# Patient Record
Sex: MALE | Race: WHITE | Employment: OTHER | URBAN - METROPOLITAN AREA
[De-identification: names, ages, dates, MRNs, and addresses within clinical notes are randomized per-mention and may not be internally consistent; named-entity substitution may affect disease eponyms.]

---

## 2018-01-26 PROBLEM — F33.9 MAJOR DEPRESSIVE DISORDER, RECURRENT (HCC): Status: ACTIVE | Noted: 2018-01-26

## 2018-05-18 ENCOUNTER — HOSPITAL ENCOUNTER (INPATIENT)
Age: 63
LOS: 4 days | Discharge: HOME | DRG: 140 | End: 2018-05-22
Attending: EMERGENCY MEDICINE | Admitting: INTERNAL MEDICINE
Payer: COMMERCIAL

## 2018-05-18 ENCOUNTER — APPOINTMENT (OUTPATIENT)
Dept: GENERAL RADIOLOGY | Age: 63
DRG: 140 | End: 2018-05-18
Payer: COMMERCIAL

## 2018-05-18 DIAGNOSIS — R06.89 DYSPNEA AND RESPIRATORY ABNORMALITIES: Primary | ICD-10-CM

## 2018-05-18 DIAGNOSIS — J44.1 COPD EXACERBATION (HCC): ICD-10-CM

## 2018-05-18 DIAGNOSIS — R06.00 DYSPNEA AND RESPIRATORY ABNORMALITIES: Primary | ICD-10-CM

## 2018-05-18 PROBLEM — J96.91 RESPIRATORY FAILURE WITH HYPOXIA (HCC): Status: ACTIVE | Noted: 2018-05-18

## 2018-05-18 LAB
AADO2: 22.5 MMHG
ALBUMIN SERPL-MCNC: 4.4 G/DL (ref 3.5–5.2)
ALP BLD-CCNC: 102 U/L (ref 40–129)
ALT SERPL-CCNC: 18 U/L (ref 0–40)
ANION GAP SERPL CALCULATED.3IONS-SCNC: 23 MMOL/L (ref 7–16)
AST SERPL-CCNC: 18 U/L (ref 0–39)
B.E.: -4.7 MMOL/L (ref -3–3)
BASOPHILS ABSOLUTE: 0.11 E9/L (ref 0–0.2)
BASOPHILS RELATIVE PERCENT: 1.2 % (ref 0–2)
BILIRUB SERPL-MCNC: 0.5 MG/DL (ref 0–1.2)
BUN BLDV-MCNC: 9 MG/DL (ref 8–23)
CALCIUM SERPL-MCNC: 9.6 MG/DL (ref 8.6–10.2)
CHLORIDE BLD-SCNC: 98 MMOL/L (ref 98–107)
CK MB: 12.3 NG/ML (ref 0–7.7)
CK MB: 17.9 NG/ML (ref 0–7.7)
CO2: 20 MMOL/L (ref 22–29)
COHB: 0.8 % (ref 0–1.5)
CREAT SERPL-MCNC: 0.8 MG/DL (ref 0.7–1.2)
CRITICAL: ABNORMAL
D DIMER: 449 NG/ML DDU
DATE ANALYZED: ABNORMAL
DATE OF COLLECTION: ABNORMAL
EKG ATRIAL RATE: 111 BPM
EKG P-R INTERVAL: 124 MS
EKG Q-T INTERVAL: 330 MS
EKG QRS DURATION: 82 MS
EKG QTC CALCULATION (BAZETT): 448 MS
EKG R AXIS: -31 DEGREES
EKG T AXIS: 62 DEGREES
EKG VENTRICULAR RATE: 111 BPM
EOSINOPHILS ABSOLUTE: 1.11 E9/L (ref 0.05–0.5)
EOSINOPHILS RELATIVE PERCENT: 12.3 % (ref 0–6)
FILM ARRAY ADENOVIRUS: NORMAL
FILM ARRAY BORDETELLA PERTUSSIS: NORMAL
FILM ARRAY CHLAMYDOPHILIA PNEUMONIAE: NORMAL
FILM ARRAY CORONAVIRUS 229E: NORMAL
FILM ARRAY CORONAVIRUS HKU1: NORMAL
FILM ARRAY CORONAVIRUS NL63: NORMAL
FILM ARRAY CORONAVIRUS OC43: NORMAL
FILM ARRAY INFLUENZA A VIRUS 09H1: NORMAL
FILM ARRAY INFLUENZA A VIRUS H1: NORMAL
FILM ARRAY INFLUENZA A VIRUS H3: NORMAL
FILM ARRAY INFLUENZA A VIRUS: NORMAL
FILM ARRAY INFLUENZA B: NORMAL
FILM ARRAY METAPNEUMOVIRUS: NORMAL
FILM ARRAY MYCOPLASMA PNEUMONIAE: NORMAL
FILM ARRAY PARAINFLUENZA VIRUS 1: NORMAL
FILM ARRAY PARAINFLUENZA VIRUS 2: NORMAL
FILM ARRAY PARAINFLUENZA VIRUS 3: NORMAL
FILM ARRAY PARAINFLUENZA VIRUS 4: NORMAL
FILM ARRAY RESPIRATORY SYNCITIAL VIRUS: NORMAL
FILM ARRAY RHINOVIRUS/ENTEROVIRUS: NORMAL
FIO2: 60 %
GFR AFRICAN AMERICAN: >60
GFR NON-AFRICAN AMERICAN: >60 ML/MIN/1.73
GLUCOSE BLD-MCNC: 284 MG/DL (ref 74–109)
HCO3: 19.5 MMOL/L (ref 22–26)
HCT VFR BLD CALC: 42.5 % (ref 37–54)
HEMOGLOBIN: 14 G/DL (ref 12.5–16.5)
HHB: 0.4 % (ref 0–5)
IMMATURE GRANULOCYTES #: 0.02 E9/L
IMMATURE GRANULOCYTES %: 0.2 % (ref 0–5)
INFLUENZA A BY PCR: NOT DETECTED
INFLUENZA B BY PCR: NOT DETECTED
INR BLD: 1.5
LAB: ABNORMAL
LACTIC ACID: 1.2 MMOL/L (ref 0.5–2.2)
LACTIC ACID: 7.3 MMOL/L (ref 0.5–2.2)
LYMPHOCYTES ABSOLUTE: 2.22 E9/L (ref 1.5–4)
LYMPHOCYTES RELATIVE PERCENT: 24.6 % (ref 20–42)
Lab: 1008
MAGNESIUM: 2.1 MG/DL (ref 1.6–2.6)
MCH RBC QN AUTO: 29.3 PG (ref 26–35)
MCHC RBC AUTO-ENTMCNC: 32.9 % (ref 32–34.5)
MCV RBC AUTO: 88.9 FL (ref 80–99.9)
METER GLUCOSE: 161 MG/DL (ref 70–110)
METHB: 0.6 % (ref 0–1.5)
MODE: ABNORMAL
MONOCYTES ABSOLUTE: 0.74 E9/L (ref 0.1–0.95)
MONOCYTES RELATIVE PERCENT: 8.2 % (ref 2–12)
NEUTROPHILS ABSOLUTE: 4.82 E9/L (ref 1.8–7.3)
NEUTROPHILS RELATIVE PERCENT: 53.5 % (ref 43–80)
O2 CONTENT: 21.6 ML/DL
O2 SATURATION: 99.6 % (ref 92–98.5)
O2HB: 98.2 % (ref 94–97)
OPERATOR ID: 366
PATIENT TEMP: 37 C
PCO2: 34 MMHG (ref 35–45)
PDW BLD-RTO: 13.7 FL (ref 11.5–15)
PEEP/CPAP: 6 CMH?O
PFO2: 5.88 MMHG/%
PH BLOOD GAS: 7.38 (ref 7.35–7.45)
PHOSPHORUS: 2.3 MG/DL (ref 2.5–4.5)
PLATELET # BLD: 290 E9/L (ref 130–450)
PMV BLD AUTO: 11.9 FL (ref 7–12)
PO2: 352.9 MMHG (ref 60–100)
POTASSIUM SERPL-SCNC: 4.7 MMOL/L (ref 3.5–5)
PRO-BNP: 564 PG/ML (ref 0–125)
PROTHROMBIN TIME: 17 SEC (ref 9.3–12.4)
PS: 18 CMH20
RBC # BLD: 4.78 E12/L (ref 3.8–5.8)
RI(T): 6 %
SODIUM BLD-SCNC: 141 MMOL/L (ref 132–146)
SOURCE, BLOOD GAS: ABNORMAL
THB: 15 G/DL (ref 11.5–16.5)
TIME ANALYZED: 1011
TOTAL CK: 108 U/L (ref 20–200)
TOTAL CK: 153 U/L (ref 20–200)
TOTAL PROTEIN: 7.9 G/DL (ref 6.4–8.3)
TROPONIN: 0.45 NG/ML (ref 0–0.03)
TROPONIN: 0.49 NG/ML (ref 0–0.03)
TROPONIN: <0.01 NG/ML (ref 0–0.03)
WBC # BLD: 9 E9/L (ref 4.5–11.5)

## 2018-05-18 PROCEDURE — 82805 BLOOD GASES W/O2 SATURATION: CPT

## 2018-05-18 PROCEDURE — 6360000002 HC RX W HCPCS: Performed by: INTERNAL MEDICINE

## 2018-05-18 PROCEDURE — 84100 ASSAY OF PHOSPHORUS: CPT

## 2018-05-18 PROCEDURE — 6370000000 HC RX 637 (ALT 250 FOR IP): Performed by: EMERGENCY MEDICINE

## 2018-05-18 PROCEDURE — 82550 ASSAY OF CK (CPK): CPT

## 2018-05-18 PROCEDURE — 87486 CHLMYD PNEUM DNA AMP PROBE: CPT

## 2018-05-18 PROCEDURE — 83880 ASSAY OF NATRIURETIC PEPTIDE: CPT

## 2018-05-18 PROCEDURE — 83605 ASSAY OF LACTIC ACID: CPT

## 2018-05-18 PROCEDURE — 94660 CPAP INITIATION&MGMT: CPT

## 2018-05-18 PROCEDURE — 87503 INFLUENZA DNA AMP PROB ADDL: CPT

## 2018-05-18 PROCEDURE — 85610 PROTHROMBIN TIME: CPT

## 2018-05-18 PROCEDURE — 6370000000 HC RX 637 (ALT 250 FOR IP): Performed by: INTERNAL MEDICINE

## 2018-05-18 PROCEDURE — 84484 ASSAY OF TROPONIN QUANT: CPT

## 2018-05-18 PROCEDURE — 82553 CREATINE MB FRACTION: CPT

## 2018-05-18 PROCEDURE — 94640 AIRWAY INHALATION TREATMENT: CPT

## 2018-05-18 PROCEDURE — 87581 M.PNEUMON DNA AMP PROBE: CPT

## 2018-05-18 PROCEDURE — 87501 INFLUENZA DNA AMP PROB 1+: CPT

## 2018-05-18 PROCEDURE — 6360000002 HC RX W HCPCS: Performed by: EMERGENCY MEDICINE

## 2018-05-18 PROCEDURE — 87502 INFLUENZA DNA AMP PROBE: CPT

## 2018-05-18 PROCEDURE — 94664 DEMO&/EVAL PT USE INHALER: CPT

## 2018-05-18 PROCEDURE — 2700000000 HC OXYGEN THERAPY PER DAY

## 2018-05-18 PROCEDURE — 87040 BLOOD CULTURE FOR BACTERIA: CPT

## 2018-05-18 PROCEDURE — 99285 EMERGENCY DEPT VISIT HI MDM: CPT

## 2018-05-18 PROCEDURE — 80053 COMPREHEN METABOLIC PANEL: CPT

## 2018-05-18 PROCEDURE — 93005 ELECTROCARDIOGRAM TRACING: CPT | Performed by: EMERGENCY MEDICINE

## 2018-05-18 PROCEDURE — 36415 COLL VENOUS BLD VENIPUNCTURE: CPT

## 2018-05-18 PROCEDURE — 85378 FIBRIN DEGRADE SEMIQUANT: CPT

## 2018-05-18 PROCEDURE — 71045 X-RAY EXAM CHEST 1 VIEW: CPT

## 2018-05-18 PROCEDURE — 82962 GLUCOSE BLOOD TEST: CPT

## 2018-05-18 PROCEDURE — 85025 COMPLETE CBC W/AUTO DIFF WBC: CPT

## 2018-05-18 PROCEDURE — 2060000000 HC ICU INTERMEDIATE R&B

## 2018-05-18 PROCEDURE — 2580000003 HC RX 258: Performed by: INTERNAL MEDICINE

## 2018-05-18 PROCEDURE — 99254 IP/OBS CNSLTJ NEW/EST MOD 60: CPT | Performed by: INTERNAL MEDICINE

## 2018-05-18 PROCEDURE — 2580000003 HC RX 258: Performed by: EMERGENCY MEDICINE

## 2018-05-18 PROCEDURE — 87798 DETECT AGENT NOS DNA AMP: CPT

## 2018-05-18 PROCEDURE — 94760 N-INVAS EAR/PLS OXIMETRY 1: CPT

## 2018-05-18 PROCEDURE — 83735 ASSAY OF MAGNESIUM: CPT

## 2018-05-18 RX ORDER — IPRATROPIUM BROMIDE 42 UG/1
2 SPRAY, METERED NASAL 2 TIMES DAILY PRN
Refills: 0 | Status: ON HOLD | COMMUNITY
Start: 2018-05-02 | End: 2018-05-22

## 2018-05-18 RX ORDER — SODIUM CHLORIDE 0.9 % (FLUSH) 0.9 %
10 SYRINGE (ML) INJECTION EVERY 12 HOURS SCHEDULED
Status: DISCONTINUED | OUTPATIENT
Start: 2018-05-18 | End: 2018-05-22 | Stop reason: HOSPADM

## 2018-05-18 RX ORDER — NICOTINE 21 MG/24HR
1 PATCH, TRANSDERMAL 24 HOURS TRANSDERMAL DAILY
Status: DISCONTINUED | OUTPATIENT
Start: 2018-05-18 | End: 2018-05-22 | Stop reason: HOSPADM

## 2018-05-18 RX ORDER — METHYLPREDNISOLONE SODIUM SUCCINATE 40 MG/ML
40 INJECTION, POWDER, LYOPHILIZED, FOR SOLUTION INTRAMUSCULAR; INTRAVENOUS EVERY 6 HOURS
Status: DISCONTINUED | OUTPATIENT
Start: 2018-05-18 | End: 2018-05-19

## 2018-05-18 RX ORDER — FINASTERIDE 5 MG/1
5 TABLET, FILM COATED ORAL DAILY
Status: DISCONTINUED | OUTPATIENT
Start: 2018-05-18 | End: 2018-05-22 | Stop reason: HOSPADM

## 2018-05-18 RX ORDER — MONTELUKAST SODIUM 10 MG/1
10 TABLET ORAL DAILY
Status: DISCONTINUED | OUTPATIENT
Start: 2018-05-18 | End: 2018-05-22 | Stop reason: HOSPADM

## 2018-05-18 RX ORDER — LORAZEPAM 1 MG/1
1 TABLET ORAL 2 TIMES DAILY PRN
Status: DISCONTINUED | OUTPATIENT
Start: 2018-05-18 | End: 2018-05-22 | Stop reason: HOSPADM

## 2018-05-18 RX ORDER — IPRATROPIUM BROMIDE AND ALBUTEROL SULFATE 2.5; .5 MG/3ML; MG/3ML
3 SOLUTION RESPIRATORY (INHALATION) ONCE
Status: COMPLETED | OUTPATIENT
Start: 2018-05-18 | End: 2018-05-18

## 2018-05-18 RX ORDER — LEVETIRACETAM 500 MG/1
500 TABLET ORAL 2 TIMES DAILY
Status: DISCONTINUED | OUTPATIENT
Start: 2018-05-18 | End: 2018-05-20

## 2018-05-18 RX ORDER — 0.9 % SODIUM CHLORIDE 0.9 %
500 INTRAVENOUS SOLUTION INTRAVENOUS ONCE
Status: COMPLETED | OUTPATIENT
Start: 2018-05-18 | End: 2018-05-18

## 2018-05-18 RX ORDER — SODIUM CHLORIDE 0.9 % (FLUSH) 0.9 %
10 SYRINGE (ML) INJECTION PRN
Status: DISCONTINUED | OUTPATIENT
Start: 2018-05-18 | End: 2018-05-22 | Stop reason: HOSPADM

## 2018-05-18 RX ORDER — ONDANSETRON 2 MG/ML
4 INJECTION INTRAMUSCULAR; INTRAVENOUS EVERY 6 HOURS PRN
Status: DISCONTINUED | OUTPATIENT
Start: 2018-05-18 | End: 2018-05-22 | Stop reason: HOSPADM

## 2018-05-18 RX ORDER — WARFARIN SODIUM 5 MG/1
5 TABLET ORAL DAILY
Status: DISCONTINUED | OUTPATIENT
Start: 2018-05-18 | End: 2018-05-18

## 2018-05-18 RX ORDER — ATORVASTATIN CALCIUM 20 MG/1
20 TABLET, FILM COATED ORAL NIGHTLY
Status: DISCONTINUED | OUTPATIENT
Start: 2018-05-18 | End: 2018-05-19

## 2018-05-18 RX ORDER — METHYLPREDNISOLONE SODIUM SUCCINATE 125 MG/2ML
125 INJECTION, POWDER, LYOPHILIZED, FOR SOLUTION INTRAMUSCULAR; INTRAVENOUS ONCE
Status: COMPLETED | OUTPATIENT
Start: 2018-05-18 | End: 2018-05-18

## 2018-05-18 RX ORDER — IPRATROPIUM BROMIDE AND ALBUTEROL SULFATE 2.5; .5 MG/3ML; MG/3ML
1 SOLUTION RESPIRATORY (INHALATION) EVERY 4 HOURS
Status: DISCONTINUED | OUTPATIENT
Start: 2018-05-18 | End: 2018-05-22 | Stop reason: HOSPADM

## 2018-05-18 RX ORDER — VENLAFAXINE HYDROCHLORIDE 75 MG/1
75 CAPSULE, EXTENDED RELEASE ORAL DAILY
Status: DISCONTINUED | OUTPATIENT
Start: 2018-05-18 | End: 2018-05-20

## 2018-05-18 RX ORDER — LEVETIRACETAM 250 MG/1
2 TABLET ORAL 2 TIMES DAILY
Refills: 0 | Status: ON HOLD | COMMUNITY
Start: 2018-05-14 | End: 2018-05-22

## 2018-05-18 RX ORDER — ALBUTEROL SULFATE 2.5 MG/3ML
3 SOLUTION RESPIRATORY (INHALATION) PRN
Refills: 0 | Status: ON HOLD | COMMUNITY
Start: 2018-05-17 | End: 2018-05-22

## 2018-05-18 RX ORDER — CLOPIDOGREL BISULFATE 75 MG/1
75 TABLET ORAL DAILY
Status: DISCONTINUED | OUTPATIENT
Start: 2018-05-18 | End: 2018-05-22

## 2018-05-18 RX ORDER — LORAZEPAM 1 MG/1
1 TABLET ORAL 2 TIMES DAILY PRN
Refills: 0 | Status: ON HOLD | COMMUNITY
Start: 2018-04-25 | End: 2018-05-22 | Stop reason: HOSPADM

## 2018-05-18 RX ORDER — WARFARIN SODIUM 5 MG/1
5 TABLET ORAL
Status: DISCONTINUED | OUTPATIENT
Start: 2018-05-18 | End: 2018-05-18

## 2018-05-18 RX ORDER — VENLAFAXINE HYDROCHLORIDE 37.5 MG/1
37.5 TABLET, EXTENDED RELEASE ORAL EVERY EVENING
Refills: 0 | Status: ON HOLD | COMMUNITY
Start: 2018-04-25 | End: 2021-09-02 | Stop reason: HOSPADM

## 2018-05-18 RX ADMIN — LORAZEPAM 1 MG: 1 TABLET ORAL at 14:58

## 2018-05-18 RX ADMIN — LEVETIRACETAM 500 MG: 500 TABLET, FILM COATED ORAL at 20:30

## 2018-05-18 RX ADMIN — VENLAFAXINE HYDROCHLORIDE 75 MG: 75 CAPSULE, EXTENDED RELEASE ORAL at 16:22

## 2018-05-18 RX ADMIN — FINASTERIDE 5 MG: 5 TABLET, FILM COATED ORAL at 16:22

## 2018-05-18 RX ADMIN — IPRATROPIUM BROMIDE AND ALBUTEROL SULFATE 1 AMPULE: 2.5; .5 SOLUTION RESPIRATORY (INHALATION) at 20:14

## 2018-05-18 RX ADMIN — ENOXAPARIN SODIUM 40 MG: 40 INJECTION SUBCUTANEOUS at 14:58

## 2018-05-18 RX ADMIN — METHYLPREDNISOLONE SODIUM SUCCINATE 125 MG: 125 INJECTION, POWDER, FOR SOLUTION INTRAMUSCULAR; INTRAVENOUS at 10:00

## 2018-05-18 RX ADMIN — LEVETIRACETAM 500 MG: 500 TABLET, FILM COATED ORAL at 14:58

## 2018-05-18 RX ADMIN — MONTELUKAST SODIUM 10 MG: 10 TABLET, FILM COATED ORAL at 16:22

## 2018-05-18 RX ADMIN — METHYLPREDNISOLONE SODIUM SUCCINATE 40 MG: 40 INJECTION, POWDER, FOR SOLUTION INTRAMUSCULAR; INTRAVENOUS at 17:48

## 2018-05-18 RX ADMIN — POTASSIUM & SODIUM PHOSPHATES POWDER PACK 280-160-250 MG 500 MG: 280-160-250 PACK at 20:30

## 2018-05-18 RX ADMIN — IPRATROPIUM BROMIDE AND ALBUTEROL SULFATE 1 AMPULE: 2.5; .5 SOLUTION RESPIRATORY (INHALATION) at 16:04

## 2018-05-18 RX ADMIN — IPRATROPIUM BROMIDE AND ALBUTEROL SULFATE 1 AMPULE: 2.5; .5 SOLUTION RESPIRATORY (INHALATION) at 23:19

## 2018-05-18 RX ADMIN — ATORVASTATIN CALCIUM 20 MG: 20 TABLET, FILM COATED ORAL at 20:58

## 2018-05-18 RX ADMIN — SODIUM CHLORIDE 500 ML: 9 INJECTION, SOLUTION INTRAVENOUS at 11:12

## 2018-05-18 RX ADMIN — Medication 10 ML: at 20:30

## 2018-05-18 RX ADMIN — IPRATROPIUM BROMIDE AND ALBUTEROL SULFATE 3 AMPULE: 2.5; .5 SOLUTION RESPIRATORY (INHALATION) at 09:42

## 2018-05-18 RX ADMIN — CLOPIDOGREL 75 MG: 75 TABLET, FILM COATED ORAL at 11:57

## 2018-05-18 RX ADMIN — ENOXAPARIN SODIUM 60 MG: 60 INJECTION SUBCUTANEOUS at 20:30

## 2018-05-18 ASSESSMENT — PAIN DESCRIPTION - FREQUENCY: FREQUENCY: CONTINUOUS

## 2018-05-18 ASSESSMENT — ENCOUNTER SYMPTOMS
RHINORRHEA: 0
NAUSEA: 0
BLOOD IN STOOL: 0
ABDOMINAL PAIN: 0
DIARRHEA: 0
COLOR CHANGE: 0
SHORTNESS OF BREATH: 1
SORE THROAT: 0
BACK PAIN: 0
VOMITING: 0
EYE PAIN: 0
CHEST TIGHTNESS: 0
COUGH: 1

## 2018-05-18 ASSESSMENT — PAIN DESCRIPTION - PAIN TYPE: TYPE: ACUTE PAIN

## 2018-05-18 ASSESSMENT — PAIN SCALES - GENERAL
PAINLEVEL_OUTOF10: 0
PAINLEVEL_OUTOF10: 2

## 2018-05-18 ASSESSMENT — PAIN DESCRIPTION - DESCRIPTORS: DESCRIPTORS: SHARP

## 2018-05-18 ASSESSMENT — PAIN DESCRIPTION - LOCATION: LOCATION: CHEST

## 2018-05-19 ENCOUNTER — APPOINTMENT (OUTPATIENT)
Dept: NUCLEAR MEDICINE | Age: 63
DRG: 140 | End: 2018-05-19
Payer: COMMERCIAL

## 2018-05-19 LAB
ANION GAP SERPL CALCULATED.3IONS-SCNC: 13 MMOL/L (ref 7–16)
BASOPHILS ABSOLUTE: 0.01 E9/L (ref 0–0.2)
BASOPHILS RELATIVE PERCENT: 0.1 % (ref 0–2)
BUN BLDV-MCNC: 15 MG/DL (ref 8–23)
CALCIUM SERPL-MCNC: 9 MG/DL (ref 8.6–10.2)
CHLORIDE BLD-SCNC: 102 MMOL/L (ref 98–107)
CHOLESTEROL, TOTAL: 133 MG/DL (ref 0–199)
CK MB: 20.4 NG/ML (ref 0–7.7)
CO2: 22 MMOL/L (ref 22–29)
CREAT SERPL-MCNC: 0.7 MG/DL (ref 0.7–1.2)
EOSINOPHILS ABSOLUTE: 0 E9/L (ref 0.05–0.5)
EOSINOPHILS RELATIVE PERCENT: 0 % (ref 0–6)
GFR AFRICAN AMERICAN: >60
GFR NON-AFRICAN AMERICAN: >60 ML/MIN/1.73
GLUCOSE BLD-MCNC: 195 MG/DL (ref 74–109)
HBA1C MFR BLD: 5.8 % (ref 4.8–5.9)
HCT VFR BLD CALC: 37.8 % (ref 37–54)
HDLC SERPL-MCNC: 54 MG/DL
HEMOGLOBIN: 12.9 G/DL (ref 12.5–16.5)
IMMATURE GRANULOCYTES #: 0.06 E9/L
IMMATURE GRANULOCYTES %: 0.5 % (ref 0–5)
INR BLD: 1.5
LDL CHOLESTEROL CALCULATED: 71 MG/DL (ref 0–99)
LV EF: 35 %
LVEF MODALITY: NORMAL
LYMPHOCYTES ABSOLUTE: 0.65 E9/L (ref 1.5–4)
LYMPHOCYTES RELATIVE PERCENT: 5.4 % (ref 20–42)
MCH RBC QN AUTO: 29.4 PG (ref 26–35)
MCHC RBC AUTO-ENTMCNC: 34.1 % (ref 32–34.5)
MCV RBC AUTO: 86.1 FL (ref 80–99.9)
METER GLUCOSE: 148 MG/DL (ref 70–110)
METER GLUCOSE: 150 MG/DL (ref 70–110)
METER GLUCOSE: 165 MG/DL (ref 70–110)
METER GLUCOSE: 198 MG/DL (ref 70–110)
MONOCYTES ABSOLUTE: 0.62 E9/L (ref 0.1–0.95)
MONOCYTES RELATIVE PERCENT: 5.2 % (ref 2–12)
NEUTROPHILS ABSOLUTE: 10.66 E9/L (ref 1.8–7.3)
NEUTROPHILS RELATIVE PERCENT: 88.8 % (ref 43–80)
PDW BLD-RTO: 13.4 FL (ref 11.5–15)
PLATELET # BLD: 220 E9/L (ref 130–450)
PMV BLD AUTO: 12.2 FL (ref 7–12)
POTASSIUM REFLEX MAGNESIUM: 4.1 MMOL/L (ref 3.5–5)
PROTHROMBIN TIME: 17.5 SEC (ref 9.3–12.4)
RBC # BLD: 4.39 E12/L (ref 3.8–5.8)
SODIUM BLD-SCNC: 137 MMOL/L (ref 132–146)
TOTAL CK: 173 U/L (ref 20–200)
TRIGL SERPL-MCNC: 42 MG/DL (ref 0–149)
TROPONIN: 0.47 NG/ML (ref 0–0.03)
VLDLC SERPL CALC-MCNC: 8 MG/DL
WBC # BLD: 12 E9/L (ref 4.5–11.5)

## 2018-05-19 PROCEDURE — 93005 ELECTROCARDIOGRAM TRACING: CPT | Performed by: INTERNAL MEDICINE

## 2018-05-19 PROCEDURE — 99223 1ST HOSP IP/OBS HIGH 75: CPT | Performed by: INTERNAL MEDICINE

## 2018-05-19 PROCEDURE — 80061 LIPID PANEL: CPT

## 2018-05-19 PROCEDURE — 82553 CREATINE MB FRACTION: CPT

## 2018-05-19 PROCEDURE — 93306 TTE W/DOPPLER COMPLETE: CPT

## 2018-05-19 PROCEDURE — 82962 GLUCOSE BLOOD TEST: CPT

## 2018-05-19 PROCEDURE — 6370000000 HC RX 637 (ALT 250 FOR IP): Performed by: INTERNAL MEDICINE

## 2018-05-19 PROCEDURE — A9558 XE133 XENON 10MCI: HCPCS | Performed by: RADIOLOGY

## 2018-05-19 PROCEDURE — 83036 HEMOGLOBIN GLYCOSYLATED A1C: CPT

## 2018-05-19 PROCEDURE — 85610 PROTHROMBIN TIME: CPT

## 2018-05-19 PROCEDURE — A9540 TC99M MAA: HCPCS | Performed by: RADIOLOGY

## 2018-05-19 PROCEDURE — 6360000002 HC RX W HCPCS: Performed by: INTERNAL MEDICINE

## 2018-05-19 PROCEDURE — 36415 COLL VENOUS BLD VENIPUNCTURE: CPT

## 2018-05-19 PROCEDURE — 78580 LUNG PERFUSION IMAGING: CPT

## 2018-05-19 PROCEDURE — 80048 BASIC METABOLIC PNL TOTAL CA: CPT

## 2018-05-19 PROCEDURE — 87070 CULTURE OTHR SPECIMN AEROBIC: CPT

## 2018-05-19 PROCEDURE — 2700000000 HC OXYGEN THERAPY PER DAY

## 2018-05-19 PROCEDURE — 85025 COMPLETE CBC W/AUTO DIFF WBC: CPT

## 2018-05-19 PROCEDURE — 2580000003 HC RX 258: Performed by: INTERNAL MEDICINE

## 2018-05-19 PROCEDURE — 2060000000 HC ICU INTERMEDIATE R&B

## 2018-05-19 PROCEDURE — 94640 AIRWAY INHALATION TREATMENT: CPT

## 2018-05-19 PROCEDURE — 94760 N-INVAS EAR/PLS OXIMETRY 1: CPT

## 2018-05-19 PROCEDURE — 87205 SMEAR GRAM STAIN: CPT

## 2018-05-19 PROCEDURE — 82550 ASSAY OF CK (CPK): CPT

## 2018-05-19 PROCEDURE — 3430000000 HC RX DIAGNOSTIC RADIOPHARMACEUTICAL: Performed by: RADIOLOGY

## 2018-05-19 PROCEDURE — 84484 ASSAY OF TROPONIN QUANT: CPT

## 2018-05-19 RX ORDER — METOPROLOL SUCCINATE 25 MG/1
25 TABLET, EXTENDED RELEASE ORAL 2 TIMES DAILY
Status: DISCONTINUED | OUTPATIENT
Start: 2018-05-19 | End: 2018-05-22

## 2018-05-19 RX ORDER — DEXTROSE MONOHYDRATE 25 G/50ML
12.5 INJECTION, SOLUTION INTRAVENOUS PRN
Status: DISCONTINUED | OUTPATIENT
Start: 2018-05-19 | End: 2018-05-22 | Stop reason: HOSPADM

## 2018-05-19 RX ORDER — FUROSEMIDE 10 MG/ML
20 INJECTION INTRAMUSCULAR; INTRAVENOUS ONCE
Status: COMPLETED | OUTPATIENT
Start: 2018-05-19 | End: 2018-05-19

## 2018-05-19 RX ORDER — SPIRONOLACTONE 25 MG/1
25 TABLET ORAL DAILY
Status: DISCONTINUED | OUTPATIENT
Start: 2018-05-19 | End: 2018-05-22 | Stop reason: HOSPADM

## 2018-05-19 RX ORDER — NITROGLYCERIN 0.4 MG/1
TABLET SUBLINGUAL
Status: DISCONTINUED
Start: 2018-05-19 | End: 2018-05-19 | Stop reason: WASHOUT

## 2018-05-19 RX ORDER — DIGOXIN 125 MCG
125 TABLET ORAL DAILY
Status: DISCONTINUED | OUTPATIENT
Start: 2018-05-19 | End: 2018-05-22 | Stop reason: HOSPADM

## 2018-05-19 RX ORDER — BUDESONIDE 0.5 MG/2ML
500 INHALANT ORAL 2 TIMES DAILY
Status: DISCONTINUED | OUTPATIENT
Start: 2018-05-19 | End: 2018-05-22 | Stop reason: HOSPADM

## 2018-05-19 RX ORDER — ATORVASTATIN CALCIUM 40 MG/1
40 TABLET, FILM COATED ORAL NIGHTLY
Status: DISCONTINUED | OUTPATIENT
Start: 2018-05-19 | End: 2018-05-22 | Stop reason: HOSPADM

## 2018-05-19 RX ORDER — PREDNISONE 20 MG/1
40 TABLET ORAL DAILY
Status: DISCONTINUED | OUTPATIENT
Start: 2018-05-19 | End: 2018-05-22 | Stop reason: HOSPADM

## 2018-05-19 RX ORDER — NICOTINE POLACRILEX 4 MG
15 LOZENGE BUCCAL PRN
Status: DISCONTINUED | OUTPATIENT
Start: 2018-05-19 | End: 2018-05-22 | Stop reason: HOSPADM

## 2018-05-19 RX ORDER — FORMOTEROL FUMARATE 20 UG/2ML
20 SOLUTION RESPIRATORY (INHALATION) EVERY 12 HOURS
Status: DISCONTINUED | OUTPATIENT
Start: 2018-05-19 | End: 2018-05-22 | Stop reason: HOSPADM

## 2018-05-19 RX ORDER — DEXTROSE MONOHYDRATE 50 MG/ML
100 INJECTION, SOLUTION INTRAVENOUS PRN
Status: DISCONTINUED | OUTPATIENT
Start: 2018-05-19 | End: 2018-05-22 | Stop reason: HOSPADM

## 2018-05-19 RX ADMIN — IPRATROPIUM BROMIDE AND ALBUTEROL SULFATE 1 AMPULE: 2.5; .5 SOLUTION RESPIRATORY (INHALATION) at 13:52

## 2018-05-19 RX ADMIN — CLOPIDOGREL 75 MG: 75 TABLET, FILM COATED ORAL at 07:54

## 2018-05-19 RX ADMIN — LEVETIRACETAM 500 MG: 500 TABLET, FILM COATED ORAL at 20:55

## 2018-05-19 RX ADMIN — SPIRONOLACTONE 25 MG: 25 TABLET ORAL at 17:37

## 2018-05-19 RX ADMIN — IPRATROPIUM BROMIDE AND ALBUTEROL SULFATE 1 AMPULE: 2.5; .5 SOLUTION RESPIRATORY (INHALATION) at 18:33

## 2018-05-19 RX ADMIN — IPRATROPIUM BROMIDE AND ALBUTEROL SULFATE 1 AMPULE: 2.5; .5 SOLUTION RESPIRATORY (INHALATION) at 09:16

## 2018-05-19 RX ADMIN — INSULIN LISPRO 1 UNITS: 100 INJECTION, SOLUTION INTRAVENOUS; SUBCUTANEOUS at 20:59

## 2018-05-19 RX ADMIN — VENLAFAXINE HYDROCHLORIDE 75 MG: 75 CAPSULE, EXTENDED RELEASE ORAL at 07:54

## 2018-05-19 RX ADMIN — LORAZEPAM 1 MG: 1 TABLET ORAL at 00:11

## 2018-05-19 RX ADMIN — LORAZEPAM 1 MG: 1 TABLET ORAL at 20:55

## 2018-05-19 RX ADMIN — NITROGLYCERIN 0.5 INCH: 20 OINTMENT TOPICAL at 17:26

## 2018-05-19 RX ADMIN — IPRATROPIUM BROMIDE AND ALBUTEROL SULFATE 1 AMPULE: 2.5; .5 SOLUTION RESPIRATORY (INHALATION) at 04:23

## 2018-05-19 RX ADMIN — FUROSEMIDE 20 MG: 10 INJECTION INTRAMUSCULAR; INTRAVENOUS at 17:07

## 2018-05-19 RX ADMIN — METOPROLOL SUCCINATE 25 MG: 25 TABLET, FILM COATED, EXTENDED RELEASE ORAL at 20:55

## 2018-05-19 RX ADMIN — Medication 10 ML: at 20:55

## 2018-05-19 RX ADMIN — ENOXAPARIN SODIUM 60 MG: 60 INJECTION SUBCUTANEOUS at 20:55

## 2018-05-19 RX ADMIN — INSULIN LISPRO 1 UNITS: 100 INJECTION, SOLUTION INTRAVENOUS; SUBCUTANEOUS at 12:22

## 2018-05-19 RX ADMIN — PREDNISONE 40 MG: 20 TABLET ORAL at 09:47

## 2018-05-19 RX ADMIN — BUDESONIDE 500 MCG: 0.5 SUSPENSION RESPIRATORY (INHALATION) at 22:03

## 2018-05-19 RX ADMIN — FINASTERIDE 5 MG: 5 TABLET, FILM COATED ORAL at 07:54

## 2018-05-19 RX ADMIN — DIGOXIN 125 MCG: 125 TABLET ORAL at 17:07

## 2018-05-19 RX ADMIN — FORMOTEROL FUMARATE DIHYDRATE 20 MCG: 20 SOLUTION RESPIRATORY (INHALATION) at 22:03

## 2018-05-19 RX ADMIN — Medication 8 MILLICURIE: at 10:57

## 2018-05-19 RX ADMIN — INSULIN LISPRO 1 UNITS: 100 INJECTION, SOLUTION INTRAVENOUS; SUBCUTANEOUS at 17:16

## 2018-05-19 RX ADMIN — LEVETIRACETAM 500 MG: 500 TABLET, FILM COATED ORAL at 07:54

## 2018-05-19 RX ADMIN — ENOXAPARIN SODIUM 60 MG: 60 INJECTION SUBCUTANEOUS at 07:54

## 2018-05-19 RX ADMIN — ATORVASTATIN CALCIUM 40 MG: 40 TABLET, FILM COATED ORAL at 20:55

## 2018-05-19 RX ADMIN — Medication 35 MILLICURIE: at 10:48

## 2018-05-19 RX ADMIN — NITROGLYCERIN 0.5 INCH: 20 OINTMENT TOPICAL at 23:08

## 2018-05-19 RX ADMIN — LORAZEPAM 1 MG: 1 TABLET ORAL at 07:55

## 2018-05-19 RX ADMIN — MONTELUKAST SODIUM 10 MG: 10 TABLET, FILM COATED ORAL at 07:54

## 2018-05-19 ASSESSMENT — PAIN SCALES - GENERAL
PAINLEVEL_OUTOF10: 0

## 2018-05-20 PROBLEM — E43 SEVERE PROTEIN-CALORIE MALNUTRITION (HCC): Chronic | Status: ACTIVE | Noted: 2018-05-20

## 2018-05-20 LAB
ANION GAP SERPL CALCULATED.3IONS-SCNC: 11 MMOL/L (ref 7–16)
BUN BLDV-MCNC: 13 MG/DL (ref 8–23)
CALCIUM SERPL-MCNC: 8.9 MG/DL (ref 8.6–10.2)
CHLORIDE BLD-SCNC: 100 MMOL/L (ref 98–107)
CO2: 28 MMOL/L (ref 22–29)
CREAT SERPL-MCNC: 0.7 MG/DL (ref 0.7–1.2)
EKG ATRIAL RATE: 89 BPM
EKG ATRIAL RATE: 97 BPM
EKG P-R INTERVAL: 122 MS
EKG P-R INTERVAL: 128 MS
EKG Q-T INTERVAL: 356 MS
EKG Q-T INTERVAL: 380 MS
EKG QRS DURATION: 84 MS
EKG QRS DURATION: 90 MS
EKG QTC CALCULATION (BAZETT): 433 MS
EKG QTC CALCULATION (BAZETT): 482 MS
EKG R AXIS: -36 DEGREES
EKG R AXIS: -44 DEGREES
EKG T AXIS: -152 DEGREES
EKG T AXIS: 103 DEGREES
EKG VENTRICULAR RATE: 89 BPM
EKG VENTRICULAR RATE: 97 BPM
GFR AFRICAN AMERICAN: >60
GFR NON-AFRICAN AMERICAN: >60 ML/MIN/1.73
GLUCOSE BLD-MCNC: 110 MG/DL (ref 74–109)
GRAM STAIN ORDERABLE: NORMAL
INR BLD: 1.2
METER GLUCOSE: 109 MG/DL (ref 70–110)
METER GLUCOSE: 114 MG/DL (ref 70–110)
METER GLUCOSE: 158 MG/DL (ref 70–110)
METER GLUCOSE: 257 MG/DL (ref 70–110)
POTASSIUM SERPL-SCNC: 3.7 MMOL/L (ref 3.5–5)
PROTHROMBIN TIME: 13.4 SEC (ref 9.3–12.4)
SODIUM BLD-SCNC: 139 MMOL/L (ref 132–146)

## 2018-05-20 PROCEDURE — 80048 BASIC METABOLIC PNL TOTAL CA: CPT

## 2018-05-20 PROCEDURE — 36415 COLL VENOUS BLD VENIPUNCTURE: CPT

## 2018-05-20 PROCEDURE — 6370000000 HC RX 637 (ALT 250 FOR IP): Performed by: INTERNAL MEDICINE

## 2018-05-20 PROCEDURE — 6360000002 HC RX W HCPCS: Performed by: INTERNAL MEDICINE

## 2018-05-20 PROCEDURE — 93010 ELECTROCARDIOGRAM REPORT: CPT | Performed by: INTERNAL MEDICINE

## 2018-05-20 PROCEDURE — 2580000003 HC RX 258: Performed by: INTERNAL MEDICINE

## 2018-05-20 PROCEDURE — 2060000000 HC ICU INTERMEDIATE R&B

## 2018-05-20 PROCEDURE — 94640 AIRWAY INHALATION TREATMENT: CPT

## 2018-05-20 PROCEDURE — 85610 PROTHROMBIN TIME: CPT

## 2018-05-20 PROCEDURE — 82962 GLUCOSE BLOOD TEST: CPT

## 2018-05-20 PROCEDURE — 99233 SBSQ HOSP IP/OBS HIGH 50: CPT | Performed by: INTERNAL MEDICINE

## 2018-05-20 PROCEDURE — 2700000000 HC OXYGEN THERAPY PER DAY

## 2018-05-20 RX ORDER — DIPHENHYDRAMINE HCL 25 MG
25 TABLET ORAL 3 TIMES DAILY
Status: DISCONTINUED | OUTPATIENT
Start: 2018-05-20 | End: 2018-05-22

## 2018-05-20 RX ORDER — FAMOTIDINE 20 MG/1
20 TABLET, FILM COATED ORAL 2 TIMES DAILY
Status: DISCONTINUED | OUTPATIENT
Start: 2018-05-20 | End: 2018-05-22

## 2018-05-20 RX ADMIN — ENOXAPARIN SODIUM 60 MG: 60 INJECTION SUBCUTANEOUS at 08:07

## 2018-05-20 RX ADMIN — INSULIN LISPRO 3 UNITS: 100 INJECTION, SOLUTION INTRAVENOUS; SUBCUTANEOUS at 17:51

## 2018-05-20 RX ADMIN — FAMOTIDINE 20 MG: 20 TABLET ORAL at 11:37

## 2018-05-20 RX ADMIN — PREDNISONE 40 MG: 20 TABLET ORAL at 08:06

## 2018-05-20 RX ADMIN — ENOXAPARIN SODIUM 60 MG: 60 INJECTION SUBCUTANEOUS at 21:15

## 2018-05-20 RX ADMIN — BUDESONIDE 500 MCG: 0.5 SUSPENSION RESPIRATORY (INHALATION) at 19:58

## 2018-05-20 RX ADMIN — Medication 10 ML: at 21:15

## 2018-05-20 RX ADMIN — DIGOXIN 125 MCG: 125 TABLET ORAL at 08:04

## 2018-05-20 RX ADMIN — INSULIN LISPRO 1 UNITS: 100 INJECTION, SOLUTION INTRAVENOUS; SUBCUTANEOUS at 21:16

## 2018-05-20 RX ADMIN — NITROGLYCERIN 0.5 INCH: 20 OINTMENT TOPICAL at 12:12

## 2018-05-20 RX ADMIN — NITROGLYCERIN 0.5 INCH: 20 OINTMENT TOPICAL at 23:41

## 2018-05-20 RX ADMIN — LORAZEPAM 1 MG: 1 TABLET ORAL at 21:15

## 2018-05-20 RX ADMIN — FAMOTIDINE 20 MG: 20 TABLET ORAL at 21:21

## 2018-05-20 RX ADMIN — FINASTERIDE 5 MG: 5 TABLET, FILM COATED ORAL at 08:07

## 2018-05-20 RX ADMIN — FORMOTEROL FUMARATE DIHYDRATE 20 MCG: 20 SOLUTION RESPIRATORY (INHALATION) at 08:34

## 2018-05-20 RX ADMIN — MONTELUKAST SODIUM 10 MG: 10 TABLET, FILM COATED ORAL at 08:07

## 2018-05-20 RX ADMIN — METOPROLOL SUCCINATE 25 MG: 25 TABLET, FILM COATED, EXTENDED RELEASE ORAL at 08:04

## 2018-05-20 RX ADMIN — NITROGLYCERIN 0.5 INCH: 20 OINTMENT TOPICAL at 17:42

## 2018-05-20 RX ADMIN — IPRATROPIUM BROMIDE AND ALBUTEROL SULFATE 1 AMPULE: 2.5; .5 SOLUTION RESPIRATORY (INHALATION) at 13:56

## 2018-05-20 RX ADMIN — DIPHENHYDRAMINE HCL 25 MG: 25 TABLET ORAL at 17:34

## 2018-05-20 RX ADMIN — BUDESONIDE 500 MCG: 0.5 SUSPENSION RESPIRATORY (INHALATION) at 08:34

## 2018-05-20 RX ADMIN — LEVETIRACETAM 500 MG: 500 TABLET, FILM COATED ORAL at 08:07

## 2018-05-20 RX ADMIN — LORAZEPAM 1 MG: 1 TABLET ORAL at 08:07

## 2018-05-20 RX ADMIN — ATORVASTATIN CALCIUM 40 MG: 40 TABLET, FILM COATED ORAL at 21:15

## 2018-05-20 RX ADMIN — IPRATROPIUM BROMIDE AND ALBUTEROL SULFATE 1 AMPULE: 2.5; .5 SOLUTION RESPIRATORY (INHALATION) at 04:36

## 2018-05-20 RX ADMIN — Medication 10 ML: at 08:08

## 2018-05-20 RX ADMIN — SPIRONOLACTONE 25 MG: 25 TABLET ORAL at 08:07

## 2018-05-20 RX ADMIN — FORMOTEROL FUMARATE DIHYDRATE 20 MCG: 20 SOLUTION RESPIRATORY (INHALATION) at 19:58

## 2018-05-20 RX ADMIN — CLOPIDOGREL 75 MG: 75 TABLET, FILM COATED ORAL at 08:07

## 2018-05-20 RX ADMIN — VENLAFAXINE HYDROCHLORIDE 75 MG: 75 CAPSULE, EXTENDED RELEASE ORAL at 08:07

## 2018-05-20 RX ADMIN — NITROGLYCERIN 0.5 INCH: 20 OINTMENT TOPICAL at 06:16

## 2018-05-20 RX ADMIN — DIPHENHYDRAMINE HCL 25 MG: 25 TABLET ORAL at 21:15

## 2018-05-20 RX ADMIN — DIPHENHYDRAMINE HCL 25 MG: 25 TABLET ORAL at 11:37

## 2018-05-20 ASSESSMENT — PAIN SCALES - GENERAL
PAINLEVEL_OUTOF10: 0
PAINLEVEL_OUTOF10: 0

## 2018-05-21 PROBLEM — I48.91 A-FIB (HCC): Status: ACTIVE | Noted: 2017-06-09

## 2018-05-21 PROBLEM — N40.0 BENIGN PROSTATIC HYPERPLASIA: Status: ACTIVE | Noted: 2018-05-21

## 2018-05-21 PROBLEM — I10 ESSENTIAL (PRIMARY) HYPERTENSION: Status: ACTIVE | Noted: 2018-05-21

## 2018-05-21 PROBLEM — J45.909 ASTHMA: Status: ACTIVE | Noted: 2018-05-21

## 2018-05-21 PROBLEM — E55.9 VITAMIN D DEFICIENCY: Status: ACTIVE | Noted: 2018-05-21

## 2018-05-21 PROBLEM — J30.2 SEASONAL ALLERGIC RHINITIS: Status: ACTIVE | Noted: 2018-05-21

## 2018-05-21 PROBLEM — F19.11 H/O DRUG ABUSE (HCC): Status: ACTIVE | Noted: 2018-05-21

## 2018-05-21 PROBLEM — K27.9 GASTRODUODENAL ULCER: Status: ACTIVE | Noted: 2018-05-21

## 2018-05-21 PROBLEM — E11.9 TYPE 2 DIABETES MELLITUS (HCC): Status: ACTIVE | Noted: 2018-05-21

## 2018-05-21 LAB
ABO/RH: NORMAL
ANION GAP SERPL CALCULATED.3IONS-SCNC: 10 MMOL/L (ref 7–16)
ANTIBODY SCREEN: NORMAL
BUN BLDV-MCNC: 14 MG/DL (ref 8–23)
CALCIUM SERPL-MCNC: 9.1 MG/DL (ref 8.6–10.2)
CHLORIDE BLD-SCNC: 104 MMOL/L (ref 98–107)
CO2: 28 MMOL/L (ref 22–29)
CREAT SERPL-MCNC: 0.8 MG/DL (ref 0.7–1.2)
CULTURE, RESPIRATORY: NORMAL
GFR AFRICAN AMERICAN: >60
GFR NON-AFRICAN AMERICAN: >60 ML/MIN/1.73
GLUCOSE BLD-MCNC: 99 MG/DL (ref 74–109)
INR BLD: 1.1
MAGNESIUM: 2 MG/DL (ref 1.6–2.6)
METER GLUCOSE: 106 MG/DL (ref 70–110)
METER GLUCOSE: 149 MG/DL (ref 70–110)
METER GLUCOSE: 235 MG/DL (ref 70–110)
PHOSPHORUS: 2.6 MG/DL (ref 2.5–4.5)
POTASSIUM SERPL-SCNC: 4.1 MMOL/L (ref 3.5–5)
PROTHROMBIN TIME: 12.7 SEC (ref 9.3–12.4)
SMEAR, RESPIRATORY: NORMAL
SODIUM BLD-SCNC: 142 MMOL/L (ref 132–146)
T4 FREE: 2.19 NG/DL (ref 0.93–1.7)
TSH SERPL DL<=0.05 MIU/L-ACNC: 0.01 UIU/ML (ref 0.27–4.2)

## 2018-05-21 PROCEDURE — 99233 SBSQ HOSP IP/OBS HIGH 50: CPT | Performed by: INTERNAL MEDICINE

## 2018-05-21 PROCEDURE — 93458 L HRT ARTERY/VENTRICLE ANGIO: CPT | Performed by: INTERNAL MEDICINE

## 2018-05-21 PROCEDURE — 99254 IP/OBS CNSLTJ NEW/EST MOD 60: CPT | Performed by: INTERNAL MEDICINE

## 2018-05-21 PROCEDURE — 94640 AIRWAY INHALATION TREATMENT: CPT

## 2018-05-21 PROCEDURE — C1769 GUIDE WIRE: HCPCS

## 2018-05-21 PROCEDURE — B2111ZZ FLUOROSCOPY OF MULTIPLE CORONARY ARTERIES USING LOW OSMOLAR CONTRAST: ICD-10-PCS | Performed by: INTERNAL MEDICINE

## 2018-05-21 PROCEDURE — 83735 ASSAY OF MAGNESIUM: CPT

## 2018-05-21 PROCEDURE — APPSS30 APP SPLIT SHARED TIME 16-30 MINUTES: Performed by: NURSE PRACTITIONER

## 2018-05-21 PROCEDURE — 6370000000 HC RX 637 (ALT 250 FOR IP): Performed by: INTERNAL MEDICINE

## 2018-05-21 PROCEDURE — 2500000003 HC RX 250 WO HCPCS

## 2018-05-21 PROCEDURE — 84439 ASSAY OF FREE THYROXINE: CPT

## 2018-05-21 PROCEDURE — C1894 INTRO/SHEATH, NON-LASER: HCPCS

## 2018-05-21 PROCEDURE — 6360000002 HC RX W HCPCS

## 2018-05-21 PROCEDURE — 85610 PROTHROMBIN TIME: CPT

## 2018-05-21 PROCEDURE — 2700000000 HC OXYGEN THERAPY PER DAY

## 2018-05-21 PROCEDURE — 99232 SBSQ HOSP IP/OBS MODERATE 35: CPT | Performed by: INTERNAL MEDICINE

## 2018-05-21 PROCEDURE — 36415 COLL VENOUS BLD VENIPUNCTURE: CPT

## 2018-05-21 PROCEDURE — 86850 RBC ANTIBODY SCREEN: CPT

## 2018-05-21 PROCEDURE — 94760 N-INVAS EAR/PLS OXIMETRY 1: CPT

## 2018-05-21 PROCEDURE — 99999 PR OFFICE/OUTPT VISIT,PROCEDURE ONLY: CPT | Performed by: INTERNAL MEDICINE

## 2018-05-21 PROCEDURE — 86900 BLOOD TYPING SEROLOGIC ABO: CPT

## 2018-05-21 PROCEDURE — 80048 BASIC METABOLIC PNL TOTAL CA: CPT

## 2018-05-21 PROCEDURE — 2060000000 HC ICU INTERMEDIATE R&B

## 2018-05-21 PROCEDURE — 84443 ASSAY THYROID STIM HORMONE: CPT

## 2018-05-21 PROCEDURE — 82962 GLUCOSE BLOOD TEST: CPT

## 2018-05-21 PROCEDURE — B2151ZZ FLUOROSCOPY OF LEFT HEART USING LOW OSMOLAR CONTRAST: ICD-10-PCS | Performed by: INTERNAL MEDICINE

## 2018-05-21 PROCEDURE — 4A023N7 MEASUREMENT OF CARDIAC SAMPLING AND PRESSURE, LEFT HEART, PERCUTANEOUS APPROACH: ICD-10-PCS | Performed by: INTERNAL MEDICINE

## 2018-05-21 PROCEDURE — 2580000003 HC RX 258: Performed by: INTERNAL MEDICINE

## 2018-05-21 PROCEDURE — 84100 ASSAY OF PHOSPHORUS: CPT

## 2018-05-21 PROCEDURE — 6360000002 HC RX W HCPCS: Performed by: INTERNAL MEDICINE

## 2018-05-21 PROCEDURE — 2709999900 HC NON-CHARGEABLE SUPPLY

## 2018-05-21 PROCEDURE — 86901 BLOOD TYPING SEROLOGIC RH(D): CPT

## 2018-05-21 RX ORDER — LACTOSE-REDUCED FOOD
LIQUID (ML) ORAL
Refills: 0 | Status: ON HOLD | COMMUNITY
Start: 2018-05-09 | End: 2018-05-22

## 2018-05-21 RX ORDER — VENLAFAXINE 75 MG/1
TABLET ORAL
Refills: 0 | Status: ON HOLD | COMMUNITY
Start: 2018-03-28 | End: 2018-05-22 | Stop reason: HOSPADM

## 2018-05-21 RX ORDER — WARFARIN SODIUM 6 MG/1
TABLET ORAL
Refills: 0 | Status: ON HOLD | COMMUNITY
Start: 2018-05-02 | End: 2018-05-22 | Stop reason: HOSPADM

## 2018-05-21 RX ORDER — SODIUM CHLORIDE 9 MG/ML
INJECTION, SOLUTION INTRAVENOUS CONTINUOUS
Status: DISCONTINUED | OUTPATIENT
Start: 2018-05-21 | End: 2018-05-22 | Stop reason: HOSPADM

## 2018-05-21 RX ORDER — METHIMAZOLE 5 MG/1
5 TABLET ORAL 3 TIMES DAILY
Status: DISCONTINUED | OUTPATIENT
Start: 2018-05-22 | End: 2018-05-22

## 2018-05-21 RX ORDER — AZELASTINE HYDROCHLORIDE 137 UG/1
SPRAY, METERED NASAL
Refills: 0 | Status: ON HOLD | COMMUNITY
Start: 2018-03-26 | End: 2018-05-22

## 2018-05-21 RX ADMIN — BUDESONIDE 500 MCG: 0.5 SUSPENSION RESPIRATORY (INHALATION) at 20:30

## 2018-05-21 RX ADMIN — Medication 10 ML: at 21:13

## 2018-05-21 RX ADMIN — FAMOTIDINE 20 MG: 20 TABLET ORAL at 08:42

## 2018-05-21 RX ADMIN — SODIUM CHLORIDE: 9 INJECTION, SOLUTION INTRAVENOUS at 11:58

## 2018-05-21 RX ADMIN — INSULIN LISPRO 2 UNITS: 100 INJECTION, SOLUTION INTRAVENOUS; SUBCUTANEOUS at 16:45

## 2018-05-21 RX ADMIN — ENOXAPARIN SODIUM 60 MG: 60 INJECTION SUBCUTANEOUS at 21:13

## 2018-05-21 RX ADMIN — NITROGLYCERIN 0.5 INCH: 20 OINTMENT TOPICAL at 06:26

## 2018-05-21 RX ADMIN — FINASTERIDE 5 MG: 5 TABLET, FILM COATED ORAL at 08:42

## 2018-05-21 RX ADMIN — BUDESONIDE 500 MCG: 0.5 SUSPENSION RESPIRATORY (INHALATION) at 08:00

## 2018-05-21 RX ADMIN — DIPHENHYDRAMINE HCL 25 MG: 25 TABLET ORAL at 21:13

## 2018-05-21 RX ADMIN — DIPHENHYDRAMINE HCL 25 MG: 25 TABLET ORAL at 16:44

## 2018-05-21 RX ADMIN — IPRATROPIUM BROMIDE AND ALBUTEROL SULFATE 1 AMPULE: 2.5; .5 SOLUTION RESPIRATORY (INHALATION) at 16:55

## 2018-05-21 RX ADMIN — CLOPIDOGREL 75 MG: 75 TABLET, FILM COATED ORAL at 08:43

## 2018-05-21 RX ADMIN — IPRATROPIUM BROMIDE AND ALBUTEROL SULFATE 1 AMPULE: 2.5; .5 SOLUTION RESPIRATORY (INHALATION) at 04:19

## 2018-05-21 RX ADMIN — MONTELUKAST SODIUM 10 MG: 10 TABLET, FILM COATED ORAL at 08:42

## 2018-05-21 RX ADMIN — PREDNISONE 40 MG: 20 TABLET ORAL at 08:43

## 2018-05-21 RX ADMIN — Medication 10 ML: at 08:45

## 2018-05-21 RX ADMIN — ATORVASTATIN CALCIUM 40 MG: 40 TABLET, FILM COATED ORAL at 21:13

## 2018-05-21 RX ADMIN — FORMOTEROL FUMARATE DIHYDRATE 20 MCG: 20 SOLUTION RESPIRATORY (INHALATION) at 20:30

## 2018-05-21 RX ADMIN — SPIRONOLACTONE 25 MG: 25 TABLET ORAL at 08:42

## 2018-05-21 RX ADMIN — NITROGLYCERIN 0.5 INCH: 20 OINTMENT TOPICAL at 16:45

## 2018-05-21 RX ADMIN — FAMOTIDINE 20 MG: 20 TABLET ORAL at 21:13

## 2018-05-21 RX ADMIN — IPRATROPIUM BROMIDE AND ALBUTEROL SULFATE 1 AMPULE: 2.5; .5 SOLUTION RESPIRATORY (INHALATION) at 11:41

## 2018-05-21 RX ADMIN — IPRATROPIUM BROMIDE AND ALBUTEROL SULFATE 1 AMPULE: 2.5; .5 SOLUTION RESPIRATORY (INHALATION) at 08:01

## 2018-05-21 RX ADMIN — METOPROLOL SUCCINATE 25 MG: 25 TABLET, FILM COATED, EXTENDED RELEASE ORAL at 21:13

## 2018-05-21 RX ADMIN — FORMOTEROL FUMARATE DIHYDRATE 20 MCG: 20 SOLUTION RESPIRATORY (INHALATION) at 08:00

## 2018-05-21 RX ADMIN — SODIUM CHLORIDE: 9 INJECTION, SOLUTION INTRAVENOUS at 12:30

## 2018-05-21 RX ADMIN — DIGOXIN 125 MCG: 125 TABLET ORAL at 08:42

## 2018-05-21 RX ADMIN — DIPHENHYDRAMINE HCL 25 MG: 25 TABLET ORAL at 08:48

## 2018-05-21 RX ADMIN — METOPROLOL SUCCINATE 25 MG: 25 TABLET, FILM COATED, EXTENDED RELEASE ORAL at 08:42

## 2018-05-21 RX ADMIN — LORAZEPAM 1 MG: 1 TABLET ORAL at 21:13

## 2018-05-21 RX ADMIN — INSULIN LISPRO 1 UNITS: 100 INJECTION, SOLUTION INTRAVENOUS; SUBCUTANEOUS at 21:17

## 2018-05-21 ASSESSMENT — PAIN SCALES - GENERAL: PAINLEVEL_OUTOF10: 0

## 2018-05-22 VITALS
RESPIRATION RATE: 18 BRPM | TEMPERATURE: 98 F | DIASTOLIC BLOOD PRESSURE: 60 MMHG | SYSTOLIC BLOOD PRESSURE: 130 MMHG | HEART RATE: 95 BPM | OXYGEN SATURATION: 97 % | BODY MASS INDEX: 21.51 KG/M2 | WEIGHT: 126 LBS | HEIGHT: 64 IN

## 2018-05-22 LAB
EKG ATRIAL RATE: 81 BPM
EKG P AXIS: -28 DEGREES
EKG P-R INTERVAL: 122 MS
EKG Q-T INTERVAL: 422 MS
EKG QRS DURATION: 88 MS
EKG QTC CALCULATION (BAZETT): 490 MS
EKG R AXIS: -34 DEGREES
EKG T AXIS: -111 DEGREES
EKG VENTRICULAR RATE: 81 BPM
INR BLD: 1
METER GLUCOSE: 113 MG/DL (ref 70–110)
METER GLUCOSE: 138 MG/DL (ref 70–110)
METER GLUCOSE: 171 MG/DL (ref 70–110)
PROTHROMBIN TIME: 12 SEC (ref 9.3–12.4)

## 2018-05-22 PROCEDURE — 85610 PROTHROMBIN TIME: CPT

## 2018-05-22 PROCEDURE — 6370000000 HC RX 637 (ALT 250 FOR IP): Performed by: INTERNAL MEDICINE

## 2018-05-22 PROCEDURE — 2700000000 HC OXYGEN THERAPY PER DAY

## 2018-05-22 PROCEDURE — 2580000003 HC RX 258: Performed by: INTERNAL MEDICINE

## 2018-05-22 PROCEDURE — 99233 SBSQ HOSP IP/OBS HIGH 50: CPT | Performed by: INTERNAL MEDICINE

## 2018-05-22 PROCEDURE — 82962 GLUCOSE BLOOD TEST: CPT

## 2018-05-22 PROCEDURE — 94640 AIRWAY INHALATION TREATMENT: CPT

## 2018-05-22 PROCEDURE — 99232 SBSQ HOSP IP/OBS MODERATE 35: CPT | Performed by: INTERNAL MEDICINE

## 2018-05-22 PROCEDURE — 93005 ELECTROCARDIOGRAM TRACING: CPT | Performed by: STUDENT IN AN ORGANIZED HEALTH CARE EDUCATION/TRAINING PROGRAM

## 2018-05-22 PROCEDURE — 94760 N-INVAS EAR/PLS OXIMETRY 1: CPT

## 2018-05-22 PROCEDURE — 6360000002 HC RX W HCPCS: Performed by: INTERNAL MEDICINE

## 2018-05-22 PROCEDURE — 36415 COLL VENOUS BLD VENIPUNCTURE: CPT

## 2018-05-22 RX ORDER — METHIMAZOLE 10 MG/1
10 TABLET ORAL 3 TIMES DAILY
Qty: 90 TABLET | Refills: 3 | Status: SHIPPED | OUTPATIENT
Start: 2018-05-22 | End: 2018-05-22

## 2018-05-22 RX ORDER — VENLAFAXINE HYDROCHLORIDE 75 MG/1
75 CAPSULE, EXTENDED RELEASE ORAL
Status: DISCONTINUED | OUTPATIENT
Start: 2018-05-23 | End: 2018-05-22 | Stop reason: HOSPADM

## 2018-05-22 RX ORDER — METOPROLOL SUCCINATE 50 MG/1
50 TABLET, EXTENDED RELEASE ORAL 2 TIMES DAILY
Status: DISCONTINUED | OUTPATIENT
Start: 2018-05-22 | End: 2018-05-22

## 2018-05-22 RX ORDER — LISINOPRIL 5 MG/1
5 TABLET ORAL DAILY
Qty: 30 TABLET | Refills: 3 | Status: SHIPPED | OUTPATIENT
Start: 2018-05-22 | End: 2018-07-24

## 2018-05-22 RX ORDER — METHIMAZOLE 10 MG/1
10 TABLET ORAL 3 TIMES DAILY
Status: DISCONTINUED | OUTPATIENT
Start: 2018-05-22 | End: 2018-05-22 | Stop reason: HOSPADM

## 2018-05-22 RX ORDER — MONTELUKAST SODIUM 10 MG/1
10 TABLET ORAL DAILY
Qty: 30 TABLET | Refills: 3 | Status: SHIPPED | OUTPATIENT
Start: 2018-05-23 | End: 2019-01-16 | Stop reason: SDUPTHER

## 2018-05-22 RX ORDER — SPIRONOLACTONE 25 MG/1
25 TABLET ORAL DAILY
Qty: 30 TABLET | Refills: 3 | Status: SHIPPED | OUTPATIENT
Start: 2018-05-23 | End: 2018-05-22

## 2018-05-22 RX ORDER — FLUTICASONE FUROATE AND VILANTEROL 200; 25 UG/1; UG/1
1 POWDER RESPIRATORY (INHALATION) DAILY
Qty: 1 EACH | Refills: 3 | Status: SHIPPED | OUTPATIENT
Start: 2018-05-22 | End: 2018-05-22

## 2018-05-22 RX ORDER — WARFARIN SODIUM 5 MG/1
5 TABLET ORAL NIGHTLY
Status: DISCONTINUED | OUTPATIENT
Start: 2018-05-22 | End: 2018-05-22

## 2018-05-22 RX ORDER — METOPROLOL SUCCINATE 100 MG/1
100 TABLET, EXTENDED RELEASE ORAL DAILY
Qty: 30 TABLET | Refills: 3 | Status: SHIPPED | OUTPATIENT
Start: 2018-05-22 | End: 2018-05-22

## 2018-05-22 RX ORDER — METOPROLOL SUCCINATE 100 MG/1
100 TABLET, EXTENDED RELEASE ORAL DAILY
Status: DISCONTINUED | OUTPATIENT
Start: 2018-05-22 | End: 2018-05-22 | Stop reason: HOSPADM

## 2018-05-22 RX ORDER — ALBUTEROL SULFATE 2.5 MG/3ML
2.5 SOLUTION RESPIRATORY (INHALATION) PRN
Qty: 120 EACH | Refills: 0 | Status: SHIPPED | OUTPATIENT
Start: 2018-05-22 | End: 2021-03-22 | Stop reason: ALTCHOICE

## 2018-05-22 RX ORDER — AZELASTINE HYDROCHLORIDE 137 UG/1
SPRAY, METERED NASAL
Qty: 1 BOTTLE | Refills: 0 | Status: SHIPPED | OUTPATIENT
Start: 2018-05-22 | End: 2021-03-22 | Stop reason: ALTCHOICE

## 2018-05-22 RX ORDER — SPIRONOLACTONE 25 MG/1
25 TABLET ORAL DAILY
Qty: 30 TABLET | Refills: 3 | Status: SHIPPED | OUTPATIENT
Start: 2018-05-23 | End: 2018-09-17 | Stop reason: SDUPTHER

## 2018-05-22 RX ORDER — ACETAMINOPHEN 500 MG
500 TABLET ORAL EVERY 6 HOURS PRN
Status: DISCONTINUED | OUTPATIENT
Start: 2018-05-22 | End: 2018-05-22 | Stop reason: HOSPADM

## 2018-05-22 RX ORDER — METOPROLOL SUCCINATE 100 MG/1
100 TABLET, EXTENDED RELEASE ORAL DAILY
Qty: 30 TABLET | Refills: 3 | Status: SHIPPED | OUTPATIENT
Start: 2018-05-22 | End: 2018-09-17 | Stop reason: SDUPTHER

## 2018-05-22 RX ORDER — METHIMAZOLE 10 MG/1
10 TABLET ORAL 3 TIMES DAILY
Qty: 90 TABLET | Refills: 3 | Status: SHIPPED | OUTPATIENT
Start: 2018-05-22 | End: 2018-06-21

## 2018-05-22 RX ORDER — LEVETIRACETAM 500 MG/1
500 TABLET ORAL 2 TIMES DAILY
Qty: 30 TABLET | Refills: 3 | Status: ON HOLD | OUTPATIENT
Start: 2018-05-22 | End: 2021-12-09 | Stop reason: SDUPTHER

## 2018-05-22 RX ORDER — LACTOSE-REDUCED FOOD
1 LIQUID (ML) ORAL DAILY
Qty: 10 CAN | Refills: 0 | Status: SHIPPED | OUTPATIENT
Start: 2018-05-22

## 2018-05-22 RX ORDER — ATORVASTATIN CALCIUM 40 MG/1
40 TABLET, FILM COATED ORAL NIGHTLY
Qty: 30 TABLET | Refills: 3 | Status: SHIPPED | OUTPATIENT
Start: 2018-05-22 | End: 2018-05-22

## 2018-05-22 RX ORDER — DIGOXIN 125 MCG
125 TABLET ORAL DAILY
Qty: 30 TABLET | Refills: 3 | Status: SHIPPED | OUTPATIENT
Start: 2018-05-23 | End: 2018-05-22

## 2018-05-22 RX ORDER — FLUTICASONE FUROATE AND VILANTEROL 200; 25 UG/1; UG/1
1 POWDER RESPIRATORY (INHALATION) DAILY
Qty: 1 EACH | Refills: 3 | Status: SHIPPED | OUTPATIENT
Start: 2018-05-22 | End: 2018-07-17

## 2018-05-22 RX ORDER — ATORVASTATIN CALCIUM 40 MG/1
40 TABLET, FILM COATED ORAL NIGHTLY
Qty: 30 TABLET | Refills: 3 | Status: SHIPPED | OUTPATIENT
Start: 2018-05-22 | End: 2019-01-16 | Stop reason: SDUPTHER

## 2018-05-22 RX ORDER — LEVETIRACETAM 500 MG/1
500 TABLET ORAL 2 TIMES DAILY
Qty: 30 TABLET | Refills: 3 | Status: SHIPPED | OUTPATIENT
Start: 2018-05-22 | End: 2018-05-22

## 2018-05-22 RX ORDER — IPRATROPIUM BROMIDE 42 UG/1
2 SPRAY, METERED NASAL 2 TIMES DAILY PRN
Qty: 1 BOTTLE | Refills: 0 | Status: SHIPPED | OUTPATIENT
Start: 2018-05-22 | End: 2019-01-16 | Stop reason: SDUPTHER

## 2018-05-22 RX ORDER — ALBUTEROL SULFATE 90 UG/1
2 AEROSOL, METERED RESPIRATORY (INHALATION) EVERY 6 HOURS PRN
Qty: 1 INHALER | Refills: 3 | Status: SHIPPED | OUTPATIENT
Start: 2018-05-22 | End: 2021-03-29

## 2018-05-22 RX ORDER — LISINOPRIL 5 MG/1
5 TABLET ORAL DAILY
Qty: 30 TABLET | Refills: 3 | Status: SHIPPED | OUTPATIENT
Start: 2018-05-22 | End: 2018-05-22

## 2018-05-22 RX ORDER — PREDNISONE 1 MG/1
TABLET ORAL
Qty: 63 TABLET | Refills: 0 | Status: SHIPPED | OUTPATIENT
Start: 2018-05-22 | End: 2018-05-22

## 2018-05-22 RX ORDER — LORAZEPAM 1 MG/1
1 TABLET ORAL 2 TIMES DAILY PRN
Qty: 30 TABLET | Refills: 0 | Status: SHIPPED | OUTPATIENT
Start: 2018-05-22 | End: 2018-06-21

## 2018-05-22 RX ORDER — FINASTERIDE 5 MG/1
5 TABLET, FILM COATED ORAL DAILY
Qty: 30 TABLET | Refills: 3 | Status: SHIPPED | OUTPATIENT
Start: 2018-05-23

## 2018-05-22 RX ORDER — DIGOXIN 125 MCG
125 TABLET ORAL DAILY
Qty: 30 TABLET | Refills: 3 | Status: SHIPPED | OUTPATIENT
Start: 2018-05-23 | End: 2019-01-16 | Stop reason: SDUPTHER

## 2018-05-22 RX ORDER — PREDNISONE 1 MG/1
TABLET ORAL
Qty: 63 TABLET | Refills: 0 | Status: SHIPPED | OUTPATIENT
Start: 2018-05-22 | End: 2020-12-21 | Stop reason: ALTCHOICE

## 2018-05-22 RX ORDER — LISINOPRIL 5 MG/1
5 TABLET ORAL DAILY
Status: DISCONTINUED | OUTPATIENT
Start: 2018-05-22 | End: 2018-05-22 | Stop reason: HOSPADM

## 2018-05-22 RX ADMIN — Medication 10 ML: at 08:40

## 2018-05-22 RX ADMIN — RIVAROXABAN 20 MG: 20 TABLET, FILM COATED ORAL at 18:03

## 2018-05-22 RX ADMIN — Medication 10 ML: at 16:37

## 2018-05-22 RX ADMIN — CLOPIDOGREL 75 MG: 75 TABLET, FILM COATED ORAL at 08:41

## 2018-05-22 RX ADMIN — LORAZEPAM 1 MG: 1 TABLET ORAL at 08:45

## 2018-05-22 RX ADMIN — NITROGLYCERIN 0.5 INCH: 20 OINTMENT TOPICAL at 00:15

## 2018-05-22 RX ADMIN — IPRATROPIUM BROMIDE AND ALBUTEROL SULFATE 1 AMPULE: 2.5; .5 SOLUTION RESPIRATORY (INHALATION) at 04:44

## 2018-05-22 RX ADMIN — NITROGLYCERIN 0.5 INCH: 20 OINTMENT TOPICAL at 11:22

## 2018-05-22 RX ADMIN — FAMOTIDINE 20 MG: 20 TABLET ORAL at 08:40

## 2018-05-22 RX ADMIN — PREDNISONE 40 MG: 20 TABLET ORAL at 08:41

## 2018-05-22 RX ADMIN — FINASTERIDE 5 MG: 5 TABLET, FILM COATED ORAL at 08:40

## 2018-05-22 RX ADMIN — METHIMAZOLE 5 MG: 5 TABLET ORAL at 08:40

## 2018-05-22 RX ADMIN — LORAZEPAM 1 MG: 1 TABLET ORAL at 16:37

## 2018-05-22 RX ADMIN — SPIRONOLACTONE 25 MG: 25 TABLET ORAL at 08:40

## 2018-05-22 RX ADMIN — MONTELUKAST SODIUM 10 MG: 10 TABLET, FILM COATED ORAL at 08:40

## 2018-05-22 RX ADMIN — IPRATROPIUM BROMIDE AND ALBUTEROL SULFATE 1 AMPULE: 2.5; .5 SOLUTION RESPIRATORY (INHALATION) at 11:32

## 2018-05-22 RX ADMIN — BUDESONIDE 500 MCG: 0.5 SUSPENSION RESPIRATORY (INHALATION) at 07:39

## 2018-05-22 RX ADMIN — METOPROLOL SUCCINATE 25 MG: 25 TABLET, FILM COATED, EXTENDED RELEASE ORAL at 08:45

## 2018-05-22 RX ADMIN — METOPROLOL SUCCINATE 100 MG: 100 TABLET, FILM COATED, EXTENDED RELEASE ORAL at 18:03

## 2018-05-22 RX ADMIN — INSULIN LISPRO 1 UNITS: 100 INJECTION, SOLUTION INTRAVENOUS; SUBCUTANEOUS at 08:45

## 2018-05-22 RX ADMIN — FORMOTEROL FUMARATE DIHYDRATE 20 MCG: 20 SOLUTION RESPIRATORY (INHALATION) at 07:39

## 2018-05-22 RX ADMIN — DIPHENHYDRAMINE HCL 25 MG: 25 TABLET ORAL at 08:40

## 2018-05-22 RX ADMIN — LISINOPRIL 5 MG: 5 TABLET ORAL at 16:40

## 2018-05-22 RX ADMIN — ENOXAPARIN SODIUM 60 MG: 60 INJECTION SUBCUTANEOUS at 08:40

## 2018-05-22 RX ADMIN — DIGOXIN 125 MCG: 125 TABLET ORAL at 08:41

## 2018-05-22 RX ADMIN — DIPHENHYDRAMINE HCL 25 MG: 25 TABLET ORAL at 13:58

## 2018-05-22 RX ADMIN — NITROGLYCERIN 0.5 INCH: 20 OINTMENT TOPICAL at 06:42

## 2018-05-22 RX ADMIN — METHIMAZOLE 10 MG: 10 TABLET ORAL at 13:58

## 2018-05-22 ASSESSMENT — PAIN SCALES - GENERAL
PAINLEVEL_OUTOF10: 0
PAINLEVEL_OUTOF10: 0

## 2018-05-23 DIAGNOSIS — J45.901 PERSISTENT ASTHMA WITH ACUTE EXACERBATION, UNSPECIFIED ASTHMA SEVERITY: Primary | ICD-10-CM

## 2018-05-23 LAB
BLOOD CULTURE, ROUTINE: NORMAL
CULTURE, BLOOD 2: NORMAL

## 2018-06-04 LAB
EKG ATRIAL RATE: 110 BPM
EKG P-R INTERVAL: 128 MS
EKG Q-T INTERVAL: 344 MS
EKG QRS DURATION: 94 MS
EKG QTC CALCULATION (BAZETT): 465 MS
EKG R AXIS: -168 DEGREES
EKG T AXIS: 130 DEGREES
EKG VENTRICULAR RATE: 110 BPM

## 2018-06-13 ENCOUNTER — TELEPHONE (OUTPATIENT)
Dept: PULMONOLOGY | Age: 63
End: 2018-06-13

## 2018-07-17 ENCOUNTER — OFFICE VISIT (OUTPATIENT)
Dept: PULMONOLOGY | Age: 63
End: 2018-07-17
Payer: COMMERCIAL

## 2018-07-17 ENCOUNTER — HOSPITAL ENCOUNTER (OUTPATIENT)
Age: 63
Discharge: HOME | End: 2018-07-17
Payer: COMMERCIAL

## 2018-07-17 VITALS
SYSTOLIC BLOOD PRESSURE: 134 MMHG | WEIGHT: 122 LBS | RESPIRATION RATE: 12 BRPM | HEIGHT: 64 IN | TEMPERATURE: 98.1 F | DIASTOLIC BLOOD PRESSURE: 71 MMHG | BODY MASS INDEX: 20.83 KG/M2 | HEART RATE: 72 BPM | OXYGEN SATURATION: 93 %

## 2018-07-17 DIAGNOSIS — J44.9 CHRONIC OBSTRUCTIVE PULMONARY DISEASE, UNSPECIFIED COPD TYPE (HCC): ICD-10-CM

## 2018-07-17 DIAGNOSIS — J45.909 MILD ASTHMA WITHOUT COMPLICATION, UNSPECIFIED WHETHER PERSISTENT: ICD-10-CM

## 2018-07-17 DIAGNOSIS — J96.91 RESPIRATORY FAILURE WITH HYPOXIA, UNSPECIFIED CHRONICITY (HCC): ICD-10-CM

## 2018-07-17 DIAGNOSIS — J44.9 CHRONIC OBSTRUCTIVE PULMONARY DISEASE, UNSPECIFIED COPD TYPE (HCC): Primary | ICD-10-CM

## 2018-07-17 LAB
DLCO %PRED: NORMAL
DLCO PRE: NORMAL
EOSINOPHILS ABSOLUTE COUNT: 750 /UL (ref 50–250)
FEF 25-75%-POST: 1.56
FEF 25-75%-PRE: 1.54
FEV1-POST: 2.67
FEV1-PRE: 2.69
FEV1/FVC-POST: 67
FEV1/FVC-PRE: 69
FVC-POST: 3.95
FVC-PRE: 3.89
MEP: NORMAL
MIP: NORMAL
TLC %PRED: NORMAL
TLC PRE: NORMAL

## 2018-07-17 PROCEDURE — 99203 OFFICE O/P NEW LOW 30 MIN: CPT | Performed by: INTERNAL MEDICINE

## 2018-07-17 PROCEDURE — G8925 SPIR FEV1/FVC>=60% & NO COPD: HCPCS | Performed by: INTERNAL MEDICINE

## 2018-07-17 PROCEDURE — 1036F TOBACCO NON-USER: CPT | Performed by: INTERNAL MEDICINE

## 2018-07-17 PROCEDURE — 99214 OFFICE O/P EST MOD 30 MIN: CPT | Performed by: INTERNAL MEDICINE

## 2018-07-17 PROCEDURE — 86003 ALLG SPEC IGE CRUDE XTRC EA: CPT

## 2018-07-17 PROCEDURE — G8427 DOCREV CUR MEDS BY ELIG CLIN: HCPCS | Performed by: INTERNAL MEDICINE

## 2018-07-17 PROCEDURE — 85048 AUTOMATED LEUKOCYTE COUNT: CPT

## 2018-07-17 PROCEDURE — 82785 ASSAY OF IGE: CPT

## 2018-07-17 PROCEDURE — 3017F COLORECTAL CA SCREEN DOC REV: CPT | Performed by: INTERNAL MEDICINE

## 2018-07-17 PROCEDURE — 94060 EVALUATION OF WHEEZING: CPT | Performed by: INTERNAL MEDICINE

## 2018-07-17 PROCEDURE — G8598 ASA/ANTIPLAT THER USED: HCPCS | Performed by: INTERNAL MEDICINE

## 2018-07-17 PROCEDURE — 3023F SPIROM DOC REV: CPT | Performed by: INTERNAL MEDICINE

## 2018-07-17 PROCEDURE — G8420 CALC BMI NORM PARAMETERS: HCPCS | Performed by: INTERNAL MEDICINE

## 2018-07-17 PROCEDURE — 36415 COLL VENOUS BLD VENIPUNCTURE: CPT

## 2018-07-17 RX ORDER — LORAZEPAM 1 MG/1
1 TABLET ORAL EVERY 6 HOURS PRN
COMMUNITY
End: 2020-12-21 | Stop reason: ALTCHOICE

## 2018-07-17 RX ORDER — LURASIDONE HYDROCHLORIDE 40 MG/1
40 TABLET, FILM COATED ORAL DAILY
COMMUNITY
Start: 2018-06-07 | End: 2021-03-22 | Stop reason: ALTCHOICE

## 2018-07-17 RX ORDER — FLUTICASONE FUROATE AND VILANTEROL 200; 25 UG/1; UG/1
200 POWDER RESPIRATORY (INHALATION) DAILY
Qty: 1 EACH | Refills: 0 | Status: SHIPPED | OUTPATIENT
Start: 2018-07-17 | End: 2018-07-17 | Stop reason: SDUPTHER

## 2018-07-17 RX ORDER — FLUTICASONE FUROATE AND VILANTEROL 200; 25 UG/1; UG/1
200 POWDER RESPIRATORY (INHALATION) DAILY
Qty: 1 EACH | Refills: 3 | Status: SHIPPED | OUTPATIENT
Start: 2018-07-17 | End: 2018-08-16

## 2018-07-17 ASSESSMENT — PULMONARY FUNCTION TESTS
FEV1/FVC_POST: 67
FVC_PRE: 3.89
FEV1_POST: 2.67
FVC_POST: 3.95
FEV1/FVC_PRE: 69
FEV1_PRE: 2.69

## 2018-07-17 NOTE — PROGRESS NOTES
PAST MEDICAL HISTORY:       Diagnosis Date    Anxiety     Arthritis     Asthma     Bipolar 1 disorder ([facility]     Chronic combined systolic and diastolic CHF (congestive heart failure) ([facility] 05/27/2018    COPD (chronic obstructive pulmonary disease) (HCC)     Depression     Diabetes mellitus (HCC)     GERD (gastroesophageal reflux disease)     Hepatitis C     resolved    Hypertension     Hyperthyroidism 8/2/2012    Hypothyroidism due to medication     Mass of testicle     Mesothelioma [facility])     pt states possibly not    Neuromuscular disorder ([facility]     Other disorders of kidney and ureter     kidney stones; most recent 08/27/2015    Paroxysmal a-fib ([facility]     discussed with PCP- patient does have hx of PAfib    Peptic ulcer     Prostate enlargement     Pulmonary embolism (HCC)     Intermediate risk for PE on VQ scan.  Seizures ([facility]     Thyroid disease     hyperthyroid       MEDICATIONS:   Current Outpatient Prescriptions   Medication Sig Dispense Refill    LATUDA 20 MG TABS tablet       LORazepam (ATIVAN) 1 MG tablet Take 1 mg by mouth every 6 hours as needed for Anxiety. .      tiotropium (SPIRIVA RESPIMAT) 1.25 MCG/ACT AERS inhaler Inhale 2 puffs into the lungs daily 1 Inhaler 3    Fluticasone Furoate-Vilanterol (BREO ELLIPTA) 200-25 MCG/INH AEPB Inhale 1 Inhaler into the lungs daily 1 each 3    montelukast (SINGULAIR) 10 MG tablet Take 1 tablet by mouth daily 30 tablet 3    rivaroxaban (XARELTO) 20 MG TABS tablet Take 1 tablet by mouth daily With food 30 tablet 3    levETIRAcetam (KEPPRA) 500 MG tablet Take 1 tablet by mouth 2 times daily (Patient taking differently: Take 500 mg by mouth 4 times daily ) 30 tablet 3    atorvastatin (LIPITOR) 40 MG tablet Take 1 tablet by mouth nightly 30 tablet 3    lisinopril (PRINIVIL;ZESTRIL) 5 MG tablet Take 1 tablet by mouth daily 30 tablet 3    metoprolol succinate (TOPROL XL) 100 MG extended release tablet Take 1 tablet by mouth daily 30 tablet 3    digoxin (LANOXIN) 125 MCG tablet Take 1 tablet by mouth daily 30 tablet 3    Nutritional Supplements (RA BALANCED NUTRITIONAL PLUS) LIQD Take 1 Bottle by mouth daily 10 Can 0    spironolactone (ALDACTONE) 25 MG tablet Take 1 tablet by mouth daily 30 tablet 3    finasteride (PROSCAR) 5 MG tablet Take 1 tablet by mouth daily 30 tablet 3    venlafaxine (EFFEXOR XR) 75 MG extended release capsule Take 1 capsule by mouth daily  0    SHINGRIX 50 MCG SUSR injection inject 0.5 milliliter intramuscularly  0    predniSONE (DELTASONE) 5 MG tablet Take 8 tablets for 3 days followed by 6 tablets for 3 days followed by 4 tablet for 3 days followed by 2 tablets by 3 days followed by 1 tablet for 3 days. 63 tablet 0    albuterol sulfate  (90 Base) MCG/ACT inhaler Inhale 2 puffs into the lungs every 6 hours as needed for Wheezing or Shortness of Breath 1 Inhaler 3    albuterol (PROVENTIL) (2.5 MG/3ML) 0.083% nebulizer solution Take 3 mLs by nebulization as needed for Wheezing or Shortness of Breath 120 each 0    Azelastine HCl 137 MCG/SPRAY SOLN instill 2 sprays into each nostril twice a day for 10 days 1 Bottle 0    ipratropium (ATROVENT) 0.06 % nasal spray 2 sprays by Nasal route 2 times daily as needed for Rhinitis 1 Bottle 0     No current facility-administered medications for this visit.         SOCIAL AND OCCUPATIONAL HEALTH:  History   Smoking Status    Former Smoker    Years: 10.00    Types: Cigarettes    Quit date: 1/10/2010   Smokeless Tobacco    Former User    Types: Al Servant Quit date: 2018     TB :No  Pets No   Industrial exposure:yes   Birds :No     SURGICAL HISTORY:   Past Surgical History:   Procedure Laterality Date    ABDOMEN SURGERY      APPENDECTOMY      BLADDER SURGERY      CARDIAC CATHETERIZATION  05/21/2018    Dr. Pam Perez ENDOSCOPY, COLON, DIAGNOSTIC  11/13/2015    HEMORRHOID SURGERY      LITHOTRIPSY      VASCULAR SURGERY Impressions:     IMPRESSION:    1.) ACOS   2.)Possible COPD   3.)Asbestosis exposure   4. )Asthma ,unknown phenotype ,check eosinophilic countw hen     off steroids   5.)diastolic CHF            PLAN:     patient has been doing well since he left the hospital with no fever ,  He  Denies any chest pain   Will proceeded with the following    -continue Breo  -continue Spiriva   -albuterol as needed   -concern about asbestosis ,will get CT chest without contrast -high resolution   -Check eosinophilic count  -Check respiratory allergen profile     Will see in 4 months and then decide if any further work up needed      Thank you for allowing me to participate in [employer] care. I will keep following with you ,should you have any concerns ,please contact me at [phone]     Sincerely,        Maged Whitmore MD  Pulmonary & Critical Care Medicine     NOTE: This report was transcribed using voice recognition software. Every effort was made to ensure accuracy; however, inadvertent computerized transcription errors may be present.

## 2018-07-24 ENCOUNTER — OFFICE VISIT (OUTPATIENT)
Dept: CARDIOLOGY CLINIC | Age: 63
End: 2018-07-24
Payer: COMMERCIAL

## 2018-07-24 VITALS
WEIGHT: 125.6 LBS | BODY MASS INDEX: 21.44 KG/M2 | HEIGHT: 64 IN | RESPIRATION RATE: 18 BRPM | HEART RATE: 62 BPM | DIASTOLIC BLOOD PRESSURE: 62 MMHG | SYSTOLIC BLOOD PRESSURE: 112 MMHG

## 2018-07-24 DIAGNOSIS — I51.9 LV DYSFUNCTION: ICD-10-CM

## 2018-07-24 DIAGNOSIS — I42.8 NICM (NONISCHEMIC CARDIOMYOPATHY) (HCC): Primary | ICD-10-CM

## 2018-07-24 DIAGNOSIS — I21.4 NSTEMI (NON-ST ELEVATED MYOCARDIAL INFARCTION) (HCC): ICD-10-CM

## 2018-07-24 DIAGNOSIS — I10 ESSENTIAL (PRIMARY) HYPERTENSION: ICD-10-CM

## 2018-07-24 DIAGNOSIS — R77.8 ELEVATED TROPONIN: ICD-10-CM

## 2018-07-24 DIAGNOSIS — I48.0 PAF (PAROXYSMAL ATRIAL FIBRILLATION) (HCC): ICD-10-CM

## 2018-07-24 LAB
Lab: NORMAL
REPORT: NORMAL
THIS TEST SENT TO: NORMAL

## 2018-07-24 PROCEDURE — 93000 ELECTROCARDIOGRAM COMPLETE: CPT | Performed by: INTERNAL MEDICINE

## 2018-07-24 PROCEDURE — 99214 OFFICE O/P EST MOD 30 MIN: CPT | Performed by: INTERNAL MEDICINE

## 2018-07-24 PROCEDURE — 3017F COLORECTAL CA SCREEN DOC REV: CPT | Performed by: INTERNAL MEDICINE

## 2018-07-24 PROCEDURE — 1036F TOBACCO NON-USER: CPT | Performed by: INTERNAL MEDICINE

## 2018-07-24 PROCEDURE — G8598 ASA/ANTIPLAT THER USED: HCPCS | Performed by: INTERNAL MEDICINE

## 2018-07-24 PROCEDURE — G8420 CALC BMI NORM PARAMETERS: HCPCS | Performed by: INTERNAL MEDICINE

## 2018-07-24 PROCEDURE — G8427 DOCREV CUR MEDS BY ELIG CLIN: HCPCS | Performed by: INTERNAL MEDICINE

## 2018-07-24 RX ORDER — PRAZOSIN HYDROCHLORIDE 1 MG/1
1 CAPSULE ORAL NIGHTLY
COMMUNITY
End: 2021-06-20

## 2018-07-24 RX ORDER — LISINOPRIL 10 MG/1
10 TABLET ORAL DAILY
Qty: 30 TABLET | Refills: 11 | Status: SHIPPED | OUTPATIENT
Start: 2018-07-24

## 2018-07-24 RX ORDER — METHIMAZOLE 10 MG/1
10 TABLET ORAL 3 TIMES DAILY
COMMUNITY

## 2018-07-24 RX ORDER — VENLAFAXINE HYDROCHLORIDE 75 MG/1
75 CAPSULE, EXTENDED RELEASE ORAL EVERY MORNING
Refills: 0 | Status: ON HOLD | COMMUNITY
Start: 2018-06-05 | End: 2021-09-02 | Stop reason: HOSPADM

## 2018-07-24 RX ORDER — AMLODIPINE BESYLATE 5 MG/1
5 TABLET ORAL DAILY
COMMUNITY
End: 2018-07-24

## 2018-07-25 NOTE — PROGRESS NOTES
APPENDECTOMY      BLADDER SURGERY      CARDIAC CATHETERIZATION  05/21/2018    Dr. Alex Elam COLONOSCOPY      ENDOSCOPY, COLON, DIAGNOSTIC  11/13/2015    HEMORRHOID SURGERY      LITHOTRIPSY      VASCULAR SURGERY         Family History:  Family History   Problem Relation Age of Onset    Cancer Mother     Diabetes Mother     Cancer Father     Diabetes Father     Diabetes Maternal Aunt     Diabetes Maternal Grandmother     Cancer Maternal Grandfather     Heart Disease Paternal Grandmother     Heart Failure Paternal Grandmother     Heart Disease Paternal Grandfather     Heart Failure Paternal Grandfather     Diabetes Maternal Aunt     Diabetes Maternal Aunt        Social History:  Social History     Social History    Marital status: Single     Spouse name: N/A    Number of children: N/A    Years of education: N/A     Occupational History    disability      Social History Main Topics    Smoking status: Former Smoker     Years: 10.00     Types: Cigarettes     Quit date: 1/10/2010    Smokeless tobacco: Former User     Types: Chew     Quit date: 2018    Alcohol use No      Comment: recovered alcoholic; last use [id number]    Drug use: Yes     Types: Marijuana      Comment: heroin, cocaine; last use 04/15/2014    Sexual activity: Yes      Comment: heroin     Other Topics Concern    Not on file     Social History Narrative    No narrative on file       Allergies:   Allergies   Allergen Reactions    Codeine Anaphylaxis and Hives    Dye [Iodides] Anaphylaxis and Swelling     He claims he has had CT scans before including contrast?  Noted that if listed bc of shellfish-- this is NOT true cross reaction but an osmolaliity issue    Ketorolac Tromethamine Anaphylaxis    Peanuts [Peanut Oil] Anaphylaxis     Throat and eyes swell    Shellfish-Derived Products Anaphylaxis       Current Medications:  Current Outpatient Prescriptions   Medication Sig Dispense Refill    prazosin MCG/ACT inhaler Inhale 2 puffs into the lungs every 6 hours as needed for Wheezing or Shortness of Breath 1 Inhaler 3    albuterol (PROVENTIL) (2.5 MG/3ML) 0.083% nebulizer solution Take 3 mLs by nebulization as needed for Wheezing or Shortness of Breath 120 each 0    Azelastine HCl 137 MCG/SPRAY SOLN instill 2 sprays into each nostril twice a day for 10 days 1 Bottle 0    ipratropium (ATROVENT) 0.06 % nasal spray 2 sprays by Nasal route 2 times daily as needed for Rhinitis 1 Bottle 0     No current facility-administered medications for this visit. Physical Exam:  /62   Pulse 62   Resp 18   Ht 5' 4\" (1.626 m)   Wt 125 lb 9.6 oz (57 kg)   BMI 21.56 kg/m²   Wt Readings from Last 3 Encounters:   07/24/18 125 lb 9.6 oz (57 kg)   07/17/18 122 lb (55.3 kg)   05/20/18 126 lb (57.2 kg)     Appearance: Awake, alert, no acute respiratory distress  Skin: Intact, no rash  Head: Normocephalic, atraumatic  Eyes: EOMI, no conjunctival erythema  ENMT: No pharyngeal erythema, MMM, no rhinorrhea  Neck: Supple, no elevated JVP, no carotid bruits  Lungs: Clear to auscultation bilaterally. No wheezes, rales, or rhonchi.   Cardiac: Regular rate and rhythm, +Z5G5, 1/6 systolic murmur  Abdomen: Soft, nontender, +bowel sounds  Extremities: Moves all extremities x 4, no lower extremity edema  Neurologic: No focal motor deficits apparent, normal mood and affect  Peripheral Pulses: Intact posterior tibial pulses bilaterally    Laboratory Tests:  Lab Results   Component Value Date    CREATININE 0.8 05/21/2018    BUN 14 05/21/2018     05/21/2018    K 4.1 05/21/2018     05/21/2018    CO2 28 05/21/2018       Lab Results   Component Value Date    WBC 12.0 05/19/2018    RBC 4.39 05/19/2018    HGB 12.9 05/19/2018    HCT 37.8 05/19/2018    MCV 86.1 05/19/2018    MCH 29.4 05/19/2018    MCHC 34.1 05/19/2018    RDW 13.4 05/19/2018     05/19/2018    MPV 12.2 05/19/2018     Lab Results   Component Value Date    PROTIME

## 2018-09-12 ENCOUNTER — TELEPHONE (OUTPATIENT)
Dept: PULMONOLOGY | Age: 63
End: 2018-09-12

## 2018-09-17 ENCOUNTER — TELEPHONE (OUTPATIENT)
Dept: ADMINISTRATIVE | Age: 63
End: 2018-09-17

## 2018-09-17 DIAGNOSIS — I51.9 LV DYSFUNCTION: ICD-10-CM

## 2018-09-17 DIAGNOSIS — I10 ESSENTIAL (PRIMARY) HYPERTENSION: Primary | ICD-10-CM

## 2018-09-18 DIAGNOSIS — I51.9 LV DYSFUNCTION: ICD-10-CM

## 2018-09-18 DIAGNOSIS — I10 ESSENTIAL (PRIMARY) HYPERTENSION: ICD-10-CM

## 2018-09-18 RX ORDER — METOPROLOL SUCCINATE 100 MG/1
100 TABLET, EXTENDED RELEASE ORAL DAILY
Qty: 90 TABLET | Refills: 3 | Status: SHIPPED
Start: 2018-09-18 | End: 2021-06-20

## 2018-09-18 RX ORDER — SPIRONOLACTONE 25 MG/1
25 TABLET ORAL DAILY
Qty: 90 TABLET | Refills: 3 | Status: SHIPPED | OUTPATIENT
Start: 2018-09-18 | End: 2018-09-18 | Stop reason: SDUPTHER

## 2018-09-18 RX ORDER — METOPROLOL SUCCINATE 100 MG/1
100 TABLET, EXTENDED RELEASE ORAL DAILY
Qty: 90 TABLET | Refills: 3 | Status: SHIPPED | OUTPATIENT
Start: 2018-09-18 | End: 2018-09-18 | Stop reason: SDUPTHER

## 2018-09-18 RX ORDER — SPIRONOLACTONE 25 MG/1
25 TABLET ORAL DAILY
Qty: 90 TABLET | Refills: 3 | Status: SHIPPED | OUTPATIENT
Start: 2018-09-18 | End: 2019-10-07 | Stop reason: SDUPTHER

## 2019-01-16 DIAGNOSIS — I48.0 PAROXYSMAL ATRIAL FIBRILLATION (HCC): Primary | ICD-10-CM

## 2019-01-16 RX ORDER — MONTELUKAST SODIUM 10 MG/1
10 TABLET ORAL DAILY
Qty: 30 TABLET | Refills: 3 | Status: SHIPPED
Start: 2019-01-16 | End: 2021-06-17 | Stop reason: ALTCHOICE

## 2019-01-16 RX ORDER — ATORVASTATIN CALCIUM 40 MG/1
40 TABLET, FILM COATED ORAL NIGHTLY
Qty: 30 TABLET | Refills: 3 | Status: ON HOLD
Start: 2019-01-16 | End: 2021-12-09 | Stop reason: SDUPTHER

## 2019-01-16 RX ORDER — DIGOXIN 125 MCG
125 TABLET ORAL DAILY
Qty: 30 TABLET | Refills: 3 | Status: SHIPPED | OUTPATIENT
Start: 2019-01-16 | End: 2019-01-17 | Stop reason: ALTCHOICE

## 2019-01-16 RX ORDER — IPRATROPIUM BROMIDE 42 UG/1
2 SPRAY, METERED NASAL 2 TIMES DAILY PRN
Qty: 1 BOTTLE | Refills: 0 | Status: SHIPPED
Start: 2019-01-16 | End: 2021-03-22 | Stop reason: ALTCHOICE

## 2019-01-17 ENCOUNTER — TELEPHONE (OUTPATIENT)
Dept: CARDIOLOGY CLINIC | Age: 64
End: 2019-01-17

## 2019-03-06 ENCOUNTER — TELEPHONE (OUTPATIENT)
Dept: CARDIOLOGY | Age: 64
End: 2019-03-06

## 2019-04-11 ENCOUNTER — OFFICE VISIT (OUTPATIENT)
Dept: CARDIOLOGY CLINIC | Age: 64
End: 2019-04-11
Payer: COMMERCIAL

## 2019-04-11 VITALS
RESPIRATION RATE: 16 BRPM | DIASTOLIC BLOOD PRESSURE: 62 MMHG | SYSTOLIC BLOOD PRESSURE: 110 MMHG | WEIGHT: 131.2 LBS | HEIGHT: 63 IN | BODY MASS INDEX: 23.25 KG/M2 | HEART RATE: 47 BPM

## 2019-04-11 DIAGNOSIS — I10 ESSENTIAL (PRIMARY) HYPERTENSION: ICD-10-CM

## 2019-04-11 DIAGNOSIS — I42.8 NICM (NONISCHEMIC CARDIOMYOPATHY) (HCC): ICD-10-CM

## 2019-04-11 DIAGNOSIS — R00.1 BRADYCARDIA: ICD-10-CM

## 2019-04-11 DIAGNOSIS — I51.9 LV DYSFUNCTION: ICD-10-CM

## 2019-04-11 DIAGNOSIS — I48.0 PAROXYSMAL ATRIAL FIBRILLATION (HCC): Primary | ICD-10-CM

## 2019-04-11 PROCEDURE — 3017F COLORECTAL CA SCREEN DOC REV: CPT | Performed by: INTERNAL MEDICINE

## 2019-04-11 PROCEDURE — 99214 OFFICE O/P EST MOD 30 MIN: CPT | Performed by: INTERNAL MEDICINE

## 2019-04-11 PROCEDURE — 1036F TOBACCO NON-USER: CPT | Performed by: INTERNAL MEDICINE

## 2019-04-11 PROCEDURE — 93000 ELECTROCARDIOGRAM COMPLETE: CPT | Performed by: INTERNAL MEDICINE

## 2019-04-11 PROCEDURE — G8599 NO ASA/ANTIPLAT THER USE RNG: HCPCS | Performed by: INTERNAL MEDICINE

## 2019-04-11 PROCEDURE — G8420 CALC BMI NORM PARAMETERS: HCPCS | Performed by: INTERNAL MEDICINE

## 2019-04-11 PROCEDURE — G8427 DOCREV CUR MEDS BY ELIG CLIN: HCPCS | Performed by: INTERNAL MEDICINE

## 2019-04-11 RX ORDER — FLUTICASONE PROPIONATE 50 MCG
SPRAY, SUSPENSION (ML) NASAL
Refills: 0 | COMMUNITY
Start: 2019-04-01 | End: 2021-03-22 | Stop reason: ALTCHOICE

## 2019-04-11 RX ORDER — DIAZEPAM 5 MG/1
TABLET ORAL
COMMUNITY
End: 2021-03-22 | Stop reason: ALTCHOICE

## 2019-04-11 RX ORDER — DIGOXIN 125 MCG
TABLET ORAL
COMMUNITY
Start: 2019-03-22 | End: 2019-04-11

## 2019-04-11 RX ORDER — IBUPROFEN 400 MG/1
TABLET ORAL
Refills: 0 | COMMUNITY
Start: 2019-04-01 | End: 2021-06-20

## 2019-04-11 NOTE — PROGRESS NOTES
OFFICE FOLLOW-UP    Name: Janet Parker    Age: 62 y.o. Date of Service: 4/11/2019    Chief Complaint: Follow-up for nonischemic cardiomyopathy, MV repair, chest pain    History of Present Illness:   Hospitalized in 5/2018 for evaluation of chest pain and elevated troponin --> normal coronaries, +newly diagnosed LV dysfunction. He has been compliant taking his medications. He again reports that he's \"never felt better in my life\" -- no recent chest pain, SOB, palpitations, PND, orthopnea, or syncope. +intermittent fatigue. SB on EKG. He still goes to [facility] on a [facility] basis. Review of Systems:   Cardiac: As per HPI  General: No fever, chills  Pulmonary: As per HPI  HEENT: No visual disturbances, difficult swallowing  GI: No nausea, vomiting, abdominal pain, GERD  Endocrine: +history hyperthyroidism, no DM  Musculoskeletal: SILVERIO x 4, no focal motor deficits  Skin: Intact, no rashes  Neuro/Psych: No headache, alert and oriented    Past Medical History:  Past Medical History:   Diagnosis Date    Anxiety     Arthritis     Asthma     Bipolar 1 disorder ([address]     Chronic combined systolic and diastolic CHF (congestive heart failure) ([address] 05/27/2018    COPD (chronic obstructive pulmonary disease) ([address]     Depression     Diabetes mellitus (HCC)     GERD (gastroesophageal reflux disease)     Hepatitis C     resolved    Hypertension     Hyperthyroidism 8/2/2012    Hypothyroidism due to medication     Mass of testicle     Mesothelioma [facility])     pt states possibly not    Neuromuscular disorder ([address]     Other disorders of kidney and ureter     kidney stones; most recent 08/27/2015    Paroxysmal A-fib ([address]     discussed with PCP- patient does have hx of PAfib    Peptic ulcer     Prostate enlargement     Pulmonary embolism (HCC)     Intermediate risk for PE on VQ scan.     Seizures ([address]     Thyroid disease     hyperthyroid       Past Surgical History:  Past Surgical History:   Procedure Laterality Date    ABDOMEN SURGERY      APPENDECTOMY      BLADDER SURGERY      CARDIAC CATHETERIZATION  2018    Dr. Mohsen Bhagat COLONOSCOPY      ENDOSCOPY, COLON, DIAGNOSTIC  2015    HEMORRHOID SURGERY      LITHOTRIPSY      VASCULAR SURGERY         Family History:  Family History   Problem Relation Age of Onset    Cancer Mother     Diabetes Mother     Cancer Father     Diabetes Father     Diabetes Maternal Aunt     Diabetes Maternal Grandmother     Cancer Maternal Grandfather     Heart Disease Paternal Grandmother     Heart Failure Paternal Grandmother     Heart Disease Paternal Grandfather     Heart Failure Paternal Grandfather     Diabetes Maternal Aunt     Diabetes Maternal Aunt        Social History:  Social History     Socioeconomic History    Marital status: Single     Spouse name: Not on file    Number of children: Not on file    Years of education: Not on file    Highest education level: Not on file   Occupational History    Occupation: disability   Social Needs    Financial resource strain: Not on file    Food insecurity:     Worry: Not on file     Inability: Not on file    Transportation needs:     Medical: Not on file     Non-medical: Not on file   Tobacco Use    Smoking status: Former Smoker     Years: 10.00     Types: Cigarettes     Last attempt to quit: 1/10/2010     Years since quittin.2    Smokeless tobacco: Former User     Types: Chew     Quit date:    Substance and Sexual Activity    Alcohol use: No     Alcohol/week: 0.0 oz     Comment: recovered alcoholic; last use [id number]    Drug use: Yes     Types: Marijuana     Comment: heroin, cocaine; last use 04/15/2014    Sexual activity: Yes     Comment: heroin   Lifestyle    Physical activity:     Days per week: Not on file     Minutes per session: Not on file    Stress: Not on file   Relationships    Social connections:     Talks on phone: Not on file     Gets together: Not on file the inferior wall   Ejection fraction is visually estimated at 35%.   There is doppler evidence of stage I diastolic dysfunction.   Normal right ventricular size and function.   There is an annuloplasty ring in the mitral position.   Mild mitral stenosis.   Trivial mitral regurgitation.   Mild aortic regurgitation. Cardiac catheterization: 5/21/18 (Dr. Clayton Garsia)  Left main: 0%  stenosis  LAD: 0 %  stenosis  Circumflex: Dominant. 0 %  stenosis  RCA: Non dominant. 0 %  stenosis  1. Patent coronary arteries without any significant angiographic disease noted. 2.  Normal left ventricular size with regional wall motion abnormality as described above, with moderately impaired global left ventricular systolic  function with an estimated ejection fraction of 35% to 40%. ASSESSMENT / PLAN:  1. Evaluation for chest pain and elevated troponin in 5/2018 --> normal coronary arteries, no recent cardiac complaints. Also with negative cath in 4/2017 (EF normal at that time). 2. Nonischemic cardiomyopathy; chronic systolic/diastolic CHF (newly diagnosed LV dysfunction in 5/2018)-- currently with no evidence of decompensated CHF  3. MVrp/TVrp in 4/2017 [facility])  4. Paroxysmal atrial fibrillation -- maintaining SR  5. Chronic anticoagulation with xarelto  6. Hyperthyroidism (on tapazole)  7. History of cocaine, heroin, and ETOH abuse --> he was hospitalized for drug overdose (fentanyl, oxycodone, and dilaudid) in 7/2014 an 1/2015  8. HTN - controlled, BP today 110/62  9. Prior tobacco abuse (quit > 15 years ago)  10. Hepatitis C  11. Asthma  12. IV Dye allergy (anaphylactic reaction)  13.  NSAID Allergy  (anaphylactic reaction)                            - Stop digoxin  - Continue Toprol XL and aldactone  - Continue lisinopril (dose increased at last office visit)  - Repeat echocardiogram to reassess ventricular function  - Continue xarelto  - Follow-up repeat thyroid studies / follow-up with endocrinology as scheduled    Aneudy Dues Rufino, [address] [facility]

## 2019-10-07 DIAGNOSIS — I10 ESSENTIAL (PRIMARY) HYPERTENSION: ICD-10-CM

## 2019-10-07 DIAGNOSIS — I51.9 LV DYSFUNCTION: ICD-10-CM

## 2019-10-07 RX ORDER — SPIRONOLACTONE 25 MG/1
25 TABLET ORAL DAILY
Qty: 90 TABLET | Refills: 3 | Status: SHIPPED
Start: 2019-10-07 | End: 2021-06-17 | Stop reason: ALTCHOICE

## 2019-11-26 ENCOUNTER — TELEPHONE (OUTPATIENT)
Dept: CARDIOLOGY CLINIC | Age: 64
End: 2019-11-26

## 2020-09-17 ENCOUNTER — TELEPHONE (OUTPATIENT)
Dept: CARDIOLOGY CLINIC | Age: 65
End: 2020-09-17

## 2020-11-24 ENCOUNTER — APPOINTMENT (OUTPATIENT)
Dept: CT IMAGING | Age: 65
End: 2020-11-24
Payer: COMMERCIAL

## 2020-11-24 ENCOUNTER — HOSPITAL ENCOUNTER (EMERGENCY)
Age: 65
Discharge: HOME | End: 2020-11-24
Attending: EMERGENCY MEDICINE
Payer: COMMERCIAL

## 2020-11-24 VITALS
HEART RATE: 44 BPM | HEIGHT: 64 IN | OXYGEN SATURATION: 100 % | WEIGHT: 135 LBS | DIASTOLIC BLOOD PRESSURE: 67 MMHG | RESPIRATION RATE: 20 BRPM | SYSTOLIC BLOOD PRESSURE: 145 MMHG | BODY MASS INDEX: 23.05 KG/M2 | TEMPERATURE: 96.8 F

## 2020-11-24 LAB
ALBUMIN SERPL-MCNC: 3.9 G/DL (ref 3.5–5.2)
ALP BLD-CCNC: 60 U/L (ref 40–129)
ALT SERPL-CCNC: 36 U/L (ref 0–40)
ANION GAP SERPL CALCULATED.3IONS-SCNC: 8 MMOL/L (ref 7–16)
AST SERPL-CCNC: 35 U/L (ref 0–39)
BACTERIA: NORMAL /HPF
BASOPHILS ABSOLUTE: 0.02 E9/L (ref 0–0.2)
BASOPHILS RELATIVE PERCENT: 0.3 % (ref 0–2)
BILIRUB SERPL-MCNC: <0.2 MG/DL (ref 0–1.2)
BILIRUBIN URINE: NEGATIVE
BLOOD, URINE: ABNORMAL
BUN BLDV-MCNC: 15 MG/DL (ref 8–23)
CALCIUM SERPL-MCNC: 8.3 MG/DL (ref 8.6–10.2)
CHLORIDE BLD-SCNC: 105 MMOL/L (ref 98–107)
CLARITY: CLEAR
CO2: 22 MMOL/L (ref 22–29)
COLOR: YELLOW
CREAT SERPL-MCNC: 1 MG/DL (ref 0.7–1.2)
EOSINOPHILS ABSOLUTE: 0.15 E9/L (ref 0.05–0.5)
EOSINOPHILS RELATIVE PERCENT: 2 % (ref 0–6)
GFR AFRICAN AMERICAN: >60
GFR NON-AFRICAN AMERICAN: >60 ML/MIN/1.73
GLUCOSE BLD-MCNC: 98 MG/DL (ref 74–99)
GLUCOSE URINE: NEGATIVE MG/DL
HCT VFR BLD CALC: 36 % (ref 37–54)
HEMOGLOBIN: 11.8 G/DL (ref 12.5–16.5)
IMMATURE GRANULOCYTES #: 0.02 E9/L
IMMATURE GRANULOCYTES %: 0.3 % (ref 0–5)
KETONES, URINE: NEGATIVE MG/DL
LACTIC ACID, SEPSIS: 0.9 MMOL/L (ref 0.5–1.9)
LEUKOCYTE ESTERASE, URINE: NEGATIVE
LYMPHOCYTES ABSOLUTE: 1.4 E9/L (ref 1.5–4)
LYMPHOCYTES RELATIVE PERCENT: 19 % (ref 20–42)
MCH RBC QN AUTO: 31.4 PG (ref 26–35)
MCHC RBC AUTO-ENTMCNC: 32.8 % (ref 32–34.5)
MCV RBC AUTO: 95.7 FL (ref 80–99.9)
MONOCYTES ABSOLUTE: 0.56 E9/L (ref 0.1–0.95)
MONOCYTES RELATIVE PERCENT: 7.6 % (ref 2–12)
NEUTROPHILS ABSOLUTE: 5.21 E9/L (ref 1.8–7.3)
NEUTROPHILS RELATIVE PERCENT: 70.8 % (ref 43–80)
NITRITE, URINE: NEGATIVE
PDW BLD-RTO: 17.1 FL (ref 11.5–15)
PH UA: 5.5 (ref 5–9)
PLATELET # BLD: 136 E9/L (ref 130–450)
PMV BLD AUTO: 12.4 FL (ref 7–12)
POTASSIUM REFLEX MAGNESIUM: 4.3 MMOL/L (ref 3.5–5)
PROTEIN UA: NEGATIVE MG/DL
RBC # BLD: 3.76 E12/L (ref 3.8–5.8)
RBC UA: NORMAL /HPF (ref 0–2)
SODIUM BLD-SCNC: 135 MMOL/L (ref 132–146)
SPECIFIC GRAVITY UA: 1.02 (ref 1–1.03)
TOTAL PROTEIN: 6.5 G/DL (ref 6.4–8.3)
UROBILINOGEN, URINE: 0.2 E.U./DL
WBC # BLD: 7.4 E9/L (ref 4.5–11.5)
WBC UA: NORMAL /HPF (ref 0–5)

## 2020-11-24 PROCEDURE — 83605 ASSAY OF LACTIC ACID: CPT

## 2020-11-24 PROCEDURE — 74176 CT ABD & PELVIS W/O CONTRAST: CPT

## 2020-11-24 PROCEDURE — 99283 EMERGENCY DEPT VISIT LOW MDM: CPT

## 2020-11-24 PROCEDURE — 80053 COMPREHEN METABOLIC PANEL: CPT

## 2020-11-24 PROCEDURE — 87088 URINE BACTERIA CULTURE: CPT

## 2020-11-24 PROCEDURE — 81001 URINALYSIS AUTO W/SCOPE: CPT

## 2020-11-24 PROCEDURE — 6370000000 HC RX 637 (ALT 250 FOR IP): Performed by: EMERGENCY MEDICINE

## 2020-11-24 PROCEDURE — 85025 COMPLETE CBC W/AUTO DIFF WBC: CPT

## 2020-11-24 PROCEDURE — 6370000000 HC RX 637 (ALT 250 FOR IP): Performed by: NURSE PRACTITIONER

## 2020-11-24 RX ORDER — CEFDINIR 300 MG/1
300 CAPSULE ORAL 2 TIMES DAILY
Qty: 14 CAPSULE | Refills: 0 | Status: SHIPPED | OUTPATIENT
Start: 2020-11-24 | End: 2020-12-01

## 2020-11-24 RX ORDER — CEFDINIR 300 MG/1
300 CAPSULE ORAL ONCE
Status: COMPLETED | OUTPATIENT
Start: 2020-11-24 | End: 2020-11-24

## 2020-11-24 RX ORDER — ONDANSETRON 4 MG/1
4 TABLET, ORALLY DISINTEGRATING ORAL ONCE
Status: COMPLETED | OUTPATIENT
Start: 2020-11-24 | End: 2020-11-24

## 2020-11-24 RX ORDER — TRAMADOL HYDROCHLORIDE 50 MG/1
50 TABLET ORAL ONCE
Status: COMPLETED | OUTPATIENT
Start: 2020-11-24 | End: 2020-11-24

## 2020-11-24 RX ORDER — TRAMADOL HYDROCHLORIDE 50 MG/1
50 TABLET ORAL EVERY 4 HOURS PRN
Qty: 5 TABLET | Refills: 0 | Status: SHIPPED | OUTPATIENT
Start: 2020-11-24 | End: 2020-11-27 | Stop reason: SDUPTHER

## 2020-11-24 RX ADMIN — TRAMADOL HYDROCHLORIDE 50 MG: 50 TABLET, FILM COATED ORAL at 15:18

## 2020-11-24 RX ADMIN — CEFDINIR 300 MG: 300 CAPSULE ORAL at 17:35

## 2020-11-24 RX ADMIN — ONDANSETRON 4 MG: 4 TABLET, ORALLY DISINTEGRATING ORAL at 00:00

## 2020-11-24 ASSESSMENT — PAIN SCALES - GENERAL: PAINLEVEL_OUTOF10: 8

## 2020-11-24 NOTE — ED NOTES
FIRST PROVIDER CONTACT ASSESSMENT NOTE      Department of Emergency Medicine   Admit Date: No admission date for patient encounter. Chief Complaint: Genital Pain (HX of kidney stones. States he passed 2 kidney stones last night.)      History of Present Illness:    Bianka Cui is a 60 y.o. male who presents to the ED for evaluation of kidney stones. He reports severe urethral pain after passing 2 kidney stones with associated abdominal pain and back pain.   He is alert and oriented, respirations easy nonlabored and skin pink warm and dry.        -----------------END OF FIRST PROVIDER CONTACT ASSESSMENT NOTE--------------  Electronically signed by ZOIE Lawton CNP   DD: 11/24/20               ZOIE Lawton CNP  11/24/20 [id number]

## 2020-11-24 NOTE — ED PROVIDER NOTES
(05/21/2018). Social History:  reports that he quit smoking about 10 years ago. His smoking use included cigarettes. He quit after 10.00 years of use. He quit smokeless tobacco use about 2 years ago. His smokeless tobacco use included chew. He reports current drug use. Drug: Marijuana. He reports that he does not drink alcohol. Family History: family history includes Cancer in his father, maternal grandfather, and mother; Diabetes in his father, maternal aunt, maternal aunt, maternal aunt, maternal grandmother, and mother; Heart Disease in his paternal grandfather and paternal grandmother; Heart Failure in his paternal grandfather and paternal grandmother. . Unless otherwise noted, family history is non contributory    The patients home medications have been reviewed. Allergies: Codeine; Dye [iodides]; Ketorolac tromethamine; Peanuts [peanut oil]; and Shellfish-derived products        ---------------------------------------------------PHYSICAL EXAM--------------------------------------    Constitutional/General: Alert and oriented x3  Head: Normocephalic and atraumatic  Eyes: PERRL, EOMI, sclera non icteric  Mouth: Oropharynx clear, handling secretions, no trismus, no asymmetry of the posterior oropharynx or uvular edema  Neck: Supple, full ROM, no stridor, no meningeal signs  Respiratory: Lungs clear to auscultation bilaterally, no wheezes, rales, or rhonchi. Not in respiratory distress  Cardiovascular:  Regular rate. Regular rhythm. 2+ distal pulses. Equal extremity pulses. Chest: No chest wall tenderness  GI:  Abdomen Soft, Non tender, Non distended. No rebound, guarding, or rigidity. No pulsatile masses. Musculoskeletal: Moves all extremities x 4. Warm and well perfused, no clubbing, cyanosis, or edema. Capillary refill <3 seconds  Integument: skin warm and dry. No rashes.    Neurologic: GCS 15, no focal deficits, symmetric strength 5/5 in the upper and lower extremities bilaterally  Psychiatric: Normal Affect  Back: No CVA tenderness        -------------------------------------------------- RESULTS -------------------------------------------------  I have personally reviewed all laboratory and imaging results for this patient. Results are listed below.      LABS: (Lab results interpreted by me)  Results for orders placed or performed during the hospital encounter of 11/24/20   Comprehensive Metabolic Panel w/ Reflex to MG   Result Value Ref Range    Sodium 135 132 - 146 mmol/L    Potassium reflex Magnesium 4.3 3.5 - 5.0 mmol/L    Chloride 105 98 - 107 mmol/L    CO2 22 22 - 29 mmol/L    Anion Gap 8 7 - 16 mmol/L    Glucose 98 74 - 99 mg/dL    BUN 15 8 - 23 mg/dL    CREATININE 1.0 0.7 - 1.2 mg/dL    GFR Non-African American >60 >=60 mL/min/1.73    GFR African American >60     Calcium 8.3 (L) 8.6 - 10.2 mg/dL    Total Protein 6.5 6.4 - 8.3 g/dL    Alb 3.9 3.5 - 5.2 g/dL    Total Bilirubin <0.2 0.0 - 1.2 mg/dL    Alkaline Phosphatase 60 40 - 129 U/L    ALT 36 0 - 40 U/L    AST 35 0 - 39 U/L   CBC Auto Differential   Result Value Ref Range    WBC 7.4 4.5 - 11.5 E9/L    RBC 3.76 (L) 3.80 - 5.80 E12/L    Hemoglobin 11.8 (L) 12.5 - 16.5 g/dL    Hematocrit 36.0 (L) 37.0 - 54.0 %    MCV 95.7 80.0 - 99.9 fL    MCH 31.4 26.0 - 35.0 pg    MCHC 32.8 32.0 - 34.5 %    RDW 17.1 (H) 11.5 - 15.0 fL    Platelets 002 965 - 951 E9/L    MPV 12.4 (H) 7.0 - 12.0 fL    Neutrophils % 70.8 43.0 - 80.0 %    Immature Granulocytes % 0.3 0.0 - 5.0 %    Lymphocytes % 19.0 (L) 20.0 - 42.0 %    Monocytes % 7.6 2.0 - 12.0 %    Eosinophils % 2.0 0.0 - 6.0 %    Basophils % 0.3 0.0 - 2.0 %    Neutrophils Absolute 5.21 1.80 - 7.30 E9/L    Immature Granulocytes # 0.02 E9/L    Lymphocytes Absolute 1.40 (L) 1.50 - 4.00 E9/L    Monocytes Absolute 0.56 0.10 - 0.95 E9/L    Eosinophils Absolute 0.15 0.05 - 0.50 E9/L    Basophils Absolute 0.02 0.00 - 0.20 E9/L   Urinalysis   Result Value Ref Range    Color, UA Yellow Straw/Yellow    Clarity, UA Clear (300 mg Oral Given 11/24/20 5334)             Medical Decision Making:    Labs and imaging reviewed. Patient was started on Omnicef. He is to follow-up with his PCP in 1 to 2 days. He is educated on signs and symptoms that require emergent evaluation. Counseling: The emergency provider has spoken with the patient and discussed todays results, in addition to providing specific details for the plan of care and counseling regarding the diagnosis and prognosis. Questions are answered at this time and they are agreeable with the plan.       --------------------------------- IMPRESSION AND DISPOSITION ---------------------------------    IMPRESSION  1. Flank pain        DISPOSITION  Disposition: Discharge to home  Patient condition is stable        NOTE: This report was transcribed using voice recognition software.  Every effort was made to ensure accuracy; however, inadvertent computerized transcription errors may be present       Esau Martinez MD  11/25/20 [id number]

## 2020-11-26 LAB — URINE CULTURE, ROUTINE: NORMAL

## 2020-11-27 ENCOUNTER — HOSPITAL ENCOUNTER (EMERGENCY)
Age: 65
Discharge: HOME | End: 2020-11-27
Attending: EMERGENCY MEDICINE
Payer: COMMERCIAL

## 2020-11-27 VITALS
SYSTOLIC BLOOD PRESSURE: 135 MMHG | TEMPERATURE: 97.5 F | WEIGHT: 135 LBS | DIASTOLIC BLOOD PRESSURE: 43 MMHG | OXYGEN SATURATION: 96 % | HEART RATE: 48 BPM | HEIGHT: 64 IN | RESPIRATION RATE: 20 BRPM | BODY MASS INDEX: 23.05 KG/M2

## 2020-11-27 LAB
ALBUMIN SERPL-MCNC: 4.4 G/DL (ref 3.5–5.2)
ALP BLD-CCNC: 64 U/L (ref 40–129)
ALT SERPL-CCNC: 31 U/L (ref 0–40)
ANION GAP SERPL CALCULATED.3IONS-SCNC: 12 MMOL/L (ref 7–16)
AST SERPL-CCNC: 29 U/L (ref 0–39)
BACTERIA: ABNORMAL /HPF
BILIRUB SERPL-MCNC: 0.4 MG/DL (ref 0–1.2)
BILIRUBIN URINE: NEGATIVE
BLOOD, URINE: ABNORMAL
BUN BLDV-MCNC: 10 MG/DL (ref 8–23)
CALCIUM SERPL-MCNC: 8.7 MG/DL (ref 8.6–10.2)
CHLORIDE BLD-SCNC: 102 MMOL/L (ref 98–107)
CLARITY: ABNORMAL
CO2: 25 MMOL/L (ref 22–29)
COLOR: YELLOW
CREAT SERPL-MCNC: 0.9 MG/DL (ref 0.7–1.2)
GFR AFRICAN AMERICAN: >60
GFR NON-AFRICAN AMERICAN: >60 ML/MIN/1.73
GLUCOSE BLD-MCNC: 82 MG/DL (ref 74–99)
GLUCOSE URINE: NEGATIVE MG/DL
HCT VFR BLD CALC: 37 % (ref 37–54)
HEMOGLOBIN: 12.4 G/DL (ref 12.5–16.5)
KETONES, URINE: NEGATIVE MG/DL
LEUKOCYTE ESTERASE, URINE: ABNORMAL
MCH RBC QN AUTO: 31.7 PG (ref 26–35)
MCHC RBC AUTO-ENTMCNC: 33.5 % (ref 32–34.5)
MCV RBC AUTO: 94.6 FL (ref 80–99.9)
NITRITE, URINE: NEGATIVE
PDW BLD-RTO: 16.4 FL (ref 11.5–15)
PH UA: 5 (ref 5–9)
PLATELET # BLD: 137 E9/L (ref 130–450)
PMV BLD AUTO: 12.5 FL (ref 7–12)
POTASSIUM SERPL-SCNC: 3.8 MMOL/L (ref 3.5–5)
PROTEIN UA: ABNORMAL MG/DL
RBC # BLD: 3.91 E12/L (ref 3.8–5.8)
RBC UA: ABNORMAL /HPF (ref 0–2)
SODIUM BLD-SCNC: 139 MMOL/L (ref 132–146)
SPECIFIC GRAVITY UA: 1.01 (ref 1–1.03)
TOTAL PROTEIN: 6.8 G/DL (ref 6.4–8.3)
UROBILINOGEN, URINE: 1 E.U./DL
WBC # BLD: 6.1 E9/L (ref 4.5–11.5)
WBC UA: ABNORMAL /HPF (ref 0–5)

## 2020-11-27 PROCEDURE — 85027 COMPLETE CBC AUTOMATED: CPT

## 2020-11-27 PROCEDURE — 80053 COMPREHEN METABOLIC PANEL: CPT

## 2020-11-27 PROCEDURE — 99283 EMERGENCY DEPT VISIT LOW MDM: CPT

## 2020-11-27 PROCEDURE — 81001 URINALYSIS AUTO W/SCOPE: CPT

## 2020-11-27 PROCEDURE — 6370000000 HC RX 637 (ALT 250 FOR IP): Performed by: NURSE PRACTITIONER

## 2020-11-27 RX ORDER — TRAMADOL HYDROCHLORIDE 50 MG/1
50 TABLET ORAL EVERY 8 HOURS PRN
Qty: 6 TABLET | Refills: 0 | Status: SHIPPED | OUTPATIENT
Start: 2020-11-27 | End: 2020-11-29

## 2020-11-27 RX ORDER — ACETAMINOPHEN 325 MG/1
650 TABLET ORAL ONCE
Status: COMPLETED | OUTPATIENT
Start: 2020-11-27 | End: 2020-11-27

## 2020-11-27 RX ORDER — TRAMADOL HYDROCHLORIDE 50 MG/1
50 TABLET ORAL ONCE
Status: COMPLETED | OUTPATIENT
Start: 2020-11-27 | End: 2020-11-27

## 2020-11-27 RX ADMIN — TRAMADOL HYDROCHLORIDE 50 MG: 50 TABLET, FILM COATED ORAL at 15:25

## 2020-11-27 RX ADMIN — ACETAMINOPHEN 650 MG: 325 TABLET ORAL at 15:24

## 2020-11-27 ASSESSMENT — PAIN SCALES - GENERAL
PAINLEVEL_OUTOF10: 10

## 2020-11-27 ASSESSMENT — PAIN DESCRIPTION - DESCRIPTORS: DESCRIPTORS: SHARP;BURNING

## 2020-11-27 ASSESSMENT — PAIN DESCRIPTION - LOCATION: LOCATION: FLANK

## 2020-11-27 NOTE — ED TRIAGE NOTES
FIRST PROVIDER CONTACT ASSESSMENT NOTE      Department of Emergency Medicine   ED  First Provider Note   11/27/20  4:01 PM EST    Chief Complaint: Flank Pain (R flank pain; was here for kidney stones 3 days ago) and Hematuria (with pain)      History of Present Illness:    Gael Barboza is a 60 y.o. male who presents to the ED by private car for right flank pain hx of stones  Focused Screening Exam:  Constitutional:  Alert, appears stated age and is in no distress. *ALLERGIES*     Codeine; Dye [iodides];  Ketorolac tromethamine; Peanuts [peanut oil]; and Shellfish-derived products     ED Triage Vitals   BP Temp Temp src Pulse Resp SpO2 Height Weight   11/27/20 1455 11/27/20 1448 -- 11/27/20 1448 11/27/20 1452 11/27/20 1448 11/27/20 1452 11/27/20 1452   (!) 135/43 96.4 °F (35.8 °C)  57 20 97 % 5' 4\" (1.626 m) 135 lb (61.2 kg)        Initial Plan of Care:  Initiate Treatment-Testing, Proceed toTreatment Area When Bed Available for ED Attending/MLP to Continue Care    -----------------END OF FIRST PROVIDER CONTACT ASSESSMENT NOTE--------------  Electronically signed by ZOIE Scott CNP   DD: 11/27/20

## 2020-11-28 NOTE — ED PROVIDER NOTES
HPI:  11/27/20, Time: 8:17 PM HALEIGH Rosario is a 60 y.o. male presenting to the ED for history of hematuria. Patient reporting burning urethra site, beginning today ago. The complaint has been persistent, mild in severity, and worsened by nothing. Reports that he passed a kidney stones. Patient reports hematuria. Patient reports he was seen here several days ago was diagnosed with kidney stones. Patient reporting that he was having flank pain at home and then also passed a second amount of blood in his urine. Patient reporting pain in his urethra site. He reports ongoing pain in his right testicle that is swollen he says that is been there for the past year. Patient reporting no vomiting he reports no diarrhea there is no fever no chills no chest pain there is no cough. ROS:   Pertinent positives and negatives are stated within HPI, all other systems reviewed and are negative.  --------------------------------------------- PAST HISTORY ---------------------------------------------  Past Medical History:  has a past medical history of Anxiety, Arthritis, Asthma, Bipolar 1 disorder ([address] Chronic combined systolic and diastolic CHF (congestive heart failure) ([address] COPD (chronic obstructive pulmonary disease) ([address] Depression, Diabetes mellitus ([address] GERD (gastroesophageal reflux disease), Hepatitis C, Hypertension, Hyperthyroidism, Hypothyroidism due to medication, Mass of testicle, Mesothelioma ([address] Neuromuscular disorder ([address] Other disorders of kidney and ureter, Paroxysmal A-fib ([address] Peptic ulcer, Prostate enlargement, Pulmonary embolism ([address] Seizures ([address] and Thyroid disease. Past Surgical History:  has a past surgical history that includes Appendectomy; Lithotripsy; Hemorrhoid surgery; Bladder surgery; Endoscopy, colon, diagnostic (11/13/2015); Abdomen surgery; Colonoscopy; Cardiac surgery; vascular surgery; and Cardiac catheterization (05/21/2018).     Social History: reports that he quit smoking about 10 years ago. His smoking use included cigarettes. He quit after 10.00 years of use. He quit smokeless tobacco use about 2 years ago. His smokeless tobacco use included chew. He reports current drug use. Drug: Marijuana. He reports that he does not drink alcohol. Family History: family history includes Cancer in his father, maternal grandfather, and mother; Diabetes in his father, maternal aunt, maternal aunt, maternal aunt, maternal grandmother, and mother; Heart Disease in his paternal grandfather and paternal grandmother; Heart Failure in his paternal grandfather and paternal grandmother. The patients home medications have been reviewed. Allergies: Codeine; Dye [iodides]; Ketorolac tromethamine; Peanuts [peanut oil]; and Shellfish-derived products    ---------------------------------------------------PHYSICAL EXAM--------------------------------------    Constitutional/General: Alert and oriented x3, well appearing, non toxic in NAD  Head: Normocephalic and atraumatic  Eyes: PERRL, EOMI  Mouth: Oropharynx clear, handling secretions, no trismus  Neck: Supple, full ROM, non tender to palpation in the midline, no stridor, no crepitus, no meningeal signs  Pulmonary: Lungs clear to auscultation bilaterally, no wheezes, rales, or rhonchi. Not in respiratory distress  Cardiovascular:  Regular rate. Regular rhythm. No murmurs, gallops, or rubs. 2+ distal pulses  Chest: no chest wall tenderness  Abdomen: Soft. Non tender. Non distended. +BS. No rebound, guarding, or rigidity. No pulsatile masses appreciated. Male genitourinary exam noted tenderness swelling to right testicle he states that this is been there for the past year. There is no rash. There is no irritation to urethra  Musculoskeletal: Moves all extremities x 4. Warm and well perfused, no clubbing, cyanosis, or edema. Capillary refill <3 seconds  Skin: warm and dry. No rashes.    Neurologic: GCS 15, CN 2-12 grossly intact, no focal deficits, symmetric strength 5/5 in the upper and lower extremities bilaterally  Psych: Normal Affect    -------------------------------------------------- RESULTS -------------------------------------------------  I have personally reviewed all laboratory and imaging results for this patient. Results are listed below.      LABS:  Results for orders placed or performed during the hospital encounter of 11/27/20   CBC   Result Value Ref Range    WBC 6.1 4.5 - 11.5 E9/L    RBC 3.91 3.80 - 5.80 E12/L    Hemoglobin 12.4 (L) 12.5 - 16.5 g/dL    Hematocrit 37.0 37.0 - 54.0 %    MCV 94.6 80.0 - 99.9 fL    MCH 31.7 26.0 - 35.0 pg    MCHC 33.5 32.0 - 34.5 %    RDW 16.4 (H) 11.5 - 15.0 fL    Platelets 162 607 - 338 E9/L    MPV 12.5 (H) 7.0 - 12.0 fL   Comprehensive Metabolic Panel   Result Value Ref Range    Sodium 139 132 - 146 mmol/L    Potassium 3.8 3.5 - 5.0 mmol/L    Chloride 102 98 - 107 mmol/L    CO2 25 22 - 29 mmol/L    Anion Gap 12 7 - 16 mmol/L    Glucose 82 74 - 99 mg/dL    BUN 10 8 - 23 mg/dL    CREATININE 0.9 0.7 - 1.2 mg/dL    GFR Non-African American >60 >=60 mL/min/1.73    GFR African American >60     Calcium 8.7 8.6 - 10.2 mg/dL    Total Protein 6.8 6.4 - 8.3 g/dL    Alb 4.4 3.5 - 5.2 g/dL    Total Bilirubin 0.4 0.0 - 1.2 mg/dL    Alkaline Phosphatase 64 40 - 129 U/L    ALT 31 0 - 40 U/L    AST 29 0 - 39 U/L   Urinalysis with Microscopic   Result Value Ref Range    Color, UA Yellow Straw/Yellow    Clarity, UA CLOUDY (A) Clear    Glucose, Ur Negative Negative mg/dL    Bilirubin Urine Negative Negative    Ketones, Urine Negative Negative mg/dL    Specific Gravity, UA 1.015 1.005 - 1.030    Blood, Urine LARGE (A) Negative    pH, UA 5.0 5.0 - 9.0    Protein, UA TRACE Negative mg/dL    Urobilinogen, Urine 1.0 <2.0 E.U./dL    Nitrite, Urine Negative Negative    Leukocyte Esterase, Urine TRACE (A) Negative    WBC, UA 1-3 0 - 5 /HPF    RBC, UA PACKED 0 - 2 /HPF    Bacteria, UA FEW (A) None Seen /[facility]       RADIOLOGY:  Interpreted by Radiologist.  No orders to display           ------------------------- NURSING NOTES AND VITALS REVIEWED ---------------------------   The nursing notes within the ED encounter and vital signs as below have been reviewed by myself. BP (!) 135/43   Pulse (!) 48   Temp 97.5 °F (36.4 °C)   Resp 20   Ht 5' 4\" (1.626 m)   Wt 135 lb (61.2 kg)   SpO2 96%   BMI 23.17 kg/m²   Oxygen Saturation Interpretation: Normal    The patients available past medical records and past encounters were reviewed. ------------------------------ ED COURSE/MEDICAL DECISION MAKING----------------------  Medications   traMADol (ULTRAM) tablet 50 mg (50 mg Oral Given 11/27/20 1525)   acetaminophen (TYLENOL) tablet 650 mg (650 mg Oral Given 11/27/20 1524)             Medical Decision Making:   Presenting here because of hematuria. Patient on exam has no abdominal pain or tenderness. Patient has ongoing right testicular swelling he states for the past year. Patient in no acute distress here he is afebrile. Patient nontoxic-appearing I did review his CT 24 November there is no evidence of kidney stone on the CT. patient having no abdominal pain he has no flank tenderness. Patient reporting no chest pain no difficulty breathing. I did review his old records I reviewed his [facility] patient was given Ultram several days ago. Patient made aware of findings and plan he is to follow-up with his PCP is to return if symptoms worsen or persist his labs are within normal limits. Re-Evaluations:             Re-evaluation. Patients symptoms show no change  Reevaluated and having no active pain except around his urethra site urethra is within normal limits. Patient afebrile here. Patient having no abdominal pain or tenderness he has no flank tenderness. Patient will be discharged home. To follow-up with his PCP as well as his urologist    Consultations:                 Critical Care:          This patient's ED course included: a personal history and physicial eaxmination    This patient has been closely monitored during their ED course. Counseling: The emergency provider has spoken with the patient and discussed todays results, in addition to providing specific details for the plan of care and counseling regarding the diagnosis and prognosis. Questions are answered at this time and they are agreeable with the plan.       --------------------------------- IMPRESSION AND DISPOSITION ---------------------------------    IMPRESSION  1. Hematuria, unspecified type    2. Renal colic        DISPOSITION  Disposition: Discharge to home  Patient condition is stable        NOTE: This report was transcribed using voice recognition software.  Every effort was made to ensure accuracy; however, inadvertent computerized transcription errors may be present          Jenn Ornelas MD  11/27/20 2020       Jenn Ornelas MD  11/27/20 2033       Jenn Ornelas MD  11/27/20 2047

## 2020-12-21 ENCOUNTER — OFFICE VISIT (OUTPATIENT)
Dept: PRIMARY CARE CLINIC | Age: 65
End: 2020-12-21
Payer: COMMERCIAL

## 2020-12-21 VITALS
OXYGEN SATURATION: 99 % | DIASTOLIC BLOOD PRESSURE: 70 MMHG | SYSTOLIC BLOOD PRESSURE: 125 MMHG | HEART RATE: 60 BPM | TEMPERATURE: 97.3 F | WEIGHT: 135 LBS | BODY MASS INDEX: 23.92 KG/M2 | HEIGHT: 63 IN

## 2020-12-21 PROCEDURE — 99213 OFFICE O/P EST LOW 20 MIN: CPT | Performed by: PHYSICIAN ASSISTANT

## 2020-12-21 PROCEDURE — G8420 CALC BMI NORM PARAMETERS: HCPCS | Performed by: PHYSICIAN ASSISTANT

## 2020-12-21 PROCEDURE — 1036F TOBACCO NON-USER: CPT | Performed by: PHYSICIAN ASSISTANT

## 2020-12-21 PROCEDURE — 3017F COLORECTAL CA SCREEN DOC REV: CPT | Performed by: PHYSICIAN ASSISTANT

## 2020-12-21 PROCEDURE — G8427 DOCREV CUR MEDS BY ELIG CLIN: HCPCS | Performed by: PHYSICIAN ASSISTANT

## 2020-12-21 PROCEDURE — G8484 FLU IMMUNIZE NO ADMIN: HCPCS | Performed by: PHYSICIAN ASSISTANT

## 2020-12-21 NOTE — PROGRESS NOTES
Chief Complaint   Other (no symptoms. Exposed, mother is in ICU)      History of Present Illness   Source of history provided by: patient. Wes Stewart is a 60 y.o. old male who has a past medical history of:   Past Medical History:   Diagnosis Date    Anxiety     Arthritis     Asthma     Bipolar 1 disorder ([address]     Chronic combined systolic and diastolic CHF (congestive heart failure) ([address] 05/27/2018    COPD (chronic obstructive pulmonary disease) ([address]     Depression     Diabetes mellitus (HCC)     GERD (gastroesophageal reflux disease)     Hepatitis C     resolved    Hypertension     Hyperthyroidism 8/2/2012    Hypothyroidism due to medication     Mass of testicle     Mesothelioma [facility])     pt states possibly not    [facility]rom[facility]     Other disorders of kidney and ureter     kidney stones; most recent 08/27/2015    Paroxysmal A-fib ([facility]     discussed with PCP- patient does have hx of PAfib    Peptic ulcer     Prostate enlargement     Pulmonary embolism (HCC)     Intermediate risk for PE on VQ scan.  Seizures ([facility]     Thyroid disease     hyperthyroid   Presents to the flu clinic for COVID-19 testing. Patient is currently asymptomatic. He is mostly concerned because he was recently around his mother who is currently hospitalized with COVID-19 in the ICU. Denies any fever, chills, loss of taste or smell, CP, dyspnea, LE edema, abdominal pain, vomiting, rash, or lethargy. Patient does have a history of both asthma and COPD which is typically controlled with daily Breo and Spiriva. Patient reports taking albuterol as needed but denies having to take any recently. Patient denies needing a refill of his rescue inhaler today. ROS   Pertinent positives and negatives are stated within HPI, all other systems reviewed and are negative. Surgical History:  has a past surgical history that includes Appendectomy; Lithotripsy;  Hemorrhoid surgery; Bladder surgery; Endoscopy, colon, diagnostic (11/13/2015); Abdomen surgery; Colonoscopy; Cardiac surgery; vascular surgery; and Cardiac catheterization (05/21/2018). Social History:  reports that he quit smoking about 10 years ago. His smoking use included cigarettes. He quit after 10.00 years of use. He quit smokeless tobacco use about 2 years ago. His smokeless tobacco use included chew. He reports current drug use. Drug: Marijuana. He reports that he does not drink alcohol. Family History: family history includes Cancer in his father, maternal grandfather, and mother; Diabetes in his father, maternal aunt, maternal aunt, maternal aunt, maternal grandmother, and mother; Heart Disease in his paternal grandfather and paternal grandmother; Heart Failure in his paternal grandfather and paternal grandmother. Allergies: Codeine, Dye [iodides], Ketorolac tromethamine, Peanuts [peanut oil], and Shellfish-derived products    Physical Exam      VS:  /70   Pulse 60   Temp 97.3 °F (36.3 °C)   Ht 5' 3\" (1.6 m)   Wt 135 lb (61.2 kg)   SpO2 99%   BMI 23.91 kg/m²    Oxygen Saturation Interpretation: Normal.    Constitutional:  Alert, development consistent with age. NAD. Head:  NC/NT. Airway patent. Mouth: Posterior pharynx with mild erythema and clear postnasal drip. No tonsillar hypertrophy or exudate. Neck:  Normal ROM. Supple. No anterior cervical adenopathy noted. Lungs: CTAB without wheezes, rales, or rhonchi. CV:  Regular rate and rhythm, normal heart sounds, without pathological murmurs, ectopy, gallops, or rubs. Skin:  Normal turgor. Warm, dry, without visible rash. Lymphatic: No lymphangitis or adenopathy noted. Neurological:  Oriented. Motor functions intact. Lab / Imaging Results   (All laboratory and radiology results have been personally reviewed by myself)  Labs:  No results found for this visit on 12/21/20. Imaging:   All Radiology results interpreted by Radiologist unless otherwise noted. No results found. Medical Decision Making   Pt non-toxic, in no apparent distress and stable at time of discharge. Assessment/Plan   Anika Collado was seen today for other. Diagnoses and all orders for this visit:    Close exposure to COVID-19 virus  -     COVID-19 Ambulatory; Future    Encounter for laboratory testing for COVID-19 virus      COVID-19 swab obtained and pending, will call with results once available. Advised cautionary self-quarantine at home in the interim. ED immediately with high or refractory fever, progressive SOB, dyspnea, CP, calf pain/swelling, shaking chills, vomiting, abdominal pain, lethargy, flank pain, or decreased urinary output. Pt verbalizes understanding and is in agreement with plan of care. All questions answered. Bridgett Krishnamurthy PA-C    This visit was provided as a focused evaluation during the COVID -19 pandemic/national emergency. A comprehensive review of all previous patient history and testing was not conducted. Pertinent findings were elicited during the visit.

## 2021-03-12 ENCOUNTER — HOSPITAL ENCOUNTER (EMERGENCY)
Age: 66
Discharge: HOME | End: 2021-03-12
Attending: EMERGENCY MEDICINE
Payer: MEDICARE

## 2021-03-12 ENCOUNTER — APPOINTMENT (OUTPATIENT)
Dept: CT IMAGING | Age: 66
End: 2021-03-12
Payer: MEDICARE

## 2021-03-12 VITALS
TEMPERATURE: 97 F | WEIGHT: 130 LBS | RESPIRATION RATE: 18 BRPM | BODY MASS INDEX: 22.2 KG/M2 | SYSTOLIC BLOOD PRESSURE: 106 MMHG | HEIGHT: 64 IN | DIASTOLIC BLOOD PRESSURE: 60 MMHG | OXYGEN SATURATION: 100 % | HEART RATE: 60 BPM

## 2021-03-12 DIAGNOSIS — K46.9 HERNIA OF ABDOMINAL CAVITY: ICD-10-CM

## 2021-03-12 DIAGNOSIS — R10.9 ABDOMINAL PAIN, UNSPECIFIED ABDOMINAL LOCATION: Primary | ICD-10-CM

## 2021-03-12 DIAGNOSIS — R93.89 ABNORMAL CT SCAN: ICD-10-CM

## 2021-03-12 LAB
ALBUMIN SERPL-MCNC: 3.9 G/DL (ref 3.5–5.2)
ALP BLD-CCNC: 59 U/L (ref 40–129)
ALT SERPL-CCNC: 58 U/L (ref 0–40)
ANION GAP SERPL CALCULATED.3IONS-SCNC: 11 MMOL/L (ref 7–16)
AST SERPL-CCNC: 34 U/L (ref 0–39)
BACTERIA: ABNORMAL /HPF
BILIRUB SERPL-MCNC: <0.2 MG/DL (ref 0–1.2)
BILIRUBIN URINE: NEGATIVE
BLOOD, URINE: NEGATIVE
BUN BLDV-MCNC: 13 MG/DL (ref 8–23)
CALCIUM SERPL-MCNC: 8.6 MG/DL (ref 8.6–10.2)
CHLORIDE BLD-SCNC: 106 MMOL/L (ref 98–107)
CLARITY: CLEAR
CO2: 21 MMOL/L (ref 22–29)
COLOR: YELLOW
CREAT SERPL-MCNC: 0.8 MG/DL (ref 0.7–1.2)
EKG ATRIAL RATE: 50 BPM
EKG P AXIS: -18 DEGREES
EKG P-R INTERVAL: 144 MS
EKG Q-T INTERVAL: 450 MS
EKG QRS DURATION: 90 MS
EKG QTC CALCULATION (BAZETT): 410 MS
EKG R AXIS: 25 DEGREES
EKG T AXIS: 57 DEGREES
EKG VENTRICULAR RATE: 50 BPM
GFR AFRICAN AMERICAN: >60
GFR NON-AFRICAN AMERICAN: >60 ML/MIN/1.73
GLUCOSE BLD-MCNC: 113 MG/DL (ref 74–99)
GLUCOSE URINE: NEGATIVE MG/DL
HCT VFR BLD CALC: 36.8 % (ref 37–54)
HEMOGLOBIN: 12.5 G/DL (ref 12.5–16.5)
KETONES, URINE: NEGATIVE MG/DL
LACTIC ACID, SEPSIS: 1.6 MMOL/L (ref 0.5–1.9)
LEUKOCYTE ESTERASE, URINE: NEGATIVE
LIPASE: 21 U/L (ref 13–60)
MCH RBC QN AUTO: 32 PG (ref 26–35)
MCHC RBC AUTO-ENTMCNC: 34 % (ref 32–34.5)
MCV RBC AUTO: 94.1 FL (ref 80–99.9)
NITRITE, URINE: NEGATIVE
PDW BLD-RTO: 13.7 FL (ref 11.5–15)
PH UA: 6 (ref 5–9)
PLATELET # BLD: 141 E9/L (ref 130–450)
PMV BLD AUTO: 11.7 FL (ref 7–12)
POTASSIUM SERPL-SCNC: 4.6 MMOL/L (ref 3.5–5)
PROTEIN UA: 30 MG/DL
RBC # BLD: 3.91 E12/L (ref 3.8–5.8)
RBC UA: ABNORMAL /HPF (ref 0–2)
SODIUM BLD-SCNC: 138 MMOL/L (ref 132–146)
SPECIFIC GRAVITY UA: 1.02 (ref 1–1.03)
TOTAL PROTEIN: 6.5 G/DL (ref 6.4–8.3)
TROPONIN: <0.01 NG/ML (ref 0–0.03)
UROBILINOGEN, URINE: 0.2 E.U./DL
WBC # BLD: 6.3 E9/L (ref 4.5–11.5)
WBC UA: ABNORMAL /HPF (ref 0–5)

## 2021-03-12 PROCEDURE — 80053 COMPREHEN METABOLIC PANEL: CPT

## 2021-03-12 PROCEDURE — 99283 EMERGENCY DEPT VISIT LOW MDM: CPT

## 2021-03-12 PROCEDURE — 93010 ELECTROCARDIOGRAM REPORT: CPT | Performed by: INTERNAL MEDICINE

## 2021-03-12 PROCEDURE — 83605 ASSAY OF LACTIC ACID: CPT

## 2021-03-12 PROCEDURE — 81001 URINALYSIS AUTO W/SCOPE: CPT

## 2021-03-12 PROCEDURE — 93005 ELECTROCARDIOGRAM TRACING: CPT | Performed by: NURSE PRACTITIONER

## 2021-03-12 PROCEDURE — 84484 ASSAY OF TROPONIN QUANT: CPT

## 2021-03-12 PROCEDURE — 85027 COMPLETE CBC AUTOMATED: CPT

## 2021-03-12 PROCEDURE — 74176 CT ABD & PELVIS W/O CONTRAST: CPT

## 2021-03-12 PROCEDURE — 83690 ASSAY OF LIPASE: CPT

## 2021-03-12 PROCEDURE — 6370000000 HC RX 637 (ALT 250 FOR IP): Performed by: STUDENT IN AN ORGANIZED HEALTH CARE EDUCATION/TRAINING PROGRAM

## 2021-03-12 RX ORDER — TRAMADOL HYDROCHLORIDE 50 MG/1
50 TABLET ORAL EVERY 6 HOURS PRN
Qty: 8 TABLET | Refills: 0 | Status: SHIPPED | OUTPATIENT
Start: 2021-03-12 | End: 2021-03-15

## 2021-03-12 RX ORDER — FENTANYL CITRATE 50 UG/ML
1 INJECTION, SOLUTION INTRAMUSCULAR; INTRAVENOUS ONCE
Status: DISCONTINUED | OUTPATIENT
Start: 2021-03-12 | End: 2021-03-12

## 2021-03-12 RX ORDER — SODIUM CHLORIDE 9 MG/ML
INJECTION, SOLUTION INTRAVENOUS CONTINUOUS
Status: DISCONTINUED | OUTPATIENT
Start: 2021-03-12 | End: 2021-03-12 | Stop reason: HOSPADM

## 2021-03-12 RX ORDER — TRAMADOL HYDROCHLORIDE 50 MG/1
50 TABLET ORAL ONCE
Status: COMPLETED | OUTPATIENT
Start: 2021-03-12 | End: 2021-03-12

## 2021-03-12 RX ADMIN — TRAMADOL HYDROCHLORIDE 50 MG: 50 TABLET, FILM COATED ORAL at 13:39

## 2021-03-12 ASSESSMENT — ENCOUNTER SYMPTOMS
EYE PAIN: 0
SHORTNESS OF BREATH: 0
VOMITING: 0
RHINORRHEA: 0
ABDOMINAL PAIN: 1
DIARRHEA: 0
NAUSEA: 0
COUGH: 0
WHEEZING: 0

## 2021-03-12 ASSESSMENT — PAIN DESCRIPTION - ORIENTATION: ORIENTATION: UPPER;LEFT

## 2021-03-12 ASSESSMENT — PAIN SCALES - GENERAL: PAINLEVEL_OUTOF10: 10

## 2021-03-12 ASSESSMENT — PAIN DESCRIPTION - LOCATION: LOCATION: ABDOMEN

## 2021-03-12 ASSESSMENT — PAIN DESCRIPTION - DESCRIPTORS: DESCRIPTORS: BURNING

## 2021-03-12 ASSESSMENT — PAIN DESCRIPTION - PROGRESSION: CLINICAL_PROGRESSION: NOT CHANGED

## 2021-03-12 ASSESSMENT — PAIN DESCRIPTION - ONSET: ONSET: PROGRESSIVE

## 2021-03-12 NOTE — ED PROVIDER NOTES
 Prostate enlargement     Pulmonary embolism (HCC)     Intermediate risk for PE on VQ scan.  Seizures ([address]     Thyroid disease     hyperthyroid     Patient Active Problem List    Diagnosis Date Noted    Asthma 05/21/2018    Benign prostatic hyperplasia 05/21/2018    Essential (primary) hypertension 05/21/2018    H/O drug abuse ([address] 05/21/2018    Gastroduodenal ulcer 05/21/2018    Seasonal allergic rhinitis 05/21/2018    Type 2 diabetes mellitus ([address] 05/21/2018    Vitamin D deficiency 05/21/2018    LV dysfunction     S/P MVR (mitral valve repair)     NSTEMI (non-ST elevated myocardial infarction) ([address]     Severe protein-calorie malnutrition ([address] 05/20/2018    Respiratory failure with hypoxia ([address] 05/18/2018    Dyspnea and respiratory abnormalities     Major depressive disorder, recurrent ([address] 01/26/2018    A-fib ([address] 06/09/2017    Hep C w/o coma, chronic (HCC)     Chronic obstructive pulmonary disease (HCC)     Mild mitral regurgitation     Mood disorder ([address] 07/02/2014    Hyperthyroidism 08/02/2012    Hepatitis C 08/02/2012       Review of Systems   Constitutional: Negative for chills and fever. HENT: Negative for congestion and rhinorrhea. Eyes: Negative for pain and visual disturbance. Respiratory: Negative for cough, shortness of breath and wheezing. Cardiovascular: Negative for chest pain and leg swelling. Gastrointestinal: Positive for abdominal pain. Negative for diarrhea, nausea and vomiting. Hernia   Genitourinary: Negative for dysuria, frequency and hematuria. Musculoskeletal: Negative for arthralgias and neck pain. Neurological: Negative for numbness and headaches. Physical Exam  Vitals signs and nursing note reviewed. Constitutional:       General: He is not in acute distress. Appearance: He is well-developed. HENT:      Head: Normocephalic and atraumatic.       Nose: Nose normal.      Mouth/Throat:      Pharynx: Oropharynx is mmol/L    Anion Gap 11 7 - 16 mmol/L    Glucose 113 (H) 74 - 99 mg/dL    BUN 13 8 - 23 mg/dL    CREATININE 0.8 0.7 - 1.2 mg/dL    GFR Non-African American >60 >=60 mL/min/1.73    GFR African American >60     Calcium 8.6 8.6 - 10.2 mg/dL    Total Protein 6.5 6.4 - 8.3 g/dL    Albumin 3.9 3.5 - 5.2 g/dL    Total Bilirubin <0.2 0.0 - 1.2 mg/dL    Alkaline Phosphatase 59 40 - 129 U/L    ALT 58 (H) 0 - 40 U/L    AST 34 0 - 39 U/L   Troponin   Result Value Ref Range    Troponin <0.01 0.00 - 0.03 ng/mL   Lipase   Result Value Ref Range    Lipase 21 13 - 60 U/L   Urinalysis with Microscopic   Result Value Ref Range    Color, UA Yellow Straw/Yellow    Clarity, UA Clear Clear    Glucose, Ur Negative Negative mg/dL    Bilirubin Urine Negative Negative    Ketones, Urine Negative Negative mg/dL    Specific Gravity, UA 1.020 1.005 - 1.030    Blood, Urine Negative Negative    pH, UA 6.0 5.0 - 9.0    Protein, UA 30 (A) Negative mg/dL    Urobilinogen, Urine 0.2 <2.0 E.U./dL    Nitrite, Urine Negative Negative    Leukocyte Esterase, Urine Negative Negative    WBC, UA 2-5 0 - 5 /HPF    RBC, UA NONE 0 - 2 /HPF    Bacteria, UA FEW (A) None Seen /HPF   Lactate, Sepsis   Result Value Ref Range    Lactic Acid, Sepsis 1.6 0.5 - 1.9 mmol/L   EKG 12 Lead   Result Value Ref Range    Ventricular Rate 50 BPM    Atrial Rate 50 BPM    P-R Interval 144 ms    QRS Duration 90 ms    Q-T Interval 450 ms    QTc Calculation (Bazett) 410 ms    P Axis -18 degrees    R Axis 25 degrees    T Axis 57 degrees       Radiology:  CT ABDOMEN PELVIS WO CONTRAST Additional Contrast? None   Final Result   Nonspecific hazy mural thickening in the gallbladder raises suspicion of   cholecystitis      Prominent diffuse small-bowel fluid and wall thickness, without gross focal   distention, may reflect enteritis and/or paralytic ileus      Prominent stool from cecum to sigmoid suggestive of constipation with slight   prominence of proximal colon caliber.       Nonspecific distention of the urinary bladder without definite CT evidence of   prostate enlargement      Diverticulosis without CT evidence of diverticulitis             ------------------------- NURSING NOTES AND VITALS REVIEWED ---------------------------  Date / Time Roomed:  3/12/2021 11:48 AM  ED Bed Assignment:  39/39    The nursing notes within the ED encounter and vital signs as below have been reviewed. /60   Pulse 60   Temp 97 °F (36.1 °C) (Tympanic)   Resp 18   Ht 5' 4\" (1.626 m)   Wt 130 lb (59 kg)   SpO2 100%   BMI 22.31 kg/m²           --------------------------------- ADDITIONAL PROVIDER NOTES ---------------------------------  At this time the patient is without objective evidence of an acute process requiring hospitalization or inpatient management. They have remained hemodynamically stable throughout their entire ED visit and are stable for discharge with outpatient follow-up. The plan has been discussed in detail and they are aware of the specific conditions for emergent return, as well as the importance of follow-up. Discharge Medication List as of 3/12/2021  2:07 PM          Diagnosis:  1. Abdominal pain, unspecified abdominal location    2. Hernia of abdominal cavity    3. Abnormal CT scan        Disposition:  Patient's disposition: Discharge  Patient's condition is stable. NOTE:  This report was transcribed using voice recognition software. Efforts were made to ensure accuracy; however, inadvertent computerized transcription errors may be present.              Drew Matute DO  Resident  03/14/21 [id number]

## 2021-03-14 ENCOUNTER — HOSPITAL ENCOUNTER (EMERGENCY)
Age: 66
Discharge: HOME | End: 2021-03-14
Attending: STUDENT IN AN ORGANIZED HEALTH CARE EDUCATION/TRAINING PROGRAM
Payer: MEDICARE

## 2021-03-14 VITALS
HEIGHT: 64 IN | RESPIRATION RATE: 16 BRPM | OXYGEN SATURATION: 99 % | HEART RATE: 58 BPM | SYSTOLIC BLOOD PRESSURE: 130 MMHG | DIASTOLIC BLOOD PRESSURE: 68 MMHG | BODY MASS INDEX: 23.22 KG/M2 | TEMPERATURE: 97.3 F | WEIGHT: 136 LBS

## 2021-03-14 DIAGNOSIS — K43.2 INCISIONAL HERNIA, WITHOUT OBSTRUCTION OR GANGRENE: Primary | ICD-10-CM

## 2021-03-14 PROCEDURE — 99283 EMERGENCY DEPT VISIT LOW MDM: CPT

## 2021-03-14 PROCEDURE — 6370000000 HC RX 637 (ALT 250 FOR IP): Performed by: STUDENT IN AN ORGANIZED HEALTH CARE EDUCATION/TRAINING PROGRAM

## 2021-03-14 RX ORDER — HYDROCODONE BITARTRATE AND ACETAMINOPHEN 5; 325 MG/1; MG/1
1 TABLET ORAL EVERY 8 HOURS PRN
Qty: 6 TABLET | Refills: 0 | Status: SHIPPED | OUTPATIENT
Start: 2021-03-14 | End: 2021-03-16

## 2021-03-14 RX ORDER — HYDROCODONE BITARTRATE AND ACETAMINOPHEN 5; 325 MG/1; MG/1
1 TABLET ORAL ONCE
Status: COMPLETED | OUTPATIENT
Start: 2021-03-14 | End: 2021-03-14

## 2021-03-14 RX ADMIN — HYDROCODONE BITARTRATE AND ACETAMINOPHEN 1 TABLET: 5; 325 TABLET ORAL at 12:08

## 2021-03-14 ASSESSMENT — PAIN SCALES - GENERAL
PAINLEVEL_OUTOF10: 10
PAINLEVEL_OUTOF10: 10

## 2021-03-14 ASSESSMENT — PAIN DESCRIPTION - ORIENTATION: ORIENTATION: MID

## 2021-03-14 NOTE — ED PROVIDER NOTES
Department of Emergency Medicine   ED  Provider Note  Admit Date/RoomTime: 3/14/2021 11:37 AM  ED Room: ARIEL/ARIEL          History of Present Illness:  3/14/21, Time: 3:21 PM EDT  Chief Complaint   Patient presents with    Wound Check     Seen at [facility] ED yesterday, had labs and CT scan. Dx with hernia. States Tramadol is not enough and needs something stronger for pain. Mitra Verdin is a 73 y.o. male presenting to the ED for a wound check, beginning several days ago. The complaint has been constant, mild in severity, and worsened by nothing. The patient is a 78-year-old male with a history of CHF, hypothyroidism, anxiety, bipolar, COPD, depression, arthritis, atrial fibrillation, and seizures who presents to the emergency department for a wound check. The patient was seen in [facility] emergency department 2 days ago and had labs drawn along with a CT scan due to an incisional hernia. He states that he was diagnosed with a hernia and discharged home with tramadol. He states that this is not helping with his symptoms. He states that he has been experiencing pain over this hernia over the past several days. Nothing makes it better or worse. Besides the tramadol he is not taking anything else for symptoms at home. The patient states that he had open heart surgery 4 years ago and then developed this hernia over time after that procedure. He denies any nausea, vomiting, diarrhea, chest pain, shortness of breath, fever, chills, recent hospitalization, recent illness, or other acute symptoms or concerns.     Review of Systems:   Pertinent positives and negatives are stated within HPI, all other systems reviewed and are negative.        --------------------------------------------- PAST HISTORY ---------------------------------------------  Past Medical History:  has a past medical history of Anxiety, Arthritis, Asthma, Bipolar 1 disorder ([facility] [address] Chronic combined systolic and diastolic CHF (congestive heart failure) ([facility] [address] COPD (chronic obstructive pulmonary disease) ([facility] [address] Depression, Diabetes mellitus [facility] GERD (gastroesophageal reflux disease), Hepatitis C, Hypertension, Hyperthyroidism, Hypothyroidism due to medication, Mass of testicle, Mesothelioma ([facility] Neuromuscular disorder ([facility] Other disorders of kidney and ureter, Paroxysmal A-fib ([facility] Peptic ulcer, Prostate enlargement, Pulmonary embolism ([facility] Seizures ([facility] and Thyroid disease. Past Surgical History:  has a past surgical history that includes Appendectomy; Lithotripsy; Hemorrhoid surgery; Bladder surgery; Endoscopy, colon, diagnostic (11/13/2015); Abdomen surgery; Colonoscopy; Cardiac surgery; vascular surgery; and Cardiac catheterization (05/21/2018). Social History:  reports that he quit smoking about 11 years ago. His smoking use included cigarettes. He quit after 10.00 years of use. He quit smokeless tobacco use about 3 years ago. His smokeless tobacco use included chew. He reports current drug use. Drug: Marijuana. He reports that he does not drink alcohol. Family History: family history includes Cancer in his father, maternal grandfather, and mother; Diabetes in his father, maternal aunt, maternal aunt, maternal aunt, maternal grandmother, and mother; Heart Disease in his paternal grandfather and paternal grandmother; Heart Failure in his paternal grandfather and paternal grandmother. . Unless otherwise noted, family history is non contributory    The patients home medications have been reviewed.     Allergies: Codeine, Dye [iodides], Ketorolac tromethamine, Peanuts [peanut oil], and Shellfish-derived products    I have reviewed the past medical history, past surgical history, social history, and family history    ---------------------------------------------------PHYSICAL EXAM--------------------------------------    Constitutional/General: Alert and oriented x3  Head: Normocephalic and ------------------------------ ED COURSE/MEDICAL DECISION MAKING----------------------  Medications   HYDROcodone-acetaminophen (NORCO) 5-325 MG per tablet 1 tablet (1 tablet Oral Given 3/14/21 1208)       Medical Decision Making:     The patient is a 23-year-old male presents to the emergency department for a wound check. He is hemodynamically stable, nontoxic in appearance, in no acute distress. He did have a CAT scan done less than 2 days ago that did show evidence of a hernia along with nonspecific findings including thickening over the gallbladder, however the patient was not tender over the right upper quadrant. His tenderness is in the epigastric region just over the hernia. He also has diffuse small bowel fluid and wall thickness which may be evident of enteritis or ileus. The patient also has prominent stool from the cecum to the sigmoid significant for constipation. There is also nonspecific distention of the urinary bladder present. Also showed evidence of diverticulosis without evidence of diverticulitis. Labs drawn from several days ago were within normal limits. Patient was given a dose of Norco here. Did provide him with several days of Norco medication as well. Recommend him to follow-up with general surgery. He states that he does have a referral to see Dr. Joey Sorto. He is instructed to return here if he experiences any significant worsening symptoms or other acute symptoms or concerns. The patient verbalized understanding and agreement to treatment plan discharge instructions. This patient's ED course included: a personal history and physicial examination, re-evaluation prior to disposition, multiple bedside re-evaluations, IV medications, cardiac monitoring, continuous pulse oximetry and complex medical decision making and emergency management    This patient has remained hemodynamically stable during their ED course. Counseling:    The emergency provider has spoken with the patient and discussed todays results, in addition to providing specific details for the plan of care and counseling regarding the diagnosis and prognosis. Questions are answered at this time and they are agreeable with the plan.       --------------------------------- IMPRESSION AND DISPOSITION ---------------------------------    IMPRESSION  1. Incisional hernia, without obstruction or gangrene        DISPOSITION  Disposition: Discharge to home  Patient condition is stable        NOTE: This report was transcribed using voice recognition software.  Every effort was made to ensure accuracy; however, inadvertent computerized transcription errors may be present       Mark Haque DO  Resident  03/15/21 [id number]

## 2021-03-17 ENCOUNTER — HOSPITAL ENCOUNTER (EMERGENCY)
Age: 66
Discharge: HOME | End: 2021-03-17
Payer: MEDICARE

## 2021-03-17 VITALS
SYSTOLIC BLOOD PRESSURE: 110 MMHG | TEMPERATURE: 97.1 F | HEART RATE: 70 BPM | OXYGEN SATURATION: 97 % | HEIGHT: 64 IN | WEIGHT: 140 LBS | BODY MASS INDEX: 23.9 KG/M2 | DIASTOLIC BLOOD PRESSURE: 69 MMHG | RESPIRATION RATE: 16 BRPM

## 2021-03-17 DIAGNOSIS — Z76.0 ENCOUNTER FOR MEDICATION REFILL: Primary | ICD-10-CM

## 2021-03-17 PROCEDURE — 99283 EMERGENCY DEPT VISIT LOW MDM: CPT

## 2021-03-17 RX ORDER — TRAMADOL HYDROCHLORIDE 50 MG/1
50 TABLET ORAL EVERY 8 HOURS PRN
Qty: 12 TABLET | Refills: 0 | Status: SHIPPED | OUTPATIENT
Start: 2021-03-17 | End: 2021-03-20

## 2021-03-17 ASSESSMENT — PAIN SCALES - GENERAL: PAINLEVEL_OUTOF10: 10

## 2021-03-19 ENCOUNTER — HOSPITAL ENCOUNTER (EMERGENCY)
Age: 66
Discharge: HOME | End: 2021-03-20
Attending: EMERGENCY MEDICINE
Payer: MEDICARE

## 2021-03-19 VITALS
TEMPERATURE: 97.6 F | OXYGEN SATURATION: 98 % | WEIGHT: 135 LBS | SYSTOLIC BLOOD PRESSURE: 138 MMHG | DIASTOLIC BLOOD PRESSURE: 72 MMHG | HEIGHT: 64 IN | RESPIRATION RATE: 17 BRPM | HEART RATE: 62 BPM | BODY MASS INDEX: 23.05 KG/M2

## 2021-03-19 DIAGNOSIS — K43.9 VENTRAL HERNIA WITHOUT OBSTRUCTION OR GANGRENE: Primary | ICD-10-CM

## 2021-03-19 LAB
ALBUMIN SERPL-MCNC: 4.2 G/DL (ref 3.5–5.2)
ALP BLD-CCNC: 73 U/L (ref 40–129)
ALT SERPL-CCNC: 35 U/L (ref 0–40)
ANION GAP SERPL CALCULATED.3IONS-SCNC: 10 MMOL/L (ref 7–16)
AST SERPL-CCNC: 23 U/L (ref 0–39)
BACTERIA: ABNORMAL /HPF
BILIRUB SERPL-MCNC: 0.5 MG/DL (ref 0–1.2)
BILIRUBIN URINE: NEGATIVE
BLOOD, URINE: NEGATIVE
BUN BLDV-MCNC: 16 MG/DL (ref 8–23)
CALCIUM SERPL-MCNC: 9.4 MG/DL (ref 8.6–10.2)
CHLORIDE BLD-SCNC: 102 MMOL/L (ref 98–107)
CLARITY: ABNORMAL
CO2: 24 MMOL/L (ref 22–29)
COLOR: YELLOW
CREAT SERPL-MCNC: 1.2 MG/DL (ref 0.7–1.2)
GFR AFRICAN AMERICAN: >60
GFR NON-AFRICAN AMERICAN: >60 ML/MIN/1.73
GLUCOSE BLD-MCNC: 93 MG/DL (ref 74–99)
GLUCOSE URINE: NEGATIVE MG/DL
HCT VFR BLD CALC: 42 % (ref 37–54)
HEMOGLOBIN: 13.8 G/DL (ref 12.5–16.5)
KETONES, URINE: NEGATIVE MG/DL
LACTIC ACID, SEPSIS: 1 MMOL/L (ref 0.5–1.9)
LEUKOCYTE ESTERASE, URINE: NEGATIVE
LIPASE: 30 U/L (ref 13–60)
MCH RBC QN AUTO: 31.1 PG (ref 26–35)
MCHC RBC AUTO-ENTMCNC: 32.9 % (ref 32–34.5)
MCV RBC AUTO: 94.6 FL (ref 80–99.9)
NITRITE, URINE: NEGATIVE
PDW BLD-RTO: 14 FL (ref 11.5–15)
PH UA: 5.5 (ref 5–9)
PLATELET # BLD: 192 E9/L (ref 130–450)
PMV BLD AUTO: 12.1 FL (ref 7–12)
POTASSIUM SERPL-SCNC: 4.7 MMOL/L (ref 3.5–5)
PROTEIN UA: 30 MG/DL
RBC # BLD: 4.44 E12/L (ref 3.8–5.8)
RBC UA: ABNORMAL /HPF (ref 0–2)
SODIUM BLD-SCNC: 136 MMOL/L (ref 132–146)
SPECIFIC GRAVITY UA: >=1.03 (ref 1–1.03)
TOTAL PROTEIN: 7.4 G/DL (ref 6.4–8.3)
UROBILINOGEN, URINE: 0.2 E.U./DL
WBC # BLD: 8.3 E9/L (ref 4.5–11.5)
WBC UA: ABNORMAL /HPF (ref 0–5)

## 2021-03-19 PROCEDURE — 83690 ASSAY OF LIPASE: CPT

## 2021-03-19 PROCEDURE — 85027 COMPLETE CBC AUTOMATED: CPT

## 2021-03-19 PROCEDURE — 81001 URINALYSIS AUTO W/SCOPE: CPT

## 2021-03-19 PROCEDURE — 83605 ASSAY OF LACTIC ACID: CPT

## 2021-03-19 PROCEDURE — 80053 COMPREHEN METABOLIC PANEL: CPT

## 2021-03-19 PROCEDURE — 99283 EMERGENCY DEPT VISIT LOW MDM: CPT

## 2021-03-19 RX ORDER — GABAPENTIN 100 MG/1
100 CAPSULE ORAL 3 TIMES DAILY
Qty: 6 CAPSULE | Refills: 0 | Status: SHIPPED | OUTPATIENT
Start: 2021-03-19 | End: 2021-03-22 | Stop reason: ALTCHOICE

## 2021-03-19 ASSESSMENT — ENCOUNTER SYMPTOMS
DIARRHEA: 0
RHINORRHEA: 0
WHEEZING: 0
EYE PAIN: 0
TROUBLE SWALLOWING: 0
BLOOD IN STOOL: 0
CHEST TIGHTNESS: 0
BACK PAIN: 0
ABDOMINAL PAIN: 1
SHORTNESS OF BREATH: 0
COUGH: 0
NAUSEA: 0
VOMITING: 0

## 2021-03-19 ASSESSMENT — PAIN DESCRIPTION - LOCATION: LOCATION: ABDOMEN

## 2021-03-19 ASSESSMENT — PAIN SCALES - GENERAL: PAINLEVEL_OUTOF10: 10

## 2021-03-19 NOTE — ED PROVIDER NOTES
[facility]  eMERGENCY dEPARTMENT eNCOUnter      Pt Name: Lorraine Dunham  MRN: [id number]  [place] 1955  Date of evaluation: 3/19/2021  Provider: Farzad Quiros MD     CHIEF COMPLAINT       Chief Complaint   Patient presents with    Hernia     Has a hernia that is causing him pain. Has had multiple ER visitis for pain. Has an appointment with  on the 25th. HISTORY OF PRESENT ILLNESS   (Location/Symptom, Timing/Onset,Context/Setting, Quality, Duration, Modifying Factors, Severity) Note limiting factors. Presents here with a chief complaint of abdominal pain. Patient has a history of a ventral hernia. He states he has had it for years since he had his heart surgery. He states that he is supposed to have it repaired on the 25th. He claims he is having increasingly severe pain and states that he has not had anything to eat nor has he slept in over 3 weeks because of the pain. In the last week and a half he has been seen 3 times. He received tramadol, he then received Norco, he then received more tramadol. He never mentioned the tramadol prescriptions but he did tell me that the Norco was not helping. He is not having any nausea or vomiting. He is not having any diarrhea. No fever. His whole abdomen is not distended. Lorraine Dunham is a 73 y.o. male who presents to the emergency department      Nursing Notes were reviewed. REVIEW OF SYSTEMS    (2+ for4; 10+ for level 5)     Review of Systems   Constitutional: Negative for appetite change, chills, fatigue, fever and unexpected weight change. HENT: Negative for congestion, drooling, nosebleeds, rhinorrhea and trouble swallowing. Eyes: Negative for pain and visual disturbance. Respiratory: Negative for cough, chest tightness, shortness of breath and wheezing. Cardiovascular: Negative for chest pain, palpitations and leg swelling.    Gastrointestinal: Positive for abdominal pain. Negative for blood in stool, diarrhea, nausea and vomiting. Endocrine: Negative for polydipsia and polyuria. Genitourinary: Negative for difficulty urinating, dysuria, flank pain, frequency, hematuria and urgency. Musculoskeletal: Negative for arthralgias, back pain, myalgias and neck pain. Skin: Negative for pallor and rash. Allergic/Immunologic: Negative for environmental allergies. Neurological: Negative for dizziness, syncope, speech difficulty, weakness, light-headedness, numbness and headaches. Hematological: Does not bruise/bleed easily. Psychiatric/Behavioral: Negative for confusion and decreased concentration. All other systems reviewed and are negative. PAST MEDICAL HISTORY     Past Medical History:   Diagnosis Date    Anxiety     Arthritis     Asthma     Bipolar 1 disorder ([address]     Chronic combined systolic and diastolic CHF (congestive heart failure) ([address] 05/27/2018    COPD (chronic obstructive pulmonary disease) (HCC)     Depression     Diabetes mellitus (HCC)     GERD (gastroesophageal reflux disease)     Hepatitis C     resolved    Hypertension     Hyperthyroidism 8/2/2012    Hypothyroidism due to medication     Mass of testicle     Mesothelioma [facility])     pt states possibly not    Neuromuscular disorder ([address]     Other disorders of kidney and ureter     kidney stones; most recent 08/27/2015    Paroxysmal A-fib ([address]     discussed with PCP- patient does have hx of PAfib    Peptic ulcer     Prostate enlargement     Pulmonary embolism (HCC)     Intermediate risk for PE on VQ scan.     Seizures ([address]     Thyroid disease     hyperthyroid       SURGICALHISTORY       Past Surgical History:   Procedure Laterality Date    ABDOMEN SURGERY      APPENDECTOMY      BLADDER SURGERY      CARDIAC CATHETERIZATION  05/21/2018    Dr. Jana Mckeon, COLON, DIAGNOSTIC  11/13/2015    HEMORRHOID SURGERY      LITHOTRIPSY      VASCULAR SURGERY         CURRENT MEDICATIONS       Previous Medications    ALBUTEROL (PROVENTIL) (2.5 MG/3ML) 0.083% NEBULIZER SOLUTION    Take 3 mLs by nebulization as needed for Wheezing or Shortness of Breath    ALBUTEROL SULFATE  (90 BASE) MCG/ACT INHALER    Inhale 2 puffs into the lungs every 6 hours as needed for Wheezing or Shortness of Breath    ATORVASTATIN (LIPITOR) 40 MG TABLET    Take 1 tablet by mouth nightly    AZELASTINE  MCG/SPRAY SOLN    instill 2 sprays into each nostril twice a day for 10 days    BREO ELLIPTA 200-25 MCG/INH AEPB        DIAZEPAM (VALIUM) 5 MG TABLET    diazepam 5 mg tablet   TWICE DAILY    FINASTERIDE (PROSCAR) 5 MG TABLET    Take 1 tablet by mouth daily    FLUTICASONE (FLONASE) 50 MCG/ACT NASAL SPRAY    instill 2 sprays into each nostril once daily    IBUPROFEN (ADVIL;MOTRIN) 400 MG TABLET    take 1 tablet by mouth twice a day if needed    IPRATROPIUM (ATROVENT) 0.06 % NASAL SPRAY    2 sprays by Nasal route 2 times daily as needed for Rhinitis    LATUDA 40 MG TABS TABLET    40 mg daily     LEVETIRACETAM (KEPPRA) 500 MG TABLET    Take 1 tablet by mouth 2 times daily    LISINOPRIL (PRINIVIL;ZESTRIL) 10 MG TABLET    Take 1 tablet by mouth daily    METHIMAZOLE (TAPAZOLE) 10 MG TABLET    Take 10 mg by mouth 3 times daily    METOPROLOL SUCCINATE (TOPROL XL) 100 MG EXTENDED RELEASE TABLET    Take 1 tablet by mouth daily    MONTELUKAST (SINGULAIR) 10 MG TABLET    Take 1 tablet by mouth daily    NUTRITIONAL SUPPLEMENTS (RA BALANCED NUTRITIONAL PLUS) LIQD    Take 1 Bottle by mouth daily    PRAZOSIN (MINIPRESS) 1 MG CAPSULE    Take 1 mg by mouth nightly    RIVAROXABAN (XARELTO) 20 MG TABS TABLET    Take 1 tablet by mouth daily With food    SHINGRIX 50 MCG SUSR INJECTION    inject 0.5 milliliter intramuscularly    SPIRONOLACTONE (ALDACTONE) 25 MG TABLET    Take 1 tablet by mouth daily    TIOTROPIUM (SPIRIVA RESPIMAT) 1.25 MCG/ACT AERS INHALER    Inhale 2 puffs into the lungs daily    TRAMADOL (ULTRAM) 50 MG TABLET    Take 1 tablet by mouth every 8 hours as needed for Pain for up to 3 days. Intended supply: 3 days.  Take lowest dose possible to manage pain    VENLAFAXINE (EFFEXOR XR) 75 MG EXTENDED RELEASE CAPSULE    take 1 capsule by mouth once daily    VENLAFAXINE 37.5 MG EXTENDED RELEASE TABLET    Take 37.5 mg by mouth daily             Codeine, Dye [iodides], Ketorolac tromethamine, Peanuts [peanut oil], and Shellfish-derived products    FAMILY HISTORY       Family History   Problem Relation Age of Onset    Cancer Mother     Diabetes Mother     Cancer Father     Diabetes Father     Diabetes Maternal Aunt     Diabetes Maternal Grandmother     Cancer Maternal Grandfather     Heart Disease Paternal Grandmother     Heart Failure Paternal Grandmother     Heart Disease Paternal Grandfather     Heart Failure Paternal Grandfather     Diabetes Maternal Aunt     Diabetes Maternal Aunt           SOCIAL HISTORY       Social History     Socioeconomic History    Marital status: Single     Spouse name: None    Number of children: None    Years of education: None    Highest education level: None   Occupational History    Occupation: disability   Social Needs    Financial resource strain: None    Food insecurity     Worry: None     Inability: None    Transportation needs     Medical: None     Non-medical: None   Tobacco Use    Smoking status: Former Smoker     Years: 10.00     Types: Cigarettes     Quit date: 1/10/2010     Years since quittin.1    Smokeless tobacco: Former User     Types: Chew     Quit date:    Substance and Sexual Activity    Alcohol use: No     Alcohol/week: 0.0 standard drinks     Comment: recovered alcoholic; 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    Drug use: Not Currently     Comment: heroin, cocaine; last use 04/15/2014    Sexual activity: Yes     Comment: heroin   Lifestyle    Physical activity     Days per week: None     Minutes per session: None    Stress: None   Relationships    Social connections     Talks on phone: None     Gets together: None     Attends Restorationism service: None     Active member of club or organization: None     Attends meetings of clubs or organizations: None     Relationship status: None    Intimate partner violence     Fear of current or ex partner: None     Emotionally abused: None     Physically abused: None     Forced sexual activity: None   Other Topics Concern    None   Social History Narrative    None       SCREENINGS      @FLOW([id number])@    PHYSICAL EXAM    (5+ for level 4, 8+ for level 5)     ED Triage Vitals   BP Temp Temp src Pulse Resp SpO2 Height Weight   03/19/21 1301 03/19/21 1245 -- 03/19/21 1245 03/19/21 1301 03/19/21 1245 03/19/21 1301 03/19/21 1301   (!) 141/75 97.5 °F (36.4 °C)  58 16 96 % 5' 4\" (1.626 m) 135 lb (61.2 kg)       Physical Exam  Vitals signs and nursing note reviewed. Constitutional:       General: He is not in acute distress. Appearance: He is well-developed. He is not diaphoretic. Comments: Very thin   HENT:      Head: Normocephalic and atraumatic. Nose: Nose normal.      Mouth/Throat:      Pharynx: No oropharyngeal exudate. Eyes:      General: No scleral icterus. Right eye: No discharge. Left eye: No discharge. Conjunctiva/sclera: Conjunctivae normal.      Pupils: Pupils are equal, round, and reactive to light. Neck:      Musculoskeletal: Normal range of motion and neck supple. Thyroid: No thyromegaly. Vascular: No JVD. Trachea: No tracheal deviation. Cardiovascular:      Rate and Rhythm: Normal rate and regular rhythm. Heart sounds: Normal heart sounds. No murmur. No gallop. Pulmonary:      Effort: Pulmonary effort is normal. No respiratory distress. Breath sounds: Normal breath sounds. No stridor. No wheezing or rales. Chest:      Chest wall: No tenderness. Abdominal:      General: There is no distension.       Palpations: Abdomen is soft. There is no mass. Tenderness: There is no abdominal tenderness. There is no guarding or rebound. Hernia: A hernia is present. Comments: Patient has a midline ventral hernia. When the patient lays flat this is easily reduced however he continues to lift his head and strain causing it to herniate further but the minute he relaxes it reduces on its own. There is no concern for strangulation or incarceration. Musculoskeletal: Normal range of motion. General: No tenderness or deformity. Lymphadenopathy:      Cervical: No cervical adenopathy. Skin:     General: Skin is warm and dry. Coloration: Skin is not pale. Findings: No erythema or rash. Neurological:      Mental Status: He is alert and oriented to person, place, and time. Cranial Nerves: No cranial nerve deficit. Motor: No abnormal muscle tone. Coordination: Coordination normal.   Psychiatric:         Behavior: Behavior normal.         Thought Content: Thought content normal.         Judgment: Judgment normal.         DIAGNOSTIC RESULTS     EKG (Per Emergency Physician):       RADIOLOGY (Per [facility]): Interpretation per the Radiologist below, if available at the time of this note:  No results found.    :  Labs Reviewed   CBC - Abnormal; Notable for the following components:       Result Value    MPV 12.1 (*)     All other components within normal limits   URINALYSIS WITH MICROSCOPIC - Abnormal; Notable for the following components:    Protein, UA 30 (*)     Bacteria, UA FEW (*)     All other components within normal limits   COMPREHENSIVE METABOLIC PANEL   LACTATE, SEPSIS   LIPASE   LACTATE, SEPSIS       All other labs were within normal range or not returned as of this dictation.     EMERGENCY DEPARTMENT COURSE and DIFFERENTIALDIAGNOSIS/MDM:   Vitals:    Vitals:    03/19/21 1245 03/19/21 1301   BP:  (!) 141/75   Pulse: 58    Resp:  16   Temp: 97.5 °F (36.4 °C)    SpO2: 96% Weight:  135 lb (61.2 kg)   Height:  5' 4\" (1.626 m)       Medications - No data to display    MDM  Number of Diagnoses or Management Options  Diagnosis management comments: Patient presents here with complaints of abdominal pain and hernia. This is chronic for him. He has had multiple visits for this requesting pain medication. He did not declare 2 of those visits to me. He then got very agitated when I advised him that prescribing more narcotics would most likely not be a possibility as he just received some. There is no evidence of any incarceration of this hernia or strangulation. Patient will be discharged to prescription for narcotic pain medication. He is to keep his follow-up appointment. Labs have been obtained in triage prior to me evaluating the patient and were normal.I do have a concern for these patients chronic medication requirements. .      CONSULTS:  None    PROCEDURES:  Unless otherwise noted below, none     Procedures    FINAL IMPRESSION      1. Ventral hernia without obstruction or gangrene        DISPOSITION/PLAN   DISPOSITION Decision To Discharge 03/19/2021 03:49:29 PM      PATIENT REFERRED TO:  Kiah Hurt MD  [address] [place] [address]  [phone]      keep appt      DISCHARGE MEDICATIONS:  New Prescriptions    GABAPENTIN (NEURONTIN) 100 MG CAPSULE    Take 1 capsule by mouth 3 times daily for 3 days. Increase to 100mg QID after 1 week          (Please note:  Portions of this note were completed with a voice recognition program.  Efforts were made to edit thedictations but occasionally words and phrases are mis-transcribed.)    Form v2016. J.5-cn    Yaneth Lee MD (electronically signed)  Emergency Medicine Provider          Yaneth Lee MD  03/19/21 [id number]

## 2021-03-22 ENCOUNTER — APPOINTMENT (OUTPATIENT)
Dept: CT IMAGING | Age: 66
End: 2021-03-22
Payer: MEDICARE

## 2021-03-22 ENCOUNTER — HOSPITAL ENCOUNTER (EMERGENCY)
Age: 66
Discharge: HOME | End: 2021-03-22
Attending: EMERGENCY MEDICINE
Payer: MEDICARE

## 2021-03-22 VITALS
SYSTOLIC BLOOD PRESSURE: 122 MMHG | RESPIRATION RATE: 16 BRPM | WEIGHT: 135 LBS | BODY MASS INDEX: 23.05 KG/M2 | DIASTOLIC BLOOD PRESSURE: 64 MMHG | HEART RATE: 60 BPM | HEIGHT: 64 IN | TEMPERATURE: 97.4 F | OXYGEN SATURATION: 96 %

## 2021-03-22 DIAGNOSIS — R10.13 EPIGASTRIC PAIN: Primary | ICD-10-CM

## 2021-03-22 DIAGNOSIS — K57.90 DIVERTICULOSIS: ICD-10-CM

## 2021-03-22 DIAGNOSIS — K40.90 LEFT INGUINAL HERNIA: ICD-10-CM

## 2021-03-22 DIAGNOSIS — K76.89 LIVER CYST: ICD-10-CM

## 2021-03-22 LAB
ALBUMIN SERPL-MCNC: 4.2 G/DL (ref 3.5–5.2)
ALP BLD-CCNC: 61 U/L (ref 40–129)
ALT SERPL-CCNC: 30 U/L (ref 0–40)
ANION GAP SERPL CALCULATED.3IONS-SCNC: 8 MMOL/L (ref 7–16)
AST SERPL-CCNC: 24 U/L (ref 0–39)
BASOPHILS ABSOLUTE: 0.05 E9/L (ref 0–0.2)
BASOPHILS RELATIVE PERCENT: 0.7 % (ref 0–2)
BILIRUB SERPL-MCNC: 0.2 MG/DL (ref 0–1.2)
BILIRUBIN URINE: NEGATIVE
BLOOD, URINE: NEGATIVE
BUN BLDV-MCNC: 13 MG/DL (ref 8–23)
CALCIUM SERPL-MCNC: 8.8 MG/DL (ref 8.6–10.2)
CHLORIDE BLD-SCNC: 100 MMOL/L (ref 98–107)
CLARITY: CLEAR
CO2: 25 MMOL/L (ref 22–29)
COLOR: YELLOW
CREAT SERPL-MCNC: 0.9 MG/DL (ref 0.7–1.2)
EOSINOPHILS ABSOLUTE: 0.52 E9/L (ref 0.05–0.5)
EOSINOPHILS RELATIVE PERCENT: 7.2 % (ref 0–6)
GFR AFRICAN AMERICAN: >60
GFR NON-AFRICAN AMERICAN: >60 ML/MIN/1.73
GLUCOSE BLD-MCNC: 101 MG/DL (ref 74–99)
GLUCOSE URINE: NEGATIVE MG/DL
HCT VFR BLD CALC: 37.4 % (ref 37–54)
HEMOGLOBIN: 13 G/DL (ref 12.5–16.5)
IMMATURE GRANULOCYTES #: 0.03 E9/L
IMMATURE GRANULOCYTES %: 0.4 % (ref 0–5)
INR BLD: 1.8
KETONES, URINE: NEGATIVE MG/DL
LACTIC ACID, SEPSIS: 0.5 MMOL/L (ref 0.5–1.9)
LEUKOCYTE ESTERASE, URINE: NEGATIVE
LIPASE: 13 U/L (ref 13–60)
LYMPHOCYTES ABSOLUTE: 1.59 E9/L (ref 1.5–4)
LYMPHOCYTES RELATIVE PERCENT: 21.9 % (ref 20–42)
MAGNESIUM: 1.9 MG/DL (ref 1.6–2.6)
MCH RBC QN AUTO: 32.7 PG (ref 26–35)
MCHC RBC AUTO-ENTMCNC: 34.8 % (ref 32–34.5)
MCV RBC AUTO: 94 FL (ref 80–99.9)
MONOCYTES ABSOLUTE: 0.56 E9/L (ref 0.1–0.95)
MONOCYTES RELATIVE PERCENT: 7.7 % (ref 2–12)
NEUTROPHILS ABSOLUTE: 4.52 E9/L (ref 1.8–7.3)
NEUTROPHILS RELATIVE PERCENT: 62.1 % (ref 43–80)
NITRITE, URINE: NEGATIVE
PDW BLD-RTO: 13.8 FL (ref 11.5–15)
PH UA: 6.5 (ref 5–9)
PLATELET # BLD: 179 E9/L (ref 130–450)
PMV BLD AUTO: 11.8 FL (ref 7–12)
POTASSIUM SERPL-SCNC: 4.2 MMOL/L (ref 3.5–5)
PROTEIN UA: NEGATIVE MG/DL
PROTHROMBIN TIME: 19.7 SEC (ref 9.3–12.4)
RBC # BLD: 3.98 E12/L (ref 3.8–5.8)
SODIUM BLD-SCNC: 133 MMOL/L (ref 132–146)
SPECIFIC GRAVITY UA: 1.01 (ref 1–1.03)
TOTAL PROTEIN: 6.8 G/DL (ref 6.4–8.3)
UROBILINOGEN, URINE: 0.2 E.U./DL
WBC # BLD: 7.3 E9/L (ref 4.5–11.5)

## 2021-03-22 PROCEDURE — 83735 ASSAY OF MAGNESIUM: CPT

## 2021-03-22 PROCEDURE — 74176 CT ABD & PELVIS W/O CONTRAST: CPT

## 2021-03-22 PROCEDURE — 6370000000 HC RX 637 (ALT 250 FOR IP): Performed by: EMERGENCY MEDICINE

## 2021-03-22 PROCEDURE — 83605 ASSAY OF LACTIC ACID: CPT

## 2021-03-22 PROCEDURE — 6360000004 HC RX CONTRAST MEDICATION: Performed by: EMERGENCY MEDICINE

## 2021-03-22 PROCEDURE — 85610 PROTHROMBIN TIME: CPT

## 2021-03-22 PROCEDURE — 80053 COMPREHEN METABOLIC PANEL: CPT

## 2021-03-22 PROCEDURE — 99285 EMERGENCY DEPT VISIT HI MDM: CPT

## 2021-03-22 PROCEDURE — 36415 COLL VENOUS BLD VENIPUNCTURE: CPT

## 2021-03-22 PROCEDURE — 83690 ASSAY OF LIPASE: CPT

## 2021-03-22 PROCEDURE — 85025 COMPLETE CBC W/AUTO DIFF WBC: CPT

## 2021-03-22 PROCEDURE — 81003 URINALYSIS AUTO W/O SCOPE: CPT

## 2021-03-22 RX ORDER — ONDANSETRON 2 MG/ML
4 INJECTION INTRAMUSCULAR; INTRAVENOUS ONCE
Status: DISCONTINUED | OUTPATIENT
Start: 2021-03-22 | End: 2021-03-22

## 2021-03-22 RX ORDER — FENTANYL CITRATE 50 UG/ML
25 INJECTION, SOLUTION INTRAMUSCULAR; INTRAVENOUS ONCE
Status: DISCONTINUED | OUTPATIENT
Start: 2021-03-22 | End: 2021-03-22

## 2021-03-22 RX ORDER — POLYETHYLENE GLYCOL 3350 17 G/17G
17 POWDER, FOR SOLUTION ORAL DAILY PRN
Qty: 510 G | Refills: 0 | Status: SHIPPED | OUTPATIENT
Start: 2021-03-22 | End: 2021-04-21

## 2021-03-22 RX ORDER — PANTOPRAZOLE SODIUM 40 MG/1
40 TABLET, DELAYED RELEASE ORAL DAILY PRN
Qty: 30 TABLET | Refills: 0 | Status: SHIPPED | OUTPATIENT
Start: 2021-03-22 | End: 2021-06-20

## 2021-03-22 RX ORDER — 0.9 % SODIUM CHLORIDE 0.9 %
1000 INTRAVENOUS SOLUTION INTRAVENOUS ONCE
Status: DISCONTINUED | OUTPATIENT
Start: 2021-03-22 | End: 2021-03-22

## 2021-03-22 RX ORDER — DOCUSATE SODIUM 100 MG/1
100 CAPSULE, LIQUID FILLED ORAL 2 TIMES DAILY PRN
Qty: 20 CAPSULE | Refills: 0 | Status: SHIPPED | OUTPATIENT
Start: 2021-03-22 | End: 2021-03-27

## 2021-03-22 RX ORDER — OXYCODONE HYDROCHLORIDE AND ACETAMINOPHEN 5; 325 MG/1; MG/1
1 TABLET ORAL ONCE
Status: COMPLETED | OUTPATIENT
Start: 2021-03-22 | End: 2021-03-22

## 2021-03-22 RX ADMIN — IOHEXOL 50 ML: 240 INJECTION, SOLUTION INTRATHECAL; INTRAVASCULAR; INTRAVENOUS; ORAL at 15:37

## 2021-03-22 RX ADMIN — OXYCODONE HYDROCHLORIDE AND ACETAMINOPHEN 1 TABLET: 5; 325 TABLET ORAL at 16:12

## 2021-03-22 ASSESSMENT — PAIN SCALES - GENERAL
PAINLEVEL_OUTOF10: 10
PAINLEVEL_OUTOF10: 10

## 2021-03-22 ASSESSMENT — PAIN DESCRIPTION - LOCATION: LOCATION: ABDOMEN

## 2021-03-22 ASSESSMENT — PAIN DESCRIPTION - DESCRIPTORS: DESCRIPTORS: STABBING;BURNING

## 2021-03-22 NOTE — ED NOTES
Bed: 10  Expected date:   Expected time:   Means of arrival:   Comments:  [address], [place]  78/75/36 1400

## 2021-03-22 NOTE — ED PROVIDER NOTES
Jun Gan is a 73 y.o. male presenting to the ED for abdominal pain, beginning pta ago. The complaint has been intermittent, moderate in severity, and worsened by nothing. 74 yo m who has history of afib, s/p MVR w sternotomy, drug abuse, hep c, gastric ulcers presents for cutaneous bulging around abdominal scar from previous surgery, there is no palpable bowel fat, induration, it appears normal scarlike in appearance w/o hernia. / pt denies fever or chills had a normal bm today pt dneies cough or cold sx, denies chest pain shortness of breath or palpitations    Review of Systems:   Pertinent positives and negatives are stated within HPI, all other systems reviewed and are negative.          --------------------------------------------- PAST HISTORY ---------------------------------------------  Past Medical History:  has a past medical history of Anxiety, Arthritis, Asthma, Bipolar 1 disorder ([address] Chronic combined systolic and diastolic CHF (congestive heart failure) ([address] COPD (chronic obstructive pulmonary disease) ([address] Depression, Diabetes mellitus ([address] GERD (gastroesophageal reflux disease), Hepatitis C, Hypertension, Hyperthyroidism, Hypothyroidism due to medication, Mass of testicle, Mesothelioma ([address] Neuromuscular disorder ([address] Other disorders of kidney and ureter, Paroxysmal A-fib ([address] Peptic ulcer, Prostate enlargement, Pulmonary embolism ([address] Seizures ([address] and Thyroid disease. Past Surgical History:  has a past surgical history that includes Appendectomy; Lithotripsy; Hemorrhoid surgery; Bladder surgery; Endoscopy, colon, diagnostic (11/13/2015); Abdomen surgery; Colonoscopy; Cardiac surgery; vascular surgery; and Cardiac catheterization (05/21/2018). Social History:  reports that he quit smoking about 11 years ago. His smoking use included cigarettes. He quit after 10.00 years of use. He quit smokeless tobacco use about 3 years ago.   His smokeless tobacco use included chew. He reports previous drug use. He reports that he does not drink alcohol. Family History: family history includes Cancer in his father, maternal grandfather, and mother; Diabetes in his father, maternal aunt, maternal aunt, maternal aunt, maternal grandmother, and mother; Heart Disease in his paternal grandfather and paternal grandmother; Heart Failure in his paternal grandfather and paternal grandmother. The patients home medications have been reviewed.     Allergies: Codeine, Dye [iodides], Ketorolac tromethamine, Peanuts [peanut oil], and Shellfish-derived products    -------------------------------------------------- RESULTS -------------------------------------------------  All laboratory and radiology results have been personally reviewed by myself   LABS:  Results for orders placed or performed during the hospital encounter of 03/22/21   CBC Auto Differential   Result Value Ref Range    WBC 7.3 4.5 - 11.5 E9/L    RBC 3.98 3.80 - 5.80 E12/L    Hemoglobin 13.0 12.5 - 16.5 g/dL    Hematocrit 37.4 37.0 - 54.0 %    MCV 94.0 80.0 - 99.9 fL    MCH 32.7 26.0 - 35.0 pg    MCHC 34.8 (H) 32.0 - 34.5 %    RDW 13.8 11.5 - 15.0 fL    Platelets 825 396 - 287 E9/L    MPV 11.8 7.0 - 12.0 fL    Neutrophils % 62.1 43.0 - 80.0 %    Immature Granulocytes % 0.4 0.0 - 5.0 %    Lymphocytes % 21.9 20.0 - 42.0 %    Monocytes % 7.7 2.0 - 12.0 %    Eosinophils % 7.2 (H) 0.0 - 6.0 %    Basophils % 0.7 0.0 - 2.0 %    Neutrophils Absolute 4.52 1.80 - 7.30 E9/L    Immature Granulocytes # 0.03 E9/L    Lymphocytes Absolute 1.59 1.50 - 4.00 E9/L    Monocytes Absolute 0.56 0.10 - 0.95 E9/L    Eosinophils Absolute 0.52 (H) 0.05 - 0.50 E9/L    Basophils Absolute 0.05 0.00 - 0.20 E9/L   Magnesium   Result Value Ref Range    Magnesium 1.9 1.6 - 2.6 mg/dL   Lipase   Result Value Ref Range    Lipase 13 13 - 60 U/L   Lactate, Sepsis   Result Value Ref Range    Lactic Acid, Sepsis 0.5 0.5 - 1.9 mmol/L   Comprehensive Metabolic toxic-appearing. HENT:      Head: Normocephalic and atraumatic. Right Ear: External ear normal.      Left Ear: External ear normal.      Nose: Nose normal. No congestion. Mouth/Throat:      Mouth: Mucous membranes are moist.      Pharynx: Oropharynx is clear. No posterior oropharyngeal erythema. Eyes:      Extraocular Movements: Extraocular movements intact. Pupils: Pupils are equal, round, and reactive to light. Neck:      Musculoskeletal: Normal range of motion and neck supple. No muscular tenderness. Cardiovascular:      Rate and Rhythm: Normal rate and regular rhythm. Pulses: Normal pulses. Heart sounds: No murmur. Comments: Sternotomy scar c/d/i no palpable fat or bowel containing hernia  Pulmonary:      Effort: Pulmonary effort is normal.      Breath sounds: No wheezing or rhonchi. Chest:      Chest wall: No tenderness. Abdominal:      General: Bowel sounds are normal. There is no distension. Palpations: There is no mass. Tenderness: There is no abdominal tenderness. There is no right CVA tenderness, left CVA tenderness, guarding or rebound. Hernia: No hernia is present. Musculoskeletal:         General: No swelling, tenderness or deformity. Right lower leg: No edema. Left lower leg: No edema. Skin:     General: Skin is warm and dry. Capillary Refill: Capillary refill takes less than 2 seconds. Findings: No bruising. Neurological:      General: No focal deficit present. Mental Status: He is alert and oriented to person, place, and time. Sensory: No sensory deficit. Motor: No weakness.    Psychiatric:         Mood and Affect: Mood normal.         Behavior: Behavior normal.               ------------------------------ ED COURSE/MEDICAL DECISION MAKING----------------------  Medications   iohexol (OMNIPAQUE 240) injection 50 mL (50 mLs Oral Given 3/22/21 1158)   oxyCODONE-acetaminophen (PERCOCET) 5-325 MG per tablet 1 tablet (1 tablet Oral Given 3/22/21 1612)         ED COURSE:       Medical Decision Making:    Ct scan reviewed w patient he has a followup apt w gen surgery already scheduled in 3 days, pt requesting percocet, I told him I didn't feel that was appropriate, ct scan no aute pathology, pt was forcing valsalva, his exam was benign, pt to be discharge w the below    I have reviewed my findings and recommendations with Marylen Patter and members of family present at the time of disposition. My findings/plan: The primary encounter diagnosis was Epigastric pain. Diagnoses of Left inguinal hernia, Diverticulosis, and Liver cyst were also pertinent to this visit. New Prescriptions    DOCUSATE SODIUM (COLACE) 100 MG CAPSULE    Take 1 capsule by mouth 2 times daily as needed for Constipation    PANTOPRAZOLE (PROTONIX) 40 MG TABLET    Take 1 tablet by mouth daily as needed (epigastric pain)    POLYETHYLENE GLYCOL (GLYCOLAX) 17 GM/SCOOP POWDER    Take 17 g by mouth daily as needed (constipation)         Risks and benefits were discussed with patient for All medications dispensed and given in department as well prescriptions prescribed for home, . The patient elected to take the medicine. Pt instructed on warning signs and precautions for medication side effects. The patient was given warning signs for when to seek medical attention. Counseled regarding todays diagnosis, including possible risks and complications,  especially if left uncontrolled. Counseled regarding the possible side effects, risks, benefits and alternatives to treatment; patient and/or guardian verbalizes understanding, agrees, feels comfortable with and wishes to proceed with treatment plan. Advised patient to call  primary care physician with any new medication issues, and read all Rx info from pharmacy to assure aware of all possible risks and side effects of medication before taking.     I did discuss warning signs for when to return to the Emergency Room, and the patient verbalized understanding      Counseling: The emergency provider has spoken with the patient and discussed todays results, in addition to providing specific details for the plan of care and counseling regarding the diagnosis and prognosis. Questions are answered at this time and they are agreeable with the plan.      --------------------------------- IMPRESSION AND DISPOSITION ---------------------------------    IMPRESSION  1. Epigastric pain    2. Left inguinal hernia    3. Diverticulosis    4. Liver cyst        DISPOSITION  Disposition: Discharge to home  Patient condition is good      NOTE: This report was transcribed using voice recognition software.  Every effort was made to ensure accuracy; however, inadvertent computerized transcription errors may be present       Romana Mancilla DO  03/22/21 1705

## 2021-03-29 ENCOUNTER — TELEPHONE (OUTPATIENT)
Dept: CARDIOLOGY CLINIC | Age: 66
End: 2021-03-29

## 2021-03-29 ENCOUNTER — APPOINTMENT (OUTPATIENT)
Dept: ULTRASOUND IMAGING | Age: 66
End: 2021-03-29
Payer: MEDICARE

## 2021-03-29 ENCOUNTER — HOSPITAL ENCOUNTER (EMERGENCY)
Age: 66
Discharge: HOME | End: 2021-03-29
Attending: EMERGENCY MEDICINE
Payer: MEDICARE

## 2021-03-29 VITALS
RESPIRATION RATE: 16 BRPM | BODY MASS INDEX: 20.49 KG/M2 | WEIGHT: 120 LBS | SYSTOLIC BLOOD PRESSURE: 116 MMHG | OXYGEN SATURATION: 98 % | DIASTOLIC BLOOD PRESSURE: 67 MMHG | HEART RATE: 62 BPM | TEMPERATURE: 96.9 F | HEIGHT: 64 IN

## 2021-03-29 DIAGNOSIS — N43.3 HYDROCELE, UNSPECIFIED HYDROCELE TYPE: ICD-10-CM

## 2021-03-29 DIAGNOSIS — G89.29 CHRONIC PAIN IN TESTICLE: Primary | ICD-10-CM

## 2021-03-29 DIAGNOSIS — Z76.5 DRUG-SEEKING BEHAVIOR: ICD-10-CM

## 2021-03-29 DIAGNOSIS — N50.819 CHRONIC PAIN IN TESTICLE: Primary | ICD-10-CM

## 2021-03-29 LAB
ALBUMIN SERPL-MCNC: 4.5 G/DL (ref 3.5–5.2)
ALP BLD-CCNC: 61 U/L (ref 40–129)
ALT SERPL-CCNC: 61 U/L (ref 0–40)
ANION GAP SERPL CALCULATED.3IONS-SCNC: 7 MMOL/L (ref 7–16)
AST SERPL-CCNC: 49 U/L (ref 0–39)
BASOPHILS ABSOLUTE: 0.05 E9/L (ref 0–0.2)
BASOPHILS RELATIVE PERCENT: 0.6 % (ref 0–2)
BILIRUB SERPL-MCNC: 0.3 MG/DL (ref 0–1.2)
BUN BLDV-MCNC: 10 MG/DL (ref 8–23)
CALCIUM SERPL-MCNC: 9.2 MG/DL (ref 8.6–10.2)
CHLORIDE BLD-SCNC: 101 MMOL/L (ref 98–107)
CO2: 27 MMOL/L (ref 22–29)
CREAT SERPL-MCNC: 1.1 MG/DL (ref 0.7–1.2)
EOSINOPHILS ABSOLUTE: 0.36 E9/L (ref 0.05–0.5)
EOSINOPHILS RELATIVE PERCENT: 4.6 % (ref 0–6)
GFR AFRICAN AMERICAN: >60
GFR NON-AFRICAN AMERICAN: >60 ML/MIN/1.73
GLUCOSE BLD-MCNC: 79 MG/DL (ref 74–99)
HCT VFR BLD CALC: 44.1 % (ref 37–54)
HEMOGLOBIN: 14.6 G/DL (ref 12.5–16.5)
IMMATURE GRANULOCYTES #: 0.04 E9/L
IMMATURE GRANULOCYTES %: 0.5 % (ref 0–5)
LYMPHOCYTES ABSOLUTE: 1.63 E9/L (ref 1.5–4)
LYMPHOCYTES RELATIVE PERCENT: 21 % (ref 20–42)
MCH RBC QN AUTO: 31.7 PG (ref 26–35)
MCHC RBC AUTO-ENTMCNC: 33.1 % (ref 32–34.5)
MCV RBC AUTO: 95.9 FL (ref 80–99.9)
MONOCYTES ABSOLUTE: 0.61 E9/L (ref 0.1–0.95)
MONOCYTES RELATIVE PERCENT: 7.9 % (ref 2–12)
NEUTROPHILS ABSOLUTE: 5.06 E9/L (ref 1.8–7.3)
NEUTROPHILS RELATIVE PERCENT: 65.4 % (ref 43–80)
PDW BLD-RTO: 14.5 FL (ref 11.5–15)
PLATELET # BLD: 172 E9/L (ref 130–450)
PMV BLD AUTO: 12 FL (ref 7–12)
POTASSIUM SERPL-SCNC: 5 MMOL/L (ref 3.5–5)
RBC # BLD: 4.6 E12/L (ref 3.8–5.8)
SODIUM BLD-SCNC: 135 MMOL/L (ref 132–146)
TOTAL PROTEIN: 7.4 G/DL (ref 6.4–8.3)
WBC # BLD: 7.8 E9/L (ref 4.5–11.5)

## 2021-03-29 PROCEDURE — 85025 COMPLETE CBC W/AUTO DIFF WBC: CPT

## 2021-03-29 PROCEDURE — 6370000000 HC RX 637 (ALT 250 FOR IP): Performed by: NURSE PRACTITIONER

## 2021-03-29 PROCEDURE — 99284 EMERGENCY DEPT VISIT MOD MDM: CPT

## 2021-03-29 PROCEDURE — 93975 VASCULAR STUDY: CPT

## 2021-03-29 PROCEDURE — 6370000000 HC RX 637 (ALT 250 FOR IP): Performed by: EMERGENCY MEDICINE

## 2021-03-29 PROCEDURE — 76870 US EXAM SCROTUM: CPT

## 2021-03-29 PROCEDURE — 80053 COMPREHEN METABOLIC PANEL: CPT

## 2021-03-29 RX ORDER — TRAMADOL HYDROCHLORIDE 50 MG/1
50 TABLET ORAL ONCE
Status: COMPLETED | OUTPATIENT
Start: 2021-03-29 | End: 2021-03-29

## 2021-03-29 RX ORDER — HYDROCODONE BITARTRATE AND ACETAMINOPHEN 5; 325 MG/1; MG/1
1 TABLET ORAL ONCE
Status: COMPLETED | OUTPATIENT
Start: 2021-03-29 | End: 2021-03-29

## 2021-03-29 RX ADMIN — TRAMADOL HYDROCHLORIDE 50 MG: 50 TABLET, FILM COATED ORAL at 12:21

## 2021-03-29 RX ADMIN — HYDROCODONE BITARTRATE AND ACETAMINOPHEN 1 TABLET: 5; 325 TABLET ORAL at 14:33

## 2021-03-29 ASSESSMENT — PAIN SCALES - GENERAL
PAINLEVEL_OUTOF10: 10
PAINLEVEL_OUTOF10: 10

## 2021-03-29 NOTE — ED TRIAGE NOTES
[facility] CONTACT ASSESSMENT NOTE      Department of Emergency Medicine   ED  First Provider Note   3/29/21  12:19 PM EDT    Chief Complaint: Groin Swelling (right sided testicle pain with swelling, follows with ricchuti but is unable to get in.)      History of Present Illness:    Tamara Lemus is a 73 y.o. male who presents to the ED by private car for right groin swelling and testicular pain. He thinks his hydrocele is acting up. Focused Screening Exam:  Constitutional:  Alert, appears stated age and is in no distress. No abdominal pain to palpation.     *ALLERGIES*     Codeine, Dye [iodides], Ketorolac tromethamine, Peanuts [peanut oil], and Shellfish-derived products     ED Triage Vitals   BP Temp Temp src Pulse Resp SpO2 Height Weight   03/29/21 1215 03/29/21 1149 -- 03/29/21 1149 03/29/21 1215 03/29/21 1149 03/29/21 1215 03/29/21 1215   116/67 96.9 °F (36.1 °C)  62 16 98 % 5' 4\" (1.626 m) 120 lb (54.4 kg)        Initial Plan of Care:  Initiate Treatment-Testing, Proceed toTreatment Area When Bed Available for ED Attending/MLP to Continue Care    -----------------END OF FIRST PROVIDER CONTACT ASSESSMENT NOTE--------------  Electronically signed by ZOIE Suazo CNP   DD: 3/29/21

## 2021-03-29 NOTE — ED PROVIDER NOTES
[facility]  Department of Emergency Medicine   ED  Encounter Note  Admit Date/RoomTime: 3/29/2021 12:36 PM  ED Room: 39/39    NAME: Marshall Arriaga  : 1955  MRN: [id number]     Chief Complaint:  Groin Swelling (right sided testicle pain with swelling, follows with ricchuti but is unable to get in.)    History of Present Illness       Marshall Arriaga is a 73 y.o. old male who presenting to the emergency department for evaluation of right testicle pain which has been going on for the past year. Patient states that he has an upcoming appointment with urology, Dr. Jake Rogel. He states that he is mostly here because he wants a prescription for tramadol. He states that he knows the ER can give him 2 to 3 days and he wants a prescription \"just to hold him over until his appointment \". He denies any fever chills. No abdominal pain nausea, vomiting or diarrhea. No constipation or rectal bleeding. No penile drainage or known exposure to sexually transmitted infections. ROS   Pertinent positives and negatives are stated within HPI, all other systems reviewed and are negative. Past Medical History:  has a past medical history of Anxiety, Arthritis, Asthma, Bipolar 1 disorder ([address] Chronic combined systolic and diastolic CHF (congestive heart failure) ([address] COPD (chronic obstructive pulmonary disease) ([address] Depression, Diabetes mellitus ([address] GERD (gastroesophageal reflux disease), Hepatitis C, Hypertension, Hyperthyroidism, Hypothyroidism due to medication, Mass of testicle, Mesothelioma ([address] Neuromuscular disorder ([address] Other disorders of kidney and ureter, Paroxysmal A-fib ([address] Peptic ulcer, Prostate enlargement, Pulmonary embolism ([address] Seizures ([address] and Thyroid disease. Surgical History:  has a past surgical history that includes Appendectomy; Lithotripsy; Hemorrhoid surgery; Bladder surgery; Endoscopy, colon, diagnostic (2015);  Abdomen surgery; Colonoscopy; Cardiac surgery; vascular surgery; and Cardiac catheterization (05/21/2018). Social History:  reports that he quit smoking about 11 years ago. His smoking use included cigarettes. He quit after 10.00 years of use. He quit smokeless tobacco use about 3 years ago. His smokeless tobacco use included chew. He reports previous drug use. He reports that he does not drink alcohol. Family History: family history includes Cancer in his father, maternal grandfather, and mother; Diabetes in his father, maternal aunt, maternal aunt, maternal aunt, maternal grandmother, and mother; Heart Disease in his paternal grandfather and paternal grandmother; Heart Failure in his paternal grandfather and paternal grandmother. Allergies: Codeine, Dye [iodides], Ketorolac tromethamine, Peanuts [peanut oil], and Shellfish-derived products    Physical Exam   Oxygen Saturation Interpretation: Normal.        ED Triage Vitals   BP Temp Temp src Pulse Resp SpO2 Height Weight   03/29/21 1215 03/29/21 1149 -- 03/29/21 1149 03/29/21 1215 03/29/21 1149 03/29/21 1215 03/29/21 1215   116/67 96.9 °F (36.1 °C)  62 16 98 % 5' 4\" (1.626 m) 120 lb (54.4 kg)         Constitutional:  Alert, development consistent with age. Eyes:  PERRL, EOMI, no discharge or conjunctival injection. Ears:  External ears without lesions. Throat:  Pharynx without injection, exudate, or tonsillar hypertrophy. Airway patient. Neck:  Normal ROM. Supple. Respiratory:  Clear to auscultation and breath sounds equal.  CV:  Regular rate and rhythm, normal heart sounds, without pathological murmurs, ectopy, gallops, or rubs. GI:  General Appearance: normal.       Bowel sounds: normal bowel sounds. Distension:  None. Tenderness: No abdominal tenderness. Liver: non-tender. Spleen:  non-tender. Pulsatile Mass: absent. Hernia:  no inguinal or femoral hernias noted.   :        Penis: non focal circumcised. Scrotum: normal.         Testicle: normal without masses , tenderness of right. Integument:  Normal turgor. Warm, dry, without visible rash, unless noted elsewhere. Neurological:  Orientation age-appropriate. Motor functions intact. Lab / Imaging Results   (All laboratory and radiology results have been personally reviewed by myself)  Labs:  Results for orders placed or performed during the hospital encounter of 03/29/21   CBC Auto Differential   Result Value Ref Range    WBC 7.8 4.5 - 11.5 E9/L    RBC 4.60 3.80 - 5.80 E12/L    Hemoglobin 14.6 12.5 - 16.5 g/dL    Hematocrit 44.1 37.0 - 54.0 %    MCV 95.9 80.0 - 99.9 fL    MCH 31.7 26.0 - 35.0 pg    MCHC 33.1 32.0 - 34.5 %    RDW 14.5 11.5 - 15.0 fL    Platelets 922 215 - 069 E9/L    MPV 12.0 7.0 - 12.0 fL    Neutrophils % 65.4 43.0 - 80.0 %    Immature Granulocytes % 0.5 0.0 - 5.0 %    Lymphocytes % 21.0 20.0 - 42.0 %    Monocytes % 7.9 2.0 - 12.0 %    Eosinophils % 4.6 0.0 - 6.0 %    Basophils % 0.6 0.0 - 2.0 %    Neutrophils Absolute 5.06 1.80 - 7.30 E9/L    Immature Granulocytes # 0.04 E9/L    Lymphocytes Absolute 1.63 1.50 - 4.00 E9/L    Monocytes Absolute 0.61 0.10 - 0.95 E9/L    Eosinophils Absolute 0.36 0.05 - 0.50 E9/L    Basophils Absolute 0.05 0.00 - 0.20 E9/L   Comprehensive Metabolic Panel   Result Value Ref Range    Sodium 135 132 - 146 mmol/L    Potassium 5.0 3.5 - 5.0 mmol/L    Chloride 101 98 - 107 mmol/L    CO2 27 22 - 29 mmol/L    Anion Gap 7 7 - 16 mmol/L    Glucose 79 74 - 99 mg/dL    BUN 10 8 - 23 mg/dL    CREATININE 1.1 0.7 - 1.2 mg/dL    GFR Non-African American >60 >=60 mL/min/1.73    GFR African American >60     Calcium 9.2 8.6 - 10.2 mg/dL    Total Protein 7.4 6.4 - 8.3 g/dL    Albumin 4.5 3.5 - 5.2 g/dL    Total Bilirubin 0.3 0.0 - 1.2 mg/dL    Alkaline Phosphatase 61 40 - 129 U/L    ALT 61 (H) 0 - 40 U/L    AST 49 (H) 0 - 39 U/L     Imaging:   All Radiology results interpreted by Radiologist unless otherwise noted.  US SCROTUM AND TESTICLES   Final Result   1. Normal size and appearing right left testicle except by microlithiasis   which are seen bilaterally. 2.  Preserved the arteries venous vascularity for the right left testicles,   no increased or decreased vascularity. 3.  Small size right-sided epididymis, no increased vascularity. Left-sided   epididymis could not be well seen, there is no increased vascularity in the   left epididymis. 4.  Moderate large size bilateral hydroceles. US DUP ABD PEL RETRO SCROT COMPLETE   Final Result   1. Normal size and appearing right left testicle except by microlithiasis   which are seen bilaterally. 2.  Preserved the arteries venous vascularity for the right left testicles,   no increased or decreased vascularity. 3.  Small size right-sided epididymis, no increased vascularity. Left-sided   epididymis could not be well seen, there is no increased vascularity in the   left epididymis. 4.  Moderate large size bilateral hydroceles. ED Course / Medical Decision Making     Medications   traMADol (ULTRAM) tablet 50 mg (50 mg Oral Given 3/29/21 1221)   HYDROcodone-acetaminophen (NORCO) 5-325 MG per tablet 1 tablet (1 tablet Oral Given 3/29/21 1433)        Re-examination:  3/29/21        Patients symptoms show no change. Consults:   None    Procedures:   none    MDM:   Patient presents to the ED for evaluation of right testicle pain which has been ongoing for 1 year. He states that his testicle is swollen however, on physical exam it appears normal.  Labs were obtained through triage and reviewed. He has a normal white blood cell count. Ultrasound of the scrotum and testes is unremarkable for acute abnormality to explain his pain. He does have bilateral hydroceles which are chronic. I did not realize he had already received a tramadol via triage and he was given a Norco for pain while waiting on ultrasound results.   I explained to the patient that I could not write him narcotics for this chronic problem. He became very angry and confrontational.  He was advised to keep his scheduled follow-up appointment with urology. Discharged home in stable condition. Plan of Care/Counseling:  I reviewed today's visit with the patient in addition to providing specific details for the plan of care and counseling regarding the diagnosis and prognosis. Questions are answered at this time and are agreeable with the plan. Assessment     1. Chronic pain in testicle    2. Hydrocele, unspecified hydrocele type    3. Drug-seeking behavior      Plan   Discharge home. Patient condition is stable    New Medications     Discharge Medication List as of 3/29/2021  2:19 PM        Electronically signed by Saba Hall DO   DD: 3/29/21  **This report was transcribed using voice recognition software. Every effort was made to ensure accuracy; however, inadvertent computerized transcription errors may be present.   END OF ED PROVIDER NOTE        Saba Hall DO  03/29/21 3752

## 2021-03-29 NOTE — TELEPHONE ENCOUNTER
Patient called stating he had open heart (3) years ago and he has a hernia or lump on his chest.  Dr. Iliana Oliva would like to remove it and has recommended patient hold Xarelto prior to procedure. Please advise.

## 2021-03-31 ENCOUNTER — HOSPITAL ENCOUNTER (EMERGENCY)
Age: 66
Discharge: HOME | End: 2021-03-31
Attending: FAMILY MEDICINE
Payer: MEDICARE

## 2021-03-31 ENCOUNTER — APPOINTMENT (OUTPATIENT)
Dept: ULTRASOUND IMAGING | Age: 66
End: 2021-03-31
Payer: MEDICARE

## 2021-03-31 ENCOUNTER — HOSPITAL ENCOUNTER (EMERGENCY)
Age: 66
Discharge: HOME | End: 2021-03-31
Attending: EMERGENCY MEDICINE
Payer: MEDICARE

## 2021-03-31 VITALS
RESPIRATION RATE: 16 BRPM | HEIGHT: 64 IN | TEMPERATURE: 97 F | OXYGEN SATURATION: 99 % | SYSTOLIC BLOOD PRESSURE: 132 MMHG | DIASTOLIC BLOOD PRESSURE: 83 MMHG | HEART RATE: 50 BPM | WEIGHT: 115 LBS | BODY MASS INDEX: 19.63 KG/M2

## 2021-03-31 VITALS
BODY MASS INDEX: 19.63 KG/M2 | HEART RATE: 80 BPM | WEIGHT: 115 LBS | HEIGHT: 64 IN | TEMPERATURE: 98 F | DIASTOLIC BLOOD PRESSURE: 68 MMHG | RESPIRATION RATE: 16 BRPM | SYSTOLIC BLOOD PRESSURE: 104 MMHG | OXYGEN SATURATION: 99 %

## 2021-03-31 DIAGNOSIS — N43.3 HYDROCELE IN ADULT: Primary | ICD-10-CM

## 2021-03-31 DIAGNOSIS — N50.819 CHRONIC PAIN IN TESTICLE: Primary | ICD-10-CM

## 2021-03-31 DIAGNOSIS — G89.29 CHRONIC PAIN IN TESTICLE: Primary | ICD-10-CM

## 2021-03-31 LAB
BACTERIA: NORMAL /HPF
BILIRUBIN URINE: NEGATIVE
BLOOD, URINE: ABNORMAL
CLARITY: CLEAR
COLOR: YELLOW
GLUCOSE URINE: NEGATIVE MG/DL
KETONES, URINE: NEGATIVE MG/DL
LEUKOCYTE ESTERASE, URINE: NEGATIVE
NITRITE, URINE: NEGATIVE
PH UA: 5.5 (ref 5–9)
PROTEIN UA: NEGATIVE MG/DL
RBC UA: >20 /HPF (ref 0–2)
SPECIFIC GRAVITY UA: 1.02 (ref 1–1.03)
UROBILINOGEN, URINE: 0.2 E.U./DL
WBC UA: NORMAL /HPF (ref 0–5)

## 2021-03-31 PROCEDURE — 81001 URINALYSIS AUTO W/SCOPE: CPT

## 2021-03-31 PROCEDURE — 99284 EMERGENCY DEPT VISIT MOD MDM: CPT

## 2021-03-31 PROCEDURE — 76870 US EXAM SCROTUM: CPT

## 2021-03-31 PROCEDURE — 93975 VASCULAR STUDY: CPT

## 2021-03-31 PROCEDURE — 6370000000 HC RX 637 (ALT 250 FOR IP): Performed by: FAMILY MEDICINE

## 2021-03-31 PROCEDURE — 6370000000 HC RX 637 (ALT 250 FOR IP): Performed by: STUDENT IN AN ORGANIZED HEALTH CARE EDUCATION/TRAINING PROGRAM

## 2021-03-31 RX ORDER — IBUPROFEN 800 MG/1
800 TABLET ORAL ONCE
Status: COMPLETED | OUTPATIENT
Start: 2021-03-31 | End: 2021-03-31

## 2021-03-31 RX ORDER — HYDROCODONE BITARTRATE AND ACETAMINOPHEN 5; 325 MG/1; MG/1
1 TABLET ORAL EVERY 8 HOURS PRN
Qty: 6 TABLET | Refills: 0 | Status: SHIPPED | OUTPATIENT
Start: 2021-03-31 | End: 2021-04-02

## 2021-03-31 RX ORDER — HYDROCODONE BITARTRATE AND ACETAMINOPHEN 5; 325 MG/1; MG/1
1 TABLET ORAL ONCE
Status: COMPLETED | OUTPATIENT
Start: 2021-03-31 | End: 2021-03-31

## 2021-03-31 RX ORDER — HYDROCODONE BITARTRATE AND ACETAMINOPHEN 5; 325 MG/1; MG/1
1 TABLET ORAL ONCE
Status: DISCONTINUED | OUTPATIENT
Start: 2021-03-31 | End: 2021-03-31

## 2021-03-31 RX ADMIN — IBUPROFEN 800 MG: 800 TABLET, FILM COATED ORAL at 08:24

## 2021-03-31 RX ADMIN — HYDROCODONE BITARTRATE AND ACETAMINOPHEN 1 TABLET: 5; 325 TABLET ORAL at 16:50

## 2021-03-31 ASSESSMENT — PAIN SCALES - GENERAL: PAINLEVEL_OUTOF10: 10

## 2021-03-31 ASSESSMENT — PAIN - FUNCTIONAL ASSESSMENT: PAIN_FUNCTIONAL_ASSESSMENT: PREVENTS OR INTERFERES SOME ACTIVE ACTIVITIES AND ADLS

## 2021-03-31 ASSESSMENT — PAIN DESCRIPTION - PAIN TYPE
TYPE: ACUTE PAIN
TYPE: ACUTE PAIN

## 2021-03-31 ASSESSMENT — PAIN DESCRIPTION - DESCRIPTORS: DESCRIPTORS: DISCOMFORT

## 2021-03-31 ASSESSMENT — PAIN DESCRIPTION - PROGRESSION: CLINICAL_PROGRESSION: GRADUALLY WORSENING

## 2021-03-31 ASSESSMENT — PAIN DESCRIPTION - FREQUENCY: FREQUENCY: CONTINUOUS

## 2021-03-31 NOTE — ED NOTES
Bed: 17B-17  Expected date:   Expected time:   Means of arrival:   Comments:  triage     Bebe Poole RN  03/31/21 1567

## 2021-03-31 NOTE — ED NOTES
FIRST PROVIDER CONTACT ASSESSMENT NOTE      Department of Emergency Medicine   ED  First Provider Note   3/31/21  1:29 PM EDT    Chief Complaint: Testicle Pain (per pt right testicle pain and swelling. Dx w/hydrocele. Pain was worse today.)      History of Present Illness:    Janice Shannon is a 73 y.o. male who presents to the ED for complaint of right scrotal pain and swelling. Reports Hx of hydrocele. Denies fever, chills, nausea, vomiting, abdominal pain, dysuria, hematuria, or any other complaints at this time. Reports being under the care of urology  for his hydrocele. Focused Screening Exam:  Constitutional:  Alert, appears stated age and is in no distress.       *ALLERGIES*     Codeine, Dye [iodides], Ketorolac tromethamine, Peanuts [peanut oil], and Shellfish-derived products     ED Triage Vitals [03/31/21 1326]   BP Temp Temp src Pulse Resp SpO2 Height Weight   91/68 96.9 °F (36.1 °C) -- (!) 48 16 96 % 5' 4\" (1.626 m) 115 lb (52.2 kg)        Initial Plan of Care:  Initiate Treatment-Testing, Proceed toTreatment Area When Bed Available for ED Attending/MLP to Continue Care    -----------------END OF FIRST PROVIDER CONTACT ASSESSMENT NOTE--------------  Electronically signed by ZOIE Du CNP   DD: 3/31/21     ZOIE Du CNP  03/31/21 9219

## 2021-04-01 ASSESSMENT — ENCOUNTER SYMPTOMS
VOMITING: 0
SORE THROAT: 0
COUGH: 0
NAUSEA: 0
EYE PAIN: 0
ABDOMINAL DISTENTION: 0
DIARRHEA: 0
EYE REDNESS: 0
SINUS PRESSURE: 0
SHORTNESS OF BREATH: 0
BACK PAIN: 0
COLOR CHANGE: 0
WHEEZING: 0
ABDOMINAL PAIN: 0
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
## 2021-04-23 ENCOUNTER — APPOINTMENT (OUTPATIENT)
Dept: ULTRASOUND IMAGING | Age: 66
End: 2021-04-23
Payer: MEDICARE

## 2021-04-23 ENCOUNTER — HOSPITAL ENCOUNTER (EMERGENCY)
Age: 66
Discharge: HOME | End: 2021-04-23
Payer: MEDICARE

## 2021-04-23 VITALS
OXYGEN SATURATION: 98 % | SYSTOLIC BLOOD PRESSURE: 118 MMHG | TEMPERATURE: 97.5 F | HEIGHT: 64 IN | HEART RATE: 55 BPM | RESPIRATION RATE: 16 BRPM | BODY MASS INDEX: 22.2 KG/M2 | WEIGHT: 130 LBS | DIASTOLIC BLOOD PRESSURE: 68 MMHG

## 2021-04-23 DIAGNOSIS — N43.3 HYDROCELE, UNSPECIFIED HYDROCELE TYPE: Primary | ICD-10-CM

## 2021-04-23 LAB
ANION GAP SERPL CALCULATED.3IONS-SCNC: 8 MMOL/L (ref 7–16)
BASOPHILS ABSOLUTE: 0.04 E9/L (ref 0–0.2)
BASOPHILS RELATIVE PERCENT: 0.9 % (ref 0–2)
BUN BLDV-MCNC: 13 MG/DL (ref 6–23)
CALCIUM SERPL-MCNC: 8.8 MG/DL (ref 8.6–10.2)
CHLORIDE BLD-SCNC: 101 MMOL/L (ref 98–107)
CO2: 28 MMOL/L (ref 22–29)
CREAT SERPL-MCNC: 0.9 MG/DL (ref 0.7–1.2)
EOSINOPHILS ABSOLUTE: 0.16 E9/L (ref 0.05–0.5)
EOSINOPHILS RELATIVE PERCENT: 3.5 % (ref 0–6)
GFR AFRICAN AMERICAN: >60
GFR NON-AFRICAN AMERICAN: >60 ML/MIN/1.73
GLUCOSE BLD-MCNC: 83 MG/DL (ref 74–99)
HCT VFR BLD CALC: 35.8 % (ref 37–54)
HEMOGLOBIN: 11.7 G/DL (ref 12.5–16.5)
IMMATURE GRANULOCYTES #: 0.01 E9/L
IMMATURE GRANULOCYTES %: 0.2 % (ref 0–5)
LYMPHOCYTES ABSOLUTE: 0.97 E9/L (ref 1.5–4)
LYMPHOCYTES RELATIVE PERCENT: 21.3 % (ref 20–42)
MCH RBC QN AUTO: 32.3 PG (ref 26–35)
MCHC RBC AUTO-ENTMCNC: 32.7 % (ref 32–34.5)
MCV RBC AUTO: 98.9 FL (ref 80–99.9)
MONOCYTES ABSOLUTE: 0.45 E9/L (ref 0.1–0.95)
MONOCYTES RELATIVE PERCENT: 9.9 % (ref 2–12)
NEUTROPHILS ABSOLUTE: 2.92 E9/L (ref 1.8–7.3)
NEUTROPHILS RELATIVE PERCENT: 64.2 % (ref 43–80)
PDW BLD-RTO: 15.1 FL (ref 11.5–15)
PLATELET # BLD: 132 E9/L (ref 130–450)
PMV BLD AUTO: 12.1 FL (ref 7–12)
POTASSIUM SERPL-SCNC: 4.7 MMOL/L (ref 3.5–5)
RBC # BLD: 3.62 E12/L (ref 3.8–5.8)
SODIUM BLD-SCNC: 137 MMOL/L (ref 132–146)
WBC # BLD: 4.6 E9/L (ref 4.5–11.5)

## 2021-04-23 PROCEDURE — 93975 VASCULAR STUDY: CPT

## 2021-04-23 PROCEDURE — 76870 US EXAM SCROTUM: CPT

## 2021-04-23 PROCEDURE — 99283 EMERGENCY DEPT VISIT LOW MDM: CPT

## 2021-04-23 PROCEDURE — 80048 BASIC METABOLIC PNL TOTAL CA: CPT

## 2021-04-23 PROCEDURE — 85025 COMPLETE CBC W/AUTO DIFF WBC: CPT

## 2021-04-23 RX ORDER — TRAMADOL HYDROCHLORIDE 50 MG/1
50 TABLET ORAL EVERY 6 HOURS PRN
Qty: 8 TABLET | Refills: 0 | Status: SHIPPED | OUTPATIENT
Start: 2021-04-23 | End: 2021-04-28

## 2021-04-23 ASSESSMENT — PAIN DESCRIPTION - LOCATION: LOCATION: SCROTUM

## 2021-04-23 NOTE — ED PROVIDER NOTES
[facility]     HPI:  4/23/21,   Time: 7:56 PM EDT         Adrianna Gunter is a 73 y.o. male presenting to the ED for right-sided testicle pain, beginning few weeks ago. The complaint has been persistent, moderate in severity, and worsened by nothing. States he was recently diagnosed with a hydrocele to the right testicle and has been followed by Dr. Eloy Hunter. States he has upcoming appointment on Tuesday for follow-up however he is out of pain medication. States he has been urinating with no difficulty. He denies any fever. Denies any abdominal pain. ROS:   Pertinent positives and negatives are stated within HPI, all other systems reviewed and are negative.  --------------------------------------------- PAST HISTORY ---------------------------------------------  Past Medical History:  has a past medical history of Anxiety, Arthritis, Asthma, Bipolar 1 disorder ([address] Chronic combined systolic and diastolic CHF (congestive heart failure) ([address] COPD (chronic obstructive pulmonary disease) ([address] Depression, Diabetes mellitus ([address] GERD (gastroesophageal reflux disease), Hepatitis C, Hypertension, Hyperthyroidism, Hypothyroidism due to medication, Mass of testicle, Mesothelioma ([address] Neuromuscular disorder ([address] Other disorders of kidney and ureter, Paroxysmal A-fib ([address] Peptic ulcer, Prostate enlargement, Pulmonary embolism ([address] Seizures ([address] and Thyroid disease. Past Surgical History:  has a past surgical history that includes Appendectomy; Lithotripsy; Hemorrhoid surgery; Bladder surgery; Endoscopy, colon, diagnostic (11/13/2015); Abdomen surgery; Colonoscopy; Cardiac surgery; vascular surgery; and Cardiac catheterization (05/21/2018). Social History:  reports that he quit smoking about 11 years ago. His smoking use included cigarettes. He quit after 10.00 years of use. He quit smokeless tobacco use about 3 years ago. His smokeless tobacco use included chew.  He reports previous drug 1.2 mg/dL    GFR Non-African American >60 >=60 mL/min/1.73    GFR African American >60     Calcium 8.8 8.6 - 10.2 mg/dL       RADIOLOGY:  Interpreted by Radiologist.  [address]   Final Result   1. Moderate right and small left simple appearing scrotal hydroceles which   are not significantly changed from prior exam.      2.  Small intratesticular calcifications bilaterally are unchanged. There is   no evidence of intra testicular mass. US DUP ABD PEL RETRO SCROT COMPLETE   Final Result   1. Moderate right and small left simple appearing scrotal hydroceles which   are not significantly changed from prior exam.      2.  Small intratesticular calcifications bilaterally are unchanged. There is   no evidence of intra testicular mass. ------------------------- NURSING NOTES AND VITALS REVIEWED ---------------------------   The nursing notes within the ED encounter and vital signs as below have been reviewed. /68   Pulse 55   Temp 97.5 °F (36.4 °C)   Resp 16   Ht 5' 4\" (1.626 m)   Wt 130 lb (59 kg)   SpO2 98%   BMI 22.31 kg/m²   Oxygen Saturation Interpretation: Normal      ---------------------------------------------------PHYSICAL EXAM--------------------------------------      Constitutional/General: Alert and oriented x3, well appearing, non toxic in NAD  Head: NC/AT  Eyes: PERRL, EOMI  Mouth: Oropharynx clear, handling secretions, no trismus  Neck: Supple, full ROM, no meningeal signs  Pulmonary: Lungs clear to auscultation bilaterally, no wheezes, rales, or rhonchi. Not in respiratory distress  Cardiovascular:  Regular rate and rhythm, no  murmurs, gallops, or rubs. 2+ distal pulses  Abdomen: Soft, non tender, non distended, tenderness on palpation with edema but no deformity or erythema to the right testicle. It is more firm than the left. No abnormality noted at the penis. Extremities: Moves all extremities x 4.  Warm and well perfused  Skin: warm and dry without rash  Neurologic: GCS 15,  Psych: Normal Affect      ------------------------------ ED COURSE/MEDICAL DECISION MAKING----------------------  Medications - No data to display      Medical Decision Making:    Informed of normal labs and ultrasound result revealing a hydrocele which is unchanged from prior examination. Pain medication provided advised to keep his upcoming appointment on Tuesday with urology. Counseling: The emergency provider has spoken with the patient and discussed todays results, in addition to providing specific details for the plan of care and counseling regarding the diagnosis and prognosis. Questions are answered at this time and they are agreeable with the plan.      --------------------------------- IMPRESSION AND DISPOSITION ---------------------------------    IMPRESSION  1.  Hydrocele, unspecified hydrocele type        DISPOSITION  Disposition: Discharge to home  Patient condition is good                 ZOIE Enriquez CNP  04/23/21 2005

## 2021-04-23 NOTE — ED NOTES
Bed: ST-3  Expected date:   Expected time:   Means of arrival:   Comments:  Use last     Frank Agustin RN  04/23/21 [id number]

## 2021-04-26 ENCOUNTER — HOSPITAL ENCOUNTER (EMERGENCY)
Age: 66
Discharge: LEFT BEFORE DISCHARGE | End: 2021-04-26
Payer: MEDICARE

## 2021-04-26 VITALS
OXYGEN SATURATION: 99 % | HEART RATE: 62 BPM | HEIGHT: 64 IN | SYSTOLIC BLOOD PRESSURE: 117 MMHG | WEIGHT: 130 LBS | BODY MASS INDEX: 22.2 KG/M2 | RESPIRATION RATE: 17 BRPM | TEMPERATURE: 97.3 F | DIASTOLIC BLOOD PRESSURE: 60 MMHG

## 2021-04-26 DIAGNOSIS — Z76.5 DRUG-SEEKING BEHAVIOR: ICD-10-CM

## 2021-04-26 DIAGNOSIS — N43.3 HYDROCELE, UNSPECIFIED HYDROCELE TYPE: Primary | ICD-10-CM

## 2021-04-26 PROCEDURE — 99283 EMERGENCY DEPT VISIT LOW MDM: CPT

## 2021-04-26 ASSESSMENT — PAIN DESCRIPTION - PAIN TYPE: TYPE: ACUTE PAIN

## 2021-04-26 ASSESSMENT — PAIN DESCRIPTION - LOCATION: LOCATION: SCROTUM

## 2021-04-26 NOTE — ED NOTES
FIRST PROVIDER CONTACT ASSESSMENT NOTE      Department of Emergency Medicine   ED  First Provider Note   4/26/21  3:11 PM EDT    Chief Complaint: Groin Swelling (swollen testicles, hx hydroceal)      History of Present Illness:    Wendi Barlow is a 73 y.o. male who presents to the ED by private car for groin pain and swelling  Focused Screening Exam:  Constitutional:  Alert, appears stated age and is in no distress.       *ALLERGIES*     Codeine, Dye [iodides], Ketorolac tromethamine, Peanuts [peanut oil], and Shellfish-derived products     ED Triage Vitals   BP Temp Temp src Pulse Resp SpO2 Height Weight   -- 04/26/21 1451 -- 04/26/21 1451 04/26/21 1509 04/26/21 1451 04/26/21 1509 04/26/21 1509    97.3 °F (36.3 °C)  62 17 99 % 5' 4\" (1.626 m) 130 lb (59 kg)        Initial Plan of Care:  Initiate Treatment-Testing, Proceed toTreatment Area When Bed Available for ED Attending/MLP to Continue Care    -----------------[address] ASSESSMENT NOTE--------------  Electronically signed by ELIZABET Norman   DD: 4/26/21       ELIZABET Hobbs  04/26/21 [id number]

## 2021-04-26 NOTE — ED PROVIDER NOTES
[facility]    HPI:  4/26/21,   Time: 3:46 PM EDT         Jerrica Lund is a 73 y.o. male presenting to the ED for pain and swollen testicles, beginning few weeks ago. The complaint has been persistent, moderate in severity, and worsened by nothing. Patient presents stating that the swelling to the testicles is getting worse. He was seen and treated this emergency room 3 days ago for the same complaint was given pain medication at that time stated that he had an appointment with urology on Monday. When questioned regarding his appointment with urology he now states that he is waiting for urology to call him to schedule the appointment. I did ask regarding the pain medication that was provided to him 3 days ago and he states he has 2 more pain pills however is concerned that he is going to run out. He states he is concerned because the swelling to the testicles is getting worse. I asked him for a urine sample and he states he is unable to urinate. I did tell him that we would either need to have to catheterize him to get the urine sample or we can wait and provide some water for him to drink in order to induce the urine. As I stepped away to obtain a cup of water for him to drink he subsequently got up and walked out of the department.     ROS:   Pertinent positives and negatives are stated within HPI, all other systems reviewed and are negative.  --------------------------------------------- PAST HISTORY ---------------------------------------------  Past Medical History:  has a past medical history of Anxiety, Arthritis, Asthma, Bipolar 1 disorder ([facility] [address] [facility] Chronic combined systolic and diastolic CHF (congestive heart failure) ([facility] [address] [facility] COPD (chronic obstructive pulmonary disease) ([facility] [address] [facility] Depression, Diabetes mellitus ([facility] [address] [facility] GERD (gastroesophageal reflux disease), Hepatitis C, Hypertension, Hyperthyroidism, Hypothyroidism due to medication, Mass of testicle, Mesothelioma [facility]), Neuromuscular disorder Normal      ---------------------------------------------------PHYSICAL EXAM--------------------------------------      Constitutional/General: Alert and oriented x3, well appearing, non toxic in NAD  Head: NC/AT  Eyes: PERRL, EOMI  Mouth: Oropharynx clear, handling secretions, no trismus  Neck: Supple, full ROM, no meningeal signs  Pulmonary: Lungs clear to auscultation bilaterally, no wheezes, rales, or rhonchi. Not in respiratory distress  Cardiovascular:  Regular rate and rhythm, no murmurs, gallops, or rubs. 2+ distal pulses  Abdomen: Soft, non tender, non distended,   Extremities: Moves all extremities x 4. Warm and well perfused  Skin: warm and dry without rash  Neurologic: GCS 15,  Psych: Normal Affect      ------------------------------ ED COURSE/MEDICAL DECISION MAKING----------------------  Medications - No data to display      Medical Decision Making:    Patient was informed that no further narcotics will be given he states he has 2 pain pills at home. He was advised to call urology for follow-up appointment upon requesting a urine sample he states he is unable to urinate when I informed him that we will need to catheterize him for the urine sample he refused stating that he will try to drink water. As I stepped away to get the water for him he subsequently left the department. Counseling: The emergency provider has spoken with the patient and discussed todays results, in addition to providing specific details for the plan of care and counseling regarding the diagnosis and prognosis. Questions are answered at this time and they are agreeable with the plan.      --------------------------------- IMPRESSION AND DISPOSITION ---------------------------------    IMPRESSION  1. Hydrocele, unspecified hydrocele type    2.  Drug-seeking behavior        DISPOSITION  Disposition: Other Disposition: Eloped  Patient condition is stable                  ZOIE Dietrich - CNP  04/26/21 1549

## 2021-05-12 ENCOUNTER — HOSPITAL ENCOUNTER (EMERGENCY)
Age: 66
Discharge: HOME | End: 2021-05-12
Payer: MEDICARE

## 2021-05-12 VITALS
OXYGEN SATURATION: 98 % | DIASTOLIC BLOOD PRESSURE: 74 MMHG | RESPIRATION RATE: 18 BRPM | TEMPERATURE: 98 F | WEIGHT: 130 LBS | BODY MASS INDEX: 22.2 KG/M2 | SYSTOLIC BLOOD PRESSURE: 138 MMHG | HEIGHT: 64 IN | HEART RATE: 54 BPM

## 2021-05-12 DIAGNOSIS — G89.29 CHRONIC PAIN IN TESTICLE: Primary | ICD-10-CM

## 2021-05-12 DIAGNOSIS — N50.819 CHRONIC PAIN IN TESTICLE: Primary | ICD-10-CM

## 2021-05-12 PROCEDURE — 99283 EMERGENCY DEPT VISIT LOW MDM: CPT

## 2021-05-12 RX ORDER — TRAMADOL HYDROCHLORIDE 50 MG/1
50 TABLET ORAL EVERY 6 HOURS PRN
Qty: 12 TABLET | Refills: 0 | Status: SHIPPED | OUTPATIENT
Start: 2021-05-12 | End: 2021-05-15

## 2021-05-12 ASSESSMENT — PAIN DESCRIPTION - PAIN TYPE: TYPE: ACUTE PAIN

## 2021-05-12 ASSESSMENT — PAIN DESCRIPTION - DESCRIPTORS: DESCRIPTORS: THROBBING;CONSTANT

## 2021-05-12 NOTE — ED PROVIDER NOTES
[facility]  Department of Emergency Medicine   ED  Encounter Note  Admit Date/RoomTime: 2021 11:53 AM  ED Room: [address]  NAME: Dinesh Servin  : 1955  MRN: [id number]     Chief Complaint:  Groin Swelling (Increased groin swelling/ pain since last evening;ongoing x1 yr follows [facility] Dr. Awa Henao; denies chest pain sob n/v/d)    HISTORY OF PRESENT ILLNESS        Dinesh Servin is a 73 y.o. male who presents to the ED by private vehicle for pain in the right testicle beginning approximately 1 year ago. Patient has had frequent emergency department visits for the same complaint where he is undergone repeat ultrasound of the scrotum each one demonstrating a similar finding of a very small hydrocele on the right otherwise no other suspicious findings. Has been no fever, chills or urinary complaints. No abdominal pains or back pains. He frequently comes to the emergency department requesting something for pain usually prescribed Ultram for discomfort. Prior record review indicates he has appointments in the upcoming 3 to 5 days at a urologist however has never been able to make it there. He states he has an appointment again in 3 days to be seen by Dr. Nayely Thompson for his chronic pain. ROS   Pertinent positives and negatives are stated within HPI, all other systems reviewed and are negative.     Past Medical History:  has a past medical history of Anxiety, Arthritis, Asthma, Bipolar 1 disorder ([address] Chronic combined systolic and diastolic CHF (congestive heart failure) ([address] COPD (chronic obstructive pulmonary disease) ([address] Depression, Diabetes mellitus ([address] GERD (gastroesophageal reflux disease), Hepatitis C, Hypertension, Hyperthyroidism, Hypothyroidism due to medication, Mass of testicle, Mesothelioma ([address] Neuromuscular disorder ([address] Other disorders of kidney and ureter, Paroxysmal A-fib ([address] Peptic ulcer, Prostate enlargement, Pulmonary embolism ([facility] [address] Seizures ([facility] [address] and Thyroid disease. Surgical History:  has a past surgical history that includes Appendectomy; Lithotripsy; Hemorrhoid surgery; Bladder surgery; Endoscopy, colon, diagnostic (11/13/2015); Abdomen surgery; Colonoscopy; Cardiac surgery; vascular surgery; and Cardiac catheterization (05/21/2018). Social History:  reports that he quit smoking about 11 years ago. His smoking use included cigarettes. He quit after 10.00 years of use. He quit smokeless tobacco use about 3 years ago. His smokeless tobacco use included chew. He reports previous drug use. He reports that he does not drink alcohol. Family History: family history includes Cancer in his father, maternal grandfather, and mother; Diabetes in his father, maternal aunt, maternal aunt, maternal aunt, maternal grandmother, and mother; Heart Disease in his paternal grandfather and paternal grandmother; Heart Failure in his paternal grandfather and paternal grandmother. Allergies: Codeine, Dye [iodides], Ketorolac tromethamine, Peanuts [peanut oil], and Shellfish-derived products    PHYSICAL EXAM   Oxygen Saturation Interpretation: Normal.        ED Triage Vitals   BP Temp Temp Source Pulse Resp SpO2 Height Weight   05/12/21 1207 05/12/21 1152 05/12/21 1207 05/12/21 1152 05/12/21 1207 05/12/21 1152 05/12/21 1207 05/12/21 1207   138/74 96.9 °F (36.1 °C) Oral (!) 48 18 98 % 5' 4\" (1.626 m) 130 lb (59 kg)         Physical Exam  Constitutional/General: Alert and oriented x3, well appearing, non toxic  HEENT:  NC/NT. PERRLA,  Airway patent. Neck: Supple, full ROM, non tender to palpation in the midline, no stridor, no crepitus, no meningeal signs  Respiratory: Lungs clear to auscultation bilaterally, no wheezes, rales, or rhonchi. Not in respiratory distress  CV:  Regular rate. Regular rhythm. No murmurs, gallops, or rubs.  2+ distal pulses  Chest: No chest wall tenderness  GI/:  Abdomen Soft, . Mild edema and tenderness to the right testicle. No epididymal tenderness. No skin changes. No rashes or wounds. No urethral drainage is noticed. Non tender, Non distended. +BS. No rebound, guarding, or rigidity. No pulsatile masses. Musculoskeletal: Moves all extremities x 4. Warm and well perfused, no clubbing, cyanosis, or edema. Capillary refill <3 seconds  Integument: skin warm and dry. No rashes. Lymphatic: no lymphadenopathy noted  Neurologic: GCS 15, no focal deficits, symmetric strength 5/5 in the upper and lower extremities bilaterally  Psychiatric: Normal Affect    Lab / Imaging Results   (All laboratory and radiology results have been personally reviewed by myself)  Labs:  No results found for this visit on 05/12/21. Imaging: All Radiology results interpreted by Radiologist unless otherwise noted. No orders to display       ED Course / Medical Decision Making   Medications - No data to display     MDM:   Patient presented with chronic testicular pain unchanged from prior. He has an appointment he claims in the next 3 days however this is been a theme of his from every emergency department visit he is ever had getting any request medications to Tide him over for 3 days until he can make it to his urologist.  I discussed the importance of him following up with urology and not utilizing the emergency department for chronic means especially for chronic pain. This is not the form to treat chronic pain efficiently and correctly. I will prescribe him enough medicine for 3 days and call was placed to urology to inform of his repeat visits in the hopes that someone can reach out to him to arrange follow-up. Plan of Care/Counseling:  I reviewed today's visit with the patient in addition to providing specific details for the plan of care and counseling regarding the diagnosis and prognosis. Questions are answered at this time and are agreeable with the plan. ASSESSMENT     1.  Chronic pain in testicle      PLAN Discharge home. Patient condition is good    New Medications     New Prescriptions    TRAMADOL (ULTRAM) 50 MG TABLET    Take 1 tablet by mouth every 6 hours as needed for Pain for up to 3 days. Electronically signed by ELIZABET Collado   DD: 5/12/21  **This report was transcribed using voice recognition software. Every effort was made to ensure accuracy; however, inadvertent computerized transcription errors may be present.   END OF ED PROVIDER NOTE       Gisselle Mckeon  05/12/21 7860

## 2021-05-18 ENCOUNTER — HOSPITAL ENCOUNTER (EMERGENCY)
Age: 66
Discharge: HOME | End: 2021-05-18
Payer: MEDICARE

## 2021-05-18 ENCOUNTER — APPOINTMENT (OUTPATIENT)
Dept: ULTRASOUND IMAGING | Age: 66
End: 2021-05-18
Payer: MEDICARE

## 2021-05-18 VITALS
TEMPERATURE: 96.8 F | SYSTOLIC BLOOD PRESSURE: 103 MMHG | WEIGHT: 130 LBS | OXYGEN SATURATION: 97 % | HEART RATE: 57 BPM | DIASTOLIC BLOOD PRESSURE: 79 MMHG | BODY MASS INDEX: 22.2 KG/M2 | RESPIRATION RATE: 16 BRPM | HEIGHT: 64 IN

## 2021-05-18 DIAGNOSIS — N50.82 SCROTAL PAIN: Primary | ICD-10-CM

## 2021-05-18 DIAGNOSIS — Z76.5 DRUG-SEEKING BEHAVIOR: ICD-10-CM

## 2021-05-18 PROCEDURE — 99283 EMERGENCY DEPT VISIT LOW MDM: CPT

## 2021-05-18 PROCEDURE — 76870 US EXAM SCROTUM: CPT

## 2021-05-18 RX ORDER — TRAMADOL HYDROCHLORIDE 50 MG/1
50 TABLET ORAL ONCE
Status: DISCONTINUED | OUTPATIENT
Start: 2021-05-18 | End: 2021-05-18 | Stop reason: HOSPADM

## 2021-05-18 ASSESSMENT — PAIN DESCRIPTION - LOCATION: LOCATION: SCROTUM

## 2021-05-18 ASSESSMENT — PAIN SCALES - GENERAL: PAINLEVEL_OUTOF10: 10
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
## 2021-05-20 NOTE — ED PROVIDER NOTES
[address]  Department of Emergency Medicine   ED  Encounter Note  Admit Date/RoomTime: 3/17/2021  4:26 PM  ED Room: [facility]    NAME: Katerine Jacob  : 1955  MRN: [id number]     Chief Complaint:  Hernia (abd hernia and was seen here yesterday and also [place] yesterday, states he doesn't see Dr. Griselda Urban until 3/25 and can't take the pain)    History of Present Illness      Katerine Jacob is a 73 y.o. old male presents to the emergency department via by private vehicle requesting medication(s) as result of running out of pain medication(s). He has a history of hernia and has an appointment with Dr. Richelle Felipe 3/25/21. He has been seen here several times and was previously taking tramadol for the pain that helped quite a bit however decided that he wanted something stronger therefore was seen on 3/14/2021 requesting stronger pain medication he was prescribed Norco now he states that he does not like the way the Arlon Aniyah makes him feel and is requesting a refill of his tramadol. ROS   Pertinent positives and negatives are stated within HPI, all other systems reviewed and are negative. Past Medical History:  has a past medical history of Anxiety, Arthritis, Asthma, Bipolar 1 disorder ([address] Chronic combined systolic and diastolic CHF (congestive heart failure) ([address] COPD (chronic obstructive pulmonary disease) ([address] Depression, Diabetes mellitus ([address] GERD (gastroesophageal reflux disease), Hepatitis C, Hypertension, Hyperthyroidism, Hypothyroidism due to medication, Mass of testicle, Mesothelioma ([address] Neuromuscular disorder ([address] Other disorders of kidney and ureter, Paroxysmal A-fib ([address] Peptic ulcer, Prostate enlargement, Pulmonary embolism ([address] Seizures ([address] and Thyroid disease. Surgical History:  has a past surgical history that includes Appendectomy; Lithotripsy; Hemorrhoid surgery; Bladder surgery;  Endoscopy, colon, diagnostic (2015); Abdomen surgery; Colonoscopy; Cardiac surgery; vascular surgery; and Cardiac catheterization (05/21/2018). Social History:  reports that he quit smoking about 11 years ago. His smoking use included cigarettes. He quit after 10.00 years of use. He quit smokeless tobacco use about 3 years ago. His smokeless tobacco use included chew. He reports current drug use. Drug: Marijuana. He reports that he does not drink alcohol. Family History: family history includes Cancer in his father, maternal grandfather, and mother; Diabetes in his father, maternal aunt, maternal aunt, maternal aunt, maternal grandmother, and mother; Heart Disease in his paternal grandfather and paternal grandmother; Heart Failure in his paternal grandfather and paternal grandmother. Allergies: Codeine, Dye [iodides], Ketorolac tromethamine, Peanuts [peanut oil], and Shellfish-derived products    Physical Exam   Oxygen Saturation Interpretation: Normal.        ED Triage Vitals   BP Temp Temp src Pulse Resp SpO2 Height Weight   03/17/21 1623 03/17/21 1514 -- 03/17/21 1514 03/17/21 1621 03/17/21 1514 03/17/21 1621 03/17/21 1621   110/69 97.1 °F (36.2 °C)  70 16 97 % 5' 4\" (1.626 m) 140 lb (63.5 kg)         Constitutional:  Alert, development consistent with age. HEENT:  NC/NT. Airway patent. Neck:  Normal ROM. Supple. Respiratory: Respirations nonlabored. Integument:  No rashes, erythema present. Lymphatics: No lymphangitis or adenopathy noted. Neurological:  Oriented. Motor functions intact. Lab / Imaging Results   (All laboratory and radiology results have been personally reviewed by myself)  Labs:  No results found for this visit on 03/17/21. Imaging: All Radiology results interpreted by Radiologist unless otherwise noted.   No orders to display       ED Course / Medical Decision Making   Medications - No data to display     Consults:   None    Counseling/MDM:   Myself  have spoken with the patient and discussed todays Pf[employer] dose 1 and 2

## 2021-06-14 ENCOUNTER — HOSPITAL ENCOUNTER (EMERGENCY)
Age: 66
Discharge: HOME | End: 2021-06-14
Payer: MEDICARE

## 2021-06-14 VITALS
WEIGHT: 140 LBS | BODY MASS INDEX: 24.8 KG/M2 | DIASTOLIC BLOOD PRESSURE: 79 MMHG | HEART RATE: 66 BPM | HEIGHT: 63 IN | RESPIRATION RATE: 18 BRPM | OXYGEN SATURATION: 98 % | SYSTOLIC BLOOD PRESSURE: 121 MMHG | TEMPERATURE: 97.5 F

## 2021-06-14 DIAGNOSIS — T23.262A PARTIAL THICKNESS BURN OF BACK OF LEFT HAND, INITIAL ENCOUNTER: Primary | ICD-10-CM

## 2021-06-14 PROCEDURE — 6370000000 HC RX 637 (ALT 250 FOR IP): Performed by: PHYSICIAN ASSISTANT

## 2021-06-14 PROCEDURE — 99282 EMERGENCY DEPT VISIT SF MDM: CPT

## 2021-06-14 RX ORDER — BACITRACIN, NEOMYCIN, POLYMYXIN B 400; 3.5; 5 [USP'U]/G; MG/G; [USP'U]/G
OINTMENT TOPICAL
Qty: 30 G | Refills: 0 | Status: SHIPPED | OUTPATIENT
Start: 2021-06-14 | End: 2021-06-17 | Stop reason: ALTCHOICE

## 2021-06-14 RX ORDER — HYDROCODONE BITARTRATE AND ACETAMINOPHEN 5; 325 MG/1; MG/1
1 TABLET ORAL ONCE
Status: COMPLETED | OUTPATIENT
Start: 2021-06-14 | End: 2021-06-14

## 2021-06-14 RX ORDER — BACITRACIN, NEOMYCIN, POLYMYXIN B 400; 3.5; 5 [USP'U]/G; MG/G; [USP'U]/G
OINTMENT TOPICAL
Qty: 30 G | Refills: 0 | Status: SHIPPED | OUTPATIENT
Start: 2021-06-14 | End: 2021-06-14

## 2021-06-14 RX ADMIN — HYDROCODONE BITARTRATE AND ACETAMINOPHEN 1 TABLET: 5; 325 TABLET ORAL at 11:49

## 2021-06-14 ASSESSMENT — PAIN SCALES - GENERAL: PAINLEVEL_OUTOF10: 10
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
## 2021-06-15 ENCOUNTER — HOSPITAL ENCOUNTER (EMERGENCY)
Age: 66
Discharge: HOME | End: 2021-06-15
Attending: EMERGENCY MEDICINE
Payer: MEDICARE

## 2021-06-15 VITALS
SYSTOLIC BLOOD PRESSURE: 148 MMHG | DIASTOLIC BLOOD PRESSURE: 90 MMHG | TEMPERATURE: 98 F | WEIGHT: 135 LBS | HEIGHT: 63 IN | OXYGEN SATURATION: 97 % | BODY MASS INDEX: 23.92 KG/M2 | HEART RATE: 90 BPM | RESPIRATION RATE: 16 BRPM

## 2021-06-15 DIAGNOSIS — T23.262A PARTIAL THICKNESS BURN OF BACK OF LEFT HAND, INITIAL ENCOUNTER: Primary | ICD-10-CM

## 2021-06-15 PROCEDURE — 99284 EMERGENCY DEPT VISIT MOD MDM: CPT

## 2021-06-15 PROCEDURE — 6370000000 HC RX 637 (ALT 250 FOR IP): Performed by: EMERGENCY MEDICINE

## 2021-06-15 RX ORDER — DIAPER,BRIEF,INFANT-TODD,DISP
EACH MISCELLANEOUS ONCE
Status: COMPLETED | OUTPATIENT
Start: 2021-06-15 | End: 2021-06-15

## 2021-06-15 RX ORDER — TRAMADOL HYDROCHLORIDE 50 MG/1
50 TABLET ORAL EVERY 6 HOURS PRN
Qty: 20 TABLET | Refills: 0 | Status: SHIPPED | OUTPATIENT
Start: 2021-06-15 | End: 2021-06-20

## 2021-06-15 RX ORDER — TRAMADOL HYDROCHLORIDE 50 MG/1
50 TABLET ORAL ONCE
Status: COMPLETED | OUTPATIENT
Start: 2021-06-15 | End: 2021-06-15

## 2021-06-15 RX ADMIN — TRAMADOL HYDROCHLORIDE 50 MG: 50 TABLET, FILM COATED ORAL at 08:03

## 2021-06-15 RX ADMIN — BACITRACIN ZINC: 500 OINTMENT TOPICAL at 08:04

## 2021-06-15 ASSESSMENT — PAIN - FUNCTIONAL ASSESSMENT: PAIN_FUNCTIONAL_ASSESSMENT: PREVENTS OR INTERFERES SOME ACTIVE ACTIVITIES AND ADLS

## 2021-06-15 ASSESSMENT — PAIN DESCRIPTION - FREQUENCY: FREQUENCY: CONTINUOUS

## 2021-06-15 ASSESSMENT — PAIN DESCRIPTION - PROGRESSION: CLINICAL_PROGRESSION: GRADUALLY WORSENING

## 2021-06-15 ASSESSMENT — PAIN DESCRIPTION - DESCRIPTORS: DESCRIPTORS: BURNING

## 2021-06-15 ASSESSMENT — PAIN DESCRIPTION - ORIENTATION: ORIENTATION: LEFT

## 2021-06-15 ASSESSMENT — PAIN DESCRIPTION - PAIN TYPE: TYPE: ACUTE PAIN

## 2021-06-15 ASSESSMENT — PAIN SCALES - GENERAL
PAINLEVEL_OUTOF10: 10
PAINLEVEL_OUTOF10: 10

## 2021-06-15 ASSESSMENT — PAIN DESCRIPTION - ONSET: ONSET: ON-GOING

## 2021-06-15 ASSESSMENT — PAIN DESCRIPTION - LOCATION: LOCATION: HAND

## 2021-06-15 NOTE — ED PROVIDER NOTES
HPI:  6/15/21, Time: 8:02 AM EDT         Janice Shannon is a 73 y.o. male presenting to the ED for pain of back of left hand after spilling hot boiling water on the back of his left hand yesterday while making past.  Was seen at [address] [place] [address],[address] yesterday and had burn treated an has not had time to follow up today but is here requesting pain meds for pain control. He denies new complaints. He is right-handed. States he does not drive. The complaint has been persistent, moderate in severity, and worsened by nothing. Patient denies any other complaints. Has a bandaid on his hand right now. ROS:   A complete review of systems was performed and all pertinent positives and negatives are stated within HPI, all other systems reviewed and are negative.      --------------------------------------------- PAST HISTORY ---------------------------------------------  Past Medical History:  has a past medical history of Anxiety, Arthritis, Asthma, Bipolar 1 disorder ([address] Chronic combined systolic and diastolic CHF (congestive heart failure) ([address] COPD (chronic obstructive pulmonary disease) ([address] Depression, Diabetes mellitus ([address] GERD (gastroesophageal reflux disease), Hepatitis C, Hypertension, Hyperthyroidism, Hypothyroidism due to medication, Mass of testicle, Mesothelioma ([address] Neuromuscular disorder ([address] Other disorders of kidney and ureter, Paroxysmal A-fib ([address] Peptic ulcer, Prostate enlargement, Pulmonary embolism ([address] Seizures ([address] and Thyroid disease. Past Surgical History:  has a past surgical history that includes Appendectomy; Lithotripsy; Hemorrhoid surgery; Bladder surgery; Endoscopy, colon, diagnostic (11/13/2015); Abdomen surgery; Colonoscopy; Cardiac surgery; vascular surgery; and Cardiac catheterization (05/21/2018). Social History:  reports that he quit smoking about 11 years ago. His smoking use included cigarettes. He quit after 10.00 years of use.  He quit smokeless tobacco use about 3 years ago. His smokeless tobacco use included chew. He reports previous drug use. He reports that he does not drink alcohol. Family History: family history includes Cancer in his father, maternal grandfather, and mother; Diabetes in his father, maternal aunt, maternal aunt, maternal aunt, maternal grandmother, and mother; Heart Disease in his paternal grandfather and paternal grandmother; Heart Failure in his paternal grandfather and paternal grandmother. The patients home medications have been reviewed. Allergies: Codeine, Dye [iodides], Ketorolac tromethamine, Peanuts [peanut oil], and Shellfish-derived products        ----------------------------------------PHYSICAL EXAM--------------------------------------  Constitutional:  Well developed, well nourished, no acute distress, non-toxic appearance   Eyes:  PERRL, conjunctiva normal, EOMI  HENT:  Atraumatic, external ears normal, nose normal, oropharynx moist. Neck- normal range of motion, no nuchal rigidity   Respiratory:  No respiratory distress   Cardiovascular:  Normal rate, normal rhythm. Radial pulses 2+ bilaterally. Musculoskeletal:  No edema,  no deformities. Mild swelling of the dorsum of the left hand but does not affect his fingers or wrist.  Compartments are soft. He is warm and well perfused. Cap refill less than 3 seconds. Integument:  Well hydrated, no visible rash. Adequate perfusion. Dorsal left hand with 2nd degree burn (blister that was previously described has popped) See photo below. Neurologic:  Alert & oriented x 3, CN 2-12 normal, no focal deficits noted. Normal gait. Left median, radial and ulnar nerves sensation intact to light touch and strength 5/5. Recurrent branch of left median nerve intact.   Psychiatric:  Speech and behavior appropriate   **Informed Consent**    The patient has given verbal consent to have photos taken of left hand and inserted into their ED Provider Note as part of their permanent medical record for purposes of documentation, treatment management and/or medical review. All Images taken on 6/15/21 of patient name: Jen Gonzalez were transmitted and stored on secured [employer] located within [facility] by a registered [employer].             -------------------------------------------------- RESULTS -------------------------------------------------  I have personally reviewed all laboratory and imaging results for this patient. Results are listed below. LABS:  No results found for this visit on 06/15/21. RADIOLOGY:  Interpreted by Radiologist.  No orders to display       ------------------------- NURSING NOTES AND VITALS REVIEWED ---------------------------  The nursing notes within the ED encounter and vital signs as below have been reviewed by myself. Ht 5' 3\" (1.6 m)   Wt 135 lb (61.2 kg)   BMI 23.91 kg/m²   Oxygen Saturation Interpretation: Normal      The patients available past medical records and past encounters were reviewed. ------------------------------ ED COURSE/MEDICAL DECISION MAKING----------------------  Medications   traMADol (ULTRAM) tablet 50 mg (50 mg Oral Given 6/15/21 0803)   bacitracin zinc ointment ( Topical Given 6/15/21 0804)           Procedures:   none      Medical Decision Making:    Tetanus already UTD per patient. Wound care with bacitracin (given Rx for it yesterday). Requesting Ultram for pain control (safe use and driving restrictions reviewed). F/U with plastics (call today). Patient was explicitly instructed on specific signs and symptoms on which to return to the emergency room for. Patient was instructed to return to the ER for any new or worsening symptoms. Additional discharge instructions were given verbally. All questions were answered. Patient is comfortable and agreeable with discharge plan. Patient in no acute distress and non-toxic in appearance.        This patient's ED course included: re-evaluation prior to disposition and a personal history and physicial eaxmination    This patient has remained hemodynamically stable and improved during their ED course. Re-Evaluations:  Time: 8:11 AM EDT   Re-evaluation. Patients symptoms are improving  Repeat physical examination is improved      Consultations:   none    Critical Care: none    Bacilio POWELL, DO, am the Primary Provider of Record    Counseling: The emergency provider has spoken with the patient and discussed todays results, in addition to providing specific details for the plan of care and counseling regarding the diagnosis and prognosis. Questions are answered at this time and they are agreeable with the plan.    --------------------------- IMPRESSION AND DISPOSITION ---------------------------------    IMPRESSION  1.  Partial thickness burn of back of left hand, initial encounter        DISPOSITION  Disposition: Discharge to home  Patient condition is stable             Melva Vasques DO  06/15/21 7789 [facility] Case Number: [id number]

## 2021-06-17 ENCOUNTER — HOSPITAL ENCOUNTER (EMERGENCY)
Age: 66
Discharge: HOME | End: 2021-06-17
Payer: MEDICARE

## 2021-06-17 VITALS
HEIGHT: 64 IN | DIASTOLIC BLOOD PRESSURE: 89 MMHG | HEART RATE: 82 BPM | WEIGHT: 130 LBS | TEMPERATURE: 97.1 F | OXYGEN SATURATION: 93 % | SYSTOLIC BLOOD PRESSURE: 158 MMHG | RESPIRATION RATE: 16 BRPM | BODY MASS INDEX: 22.2 KG/M2

## 2021-06-17 DIAGNOSIS — T23.062D: Primary | ICD-10-CM

## 2021-06-17 PROCEDURE — 6370000000 HC RX 637 (ALT 250 FOR IP): Performed by: PHYSICIAN ASSISTANT

## 2021-06-17 PROCEDURE — 99283 EMERGENCY DEPT VISIT LOW MDM: CPT

## 2021-06-17 RX ORDER — CEPHALEXIN 500 MG/1
500 CAPSULE ORAL ONCE
Status: COMPLETED | OUTPATIENT
Start: 2021-06-17 | End: 2021-06-17

## 2021-06-17 RX ORDER — HYDROCODONE BITARTRATE AND ACETAMINOPHEN 5; 325 MG/1; MG/1
1 TABLET ORAL ONCE
Status: COMPLETED | OUTPATIENT
Start: 2021-06-17 | End: 2021-06-17

## 2021-06-17 RX ORDER — CEPHALEXIN 500 MG/1
500 CAPSULE ORAL 4 TIMES DAILY
Qty: 28 CAPSULE | Refills: 0 | Status: SHIPPED | OUTPATIENT
Start: 2021-06-17 | End: 2021-06-24

## 2021-06-17 RX ORDER — DIAPER,BRIEF,INFANT-TODD,DISP
EACH MISCELLANEOUS ONCE
Status: COMPLETED | OUTPATIENT
Start: 2021-06-17 | End: 2021-06-17

## 2021-06-17 RX ORDER — CLONAZEPAM 1 MG/1
1 TABLET ORAL 3 TIMES DAILY PRN
Status: ON HOLD | COMMUNITY
End: 2021-09-02 | Stop reason: HOSPADM

## 2021-06-17 RX ADMIN — CEPHALEXIN 500 MG: 500 CAPSULE ORAL at 12:45

## 2021-06-17 RX ADMIN — HYDROCODONE BITARTRATE AND ACETAMINOPHEN 1 TABLET: 5; 325 TABLET ORAL at 12:45

## 2021-06-17 RX ADMIN — Medication: at 13:12

## 2021-06-17 ASSESSMENT — PAIN DESCRIPTION - PAIN TYPE: TYPE: ACUTE PAIN

## 2021-06-17 ASSESSMENT — PAIN DESCRIPTION - DESCRIPTORS: DESCRIPTORS: ACHING

## 2021-06-17 ASSESSMENT — PAIN SCALES - GENERAL
PAINLEVEL_OUTOF10: 10
PAINLEVEL_OUTOF10: 10

## 2021-06-17 ASSESSMENT — PAIN DESCRIPTION - LOCATION: LOCATION: HAND

## 2021-06-17 ASSESSMENT — PAIN DESCRIPTION - ORIENTATION: ORIENTATION: LEFT

## 2021-06-20 ENCOUNTER — APPOINTMENT (OUTPATIENT)
Dept: GENERAL RADIOLOGY | Age: 66
End: 2021-06-20
Payer: MEDICARE

## 2021-06-20 ENCOUNTER — HOSPITAL ENCOUNTER (EMERGENCY)
Age: 66
Discharge: LEFT BEFORE DISCHARGE | End: 2021-06-20
Attending: EMERGENCY MEDICINE
Payer: MEDICARE

## 2021-06-20 VITALS
TEMPERATURE: 97.7 F | SYSTOLIC BLOOD PRESSURE: 121 MMHG | HEART RATE: 71 BPM | DIASTOLIC BLOOD PRESSURE: 70 MMHG | OXYGEN SATURATION: 99 % | RESPIRATION RATE: 16 BRPM | BODY MASS INDEX: 20.49 KG/M2 | WEIGHT: 120 LBS | HEIGHT: 64 IN

## 2021-06-20 DIAGNOSIS — T23.232D PARTIAL THICKNESS BURN OF MULTIPLE FINGERS OF LEFT HAND EXCLUDING THUMB, SUBSEQUENT ENCOUNTER: ICD-10-CM

## 2021-06-20 DIAGNOSIS — R07.9 CHEST PAIN, UNSPECIFIED TYPE: Primary | ICD-10-CM

## 2021-06-20 LAB
ALBUMIN SERPL-MCNC: 4.4 G/DL (ref 3.5–5.2)
ALP BLD-CCNC: 63 U/L (ref 40–129)
ALT SERPL-CCNC: 19 U/L (ref 0–40)
ANION GAP SERPL CALCULATED.3IONS-SCNC: 12 MMOL/L (ref 7–16)
APTT: 32.3 SEC (ref 24.5–35.1)
AST SERPL-CCNC: 24 U/L (ref 0–39)
BASOPHILS ABSOLUTE: 0.06 E9/L (ref 0–0.2)
BASOPHILS RELATIVE PERCENT: 0.8 % (ref 0–2)
BILIRUB SERPL-MCNC: 0.4 MG/DL (ref 0–1.2)
BUN BLDV-MCNC: 12 MG/DL (ref 6–23)
CALCIUM SERPL-MCNC: 8.9 MG/DL (ref 8.6–10.2)
CHLORIDE BLD-SCNC: 98 MMOL/L (ref 98–107)
CO2: 25 MMOL/L (ref 22–29)
CREAT SERPL-MCNC: 1 MG/DL (ref 0.7–1.2)
D DIMER: 439 NG/ML DDU
EOSINOPHILS ABSOLUTE: 0.4 E9/L (ref 0.05–0.5)
EOSINOPHILS RELATIVE PERCENT: 5.3 % (ref 0–6)
GFR AFRICAN AMERICAN: >60
GFR NON-AFRICAN AMERICAN: >60 ML/MIN/1.73
GLUCOSE BLD-MCNC: 75 MG/DL (ref 74–99)
HCT VFR BLD CALC: 38.2 % (ref 37–54)
HEMOGLOBIN: 12.8 G/DL (ref 12.5–16.5)
IMMATURE GRANULOCYTES #: 0.02 E9/L
IMMATURE GRANULOCYTES %: 0.3 % (ref 0–5)
INR BLD: 1.2
LYMPHOCYTES ABSOLUTE: 1.66 E9/L (ref 1.5–4)
LYMPHOCYTES RELATIVE PERCENT: 22.1 % (ref 20–42)
MCH RBC QN AUTO: 31.1 PG (ref 26–35)
MCHC RBC AUTO-ENTMCNC: 33.5 % (ref 32–34.5)
MCV RBC AUTO: 92.9 FL (ref 80–99.9)
MONOCYTES ABSOLUTE: 0.72 E9/L (ref 0.1–0.95)
MONOCYTES RELATIVE PERCENT: 9.6 % (ref 2–12)
NEUTROPHILS ABSOLUTE: 4.64 E9/L (ref 1.8–7.3)
NEUTROPHILS RELATIVE PERCENT: 61.9 % (ref 43–80)
PDW BLD-RTO: 13.2 FL (ref 11.5–15)
PLATELET # BLD: 196 E9/L (ref 130–450)
PMV BLD AUTO: 11.5 FL (ref 7–12)
POTASSIUM REFLEX MAGNESIUM: 5.3 MMOL/L (ref 3.5–5)
PROTHROMBIN TIME: 13.6 SEC (ref 9.3–12.4)
RBC # BLD: 4.11 E12/L (ref 3.8–5.8)
REASON FOR REJECTION: NORMAL
REJECTED TEST: NORMAL
SODIUM BLD-SCNC: 135 MMOL/L (ref 132–146)
TOTAL PROTEIN: 7.5 G/DL (ref 6.4–8.3)
TROPONIN, HIGH SENSITIVITY: 7 NG/L (ref 0–11)
WBC # BLD: 7.5 E9/L (ref 4.5–11.5)

## 2021-06-20 PROCEDURE — 99284 EMERGENCY DEPT VISIT MOD MDM: CPT

## 2021-06-20 PROCEDURE — 85610 PROTHROMBIN TIME: CPT

## 2021-06-20 PROCEDURE — 96375 TX/PRO/DX INJ NEW DRUG ADDON: CPT

## 2021-06-20 PROCEDURE — 85378 FIBRIN DEGRADE SEMIQUANT: CPT

## 2021-06-20 PROCEDURE — 93005 ELECTROCARDIOGRAM TRACING: CPT | Performed by: EMERGENCY MEDICINE

## 2021-06-20 PROCEDURE — 6360000002 HC RX W HCPCS: Performed by: EMERGENCY MEDICINE

## 2021-06-20 PROCEDURE — 80053 COMPREHEN METABOLIC PANEL: CPT

## 2021-06-20 PROCEDURE — 85730 THROMBOPLASTIN TIME PARTIAL: CPT

## 2021-06-20 PROCEDURE — 85025 COMPLETE CBC W/AUTO DIFF WBC: CPT

## 2021-06-20 PROCEDURE — 84484 ASSAY OF TROPONIN QUANT: CPT

## 2021-06-20 PROCEDURE — 36415 COLL VENOUS BLD VENIPUNCTURE: CPT

## 2021-06-20 PROCEDURE — 96374 THER/PROPH/DIAG INJ IV PUSH: CPT

## 2021-06-20 PROCEDURE — 71045 X-RAY EXAM CHEST 1 VIEW: CPT

## 2021-06-20 RX ORDER — DIPHENHYDRAMINE HYDROCHLORIDE 50 MG/ML
50 INJECTION INTRAMUSCULAR; INTRAVENOUS ONCE
Status: DISCONTINUED | OUTPATIENT
Start: 2021-06-20 | End: 2021-06-20

## 2021-06-20 RX ORDER — ONDANSETRON 2 MG/ML
4 INJECTION INTRAMUSCULAR; INTRAVENOUS ONCE
Status: COMPLETED | OUTPATIENT
Start: 2021-06-20 | End: 2021-06-20

## 2021-06-20 RX ORDER — M-VIT,TX,IRON,MINS/CALC/FOLIC 27MG-0.4MG
1 TABLET ORAL DAILY
COMMUNITY

## 2021-06-20 RX ORDER — ALBUTEROL SULFATE 90 UG/1
2 AEROSOL, METERED RESPIRATORY (INHALATION) EVERY 6 HOURS
Status: ON HOLD | COMMUNITY
End: 2021-08-27 | Stop reason: ALTCHOICE

## 2021-06-20 RX ORDER — METHYLPREDNISOLONE SODIUM SUCCINATE 125 MG/2ML
125 INJECTION, POWDER, LYOPHILIZED, FOR SOLUTION INTRAMUSCULAR; INTRAVENOUS ONCE
Status: DISCONTINUED | OUTPATIENT
Start: 2021-06-20 | End: 2021-06-20

## 2021-06-20 RX ORDER — FENTANYL CITRATE 50 UG/ML
50 INJECTION, SOLUTION INTRAMUSCULAR; INTRAVENOUS ONCE
Status: COMPLETED | OUTPATIENT
Start: 2021-06-20 | End: 2021-06-20

## 2021-06-20 RX ORDER — DIAPER,BRIEF,INFANT-TODD,DISP
EACH MISCELLANEOUS ONCE
Status: DISCONTINUED | OUTPATIENT
Start: 2021-06-20 | End: 2021-06-20 | Stop reason: HOSPADM

## 2021-06-20 RX ADMIN — ONDANSETRON 4 MG: 2 INJECTION INTRAMUSCULAR; INTRAVENOUS at 17:02

## 2021-06-20 RX ADMIN — FENTANYL CITRATE 50 MCG: 0.05 INJECTION, SOLUTION INTRAMUSCULAR; INTRAVENOUS at 17:00

## 2021-06-20 ASSESSMENT — PAIN DESCRIPTION - LOCATION: LOCATION: CHEST

## 2021-06-20 ASSESSMENT — PAIN SCALES - GENERAL
PAINLEVEL_OUTOF10: 8
PAINLEVEL_OUTOF10: 5

## 2021-06-20 ASSESSMENT — PAIN DESCRIPTION - PAIN TYPE: TYPE: ACUTE PAIN

## 2021-06-20 NOTE — ED NOTES
Bed: 07  Expected date:   Expected time:   Means of arrival:   Comments:  KHAI Hardin, RN  06/20/21 7842

## 2021-06-20 NOTE — ED NOTES
Pt requesting Dr. Lana Patel, charge nurse came into room to check on complaint called to . Informed the patient together that he was welcome to make any concerns known and care will be unaffected. Bacitracin Dsg w/ non-adherant vaseline dressing applied to L hand from 3rd degree burn site treated 4 days ago. Discussed with patient the concerns of pain medication use. Reviewed the importance of changing dressing daily and when soiled. Patient began to mention his wife was pregnant and due soon. He had not mentioned this before. He then clarified it was his xwife. He also stated his steven could pick him up but he needed to leave by a certain time. It was conveyed to him that he had concerning labs that may mean additional testing. Patient stated he was not wanting additional testing or admission because of his pregnant, wife- xwife.        Rg Lewis RN  06/20/21 1900

## 2021-06-20 NOTE — ED PROVIDER NOTES
HPI:  6/20/21,   Time: 4:16 PM EDT       Daniela Torres is a 73 y.o. male presenting to the ED for cp, beginning 10 hr ago. The complaint has been persistent, moderate in severity, and worsened by nothing. Sharp pain, nothing makes better, no hx. No n/v/d/cough/congestion/runny nose/sore throat. Has left hand burn from 5 days ago seen at [facility] for    Review of Systems:   Pertinent positives and negatives are stated within HPI, all other systems reviewed and are negative.          --------------------------------------------- PAST HISTORY ---------------------------------------------  Past Medical History:  has a past medical history of Anxiety, Arthritis, Asthma, Bipolar 1 disorder ([address] Chronic combined systolic and diastolic CHF (congestive heart failure) ([address] COPD (chronic obstructive pulmonary disease) ([address] Depression, Diabetes mellitus ([address] GERD (gastroesophageal reflux disease), Hepatitis C, Hypertension, Hyperthyroidism, Hypothyroidism due to medication, Mass of testicle, Mesothelioma ([address] Neuromuscular disorder ([address] Other disorders of kidney and ureter, Paroxysmal A-fib ([address] Peptic ulcer, Prostate enlargement, Pulmonary embolism ([address] Seizures ([address] and Thyroid disease. Past Surgical History:  has a past surgical history that includes Appendectomy; Lithotripsy; Hemorrhoid surgery; Bladder surgery; Endoscopy, colon, diagnostic (11/13/2015); Abdomen surgery; Colonoscopy; Cardiac surgery; vascular surgery; and Cardiac catheterization (05/21/2018). Social History:  reports that he quit smoking about 11 years ago. His smoking use included cigarettes. He quit after 10.00 years of use. He quit smokeless tobacco use about 3 years ago. His smokeless tobacco use included chew. He reports previous drug use. He reports that he does not drink alcohol.     Family History: family history includes Cancer in his father, maternal grandfather, and mother; Diabetes in his father, maternal aunt, maternal aunt, maternal aunt, maternal grandmother, and mother; Heart Disease in his paternal grandfather and paternal grandmother; Heart Failure in his paternal grandfather and paternal grandmother. The patients home medications have been reviewed. Allergies: Codeine, Dye [iodides], Ketorolac tromethamine, Peanuts [peanut oil], and Shellfish-derived products        ---------------------------------------------------PHYSICAL EXAM--------------------------------------    Constitutional/General: Alert and oriented x3, well appearing, non toxic in NAD  Head: Normocephalic and atraumatic  Eyes: PERRL, EOMI, conjunctive normal, sclera non icteric  Mouth: Oropharynx clear, handling secretions, no trismus, no asymmetry of the posterior oropharynx or uvular edema  Neck: Supple, full ROM, non tender to palpation in the midline, no stridor, no crepitus, no meningeal signs  Respiratory: Lungs clear to auscultation bilaterally, no wheezes, rales, or rhonchi. Not in respiratory distress  Cardiovascular:  Regular rate. Regular rhythm. No murmurs, gallops, or rubs. 2+ distal pulses  Chest: No chest wall tenderness  GI:  Abdomen Soft, Non tender, Non distended. Musculoskeletal: Moves all extremities x 4. Warm and well perfused, no clubbing, cyanosis, or edema. Capillary refill <3 seconds  Integument: 2nd degree burn dorsum left hand  Lymphatic: no lymphadenopathy noted  Neurologic: GCS 15, no focal deficits,   Psychiatric: Normal Affect    -------------------------------------------------- RESULTS -------------------------------------------------  I have personally reviewed all laboratory and imaging results for this patient. Results are listed below.      LABS:  Results for orders placed or performed during the hospital encounter of 06/20/21   Comprehensive Metabolic Panel w/ Reflex to MG   Result Value Ref Range    Sodium 135 132 - 146 mmol/L    Potassium reflex Magnesium 5.3 (H) 3.5 - 5.0 mmol/L    Chloride 98 98 - 107 mmol/L    CO2 25 22 - 29 mmol/L    Anion Gap 12 7 - 16 mmol/L    Glucose 75 74 - 99 mg/dL    BUN 12 6 - 23 mg/dL    CREATININE 1.0 0.7 - 1.2 mg/dL    GFR Non-African American >60 >=60 mL/min/1.73    GFR African American >60     Calcium 8.9 8.6 - 10.2 mg/dL    Total Protein 7.5 6.4 - 8.3 g/dL    Albumin 4.4 3.5 - 5.2 g/dL    Total Bilirubin 0.4 0.0 - 1.2 mg/dL    Alkaline Phosphatase 63 40 - 129 U/L    ALT 19 0 - 40 U/L    AST 24 0 - 39 U/L   Troponin   Result Value Ref Range    Troponin, High Sensitivity 7 0 - 11 ng/L   D-dimer, quantitative   Result Value Ref Range    D-Dimer, Quant 439 ng/mL DDU   Protime-INR   Result Value Ref Range    Protime 13.6 (H) 9.3 - 12.4 sec    INR 1.2    APTT   Result Value Ref Range    aPTT 32.3 24.5 - 35.1 sec   CBC auto differential   Result Value Ref Range    WBC 7.5 4.5 - 11.5 E9/L    RBC 4.11 3.80 - 5.80 E12/L    Hemoglobin 12.8 12.5 - 16.5 g/dL    Hematocrit 38.2 37.0 - 54.0 %    MCV 92.9 80.0 - 99.9 fL    MCH 31.1 26.0 - 35.0 pg    MCHC 33.5 32.0 - 34.5 %    RDW 13.2 11.5 - 15.0 fL    Platelets 230 081 - 865 E9/L    MPV 11.5 7.0 - 12.0 fL    Neutrophils % 61.9 43.0 - 80.0 %    Immature Granulocytes % 0.3 0.0 - 5.0 %    Lymphocytes % 22.1 20.0 - 42.0 %    Monocytes % 9.6 2.0 - 12.0 %    Eosinophils % 5.3 0.0 - 6.0 %    Basophils % 0.8 0.0 - 2.0 %    Neutrophils Absolute 4.64 1.80 - 7.30 E9/L    Immature Granulocytes # 0.02 E9/L    Lymphocytes Absolute 1.66 1.50 - 4.00 E9/L    Monocytes Absolute 0.72 0.10 - 0.95 E9/L    Eosinophils Absolute 0.40 0.05 - 0.50 E9/L    Basophils Absolute 0.06 0.00 - 0.20 E9/L   SPECIMEN REJECTION   Result Value Ref Range    Rejected Test PT PTT DIMER     Reason for Rejection see below        RADIOLOGY:  Interpreted by Radiologist.  XR CHEST PORTABLE   Final Result   No acute process. EKG:  This EKG is signed and interpreted by the EP.     Time: 1616  Rate: 70  Rhythm: Sinus  Interpretation: non-specific EKG  Comparison: None      ------------------------- NURSING NOTES AND VITALS REVIEWED ---------------------------   The nursing notes within the ED encounter and vital signs as below have been reviewed by myself. /70   Pulse 71   Temp 97.7 °F (36.5 °C) (Temporal)   Resp 16   Ht 5' 4\" (1.626 m)   Wt 120 lb (54.4 kg)   SpO2 99%   BMI 20.60 kg/m²   Oxygen Saturation Interpretation: Normal    The patients available past medical records and past encounters were reviewed. ------------------------------ ED COURSE/MEDICAL DECISION MAKING----------------------  Medications   bacitracin zinc ointment (has no administration in time range)   fentaNYL (SUBLIMAZE) injection 50 mcg (50 mcg Intravenous Given 6/20/21 1700)   ondansetron (ZOFRAN) injection 4 mg (4 mg Intravenous Given 6/20/21 1702)         ED COURSE:       Medical Decision Making:    Pt trop noted, d dimer elev, allergic to iv dye, rec obs for v/q and repeat trop. Pt refused, signs out ama    Unfortunately, Jarad Ordoñez at 7:10 PM decided to leave the Emergency Department Against Medical Advice. Jarad Ordoñez is clinically sober, free of any distracting injury, appears to be of sound mind with intact judgement and insight, and in my opinion has the capacity to make decisions. he presented to the Emergency Department to be evaluated for Chest Pain (woke up with chest pain out of his sleep. States he was diaphoretic. No meds given. The patient states he can't take ASA d/t him being on Xarelto)   and understands that I am concerned about the possibility of emergent issue in pe or cardiac cause of sx. I have explained the nature of the evaluation so for and he understands the results and that the evaluation so far has been limited and cannot exclude emergent condition and that by not undergoing further evaluation and management specific risks include: Permanent disability and death.    We have discussed alternative treatments and the patient was still wishing to leave and referred to Erich Alcazar MD   Still, Taylor Ríos is unwilling to stay for the recommended evaluation and management and wishes to leave against medical advice. I am unable to convince the patient to stay, I have asked them to return as soon as possible to complete their evaluation. I have answered all their questions             This patient's ED course included: a personal history and physicial examination    This patient has remained hemodynamically stable during their ED course. Re-Evaluations:             Re-evaluation. Patients symptoms are improving          Counseling: The emergency provider has spoken with the patient and discussed todays results, in addition to providing specific details for the plan of care and counseling regarding the diagnosis and prognosis. Questions are answered at this time and they are agreeable with the plan.       --------------------------------- IMPRESSION AND DISPOSITION ---------------------------------    IMPRESSION  1. Chest pain, unspecified type    2. Partial thickness burn of multiple fingers of left hand excluding thumb, subsequent encounter        DISPOSITION  Disposition: Other Disposition: Left AMA  Patient condition is fair    NOTE: This report was transcribed using voice recognition software.  Every effort was made to ensure accuracy; however, inadvertent computerized transcription errors may be present        Lolis Bermudez MD  06/20/21 1910

## 2021-06-21 LAB
EKG ATRIAL RATE: 71 BPM
EKG P-R INTERVAL: 132 MS
EKG Q-T INTERVAL: 406 MS
EKG QRS DURATION: 80 MS
EKG QTC CALCULATION (BAZETT): 441 MS
EKG R AXIS: -7 DEGREES
EKG T AXIS: 46 DEGREES
EKG VENTRICULAR RATE: 71 BPM

## 2021-06-21 PROCEDURE — 93010 ELECTROCARDIOGRAM REPORT: CPT | Performed by: INTERNAL MEDICINE

## 2021-06-30 ENCOUNTER — HOSPITAL ENCOUNTER (EMERGENCY)
Age: 66
Discharge: HOME | End: 2021-06-30
Attending: FAMILY MEDICINE
Payer: MEDICARE

## 2021-06-30 ENCOUNTER — HOSPITAL ENCOUNTER (EMERGENCY)
Age: 66
Discharge: LEFT BEFORE DISCHARGE | End: 2021-06-30
Payer: MEDICARE

## 2021-06-30 VITALS
SYSTOLIC BLOOD PRESSURE: 99 MMHG | OXYGEN SATURATION: 93 % | DIASTOLIC BLOOD PRESSURE: 67 MMHG | TEMPERATURE: 97.3 F | RESPIRATION RATE: 16 BRPM | HEART RATE: 78 BPM

## 2021-06-30 VITALS
OXYGEN SATURATION: 98 % | RESPIRATION RATE: 18 BRPM | BODY MASS INDEX: 20.83 KG/M2 | HEART RATE: 77 BPM | DIASTOLIC BLOOD PRESSURE: 62 MMHG | SYSTOLIC BLOOD PRESSURE: 101 MMHG | TEMPERATURE: 97.5 F | HEIGHT: 64 IN | WEIGHT: 122 LBS

## 2021-06-30 DIAGNOSIS — T23.262A: Primary | ICD-10-CM

## 2021-06-30 DIAGNOSIS — T23.202D PARTIAL THICKNESS BURN OF LEFT HAND, UNSPECIFIED SITE OF HAND, SUBSEQUENT ENCOUNTER: Primary | ICD-10-CM

## 2021-06-30 PROCEDURE — 99283 EMERGENCY DEPT VISIT LOW MDM: CPT

## 2021-06-30 PROCEDURE — 6370000000 HC RX 637 (ALT 250 FOR IP): Performed by: PHYSICIAN ASSISTANT

## 2021-06-30 RX ORDER — TRAMADOL HYDROCHLORIDE 50 MG/1
50 TABLET ORAL EVERY 6 HOURS PRN
Qty: 6 TABLET | Refills: 0 | Status: SHIPPED | OUTPATIENT
Start: 2021-06-30 | End: 2021-07-02

## 2021-06-30 RX ORDER — TRAMADOL HYDROCHLORIDE 50 MG/1
50 TABLET ORAL ONCE
Status: COMPLETED | OUTPATIENT
Start: 2021-06-30 | End: 2021-06-30

## 2021-06-30 RX ADMIN — TRAMADOL HYDROCHLORIDE 50 MG: 50 TABLET, FILM COATED ORAL at 16:14

## 2021-06-30 ASSESSMENT — PAIN DESCRIPTION - DESCRIPTORS: DESCRIPTORS: BURNING

## 2021-06-30 ASSESSMENT — PAIN DESCRIPTION - LOCATION
LOCATION: HAND
LOCATION: HAND

## 2021-06-30 ASSESSMENT — PAIN SCALES - GENERAL
PAINLEVEL_OUTOF10: 7
PAINLEVEL_OUTOF10: 10
PAINLEVEL_OUTOF10: 10

## 2021-06-30 ASSESSMENT — PAIN DESCRIPTION - ORIENTATION
ORIENTATION: LEFT
ORIENTATION: LEFT

## 2021-06-30 ASSESSMENT — PAIN DESCRIPTION - PAIN TYPE
TYPE: ACUTE PAIN
TYPE: ACUTE PAIN

## 2021-06-30 NOTE — ED NOTES
Left hand wrapped with telfa and gauze wrap per pt request. States he has silvadene cream at home     Abhi Randolph RN  06/30/21 7730

## 2021-06-30 NOTE — ED PROVIDER NOTES
ED Attending  CC: No       [address] [facility]  Department of Emergency Medicine   ED  Encounter Note  Admit Date/RoomTime: 2021  3:04 PM  ED Room:      NAME: Zach Mckeon  : 1955  MRN: [id number]     Chief Complaint:  Burn (left handle, states boiling water, spilled on hand)    History of Present Illness        Zach Mckeon is a 73 y.o. old male who presents to the emergency department by private vehicle, for evaluation of burn of left dorsal hand which occurred on . Patient states he is still having pain. Patient states symptoms are mild in severity and describes as a burning pain. Patient denies anything making it better or worse. Patient states he did not follow-up with the burn center as recommended. Denies fever/chills, headache, vision change, dizziness, chest pain, dyspnea, abdominal pain, NVD, numbness/weakness. Pertinent positives and negatives are stated within HPI, all other systems reviewed and are negative. Past Medical History:  has a past medical history of Anxiety, Arthritis, Asthma, Bipolar 1 disorder ([address] Chronic combined systolic and diastolic CHF (congestive heart failure) ([address] COPD (chronic obstructive pulmonary disease) ([address] Depression, Diabetes mellitus ([address] GERD (gastroesophageal reflux disease), Hepatitis C, Hypertension, Hyperthyroidism, Hypothyroidism due to medication, Mass of testicle, Mesothelioma ([address] Neuromuscular disorder ([address] Other disorders of kidney and ureter, Paroxysmal A-fib ([address] Peptic ulcer, Prostate enlargement, Pulmonary embolism ([address] Seizures ([address] and Thyroid disease. Surgical History:  has a past surgical history that includes Appendectomy; Lithotripsy; Hemorrhoid surgery; Bladder surgery; Endoscopy, colon, diagnostic (2015); Abdomen surgery; Colonoscopy; Cardiac surgery; vascular surgery; and Cardiac catheterization (2018).     Social History:  reports that he quit smoking about 11 years ago. His smoking use included cigarettes. He quit after 10.00 years of use. He quit smokeless tobacco use about 3 years ago. His smokeless tobacco use included chew. He reports previous drug use. He reports that he does not drink alcohol. Family History: family history includes Cancer in his father, maternal grandfather, and mother; Diabetes in his father, maternal aunt, maternal aunt, maternal aunt, maternal grandmother, and mother; Heart Disease in his paternal grandfather and paternal grandmother; Heart Failure in his paternal grandfather and paternal grandmother. Allergies: Codeine, Dye [iodides], Ketorolac tromethamine, Peanuts [peanut oil], and Shellfish-derived products    Physical Exam   Oxygen Saturation Interpretation: Normal.        ED Triage Vitals   BP Temp Temp Source Pulse Resp SpO2 Height Weight   06/30/21 1459 06/30/21 1459 06/30/21 1459 06/30/21 1459 06/30/21 1459 06/30/21 1459 06/30/21 1524 06/30/21 1524   101/62 97.5 °F (36.4 °C) Oral 77 18 98 % 5' 4\" (1.626 m) 122 lb (55.3 kg)         Constitutional:  Alert, development consistent with age. HEENT:  NC/NT. Airway patent. Neck:  Normal ROM. Supple. Extremity(s):  Left: hand. Tenderness:  none. Swelling: None. Deformity: No.               ROM: full range of motion. Skin:  Healing skin on dorsal left hand. Neurovascular: Motor deficit: none. Sensory deficit: none. Pulse deficit: none. Capillary refill: normal.  Lymphatics: No lymphangitis or adenopathy noted. Neurological:  Oriented. Motor functions intact. Lab / Imaging Results   (All laboratory and radiology results have been personally reviewed by myself)  Labs:  No results found for this visit on 06/30/21. Imaging: All Radiology results interpreted by Radiologist unless otherwise noted.   No orders to display       ED Course / Medical Decision Making Medications   traMADol (ULTRAM) tablet 50 mg (50 mg Oral Given 6/30/21 1612)         Consult(s):   None    Procedure(s):   none    MDM:   Patient presenting with burn to his left hand. Patient is in no acute distress, afebrile, nontoxic appearance. Discussed with patient that he cannot continue getting narcotics for this burn from the ED. Patient did not follow-up with burn center as recommended. Patient requested to speak with Dr. Rubbie Klinefelter who then Dr. Rubbie Klinefelter asked that I prescribe him 6 tramadols. Recommended patient follow-up with the burn center. Recommended patient return to the ED with new or worsening of symptoms. Plan of Care/Counseling:  YOLA Jorgensen reviewed today's visit with the patient in addition to providing specific details for the plan of care and counseling regarding the diagnosis and prognosis. Questions are answered at this time and are agreeable with the plan. Assessment      1. Partial thickness burn of left hand, unspecified site of hand, subsequent encounter      Plan   Discharged home. Patient condition is stable    New Medications     Discharge Medication List as of 6/30/2021  4:23 PM      START taking these medications    Details   traMADol (ULTRAM) 50 MG tablet Take 1 tablet by mouth every 6 hours as needed for Pain for up to 2 days. Intended supply: 3 days. Take lowest dose possible to manage pain, Disp-6 tablet, R-0Print           Electronically signed by YOLA Jorgensen   DD: 6/30/21  **This report was transcribed using voice recognition software. Every effort was made to ensure accuracy; however, inadvertent computerized transcription errors may be present.   END OF ED PROVIDER NOTE     YOLA Jorgensen  06/30/21 9842

## 2021-06-30 NOTE — ED PROVIDER NOTES
Appendectomy; Lithotripsy; Hemorrhoid surgery; Bladder surgery; Endoscopy, colon, diagnostic (11/13/2015); Abdomen surgery; Colonoscopy; Cardiac surgery; vascular surgery; and Cardiac catheterization (05/21/2018). Social History:  reports that he quit smoking about 11 years ago. His smoking use included cigarettes. He quit after 10.00 years of use. He quit smokeless tobacco use about 3 years ago. His smokeless tobacco use included chew. He reports previous drug use. He reports that he does not drink alcohol. Family History: family history includes Cancer in his father, maternal grandfather, and mother; Diabetes in his father, maternal aunt, maternal aunt, maternal aunt, maternal grandmother, and mother; Heart Disease in his paternal grandfather and paternal grandmother; Heart Failure in his paternal grandfather and paternal grandmother. Allergies: Codeine, Dye [iodides], Ketorolac tromethamine, Peanuts [peanut oil], and Shellfish-derived products    Physical Exam   Oxygen Saturation Interpretation: Normal.        ED Triage Vitals   BP Temp Temp src Pulse Resp SpO2 Height Weight   06/30/21 1311 06/30/21 1251 -- 06/30/21 1251 06/30/21 1251 06/30/21 1251 -- --   99/67 97.3 °F (36.3 °C)  78 16 93 %           Constitutional:  Alert, development consistent with age. HEENT:  NC/NT. Airway patent. Neck:  Normal ROM. Supple. Extremity(s):  Left: hand. Tenderness:  mild. Swelling: Mild. Deformity: No.               ROM: full range of motion. Skin: Erythematous healing second-degree burn to the dorsum of the left hand including the dorsum of the proximal phalanx of the digits. No red streaking, no focal abscess formation, no evidence of any secondary bacterial infection. .       Neurovascular: Motor deficit: none. Sensory deficit: none. Pulse deficit: none.   2+ radial pulse of the left wrist.            Capillary refill: normal.  Lymphatics: No lymphangitis or adenopathy noted. Neurological:  Oriented. Motor functions intact. Lab / Imaging Results   (All laboratory and radiology results have been personally reviewed by myself)  Labs:  No results found for this visit on 06/30/21. Imaging: All Radiology results interpreted by Radiologist unless otherwise noted. No orders to display       ED Course / Medical Decision Making   Medications - No data to display       Consult(s):   None    Procedure(s):   none    MDM:   Patient has healing second-degree burn of the dorsum of the left hand. There is no sign of any secondary bacterial infection, the burn is from 1 week ago. Patient is requesting tramadol, advised that he has a history of seizures and that I am unwilling to prescribe tramadol at this time. Patient is argumentative, reporting that he has had tramadol in the past, states that he has not had a seizure in 4 years. I again explained to the patient that tramadol can lower your threshold for seizures and I would not prescribe it at this time. He is offered other pain medication and anti-inflammatories, he declines all of this and states that he would just like to leave. His tetanus is up-to-date. Will discharge home at this time. Patient is encouraged return to the ER for any worsening symptoms. Otherwise to follow-up with PCP and plastics. Plan of Care/Counseling:  ELIZABET Sousa reviewed today's visit with the patient in addition to providing specific details for the plan of care and counseling regarding the diagnosis and prognosis. Questions are answered at this time and are agreeable with the plan. Assessment      1. Second degree burn of back of hand, left, initial encounter      Plan   Discharged home.   Patient condition is good    New Medications     New Prescriptions    No medications on file     Electronically signed by ELIZABET Sousa   DD: 6/30/21  **This report was transcribed using voice recognition software. Every effort was made to ensure accuracy; however, inadvertent computerized transcription errors may be present.   END OF ED PROVIDER NOTE       Astrid , [id number]  06/30/21 [id number]

## 2021-07-26 ENCOUNTER — APPOINTMENT (OUTPATIENT)
Dept: GENERAL RADIOLOGY | Age: 66
End: 2021-07-26
Payer: COMMERCIAL

## 2021-07-26 ENCOUNTER — HOSPITAL ENCOUNTER (EMERGENCY)
Age: 66
Discharge: HOME | End: 2021-07-26
Payer: COMMERCIAL

## 2021-07-26 VITALS
SYSTOLIC BLOOD PRESSURE: 111 MMHG | WEIGHT: 122 LBS | HEIGHT: 62 IN | RESPIRATION RATE: 18 BRPM | DIASTOLIC BLOOD PRESSURE: 58 MMHG | HEART RATE: 76 BPM | BODY MASS INDEX: 22.45 KG/M2 | TEMPERATURE: 97.5 F | OXYGEN SATURATION: 95 %

## 2021-07-26 DIAGNOSIS — S39.012A STRAIN OF LUMBAR REGION, INITIAL ENCOUNTER: Primary | ICD-10-CM

## 2021-07-26 PROCEDURE — 6370000000 HC RX 637 (ALT 250 FOR IP): Performed by: NURSE PRACTITIONER

## 2021-07-26 PROCEDURE — 99284 EMERGENCY DEPT VISIT MOD MDM: CPT

## 2021-07-26 PROCEDURE — 72110 X-RAY EXAM L-2 SPINE 4/>VWS: CPT

## 2021-07-26 PROCEDURE — 6360000002 HC RX W HCPCS: Performed by: NURSE PRACTITIONER

## 2021-07-26 PROCEDURE — 96372 THER/PROPH/DIAG INJ SC/IM: CPT

## 2021-07-26 RX ORDER — HYDROCODONE BITARTRATE AND ACETAMINOPHEN 5; 325 MG/1; MG/1
2 TABLET ORAL ONCE
Status: COMPLETED | OUTPATIENT
Start: 2021-07-26 | End: 2021-07-26

## 2021-07-26 RX ORDER — HYDROCODONE BITARTRATE AND ACETAMINOPHEN 5; 325 MG/1; MG/1
1 TABLET ORAL EVERY 8 HOURS PRN
Qty: 6 TABLET | Refills: 0 | Status: SHIPPED | OUTPATIENT
Start: 2021-07-26 | End: 2021-07-29

## 2021-07-26 RX ORDER — LIDOCAINE 50 MG/G
3 PATCH TOPICAL EVERY 24 HOURS
Qty: 30 PATCH | Refills: 0 | Status: SHIPPED | OUTPATIENT
Start: 2021-07-26 | End: 2021-09-24

## 2021-07-26 RX ORDER — ORPHENADRINE CITRATE 30 MG/ML
60 INJECTION INTRAMUSCULAR; INTRAVENOUS ONCE
Status: COMPLETED | OUTPATIENT
Start: 2021-07-26 | End: 2021-07-26

## 2021-07-26 RX ADMIN — HYDROCODONE BITARTRATE AND ACETAMINOPHEN 2 TABLET: 5; 325 TABLET ORAL at 14:55

## 2021-07-26 RX ADMIN — ORPHENADRINE CITRATE 60 MG: 60 INJECTION INTRAMUSCULAR; INTRAVENOUS at 14:55

## 2021-07-26 ASSESSMENT — PAIN SCALES - GENERAL
PAINLEVEL_OUTOF10: 8
PAINLEVEL_OUTOF10: 5
PAINLEVEL_OUTOF10: 9

## 2021-07-26 ASSESSMENT — PAIN DESCRIPTION - LOCATION: LOCATION: BACK

## 2021-07-26 ASSESSMENT — PAIN DESCRIPTION - DESCRIPTORS: DESCRIPTORS: CONSTANT;PRESSURE;DISCOMFORT

## 2021-07-26 ASSESSMENT — PAIN DESCRIPTION - PAIN TYPE: TYPE: ACUTE PAIN

## 2021-07-26 ASSESSMENT — PAIN DESCRIPTION - ORIENTATION: ORIENTATION: LOWER;MID

## 2021-07-27 NOTE — ED PROVIDER NOTES
[address]  Department of Emergency Medicine   ED  Encounter Note  Admit Date/RoomTime: 2021  2:29 PM  ED Room: ST02/ST-2    NAME: Mattie Durbin  : 1955  MRN: [id number]     Chief Complaint:  Back Pain (pt reports he lifted 2 cement blocks earlier today, mid to lower back pain, hx herniated disc)    History of Present Illness        Mattie Durbin is a 73 y.o. old male with a prior history of ongoing chronic back pain from a remote injury, presents to the emergency department by private vehicle, for traumatic acute-on-chronic, aching and sharp midline lower lumbar spine pain without radiation, for several hour(s) prior to arrival.  There has been a new injury as it relates to today's presenting complaint, he states he is building a house and bent over to  a cinder block and felt a pulling in his lower back. Since onset the symptoms have been stable and is mild-to-moderate in severity. He has associated signs & symptoms of nothing additional.   He denies any bladder or bowel incontinence , new weakness, tingling or paresthesias, recent back injection, recent spinal surgery, recent spinal/chiropractic manipulation, history of IVDA, fever, abdominal pain, bladder incontinence, bowel incontinence, bladder retention, bladder urgency, bowel urgency, saddle paresthesias  or sacral numbness. The pain is aggraveated by any movement and relieved by nothing in particular. He reports he tried ibuprofen and Toradol with no improvement. He is not currently in pain management but reports his PCP is trying to get him into pain management but he is \"very busy\". He is on xarelto. ROS   Pertinent positives and negatives are stated within HPI, all other systems reviewed and are negative.     Past Medical History:  has a past medical history of Anxiety, Arthritis, Asthma, Bipolar 1 disorder ([address] Chronic combined systolic and diastolic CHF (congestive heart failure) ([facility] [address] COPD (chronic obstructive pulmonary disease) ([facility] [address] Depression, Diabetes mellitus ([facility] [address] GERD (gastroesophageal reflux disease), Hepatitis C, Hypertension, Hyperthyroidism, Hypothyroidism due to medication, Mass of testicle, Mesothelioma ([facility] [address] Neuromuscular disorder ([facility] [address] Other disorders of kidney and ureter, Paroxysmal A-fib ([facility] [address] Peptic ulcer, Prostate enlargement, Pulmonary embolism ([facility] [address] Seizures ([facility] [address] and Thyroid disease. Surgical History:  has a past surgical history that includes Appendectomy; Lithotripsy; Hemorrhoid surgery; Bladder surgery; Endoscopy, colon, diagnostic (11/13/2015); Abdomen surgery; Colonoscopy; Cardiac surgery; vascular surgery; and Cardiac catheterization (05/21/2018). Social History:  reports that he quit smoking about 11 years ago. His smoking use included cigarettes. He quit after 10.00 years of use. He quit smokeless tobacco use about 3 years ago. His smokeless tobacco use included chew. He reports previous drug use. He reports that he does not drink alcohol. Family History: family history includes Cancer in his father, maternal grandfather, and mother; Diabetes in his father, maternal aunt, maternal aunt, maternal aunt, maternal grandmother, and mother; Heart Disease in his paternal grandfather and paternal grandmother; Heart Failure in his paternal grandfather and paternal grandmother. Allergies: Codeine, Dye [iodides], Ketorolac tromethamine, Peanuts [peanut oil], and Shellfish-derived products    Physical Exam   Oxygen Saturation Interpretation: Normal.        ED Triage Vitals   BP Temp Temp src Pulse Resp SpO2 Height Weight   07/26/21 1426 07/26/21 1356 -- 07/26/21 1356 07/26/21 1426 07/26/21 1356 07/26/21 1426 07/26/21 1426   107/60 97.5 °F (36.4 °C)  77 18 95 % 5' 2\" (1.575 m) 122 lb (55.3 kg)         Constitutional:  Alert, development consistent with age. HEENT:  NC/NT. Airway patent. Neck:  Normal ROM. Supple.   Respiratory:  Clear to auscultation and breath sounds equal.  CV:  Regular rate and rhythm, normal heart sounds, without pathological murmurs, ectopy, gallops, or rubs. GI:  Abdomen Soft, nontender, good bowel sounds. No firm or pulsatile mass. Back: lower lumbar spine midline. Tenderness: Moderate. Swelling: no.              Range of Motion: full range of motion. CVA Tenderness: No CVA tenderness. Straight leg raising:  Bilateral negative. Skin:  no wounds, erythema, or swelling. Distal Function:              Motor deficit: none. Sensory deficit: none. Pulse deficit: none. Calf Tenderness:  No Bilateral.               Edema:  none Bilateral lower extremity(s). Deep Tendon Reflexes (DTR's) to bilateral knee's and ankle's are normo-reflexive   Gait:  normal.  Integument:  Normal turgor. Warm, dry, without visible rash. Lymphatics: No lymphangitis or adenopathy noted. Neurological:  Oriented. Motor functions intact. Lab / Imaging Results   (All laboratory and radiology results have been personally reviewed by myself)  Labs:  No results found for this visit on 07/26/21. Imaging: All Radiology results interpreted by Radiologist unless otherwise noted. XR LUMBAR SPINE (MIN 4 VIEWS)   Final Result   Lumbar dextroscoliosis. Multilevel lumbar spine degenerative changes. No   acute osseous abnormality is identified             ED Course / Medical Decision Making     Medications   orphenadrine (NORFLEX) injection 60 mg (60 mg Intramuscular Given 7/26/21 1455)   HYDROcodone-acetaminophen (NORCO) 5-325 MG per tablet 2 tablet (2 tablets Oral Given 7/26/21 1455)        Re-examination:  7/26/21       Time: 6674 patient reports pain is improved.   Consult(s):   None    Procedure(s):   none    Medical Decision Making/Counseling:    *At this time the patient is without objective evidence of an acute process requiring inpatient management. Chronic pain with new lifting injury x-ray obtained negative for any acute findings. No neurovascular deficits. Plan to be for symptom control and appropriate outpatient follow-up with PCP. [place] was [place] patient has an active prescription for Klonopin, as well as lenghty [place] record,  is on Xarelto therefore poor candidate for NSAIDs therefore will start Lidoderm patches and patient was given a short course of Norco until he can be seen by his PCP. Educated on signs and symptoms require emergent evaluation. The emergency provider has spoken with the patient and discussed todays emergency visit, in addition to providing specific details for the plan of care and counseling regarding the diagnosis and prognosis. He was counseled on the role of the emergency department regarding prescribing medications for chronic conditions including Narcotic and other controlled substances. Questions are answered at this time and they are agreeable with the plan. Assessment      1. Strain of lumbar region, initial encounter      Plan   Discharged home. Patient condition is good    New Medications     Discharge Medication List as of 7/26/2021  4:04 PM      START taking these medications    Details   lidocaine (LIDODERM) 5 % Place 3 patches onto the skin every 24 hours 12 hours on, 12 hours off., Disp-30 patch, R-0Print      HYDROcodone-acetaminophen (NORCO) 5-325 MG per tablet Take 1 tablet by mouth every 8 hours as needed for Pain for up to 3 days. Intended supply: 3 days. Take lowest dose possible to manage pain, Disp-6 tablet, R-0Print           Electronically signed by ZOIE Romero CNP   DD: 7/26/21  **This report was transcribed using voice recognition software. Every effort was made to ensure accuracy; however, inadvertent computerized transcription errors may be present.   END OF ED PROVIDER NOTE      ZOIE Esparza CNP  07/26/21 2356

## 2021-08-13 ENCOUNTER — HOSPITAL ENCOUNTER (EMERGENCY)
Age: 66
Discharge: HOME | End: 2021-08-13
Payer: MEDICARE

## 2021-08-13 VITALS
SYSTOLIC BLOOD PRESSURE: 132 MMHG | HEIGHT: 62 IN | TEMPERATURE: 98 F | HEART RATE: 88 BPM | OXYGEN SATURATION: 97 % | BODY MASS INDEX: 22.45 KG/M2 | RESPIRATION RATE: 20 BRPM | DIASTOLIC BLOOD PRESSURE: 87 MMHG | WEIGHT: 122 LBS

## 2021-08-13 DIAGNOSIS — M54.50 CHRONIC LOW BACK PAIN WITHOUT SCIATICA, UNSPECIFIED BACK PAIN LATERALITY: Primary | ICD-10-CM

## 2021-08-13 DIAGNOSIS — G89.29 CHRONIC LOW BACK PAIN WITHOUT SCIATICA, UNSPECIFIED BACK PAIN LATERALITY: Primary | ICD-10-CM

## 2021-08-13 PROCEDURE — 99283 EMERGENCY DEPT VISIT LOW MDM: CPT

## 2021-08-13 RX ORDER — TRAMADOL HYDROCHLORIDE 50 MG/1
50 TABLET ORAL EVERY 6 HOURS PRN
Qty: 12 TABLET | Refills: 0 | Status: SHIPPED | OUTPATIENT
Start: 2021-08-13 | End: 2021-08-18

## 2021-08-13 ASSESSMENT — PAIN DESCRIPTION - LOCATION: LOCATION: BACK

## 2021-08-13 ASSESSMENT — PAIN SCALES - GENERAL: PAINLEVEL_OUTOF10: 10

## 2021-08-13 ASSESSMENT — PAIN DESCRIPTION - DESCRIPTORS: DESCRIPTORS: PRESSURE

## 2021-08-13 ASSESSMENT — PAIN DESCRIPTION - PAIN TYPE: TYPE: CHRONIC PAIN

## 2021-08-13 ASSESSMENT — PAIN DESCRIPTION - FREQUENCY: FREQUENCY: CONTINUOUS

## 2021-08-13 NOTE — ED PROVIDER NOTES
[facility]    HPI: Shira Sifuentes 73 y. o.male with   Past Medical History:   Diagnosis Date    Anxiety     Arthritis     Asthma     B[facility] 1 disorder ([facility]     Chronic combined systolic and diastolic CHF (congestive heart failure) ([facility] [address] 05/27/2018    COPD (chronic obstructive pulmonary disease) (HCC)     Depression     Diabetes mellitus (HCC)     GERD (gastroesophageal reflux disease)     Hepatitis C     resolved    Hypertension     Hyperthyroidism 8/2/2012    Hypothyroidism due to medication     Mass of testicle     Mesothelioma [facility])     pt states possibly not    Neuromuscular disorder ([address]     Other disorders of kidney and ureter     kidney stones; most recent 08/27/2015    Paroxysmal A-fib ([facility] [address]     discussed with PCP- patient does have hx of PAfib    Peptic ulcer     Prostate enlargement     Pulmonary embolism (HCC)     Intermediate risk for PE on VQ scan.  Seizures ([facility] [address]     Thyroid disease     hyperthyroid    who presents with lower back pain. The patient states that the back pain began yesterday however is chronic in nature.   The history is obtained from the patient. The mechanism of the injury is apparent. The patient states that the pain is moderate. It is worsened by movement including flexion and extension of the back as well as rotation. Patient denies any distal neurovascular complaints including numbness tingling or weakness. There are no bowel or bladder symptoms reported. The patient denies saddle or groin anesthesia. The patient also denies fevers, chills, weight loss, night sweats, IV drug use, or recent illness. States he suffers from chronic low back pain and lifted a 50 pound sack of flour yesterday is now having worsening low back pain. Denies any radiation to lower extremities. Denies loss of bowel or bladder control. Denies any saddle anesthesia.   Denies any direct fall.        ROS:   Pertinent positives and negatives are stated within HPI, all other systems reviewed and are negative.    --------------------------------------------- PAST HISTORY ---------------------------------------------  Past Medical History:  has a past medical history of Anxiety, Arthritis, Asthma, Bipolar 1 disorder ([facility] [address] Chronic combined systolic and diastolic CHF (congestive heart failure) ([facility] [address] COPD (chronic obstructive pulmonary disease) ([facility] [address] Depression, Diabetes mellitus ([facility] [address] GERD (gastroesophageal reflux disease), Hepatitis C, Hypertension, Hyperthyroidism, Hypothyroidism due to medication, Mass of testicle, Mesothelioma ([facility] [address] Neuromuscular disorder ([facility] Other disorders of kidney and ureter, Paroxysmal A-fib ([facility] [address] Peptic ulcer, Prostate enlargement, Pulmonary embolism ([facility] [address] Seizures ([facility] [address] and Thyroid disease. Past Surgical History:  has a past surgical history that includes Appendectomy; Lithotripsy; Hemorrhoid surgery; Bladder surgery; Endoscopy, colon, diagnostic (11/13/2015); Abdomen surgery; Colonoscopy; Cardiac surgery; vascular surgery; and Cardiac catheterization (05/21/2018). Social History:  reports that he quit smoking about 11 years ago. His smoking use included cigarettes. He quit after 10.00 years of use. He quit smokeless tobacco use about 3 years ago. His smokeless tobacco use included chew. He reports previous drug use. He reports that he does not drink alcohol. Family History: family history includes Cancer in his father, maternal grandfather, and mother; Diabetes in his father, maternal aunt, maternal aunt, maternal aunt, maternal grandmother, and mother; Heart Disease in his paternal grandfather and paternal grandmother; Heart Failure in his paternal grandfather and paternal grandmother. The patients home medications have been reviewed.     Allergies: Codeine, Dye [iodides], Ketorolac tromethamine, Peanuts [peanut oil], and Shellfish-derived products      ------------------------- NURSING NOTES AND VITALS REVIEWED ---------------------------   The nursing notes within the ED encounter and vital signs as below have been reviewed by myself. /87   Pulse 88   Temp 98 °F (36.7 °C) (Tympanic)   Resp 20   Ht 5' 2\" (1.575 m)   Wt 122 lb (55.3 kg)   SpO2 97%   BMI 22.31 kg/m²   Oxygen Saturation Interpretation: Normal    The patients available past medical records and past encounters were reviewed. Physical exam:  Constitutional:  The patient is comfortable, well appearing, non toxic in NAD. Patient is alert and oriented x3. Head: Head is atraumatic and normocephalic. Eyes: There is no discharge from the eyes. Sclerae are normal.  ENT: The oropharynx is normal. The mouth is normal to inspection. Neck: Normal range of motion of the neck is present there is no JVD present no meningeal signs are present. Respiratory/chest: The chest is nontender breath sounds are normal  Cardiovascular: Regular rate and rhythm is noted. No murmurs. No rubs or gallops. Skin: Skin is warm and dry, Skin exam normal  Neurologic exam: The patient's Rhona Coma Scale is 15. No focal motor deficits. There are no focal sensory deficits. Deep tendon reflexes are intact and present bilaterally in the lower extremities. Babinski is absent. Gait is normal. Symmetric strength and sensation in the lower extremities bilaterally. Back exam: The patient has reproducible tenderness to palpation in the paravertebral lumbar region. There is no evidence of localized erythema nor is there any focal warmth to the back. The patient has no evidence of single vertebral tenderness or any single area of interspace tenderness. There are no palpable deformities, lacerations, abrasions, or stepoffs.  No midline cervical, thoracic, or lumbar spine tenderness.           -------------------------------------------------- RESULTS -------------------------------------------------  I have personally reviewed all laboratory and imaging results for this patient. Results are listed below. LABS:  No results found for this visit on 08/13/21. RADIOLOGY:  Interpreted by Radiologist.  No orders to display           Medical decision making:   Mechanical lumbosacral strain without evidence of fracture or neurologic compromise. Exacerbation of chronic low back pain. States he is currently out of tramadol and that works best for his pain     Plan:  Review of past medical records for appropriate pain control and outpatient referral.        --------------------------------- IMPRESSION AND DISPOSITION ---------------------------------    IMPRESSION  1.  Chronic low back pain without sciatica, unspecified back pain laterality        DISPOSITION  Disposition: Discharge to home  Patient condition is good            ZOIE Tovar CNP  08/13/21 [id number]

## 2021-08-21 ENCOUNTER — HOSPITAL ENCOUNTER (EMERGENCY)
Age: 66
Discharge: LEFT BEFORE DISCHARGE | End: 2021-08-21
Attending: EMERGENCY MEDICINE
Payer: COMMERCIAL

## 2021-08-21 ENCOUNTER — APPOINTMENT (OUTPATIENT)
Dept: GENERAL RADIOLOGY | Age: 66
End: 2021-08-21
Payer: COMMERCIAL

## 2021-08-21 VITALS
DIASTOLIC BLOOD PRESSURE: 70 MMHG | HEIGHT: 64 IN | TEMPERATURE: 98 F | SYSTOLIC BLOOD PRESSURE: 110 MMHG | BODY MASS INDEX: 20.83 KG/M2 | OXYGEN SATURATION: 99 % | WEIGHT: 122 LBS | HEART RATE: 65 BPM | RESPIRATION RATE: 17 BRPM

## 2021-08-21 DIAGNOSIS — G89.29 CHRONIC LOW BACK PAIN WITHOUT SCIATICA, UNSPECIFIED BACK PAIN LATERALITY: ICD-10-CM

## 2021-08-21 DIAGNOSIS — R07.9 CHEST PAIN, UNSPECIFIED TYPE: Primary | ICD-10-CM

## 2021-08-21 DIAGNOSIS — M54.50 CHRONIC LOW BACK PAIN WITHOUT SCIATICA, UNSPECIFIED BACK PAIN LATERALITY: ICD-10-CM

## 2021-08-21 DIAGNOSIS — Z76.5 DRUG-SEEKING BEHAVIOR: ICD-10-CM

## 2021-08-21 LAB
ALBUMIN SERPL-MCNC: 4.4 G/DL (ref 3.5–5.2)
ALP BLD-CCNC: 52 U/L (ref 40–129)
ALT SERPL-CCNC: 52 U/L (ref 0–40)
ANION GAP SERPL CALCULATED.3IONS-SCNC: 9 MMOL/L (ref 7–16)
AST SERPL-CCNC: 33 U/L (ref 0–39)
BASOPHILS ABSOLUTE: 0.04 E9/L (ref 0–0.2)
BASOPHILS RELATIVE PERCENT: 0.5 % (ref 0–2)
BILIRUB SERPL-MCNC: <0.2 MG/DL (ref 0–1.2)
BUN BLDV-MCNC: 20 MG/DL (ref 6–23)
CALCIUM SERPL-MCNC: 8.9 MG/DL (ref 8.6–10.2)
CHLORIDE BLD-SCNC: 94 MMOL/L (ref 98–107)
CO2: 26 MMOL/L (ref 22–29)
CREAT SERPL-MCNC: 1.1 MG/DL (ref 0.7–1.2)
EKG ATRIAL RATE: 54 BPM
EKG P AXIS: 26 DEGREES
EKG P-R INTERVAL: 144 MS
EKG Q-T INTERVAL: 420 MS
EKG QRS DURATION: 90 MS
EKG QTC CALCULATION (BAZETT): 398 MS
EKG R AXIS: 62 DEGREES
EKG T AXIS: -5 DEGREES
EKG VENTRICULAR RATE: 54 BPM
EOSINOPHILS ABSOLUTE: 0.35 E9/L (ref 0.05–0.5)
EOSINOPHILS RELATIVE PERCENT: 4.7 % (ref 0–6)
GFR AFRICAN AMERICAN: >60
GFR NON-AFRICAN AMERICAN: >60 ML/MIN/1.73
GLUCOSE BLD-MCNC: 101 MG/DL (ref 74–99)
HCT VFR BLD CALC: 33.2 % (ref 37–54)
HEMOGLOBIN: 11.4 G/DL (ref 12.5–16.5)
IMMATURE GRANULOCYTES #: 0.03 E9/L
IMMATURE GRANULOCYTES %: 0.4 % (ref 0–5)
LYMPHOCYTES ABSOLUTE: 1.66 E9/L (ref 1.5–4)
LYMPHOCYTES RELATIVE PERCENT: 22.3 % (ref 20–42)
MCH RBC QN AUTO: 31.7 PG (ref 26–35)
MCHC RBC AUTO-ENTMCNC: 34.3 % (ref 32–34.5)
MCV RBC AUTO: 92.2 FL (ref 80–99.9)
MONOCYTES ABSOLUTE: 0.65 E9/L (ref 0.1–0.95)
MONOCYTES RELATIVE PERCENT: 8.7 % (ref 2–12)
NEUTROPHILS ABSOLUTE: 4.7 E9/L (ref 1.8–7.3)
NEUTROPHILS RELATIVE PERCENT: 63.4 % (ref 43–80)
PDW BLD-RTO: 15.4 FL (ref 11.5–15)
PLATELET # BLD: 119 E9/L (ref 130–450)
PMV BLD AUTO: 11.7 FL (ref 7–12)
POTASSIUM REFLEX MAGNESIUM: 4.2 MMOL/L (ref 3.5–5)
RBC # BLD: 3.6 E12/L (ref 3.8–5.8)
SODIUM BLD-SCNC: 129 MMOL/L (ref 132–146)
TOTAL PROTEIN: 7.1 G/DL (ref 6.4–8.3)
TROPONIN, HIGH SENSITIVITY: 10 NG/L (ref 0–11)
WBC # BLD: 7.4 E9/L (ref 4.5–11.5)

## 2021-08-21 PROCEDURE — 71045 X-RAY EXAM CHEST 1 VIEW: CPT

## 2021-08-21 PROCEDURE — 93005 ELECTROCARDIOGRAM TRACING: CPT | Performed by: STUDENT IN AN ORGANIZED HEALTH CARE EDUCATION/TRAINING PROGRAM

## 2021-08-21 PROCEDURE — 6370000000 HC RX 637 (ALT 250 FOR IP): Performed by: STUDENT IN AN ORGANIZED HEALTH CARE EDUCATION/TRAINING PROGRAM

## 2021-08-21 PROCEDURE — 84484 ASSAY OF TROPONIN QUANT: CPT

## 2021-08-21 PROCEDURE — 80053 COMPREHEN METABOLIC PANEL: CPT

## 2021-08-21 PROCEDURE — 2580000003 HC RX 258: Performed by: STUDENT IN AN ORGANIZED HEALTH CARE EDUCATION/TRAINING PROGRAM

## 2021-08-21 PROCEDURE — 93010 ELECTROCARDIOGRAM REPORT: CPT | Performed by: INTERNAL MEDICINE

## 2021-08-21 PROCEDURE — 99285 EMERGENCY DEPT VISIT HI MDM: CPT

## 2021-08-21 PROCEDURE — 85025 COMPLETE CBC W/AUTO DIFF WBC: CPT

## 2021-08-21 RX ORDER — KETOROLAC TROMETHAMINE 30 MG/ML
INJECTION, SOLUTION INTRAMUSCULAR; INTRAVENOUS
Status: DISCONTINUED
Start: 2021-08-21 | End: 2021-08-21 | Stop reason: WASHOUT

## 2021-08-21 RX ORDER — 0.9 % SODIUM CHLORIDE 0.9 %
1000 INTRAVENOUS SOLUTION INTRAVENOUS ONCE
Status: COMPLETED | OUTPATIENT
Start: 2021-08-21 | End: 2021-08-21

## 2021-08-21 RX ORDER — ACETAMINOPHEN 325 MG/1
650 TABLET ORAL ONCE
Status: COMPLETED | OUTPATIENT
Start: 2021-08-21 | End: 2021-08-21

## 2021-08-21 RX ADMIN — SODIUM CHLORIDE 1000 ML: 9 INJECTION, SOLUTION INTRAVENOUS at 14:14

## 2021-08-21 RX ADMIN — ACETAMINOPHEN 650 MG: 325 TABLET ORAL at 14:31

## 2021-08-21 ASSESSMENT — ENCOUNTER SYMPTOMS
EYE DISCHARGE: 0
ABDOMINAL PAIN: 0
SORE THROAT: 0
DIARRHEA: 0
BACK PAIN: 1
EYE REDNESS: 0
SHORTNESS OF BREATH: 0
COUGH: 0
EYE PAIN: 0
VOMITING: 0
SINUS PRESSURE: 0
NAUSEA: 0
WHEEZING: 0

## 2021-08-21 ASSESSMENT — PAIN SCALES - GENERAL: PAINLEVEL_OUTOF10: 10

## 2021-08-21 ASSESSMENT — PAIN DESCRIPTION - DESCRIPTORS: DESCRIPTORS: DISCOMFORT

## 2021-08-21 ASSESSMENT — PAIN DESCRIPTION - LOCATION: LOCATION: BACK;CHEST

## 2021-08-21 NOTE — ED NOTES
Bed: HE  Expected date: 8/21/21  Expected time:   Means of arrival: [facility]  Comments:  terry Montoya RN  08/21/21 8815

## 2021-08-21 NOTE — ED NOTES
Pt expresses he wants to leave AMA, ED attending aware, spoke to pt, pt aaox4, denies SI/HI     Mariann Whitt RN  08/21/21 [id number]
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
## 2021-08-27 ENCOUNTER — HOSPITAL ENCOUNTER (INPATIENT)
Age: 66
LOS: 6 days | Discharge: HOME | DRG: 885 | End: 2021-09-02
Attending: EMERGENCY MEDICINE | Admitting: PSYCHIATRY & NEUROLOGY
Payer: MEDICARE

## 2021-08-27 DIAGNOSIS — F13.20 BENZODIAZEPINE DEPENDENCE (HCC): Primary | ICD-10-CM

## 2021-08-27 DIAGNOSIS — F32.A DEPRESSION WITH SUICIDAL IDEATION: ICD-10-CM

## 2021-08-27 DIAGNOSIS — R45.851 DEPRESSION WITH SUICIDAL IDEATION: ICD-10-CM

## 2021-08-27 LAB
ACETAMINOPHEN LEVEL: <5 MCG/ML (ref 10–30)
ALBUMIN SERPL-MCNC: 4.5 G/DL (ref 3.5–5.2)
ALP BLD-CCNC: 69 U/L (ref 40–129)
ALT SERPL-CCNC: 198 U/L (ref 0–40)
AMPHETAMINE SCREEN, URINE: NOT DETECTED
ANION GAP SERPL CALCULATED.3IONS-SCNC: 11 MMOL/L (ref 7–16)
AST SERPL-CCNC: 84 U/L (ref 0–39)
BARBITURATE SCREEN URINE: NOT DETECTED
BASOPHILS ABSOLUTE: 0.02 E9/L (ref 0–0.2)
BASOPHILS RELATIVE PERCENT: 0.3 % (ref 0–2)
BENZODIAZEPINE SCREEN, URINE: NOT DETECTED
BILIRUB SERPL-MCNC: 0.4 MG/DL (ref 0–1.2)
BUN BLDV-MCNC: 7 MG/DL (ref 6–23)
CALCIUM SERPL-MCNC: 9.4 MG/DL (ref 8.6–10.2)
CANNABINOID SCREEN URINE: POSITIVE
CHLORIDE BLD-SCNC: 97 MMOL/L (ref 98–107)
CO2: 27 MMOL/L (ref 22–29)
COCAINE METABOLITE SCREEN URINE: NOT DETECTED
CREAT SERPL-MCNC: 1 MG/DL (ref 0.7–1.2)
EKG ATRIAL RATE: 58 BPM
EKG P AXIS: 57 DEGREES
EKG P-R INTERVAL: 140 MS
EKG Q-T INTERVAL: 418 MS
EKG QRS DURATION: 88 MS
EKG QTC CALCULATION (BAZETT): 410 MS
EKG R AXIS: -12 DEGREES
EKG T AXIS: 61 DEGREES
EKG VENTRICULAR RATE: 58 BPM
EOSINOPHILS ABSOLUTE: 0.11 E9/L (ref 0.05–0.5)
EOSINOPHILS RELATIVE PERCENT: 1.7 % (ref 0–6)
ETHANOL: <10 MG/DL (ref 0–0.08)
FENTANYL SCREEN, URINE: NOT DETECTED
GFR AFRICAN AMERICAN: >60
GFR NON-AFRICAN AMERICAN: >60 ML/MIN/1.73
GLUCOSE BLD-MCNC: 130 MG/DL (ref 74–99)
HCT VFR BLD CALC: 37.7 % (ref 37–54)
HEMOGLOBIN: 13 G/DL (ref 12.5–16.5)
IMMATURE GRANULOCYTES #: 0.02 E9/L
IMMATURE GRANULOCYTES %: 0.3 % (ref 0–5)
INFLUENZA A: NOT DETECTED
INFLUENZA B: NOT DETECTED
LYMPHOCYTES ABSOLUTE: 0.92 E9/L (ref 1.5–4)
LYMPHOCYTES RELATIVE PERCENT: 14.2 % (ref 20–42)
Lab: ABNORMAL
MCH RBC QN AUTO: 31.3 PG (ref 26–35)
MCHC RBC AUTO-ENTMCNC: 34.5 % (ref 32–34.5)
MCV RBC AUTO: 90.6 FL (ref 80–99.9)
METHADONE SCREEN, URINE: NOT DETECTED
MONOCYTES ABSOLUTE: 0.46 E9/L (ref 0.1–0.95)
MONOCYTES RELATIVE PERCENT: 7.1 % (ref 2–12)
NEUTROPHILS ABSOLUTE: 4.94 E9/L (ref 1.8–7.3)
NEUTROPHILS RELATIVE PERCENT: 76.4 % (ref 43–80)
OPIATE SCREEN URINE: POSITIVE
OXYCODONE URINE: NOT DETECTED
PDW BLD-RTO: 15.2 FL (ref 11.5–15)
PHENCYCLIDINE SCREEN URINE: NOT DETECTED
PLATELET # BLD: 152 E9/L (ref 130–450)
PMV BLD AUTO: 12.3 FL (ref 7–12)
POTASSIUM SERPL-SCNC: 4 MMOL/L (ref 3.5–5)
RBC # BLD: 4.16 E12/L (ref 3.8–5.8)
SALICYLATE, SERUM: <0.3 MG/DL (ref 0–30)
SARS-COV-2 RNA, RT PCR: NOT DETECTED
SODIUM BLD-SCNC: 135 MMOL/L (ref 132–146)
TOTAL PROTEIN: 7.6 G/DL (ref 6.4–8.3)
TRICYCLIC ANTIDEPRESSANTS SCREEN SERUM: NEGATIVE NG/ML
WBC # BLD: 6.5 E9/L (ref 4.5–11.5)

## 2021-08-27 PROCEDURE — 82077 ASSAY SPEC XCP UR&BREATH IA: CPT

## 2021-08-27 PROCEDURE — 85025 COMPLETE CBC W/AUTO DIFF WBC: CPT

## 2021-08-27 PROCEDURE — 93010 ELECTROCARDIOGRAM REPORT: CPT | Performed by: INTERNAL MEDICINE

## 2021-08-27 PROCEDURE — 87636 SARSCOV2 & INF A&B AMP PRB: CPT

## 2021-08-27 PROCEDURE — 99285 EMERGENCY DEPT VISIT HI MDM: CPT

## 2021-08-27 PROCEDURE — 6370000000 HC RX 637 (ALT 250 FOR IP): Performed by: NURSE PRACTITIONER

## 2021-08-27 PROCEDURE — 6370000000 HC RX 637 (ALT 250 FOR IP): Performed by: EMERGENCY MEDICINE

## 2021-08-27 PROCEDURE — 80307 DRUG TEST PRSMV CHEM ANLYZR: CPT

## 2021-08-27 PROCEDURE — 93005 ELECTROCARDIOGRAM TRACING: CPT | Performed by: EMERGENCY MEDICINE

## 2021-08-27 PROCEDURE — 80179 DRUG ASSAY SALICYLATE: CPT

## 2021-08-27 PROCEDURE — 1240000000 HC EMOTIONAL WELLNESS R&B

## 2021-08-27 PROCEDURE — 80053 COMPREHEN METABOLIC PANEL: CPT

## 2021-08-27 PROCEDURE — 80143 DRUG ASSAY ACETAMINOPHEN: CPT

## 2021-08-27 RX ORDER — TRAZODONE HYDROCHLORIDE 50 MG/1
50 TABLET ORAL NIGHTLY PRN
Status: DISCONTINUED | OUTPATIENT
Start: 2021-08-27 | End: 2021-09-02 | Stop reason: HOSPADM

## 2021-08-27 RX ORDER — LORAZEPAM 1 MG/1
2 TABLET ORAL ONCE
Status: COMPLETED | OUTPATIENT
Start: 2021-08-27 | End: 2021-08-27

## 2021-08-27 RX ORDER — HALOPERIDOL 5 MG/ML
5 INJECTION INTRAMUSCULAR EVERY 6 HOURS PRN
Status: DISCONTINUED | OUTPATIENT
Start: 2021-08-27 | End: 2021-09-02 | Stop reason: HOSPADM

## 2021-08-27 RX ORDER — ACETAMINOPHEN 325 MG/1
650 TABLET ORAL EVERY 4 HOURS PRN
Status: DISCONTINUED | OUTPATIENT
Start: 2021-08-27 | End: 2021-09-02 | Stop reason: HOSPADM

## 2021-08-27 RX ORDER — DIPHENHYDRAMINE HYDROCHLORIDE 50 MG/ML
50 INJECTION INTRAMUSCULAR; INTRAVENOUS EVERY 6 HOURS PRN
Status: DISCONTINUED | OUTPATIENT
Start: 2021-08-27 | End: 2021-09-02 | Stop reason: HOSPADM

## 2021-08-27 RX ORDER — NICOTINE 21 MG/24HR
1 PATCH, TRANSDERMAL 24 HOURS TRANSDERMAL DAILY
Status: DISCONTINUED | OUTPATIENT
Start: 2021-08-27 | End: 2021-09-02 | Stop reason: HOSPADM

## 2021-08-27 RX ORDER — LORAZEPAM 1 MG/1
1 TABLET ORAL EVERY 6 HOURS PRN
Status: DISCONTINUED | OUTPATIENT
Start: 2021-08-27 | End: 2021-09-01

## 2021-08-27 RX ORDER — MAGNESIUM HYDROXIDE/ALUMINUM HYDROXICE/SIMETHICONE 120; 1200; 1200 MG/30ML; MG/30ML; MG/30ML
30 SUSPENSION ORAL PRN
Status: DISCONTINUED | OUTPATIENT
Start: 2021-08-27 | End: 2021-09-02 | Stop reason: HOSPADM

## 2021-08-27 RX ADMIN — LORAZEPAM 2 MG: 1 TABLET ORAL at 13:30

## 2021-08-27 RX ADMIN — LORAZEPAM 1 MG: 1 TABLET ORAL at 21:24

## 2021-08-27 ASSESSMENT — SLEEP AND FATIGUE QUESTIONNAIRES
AVERAGE NUMBER OF SLEEP HOURS: 2
DIFFICULTY STAYING ASLEEP: YES
DIFFICULTY FALLING ASLEEP: YES
DO YOU HAVE DIFFICULTY SLEEPING: YES
SLEEP PATTERN: DIFFICULTY FALLING ASLEEP;INSOMNIA
RESTFUL SLEEP: NO
DIFFICULTY ARISING: NO
DO YOU USE A SLEEP AID: YES

## 2021-08-27 ASSESSMENT — PAIN - FUNCTIONAL ASSESSMENT: PAIN_FUNCTIONAL_ASSESSMENT: ACTIVITIES ARE NOT PREVENTED

## 2021-08-27 ASSESSMENT — PAIN DESCRIPTION - PAIN TYPE: TYPE: ACUTE PAIN

## 2021-08-27 ASSESSMENT — PAIN DESCRIPTION - PROGRESSION: CLINICAL_PROGRESSION: NOT CHANGED

## 2021-08-27 ASSESSMENT — PAIN DESCRIPTION - ONSET: ONSET: GRADUAL

## 2021-08-27 ASSESSMENT — LIFESTYLE VARIABLES: HISTORY_ALCOHOL_USE: YES

## 2021-08-27 ASSESSMENT — PATIENT HEALTH QUESTIONNAIRE - PHQ9: SUM OF ALL RESPONSES TO PHQ QUESTIONS 1-9: 18

## 2021-08-27 ASSESSMENT — PAIN DESCRIPTION - LOCATION: LOCATION: ABDOMEN

## 2021-08-27 ASSESSMENT — PAIN DESCRIPTION - ORIENTATION: ORIENTATION: MID

## 2021-08-27 ASSESSMENT — PAIN DESCRIPTION - DESCRIPTORS: DESCRIPTORS: BURNING

## 2021-08-27 ASSESSMENT — PAIN DESCRIPTION - FREQUENCY: FREQUENCY: CONTINUOUS

## 2021-08-27 ASSESSMENT — PAIN SCALES - GENERAL: PAINLEVEL_OUTOF10: 10
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
## 2021-08-27 NOTE — ED NOTES
Emergency Department [facility] AN AFFILIATE OF [facility] Biopsychosocial Assessment Note    Chief Complaint: was at psych appt. making comments about ending life    MSE:  Very poor insight and judgement, suicidal, depressed, sad, hopeless, anxious, clear speech, thoughts are unstable - fair eye contact    Clinical Summary/History:   [facility] to have [facility] ideations with recent plans to use a shotgun just a few days ago. He is stressed about his housing, as he was evicted from his home, is bi-polar and is med compliant. He has not had any sleep and poor eating. Pt is tearful and said \"people go through things\". He presents very hopeless, sad and depressed. He reported to me that he did not take his meds today and now having suicidal thoughts. He reported that he is losing his apartment on 21. Pt has 3 8year old daughters Baron Burgess and Bertha Camar[place]). He said that they are at home alone and that his wife  3 years ago. Both girls have COVID. Pt will call his brother to care for the girls. Mom has dementia and is in [facility]. Pt said that girls attend [facility], but have not attended yet. Pt's main focus now is his daughters. Pt is aware that he will need to be admitted for safety and stability. Gender  [x] Male [] Female [] Transgender  [] Other    Sexual Orientation    [x] Heterosexual [] Homosexual [] Bisexual [] Other    Suicidal Behavioral: CSSR-S Complete. [x] Reports:    [] Past [] Present   [] Denies    Homicidal/ Violent Behavior  [] Reports:   [] Past [] Present   [x] Denies     Hallucinations/Delusions   [] Reports:   [x] Denies     Substance Use/Alcohol Use/Addiction: SBIRT Screen Complete. [x] Reports: IN THE PAST  [] Denies     Trauma History  [] Reports:  [x] Denies     Collateral Information:   Pt pretended to call his brother Chuy Reyna and gave a fake number, which was disconnected.   He then said that he lied and that he has no children and just wants to get out of here. I called [employer] and spoke to Romaine who will send officers over to do a welfare check.     AWAITING CALL BACK      Level of Care/Disposition Plan  [] Home:   [] Outpatient Provider:   [] Crisis Unit:   [x] Inpatient Psychiatric Unit:  [] Other:        Navin Landry, [facility]  08/27/21 0721

## 2021-08-27 NOTE — PLAN OF CARE
Problem: Depressive Behavior With or Without Suicide Precautions:  Goal: Ability to disclose and discuss suicidal ideas will improve  Description: Ability to disclose and discuss suicidal ideas will improve  Outcome: Met This Shift  Goal: Able to verbalize support systems  Description: Able to verbalize support systems  Outcome: Met This Shift  Goal: Absence of self-harm  Description: Absence of self-harm  Outcome: Met This Shift     Problem: Depressive Behavior With or Without Suicide Precautions:  Goal: Able to verbalize acceptance of life and situations over which he or she has no control  Description: Able to verbalize acceptance of life and situations over which he or she has no control  Outcome: Not Met This Shift  Goal: Able to verbalize and/or display a decrease in depressive symptoms  Description: Able to verbalize and/or display a decrease in depressive symptoms  Outcome: Not Met This Shift

## 2021-08-27 NOTE — ED PROVIDER NOTES
Department of Emergency Medicine   ED  Provider Note  Admit Date/RoomTime: 8/27/2021  9:43 AM  [facility]              8/27/21  12:41 PM EDT    HPI: Bianka Cui 73 y.o. male presents with a complaint of depression and suicidal ideation beginning a few days ago. Complaint has been constant and became more severe today which is what prompted the visit. Depression with suicidal ideation. Says he has thought about trying to kill himself. Says that he has thought about shooting himself but he no longer has a gun. He denies other complaints. No HI. Review of Systems:   A complete review of systems was performed and pertinent positives and negatives are stated within HPI, all other systems reviewed and are negative.            --------------------------------------------- PAST HISTORY ---------------------------------------------  Past Medical History:  has a past medical history of Anxiety, Arthritis, Asthma, Bipolar 1 disorder ([address] Chronic combined systolic and diastolic CHF (congestive heart failure) ([address] COPD (chronic obstructive pulmonary disease) ([address] Depression, Diabetes mellitus ([address] GERD (gastroesophageal reflux disease), Hepatitis C, Hypertension, Hyperthyroidism, Hypothyroidism due to medication, Mass of testicle, Mesothelioma ([address] Neuromuscular disorder ([address] Other disorders of kidney and ureter, Paroxysmal A-fib ([address] Peptic ulcer, Prostate enlargement, Pulmonary embolism ([address] Seizures ([address] and Thyroid disease. Past Surgical History:  has a past surgical history that includes Appendectomy; Lithotripsy; Hemorrhoid surgery; Bladder surgery; Endoscopy, colon, diagnostic (11/13/2015); Abdomen surgery; Colonoscopy; Cardiac surgery; vascular surgery; and Cardiac catheterization (05/21/2018). Social History:  reports that he quit smoking about 11 years ago. His smoking use included cigarettes. He quit after 10.00 years of use.  He quit smokeless tobacco use about 3 years ago. His smokeless tobacco use included chew. He reports previous drug use. He reports that he does not drink alcohol. Family History: family history includes Cancer in his father, maternal grandfather, and mother; Diabetes in his father, maternal aunt, maternal aunt, maternal aunt, maternal grandmother, and mother; Heart Disease in his paternal grandfather and paternal grandmother; Heart Failure in his paternal grandfather and paternal grandmother. Family history is non contributory unless otherwise noted    The patients home medications have been reviewed. Allergies: Codeine, Dye [iodides], Ketorolac tromethamine, Peanuts [peanut oil], and Shellfish-derived products    -------------------------------------------------- RESULTS -------------------------------------------------  All laboratory and imaging studies were reviewed by myself.     LABS: reviewed and interpreted by me  Results for orders placed or performed during the hospital encounter of 08/27/21   COVID-19 & Influenza Combo    Specimen: Nasopharyngeal Swab   Result Value Ref Range    SARS-CoV-2 RNA, RT PCR NOT DETECTED NOT DETECTED    INFLUENZA A NOT DETECTED NOT DETECTED    INFLUENZA B NOT DETECTED NOT DETECTED   Comprehensive Metabolic Panel   Result Value Ref Range    Sodium 135 132 - 146 mmol/L    Potassium 4.0 3.5 - 5.0 mmol/L    Chloride 97 (L) 98 - 107 mmol/L    CO2 27 22 - 29 mmol/L    Anion Gap 11 7 - 16 mmol/L    Glucose 130 (H) 74 - 99 mg/dL    BUN 7 6 - 23 mg/dL    CREATININE 1.0 0.7 - 1.2 mg/dL    GFR Non-African American >60 >=60 mL/min/1.73    GFR African American >60     Calcium 9.4 8.6 - 10.2 mg/dL    Total Protein 7.6 6.4 - 8.3 g/dL    Albumin 4.5 3.5 - 5.2 g/dL    Total Bilirubin 0.4 0.0 - 1.2 mg/dL    Alkaline Phosphatase 69 40 - 129 U/L     (H) 0 - 40 U/L    AST 84 (H) 0 - 39 U/L   CBC Auto Differential   Result Value Ref Range    WBC 6.5 4.5 - 11.5 E9/L    RBC 4.16 3.80 - 5.80 E12/L    Hemoglobin 13.0 12.5 - 16.5 g/dL    Hematocrit 37.7 37.0 - 54.0 %    MCV 90.6 80.0 - 99.9 fL    MCH 31.3 26.0 - 35.0 pg    MCHC 34.5 32.0 - 34.5 %    RDW 15.2 (H) 11.5 - 15.0 fL    Platelets 709 450 - 227 E9/L    MPV 12.3 (H) 7.0 - 12.0 fL    Neutrophils % 76.4 43.0 - 80.0 %    Immature Granulocytes % 0.3 0.0 - 5.0 %    Lymphocytes % 14.2 (L) 20.0 - 42.0 %    Monocytes % 7.1 2.0 - 12.0 %    Eosinophils % 1.7 0.0 - 6.0 %    Basophils % 0.3 0.0 - 2.0 %    Neutrophils Absolute 4.94 1.80 - 7.30 E9/L    Immature Granulocytes # 0.02 E9/L    Lymphocytes Absolute 0.92 (L) 1.50 - 4.00 E9/L    Monocytes Absolute 0.46 0.10 - 0.95 E9/L    Eosinophils Absolute 0.11 0.05 - 0.50 E9/L    Basophils Absolute 0.02 0.00 - 0.20 E9/L   Serum Drug Screen   Result Value Ref Range    Ethanol Lvl <10 mg/dL    Acetaminophen Level <5.0 (L) 10.0 - 11.6 mcg/mL    Salicylate, Serum <8.7 0.0 - 30.0 mg/dL   Urine Drug Screen   Result Value Ref Range    Amphetamine Screen, Urine NOT DETECTED Negative <1000 ng/mL    Barbiturate Screen, Ur NOT DETECTED Negative < 200 ng/mL    Benzodiazepine Screen, Urine NOT DETECTED Negative < 200 ng/mL    Cannabinoid Scrn, Ur POSITIVE (A) Negative < 50ng/mL    Cocaine Metabolite Screen, Urine NOT DETECTED Negative < 300 ng/mL    Opiate Scrn, Ur POSITIVE (A) Negative < 300ng/mL    PCP Screen, Urine NOT DETECTED Negative < 25 ng/mL    Methadone Screen, Urine NOT DETECTED Negative <300 ng/mL    Oxycodone Urine NOT DETECTED Negative <100 ng/mL    FENTANYL SCREEN, URINE NOT DETECTED Negative <1 ng/mL    Drug Screen Comment: see below    EKG 12 Lead   Result Value Ref Range    Ventricular Rate 58 BPM    Atrial Rate 58 BPM    P-R Interval 140 ms    QRS Duration 88 ms    Q-T Interval 418 ms    QTc Calculation (Bazett) 410 ms    P Axis 57 degrees    R Axis -12 degrees    T Axis 61 degrees       RADIOLOGY:  Interpreted by Radiologist.  No orders to display       EKG Interpretation  Interpreted by emergency department physician, Dr. Charmayne Roles Rhythm: sinus bradycardia  Rate: bradycardia  Axis: left  Conduction: normal  ST Segments: no acute change  T Waves: no acute change    Clinical Impression: sinus bradycardia, no acute ischemic changes  Comparison to Old EKG  Stable from prior        ------------------------- NURSING NOTES AND VITALS REVIEWED ---------------------------   The nursing notes within the ED encounter and vital signs as below have been reviewed. /73   Pulse 63   Temp 97.3 °F (36.3 °C) (Temporal)   Resp 16   SpO2 99%   Oxygen Saturation Interpretation: Normal            ---------------------------------------------------PHYSICAL EXAM--------------------------------------      Constitutional/General: Alert and oriented x3   Head: Normocephalic, atraumatic  Eyes: PERRL, EOMI  ENT: Oropharynx clear, handling secretions, no trismus  Neck: Supple, full ROM, no meningeal signs  Pulmonary: Lungs clear to auscultation bilaterally, no wheezes, rales, or rhonchi. Not in respiratory distress  Cardiovascular:  Regular rate and rhythm, no murmurs, gallops, or rubs. 2+ distal pulses  Abdomen: Soft, non tender, non distended, +BS, no rebound, guarding, or rigidity. No pulsatile masses appreciated  Extremities: Moves all extremities x 4. Warm and well perfused, no clubbing, cyanosis, or edema. Capillary refill <3 seconds  Skin: warm and dry without rash  Neurologic: GCS 15, no focal deficits  Psych: flat Affect      ------------------------------------------ PROGRESS NOTES ------------------------------------------     Medical decision making:  Patient is medically stable for mental health evaluation    Consultations:   Behavioral Health    Re-Evaluations:        Counseling: The emergency provider has spoken with thepatient and discussed todays results, in addition to providing specific details for the plan of care and counseling regarding the diagnosis and prognosis.   Questions are answered at this time and they are agreeable with the plan.         --------------------------------- IMPRESSION AND DISPOSITION ---------------------------------    IMPRESSION  1.  Depression with suicidal ideation        DISPOSITION  Disposition: as per consultant  Patient condition is stable           Fede Manzo MD  08/27/21 [id number]

## 2021-08-28 PROCEDURE — 6370000000 HC RX 637 (ALT 250 FOR IP): Performed by: NURSE PRACTITIONER

## 2021-08-28 PROCEDURE — 1240000000 HC EMOTIONAL WELLNESS R&B

## 2021-08-28 PROCEDURE — 99221 1ST HOSP IP/OBS SF/LOW 40: CPT | Performed by: PSYCHIATRY & NEUROLOGY

## 2021-08-28 PROCEDURE — 6370000000 HC RX 637 (ALT 250 FOR IP): Performed by: PSYCHIATRY & NEUROLOGY

## 2021-08-28 RX ORDER — VENLAFAXINE HYDROCHLORIDE 37.5 MG/1
37.5 CAPSULE, EXTENDED RELEASE ORAL NIGHTLY
Status: DISCONTINUED | OUTPATIENT
Start: 2021-08-28 | End: 2021-09-02 | Stop reason: HOSPADM

## 2021-08-28 RX ORDER — CLONAZEPAM 0.5 MG/1
1 TABLET ORAL 3 TIMES DAILY PRN
Status: DISCONTINUED | OUTPATIENT
Start: 2021-08-28 | End: 2021-09-01

## 2021-08-28 RX ORDER — VENLAFAXINE HYDROCHLORIDE 75 MG/1
75 CAPSULE, EXTENDED RELEASE ORAL
Status: DISCONTINUED | OUTPATIENT
Start: 2021-08-28 | End: 2021-09-02 | Stop reason: HOSPADM

## 2021-08-28 RX ADMIN — LURASIDONE HYDROCHLORIDE 60 MG: 20 TABLET, FILM COATED ORAL at 11:40

## 2021-08-28 RX ADMIN — VENLAFAXINE HYDROCHLORIDE 37.5 MG: 37.5 CAPSULE, EXTENDED RELEASE ORAL at 21:41

## 2021-08-28 RX ADMIN — CLONAZEPAM 1 MG: 0.5 TABLET ORAL at 17:25

## 2021-08-28 RX ADMIN — VENLAFAXINE HYDROCHLORIDE 75 MG: 75 CAPSULE, EXTENDED RELEASE ORAL at 11:40

## 2021-08-28 RX ADMIN — LORAZEPAM 1 MG: 1 TABLET ORAL at 21:41

## 2021-08-28 ASSESSMENT — SLEEP AND FATIGUE QUESTIONNAIRES
AVERAGE NUMBER OF SLEEP HOURS: 4
RESTFUL SLEEP: NO
DO YOU USE A SLEEP AID: YES
DIFFICULTY STAYING ASLEEP: YES
DIFFICULTY FALLING ASLEEP: YES
SLEEP PATTERN: DIFFICULTY FALLING ASLEEP;INSOMNIA
DIFFICULTY ARISING: NO
DO YOU HAVE DIFFICULTY SLEEPING: YES

## 2021-08-28 ASSESSMENT — PATIENT HEALTH QUESTIONNAIRE - PHQ9: SUM OF ALL RESPONSES TO PHQ QUESTIONS 1-9: 18

## 2021-08-28 ASSESSMENT — LIFESTYLE VARIABLES: HISTORY_ALCOHOL_USE: YES

## 2021-08-28 NOTE — PLAN OF CARE
Patient alert and oriented x4 he has a flat affect and is cooperative. Patient is denying SI/HI he does not report auditory or visual hallucinations. Can be isolative to his room but did come out to watch a movie this afternoon. Patient is taking medications.         Problem: Depressive Behavior With or Without Suicide Precautions:  Goal: Able to verbalize acceptance of life and situations over which he or she has no control  Description: Able to verbalize acceptance of life and situations over which he or she has no control  Outcome: Ongoing     Problem: Depressive Behavior With or Without Suicide Precautions:  Goal: Able to verbalize and/or display a decrease in depressive symptoms  Description: Able to verbalize and/or display a decrease in depressive symptoms  Outcome: Ongoing     Problem: Depressive Behavior With or Without Suicide Precautions:  Goal: Ability to disclose and discuss suicidal ideas will improve  Description: Ability to disclose and discuss suicidal ideas will improve  Outcome: Ongoing     Problem: Depressive Behavior With or Without Suicide Precautions:  Goal: Able to verbalize support systems  Description: Able to verbalize support systems  Outcome: Ongoing

## 2021-08-28 NOTE — PROGRESS NOTES
Home medications verified with Bill at [facility].         Electronically signed by Yosi Ellsworth RN on 8/27/2021 at 8:03 PM
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
## 2021-08-28 NOTE — PROGRESS NOTES
Nutrition Assessment     Type and Reason for Visit: Initial, Positive Nutrition Screen    Nutrition Recommendations/Plan: Continue current diet, Start Ensure BID     Nutrition Assessment:  Pt adm w/ depression/SI. Noted subjective wt loss. Will provide ONS and monitor    Malnutrition Assessment:  Malnutrition Status: At risk for malnutrition (Comment)    Nutrition Related Findings: pt alert, abd tenderness, no noted edema, fluids WNL      Current Nutrition Therapies:    ADULT DIET; Regular    Anthropometric Measures:  · Height: 5' 4\" (162.6 cm)  · Current Body Wt: 122 lb (55.3 kg) (stated, UTO updated CBW)   · BMI: 20.9    Nutrition Diagnosis:   No nutrition diagnosis at this time    Nutrition Interventions:   Food and/or Nutrient Delivery:  Continue Current Diet, Start Oral Nutrition Supplement (Ensure BID)  Nutrition Education/Counseling:  Education not appropriate   Coordination of Nutrition Care:  Continue to monitor while inpatient    Goals:  Consume >75% meals/ONS       Nutrition Monitoring and Evaluation:   Food/Nutrient Intake Outcomes:  Diet Advancement/Tolerance, Food and Nutrient Intake, Supplement Intake  Physical Signs/Symptoms Outcomes:  Biochemical Data, GI Status, Fluid Status or Edema, Nutrition Focused Physical Findings, Weight, Skin     Discharge Planning:     Too soon to determine     Electronically signed by Handy Hunter, MS, RD, LD on 8/28/21 at 3:13 PM EDT    Contact: [phone]

## 2021-08-28 NOTE — PROGRESS NOTES
[facility]  Initial Interdisciplinary Treatment Plan NOTE    Review Date & Time: 8/28/2021    Patient was in treatment team    Admission Type:   Admission Type: Involuntary    Reason for admission:  Reason for Admission: \"I was having suicidal thoughts. I'm losing my apartment in 30 days too. \"      Estimated Length of Stay Update:  3-5 days  Estimated Discharge Date Update: 9/1/2021    EDUCATION:   Learner Progress Toward Treatment Goals: Reviewed results and recommendations of this team, Reviewed group plan and strategies, Reviewed signs, symptoms and risk of self harm and violent behavior and Reviewed goals and plan of care    Method: Small group    Outcome: Needs reinforcement    PATIENT GOALS: no goal set for today    PLAN/TREATMENT RECOMMENDATIONS UPDATE: will support patient in setting and obtaining both short term and long term goals while on the unit    GOALS UPDATE:   Time frame for Short-Term Goals: 1-3 days    Susan Zavala RN
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
## 2021-08-29 PROCEDURE — 6370000000 HC RX 637 (ALT 250 FOR IP): Performed by: PSYCHIATRY & NEUROLOGY

## 2021-08-29 PROCEDURE — 99231 SBSQ HOSP IP/OBS SF/LOW 25: CPT | Performed by: PSYCHIATRY & NEUROLOGY

## 2021-08-29 PROCEDURE — 1240000000 HC EMOTIONAL WELLNESS R&B

## 2021-08-29 PROCEDURE — 6370000000 HC RX 637 (ALT 250 FOR IP): Performed by: NURSE PRACTITIONER

## 2021-08-29 RX ORDER — METHIMAZOLE 5 MG/1
10 TABLET ORAL 3 TIMES DAILY
Status: DISCONTINUED | OUTPATIENT
Start: 2021-08-29 | End: 2021-09-02 | Stop reason: HOSPADM

## 2021-08-29 RX ORDER — LISINOPRIL 10 MG/1
5 TABLET ORAL DAILY
Status: DISCONTINUED | OUTPATIENT
Start: 2021-08-29 | End: 2021-09-02 | Stop reason: HOSPADM

## 2021-08-29 RX ORDER — FINASTERIDE 5 MG/1
5 TABLET, FILM COATED ORAL DAILY
Status: DISCONTINUED | OUTPATIENT
Start: 2021-08-29 | End: 2021-09-02 | Stop reason: HOSPADM

## 2021-08-29 RX ORDER — ATORVASTATIN CALCIUM 40 MG/1
40 TABLET, FILM COATED ORAL NIGHTLY
Status: DISCONTINUED | OUTPATIENT
Start: 2021-08-29 | End: 2021-09-02 | Stop reason: HOSPADM

## 2021-08-29 RX ADMIN — ATORVASTATIN CALCIUM 40 MG: 40 TABLET, FILM COATED ORAL at 21:19

## 2021-08-29 RX ADMIN — METHIMAZOLE 10 MG: 5 TABLET ORAL at 21:18

## 2021-08-29 RX ADMIN — LURASIDONE HYDROCHLORIDE 60 MG: 20 TABLET, FILM COATED ORAL at 08:59

## 2021-08-29 RX ADMIN — LISINOPRIL 5 MG: 10 TABLET ORAL at 11:59

## 2021-08-29 RX ADMIN — METHIMAZOLE 10 MG: 5 TABLET ORAL at 11:59

## 2021-08-29 RX ADMIN — VENLAFAXINE HYDROCHLORIDE 37.5 MG: 37.5 CAPSULE, EXTENDED RELEASE ORAL at 21:19

## 2021-08-29 RX ADMIN — FINASTERIDE 5 MG: 5 TABLET, FILM COATED ORAL at 11:59

## 2021-08-29 RX ADMIN — TRAZODONE HYDROCHLORIDE 50 MG: 50 TABLET ORAL at 21:26

## 2021-08-29 RX ADMIN — RIVAROXABAN 20 MG: 20 TABLET, FILM COATED ORAL at 11:59

## 2021-08-29 RX ADMIN — CLONAZEPAM 1 MG: 0.5 TABLET ORAL at 08:58

## 2021-08-29 RX ADMIN — VENLAFAXINE HYDROCHLORIDE 75 MG: 75 CAPSULE, EXTENDED RELEASE ORAL at 08:59

## 2021-08-29 RX ADMIN — LORAZEPAM 1 MG: 1 TABLET ORAL at 21:19

## 2021-08-29 RX ADMIN — CLONAZEPAM 1 MG: 0.5 TABLET ORAL at 16:03

## 2021-08-29 ASSESSMENT — PAIN SCALES - GENERAL: PAINLEVEL_OUTOF10: 0

## 2021-08-29 NOTE — PROGRESS NOTES
Group Therapy Note    Patient attended goals group and stated daily goal as to be positive. Group Therapy Note    Date: 8/29/2021  Start Time: 11:00  End Time: 11:30  Number of Participants: 11    Type of Group: Psychoeducation    Wellness Binder Information  Module Name: Building Happiness    Patient's Goal: To id positive ways to build happiness in one's life to improve wellness recovery. Notes: Attended group and was able to participate. Status After Intervention:  Improved    Participation Level:  Active Listener and Interactive    Participation Quality: Attentive      Speech:  hesitant      Thought Process/Content: Linear      Affective Functioning: Flat      Mood: depressed      Level of consciousness:  Alert      Response to Learning: Progressing to goal      Endings: None Reported    Modes of Intervention: Education      Discipline Responsible: Psychoeducational Specialist      Signature:  TONY Robert

## 2021-08-29 NOTE — PROGRESS NOTES
BEHAVIORAL HEALTH FOLLOW-UP NOTE     8/29/2021     Patient was seen and examined in person, Chart reviewed   Patient's case discussed with staff/team    Chief Complaint: depressed mood, suicidal ideation    Interim History:     Patient said he was feeling \"better\". He said he was able to sleep well and that his appetite was \"good\". He said he did not feel depressed anymore. He denied having any more suicidal or homicidal ideation. He denied having any hallucinations, paranoia or other delusions. When asked about his statement in the ER that he had \"little girls\" at home, he said he had \"lied\" because he wanted to leave; however he said he now is happy to be on the unit and getting help. Appetite:   [x] Normal/Unchanged  [] Increased  [] Decreased      Sleep:       [x] Normal/Unchanged  [] Fair       [] Poor              Energy:    [x] Normal/Unchanged  [] Increased  [] Decreased        SI [] Present  [x] Absent    HI  []Present  [x] Absent     Aggression:  [] yes  [x] no    Patient is [x] able  [] unable to CONTRACT FOR SAFETY     PAST MEDICAL/PSYCHIATRIC HISTORY:   Past Medical History:   Diagnosis Date    Anxiety     Arthritis     Asthma     Bipolar 1 disorder ([address]     Chronic combined systolic and diastolic CHF (congestive heart failure) ([address] 05/27/2018    COPD (chronic obstructive pulmonary disease) ([address]     Depression     Diabetes mellitus ([address]     GERD (gastroesophageal reflux disease)     Hepatitis C     resolved    Hypertension     Hyperthyroidism 8/2/2012    Hypothyroidism due to medication     Mass of testicle     Mesothelioma [facility])     pt states possibly not    Neuromuscular disorder ([address]     Other disorders of kidney and ureter     kidney stones; most recent 08/27/2015    Paroxysmal A-fib ([address]     discussed with PCP- patient does have hx of PAfib    Peptic ulcer     Prostate enlargement     Pulmonary embolism ([address]     Intermediate risk for PE on VQ scan.     Seizures ([address]  Thyroid disease     hyperthyroid       FAMILY/SOCIAL HISTORY:  Family History   Problem Relation Age of Onset    Cancer Mother     Diabetes Mother     Cancer Father     Diabetes Father     Diabetes Maternal Aunt     Diabetes Maternal Grandmother     Cancer Maternal Grandfather     Heart Disease Paternal Grandmother     Heart Failure Paternal Grandmother     Heart Disease Paternal Grandfather     Heart Failure Paternal Grandfather     Diabetes Maternal Aunt     Diabetes Maternal Aunt      Social History     Socioeconomic History    Marital status: Single     Spouse name: Not on file    Number of children: Not on file    Years of education: Not on file    Highest education level: Not on file   Occupational History    Occupation: disability   Tobacco Use    Smoking status: Former Smoker     Years: 10.00     Types: Cigarettes     Quit date: 1/10/2010     Years since quittin.6    Smokeless tobacco: Former User     Types: [address] date:    Vaping Use    Vaping Use: Never used   Substance and Sexual Activity    Alcohol use: No     Alcohol/week: 0.0 standard drinks     Comment: recovered alcoholic; 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    Drug use: Not Currently     Comment: heroin, cocaine; last use 04/15/2014    Sexual activity: Yes     Comment: heroin   Other Topics Concern    Not on file   Social History Narrative    Not on file     Social Determinants of Health     Financial Resource Strain:     Difficulty of Paying Living Expenses:    Food Insecurity:     Worried About Running Out of Food in the Last Year:     [address] in the Last Year:    Transportation Needs:     Lack of Transportation (Medical):      Lack of Transportation (Non-Medical):    Physical Activity:     Days of Exercise per Week:     Minutes of Exercise per Session:    Stress:     Feeling of Stress :    Social Connections:     Frequency of Communication with Friends and Family:     Frequency of Social Gatherings with Friends and Family:     Attends Shinto Services:     Active Member of Clubs or Organizations:     Attends Club or Organization Meetings:     Marital Status:    Intimate Partner Violence:     Fear of Current or Ex-Partner:     Emotionally Abused:     Physically Abused:     Sexually Abused:            ROS:  [x] All negative/unchanged except if checked.  Explain positive(checked items) below:  [] Constitutional  [] Eyes  [] Ear/Nose/Mouth/Throat  [] Respiratory  [] CV  [] GI  []   [] Musculoskeletal  [] Skin/Breast  [] Neurological  [] Endocrine  [] Heme/Lymph  [] Allergic/Immunologic    Explanation:     MEDICATIONS:    Current Facility-Administered Medications:     rivaroxaban (XARELTO) tablet 20 mg, 20 mg, Oral, Daily, Juli Dubois MD, 20 mg at 08/29/21 1159    lisinopril (PRINIVIL;ZESTRIL) tablet 5 mg, 5 mg, Oral, Daily, Juli Dubois MD, 5 mg at 08/29/21 1159    finasteride (PROSCAR) tablet 5 mg, 5 mg, Oral, Daily, Juli Dubois MD, 5 mg at 08/29/21 1159    atorvastatin (LIPITOR) tablet 40 mg, 40 mg, Oral, Nightly, Juli Dubois MD    methIMAzole (TAPAZOLE) tablet 10 mg, 10 mg, Oral, TID, Juli Dubois MD, 10 mg at 08/29/21 1159    venlafaxine (EFFEXOR XR) extended release capsule 75 mg, 75 mg, Oral, Daily with breakfast, Juli Dubois MD, 75 mg at 08/29/21 0859    venlafaxine (EFFEXOR XR) extended release capsule 37.5 mg, 37.5 mg, Oral, Nightly, Juli Dubois MD, 37.5 mg at 08/28/21 2141    lurasidone (LATUDA) tablet 60 mg, 60 mg, Oral, Daily, Juli Dubois MD, 60 mg at 08/29/21 0859    clonazePAM (KLONOPIN) tablet 1 mg, 1 mg, Oral, TID PRN, Juli Dubois MD, 1 mg at 08/29/21 0858    acetaminophen (TYLENOL) tablet 650 mg, 650 mg, Oral, Q4H PRN, ZOIE Johnson - CNP    magnesium hydroxide (MILK OF MAGNESIA) 400 MG/5ML suspension 30 mL, 30 mL, Oral, Daily PRN, Mckayla Burgess APRN - CNP    nicotine (NICODERM CQ) 21 MG/24HR 1 patch, 1 patch, Transdermal, Daily, Chey Sinclair Carly Sainz, ZOIE - CNP    aluminum & magnesium hydroxide-simethicone (MAALOX) 200-200-20 MG/5ML suspension 30 mL, 30 mL, Oral, PRN, ZOIE Escobar CNP    traZODone (DESYREL) tablet 50 mg, 50 mg, Oral, Nightly PRN, ZOIE Escobar - CNP    diphenhydrAMINE (BENADRYL) injection 50 mg, 50 mg, IntraMUSCular, Q6H PRN, ZOIE Escobar - CNP    LORazepam (ATIVAN) tablet 1 mg, 1 mg, Oral, Q6H PRN, ZOIE Escobar - CNP, 1 mg at 08/28/21 2141    haloperidol lactate (HALDOL) injection 5 mg, 5 mg, IntraMUSCular, Q6H PRN, ZOIE Escobar CNP      Examination:  /66   Pulse 66   Temp 98.5 °F (36.9 °C) (Temporal)   Resp 17   Ht 5' 4\" (1.626 m)   Wt 122 lb (55.3 kg)   SpO2 98%   BMI 20.94 kg/m²   Gait - steady  Medication side effects(SE): none reported    Mental Status Examination:    Level of consciousness:  within normal limits   Appearance:  fair grooming and fair hygiene  Behavior/Motor:  no abnormalities noted  Attitude toward examiner:  cooperative and good eye contact  Speech:  spontaneous, normal rate, normal volume and well articulated   Mood: euthymic  Affect:  mood congruent  Thought processes:  linear, goal directed and coherent   Thought content:  Homocidal ideation denies  Suicidal Ideation:  denies suicidal ideation  Delusions:  no evidence of delusions  Perceptual Disturbance:  denies any perceptual disturbance  Cognition:  oriented to person, place, and time   Concentration distractible  Insight fair   Judgement fair     ASSESSMENT:   Patient symptoms are:  [x] Well controlled  [x] Improving  [] Worsening  [] No change      Diagnosis:   Bipolar Disorder, depressed vs. Major Depressive Disorder, recurrent  Opioid use disorder  Cannabis use disorder  R/o PTSD    LABS:    Recent Labs     08/27/21  1042   WBC 6.5   HGB 13.0        Recent Labs     08/27/21  1042      K 4.0   CL 97*   CO2 27   BUN 7   CREATININE 1.0   GLUCOSE 130*     Recent Labs 08/27/21  1042   BILITOT 0.4   ALKPHOS 69   AST 84*   *     Lab Results   Component Value Date    LABAMPH NOT DETECTED 08/27/2021    LABAMPH NOT DETECTED 07/04/2011    BARBSCNU NOT DETECTED 08/27/2021    LABBENZ NOT DETECTED 08/27/2021    LABBENZ SEE BELOW 07/01/2014    CANNAB POSITIVE  07/04/2011    LABMETH NOT DETECTED 08/27/2021    OPIATESCREENURINE POSITIVE 08/27/2021    PHENCYCLIDINESCREENURINE NOT DETECTED 08/27/2021    ETOH <10 08/27/2021     Lab Results   Component Value Date    TSH 0.008 05/21/2018     No results found for: LITHIUM  Lab Results   Component Value Date    VALPROATE 31 (L) 06/02/2016       RISK ASSESSMENT:   Suicide risk: moderate  Homicide risk: low  Violence risk: low  Elopement risk: low    Treatment Plan:  Reviewed current Medications with the patient. Will continue current medications. Risks, benefits, side effects, drug-to-drug interactions and alternatives to treatment were discussed. Collateral information: pending  CD evaluation pending  Encourage patient to attend group and other milieu activities.   Discharge planning discussed with the patient and treatment team.    PSYCHOTHERAPY/COUNSELING:  [x] Therapeutic interview  [x] Supportive  [] CBT  [] Ongoing  [] Other    [x] Patient continues to need, on a daily basis, active treatment furnished directly by or requiring the supervision of inpatient psychiatric personnel      Anticipated Length of stay:             Electronically signed by Tashi De MD on 8/29/2021 at 3:17 PM
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
## 2021-08-30 PROBLEM — F31.32 BIPOLAR AFFECTIVE DISORDER, CURRENTLY DEPRESSED, MODERATE (HCC): Status: ACTIVE | Noted: 2021-08-30

## 2021-08-30 PROCEDURE — 99231 SBSQ HOSP IP/OBS SF/LOW 25: CPT | Performed by: NURSE PRACTITIONER

## 2021-08-30 PROCEDURE — 6370000000 HC RX 637 (ALT 250 FOR IP): Performed by: PSYCHIATRY & NEUROLOGY

## 2021-08-30 PROCEDURE — 6370000000 HC RX 637 (ALT 250 FOR IP): Performed by: NURSE PRACTITIONER

## 2021-08-30 PROCEDURE — 1240000000 HC EMOTIONAL WELLNESS R&B

## 2021-08-30 RX ADMIN — RIVAROXABAN 20 MG: 20 TABLET, FILM COATED ORAL at 09:27

## 2021-08-30 RX ADMIN — LURASIDONE HYDROCHLORIDE 60 MG: 20 TABLET, FILM COATED ORAL at 10:18

## 2021-08-30 RX ADMIN — LISINOPRIL 5 MG: 10 TABLET ORAL at 09:27

## 2021-08-30 RX ADMIN — METHIMAZOLE 10 MG: 5 TABLET ORAL at 13:09

## 2021-08-30 RX ADMIN — CLONAZEPAM 1 MG: 0.5 TABLET ORAL at 09:51

## 2021-08-30 RX ADMIN — VENLAFAXINE HYDROCHLORIDE 75 MG: 75 CAPSULE, EXTENDED RELEASE ORAL at 09:27

## 2021-08-30 RX ADMIN — RIVAROXABAN 20 MG: 20 TABLET, FILM COATED ORAL at 18:31

## 2021-08-30 RX ADMIN — CLONAZEPAM 1 MG: 0.5 TABLET ORAL at 17:13

## 2021-08-30 RX ADMIN — FINASTERIDE 5 MG: 5 TABLET, FILM COATED ORAL at 09:27

## 2021-08-30 RX ADMIN — ATORVASTATIN CALCIUM 40 MG: 40 TABLET, FILM COATED ORAL at 21:11

## 2021-08-30 RX ADMIN — VENLAFAXINE HYDROCHLORIDE 37.5 MG: 37.5 CAPSULE, EXTENDED RELEASE ORAL at 21:10

## 2021-08-30 RX ADMIN — METHIMAZOLE 10 MG: 5 TABLET ORAL at 21:11

## 2021-08-30 RX ADMIN — METHIMAZOLE 10 MG: 5 TABLET ORAL at 09:27

## 2021-08-30 RX ADMIN — TRAZODONE HYDROCHLORIDE 50 MG: 50 TABLET ORAL at 21:11

## 2021-08-30 ASSESSMENT — PAIN SCALES - GENERAL
PAINLEVEL_OUTOF10: 0
PAINLEVEL_OUTOF10: 0

## 2021-08-30 NOTE — PROGRESS NOTES
Pt continues to be friendly and bright when conversing with this . Pt's estimated discharge remains unknown. Sw will continue to monitor pt's moods and behaviors.

## 2021-08-30 NOTE — BH NOTE
Pt denies SI HI and hallucinations. Pt is bright, blunt with assessment questions. No voiced depression or anxiety. Pt spoke about his sobriety of 20+ years. Pt stated that his sponsor, Charley Benitez, will help him with his housing situation. Pt encouraged to speak with SW concerning discharge planning. Pt is out on the unit social with peers, intrusive at times but appropriate. No aggression or behaviors. Pt is medication compliant. Pt is eating provided meals. Pt is attending and participating in groups. We will continue to provide support and comfort for the patient.

## 2021-08-30 NOTE — PROGRESS NOTES
Patient has been out on the unit, pleasant, calm, and cooperative. Patient denies all and denies anxiety/depression at this time. Patient is focused on discharge and states that he's ready to leave. Patient was overheard making comments to another patient who was tearful. Patient was making remarks about giving the patient \"something to cry about\" and patient was reminded to remain respectful of other people on the unit. Patient is encouraged to continue to work towards discharge goal by complying with medications, attending groups and to seek staff if feelings are overwhelming. Environmental rounds completed per unit policy to maintain safety of everyone on the unit. Staff will offer support and interventions as requested or required.

## 2021-08-30 NOTE — PLAN OF CARE
Problem: Depressive Behavior With or Without Suicide Precautions:  Goal: Able to verbalize acceptance of life and situations over which he or she has no control  Description: Able to verbalize acceptance of life and situations over which he or she has no control  8/29/2021 2116 by Demarcus Mendez RN  Outcome: Met This Shift     Problem: Depressive Behavior With or Without Suicide Precautions:  Goal: Able to verbalize and/or display a decrease in depressive symptoms  Description: Able to verbalize and/or display a decrease in depressive symptoms  8/29/2021 2116 by Demarcus Mendez RN  Outcome: Met This Shift     Problem: Depressive Behavior With or Without Suicide Precautions:  Goal: Ability to disclose and discuss suicidal ideas will improve  Description: Ability to disclose and discuss suicidal ideas will improve  Outcome: Met This Shift

## 2021-08-30 NOTE — PROGRESS NOTES
BEHAVIORAL HEALTH FOLLOW-UP NOTE     8/30/2021     Patient was seen and examined in person, Chart reviewed   Patient's case discussed with staff/team    Chief Complaint: depressed mood, suicidal ideation    Interim History:     Patient said he was feeling \"better\". He said he was able to sleep well and that his appetite was \"good\". He said he did not feel depressed anymore. He denied having any more suicidal or homicidal ideation. He denied having any hallucinations, paranoia or other delusions. When asked about his statement in the ER that he had \"little girls\" at home, he said he had \"lied\" because he wanted to leave; however he said he now is happy to be on the unit and getting help. He is observed on the unit, he is present in the milieu and social with select peers.      Appetite:   [x] Normal/Unchanged  [] Increased  [] Decreased      Sleep:       [x] Normal/Unchanged  [] Fair       [] Poor              Energy:    [x] Normal/Unchanged  [] Increased  [] Decreased        SI [] Present  [x] Absent    HI  []Present  [x] Absent     Aggression:  [] yes  [x] no    Patient is [x] able  [] unable to CONTRACT FOR SAFETY     PAST MEDICAL/PSYCHIATRIC HISTORY:   Past Medical History:   Diagnosis Date    Anxiety     Arthritis     Asthma     Bipolar 1 disorder ([address]     Chronic combined systolic and diastolic CHF (congestive heart failure) ([address] 05/27/2018    [facility]     Depression     Diabetes mellitus ([address]     GERD (gastroesophageal reflux disease)     Hepatitis C     resolved    Hypertension     Hyperthyroidism 8/2/2012    Hypothyroidism due to medication     Mass of testicle     Mesothelioma [facility])     pt states possibly not    Neuromuscular disorder ([address]     Other disorders of kidney and ureter     kidney stones; most recent 08/27/2015    Paroxysmal A-fib ([address]     discussed with PCP- patient does have hx of PAfib    Peptic ulcer     Prostate enlargement  Pulmonary embolism (HCC)     Intermediate risk for PE on VQ scan.  Seizures ([address]     Thyroid disease     hyperthyroid       FAMILY/SOCIAL HISTORY:  Family History   Problem Relation Age of Onset    Cancer Mother     Diabetes Mother     Cancer Father     Diabetes Father     Diabetes Maternal Aunt     Diabetes Maternal Grandmother     Cancer Maternal Grandfather     Heart Disease Paternal Grandmother     Heart Failure Paternal Grandmother     Heart Disease Paternal Grandfather     Heart Failure Paternal Grandfather     Diabetes Maternal Aunt     Diabetes Maternal Aunt      Social History     Socioeconomic History    Marital status: Single     Spouse name: Not on file    Number of children: Not on file    Years of education: Not on file    Highest education level: Not on file   Occupational History    Occupation: disability   Tobacco Use    Smoking status: Former Smoker     Years: 10.00     Types: Cigarettes     Quit date: 1/10/2010     Years since quittin.6    Smokeless tobacco: Former User     Types: [address] date:    Vaping Use    Vaping Use: Never used   Substance and Sexual Activity    Alcohol use: No     Alcohol/week: 0.0 standard drinks     Comment: recovered alcoholic; 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    Drug use: Not Currently     Comment: heroin, cocaine; last use 04/15/2014    Sexual activity: Yes     Comment: heroin   Other Topics Concern    Not on file   Social History Narrative    Not on file     Social Determinants of Health     Financial Resource Strain:     Difficulty of Paying Living Expenses:    Food Insecurity:     Worried About Running Out of Food in the Last Year:     [address] in the Last Year:    Transportation Needs:     Lack of Transportation (Medical):      Lack of Transportation (Non-Medical):    Physical Activity:     Days of Exercise per Week:     Minutes of Exercise per Session:    Stress:     Feeling of Stress :    Social Connections:     Frequency of Communication with Friends and Family:     Frequency of Social Gatherings with Friends and Family:     Attends Sabianist Services:     Active Member of Clubs or Organizations:     Attends Club or Organization Meetings:     Marital Status:    Intimate Partner Violence:     Fear of Current or Ex-Partner:     Emotionally Abused:     Physically Abused:     Sexually Abused:            ROS:  [x] All negative/unchanged except if checked.  Explain positive(checked items) below:  [] Constitutional  [] Eyes  [] Ear/Nose/Mouth/Throat  [] Respiratory  [] CV  [] GI  []   [] Musculoskeletal  [] Skin/Breast  [] Neurological  [] Endocrine  [] Heme/Lymph  [] Allergic/Immunologic    Explanation:     MEDICATIONS:    Current Facility-Administered Medications:     rivaroxaban (XARELTO) tablet 20 mg, 20 mg, Oral, Daily, Adry Sidhu MD, 20 mg at 08/30/21 9394    lisinopril (PRINIVIL;ZESTRIL) tablet 5 mg, 5 mg, Oral, Daily, Adry Sidhu MD, 5 mg at 08/30/21 0256    finasteride (PROSCAR) tablet 5 mg, 5 mg, Oral, Daily, Adry Sidhu MD, 5 mg at 08/30/21 0357    atorvastatin (LIPITOR) tablet 40 mg, 40 mg, Oral, Nightly, Adry Sidhu MD, 40 mg at 08/29/21 2119    methIMAzole (TAPAZOLE) tablet 10 mg, 10 mg, Oral, TID, Adry Sidhu MD, 10 mg at 08/30/21 1309    venlafaxine (EFFEXOR XR) extended release capsule 75 mg, 75 mg, Oral, Daily with breakfast, Adry Sidhu MD, 75 mg at 08/30/21 7673    venlafaxine (EFFEXOR XR) extended release capsule 37.5 mg, 37.5 mg, Oral, Nightly, Adry Sidhu MD, 37.5 mg at 08/29/21 2119    lurasidone (LATUDA) tablet 60 mg, 60 mg, Oral, Daily, Adry Sidhu MD, 60 mg at 08/30/21 1018    clonazePAM (KLONOPIN) tablet 1 mg, 1 mg, Oral, TID PRN, Adry Sidhu MD, 1 mg at 08/30/21 0951    acetaminophen (TYLENOL) tablet 650 mg, 650 mg, Oral, Q4H PRN, Josefa Muro, APRN - CNP    magnesium hydroxide (MILK OF MAGNESIA) 400 MG/5ML suspension 30 mL, 30 mL, Oral, Daily PRN, Aren Gregg, APRN - CNP    nicotine (NICODERM CQ) 21 MG/24HR 1 patch, 1 patch, Transdermal, Daily, Aren Fails, APRN - CNP    aluminum & magnesium hydroxide-simethicone (MAALOX) 200-200-20 MG/5ML suspension 30 mL, 30 mL, Oral, PRN, Aren Fails, APRN - CNP    traZODone (DESYREL) tablet 50 mg, 50 mg, Oral, Nightly PRN, ZOIE Nathan - CNP, 50 mg at 08/29/21 2126    diphenhydrAMINE (BENADRYL) injection 50 mg, 50 mg, IntraMUSCular, Q6H PRN, Aren Fails, APRN - CNP    LORazepam (ATIVAN) tablet 1 mg, 1 mg, Oral, Q6H PRN, Aren Fails, ZOIE - CNP, 1 mg at 08/29/21 2119    haloperidol lactate (HALDOL) injection 5 mg, 5 mg, IntraMUSCular, Q6H PRN, ZOIE Nathan - LATONIA      Examination:  /64   Pulse 84   Temp 98.7 °F (37.1 °C) (Temporal)   Resp 16   Ht 5' 4\" (1.626 m)   Wt 122 lb (55.3 kg)   SpO2 96%   BMI 20.94 kg/m²   Gait - steady  Medication side effects(SE): none reported    Mental Status Examination:    Level of consciousness:  within normal limits   Appearance:  fair grooming and fair hygiene  Behavior/Motor:  no abnormalities noted  Attitude toward examiner:  cooperative and good eye contact  Speech:  spontaneous, normal rate, normal volume and well articulated   Mood: euthymic  Affect:  mood congruent  Thought processes:  linear, goal directed and coherent   Thought content:  Homocidal ideation denies  Suicidal Ideation:  denies suicidal ideation  Delusions:  no evidence of delusions  Perceptual Disturbance:  denies any perceptual disturbance  Cognition:  oriented to person, place, and time   Concentration distractible  Insight fair   Judgement fair     ASSESSMENT:   Patient symptoms are:  [x] Well controlled  [x] Improving  [] Worsening  [] No change      Diagnosis:   Bipolar Disorder, depressed vs. Major Depressive Disorder, recurrent  Opioid use disorder  Cannabis use disorder  R/o PTSD    LABS:    No results for input(s): WBC, HGB, PLT in the last 72 hours. No results for input(s): NA, K, CL, CO2, BUN, CREATININE, GLUCOSE in the last 72 hours. No results for input(s): BILITOT, ALKPHOS, AST, ALT in the last 72 hours. Lab Results   Component Value Date    LABAMPH NOT DETECTED 08/27/2021    LABAMPH NOT DETECTED 07/04/2011    BARBSCNU NOT DETECTED 08/27/2021    LABBENZ NOT DETECTED 08/27/2021    LABBENZ SEE BELOW 07/01/2014    CANNAB POSITIVE  07/04/2011    LABMETH NOT DETECTED 08/27/2021    OPIATESCREENURINE POSITIVE 08/27/2021    PHENCYCLIDINESCREENURINE NOT DETECTED 08/27/2021    ETOH <10 08/27/2021     Lab Results   Component Value Date    TSH 0.008 05/21/2018     No results found for: LITHIUM  Lab Results   Component Value Date    VALPROATE 31 (L) 06/02/2016       RISK ASSESSMENT:   Suicide risk: moderate  Homicide risk: low  Violence risk: low  Elopement risk: low    Treatment Plan:  Reviewed current Medications with the patient. Will continue current medications. Risks, benefits, side effects, drug-to-drug interactions and alternatives to treatment were discussed. Collateral information: pending  CD evaluation pending  Encourage patient to attend group and other milieu activities.   Discharge planning discussed with the patient and treatment team.    PSYCHOTHERAPY/COUNSELING:  [x] Therapeutic interview  [x] Supportive  [] CBT  [] Ongoing  [] Other    [x] Patient continues to need, on a daily basis, active treatment furnished directly by or requiring the supervision of inpatient psychiatric personnel      Anticipated Length of stay:             Electronically signed by ZOIE Jacobson CNP on 8/30/2021 at 1:51 PM

## 2021-08-30 NOTE — GROUP NOTE
Group Therapy Note    Date: 8/30/2021    Group Start Time: 1100  Group End Time: 8281  Group Topic: Cognitive Skills    [facility]    DAVID Khan LSW        Group Therapy Note    Attendees:8           Patient's Goal: Pt will be able to follow a guided meditation on the topic of a 'body scan'    Notes:  Pt made connections and participated in group    Status After Intervention:  Improved    Participation Level:  Active Listener and Interactive    Participation Quality: Appropriate, Attentive, Sharing and Supportive      Speech:  normal      Thought Process/Content: Logical  Linear      Affective Functioning: Congruent      Mood: depressed      Level of consciousness:  Alert, Oriented x4 and Attentive      Response to Learning: Able to verbalize current knowledge/experience      Endings: None Reported    Modes of Intervention: Education, Support, Socialization, Exploration, Clarifying, Problem-solving and Activity      Discipline Responsible: /Counselor      Signature:  DAVID Khan LSW

## 2021-08-31 PROCEDURE — 1240000000 HC EMOTIONAL WELLNESS R&B

## 2021-08-31 PROCEDURE — 99231 SBSQ HOSP IP/OBS SF/LOW 25: CPT | Performed by: NURSE PRACTITIONER

## 2021-08-31 PROCEDURE — 6370000000 HC RX 637 (ALT 250 FOR IP): Performed by: NURSE PRACTITIONER

## 2021-08-31 PROCEDURE — 6370000000 HC RX 637 (ALT 250 FOR IP): Performed by: PSYCHIATRY & NEUROLOGY

## 2021-08-31 RX ADMIN — RIVAROXABAN 20 MG: 20 TABLET, FILM COATED ORAL at 17:09

## 2021-08-31 RX ADMIN — VENLAFAXINE HYDROCHLORIDE 37.5 MG: 37.5 CAPSULE, EXTENDED RELEASE ORAL at 20:30

## 2021-08-31 RX ADMIN — CLONAZEPAM 1 MG: 0.5 TABLET ORAL at 09:30

## 2021-08-31 RX ADMIN — TRAZODONE HYDROCHLORIDE 50 MG: 50 TABLET ORAL at 20:30

## 2021-08-31 RX ADMIN — LURASIDONE HYDROCHLORIDE 60 MG: 20 TABLET, FILM COATED ORAL at 09:06

## 2021-08-31 RX ADMIN — METHIMAZOLE 10 MG: 5 TABLET ORAL at 09:07

## 2021-08-31 RX ADMIN — VENLAFAXINE HYDROCHLORIDE 75 MG: 75 CAPSULE, EXTENDED RELEASE ORAL at 09:06

## 2021-08-31 RX ADMIN — FINASTERIDE 5 MG: 5 TABLET, FILM COATED ORAL at 09:06

## 2021-08-31 RX ADMIN — CLONAZEPAM 1 MG: 0.5 TABLET ORAL at 17:09

## 2021-08-31 RX ADMIN — METHIMAZOLE 10 MG: 5 TABLET ORAL at 20:30

## 2021-08-31 RX ADMIN — LISINOPRIL 5 MG: 10 TABLET ORAL at 09:06

## 2021-08-31 RX ADMIN — METHIMAZOLE 10 MG: 5 TABLET ORAL at 13:23

## 2021-08-31 RX ADMIN — ATORVASTATIN CALCIUM 40 MG: 40 TABLET, FILM COATED ORAL at 20:30

## 2021-08-31 ASSESSMENT — PAIN SCALES - GENERAL: PAINLEVEL_OUTOF10: 0

## 2021-08-31 NOTE — PROGRESS NOTES
Spiritual Support Group Note    Number of Participants in Group:     8                   Time:   2:30    Goal: Relief from isolation and loneliness             Ashley Sharing             Self-understanding and gain insight              Acceptance and belonging            Recognize they are not alone                Socialization             Empowerment       Encouragement    Topic:  [x] Spiritual Wellness and Self Care                  [x] Hope                     [] Connecting with Divine/Others        [] Thankfulness and Gratitude               []  Meaningfulness and Purpose               [x] Forgiveness               [x] Peace               [] Connect to [employer]      [] Other    Participation Level:   [x] Active Listener   [] Minimal   [] Monopolizing   [] Interactive   [] No Participation   []  Other:     Attention:   [x] Alert   [] Distractible   [] Drowsy   [] Poor   [] Other:    Manner:   [x] Cooperative   [] Suspicious   [] Withdrawn   [] Guarded   [] Irritable   [] Inhospitable   [] Other:     Others Comments from Group:

## 2021-08-31 NOTE — BH NOTE
Pt denies SI HI and hallucinations. Pt is pleasant, evasive, minimizing, poor insight. Pt noted to be disrespectful to other patients needs on the unit, intrusive, apologetic when confronted. Pt is out on the unit social with peers. No voiced depression or anxiety, delusions or paranoia. No aggression or behaviors. Pt is medication compliant. Pt is eating provided meals. Pt is attending and participating in groups. We will continue to provide support and comfort for the patient.

## 2021-08-31 NOTE — GROUP NOTE
Group Therapy Note    Date: 8/31/2021    Group Start Time: 1000  Group End Time: 1100  Group Topic: Psychoeducation    [facility] [address]        Group Therapy Note    Attendees: 10         Patient's Goal:  To quit reliving the past trauma when I was 9 and move forward in my recovery and with AA. Notes: Active and engaged during discussion on importance of a daily routine. Pleasant and social with peers. Status After Intervention:  Improved    Participation Level:  Active Listener and Interactive    Participation Quality: Appropriate, Attentive, Sharing and Supportive      Speech:  normal      Thought Process/Content: Logical      Affective Functioning: Congruent      Mood: euthymic      Level of consciousness:  Alert and Attentive      Response to Learning: Progressing to goal      Endings: None Reported    Modes of Intervention: Education, Support, Socialization and Exploration      Discipline Responsible: Psychoeducational Specialist      Signature:  Teddy Winkler, [address]

## 2021-08-31 NOTE — GROUP NOTE
Group Therapy Note    Date: 8/31/2021    Group Start Time: 1100  Group End Time: 1130  Group Topic: Cognitive Skills    [facility]    DAVID Phillips LSW        Group Therapy Note    Attendees: 7         Patient's Goal:  Pt participated in gratitude exercises     Notes:  Pt participated and made connections during group    Status After Intervention:  Improved    Participation Level:  Active Listener and Interactive    Participation Quality: Appropriate, Attentive, Sharing and Supportive      Speech:  normal      Thought Process/Content: Logical  Linear      Affective Functioning: Congruent      Mood: depressed      Level of consciousness:  Alert, Oriented x4 and Attentive      Response to Learning: Able to verbalize current knowledge/experience      Endings: None Reported    Modes of Intervention: Education, Support, Socialization, Exploration, Clarifying, Problem-solving and Activity      Discipline Responsible: /Counselor      Signature:  DAVID Phillips LSW

## 2021-08-31 NOTE — PLAN OF CARE
Problem: Depressive Behavior With or Without Suicide Precautions:  Goal: Able to verbalize acceptance of life and situations over which he or she has no control  Description: Able to verbalize acceptance of life and situations over which he or she has no control  Outcome: Met This Shift  Goal: Ability to disclose and discuss suicidal ideas will improve  Description: Ability to disclose and discuss suicidal ideas will improve  Outcome: Met This Shift  Goal: Absence of self-harm  Description: Absence of self-harm  Outcome: Met This Shift

## 2021-08-31 NOTE — PROGRESS NOTES
Patient has been out on the unit, pleasant, calm, and cooperative. Patient denies all and denies anxiety/depression. Patient states that \"I feel so much better. \"  Patient states that he is ready for discharge. Patient is encouraged to continue to work towards discharge goal by complying with medications, attending groups and to seek staff if feelings are overwhelming. Environmental rounds completed per unit policy to maintain safety of everyone on the unit. Staff will offer support and interventions as requested or required.

## 2021-08-31 NOTE — PROGRESS NOTES
BEHAVIORAL HEALTH FOLLOW-UP NOTE     8/31/2021     Patient was seen and examined in person, Chart reviewed   Patient's case discussed with staff/team    Chief Complaint: \"I am doing well, when do you think I can go home. \"     Interim History:     Patient said he was feeling \"better\". He said he was able to sleep well and that his appetite was \"good\". He said he did not feel depressed anymore. He denied having any more suicidal or homicidal ideation. He denied having any hallucinations, paranoia or other delusions. When asked about his statement in the ER that he had \"little girls\" at home, he said he had \"lied\" because he wanted to leave; however he said he now is happy to be on the unit and getting help. He is observed on the unit, he is present in the milieu and social with select peers.      Appetite:   [x] Normal/Unchanged  [] Increased  [] Decreased      Sleep:       [x] Normal/Unchanged  [] Fair       [] Poor              Energy:    [x] Normal/Unchanged  [] Increased  [] Decreased        SI [] Present  [x] Absent    HI  []Present  [x] Absent     Aggression:  [] yes  [x] no    Patient is [x] able  [] unable to CONTRACT FOR SAFETY     PAST MEDICAL/PSYCHIATRIC HISTORY:   Past Medical History:   Diagnosis Date    Anxiety     Arthritis     Asthma     Bipolar 1 disorder ([address]     Chronic combined systolic and diastolic CHF (congestive heart failure) ([address] 05/27/2018    COPD (chronic obstructive pulmonary disease) ([address]     Depression     Diabetes mellitus ([address]     GERD (gastroesophageal reflux disease)     Hepatitis C     resolved    Hypertension     Hyperthyroidism 8/2/2012    Hypothyroidism due to medication     Mass of testicle     Mesothelioma [facility])     pt states possibly not    Neuromuscular disorder ([address]     Other disorders of kidney and ureter     kidney stones; most recent 08/27/2015    Paroxysmal A-fib ([address]     discussed with PCP- patient does have hx of PAfib    Peptic ulcer     Prostate enlargement     Pulmonary embolism (HCC)     Intermediate risk for PE on VQ scan.  Seizures ([address]     Thyroid disease     hyperthyroid       FAMILY/SOCIAL HISTORY:  Family History   Problem Relation Age of Onset    Cancer Mother     Diabetes Mother     Cancer Father     Diabetes Father     Diabetes Maternal Aunt     Diabetes Maternal Grandmother     Cancer Maternal Grandfather     Heart Disease Paternal Grandmother     Heart Failure Paternal Grandmother     Heart Disease Paternal Grandfather     Heart Failure Paternal Grandfather     Diabetes Maternal Aunt     Diabetes Maternal Aunt      Social History     Socioeconomic History    Marital status: Single     Spouse name: Not on file    Number of children: Not on file    Years of education: Not on file    Highest education level: Not on file   Occupational History    Occupation: disability   Tobacco Use    Smoking status: Former Smoker     Years: 10.00     Types: Cigarettes     Quit date: 1/10/2010     Years since quittin.6    Smokeless tobacco: Former User     Types: [address] date:    Vaping Use    Vaping Use: Never used   Substance and Sexual Activity    Alcohol use: No     Alcohol/week: 0.0 standard drinks     Comment: recovered alcoholic; 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    Drug use: Not Currently     Comment: heroin, cocaine; last use 04/15/2014    Sexual activity: Yes     Comment: heroin   Other Topics Concern    Not on file   Social History Narrative    Not on file     Social Determinants of Health     Financial Resource Strain:     Difficulty of Paying Living Expenses:    Food Insecurity:     Worried About Running Out of Food in the Last Year:     [address] in the Last Year:    Transportation Needs:     Lack of Transportation (Medical):      Lack of Transportation (Non-Medical):    Physical Activity:     Days of Exercise per Week:     Minutes of Exercise per Session:    Stress:     Feeling of Stress :    Social Connections:     Frequency of Communication with Friends and Family:     Frequency of Social Gatherings with Friends and Family:     Attends [employer] Services:     Active Member of Clubs or Organizations:     Attends Club or Organization Meetings:     Marital Status:    Intimate Partner Violence:     Fear of Current or Ex-Partner:     Emotionally Abused:     Physically Abused:     Sexually Abused:            ROS:  [x] All negative/unchanged except if checked.  Explain positive(checked items) below:  [] Constitutional  [] Eyes  [] Ear/Nose/Mouth/Throat  [] Respiratory  [] CV  [] GI  []   [] Musculoskeletal  [] Skin/Breast  [] Neurological  [] Endocrine  [] Heme/Lymph  [] Allergic/Immunologic    Explanation:     MEDICATIONS:    Current Facility-Administered Medications:     rivaroxaban (XARELTO) tablet 20 mg, 20 mg, Oral, Daily, Daryl Dsouza MD, 20 mg at 08/30/21 1831    lisinopril (PRINIVIL;ZESTRIL) tablet 5 mg, 5 mg, Oral, Daily, Daryl Dsouza MD, 5 mg at 08/31/21 7832    finasteride (PROSCAR) tablet 5 mg, 5 mg, Oral, Daily, Daryl Dsouza MD, 5 mg at 08/31/21 9987    atorvastatin (LIPITOR) tablet 40 mg, 40 mg, Oral, Nightly, Daryl Dsouza MD, 40 mg at 08/30/21 2111    methIMAzole (TAPAZOLE) tablet 10 mg, 10 mg, Oral, TID, Daryl Dsouza MD, 10 mg at 08/31/21 1323    venlafaxine (EFFEXOR XR) extended release capsule 75 mg, 75 mg, Oral, Daily with breakfast, Daryl Dsouza MD, 75 mg at 08/31/21 0906    venlafaxine (EFFEXOR XR) extended release capsule 37.5 mg, 37.5 mg, Oral, Nightly, Daryl Dsouza MD, 37.5 mg at 08/30/21 2110    lurasidone (LATUDA) tablet 60 mg, 60 mg, Oral, Daily, Daryl Dsouza MD, 60 mg at 08/31/21 1572    clonazePAM (KLONOPIN) tablet 1 mg, 1 mg, Oral, TID PRN, Daryl Dsouza MD, 1 mg at 08/31/21 0930    acetaminophen (TYLENOL) tablet 650 mg, 650 mg, Oral, Q4H PRN, ZOIE Rodrigez - CNP    magnesium hydroxide (MILK OF MAGNESIA) 400 MG/5ML suspension 30 mL, 30 mL, Oral, Daily PRN, ZOIE Jovel - CNP    nicotine (NICODERM CQ) 21 MG/24HR 1 patch, 1 patch, Transdermal, Daily, ZOIE Jovel - CNP    aluminum & magnesium hydroxide-simethicone (MAALOX) 200-200-20 MG/5ML suspension 30 mL, 30 mL, Oral, PRN, Brea Rodriguez APRN - CNP    traZODone (DESYREL) tablet 50 mg, 50 mg, Oral, Nightly PRN, Brea Rodriguez APRN - CNP, 50 mg at 08/30/21 2111    diphenhydrAMINE (BENADRYL) injection 50 mg, 50 mg, IntraMUSCular, Q6H PRN, Brea Rodriguez APRN - CNP    LORazepam (ATIVAN) tablet 1 mg, 1 mg, Oral, Q6H PRN, Brea Lopezeger, APRN - CNP, 1 mg at 08/29/21 2119    haloperidol lactate (HALDOL) injection 5 mg, 5 mg, IntraMUSCular, Q6H PRN, ZOIE Jovel - CNP      Examination:  BP (!) 116/53   Pulse 80   Temp 98.1 °F (36.7 °C) (Temporal)   Resp 16   Ht 5' 4\" (1.626 m)   Wt 122 lb (55.3 kg)   SpO2 96%   BMI 20.94 kg/m²   Gait - steady  Medication side effects(SE): none reported    Mental Status Examination:    Level of consciousness:  within normal limits   Appearance:  fair grooming and fair hygiene  Behavior/Motor:  no abnormalities noted  Attitude toward examiner:  cooperative and good eye contact  Speech:  spontaneous, normal rate, normal volume and well articulated   Mood: euthymic  Affect:  mood congruent  Thought processes:  linear, goal directed and coherent   Thought content:  Homocidal ideation denies  Suicidal Ideation:  denies suicidal ideation  Delusions:  no evidence of delusions  Perceptual Disturbance:  denies any perceptual disturbance  Cognition:  oriented to person, place, and time   Concentration distractible  Insight fair   Judgement fair     ASSESSMENT:   Patient symptoms are:  [x] Well controlled  [x] Improving  [] Worsening  [] No change      Diagnosis:   Bipolar Disorder, depressed vs. Major Depressive Disorder, recurrent  Opioid use disorder  Cannabis use disorder  R/o PTSD    LABS:    No results for input(s): WBC, HGB, PLT in the last 72 hours. No results for input(s): NA, K, CL, CO2, BUN, CREATININE, GLUCOSE in the last 72 hours. No results for input(s): BILITOT, ALKPHOS, AST, ALT in the last 72 hours. Lab Results   Component Value Date    LABAMPH NOT DETECTED 08/27/2021    LABAMPH NOT DETECTED 07/04/2011    BARBSCNU NOT DETECTED 08/27/2021    LABBENZ NOT DETECTED 08/27/2021    LABBENZ SEE BELOW 07/01/2014    CANNAB POSITIVE  07/04/2011    LABMETH NOT DETECTED 08/27/2021    OPIATESCREENURINE POSITIVE 08/27/2021    PHENCYCLIDINESCREENURINE NOT DETECTED 08/27/2021    ETOH <10 08/27/2021     Lab Results   Component Value Date    TSH 0.008 05/21/2018     No results found for: LITHIUM  Lab Results   Component Value Date    VALPROATE 31 (L) 06/02/2016       Treatment Plan:  Reviewed current Medications with the patient. Will continue current medications. Risks, benefits, side effects, drug-to-drug interactions and alternatives to treatment were discussed. Collateral information: pending  CD evaluation pending  Encourage patient to attend group and other milieu activities.   Discharge planning discussed with the patient and treatment team.    PSYCHOTHERAPY/COUNSELING:  [x] Therapeutic interview  [x] Supportive  [] CBT  [] Ongoing  [] Other    [x] Patient continues to need, on a daily basis, active treatment furnished directly by or requiring the supervision of inpatient psychiatric personnel      Anticipated Length of stay:             Electronically signed by ZOIE Morales CNP on 8/31/2021 at 2:32 PM

## 2021-08-31 NOTE — PROGRESS NOTES
Pt continues to be friendly and bright when conversing with this . Pt out on the unit conversing with peers. Pt's estimated discharge date: 9/2 or 9/3. Sw will continue to monitor pt's moods and behaviors.

## 2021-08-31 NOTE — PLAN OF CARE
Problem: Depressive Behavior With or Without Suicide Precautions:  Goal: Able to verbalize acceptance of life and situations over which he or she has no control  Description: Able to verbalize acceptance of life and situations over which he or she has no control  Outcome: Met This Shift     Problem: Depressive Behavior With or Without Suicide Precautions:  Goal: Able to verbalize and/or display a decrease in depressive symptoms  Description: Able to verbalize and/or display a decrease in depressive symptoms  Outcome: Met This Shift     Problem: Depressive Behavior With or Without Suicide Precautions:  Goal: Ability to disclose and discuss suicidal ideas will improve  Description: Ability to disclose and discuss suicidal ideas will improve  Outcome: Met This Shift     Problem: Depressive Behavior With or Without Suicide Precautions:  Goal: Absence of self-harm  Description: Absence of self-harm  Outcome: Met This Shift     Problem: Falls - Risk of:  Goal: Will remain free from falls  Description: Will remain free from falls  Outcome: Met This Shift

## 2021-09-01 PROCEDURE — 99231 SBSQ HOSP IP/OBS SF/LOW 25: CPT | Performed by: NURSE PRACTITIONER

## 2021-09-01 PROCEDURE — 6370000000 HC RX 637 (ALT 250 FOR IP): Performed by: NURSE PRACTITIONER

## 2021-09-01 PROCEDURE — 6370000000 HC RX 637 (ALT 250 FOR IP): Performed by: PSYCHIATRY & NEUROLOGY

## 2021-09-01 PROCEDURE — 1240000000 HC EMOTIONAL WELLNESS R&B

## 2021-09-01 RX ORDER — CLONAZEPAM 0.5 MG/1
0.5 TABLET ORAL 2 TIMES DAILY PRN
Status: DISCONTINUED | OUTPATIENT
Start: 2021-09-01 | End: 2021-09-02 | Stop reason: HOSPADM

## 2021-09-01 RX ADMIN — LISINOPRIL 5 MG: 10 TABLET ORAL at 09:27

## 2021-09-01 RX ADMIN — METHIMAZOLE 10 MG: 5 TABLET ORAL at 09:27

## 2021-09-01 RX ADMIN — CLONAZEPAM 0.5 MG: 0.5 TABLET ORAL at 18:40

## 2021-09-01 RX ADMIN — LURASIDONE HYDROCHLORIDE 60 MG: 20 TABLET, FILM COATED ORAL at 09:27

## 2021-09-01 RX ADMIN — RIVAROXABAN 20 MG: 20 TABLET, FILM COATED ORAL at 17:09

## 2021-09-01 RX ADMIN — FINASTERIDE 5 MG: 5 TABLET, FILM COATED ORAL at 09:27

## 2021-09-01 RX ADMIN — VENLAFAXINE HYDROCHLORIDE 37.5 MG: 37.5 CAPSULE, EXTENDED RELEASE ORAL at 20:59

## 2021-09-01 RX ADMIN — ATORVASTATIN CALCIUM 40 MG: 40 TABLET, FILM COATED ORAL at 20:59

## 2021-09-01 RX ADMIN — TRAZODONE HYDROCHLORIDE 50 MG: 50 TABLET ORAL at 20:59

## 2021-09-01 RX ADMIN — CLONAZEPAM 1 MG: 0.5 TABLET ORAL at 10:29

## 2021-09-01 RX ADMIN — VENLAFAXINE HYDROCHLORIDE 75 MG: 75 CAPSULE, EXTENDED RELEASE ORAL at 09:27

## 2021-09-01 RX ADMIN — CLONAZEPAM 1 MG: 0.5 TABLET ORAL at 02:21

## 2021-09-01 RX ADMIN — METHIMAZOLE 10 MG: 5 TABLET ORAL at 14:43

## 2021-09-01 RX ADMIN — METHIMAZOLE 10 MG: 5 TABLET ORAL at 20:59

## 2021-09-01 ASSESSMENT — PAIN SCALES - GENERAL
PAINLEVEL_OUTOF10: 0
PAINLEVEL_OUTOF10: 0

## 2021-09-01 NOTE — PLAN OF CARE
Denies SI, HI, and hallucinations. He is bright and pleasant with staff. He states \"I'm doing much better now. \" Today he is focused on his discharge date. He states \"I need to look for a new apartment and I'm getting another heart surgery in 3 weeks. It's from all the drugs I did, the crack cocaine. \" He is also reporting anxiety and requesting medication. PRN given for anxiety. See MAR. No other complaints or concerns verbalized at this time. He is out on the unit, attending groups, and is social with peers. Will continue to monitor and offer support.         Problem: Depressive Behavior With or Without Suicide Precautions:  Goal: Able to verbalize acceptance of life and situations over which he or she has no control  Description: Able to verbalize acceptance of life and situations over which he or she has no control  Outcome: Met This Shift     Problem: Depressive Behavior With or Without Suicide Precautions:  Goal: Able to verbalize and/or display a decrease in depressive symptoms  Description: Able to verbalize and/or display a decrease in depressive symptoms  Outcome: Met This Shift     Problem: Depressive Behavior With or Without Suicide Precautions:  Goal: Ability to disclose and discuss suicidal ideas will improve  Description: Ability to disclose and discuss suicidal ideas will improve  Outcome: Met This Shift     Problem: Depressive Behavior With or Without Suicide Precautions:  Goal: Absence of self-harm  Description: Absence of self-harm  Outcome: Met This Shift     Problem: Falls - Risk of:  Goal: Will remain free from falls  Description: Will remain free from falls  Outcome: Met This Shift           Electronically signed by Lucie Toth RN on 9/1/2021 at 12:21 PM

## 2021-09-01 NOTE — GROUP NOTE
Group Therapy Note    Date: 9/1/2021    Group Start Time: 1000  Group End Time: 9828  Group Topic: Psychoeducation    [facility]    Yanira Cevallos, CTRS        Group Therapy Note    Number of participants: 9  Type of group: Psychoeducation  Mode of intervention: Education, Support, Socialization, Exploration, Clarifying, and Problem-solving  Topic: Time Management Skills  Objective: Pt will identify 1 way to manage time better in recovery. Patient's Goal:  \"Go home\"     Notes:  Pt was interactive during group sharing 1 way to manage time better in recovery. Pt gave support and feedback to others. Status After Intervention:  Improved    Participation Level:  Active Listener and Interactive    Participation Quality: Appropriate, Attentive, Sharing and Supportive      Speech:  normal      Thought Process/Content: Logical      Affective Functioning: Congruent      Mood: euthymic      Level of consciousness:  Alert, Oriented x4 and Attentive      Response to Learning: Able to verbalize current knowledge/experience, Able to verbalize/acknowledge new learning, Able to retain information, Capable of insight, Able to change behavior and Progressing to goal      Endings: None Reported    Modes of Intervention: Education, Support, Socialization, Exploration, Clarifying and Problem-solving

## 2021-09-01 NOTE — PROGRESS NOTES
Pt continues to be friendly and bright when conversing with this . Pt appears to be discharge focused. Pt's estimated discharge date: 9/2 or 9/3. Sw will continue to monitor pt's moods and behaviors.

## 2021-09-01 NOTE — GROUP NOTE
Group Therapy Note    Date: 9/1/2021    Group Start Time: 1050  Group End Time: 1130  Group Topic: Cognitive Skills    [facility]    DAVID Beebe LSW        Group Therapy Note    Attendees: 9         Patient's Goal:  Pt will be able to verbalize understanding of 'self-care tips' and why self-care is important'. Notes: Pt participated and made connections during group    Status After Intervention:  Improved    Participation Level:  Active Listener    Participation Quality: Appropriate, Attentive, Sharing and Supportive      Speech:  normal      Thought Process/Content: Logical  Linear      Affective Functioning: Congruent      Mood: depressed      Level of consciousness:  Alert, Oriented x4 and Attentive      Response to Learning: Able to verbalize current knowledge/experience      Endings: None Reported    Modes of Intervention: Education, Support, Socialization, Exploration, Clarifying, Problem-solving and Activity      Discipline Responsible: /Counselor      Signature:  DAVID Beebe LSW

## 2021-09-01 NOTE — PROGRESS NOTES
[id number]    Pt attended and participated in leisure group of lifestories. Pt was 1 out of 7 in attendance.

## 2021-09-01 NOTE — PROGRESS NOTES
Patient has been out on the unit pleasant, calm, and cooperative. Patient denies all and denies anxiety/depression at this time. Patient is encouraged to continue to work towards discharge goal by complying with medications, attending groups and to seek staff if feelings are overwhelming. Environmental rounds completed per unit policy to maintain safety of everyone on the unit. Staff will offer support and interventions as requested or required.

## 2021-09-01 NOTE — PROGRESS NOTES
BEHAVIORAL HEALTH FOLLOW-UP NOTE     9/1/2021     Patient was seen and examined in person, Chart reviewed   Patient's case discussed with staff/team    Chief Complaint: \"I am doing well, when do you think I can go home. \"     Interim History:     Patient said he was feeling \"better\". He said he was able to sleep well and that his appetite was \"good\". He said he did not feel depressed anymore. He denied having any more suicidal or homicidal ideation. He denied having any hallucinations, paranoia or other delusions. When asked about his statement in the ER that he had \"little girls\" at home, he said he had \"lied\" because he wanted to leave; however he said he now is happy to be on the unit and getting help. He is observed on the unit, he is present in the milieu and social with select peers. Full decrease Klonopin from 1 mg 3 times daily as needed to Klonopin 0.5 mg twice daily as needed. The patient is heavily utilizing his as needed Klonopin that was continued on admission due to taking this medication in the outpatient setting. We will taper him off of this medication safely prior to discharge.     Appetite:   [x] Normal/Unchanged  [] Increased  [] Decreased      Sleep:       [x] Normal/Unchanged  [] Fair       [] Poor              Energy:    [x] Normal/Unchanged  [] Increased  [] Decreased        SI [] Present  [x] Absent    HI  []Present  [x] Absent     Aggression:  [] yes  [x] no    Patient is [x] able  [] unable to CONTRACT FOR SAFETY     PAST MEDICAL/PSYCHIATRIC HISTORY:   Past Medical History:   Diagnosis Date    Anxiety     Arthritis     Asthma     [facility]     Chronic combined systolic and diastolic CHF (congestive heart failure) ([facility] 05/27/2018    COPD (chronic obstructive pulmonary disease) ([facility]     Depression     Diabetes mellitus ([facility]     GERD (gastroesophageal reflux disease)     Hepatitis C     resolved    Hypertension     Hyperthyroidism 8/2/2012    Hypothyroidism due to medication     Mass of testicle     Mesothelioma [facility])     pt states possibly not    [facility]rom[facility] disorder [facility]     Other disorders of kidney and ureter     kidney stones; most recent 2015    Paroxysmal A-fib [facility])     discussed with PCP- patient does have hx of PAfib    Peptic ulcer     Prostate enlargement     Pulmonary embolism (HCC)     Intermediate risk for PE on VQ scan.     Seizures ([facility]     Thyroid disease     hyperthyroid       FAMILY/SOCIAL HISTORY:  Family History   Problem Relation Age of Onset    Cancer Mother     Diabetes Mother     Cancer Father     Diabetes Father     Diabetes Maternal Aunt     Diabetes Maternal Grandmother     Cancer Maternal Grandfather     Heart Disease Paternal Grandmother     Heart Failure Paternal Grandmother     Heart Disease Paternal Grandfather     Heart Failure Paternal Grandfather     Diabetes Maternal Aunt     Diabetes Maternal Aunt      Social History     Socioeconomic History    Marital status: Single     Spouse name: Not on file    Number of children: Not on file    Years of education: Not on file    Highest education level: Not on file   Occupational History    Occupation: disability   Tobacco Use    Smoking status: Former Smoker     Years: 10.00     Types: Cigarettes     Quit date: 1/10/2010     Years since quittin.6    Smokeless tobacco: Former User     Types: Chew     Quit date:    Vaping Use    Vaping Use: Never used   Substance and Sexual Activity    Alcohol use: No     Alcohol/week: 0.0 standard drinks     Comment: recovered alcoholic; 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    Drug use: Not Currently     Comment: heroin, cocaine; last use 04/15/2014    Sexual activity: Yes     Comment: heroin   Other Topics Concern    Not on file   Social History Narrative    Not on file     Social Determinants of Health     Financial Resource Strain:     Difficulty of Paying Living Expenses:    Food Insecurity:     Worried About Running Out of Food in the Last Year:    [address] in the Last Year:    Transportation Needs:     Lack of Transportation (Medical):  Lack of Transportation (Non-Medical):    Physical Activity:     Days of Exercise per Week:     Minutes of Exercise per Session:    Stress:     Feeling of Stress :    Social Connections:     Frequency of Communication with Friends and Family:     Frequency of Social Gatherings with Friends and Family:     Attends [employer] Services:     Active Member of Clubs or Organizations:     Attends Club or Organization Meetings:     Marital Status:    Intimate Partner Violence:     Fear of Current or Ex-Partner:     Emotionally Abused:     Physically Abused:     Sexually Abused:            ROS:  [x] All negative/unchanged except if checked.  Explain positive(checked items) below:  [] Constitutional  [] Eyes  [] Ear/Nose/Mouth/Throat  [] Respiratory  [] CV  [] GI  []   [] Musculoskeletal  [] Skin/Breast  [] Neurological  [] Endocrine  [] Heme/Lymph  [] Allergic/Immunologic    Explanation:     MEDICATIONS:    Current Facility-Administered Medications:     clonazePAM (KLONOPIN) tablet 0.5 mg, 0.5 mg, Oral, BID PRN, Crescencio Morillo APRN - CNP    rivaroxaban (XARELTO) tablet 20 mg, 20 mg, Oral, Daily, Humberto Levy MD, 20 mg at 08/31/21 1709    lisinopril (PRINIVIL;ZESTRIL) tablet 5 mg, 5 mg, Oral, Daily, Humberto Levy MD, 5 mg at 09/01/21 3408    finasteride (PROSCAR) tablet 5 mg, 5 mg, Oral, Daily, Humberto Levy MD, 5 mg at 09/01/21 2425    atorvastatin (LIPITOR) tablet 40 mg, 40 mg, Oral, Nightly, Humberto Levy MD, 40 mg at 08/31/21 2030    methIMAzole (TAPAZOLE) tablet 10 mg, 10 mg, Oral, TID, Humberto Levy MD, 10 mg at 09/01/21 1841    venlafaxine (EFFEXOR XR) extended release capsule 75 mg, 75 mg, Oral, Daily with breakfast, Humberto Levy MD, 75 mg at 09/01/21 0927    venlafaxine (EFFEXOR XR) extended release capsule 37.5 mg, 37.5 mg, Oral, Nightly, Mark Jin Emmy Dasilva MD, 37.5 mg at 08/31/21 2030    lurasidone (LATUDA) tablet 60 mg, 60 mg, Oral, Daily, Humberto Levy MD, 60 mg at 09/01/21 8713    acetaminophen (TYLENOL) tablet 650 mg, 650 mg, Oral, Q4H PRN, ZOIE Sanchez CNP    magnesium hydroxide (MILK OF MAGNESIA) 400 MG/5ML suspension 30 mL, 30 mL, Oral, Daily PRN, ZOIE Sanchez CNP    nicotine (NICODERM CQ) 21 MG/24HR 1 patch, 1 patch, TransDERmal, Daily, ZOIE Sanchez CNP    aluminum & magnesium hydroxide-simethicone (MAALOX) 200-200-20 MG/5ML suspension 30 mL, 30 mL, Oral, PRN, ZOIE Sanchez CNP    traZODone (DESYREL) tablet 50 mg, 50 mg, Oral, Nightly PRN, ZOIE Sanchez CNP, 50 mg at 08/31/21 2030    diphenhydrAMINE (BENADRYL) injection 50 mg, 50 mg, IntraMUSCular, Q6H PRN, ZOIE Sanchez CNP    haloperidol lactate (HALDOL) injection 5 mg, 5 mg, IntraMUSCular, Q6H PRN, ZOIE Sanchez CNP      Examination:  BP (!) 162/68   Pulse 82   Temp 97.8 °F (36.6 °C) (Temporal)   Resp 16   Ht 5' 4\" (1.626 m)   Wt 122 lb (55.3 kg)   SpO2 98%   BMI 20.94 kg/m²   Gait - steady  Medication side effects(SE): none reported    Mental Status Examination:    Level of consciousness:  within normal limits   Appearance:  fair grooming and fair hygiene  Behavior/Motor:  no abnormalities noted  Attitude toward examiner:  cooperative and good eye contact  Speech:  spontaneous, normal rate, normal volume and well articulated   Mood: euthymic  Affect:  mood congruent  Thought processes:  linear, goal directed and coherent   Thought content:  Homocidal ideation denies  Suicidal Ideation:  denies suicidal ideation  Delusions:  no evidence of delusions  Perceptual Disturbance:  denies any perceptual disturbance  Cognition:  oriented to person, place, and time   Concentration distractible  Insight fair   Judgement fair     ASSESSMENT:   Patient symptoms are:  [x] Well controlled  [x] Improving  [] Worsening  [] No change      Diagnosis:   Bipolar Disorder, depressed vs. Major Depressive Disorder, recurrent  Opioid use disorder  Cannabis use disorder  R/o PTSD    LABS:    No results for input(s): WBC, HGB, PLT in the last 72 hours. No results for input(s): NA, K, CL, CO2, BUN, CREATININE, GLUCOSE in the last 72 hours. No results for input(s): BILITOT, ALKPHOS, AST, ALT in the last 72 hours. Lab Results   Component Value Date    LABAMPH NOT DETECTED 08/27/2021    LABAMPH NOT DETECTED 07/04/2011    BARBSCNU NOT DETECTED 08/27/2021    LABBENZ NOT DETECTED 08/27/2021    LABBENZ SEE BELOW 07/01/2014    CANNAB POSITIVE  07/04/2011    LABMETH NOT DETECTED 08/27/2021    OPIATESCREENURINE POSITIVE 08/27/2021    PHENCYCLIDINESCREENURINE NOT DETECTED 08/27/2021    ETOH <10 08/27/2021     Lab Results   Component Value Date    TSH 0.008 05/21/2018     No results found for: LITHIUM  Lab Results   Component Value Date    VALPROATE 31 (L) 06/02/2016       Treatment Plan:  Reviewed current Medications with the patient. Will continue current medications. Risks, benefits, side effects, drug-to-drug interactions and alternatives to treatment were discussed. Collateral information: pending  CD evaluation pending  Encourage patient to attend group and other milieu activities.   Discharge planning discussed with the patient and treatment team.    PSYCHOTHERAPY/COUNSELING:  [x] Therapeutic interview  [x] Supportive  [] CBT  [] Ongoing  [] Other    [x] Patient continues to need, on a daily basis, active treatment furnished directly by or requiring the supervision of inpatient psychiatric personnel      Anticipated Length of stay:             Electronically signed by ZOIE Peres CNP on 9/1/2021 at 11:16 AM

## 2021-09-02 VITALS
TEMPERATURE: 98.1 F | HEIGHT: 64 IN | WEIGHT: 122 LBS | SYSTOLIC BLOOD PRESSURE: 128 MMHG | RESPIRATION RATE: 14 BRPM | BODY MASS INDEX: 20.83 KG/M2 | HEART RATE: 77 BPM | OXYGEN SATURATION: 98 % | DIASTOLIC BLOOD PRESSURE: 69 MMHG

## 2021-09-02 PROBLEM — F13.20 BENZODIAZEPINE DEPENDENCE (HCC): Status: ACTIVE | Noted: 2021-09-02

## 2021-09-02 PROCEDURE — 99239 HOSP IP/OBS DSCHRG MGMT >30: CPT | Performed by: NURSE PRACTITIONER

## 2021-09-02 PROCEDURE — 6370000000 HC RX 637 (ALT 250 FOR IP): Performed by: PSYCHIATRY & NEUROLOGY

## 2021-09-02 PROCEDURE — 6370000000 HC RX 637 (ALT 250 FOR IP): Performed by: NURSE PRACTITIONER

## 2021-09-02 RX ORDER — NICOTINE 21 MG/24HR
1 PATCH, TRANSDERMAL 24 HOURS TRANSDERMAL DAILY
Qty: 30 PATCH | Refills: 3
Start: 2021-09-03 | End: 2021-09-20

## 2021-09-02 RX ORDER — VENLAFAXINE HYDROCHLORIDE 37.5 MG/1
37.5 CAPSULE, EXTENDED RELEASE ORAL NIGHTLY
Qty: 30 CAPSULE | Refills: 0 | Status: ON HOLD | OUTPATIENT
Start: 2021-09-02 | End: 2021-09-28 | Stop reason: HOSPADM

## 2021-09-02 RX ORDER — CLONAZEPAM 0.5 MG/1
0.5 TABLET ORAL 2 TIMES DAILY PRN
Qty: 6 TABLET | Refills: 0 | Status: ON HOLD | OUTPATIENT
Start: 2021-09-02 | End: 2021-11-11

## 2021-09-02 RX ORDER — VENLAFAXINE HYDROCHLORIDE 75 MG/1
75 CAPSULE, EXTENDED RELEASE ORAL
Qty: 30 CAPSULE | Refills: 0 | Status: ON HOLD | OUTPATIENT
Start: 2021-09-03 | End: 2021-10-18 | Stop reason: HOSPADM

## 2021-09-02 RX ADMIN — FINASTERIDE 5 MG: 5 TABLET, FILM COATED ORAL at 09:35

## 2021-09-02 RX ADMIN — METHIMAZOLE 10 MG: 5 TABLET ORAL at 13:20

## 2021-09-02 RX ADMIN — VENLAFAXINE HYDROCHLORIDE 75 MG: 75 CAPSULE, EXTENDED RELEASE ORAL at 09:04

## 2021-09-02 RX ADMIN — CLONAZEPAM 0.5 MG: 0.5 TABLET ORAL at 09:03

## 2021-09-02 RX ADMIN — LISINOPRIL 5 MG: 10 TABLET ORAL at 09:02

## 2021-09-02 RX ADMIN — METHIMAZOLE 10 MG: 5 TABLET ORAL at 09:02

## 2021-09-02 RX ADMIN — LURASIDONE HYDROCHLORIDE 60 MG: 20 TABLET, FILM COATED ORAL at 09:02

## 2021-09-02 ASSESSMENT — PAIN SCALES - GENERAL: PAINLEVEL_OUTOF10: 0

## 2021-09-02 NOTE — BH NOTE
Pt denies SI HI and hallucinations. Pt is pleasant, blunt, out on the unit appropriate with staff and peers. Pt is medication compliant. Pt is eating provided meals, attending and participating in groups. No voiced depression or anxiety. No delusions or paranoia. No aggression or behaviors. We will continue to provide support and comfort for the patient.

## 2021-09-02 NOTE — CARE COORDINATION
KADEN note: d/c planning: KADEN met with pt and discussed d/c. He states that he treats at [facility] in [facility] where he sees [facility] for medication management. Pt states Gopi blackwell slipped him d/t concern when pt made suicidal statements after finding out he was going to be evicted. Pt states he is feeling better re: this now and will be looking for another apt. He states he can return to his apt upon d/c as he just received his 30 day eviction notice. Pt states he always takes bus and plans to take bus home today. KADEN called and left message for [facility] to call back re: appt.

## 2021-09-02 NOTE — PROGRESS NOTES
[facility]  Discharge Note    Pt discharged with followings belongings:   Dentures: Lowers, Uppers  Vision - Corrective Lenses: Glasses  Hearing Aid: None  Jewelry: Watch  Body Piercings Removed: N/A  Clothing: Pants, Shirt, Socks, Footwear  Were All Patient Medications Collected?: Not Applicable  Other Valuables: Jason Cramer sent home with patient or returned to patient. Patient education on aftercare instructions: Yes  Information reviewed with and copy provided to patient by Jeffery Lowery RN  at 1:00 pm .Patient verbalize understanding of AVS:  Yes.     Status EXAM upon discharge:  Status and Exam  Normal: Yes  Facial Expression: Brightened  Affect: Appropriate  Level of Consciousness: Alert  Mood:Normal: No  Mood: Anxious  Motor Activity:Normal: Yes  Interview Behavior: Cooperative  Preception: [place] to Person, [place] to Time, [place] to Place, [place] to Situation  Attention:Normal: No  Attention: Distractible  Thought Processes: Circumstantial, Tangential  Thought Content:Normal: No  Thought Content: Preoccupations  Hallucinations: None  Delusions: No  Memory:Normal: Yes  Memory: Confabulation  Insight and Judgment: Yes  Insight and Judgment: Poor Judgment, Poor Insight  Present Suicidal Ideation: No  Present Homicidal Ideation: No      Metabolic Screening:    Lab Results   Component Value Date    LABA1C 5.8 05/19/2018       Lab Results   Component Value Date    CHOL 133 05/19/2018    CHOL 148 07/06/2012     Lab Results   Component Value Date    TRIG 42 05/19/2018    TRIG 63 07/06/2012     Lab Results   Component Value Date    HDL 54 05/19/2018    HDL 65.0 07/06/2012     No components found for: [facility] [place]  Lab Results   Component Value Date    LABVLDL 8 05/19/2018       Stu Sandoval RN

## 2021-09-02 NOTE — GROUP NOTE
Group Therapy Note    Date: 9/2/2021    Group Start Time: 1105  Group End Time: 1150  Group Topic: Cognitive Skills    SE 7W I    SOHAM George        Group Therapy Note    Attendees: 9         Patient's Goal: Pt will be able to participate in discussion of the importance of 6 protective factors that can help to deal with life stressors. Notes: Pt participated in class discussion. Pt shared when called upon and seemed willing to learn new information. Status After Intervention:  Unchanged    Participation Level:  Active Listener and interactive    Participation Quality: Appropriate and Attentive      Speech:  normal      Thought Process/Content: Logical      Affective Functioning: Blunted      Mood: anxious and depressed      Level of consciousness:  Alert and Oriented x4      Response to Learning: Able to verbalize current knowledge/experience, Able to verbalize/acknowledge new learning and Progressing to goal      Endings: None Reported    Modes of Intervention: Education, Support, Socialization and Problem-solving      Discipline Responsible: /Counselor      Signature:  SOHAM George

## 2021-09-02 NOTE — CARE COORDINATION
In order to ensure appropriate transition and discharge planning is in place, the following documents have been transmitted to [facility] in [facility], as the new outpatient provider:     The d/c diagnosis was transmitted to the next care provider   The reason for hospitalization was transmitted to the next care provider   The d/c medications (dosage and indication) were transmitted to the next care provider    The continuing care plan was transmitted to the next care provider

## 2021-09-02 NOTE — SUICIDE SAFETY PLAN
SAFETY PLAN    A suicide Safety Plan is a document that supports someone when they are having thoughts of suicide. Warning Signs that indicate a suicidal crisis may be developing: What (situations, thoughts, feelings, body sensations, behaviors, etc.) do you experience that lets you know you are beginning to think about suicide? 1. Bad thoughts  2. Depression  3. Broke    Internal Coping Strategies:  What things can I do (relaxation techniques, hobbies, physical activities, etc.) to take my mind off my problems without contacting another person? 1. Run  2. [place]  3. Call sponsor     People and social settings that provide distraction: Who can I call or where can I go to distract me? 1. Name:   Phone:   2. Name:   Phone:    3. Place: [facility]            4. Place: [facility] whom I can ask for help: Who can I call when I need help - for example, friends, family, clergy, someone else? 1. Name: Chyna Sánchez                Phone:   2. Name: mom  Phone:   3. Name: Sponsor  Phone:     Professionals or [facility] agencies I can contact during a crisis: Who can I call for help - for example, my doctor, my psychiatrist, my psychologist, a mental health provider, a suicide hotline? 1. Clinician Name: [facility]   Phone: [phone]      Clinician Pager or Emergency Contact #:     2. Clinician Name:    Phone:       Clinician Pager or Emergency Contact #:     3. Suicide Prevention Lifeline: 5-114-459-TALK (0488)    4. [address] [place] [address] Emergency Services -  for example, [facility] suicide hotline, [facility] Hotline:       Emergency Services Address:       Emergency Services Phone: [facility]    Making the environment safe: How can I make my environment (house/apartment/living space) safer? For example, can I remove guns, medications, and other items? 1. Keep clean  2.

## 2021-09-02 NOTE — PROGRESS NOTES
CLINICAL PHARMACY NOTE: MEDS TO BEDS    Total # of Prescriptions Filled: 3   The following medications were delivered to the patient:  · EFFEXOR XR 75 MG  · EFFEXOR XR 37.5 MG  · KLONOPIN 0.5 MG    Additional Documentation:

## 2021-09-02 NOTE — PROGRESS NOTES
Group Therapy Note  Patient attended goals group and stated daily goal as to work on my anxiety by praying more. Group Therapy Note    Date: 9/2/2021  Start Time: 10:00  End Time: 10:30  Number of Participants: 8    Type of Group: Psychoeducation    Wellness Binder Information  Module Name: Coping Skills     Patient's Goal: To id positive coping skills to use on a  daily basis. Notes: attended group and was able to participate in discussion    Status After Intervention:  Improved    Participation Level:  Active Listener and Interactive    Participation Quality: Attentive and Sharing      Speech:  hesitant      Thought Process/Content: Linear      Affective Functioning: Flat      Mood: depressed      Level of consciousness:  Alert      Response to Learning: Progressing to goal      Endings: None Reported    Modes of Intervention: Education      Discipline Responsible: Psychoeducational Specialist      Signature:  TONY Bishop

## 2021-09-02 NOTE — DISCHARGE SUMMARY
DISCHARGE SUMMARY      Patient ID:  Manjula Bolds  [id number]  89 y.o.  1955    Admit date: 8/27/2021    Discharge date and time: 9/2/2021    Admitting Physician: Emelia Hernandez MD     Discharge Physician: Dr Macy Javed MD    Discharge Diagnoses:   Patient Active Problem List   Diagnosis    Hyperthyroidism    Hepatitis C    Mood disorder ([address]    Mild mitral regurgitation    Hep C w/o coma, chronic (HCC)    Chronic obstructive pulmonary disease ([address]    Major depressive disorder, recurrent ([address]    Dyspnea and respiratory abnormalities    Respiratory failure with hypoxia ([address]    Severe protein-calorie malnutrition ([address]    Asthma    A-fib ([address]    Benign prostatic hyperplasia    Essential (primary) hypertension    H/O drug abuse ([address]    Gastroduodenal ulcer    Seasonal allergic rhinitis    Type 2 diabetes mellitus ([address]    Vitamin D deficiency    LV dysfunction    S/P MVR (mitral valve repair)    NSTEMI (non-ST elevated myocardial infarction) ([address]    Suicidal ideations    Bipolar affective disorder, currently depressed, moderate ([address]    Benzodiazepine dependence ([address]       Admission Condition: poor    Discharged Condition: stable    Admission Circumstance:   Manjula Logan  is a 73 y.o. male with history of treatment for Bipolar Disorder (per his report) vs. Major Depressive Disorder, recurrent (per Epic diagnoses) who was admitted from the ER due to depressed mood and suicidal ideation. Per ER note, \"EMS reported that pt was at [facility] and reported to have suicidal ideations with recent plans to use a shotgun just a few days ago. Sanjuanita Mccrary is stressed about his housing, as he was evicted from his home, is bi-polar and is med compliant.        PAST MEDICAL/PSYCHIATRIC HISTORY:   Past Medical History:   Diagnosis Date    Anxiety     Arthritis     Asthma     Bipolar 1 disorder ([address]     Chronic combined systolic and diastolic CHF (congestive heart failure) [facility] 05/27/2018    COPD (chronic obstructive pulmonary disease) ([facility]     Depression     Diabetes mellitus (HCC)     GERD (gastroesophageal reflux disease)     Hepatitis C     resolved    Hypertension     Hyperthyroidism 2012    Hypothyroidism due to medication     Mass of testicle     Mesothelioma [facility])     pt states possibly not    Neuromuscular disorder ([facility]     Other disorders of kidney and ureter     kidney stones; most recent 2015    Paroxysmal A-fib [facility])     discussed with PCP- patient does have hx of PAfib    Peptic ulcer     Prostate enlargement     Pulmonary embolism (HCC)     Intermediate risk for PE on VQ scan.     Seizures ([facility]     Thyroid disease     hyperthyroid       FAMILY/SOCIAL HISTORY:  Family History   Problem Relation Age of Onset    Cancer Mother     Diabetes Mother     Cancer Father     Diabetes Father     Diabetes Maternal Aunt     Diabetes Maternal Grandmother     Cancer Maternal Grandfather     Heart Disease Paternal Grandmother     Heart Failure Paternal Grandmother     Heart Disease Paternal Grandfather     Heart Failure Paternal Grandfather     Diabetes Maternal Aunt     Diabetes Maternal Aunt      Social History     Socioeconomic History    Marital status: Single     Spouse name: Not on file    Number of children: Not on file    Years of education: Not on file    Highest education level: Not on file   Occupational History    Occupation: disability   Tobacco Use    Smoking status: Former Smoker     Years: 10.00     Types: Cigarettes     Quit date: 1/10/2010     Years since quittin.6    Smokeless tobacco: Former User     Types: [address] date:    Vaping Use    Vaping Use: Never used   Substance and Sexual Activity    Alcohol use: No     Alcohol/week: 0.0 standard drinks     Comment: recovered alcoholic; 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    Drug use: Not Currently     Comment: heroin, cocaine; last use 04/15/2014    Sexual activity: Yes     Comment: heroin Other Topics Concern    Not on file   Social History Narrative    Not on file     Social Determinants of Health     Financial Resource Strain:     Difficulty of Paying Living Expenses:    Food Insecurity:     Worried About Running Out of Food in the Last Year:     [address] in the Last Year:    Transportation Needs:     Lack of Transportation (Medical):      Lack of Transportation (Non-Medical):    Physical Activity:     Days of Exercise per Week:     Minutes of Exercise per Session:    Stress:     Feeling of Stress :    Social Connections:     Frequency of Communication with Friends and Family:     Frequency of Social Gatherings with Friends and Family:     Attends Confucianist Services:     Active Member of Clubs or Organizations:     Attends Club or Organization Meetings:     Marital Status:    Intimate Partner Violence:     Fear of Current or Ex-Partner:     Emotionally Abused:     Physically Abused:     Sexually Abused:        MEDICATIONS:    Current Facility-Administered Medications:     clonazePAM (KLONOPIN) tablet 0.5 mg, 0.5 mg, Oral, BID PRN, Mateo Salas, APRN - CNP, 0.5 mg at 09/02/21 1524    rivaroxaban (XARELTO) tablet 20 mg, 20 mg, Oral, Daily, Bree Sood MD, 20 mg at 09/01/21 1709    lisinopril (PRINIVIL;ZESTRIL) tablet 5 mg, 5 mg, Oral, Daily, Bree Sood MD, 5 mg at 09/02/21 0902    finasteride (PROSCAR) tablet 5 mg, 5 mg, Oral, Daily, Bree Sood MD, 5 mg at 09/02/21 0935    atorvastatin (LIPITOR) tablet 40 mg, 40 mg, Oral, Nightly, Bree Sood MD, 40 mg at 09/01/21 2059    methIMAzole (TAPAZOLE) tablet 10 mg, 10 mg, Oral, TID, Bree Sood MD, 10 mg at 09/02/21 0902    venlafaxine (EFFEXOR XR) extended release capsule 75 mg, 75 mg, Oral, Daily with breakfast, Bree Sood MD, 75 mg at 09/02/21 0904    venlafaxine (EFFEXOR XR) extended release capsule 37.5 mg, 37.5 mg, Oral, Nightly, Bree Sood MD, 37.5 mg at 09/01/21 2059   lurasidone (LATUDA) tablet 60 mg, 60 mg, Oral, Daily, Mathew Reveles MD, 60 mg at 09/02/21 0902    acetaminophen (TYLENOL) tablet 650 mg, 650 mg, Oral, Q4H PRN, Honora Quails, APRN - CNP    magnesium hydroxide (MILK OF MAGNESIA) 400 MG/5ML suspension 30 mL, 30 mL, Oral, Daily PRN, Honora Quails, APRN - CNP    nicotine (NICODERM CQ) 21 MG/24HR 1 patch, 1 patch, TransDERmal, Daily, Honora Quails, APRN - CNP    aluminum & magnesium hydroxide-simethicone (MAALOX) 200-200-20 MG/5ML suspension 30 mL, 30 mL, Oral, PRN, Honora Quails, APRN - CNP    traZODone (DESYREL) tablet 50 mg, 50 mg, Oral, Nightly PRN, Honora Quails, APRN - CNP, 50 mg at 09/01/21 2059    diphenhydrAMINE (BENADRYL) injection 50 mg, 50 mg, IntraMUSCular, Q6H PRN, Honora Quails, APRN - CNP    haloperidol lactate (HALDOL) injection 5 mg, 5 mg, IntraMUSCular, Q6H PRN, Honora Quails, APRN - CNP    Examination:  /69   Pulse 77   Temp 98.1 °F (36.7 °C) (Temporal)   Resp 14   Ht 5' 4\" (1.626 m)   Wt 122 lb (55.3 kg)   SpO2 98%   BMI 20.94 kg/m²   Gait - steady    HOSPITAL COURSE[de-identified]  Following admission to the hospital, patient had a complete physical exam and blood work up, which he was medically cleared and admitted to [facility] for psychiatric evaluation and stabilization. The patient was monitored closely with suicide and appropriate precautions. He was continued on his home regime of psychiatric medications as he stated that these worked very well for him. There was no indication for medication changes on this admission. However he was tapered down on the Klonopin prior to discharge. He was encouraged to participate in group and other milieu activity and started to feel better with this combination of treatment. There has been significant progress in the improvement of symptoms since admission. The patient has been an active participant in his treatment, and discharge planning.       Patient was no longer suicidal, homicidal, manic or psychotic. He received the required treatment with medication, participated in group milieu, remained engaged in unit activities, learned appropriate coping skills. He was seen to be watching television socializing with peers using the phone. There were no mention or gestures of self-harm or harm to others. His mental status has returned to baseline. The treatment team believes the patient obtain maximum benefit out of this hospitalization and does not meet the criteria for inpatient hospitalization anymore. However he will continue to benefit from outpatient follow-up and treatment to maintain stability. Collateral information has been obtained and reconciled and there are no concerns about his safety. He has no access to guns or weapons. He appreciates the help that he received here. This patient no longer meets criteria for inpatient hospitalization. He was discharged home to his family in psychiatrically stable condition. Appetite:  [x] Normal  [] Increased  [] Decreased    Sleep:       [x] Normal  [] Fair       [] Poor            Energy:    [x] Normal  [] Increased  [] Decreased     SI [] Present  [x] Absent  HI  []Present  [x] Absent   Aggression:  [] yes  [] no  Patient is [x] able  [] unable to CONTRACT FOR SAFETY   Medication side effects(SE):  [x] None(Psych. Meds.) [] Other      Mental Status Examination on discharge:    Level of consciousness:  within normal limits   Appearance:  well-appearing  Behavior/Motor:  no abnormalities noted  Attitude toward examiner:  attentive and good eye contact  Speech:  spontaneous, normal rate and normal volume   Mood: euthymic  Affect:  mood congruent  Thought processes:  linear and goal directed   Thought content: The patient is devoid of suicidal or homicidal ideation intent or plan. Devoid of auditory or visual hallucinations or other perceptual disturbances, there are no overt or covert signs of psychosis or paranoia.   There are no alternatives discussed with the patient and the patient demonstrated understanding. The patient was also educated that the outcome of treatment will depend on the medication compliance as directed by the prescribers along with regular follow-up, compliance with the labs and other work-up, as clinically indicated. The patient was referred to outpatient/inpatient substance abuse rehabilitation programming. He was educated multiple times during the hospitalization that if he chooses to continue to use drugs or alcohol, he may potentially act out impulsively, resulting in serious harm to self or others, even though unintentional.  He was also educated that mental health treatment cannot be optimized with ongoing use of drugs. He demonstrated understanding has the capacity to understand that. Encourage patient to attend outpatient follow up appointment and therapy, outpatient follow-up appointments have been scheduled prior to discharge    Patient was advised to call the outpatient provider, visit the nearest ED or call 911 if symptoms are not manageable. Patient's family member was contacted prior to the discharge, patient was discharged to his home in psychiatrically stable condition. Medication List      START taking these medications    nicotine 21 MG/24HR  Commonly known as: [id number] [facility] 1 patch onto the skin daily  Start taking on: September 3, 2021        CHANGE how you take these medications    clonazePAM 0.5 MG tablet  Commonly known as: KLONOPIN  Take 1 tablet by mouth 2 times daily as needed for Anxiety for up to 3 days.   What changed:   · medication strength  · how much to take  · when to take this  · reasons to take this     lisinopril 10 MG tablet  Commonly known as: PRINIVIL;ZESTRIL  Take 1 tablet by mouth daily  What changed: how much to take     lurasidone 60 MG Tabs tablet  Commonly known as: LATUDA  Take 1 tablet by mouth daily  Start taking on: September 3, 2021  What changed: medication strength     * venlafaxine 37.5 MG extended release capsule  Commonly known as: EFFEXOR XR  Take 1 capsule by mouth nightly  What changed: You were already taking a medication with the same name, and this prescription was added. Make sure you understand how and when to take each. Replaces: venlafaxine 37.5 MG extended release tablet     * venlafaxine 75 MG extended release capsule  Commonly known as: EFFEXOR XR  Take 1 capsule by mouth daily (with breakfast)  Start taking on: September 3, 2021  What changed:   · when to take this  · Another medication with the same name was removed. Continue taking this medication, and follow the directions you see here. * This list has 2 medication(s) that are the same as other medications prescribed for you. Read the directions carefully, and ask your doctor or other care provider to review them with you. CONTINUE taking these medications    atorvastatin 40 MG tablet  Commonly known as: LIPITOR  Take 1 tablet by mouth nightly     Breo Ellipta 200-25 MCG/INH Aepb inhaler  Generic drug: Fluticasone furoate-vilanterol     finasteride 5 MG tablet  Commonly known as: PROSCAR  Take 1 tablet by mouth daily     levETIRAcetam 500 MG tablet  Commonly known as: KEPPRA  Take 1 tablet by mouth 2 times daily     lidocaine 5 %  Commonly known as: Lidoderm  Place 3 patches onto the skin every 24 hours 12 hours on, 12 hours off.     methIMAzole 10 MG tablet  Commonly known as: TAPAZOLE     RA Balanced Nutritional Plus Liqd  Take 1 Bottle by mouth daily     rivaroxaban 20 MG Tabs tablet  Commonly known as: XARELTO  Take 1 tablet by mouth daily With food     therapeutic multivitamin-minerals tablet        STOP taking these medications    venlafaxine 37.5 MG extended release tablet  Replaced by: venlafaxine 37.5 MG extended release capsule  You also have another medication with the same name that you need to continue taking as instructed.      Ventolin  (90 Base) MCG/ACT inhaler  Generic drug: albuterol sulfate HFA           Where to Get Your Medications      These medications were sent to [facility] [address] [address]    Phone: [phone]   · lurasidone 60 MG Tabs tablet  · venlafaxine 37.5 MG extended release capsule  · venlafaxine 75 MG extended release capsule     You can get these medications from any pharmacy    Bring a paper prescription for each of these medications  · clonazePAM 0.5 MG tablet     Information about where to get these medications is not yet available    Ask your nurse or doctor about these medications  · nicotine 21 MG/24HR       NOTE: This report was transcribed using voice recognition software. Every effort was made to ensure accuracy; however, inadvertent computerized transcription errors may be present.       TIME SPEND - 35 MINUTES TO COMPLETE THE EVALUATION, DISCHARGE SUMMARY, MEDICATION RECONCILIATION AND FOLLOW UP CARE     Signed:  ZOIE Perez CNP  9/2/2021  9:38 AM

## 2021-09-02 NOTE — PLAN OF CARE
Problem: Depressive Behavior With or Without Suicide Precautions:  Goal: Able to verbalize acceptance of life and situations over which he or she has no control  Description: Able to verbalize acceptance of life and situations over which he or she has no control  9/2/2021 1055 by Gay Perez RN  Outcome: Met This Shift     Problem: Depressive Behavior With or Without Suicide Precautions:  Goal: Able to verbalize and/or display a decrease in depressive symptoms  Description: Able to verbalize and/or display a decrease in depressive symptoms  9/2/2021 1055 by Gay Perez RN  Outcome: Met This Shift     Problem: Depressive Behavior With or Without Suicide Precautions:  Goal: Ability to disclose and discuss suicidal ideas will improve  Description: Ability to disclose and discuss suicidal ideas will improve  9/2/2021 1055 by Gay Perez RN  Outcome: Met This Shift     Problem: Depressive Behavior With or Without Suicide Precautions:  Goal: Absence of self-harm  Description: Absence of self-harm  Outcome: Met This Shift     Problem: Falls - Risk of:  Goal: Will remain free from falls  Description: Will remain free from falls  Outcome: Met This Shift

## 2021-09-02 NOTE — PLAN OF CARE
Patient out on unit, bright, social, pleasant. Denies SI/HI/AVH and depression/anxiety. Discharge focused. Has been social with peers this shift. Compliant with medications, no behavioral issues. Will continue to monitor and provide support.     Problem: Depressive Behavior With or Without Suicide Precautions:  Goal: Able to verbalize acceptance of life and situations over which he or she has no control  Description: Able to verbalize acceptance of life and situations over which he or she has no control  9/1/2021 2256 by Wendi Gastelum RN  Outcome: Met This Shift     Problem: Depressive Behavior With or Without Suicide Precautions:  Goal: Able to verbalize and/or display a decrease in depressive symptoms  Description: Able to verbalize and/or display a decrease in depressive symptoms  9/1/2021 2256 by Wendi Gastelum RN  Outcome: Met This Shift     Problem: Depressive Behavior With or Without Suicide Precautions:  Goal: Ability to disclose and discuss suicidal ideas will improve  Description: Ability to disclose and discuss suicidal ideas will improve  9/1/2021 2256 by Wendi Gastelum RN  Outcome: Met This Shift     Problem: Depressive Behavior With or Without Suicide Precautions:  Goal: Able to verbalize support systems  Description: Able to verbalize support systems  Outcome: Ongoing

## 2021-09-20 ENCOUNTER — HOSPITAL ENCOUNTER (EMERGENCY)
Age: 66
Discharge: HOME | End: 2021-09-20
Attending: EMERGENCY MEDICINE
Payer: MEDICARE

## 2021-09-20 VITALS
TEMPERATURE: 97.9 F | RESPIRATION RATE: 16 BRPM | HEART RATE: 65 BPM | OXYGEN SATURATION: 96 % | SYSTOLIC BLOOD PRESSURE: 115 MMHG | HEIGHT: 63 IN | BODY MASS INDEX: 21.61 KG/M2 | DIASTOLIC BLOOD PRESSURE: 62 MMHG

## 2021-09-20 DIAGNOSIS — M79.601 PAIN OF RIGHT UPPER EXTREMITY: ICD-10-CM

## 2021-09-20 DIAGNOSIS — R21 RASH AND OTHER NONSPECIFIC SKIN ERUPTION: Primary | ICD-10-CM

## 2021-09-20 PROCEDURE — 99282 EMERGENCY DEPT VISIT SF MDM: CPT

## 2021-09-20 RX ORDER — HYDROXYZINE PAMOATE 25 MG/1
25 CAPSULE ORAL 3 TIMES DAILY PRN
Qty: 15 CAPSULE | Refills: 0 | Status: ON HOLD | OUTPATIENT
Start: 2021-09-20 | End: 2021-11-15 | Stop reason: HOSPADM

## 2021-09-20 ASSESSMENT — PAIN DESCRIPTION - FREQUENCY: FREQUENCY: CONTINUOUS

## 2021-09-20 ASSESSMENT — PAIN DESCRIPTION - LOCATION: LOCATION: ARM

## 2021-09-20 ASSESSMENT — PAIN DESCRIPTION - ONSET: ONSET: PROGRESSIVE

## 2021-09-20 ASSESSMENT — PAIN SCALES - GENERAL: PAINLEVEL_OUTOF10: 10

## 2021-09-20 ASSESSMENT — ENCOUNTER SYMPTOMS
ABDOMINAL PAIN: 0
BACK PAIN: 0
COUGH: 0

## 2021-09-20 ASSESSMENT — PAIN DESCRIPTION - DESCRIPTORS: DESCRIPTORS: SHARP

## 2021-09-20 NOTE — ED PROVIDER NOTES
This is a 73year old male with a PMH of Bipolar and DM and CAD who presents to the ED for evaluation of a rash. He states that this morning he awoke and had pain and itching to his right forearm. He has no new contacts, no new lotions no new food or detergents. He is concerned that it is shingles as he had shingles in the past and states that this feels similar. He denies any fevers, any numbness or tingling. He denies any trauma. The history is provided by the patient. No  was used. Review of Systems   Constitutional: Negative for fever. HENT: Negative for congestion. Eyes: Negative for visual disturbance. Respiratory: Negative for cough. Cardiovascular: Negative for chest pain. Gastrointestinal: Negative for abdominal pain. Genitourinary: Negative for dysuria. Musculoskeletal: Negative for back pain. Skin: Positive for rash. Allergic/Immunologic: Negative for immunocompromised state. Neurological: Negative for headaches. Hematological: Bruises/bleeds easily. Psychiatric/Behavioral: Negative for confusion. Physical Exam  Vitals and nursing note reviewed. Constitutional:       General: He is not in acute distress. Appearance: He is well-developed. HENT:      Head: Normocephalic and atraumatic. Mouth/Throat:      Mouth: Mucous membranes are moist.   Eyes:      Extraocular Movements: Extraocular movements intact. Pupils: Pupils are equal, round, and reactive to light. Neck:      Vascular: No JVD. Cardiovascular:      Rate and Rhythm: Normal rate and regular rhythm. Pulmonary:      Effort: Pulmonary effort is normal.      Breath sounds: No wheezing, rhonchi or rales. Chest:      Chest wall: No tenderness. Abdominal:      General: There is no distension. Palpations: Abdomen is soft. Tenderness: There is no abdominal tenderness. There is no guarding or rebound. Hernia: No hernia is present.    Musculoskeletal: Cervical back: Normal range of motion and neck supple. Right lower leg: No edema. Left lower leg: No edema. Comments: Areas of scattered eccymosis to right forarm, no crepitus or induration or raised lesions. Skin:     General: Skin is warm and dry. Capillary Refill: Capillary refill takes less than 2 seconds. Neurological:      General: No focal deficit present. Mental Status: He is alert and oriented to person, place, and time. Cranial Nerves: No cranial nerve deficit. Psychiatric:         Mood and Affect: Mood normal.         Behavior: Behavior normal.          Procedures     MDM  Number of Diagnoses or Management Options  Pain of right upper extremity  Rash and other nonspecific skin eruption  Diagnosis management comments: Patient presented with what he called a rash. No obvious rash appreacited lesions were more consistent with eccymosis and he is on blood thinners. Patient grew upset when he was told that he would not get narcotics that he had asked for. When I went to recheck and discharge patient nurse made me aware that he had left                     --------------------------------------------- PAST HISTORY ---------------------------------------------  Past Medical History:  has a past medical history of Anxiety, Arthritis, Asthma, Bipolar 1 disorder ([facility] [address] Chronic combined systolic and diastolic CHF (congestive heart failure) ([facility] [address] COPD (chronic obstructive pulmonary disease) ([facility] [address] Depression, Diabetes mellitus ([facility] [address] GERD (gastroesophageal reflux disease), Hepatitis C, Hypertension, Hyperthyroidism, Hypothyroidism due to medication, Mass of testicle, Mesothelioma ([address] Neuromuscular disorder ([address] Other disorders of kidney and ureter, Paroxysmal A-fib ([address] Peptic ulcer, Prostate enlargement, Pulmonary embolism ([address] Seizures ([facility] [address] and Thyroid disease. Past Surgical History:  has a past surgical history that includes Appendectomy; Lithotripsy;  Hemorrhoid surgery; Bladder surgery; Endoscopy, colon, diagnostic (11/13/2015); Abdomen surgery; Colonoscopy; Cardiac surgery; vascular surgery; and Cardiac catheterization (05/21/2018). Social History:  reports that he quit smoking about 11 years ago. His smoking use included cigarettes. He quit after 10.00 years of use. He quit smokeless tobacco use about 3 years ago. His smokeless tobacco use included chew. He reports previous drug use. He reports that he does not drink alcohol. Family History: family history includes Cancer in his father, maternal grandfather, and mother; Diabetes in his father, maternal aunt, maternal aunt, maternal aunt, maternal grandmother, and mother; Heart Disease in his paternal grandfather and paternal grandmother; Heart Failure in his paternal grandfather and paternal grandmother. The patients home medications have been reviewed. Allergies: Codeine, Dye [iodides], Ketorolac tromethamine, Peanuts [peanut oil], and Shellfish-derived products    -------------------------------------------------- RESULTS -------------------------------------------------  Labs:  No results found for this visit on 09/20/21. Radiology:  No orders to display       ------------------------- NURSING NOTES AND VITALS REVIEWED ---------------------------  Date / Time Roomed:  9/20/2021  1:24 PM  ED Bed Assignment:  10/10    The nursing notes within the ED encounter and vital signs as below have been reviewed. /62   Pulse 65   Temp 97.9 °F (36.6 °C) (Temporal)   Resp 16   Ht 5' 3\" (1.6 m)   SpO2 96%   BMI 21.61 kg/m²   Oxygen Saturation Interpretation: Normal      ------------------------------------------ PROGRESS NOTES ------------------------------------------  2:36 PM EDT  I have spoken with the patient and discussed todays results, in addition to providing specific details for the plan of care and counseling regarding the diagnosis and prognosis.   Their questions are answered at this time and they are agreeable with the plan. I discussed at length with them reasons for immediate return here for re evaluation. They will followup with their PCP.      --------------------------------- ADDITIONAL PROVIDER NOTES ---------------------------------  At this time the patient is without objective evidence of an acute process requiring hospitalization or inpatient management. They have remained hemodynamically stable throughout their entire ED visit and are stable for discharge with outpatient follow-up. The plan has been discussed in detail and they are aware of the specific conditions for emergent return, as well as the importance of follow-up. New Prescriptions    HYDROXYZINE (VISTARIL) 25 MG CAPSULE    Take 1 capsule by mouth 3 times daily as needed for Itching       Diagnosis:  1. Rash and other nonspecific skin eruption    2. Pain of right upper extremity        Disposition:  Patient's disposition: Discharge to home  Patient's condition is stable.       Charles Mayfield DO  09/21/21 2044

## 2021-09-24 ENCOUNTER — HOSPITAL ENCOUNTER (INPATIENT)
Age: 66
LOS: 4 days | Discharge: HOME | DRG: 885 | End: 2021-09-29
Attending: EMERGENCY MEDICINE | Admitting: PSYCHIATRY & NEUROLOGY
Payer: MEDICARE

## 2021-09-24 DIAGNOSIS — F19.10 POLYSUBSTANCE ABUSE (HCC): ICD-10-CM

## 2021-09-24 DIAGNOSIS — R45.851 SUICIDAL IDEATION: Primary | ICD-10-CM

## 2021-09-24 LAB
ACETAMINOPHEN LEVEL: <5 MCG/ML (ref 10–30)
ALBUMIN SERPL-MCNC: 4.5 G/DL (ref 3.5–5.2)
ALP BLD-CCNC: 73 U/L (ref 40–129)
ALT SERPL-CCNC: 14 U/L (ref 0–40)
AMPHETAMINE SCREEN, URINE: NOT DETECTED
ANION GAP SERPL CALCULATED.3IONS-SCNC: 10 MMOL/L (ref 7–16)
AST SERPL-CCNC: 16 U/L (ref 0–39)
BACTERIA: NORMAL /HPF
BARBITURATE SCREEN URINE: NOT DETECTED
BASOPHILS ABSOLUTE: 0.04 E9/L (ref 0–0.2)
BASOPHILS RELATIVE PERCENT: 0.7 % (ref 0–2)
BENZODIAZEPINE SCREEN, URINE: POSITIVE
BILIRUB SERPL-MCNC: 0.4 MG/DL (ref 0–1.2)
BILIRUBIN URINE: NEGATIVE
BLOOD, URINE: ABNORMAL
BUN BLDV-MCNC: 7 MG/DL (ref 6–23)
CALCIUM SERPL-MCNC: 9.3 MG/DL (ref 8.6–10.2)
CANNABINOID SCREEN URINE: POSITIVE
CHLORIDE BLD-SCNC: 103 MMOL/L (ref 98–107)
CLARITY: ABNORMAL
CO2: 27 MMOL/L (ref 22–29)
COCAINE METABOLITE SCREEN URINE: POSITIVE
COLOR: YELLOW
CREAT SERPL-MCNC: 1 MG/DL (ref 0.7–1.2)
EOSINOPHILS ABSOLUTE: 0.28 E9/L (ref 0.05–0.5)
EOSINOPHILS RELATIVE PERCENT: 4.8 % (ref 0–6)
ETHANOL: <10 MG/DL (ref 0–0.08)
FENTANYL SCREEN, URINE: NOT DETECTED
GFR AFRICAN AMERICAN: >60
GFR NON-AFRICAN AMERICAN: >60 ML/MIN/1.73
GLUCOSE BLD-MCNC: 99 MG/DL (ref 74–99)
GLUCOSE URINE: NEGATIVE MG/DL
HCT VFR BLD CALC: 41.6 % (ref 37–54)
HEMOGLOBIN: 13.7 G/DL (ref 12.5–16.5)
IMMATURE GRANULOCYTES #: 0.03 E9/L
IMMATURE GRANULOCYTES %: 0.5 % (ref 0–5)
INFLUENZA A: NOT DETECTED
INFLUENZA B: NOT DETECTED
KETONES, URINE: NEGATIVE MG/DL
LEUKOCYTE ESTERASE, URINE: NEGATIVE
LYMPHOCYTES ABSOLUTE: 1.35 E9/L (ref 1.5–4)
LYMPHOCYTES RELATIVE PERCENT: 23.2 % (ref 20–42)
Lab: ABNORMAL
MCH RBC QN AUTO: 32 PG (ref 26–35)
MCHC RBC AUTO-ENTMCNC: 32.9 % (ref 32–34.5)
MCV RBC AUTO: 97.2 FL (ref 80–99.9)
METHADONE SCREEN, URINE: NOT DETECTED
MONOCYTES ABSOLUTE: 0.49 E9/L (ref 0.1–0.95)
MONOCYTES RELATIVE PERCENT: 8.4 % (ref 2–12)
NEUTROPHILS ABSOLUTE: 3.64 E9/L (ref 1.8–7.3)
NEUTROPHILS RELATIVE PERCENT: 62.4 % (ref 43–80)
NITRITE, URINE: NEGATIVE
OPIATE SCREEN URINE: POSITIVE
OXYCODONE URINE: NOT DETECTED
PDW BLD-RTO: 15.2 FL (ref 11.5–15)
PH UA: 7.5 (ref 5–9)
PHENCYCLIDINE SCREEN URINE: POSITIVE
PLATELET # BLD: 159 E9/L (ref 130–450)
PMV BLD AUTO: 12.2 FL (ref 7–12)
POTASSIUM REFLEX MAGNESIUM: 4.2 MMOL/L (ref 3.5–5)
PROTEIN UA: >=300 MG/DL
RBC # BLD: 4.28 E12/L (ref 3.8–5.8)
RBC UA: NORMAL /HPF (ref 0–2)
SALICYLATE, SERUM: <0.3 MG/DL (ref 0–30)
SARS-COV-2 RNA, RT PCR: NOT DETECTED
SODIUM BLD-SCNC: 140 MMOL/L (ref 132–146)
SPECIFIC GRAVITY UA: 1.02 (ref 1–1.03)
SPERM, URINE: NORMAL
T4 TOTAL: 6.6 MCG/DL (ref 4.5–11.7)
TOTAL PROTEIN: 7.1 G/DL (ref 6.4–8.3)
TRICYCLIC ANTIDEPRESSANTS SCREEN SERUM: NEGATIVE NG/ML
TSH SERPL DL<=0.05 MIU/L-ACNC: 22.83 UIU/ML (ref 0.27–4.2)
UROBILINOGEN, URINE: 0.2 E.U./DL
WBC # BLD: 5.8 E9/L (ref 4.5–11.5)
WBC UA: NORMAL /HPF (ref 0–5)

## 2021-09-24 PROCEDURE — 82077 ASSAY SPEC XCP UR&BREATH IA: CPT

## 2021-09-24 PROCEDURE — 81001 URINALYSIS AUTO W/SCOPE: CPT

## 2021-09-24 PROCEDURE — 80053 COMPREHEN METABOLIC PANEL: CPT

## 2021-09-24 PROCEDURE — 80143 DRUG ASSAY ACETAMINOPHEN: CPT

## 2021-09-24 PROCEDURE — 87636 SARSCOV2 & INF A&B AMP PRB: CPT

## 2021-09-24 PROCEDURE — 85025 COMPLETE CBC W/AUTO DIFF WBC: CPT

## 2021-09-24 PROCEDURE — 80179 DRUG ASSAY SALICYLATE: CPT

## 2021-09-24 PROCEDURE — 84443 ASSAY THYROID STIM HORMONE: CPT

## 2021-09-24 PROCEDURE — 82550 ASSAY OF CK (CPK): CPT

## 2021-09-24 PROCEDURE — 6370000000 HC RX 637 (ALT 250 FOR IP): Performed by: EMERGENCY MEDICINE

## 2021-09-24 PROCEDURE — 93005 ELECTROCARDIOGRAM TRACING: CPT | Performed by: EMERGENCY MEDICINE

## 2021-09-24 PROCEDURE — 84436 ASSAY OF TOTAL THYROXINE: CPT

## 2021-09-24 PROCEDURE — 80307 DRUG TEST PRSMV CHEM ANLYZR: CPT

## 2021-09-24 PROCEDURE — 36415 COLL VENOUS BLD VENIPUNCTURE: CPT

## 2021-09-24 RX ORDER — CLONAZEPAM 0.5 MG/1
0.5 TABLET ORAL ONCE
Status: COMPLETED | OUTPATIENT
Start: 2021-09-24 | End: 2021-09-24

## 2021-09-24 RX ADMIN — CLONAZEPAM 0.5 MG: 0.5 TABLET ORAL at 22:11

## 2021-09-24 ASSESSMENT — ENCOUNTER SYMPTOMS
NAUSEA: 0
ABDOMINAL PAIN: 0
EYE REDNESS: 0
VOMITING: 0
SHORTNESS OF BREATH: 0

## 2021-09-24 NOTE — ED NOTES
Bed: [facility]  Expected date:   Expected time:   Means of arrival:   Comments:  ems     Mitra Johnson RN  09/24/21 1921

## 2021-09-25 PROBLEM — F19.10 POLYSUBSTANCE ABUSE (HCC): Status: ACTIVE | Noted: 2021-09-25

## 2021-09-25 PROBLEM — R45.851 SUICIDAL IDEATION: Status: ACTIVE | Noted: 2021-09-25

## 2021-09-25 PROBLEM — F31.30 BIPOLAR DISORDER CURRENT EPISODE DEPRESSED (HCC): Status: ACTIVE | Noted: 2021-09-25

## 2021-09-25 LAB
EKG ATRIAL RATE: 52 BPM
EKG P AXIS: 88 DEGREES
EKG P-R INTERVAL: 152 MS
EKG Q-T INTERVAL: 422 MS
EKG QRS DURATION: 80 MS
EKG QTC CALCULATION (BAZETT): 392 MS
EKG R AXIS: 11 DEGREES
EKG T AXIS: 28 DEGREES
EKG VENTRICULAR RATE: 52 BPM
TOTAL CK: 47 U/L (ref 20–200)

## 2021-09-25 PROCEDURE — 6370000000 HC RX 637 (ALT 250 FOR IP): Performed by: PSYCHIATRY & NEUROLOGY

## 2021-09-25 PROCEDURE — 1240000000 HC EMOTIONAL WELLNESS R&B

## 2021-09-25 PROCEDURE — 99222 1ST HOSP IP/OBS MODERATE 55: CPT | Performed by: PSYCHIATRY & NEUROLOGY

## 2021-09-25 PROCEDURE — 93010 ELECTROCARDIOGRAM REPORT: CPT | Performed by: INTERNAL MEDICINE

## 2021-09-25 PROCEDURE — 99284 EMERGENCY DEPT VISIT MOD MDM: CPT

## 2021-09-25 RX ORDER — HYDROXYZINE PAMOATE 25 MG/1
50 CAPSULE ORAL 3 TIMES DAILY PRN
Status: DISCONTINUED | OUTPATIENT
Start: 2021-09-25 | End: 2021-09-29 | Stop reason: HOSPADM

## 2021-09-25 RX ORDER — TRAZODONE HYDROCHLORIDE 50 MG/1
50 TABLET ORAL NIGHTLY PRN
Status: DISCONTINUED | OUTPATIENT
Start: 2021-09-25 | End: 2021-09-29 | Stop reason: HOSPADM

## 2021-09-25 RX ORDER — M-VIT,TX,IRON,MINS/CALC/FOLIC 27MG-0.4MG
1 TABLET ORAL DAILY
Status: DISCONTINUED | OUTPATIENT
Start: 2021-09-25 | End: 2021-09-29 | Stop reason: HOSPADM

## 2021-09-25 RX ORDER — VENLAFAXINE HYDROCHLORIDE 75 MG/1
75 CAPSULE, EXTENDED RELEASE ORAL
Status: DISCONTINUED | OUTPATIENT
Start: 2021-09-26 | End: 2021-09-29 | Stop reason: HOSPADM

## 2021-09-25 RX ORDER — LISINOPRIL 10 MG/1
10 TABLET ORAL DAILY
Status: DISCONTINUED | OUTPATIENT
Start: 2021-09-25 | End: 2021-09-29 | Stop reason: HOSPADM

## 2021-09-25 RX ORDER — CLONAZEPAM 0.5 MG/1
0.5 TABLET ORAL 2 TIMES DAILY PRN
Status: DISCONTINUED | OUTPATIENT
Start: 2021-09-25 | End: 2021-09-29 | Stop reason: HOSPADM

## 2021-09-25 RX ORDER — LEVETIRACETAM 500 MG/1
500 TABLET ORAL 2 TIMES DAILY
Status: DISCONTINUED | OUTPATIENT
Start: 2021-09-25 | End: 2021-09-29 | Stop reason: HOSPADM

## 2021-09-25 RX ORDER — BUDESONIDE AND FORMOTEROL FUMARATE DIHYDRATE 160; 4.5 UG/1; UG/1
2 AEROSOL RESPIRATORY (INHALATION) 2 TIMES DAILY
Status: DISCONTINUED | OUTPATIENT
Start: 2021-09-25 | End: 2021-09-25 | Stop reason: CLARIF

## 2021-09-25 RX ORDER — MAGNESIUM HYDROXIDE/ALUMINUM HYDROXICE/SIMETHICONE 120; 1200; 1200 MG/30ML; MG/30ML; MG/30ML
30 SUSPENSION ORAL PRN
Status: DISCONTINUED | OUTPATIENT
Start: 2021-09-25 | End: 2021-09-29 | Stop reason: HOSPADM

## 2021-09-25 RX ORDER — BUDESONIDE 0.5 MG/2ML
0.5 INHALANT ORAL 2 TIMES DAILY
Status: DISCONTINUED | OUTPATIENT
Start: 2021-09-25 | End: 2021-09-29 | Stop reason: HOSPADM

## 2021-09-25 RX ORDER — METHIMAZOLE 5 MG/1
10 TABLET ORAL 3 TIMES DAILY
Status: DISCONTINUED | OUTPATIENT
Start: 2021-09-25 | End: 2021-09-29 | Stop reason: HOSPADM

## 2021-09-25 RX ORDER — ATORVASTATIN CALCIUM 40 MG/1
40 TABLET, FILM COATED ORAL NIGHTLY
Status: DISCONTINUED | OUTPATIENT
Start: 2021-09-25 | End: 2021-09-29 | Stop reason: HOSPADM

## 2021-09-25 RX ORDER — HALOPERIDOL 2 MG/1
3 TABLET ORAL EVERY 6 HOURS PRN
Status: DISCONTINUED | OUTPATIENT
Start: 2021-09-25 | End: 2021-09-29 | Stop reason: HOSPADM

## 2021-09-25 RX ORDER — ACETAMINOPHEN 325 MG/1
650 TABLET ORAL EVERY 4 HOURS PRN
Status: DISCONTINUED | OUTPATIENT
Start: 2021-09-25 | End: 2021-09-29 | Stop reason: HOSPADM

## 2021-09-25 RX ORDER — NICOTINE 21 MG/24HR
1 PATCH, TRANSDERMAL 24 HOURS TRANSDERMAL DAILY
Status: DISCONTINUED | OUTPATIENT
Start: 2021-09-25 | End: 2021-09-29 | Stop reason: HOSPADM

## 2021-09-25 RX ORDER — ARFORMOTEROL TARTRATE 15 UG/2ML
15 SOLUTION RESPIRATORY (INHALATION) 2 TIMES DAILY
Status: DISCONTINUED | OUTPATIENT
Start: 2021-09-25 | End: 2021-09-29 | Stop reason: HOSPADM

## 2021-09-25 RX ORDER — LACTOSE-REDUCED FOOD
1 LIQUID (ML) ORAL DAILY
Status: DISCONTINUED | OUTPATIENT
Start: 2021-09-25 | End: 2021-09-25 | Stop reason: CLARIF

## 2021-09-25 RX ORDER — HALOPERIDOL 5 MG/ML
3 INJECTION INTRAMUSCULAR EVERY 6 HOURS PRN
Status: DISCONTINUED | OUTPATIENT
Start: 2021-09-25 | End: 2021-09-29 | Stop reason: HOSPADM

## 2021-09-25 RX ADMIN — RIVAROXABAN 20 MG: 20 TABLET, FILM COATED ORAL at 13:37

## 2021-09-25 RX ADMIN — LEVETIRACETAM 500 MG: 500 TABLET, FILM COATED ORAL at 13:42

## 2021-09-25 RX ADMIN — ATORVASTATIN CALCIUM 40 MG: 40 TABLET, FILM COATED ORAL at 21:15

## 2021-09-25 RX ADMIN — HYDROXYZINE PAMOATE 50 MG: 25 CAPSULE ORAL at 16:54

## 2021-09-25 RX ADMIN — CLONAZEPAM 0.5 MG: 0.5 TABLET ORAL at 13:36

## 2021-09-25 RX ADMIN — LEVETIRACETAM 500 MG: 500 TABLET, FILM COATED ORAL at 21:15

## 2021-09-25 RX ADMIN — Medication 1 TABLET: at 13:38

## 2021-09-25 RX ADMIN — METHIMAZOLE 10 MG: 5 TABLET ORAL at 21:15

## 2021-09-25 RX ADMIN — METHIMAZOLE 10 MG: 5 TABLET ORAL at 13:37

## 2021-09-25 RX ADMIN — LURASIDONE HYDROCHLORIDE 60 MG: 20 TABLET, FILM COATED ORAL at 13:42

## 2021-09-25 RX ADMIN — LISINOPRIL 10 MG: 10 TABLET ORAL at 13:38

## 2021-09-25 ASSESSMENT — SLEEP AND FATIGUE QUESTIONNAIRES
DO YOU USE A SLEEP AID: NO
DO YOU HAVE DIFFICULTY SLEEPING: NO
SLEEP PATTERN: NORMAL
DIFFICULTY ARISING: NO
AVERAGE NUMBER OF SLEEP HOURS: 6
DIFFICULTY FALLING ASLEEP: NO
AVERAGE NUMBER OF SLEEP HOURS: 6
DO YOU HAVE DIFFICULTY SLEEPING: NO
DIFFICULTY STAYING ASLEEP: NO
DO YOU USE A SLEEP AID: NO
RESTFUL SLEEP: YES

## 2021-09-25 ASSESSMENT — LIFESTYLE VARIABLES
HISTORY_ALCOHOL_USE: YES
HISTORY_ALCOHOL_USE: NO

## 2021-09-25 ASSESSMENT — PATIENT HEALTH QUESTIONNAIRE - PHQ9
SUM OF ALL RESPONSES TO PHQ QUESTIONS 1-9: 5
SUM OF ALL RESPONSES TO PHQ QUESTIONS 1-9: 5

## 2021-09-25 ASSESSMENT — PAIN SCALES - GENERAL: PAINLEVEL_OUTOF10: 0
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
## 2021-09-25 NOTE — PROGRESS NOTES
[facility]  Admission Note     Admission Type:   Admission Type: Involuntary    Reason for admission:  Reason for Admission: \"I'm stressed about my life. I'm about to get evicted out of my apartment on 9/30. \"    PATIENT STRENGTHS:  Strengths: Communication, Connection to output provider    Patient Strengths and Limitations:  Limitations: Tendency to isolate self, Inappropriate/potentially harmful leisure interests    Addictive Behavior:   Addictive Behavior  In the past 3 months, have you felt or has someone told you that you have a problem with:  : None (Patient only admits to smoking MJ and states it is not an issue)  Do you have a history of Chemical Use?: Yes  Do you have a history of Alcohol Use?: Yes  Do you have a history of Street Drug Abuse?: No (MJ)  Histroy of Prescripton Drug Abuse?: Yes    Medical Problems:   Past Medical History:   Diagnosis Date    Anxiety     Arthritis     Asthma     Bipolar 1 disorder ([address]     Chronic combined systolic and diastolic CHF (congestive heart failure) ([facility] [address] 05/27/2018    COPD (chronic obstructive pulmonary disease) ([facility] [address]     Depression     Diabetes mellitus ([address]     GERD (gastroesophageal reflux disease)     Hepatitis C     resolved    Hypertension     Hyperthyroidism 8/2/2012    Hypothyroidism due to medication     Mass of testicle     Mesothelioma [facility])     pt states possibly not    Neuromuscular disorder ([address]     Other disorders of kidney and ureter     kidney stones; most recent 08/27/2015    Paroxysmal A-fib ([address]     discussed with PCP- patient does have hx of PAfib    Peptic ulcer     Prostate enlargement     Pulmonary embolism ([address]     Intermediate risk for PE on VQ scan.     Seizures ([address]     Thyroid disease     hyperthyroid       Status EXAM:  Status and Exam  Normal: Yes  Facial Expression: Brightened  Affect: Congruent  Level of Consciousness: Alert  Mood:Normal: No  Mood: Anxious  Motor Activity:Normal: (jeans, tshirt, belt, socks, shoes, hat)  Were All Patient Medications Collected?: Not Applicable  Other Valuables: Keys, Cell phone     Patient oriented to surroundings and program expectations and copy of patient rights given. Received admission packet:  Yes. Consents reviewed, signed Yes. Patient verbalize understanding:  Yes. Patient education on precautions:  Yes                   [place] RN

## 2021-09-25 NOTE — ED NOTES
Patient resting with eyes closed, respirations non-labored, no distress noted. Patient responds to voice, oriented x 4 when awake. Patient denies SI or HI at present. Patient states he had suicidal thoughts earlier due to losing his apartment and having no place to go. Patient denies any hallucinations. Patient calm and cooperative at present.       Horace Priest RN  09/25/21 9613

## 2021-09-25 NOTE — ED NOTES
Informed by charge RN that other patient in main ED went to 7300 floor. This RN to request transport to floor on this patient at [phone] per charge RN.       Kyara Farias RN  09/25/21 2591

## 2021-09-25 NOTE — ED NOTES
This RN called report to floor. Was informed RN in report that there are 2 patients coming to 68 but that due to staffing issues on the floor there needs to be an hour and a half in between patients being sent up. This RN notified ED charge nurse who then spoke to nursing supervisor who states to give floor 45 minutes in between patients. Per charge nurse this RN to request transport 45 minutes after other patient going to 73 goes to floor.       Garth Reyes RN  09/25/21 4445
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
## 2021-09-25 NOTE — ED PROVIDER NOTES
Chief complaint: Suicidal ideation      HPI:  9/24/21, Time: 11:48 PM EDT    JOHANN Reinoso is a 73 y.o. male presenting to the ED for suicidal ideation. The  history is obtained from the patient as well as the patient's medical record. Patient is presenting to the emergency department for suicidal ideation. The patient states that he was suicidal secondary to losing his apartment. This is present for 1 day. Moderate in severity. Nothing makes it better. Nothing makes it worse. The patient states he has no particular plan. Patient denied any treatment prior to arrival.  He denies any homicidal ideation. He denies any auditory . mtros visual hallucination. There are no associated fevers, chills, lightheadedness, nasal congestion, chest pain, shortness of breath, cough, nausea, vomiting, abdominal pain, diarrhea, constipation, dysuria or hematuria. The patient has no other stated complaints at this time. ROS:   Review of Systems   Constitutional: Negative for chills and fever. HENT: Negative for congestion. Eyes: Negative for redness. Respiratory: Negative for shortness of breath. Cardiovascular: Negative for chest pain. Gastrointestinal: Negative for abdominal pain, nausea and vomiting. Genitourinary: Negative for dysuria. Musculoskeletal: Negative for arthralgias. Skin: Negative for rash. Neurological: Negative for light-headedness. Psychiatric/Behavioral: Positive for suicidal ideas. Negative for confusion.    All other systems reviewed and are negative.      --------------------------------------------- PAST HISTORY ---------------------------------------------  Past Medical History:  has a past medical history of Anxiety, Arthritis, Asthma, B[facility] 1 disorder ([facility] Chronic combined systolic and diastolic CHF (congestive heart failure) ([facility] COPD (chronic obstructive pulmonary disease) ([facility] Depression, Diabetes mellitus ([facility] GERD (gastroesophageal reflux disease), Hepatitis C, Hypertension, Hyperthyroidism, Hypothyroidism due to medication, Mass of testicle, Mesothelioma ([facility] Neuromuscular disorder ([facility] Other disorders of kidney and ureter, Paroxysmal A-fib ([facility] Peptic ulcer, Prostate enlargement, Pulmonary embolism ([facility] Seizures ([facility] and Thyroid disease. Past Surgical History:  has a past surgical history that includes Appendectomy; Lithotripsy; Hemorrhoid surgery; Bladder surgery; Endoscopy, colon, diagnostic (11/13/2015); Abdomen surgery; Colonoscopy; Cardiac surgery; vascular surgery; and Cardiac catheterization (05/21/2018). Social History:  reports that he quit smoking about 11 years ago. His smoking use included cigarettes. He quit after 10.00 years of use. He quit smokeless tobacco use about 3 years ago. His smokeless tobacco use included chew. He reports current drug use. Drug: Cocaine. He reports that he does not drink alcohol. Family History: family history includes Cancer in his father, maternal grandfather, and mother; Diabetes in his father, maternal aunt, maternal aunt, maternal aunt, maternal grandmother, and mother; Heart Disease in his paternal grandfather and paternal grandmother; Heart Failure in his paternal grandfather and paternal grandmother. The patients home medications have been reviewed. Allergies: Codeine, Dye [iodides], Ketorolac tromethamine, Peanuts [peanut oil], and Shellfish-derived products    ---------------------------------------------------PHYSICAL EXAM--------------------------------------    Constitutional/General: Alert and oriented x3, well appearing, non toxic in NAD  Head: Normocephalic and atraumatic  Mouth: Oropharynx clear, handling secretions, no trismus  Neck: Supple, full ROM,  Pulmonary: Lungs clear to auscultation bilaterally, no wheezes, rales, or rhonchi. Not in respiratory distress  Cardiovascular:  Regular rate. Regular rhythm.  No murmurs  Chest: no chest wall tenderness  Abdomen: Soft.  Non tender. Non distended. No rebound, guarding, or rigidity. No pulsatile masses appreciated. Musculoskeletal: Moves all extremities x 4. Warm and well perfused, no clubbing, cyanosis, or edema. Capillary refill <3 seconds  Skin: warm and dry. No rashes. Neurologic: GCS 15, no gross focal neurologic deficits  Psych: Flat affect, positive suicidal ideation, negative homicidal ideation, no auditory visual hallucination    -------------------------------------------------- RESULTS -------------------------------------------------  I have personally reviewed all laboratory and imaging results for this patient. Results are listed below.      LABS:  Results for orders placed or performed during the hospital encounter of 09/24/21   COVID-19 & Influenza Combo    Specimen: Nasopharyngeal Swab   Result Value Ref Range    SARS-CoV-2 RNA, RT PCR NOT DETECTED NOT DETECTED    INFLUENZA A NOT DETECTED NOT DETECTED    INFLUENZA B NOT DETECTED NOT DETECTED   CBC Auto Differential   Result Value Ref Range    WBC 5.8 4.5 - 11.5 E9/L    RBC 4.28 3.80 - 5.80 E12/L    Hemoglobin 13.7 12.5 - 16.5 g/dL    Hematocrit 41.6 37.0 - 54.0 %    MCV 97.2 80.0 - 99.9 fL    MCH 32.0 26.0 - 35.0 pg    MCHC 32.9 32.0 - 34.5 %    RDW 15.2 (H) 11.5 - 15.0 fL    Platelets 702 936 - 163 E9/L    MPV 12.2 (H) 7.0 - 12.0 fL    Neutrophils % 62.4 43.0 - 80.0 %    Immature Granulocytes % 0.5 0.0 - 5.0 %    Lymphocytes % 23.2 20.0 - 42.0 %    Monocytes % 8.4 2.0 - 12.0 %    Eosinophils % 4.8 0.0 - 6.0 %    Basophils % 0.7 0.0 - 2.0 %    Neutrophils Absolute 3.64 1.80 - 7.30 E9/L    Immature Granulocytes # 0.03 E9/L    Lymphocytes Absolute 1.35 (L) 1.50 - 4.00 E9/L    Monocytes Absolute 0.49 0.10 - 0.95 E9/L    Eosinophils Absolute 0.28 0.05 - 0.50 E9/L    Basophils Absolute 0.04 0.00 - 0.20 E9/L   Comprehensive Metabolic Panel w/ Reflex to MG   Result Value Ref Range    Sodium 140 132 - 146 mmol/L    Potassium reflex Magnesium 4.2 3.5 - 5.0 mmol/L Chloride 103 98 - 107 mmol/L    CO2 27 22 - 29 mmol/L    Anion Gap 10 7 - 16 mmol/L    Glucose 99 74 - 99 mg/dL    BUN 7 6 - 23 mg/dL    CREATININE 1.0 0.7 - 1.2 mg/dL    GFR Non-African American >60 >=60 mL/min/1.73    GFR African American >60     Calcium 9.3 8.6 - 10.2 mg/dL    Total Protein 7.1 6.4 - 8.3 g/dL    Albumin 4.5 3.5 - 5.2 g/dL    Total Bilirubin 0.4 0.0 - 1.2 mg/dL    Alkaline Phosphatase 73 40 - 129 U/L    ALT 14 0 - 40 U/L    AST 16 0 - 39 U/L   Serum Drug Screen   Result Value Ref Range    Ethanol Lvl <10 mg/dL    Acetaminophen Level <5.0 (L) 10.0 - 41.0 mcg/mL    Salicylate, Serum <5.4 0.0 - 30.0 mg/dL    TCA Scrn NEGATIVE Cutoff:300 ng/mL   Urine Drug Screen   Result Value Ref Range    Amphetamine Screen, Urine NOT DETECTED Negative <1000 ng/mL    Barbiturate Screen, Ur NOT DETECTED Negative < 200 ng/mL    Benzodiazepine Screen, Urine POSITIVE (A) Negative < 200 ng/mL    Cannabinoid Scrn, Ur POSITIVE (A) Negative < 50ng/mL    Cocaine Metabolite Screen, Urine POSITIVE (A) Negative < 300 ng/mL    Opiate Scrn, Ur POSITIVE (A) Negative < 300ng/mL    PCP Screen, Urine POSITIVE (A) Negative < 25 ng/mL    Methadone Screen, Urine NOT DETECTED Negative <300 ng/mL    Oxycodone Urine NOT DETECTED Negative <100 ng/mL    FENTANYL SCREEN, URINE NOT DETECTED Negative <1 ng/mL    Drug Screen Comment: see below    TSH without Reflex   Result Value Ref Range    TSH 22.830 (H) 0.270 - 4.200 uIU/mL   T4   Result Value Ref Range    T4, Total 6.6 4.5 - 11.7 mcg/dL   Urinalysis   Result Value Ref Range    Color, UA Yellow Straw/Yellow    Clarity, UA TURBID (A) Clear    Glucose, Ur Negative Negative mg/dL    Bilirubin Urine Negative Negative    Ketones, Urine Negative Negative mg/dL    Specific Gravity, UA 1.020 1.005 - 1.030    Blood, Urine SMALL (A) Negative    pH, UA 7.5 5.0 - 9.0    Protein, UA >=300 (A) Negative mg/dL    Urobilinogen, Urine 0.2 <2.0 E.U./dL    Nitrite, Urine Negative Negative    Leukocyte Esterase, Urine Negative Negative   Microscopic Urinalysis   Result Value Ref Range    WBC, UA 0-1 0 - 5 /HPF    RBC, UA 0-1 0 - 2 /HPF    Bacteria, UA NONE SEEN None Seen /HPF    Sperm, Urine MANY    CK   Result Value Ref Range    Total CK 47 20 - 200 U/L       RADIOLOGY:  Interpreted by Radiologist.  No orders to display         EKG: This EKG is signed and interpreted by me. Sinus bradycardia, rate of 52, no ST segment elevation or depression, RI interval 152 MS, QRS 80 MS,  MS  Interpreted by me      ------------------------- NURSING NOTES AND VITALS REVIEWED ---------------------------   The nursing notes within the ED encounter and vital signs as below have been reviewed by myself. /68   Pulse 76   Temp 97.5 °F (36.4 °C) (Infrared)   Resp 18   Ht 5' 4\" (1.626 m)   Wt 113 lb (51.3 kg)   SpO2 96%   BMI 19.40 kg/m²   Oxygen Saturation Interpretation: Normal    The patients available past medical records and past encounters were reviewed. ------------------------------ ED COURSE/MEDICAL DECISION MAKING----------------------  Medications   clonazePAM (KLONOPIN) tablet 0.5 mg (0.5 mg Oral Given 9/24/21 2211)             Medical Decision Making:   IDr. Gloria am the primary physician of record. Dewey Talbot is a 73 y.o. male who presents to the ED for suicidal ideation. Labs reviewed. Patient medically cleared. The patient was pink slipped. Social work consultation. Re-Evaluations/Consultations:           Patient in bed no acute distress. Medically cleared. This patient's ED course included: History, physical examination, reevaluation prior to disposition, labs    This patient has remained hemodynamically stable during their ED course. Counseling: The emergency provider has spoken with the patient and discussed todays results, in addition to providing specific details for the plan of care and counseling regarding the diagnosis and prognosis. Questions are answered at this time and they are agreeable with the plan.       --------------------------------- IMPRESSION AND DISPOSITION ---------------------------------    IMPRESSION  1. Suicidal ideation    2. Polysubstance abuse ([facility]        DISPOSITION  Disposition: Admit to mental health unit - medically cleared for admission  Patient condition is stable        NOTE: This report was transcribed using voice recognition software.  Every effort was made to ensure accuracy; however, inadvertent computerized transcription errors may be present         Serge Burns DO  09/25/21 0122

## 2021-09-25 NOTE — ED NOTES
Patient has been accepted for admission to [facility] by Dr. Sunni Poole. Patient will go to room 7305B. Called admitting and notified Jimi SOLOMON RN is aware to call nurse to nurse and put in for patient transport.      DAVID Kim, [facility]  09/25/21 0020

## 2021-09-25 NOTE — ED NOTES
Transport to floor requested. All patient belongings will be removed from locker for transport.       Eli Linares RN  09/25/21 1884
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
## 2021-09-26 PROCEDURE — 6360000002 HC RX W HCPCS: Performed by: NURSE PRACTITIONER

## 2021-09-26 PROCEDURE — 1240000000 HC EMOTIONAL WELLNESS R&B

## 2021-09-26 PROCEDURE — 6370000000 HC RX 637 (ALT 250 FOR IP): Performed by: NURSE PRACTITIONER

## 2021-09-26 PROCEDURE — 6370000000 HC RX 637 (ALT 250 FOR IP): Performed by: PSYCHIATRY & NEUROLOGY

## 2021-09-26 PROCEDURE — 94640 AIRWAY INHALATION TREATMENT: CPT

## 2021-09-26 PROCEDURE — 99231 SBSQ HOSP IP/OBS SF/LOW 25: CPT | Performed by: NURSE PRACTITIONER

## 2021-09-26 RX ORDER — FINASTERIDE 5 MG/1
5 TABLET, FILM COATED ORAL DAILY
Status: DISCONTINUED | OUTPATIENT
Start: 2021-09-26 | End: 2021-09-29 | Stop reason: HOSPADM

## 2021-09-26 RX ADMIN — BUDESONIDE 500 MCG: 0.5 SUSPENSION RESPIRATORY (INHALATION) at 08:50

## 2021-09-26 RX ADMIN — METHIMAZOLE 10 MG: 5 TABLET ORAL at 21:21

## 2021-09-26 RX ADMIN — BUDESONIDE 500 MCG: 0.5 SUSPENSION RESPIRATORY (INHALATION) at 21:30

## 2021-09-26 RX ADMIN — HYDROXYZINE PAMOATE 50 MG: 25 CAPSULE ORAL at 21:21

## 2021-09-26 RX ADMIN — VENLAFAXINE HYDROCHLORIDE 75 MG: 75 CAPSULE, EXTENDED RELEASE ORAL at 09:02

## 2021-09-26 RX ADMIN — ATORVASTATIN CALCIUM 40 MG: 40 TABLET, FILM COATED ORAL at 21:21

## 2021-09-26 RX ADMIN — LURASIDONE HYDROCHLORIDE 60 MG: 20 TABLET, FILM COATED ORAL at 09:02

## 2021-09-26 RX ADMIN — RIVAROXABAN 20 MG: 20 TABLET, FILM COATED ORAL at 09:02

## 2021-09-26 RX ADMIN — CLONAZEPAM 0.5 MG: 0.5 TABLET ORAL at 21:21

## 2021-09-26 RX ADMIN — TRAZODONE HYDROCHLORIDE 50 MG: 50 TABLET ORAL at 21:21

## 2021-09-26 RX ADMIN — CLONAZEPAM 0.5 MG: 0.5 TABLET ORAL at 09:12

## 2021-09-26 RX ADMIN — Medication 1 TABLET: at 09:03

## 2021-09-26 RX ADMIN — METHIMAZOLE 10 MG: 5 TABLET ORAL at 09:03

## 2021-09-26 RX ADMIN — LISINOPRIL 10 MG: 10 TABLET ORAL at 09:04

## 2021-09-26 RX ADMIN — METHIMAZOLE 10 MG: 5 TABLET ORAL at 14:12

## 2021-09-26 RX ADMIN — LEVETIRACETAM 500 MG: 500 TABLET, FILM COATED ORAL at 09:02

## 2021-09-26 RX ADMIN — FINASTERIDE 5 MG: 5 TABLET, FILM COATED ORAL at 14:20

## 2021-09-26 RX ADMIN — LEVETIRACETAM 500 MG: 500 TABLET, FILM COATED ORAL at 21:21

## 2021-09-26 RX ADMIN — ARFORMOTEROL TARTRATE 15 MCG: 15 SOLUTION RESPIRATORY (INHALATION) at 21:30

## 2021-09-26 RX ADMIN — ARFORMOTEROL TARTRATE 15 MCG: 15 SOLUTION RESPIRATORY (INHALATION) at 08:50

## 2021-09-26 ASSESSMENT — PAIN SCALES - GENERAL
PAINLEVEL_OUTOF10: 0
PAINLEVEL_OUTOF10: 0
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
## 2021-09-26 NOTE — PROGRESS NOTES
[facility]  Initial Interdisciplinary Treatment Plan NOTE    Review Date & Time: 09/26/2021 0900    Patient was in treatment team    Admission Type:   Admission Type: Involuntary    Reason for admission:  Reason for Admission: \"I'm stressed about my life. I'm about to get evicted out of my apartment on 9/30. \"      Estimated Length of Stay Update:  1-3  Estimated Discharge Date Update: 09/28/2021    EDUCATION:   Learner Progress Toward Treatment Goals: Reviewed results and recommendations of this team    Method: Small group    Outcome: Verbalized understanding    PATIENT GOALS: None at this time. PLAN/TREATMENT RECOMMENDATIONS UPDATE: Encourage patient to attend and participate in groups. Take medication as prescribed. GOALS UPDATE:   Time frame for Short-Term Goals: Prior to discharge.     Victoriano Barreto RN

## 2021-09-26 NOTE — PROGRESS NOTES
Patient attended goals group and stated daily goal as to stay positive. Group Therapy Note    Date: 9/26/2021  Start Time: 11:00  End Time:  11:30  Number of Participants: 11    Type of Group: Psychoeducation    Wellness Binder Information  Module Name: Building happiness    Patient's Goal:  To id ways to build happiness in one's life. Notes: Attended group and was able to participate in discussion. Status After Intervention:  Improved    Participation Level:  Active Listener    Participation Quality: Attentive and Sharing      Speech:  hesitant      Thought Process/Content: Linear      Affective Functioning: Flat      Mood: depressed      Level of consciousness:  Alert      Response to Learning: Progressing to goal      Endings: None Reported    Modes of Intervention: Education        Signature:  TONY Mcclain

## 2021-09-26 NOTE — PLAN OF CARE
Problem: Depressive Behavior With or Without Suicide Precautions:  Goal: Able to verbalize and/or display a decrease in depressive symptoms  Description: Able to verbalize and/or display a decrease in depressive symptoms  Outcome: Met This Shift     Problem: Depressive Behavior With or Without Suicide Precautions:  Goal: Ability to disclose and discuss suicidal ideas will improve  Description: Ability to disclose and discuss suicidal ideas will improve  Outcome: Met This Shift     Problem: Depressive Behavior With or Without Suicide Precautions:  Goal: Absence of self-harm  Description: Absence of self-harm  9/26/2021 1329 by Shanique Juárez RN  Outcome: Met This Shift  9/26/2021 0029 by Vicki Karimi RN  Outcome: Ongoing     Problem: Depressive Behavior With or Without Suicide Precautions:  Goal: Able to verbalize acceptance of life and situations over which he or she has no control  Description: Able to verbalize acceptance of life and situations over which he or she has no control  Outcome: Ongoing     Problem: Depressive Behavior With or Without Suicide Precautions:  Goal: Participates in care planning  Description: Participates in care planning  Outcome: Ongoing     Patient denies SI/HI/Hallucinations, anxiety or depression. Patient minimizes his hospitalization and is focused on discharge. Patient preoccupied with being evicted from his apartment in four days. Patient observed out on the unit watching TV, social with peers. Patient bright and pleasant during conversation. Patient taking prescribed medications, eating provided meals, and attending groups.

## 2021-09-27 PROCEDURE — 6370000000 HC RX 637 (ALT 250 FOR IP): Performed by: NURSE PRACTITIONER

## 2021-09-27 PROCEDURE — 1240000000 HC EMOTIONAL WELLNESS R&B

## 2021-09-27 PROCEDURE — 6370000000 HC RX 637 (ALT 250 FOR IP): Performed by: PSYCHIATRY & NEUROLOGY

## 2021-09-27 PROCEDURE — 99231 SBSQ HOSP IP/OBS SF/LOW 25: CPT | Performed by: NURSE PRACTITIONER

## 2021-09-27 PROCEDURE — 94640 AIRWAY INHALATION TREATMENT: CPT

## 2021-09-27 PROCEDURE — 6360000002 HC RX W HCPCS: Performed by: NURSE PRACTITIONER

## 2021-09-27 RX ADMIN — RIVAROXABAN 20 MG: 20 TABLET, FILM COATED ORAL at 10:01

## 2021-09-27 RX ADMIN — LEVETIRACETAM 500 MG: 500 TABLET, FILM COATED ORAL at 10:00

## 2021-09-27 RX ADMIN — METHIMAZOLE 10 MG: 5 TABLET ORAL at 13:43

## 2021-09-27 RX ADMIN — LURASIDONE HYDROCHLORIDE 60 MG: 20 TABLET, FILM COATED ORAL at 10:00

## 2021-09-27 RX ADMIN — BUDESONIDE 500 MCG: 0.5 SUSPENSION RESPIRATORY (INHALATION) at 20:51

## 2021-09-27 RX ADMIN — Medication 1 TABLET: at 10:01

## 2021-09-27 RX ADMIN — ATORVASTATIN CALCIUM 40 MG: 40 TABLET, FILM COATED ORAL at 21:59

## 2021-09-27 RX ADMIN — METHIMAZOLE 10 MG: 5 TABLET ORAL at 10:00

## 2021-09-27 RX ADMIN — ARFORMOTEROL TARTRATE 15 MCG: 15 SOLUTION RESPIRATORY (INHALATION) at 20:50

## 2021-09-27 RX ADMIN — LEVETIRACETAM 500 MG: 500 TABLET, FILM COATED ORAL at 22:00

## 2021-09-27 RX ADMIN — TRAZODONE HYDROCHLORIDE 50 MG: 50 TABLET ORAL at 22:00

## 2021-09-27 RX ADMIN — VENLAFAXINE HYDROCHLORIDE 75 MG: 75 CAPSULE, EXTENDED RELEASE ORAL at 10:01

## 2021-09-27 RX ADMIN — METHIMAZOLE 10 MG: 5 TABLET ORAL at 22:00

## 2021-09-27 RX ADMIN — FINASTERIDE 5 MG: 5 TABLET, FILM COATED ORAL at 10:01

## 2021-09-27 RX ADMIN — CLONAZEPAM 0.5 MG: 0.5 TABLET ORAL at 17:42

## 2021-09-27 RX ADMIN — HYDROXYZINE PAMOATE 50 MG: 25 CAPSULE ORAL at 22:00

## 2021-09-27 ASSESSMENT — PAIN SCALES - GENERAL: PAINLEVEL_OUTOF10: 0

## 2021-09-27 NOTE — CARE COORDINATION
Pt has a counseling appointment at [address] [place] [address] 10/7 and medication management 10/13.

## 2021-09-27 NOTE — PROGRESS NOTES
2:30-3:00    Pt attended and participated in leisure group of chicken soup for the soul game. Enjoyed chicken soup for the soul activity. Pt was 1 out of 7 in attendance.

## 2021-09-27 NOTE — PLAN OF CARE
Problem: Depressive Behavior With or Without Suicide Precautions:  Goal: Able to verbalize and/or display a decrease in depressive symptoms  Description: Able to verbalize and/or display a decrease in depressive symptoms  9/27/2021 0107 by Joann Rogel RN  Outcome: Met This Shift     Problem: Depressive Behavior With or Without Suicide Precautions:  Goal: Ability to disclose and discuss suicidal ideas will improve  Description: Ability to disclose and discuss suicidal ideas will improve  9/27/2021 0107 by Joann Rogel RN  Outcome: Met This Shift     Problem: Depressive Behavior With or Without Suicide Precautions:  Goal: Absence of self-harm  Description: Absence of self-harm  9/27/2021 0107 by Joann Rogel RN  Outcome: Met This Shift

## 2021-09-27 NOTE — PROGRESS NOTES
BEHAVIORAL HEALTH FOLLOW-UP NOTE     9/27/2021     Patient was seen and examined in person, Chart reviewed   Patient's case discussed with staff/team    Chief Complaint: \"I think I was overwhelmed but I am ready to go now. \"    Interim History:     Patient is on the unit, social with select peers. He is bizarre and cannot seem to tell me why he was admitted or why he was in the ER. He is telling me that he has to be discharged by the 30th so he can get his belongings from his apartment that he is being evicted from, he states he wants to go to the  [facility] [address]. He was positive for multiple substances in the ED and seems to have poor recollection of calling 911 and stating that he was going to shoot himself. Today he tells me everything is fine and he is discharge focused. Denies all.  Denies SI/HI      Appetite:   [x] Normal/Unchanged  [] Increased  [] Decreased      Sleep:       [x] Normal/Unchanged  [] Fair       [] Poor              Energy:    [x] Normal/Unchanged  [] Increased  [] Decreased        SI [] Present  [x] Absent    HI  []Present  [x] Absent     Aggression:  [] yes  [x] no    Patient is [x] able  [] unable to CONTRACT FOR SAFETY     PAST MEDICAL/PSYCHIATRIC HISTORY:   Past Medical History:   Diagnosis Date    Anxiety     Arthritis     Asthma     Bipolar 1 disorder ([address]     Chronic combined systolic and diastolic CHF (congestive heart failure) ([address] 05/27/2018    COPD (chronic obstructive pulmonary disease) ([address]     Depression     Diabetes mellitus ([address]     GERD (gastroesophageal reflux disease)     Hepatitis C     resolved    Hypertension     Hyperthyroidism 8/2/2012    Hypothyroidism due to medication     Mass of testicle     Mesothelioma [facility])     pt states possibly not    Neuromuscular disorder ([address]     Other disorders of kidney and ureter     kidney stones; most recent 08/27/2015    Paroxysmal A-fib ([address]     discussed with PCP- patient does have hx of PAfib    Peptic ulcer     Prostate enlargement     Pulmonary embolism (HCC)     Intermediate risk for PE on VQ scan.  Seizures ([address]     Thyroid disease     hyperthyroid       FAMILY/SOCIAL HISTORY:  Family History   Problem Relation Age of Onset    Cancer Mother     Diabetes Mother     Cancer Father     Diabetes Father     Diabetes Maternal Aunt     Diabetes Maternal Grandmother     Cancer Maternal Grandfather     Heart Disease Paternal Grandmother     Heart Failure Paternal Grandmother     Heart Disease Paternal Grandfather     Heart Failure Paternal Grandfather     Diabetes Maternal Aunt     Diabetes Maternal Aunt      Social History     Socioeconomic History    Marital status: Single     Spouse name: Not on file    Number of children: Not on file    Years of education: Not on file    Highest education level: Not on file   Occupational History    Occupation: disability   Tobacco Use    Smoking status: Former Smoker     Years: 10.00     Types: Cigarettes     Quit date: 1/10/2010     Years since quittin.7    Smokeless tobacco: Former User     Types: Chew     Quit date:    Vaping Use    Vaping Use: Never used   Substance and Sexual Activity    Alcohol use: No     Alcohol/week: 0.0 standard drinks     Comment: recovered alcoholic; 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    Drug use: Yes     Types: Cocaine    Sexual activity: Yes     Comment: heroin   Other Topics Concern    Not on file   Social History Narrative    Not on file     Social Determinants of Health     Financial Resource Strain:     Difficulty of Paying Living Expenses:    Food Insecurity:     Worried About Running Out of Food in the Last Year:     [address] in the Last Year:    Transportation Needs:     Lack of Transportation (Medical):      Lack of Transportation (Non-Medical):    Physical Activity:     Days of Exercise per Week:     Minutes of Exercise per Session:    Stress:     Feeling of Stress :    Social Connections:     Frequency of Communication with Friends and Family:     Frequency of Social Gatherings with Friends and Family:     Attends Orthodox Services:     Active Member of Clubs or Organizations:     Attends Club or Organization Meetings:     Marital Status:    Intimate Partner Violence:     Fear of Current or Ex-Partner:     Emotionally Abused:     Physically Abused:     Sexually Abused:            ROS:  [x] All negative/unchanged except if checked.  Explain positive(checked items) below:  [] Constitutional  [] Eyes  [] Ear/Nose/Mouth/Throat  [] Respiratory  [] CV  [] GI  []   [] Musculoskeletal  [] Skin/Breast  [] Neurological  [] Endocrine  [] Heme/Lymph  [] Allergic/Immunologic    Explanation:     MEDICATIONS:    Current Facility-Administered Medications:     finasteride (PROSCAR) tablet 5 mg, 5 mg, Oral, Daily, ZOIE Sloan - CNP, 5 mg at 09/27/21 1001    acetaminophen (TYLENOL) tablet 650 mg, 650 mg, Oral, Q4H PRN, Saranya Dawson MD    magnesium hydroxide (MILK OF MAGNESIA) 400 MG/5ML suspension 30 mL, 30 mL, Oral, Daily PRN, Saranya Dawson MD    nicotine (NICODERM CQ) 21 MG/24HR 1 patch, 1 patch, TransDERmal, Daily, Saranya Dawson MD    aluminum & magnesium hydroxide-simethicone (MAALOX) 200-200-20 MG/5ML suspension 30 mL, 30 mL, Oral, PRN, Saranya Dawson MD    hydrOXYzine (VISTARIL) capsule 50 mg, 50 mg, Oral, TID PRN, Saranya Dawson MD, 50 mg at 09/26/21 2121    haloperidol (HALDOL) tablet 3 mg, 3 mg, Oral, Q6H PRN **OR** haloperidol lactate (HALDOL) injection 3 mg, 3 mg, IntraMUSCular, Q6H PRN, Saranya Dawson MD    traZODone (DESYREL) tablet 50 mg, 50 mg, Oral, Nightly PRN, Saranya Dawson MD, 50 mg at 09/26/21 2121    levETIRAcetam (KEPPRA) tablet 500 mg, 500 mg, Oral, BID, Saranya Dawson MD, 500 mg at 09/27/21 1000    lisinopril (PRINIVIL;ZESTRIL) tablet 10 mg, 10 mg, Oral, Daily, Saranya Dawson MD, 10 mg at 09/26/21 0904    lurasidone (LATUDA) tablet 60 mg, 60 mg, Oral, Daily, Cathy Carrillo MD, 60 mg at 09/27/21 1000    methIMAzole (TAPAZOLE) tablet 10 mg, 10 mg, Oral, TID, Cathy Carrillo MD, 10 mg at 09/27/21 1000    therapeutic multivitamin-minerals 1 tablet, 1 tablet, Oral, Daily, Cathy Carrillo MD, 1 tablet at 09/27/21 1001    rivaroxaban (XARELTO) tablet 20 mg, 20 mg, Oral, Daily, Cathy Carrillo MD, 20 mg at 09/27/21 1001    clonazePAM (KLONOPIN) tablet 0.5 mg, 0.5 mg, Oral, BID PRN, Cathy Carrillo MD, 0.5 mg at 09/26/21 2121    atorvastatin (LIPITOR) tablet 40 mg, 40 mg, Oral, Nightly, Cathy Carrillo MD, 40 mg at 09/26/21 2121    venlafaxine (EFFEXOR XR) extended release capsule 75 mg, 75 mg, Oral, Daily with breakfast, Cathy Carrillo MD, 75 mg at 09/27/21 1001    budesonide (PULMICORT) nebulizer suspension 500 mcg, 0.5 mg, Nebulization, BID, Galileo Janelco, APRN - CNP, 500 mcg at 09/26/21 2130    Arformoterol Tartrate (BROVANA) nebulizer solution 15 mcg, 15 mcg, Nebulization, BID, Galileo Banco, APRN - CNP, 15 mcg at 09/26/21 2130      Examination:  BP (!) 90/52   Pulse 71   Temp 97.6 °F (36.4 °C) (Temporal)   Resp 16   Ht 5' 4\" (1.626 m)   Wt 113 lb (51.3 kg)   SpO2 98%   BMI 19.40 kg/m²   Gait - steady  Medication side effects(SE): none reported     Mental Status Examination:    Level of consciousness:  within normal limits   Appearance:  fair grooming and fair hygiene  Behavior/Motor:  no abnormalities noted  Attitude toward examiner:  cooperative  Speech:  normal rate and normal volume   Mood: euthymic  Affect:  mood congruent  Thought processes:  goal directed   Thought content: The patient is devoid of suicidal or homicidal ideation intent or plan. Devoid of auditory or visual hallucinations or other perceptual disturbances, there are no overt or covert signs of psychosis or paranoia. There are no neurovegetative signs of depression.   Cognition:  oriented to person, place, and time   Concentration intact  Insight poor Judgement poor     ASSESSMENT:   Patient symptoms are:  [] Well controlled  [] Improving  [] Worsening  [x] No change      Diagnosis:   Principal Problem:    Bipolar disorder current episode depressed ([facility]  Active Problems:    Suicidal ideation    Polysubstance abuse ([facility]  Resolved Problems:    * No resolved hospital problems. *      LABS:    Recent Labs     09/24/21 2007   WBC 5.8   HGB 13.7        Recent Labs     09/24/21 2007      K 4.2      CO2 27   BUN 7   CREATININE 1.0   GLUCOSE 99     Recent Labs     09/24/21 2007   BILITOT 0.4   ALKPHOS 73   AST 16   ALT 14     Lab Results   Component Value Date    LABAMPH NOT DETECTED 09/24/2021    LABAMPH NOT DETECTED 07/04/2011    BARBSCNU NOT DETECTED 09/24/2021    LABBENZ POSITIVE 09/24/2021    LABBENZ SEE BELOW 07/01/2014    CANNAB POSITIVE  07/04/2011    LABMETH NOT DETECTED 09/24/2021    OPIATESCREENURINE POSITIVE 09/24/2021    PHENCYCLIDINESCREENURINE POSITIVE 09/24/2021    ETOH <10 09/24/2021     Lab Results   Component Value Date    TSH 22.830 09/24/2021     No results found for: LITHIUM  Lab Results   Component Value Date    VALPROATE 31 (L) 06/02/2016           Treatment Plan:  The patient's diagnosis, treatment plan, medication management were formulated after patient was seen directly by the attending physician and myself and all relevant documentation was reviewed. Reviewed current Medications with the patient. Risk, benefit, side effects, possible outcomes of the medication and alternatives discussed with the patient and the patient demonstrated understanding. The patient was also educated that the outcome of treatment will depend on the medication compliance as directed by the prescribers along with regular follow-up, compliance with the labs and other work-up, as clinically indicated.     Home medications continued as indicated  Continue Effexor XR 75 mg daily for depression and anxiety  Continue latuda 60 mg daily

## 2021-09-27 NOTE — PROGRESS NOTES
Patient has been out on the unit, pleasant, calm, and cooperative. Patient denies all and denies anxiety/depression. Patient is focused on discharge. Patient has been bright and social on the unit. Patient is encouraged to continue to work towards discharge goal by complying with medications, attending groups and to seek staff if feelings are overwhelming. Environmental rounds completed per unit policy to maintain safety of everyone on the unit. Staff will offer support and interventions as requested or required.

## 2021-09-27 NOTE — GROUP NOTE
Group Therapy Note    Date: 9/27/2021    Group Start Time: 1055  Group End Time: 0676  Group Topic: Cognitive Skills    [facility] ACUTE BH 2    DAVID Alexander, [facility] [place]        Group Therapy Note    Attendees: 8         Patient's Goal:  Pt will be able to utilize the DBT acronym [facility] to communicate effectively. Notes:  Pt participated in group and made connections. Status After Intervention:  Improved    Participation Level:  Active Listener and Interactive    Participation Quality: Appropriate and Attentive      Speech:  normal      Thought Process/Content: Logical      Affective Functioning: Flat      Mood: depressed      Level of consciousness:  Alert, Oriented x4 and Attentive      Response to Learning: Able to verbalize current knowledge/experience and Able to retain information      Endings: None Reported    Modes of Intervention: Education, Support, Socialization, Exploration, Clarifying and Problem-solving      Discipline Responsible: /Counselor      Signature:  DAVID Alexander, [place]

## 2021-09-27 NOTE — BH NOTE
Pt denies SI HI and hallucinations. Pt is bright, pleasant, social, out on the unit appropriate with staff and peers. No aggression or behaviors. Anxious about his eviction but aware that he will be able to go to the [facility] at discharge. Pt is medication compliant. Pt is eating provided meals. Pt is attending and participating in groups. No voiced depression, paranoia or delusions. We will continue to provide support and comfort for the patient. Belinda Larkin

## 2021-09-28 LAB
AMPHETAMINE SCREEN, URINE: NOT DETECTED
BARBITURATE SCREEN URINE: NOT DETECTED
BENZODIAZEPINE SCREEN, URINE: POSITIVE
CANNABINOID SCREEN URINE: POSITIVE
COCAINE METABOLITE SCREEN URINE: NOT DETECTED
FENTANYL SCREEN, URINE: NOT DETECTED
Lab: ABNORMAL
METHADONE SCREEN, URINE: NOT DETECTED
OPIATE SCREEN URINE: NOT DETECTED
OXYCODONE URINE: NOT DETECTED
PHENCYCLIDINE SCREEN URINE: NOT DETECTED

## 2021-09-28 PROCEDURE — 1240000000 HC EMOTIONAL WELLNESS R&B

## 2021-09-28 PROCEDURE — 6360000002 HC RX W HCPCS: Performed by: NURSE PRACTITIONER

## 2021-09-28 PROCEDURE — 6370000000 HC RX 637 (ALT 250 FOR IP): Performed by: PSYCHIATRY & NEUROLOGY

## 2021-09-28 PROCEDURE — 99231 SBSQ HOSP IP/OBS SF/LOW 25: CPT | Performed by: NURSE PRACTITIONER

## 2021-09-28 PROCEDURE — 80307 DRUG TEST PRSMV CHEM ANLYZR: CPT

## 2021-09-28 PROCEDURE — 94640 AIRWAY INHALATION TREATMENT: CPT

## 2021-09-28 PROCEDURE — 6370000000 HC RX 637 (ALT 250 FOR IP): Performed by: NURSE PRACTITIONER

## 2021-09-28 RX ORDER — TRAZODONE HYDROCHLORIDE 50 MG/1
50 TABLET ORAL NIGHTLY PRN
Qty: 30 TABLET | Refills: 0 | Status: SHIPPED | OUTPATIENT
Start: 2021-09-28 | End: 2022-08-11

## 2021-09-28 RX ADMIN — LEVETIRACETAM 500 MG: 500 TABLET, FILM COATED ORAL at 21:08

## 2021-09-28 RX ADMIN — FINASTERIDE 5 MG: 5 TABLET, FILM COATED ORAL at 09:27

## 2021-09-28 RX ADMIN — ACETAMINOPHEN 650 MG: 325 TABLET ORAL at 11:48

## 2021-09-28 RX ADMIN — CLONAZEPAM 0.5 MG: 0.5 TABLET ORAL at 10:12

## 2021-09-28 RX ADMIN — LEVETIRACETAM 500 MG: 500 TABLET, FILM COATED ORAL at 09:27

## 2021-09-28 RX ADMIN — METHIMAZOLE 10 MG: 5 TABLET ORAL at 09:27

## 2021-09-28 RX ADMIN — RIVAROXABAN 20 MG: 20 TABLET, FILM COATED ORAL at 09:27

## 2021-09-28 RX ADMIN — Medication 1 TABLET: at 09:26

## 2021-09-28 RX ADMIN — METHIMAZOLE 10 MG: 5 TABLET ORAL at 21:08

## 2021-09-28 RX ADMIN — LURASIDONE HYDROCHLORIDE 60 MG: 20 TABLET, FILM COATED ORAL at 09:26

## 2021-09-28 RX ADMIN — BUDESONIDE 500 MCG: 0.5 SUSPENSION RESPIRATORY (INHALATION) at 09:58

## 2021-09-28 RX ADMIN — ARFORMOTEROL TARTRATE 15 MCG: 15 SOLUTION RESPIRATORY (INHALATION) at 09:58

## 2021-09-28 RX ADMIN — ATORVASTATIN CALCIUM 40 MG: 40 TABLET, FILM COATED ORAL at 21:08

## 2021-09-28 RX ADMIN — TRAZODONE HYDROCHLORIDE 50 MG: 50 TABLET ORAL at 21:08

## 2021-09-28 RX ADMIN — VENLAFAXINE HYDROCHLORIDE 75 MG: 75 CAPSULE, EXTENDED RELEASE ORAL at 09:27

## 2021-09-28 RX ADMIN — LISINOPRIL 10 MG: 10 TABLET ORAL at 09:27

## 2021-09-28 RX ADMIN — CLONAZEPAM 0.5 MG: 0.5 TABLET ORAL at 22:05

## 2021-09-28 RX ADMIN — METHIMAZOLE 10 MG: 5 TABLET ORAL at 13:09

## 2021-09-28 ASSESSMENT — PAIN SCALES - GENERAL
PAINLEVEL_OUTOF10: 0
PAINLEVEL_OUTOF10: 10
PAINLEVEL_OUTOF10: 0
PAINLEVEL_OUTOF10: 0

## 2021-09-28 ASSESSMENT — PAIN DESCRIPTION - DESCRIPTORS: DESCRIPTORS: ACHING;DISCOMFORT;TENDER

## 2021-09-28 ASSESSMENT — PAIN DESCRIPTION - LOCATION: LOCATION: ABDOMEN

## 2021-09-28 ASSESSMENT — PAIN DESCRIPTION - ORIENTATION: ORIENTATION: MID

## 2021-09-28 ASSESSMENT — PAIN DESCRIPTION - PROGRESSION: CLINICAL_PROGRESSION: NOT CHANGED

## 2021-09-28 ASSESSMENT — PAIN DESCRIPTION - FREQUENCY: FREQUENCY: INTERMITTENT

## 2021-09-28 ASSESSMENT — PAIN DESCRIPTION - PAIN TYPE: TYPE: ACUTE PAIN

## 2021-09-28 ASSESSMENT — PAIN DESCRIPTION - ONSET: ONSET: GRADUAL

## 2021-09-28 NOTE — GROUP NOTE
Group Therapy Note    Date: 9/28/2021    Group Start Time: 1000  Group End Time: 1100  Group Topic: Psychoeducation    [facility] AC[facility]        Group Therapy Note    Attendees: 13         Patient's Goal:  Not feel so depressed. Stay sober. Notes: Active and engaged during wellness discussion. Pleasant when sharing experiences with peers. Status After Intervention:  Improved    Participation Level:  Active Listener and Interactive    Participation Quality: Appropriate, Attentive and Sharing      Speech:  normal      Thought Process/Content: Logical      Affective Functioning: Congruent      Mood: euthymic      Level of consciousness:  Alert and Attentive      Response to Learning: Progressing to goal      Endings: None Reported    Modes of Intervention: Education, Support, Socialization and Exploration      Discipline Responsible: Psychoeducational Specialist      Signature:  Keegan Nieto

## 2021-09-28 NOTE — PROGRESS NOTES
Patient denies SI/HI/Hallucinations, anxiety or depression. Patient bright and pleasant during conversation. Patient observed out on the unit watching TV and socializing with peers. Patient taking prescribed medications, eating provided meals, and attending groups.

## 2021-09-28 NOTE — CARE COORDINATION
In order to ensure appropriate transition and discharge planning is in place, the following documents have been transmitted to [facility], as the new outpatient provider:     · The d/c diagnosis was transmitted to the next care provider  · The reason for hospitalization was transmitted to the next care provider  · The d/c medications (dosage and indication) were transmitted to the next care provider   · The continuing care plan was transmitted to the next care provider

## 2021-09-28 NOTE — CARE COORDINATION
Pt asking that he be discharged tomorrow, as his landlord will not have the key for his apartment until tomorrow. Sw will discussed this with treatment team. Pt to discharged 9/29.

## 2021-09-28 NOTE — PROGRESS NOTES
Spiritual Support Group Note    Number of Participants in Group:     11                   Time:    2:30    Goal: Relief from isolation and loneliness             Ashley Sharing             Self-understanding and gain insight              Acceptance and belonging            Recognize they are not alone                Socialization             Empowerment       Encouragement    Topic:  [x] Spiritual Wellness and Self Care                  [x] Hope                     [] Connecting with Divine/Others        [] Thankfulness and Gratitude               []  Meaningfulness and Purpose               [] Forgiveness               [x] Peace               [] Connect to [employer]      [] Other    Participation Level:   [x] Active Listener   [] Minimal   [] Monopolizing   [] Interactive   [] No Participation   []  Other:     Attention:   [x] Alert   [] Distractible   [] Drowsy   [] Poor   [] Other:    Manner:   [x] Cooperative   [] Suspicious   [] Withdrawn   [] Guarded   [] Irritable   [] Inhospitable   [] Other:     Others Comments from Group:

## 2021-09-28 NOTE — PLAN OF CARE
Problem: Depressive Behavior With or Without Suicide Precautions:  Goal: Able to verbalize and/or display a decrease in depressive symptoms  Description: Able to verbalize and/or display a decrease in depressive symptoms  Outcome: Met This Shift     Problem: Depressive Behavior With or Without Suicide Precautions:  Goal: Ability to disclose and discuss suicidal ideas will improve  Description: Ability to disclose and discuss suicidal ideas will improve  9/28/2021 0937 by Katelynn Major RN  Outcome: Met This Shift  9/27/2021 2226 by Manish Granda RN  Outcome: Met This Shift     Problem: Depressive Behavior With or Without Suicide Precautions:  Goal: Absence of self-harm  Description: Absence of self-harm  9/28/2021 0937 by Katelynn Major RN  Outcome: Met This Shift  9/27/2021 2226 by Manish Granda RN  Outcome: Met This Shift     Problem: Depressive Behavior With or Without Suicide Precautions:  Goal: Able to verbalize acceptance of life and situations over which he or she has no control  Description: Able to verbalize acceptance of life and situations over which he or she has no control  9/28/2021 0937 by Katelynn Major RN  Outcome: Ongoing  9/27/2021 2226 by Manish Granda RN  Outcome: Not Met This Shift     Problem: Depressive Behavior With or Without Suicide Precautions:  Goal: Participates in care planning  Description: Participates in care planning  Outcome: Ongoing

## 2021-09-28 NOTE — PLAN OF CARE
Problem: Depressive Behavior With or Without Suicide Precautions:  Goal: Ability to disclose and discuss suicidal ideas will improve  Description: Ability to disclose and discuss suicidal ideas will improve  Outcome: Met This Shift     Problem: Depressive Behavior With or Without Suicide Precautions:  Goal: Absence of self-harm  Description: Absence of self-harm  Outcome: Met This Shift     Problem: Depressive Behavior With or Without Suicide Precautions:  Goal: Able to verbalize acceptance of life and situations over which he or she has no control  Description: Able to verbalize acceptance of life and situations over which he or she has no control  Outcome: Not Met This Shift

## 2021-09-28 NOTE — PLAN OF CARE
[facility]  Day 3 Interdisciplinary Treatment Plan NOTE    Review Date & Time: 9/27/2021 0830    Patient was in treatment team    Estimated Length of Stay Update:  3-5 days  Estimated Discharge Date Update: 9/30/2021    EDUCATION:   Learner Progress Toward Treatment Goals: Reviewed results and recommendations of this team    Method: Small group    Outcome: Verbalized understanding    PATIENT GOALS: No goal.    PLAN/TREATMENT RECOMMENDATIONS UPDATE: Encourage group participation and continue to provide emotional support    GOALS UPDATE:   Time frame for Short-Term Goals: 1-3 days      Roderick Tanner RN

## 2021-09-28 NOTE — GROUP NOTE
Group Therapy Note    Date: 9/28/2021    Group Start Time: 1130  Group End Time: 1200  Group Topic: Cognitive Skills    [facility] ACUTE BH 1    DAVID aSlmon LSW        Group Therapy Note    Attendees: 7         Patient's Goal:  Pt will be able to verbalize understanding of assertive communication     Notes:  Pt made connections and participated in group    Status After Intervention:  Improved    Participation Level:  Active Listener and Interactive    Participation Quality: Appropriate, Attentive, Sharing and Supportive      Speech:  normal      Thought Process/Content: Logical      Affective Functioning: Congruent      Mood: depressed      Level of consciousness:  Alert, Oriented x4 and Attentive      Response to Learning: Able to verbalize current knowledge/experience      Endings: None Reported    Modes of Intervention: Exploration, Clarifying, Problem-solving and Activity      Discipline Responsible: /Counselor      Signature:  DAVID Salmon LSW

## 2021-09-28 NOTE — SUICIDE SAFETY PLAN
SAFETY PLAN    A suicide Safety Plan is a document that supports someone when they are having thoughts of suicide. Warning Signs that indicate a suicidal crisis may be developing: What (situations, thoughts, feelings, body sensations, behaviors, etc.) do you experience that lets you know you are beginning to think about suicide? 1. Depression    Internal Coping Strategies:  What things can I do (relaxation techniques, hobbies, physical activities, etc.) to take my mind off my problems without contacting another person? 1. N/A per patient    People and social settings that provide distraction: Who can I call or where can I go to distract me? 1. Name: Friends    People whom I can ask for help: Who can I call when I need help - for example, friends, family, clergy, someone else? 1. Name: Friends    Professionals or [address] [place] [address] I can contact during a crisis: Who can I call for help - for example, my doctor, my psychiatrist, my psychologist, a mental health provider, a suicide hotline? 1. Comprehensive    3. Suicide Prevention Lifeline: 7-229-135-TALK (3298)      Making the environment safe: How can I make my environment (house/apartment/living space) safer? For example, can I remove guns, medications, and other items? 1.  N/A per patient

## 2021-09-29 VITALS
RESPIRATION RATE: 16 BRPM | DIASTOLIC BLOOD PRESSURE: 60 MMHG | HEIGHT: 64 IN | HEART RATE: 67 BPM | TEMPERATURE: 98.1 F | WEIGHT: 113 LBS | SYSTOLIC BLOOD PRESSURE: 109 MMHG | BODY MASS INDEX: 19.29 KG/M2 | OXYGEN SATURATION: 98 %

## 2021-09-29 PROCEDURE — 6370000000 HC RX 637 (ALT 250 FOR IP): Performed by: NURSE PRACTITIONER

## 2021-09-29 PROCEDURE — 99239 HOSP IP/OBS DSCHRG MGMT >30: CPT | Performed by: NURSE PRACTITIONER

## 2021-09-29 PROCEDURE — 6370000000 HC RX 637 (ALT 250 FOR IP): Performed by: PSYCHIATRY & NEUROLOGY

## 2021-09-29 RX ADMIN — FINASTERIDE 5 MG: 5 TABLET, FILM COATED ORAL at 09:32

## 2021-09-29 RX ADMIN — RIVAROXABAN 20 MG: 20 TABLET, FILM COATED ORAL at 09:32

## 2021-09-29 RX ADMIN — CLONAZEPAM 0.5 MG: 0.5 TABLET ORAL at 10:18

## 2021-09-29 RX ADMIN — METHIMAZOLE 10 MG: 5 TABLET ORAL at 13:40

## 2021-09-29 RX ADMIN — LURASIDONE HYDROCHLORIDE 60 MG: 20 TABLET, FILM COATED ORAL at 09:32

## 2021-09-29 RX ADMIN — VENLAFAXINE HYDROCHLORIDE 75 MG: 75 CAPSULE, EXTENDED RELEASE ORAL at 09:32

## 2021-09-29 RX ADMIN — Medication 1 TABLET: at 09:32

## 2021-09-29 RX ADMIN — LEVETIRACETAM 500 MG: 500 TABLET, FILM COATED ORAL at 09:32

## 2021-09-29 RX ADMIN — METHIMAZOLE 10 MG: 5 TABLET ORAL at 09:32

## 2021-09-29 RX ADMIN — LISINOPRIL 10 MG: 10 TABLET ORAL at 09:32

## 2021-09-29 ASSESSMENT — PAIN - FUNCTIONAL ASSESSMENT: PAIN_FUNCTIONAL_ASSESSMENT: RESPIRATORY RATE >10

## 2021-09-29 NOTE — PROGRESS NOTES
Patient has been out on the unit, pleasant, calm, and cooperative. Patient denies all and states that he's \"a little bummed\" about not being able to leave today but has been social and bright. Patient is encouraged to continue to work towards discharge goal by complying with medications, attending groups and to seek staff if feelings are overwhelming. Environmental rounds completed per unit policy to maintain safety of everyone on the unit. Staff will offer support and interventions as requested or required.

## 2021-09-29 NOTE — PLAN OF CARE
Problem: Depressive Behavior With or Without Suicide Precautions:  Goal: Absence of self-harm  Description: Absence of self-harm  9/29/2021 0701 by Durga Sumner  Outcome: Met This Shift

## 2021-09-29 NOTE — CARE COORDINATION
In order to ensure appropriate transition and discharge planning is in place, the following documents have been transmitted to [facility], as the new outpatient provider:     · The d/c diagnosis was transmitted to the next care provider  · The reason for hospitalization was transmitted to the next care provider  · The d/c medications (dosage and indication) were transmitted to the next care provider   · The continuing care plan was transmitted to the next care provider

## 2021-09-29 NOTE — PLAN OF CARE
Problem: Depressive Behavior With or Without Suicide Precautions:  Goal: Able to verbalize and/or display a decrease in depressive symptoms  Description: Able to verbalize and/or display a decrease in depressive symptoms  9/28/2021 2118 by Jami Gill RN  Outcome: Met This Shift     Problem: Depressive Behavior With or Without Suicide Precautions:  Goal: Ability to disclose and discuss suicidal ideas will improve  Description: Ability to disclose and discuss suicidal ideas will improve  9/28/2021 2118 by Jami Gill RN  Outcome: Met This Shift     Problem: Depressive Behavior With or Without Suicide Precautions:  Goal: Absence of self-harm  Description: Absence of self-harm  9/28/2021 2118 by Jami Gill RN  Outcome: Met This Shift

## 2021-09-29 NOTE — PROGRESS NOTES
2:30-3:00    Pt attended and participated in leisure group of lifestories. Enjoyed lifestories activity. Pt was 1 out of 8 in attendance.
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
## 2021-09-29 NOTE — GROUP NOTE
Group Therapy Note    Date: 9/29/2021    Group Start Time: 1005  Group End Time: 1055  Group Topic: Psychoeducation    [facility]    Yanira Cevallos, CTRS        Group Therapy Note      Number of participants: 10  Type of group: Psychoeducation  Mode of intervention: Education, Support, Socialization, Exploration, Clarifying, and Problem-solving  Topic: Ways to Cultivate a Attitude   Objective: Pt will identify 3 ways to cultivate a positive attitude in recovery. Patient's Goal:  \"Get out of here\"     Notes:  Pt interactive during group sharing 3 ways to cultivate a positive attitude in recovery. Pt share personal experiences where a positive attitude has been helpful. Pt gave support and feedback to others. Status After Intervention:  Improved    Participation Level:  Active Listener and Interactive    Participation Quality: Appropriate, Attentive, Sharing and Supportive      Speech:  normal      Thought Process/Content: Logical      Affective Functioning: Congruent      Mood: euthymic      Level of consciousness:  Alert, Oriented x4 and Attentive      Response to Learning: Able to verbalize current knowledge/experience, Able to verbalize/acknowledge new learning, Able to retain information, Capable of insight, Able to change behavior and Progressing to goal      Endings: None Reported    Modes of Intervention: Education, Support, Socialization, Exploration, Clarifying and Problem-solving

## 2021-09-29 NOTE — GROUP NOTE
Group Therapy Note    Date: 9/29/2021    Group Start Time: 1100  Group End Time: 0476  Group Topic: Cognitive Skills    [facility]    DAVID Valadez LSW        Group Therapy Note    Attendees: 9         Patient's Goal:  Pt will be able to discuss cognitive distortions, and how they may negatively impact our lives    Notes:  Pt made connections and participated in group    Status After Intervention:  Improved    Participation Level:  Active Listener and Interactive    Participation Quality: Appropriate, Attentive, Sharing and Supportive      Speech:  normal      Thought Process/Content: Logical  Linear      Affective Functioning: Congruent      Mood: depressed      Level of consciousness:  Alert, Oriented x4 and Attentive      Response to Learning: Able to verbalize current knowledge/experience      Endings: None Reported    Modes of Intervention: Education, Support, Socialization, Exploration, Clarifying, Problem-solving and Activity      Discipline Responsible: /Counselor      Signature:  DAVID Valadez LSW
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
## 2021-09-29 NOTE — DISCHARGE SUMMARY
DISCHARGE SUMMARY      Patient ID:  Efra Love  [id number]  23 y.o.  1955    Admit date: 9/24/2021    Discharge date and time: 9/29/2021    Admitting Physician: Carlos Miranda MD     Discharge Physician: Dr Jihan Aviles MD    Discharge Diagnoses:   Patient Active Problem List   Diagnosis    Hyperthyroidism    Hepatitis C    Mood disorder ([address]    Mild mitral regurgitation    Hep C w/o coma, chronic (HCC)    Chronic obstructive pulmonary disease ([address]    Major depressive disorder, recurrent ([address]    Dyspnea and respiratory abnormalities    Respiratory failure with hypoxia ([address]    Severe protein-calorie malnutrition ([address]    Asthma    A-fib ([address]    Benign prostatic hyperplasia    Essential (primary) hypertension    H/O drug abuse ([address]    Gastroduodenal ulcer    Seasonal allergic rhinitis    Type 2 diabetes mellitus ([address]    Vitamin D deficiency    LV dysfunction    S/P MVR (mitral valve repair)    NSTEMI (non-ST elevated myocardial infarction) ([address]    Suicidal ideations    Bipolar affective disorder, currently depressed, moderate (HCC)    Benzodiazepine dependence ([address]    Suicidal ideation    Bipolar disorder current episode depressed ([address]    Polysubstance abuse ([address]       Admission Condition: poor    Discharged Condition: stable    Admission Circumstance:   Patient is a 79-year old man with history of bipolar disorder depressed discharge recently for 7 W and was readmitted last time because he was feeling suicidal.  He called himself 911 stating that he was going to shoot himself with a gun. He however does not have a gun. In the emergency room he was tested positive for cannabis cocaine opiate PCP. He was medically cleared and admitted to 9 W.     PAST MEDICAL/PSYCHIATRIC HISTORY:   Past Medical History:   Diagnosis Date    Anxiety     Arthritis     Asthma     Bipolar 1 disorder ([address]     Chronic combined systolic and diastolic CHF (congestive heart failure) ([address] 2018    COPD (chronic obstructive pulmonary disease) (HCC)     Depression     Diabetes mellitus (HCC)     GERD (gastroesophageal reflux disease)     Hepatitis C     resolved    Hypertension     Hyperthyroidism 2012    Hypothyroidism due to medication     Mass of testicle     Mesothelioma [facility])     pt states possibly not    [facility]     Other disorders of kidney and ureter     kidney stones; most recent 2015    Paroxysmal A-fib [facility])     discussed with PCP- patient does have hx of PAfib    Peptic ulcer     Prostate enlargement     Pulmonary embolism (HCC)     Intermediate risk for PE on VQ scan.     Seizures ([facility]     Thyroid disease     hyperthyroid       FAMILY/SOCIAL HISTORY:  Family History   Problem Relation Age of Onset    Cancer Mother     Diabetes Mother     Cancer Father     Diabetes Father     Diabetes Maternal Aunt     Diabetes Maternal Grandmother     Cancer Maternal Grandfather     Heart Disease Paternal Grandmother     Heart Failure Paternal Grandmother     Heart Disease Paternal Grandfather     Heart Failure Paternal Grandfather     Diabetes Maternal Aunt     Diabetes Maternal Aunt      Social History     Socioeconomic History    Marital status: Single     Spouse name: Not on file    Number of children: Not on file    Years of education: Not on file    Highest education level: Not on file   Occupational History    Occupation: disability   Tobacco Use    Smoking status: Former Smoker     Years: 10.00     Types: Cigarettes     Quit date: 1/10/2010     Years since quittin.7    Smokeless tobacco: Former User     Types: Chew     Quit date:    Vaping Use    Vaping Use: Never used   Substance and Sexual Activity    Alcohol use: No     Alcohol/week: 0.0 standard drinks     Comment: recovered alcoholic; last use [id number]    Drug use: Yes     Types: Cocaine    Sexual activity: Yes     Comment: heroin   Other Topics Concern    Not on file   Social History Narrative    Not on file     Social Determinants of Health     Financial Resource Strain:     Difficulty of Paying Living Expenses:    Food Insecurity:     Worried About Running Out of Food in the Last Year:     [address] in the Last Year:    Transportation Needs:     Lack of Transportation (Medical):      Lack of Transportation (Non-Medical):    Physical Activity:     Days of Exercise per Week:     Minutes of Exercise per Session:    Stress:     Feeling of Stress :    Social Connections:     Frequency of Communication with Friends and Family:     Frequency of Social Gatherings with Friends and Family:     Attends [employer] Services:     Active Member of Clubs or Organizations:     Attends Club or Organization Meetings:     Marital Status:    Intimate Partner Violence:     Fear of Current or Ex-Partner:     Emotionally Abused:     Physically Abused:     Sexually Abused:        MEDICATIONS:    Current Facility-Administered Medications:     finasteride (PROSCAR) tablet 5 mg, 5 mg, Oral, Daily, Deneise Bolds, APRN - CNP, 5 mg at 09/29/21 0932    acetaminophen (TYLENOL) tablet 650 mg, 650 mg, Oral, Q4H PRN, Bryan Morales MD, 650 mg at 09/28/21 1148    magnesium hydroxide (MILK OF MAGNESIA) 400 MG/5ML suspension 30 mL, 30 mL, Oral, Daily PRN, Bryan Morales MD    nicotine (NICODERM CQ) 21 MG/24HR 1 patch, 1 patch, TransDERmal, Daily, Bryan Morales MD    aluminum & magnesium hydroxide-simethicone (MAALOX) 200-200-20 MG/5ML suspension 30 mL, 30 mL, Oral, PRN, Bryan Morales MD    hydrOXYzine (VISTARIL) capsule 50 mg, 50 mg, Oral, TID PRN, Bryan Morales MD, 50 mg at 09/27/21 2200    haloperidol (HALDOL) tablet 3 mg, 3 mg, Oral, Q6H PRN **OR** haloperidol lactate (HALDOL) injection 3 mg, 3 mg, IntraMUSCular, Q6H PRN, Bryan Morales MD    traZODone (DESYREL) tablet 50 mg, 50 mg, Oral, Nightly PRN, Bryan Morales MD, 50 mg at 09/28/21 2108   levETIRAcetam (KEPPRA) tablet 500 mg, 500 mg, Oral, BID, Ora Dancer, MD, 500 mg at 09/29/21 0932    lisinopril (PRINIVIL;ZESTRIL) tablet 10 mg, 10 mg, Oral, Daily, Ora Dancer, MD, 10 mg at 09/29/21 0932    lurasidone (LATUDA) tablet 60 mg, 60 mg, Oral, Daily, Ora Dancer, MD, 60 mg at 09/29/21 0932    methIMAzole (TAPAZOLE) tablet 10 mg, 10 mg, Oral, TID, Ora Dancer, MD, 10 mg at 09/29/21 0932    therapeutic multivitamin-minerals 1 tablet, 1 tablet, Oral, Daily, Ora Dancer, MD, 1 tablet at 09/29/21 0932    rivaroxaban (XARELTO) tablet 20 mg, 20 mg, Oral, Daily, Ora Dancer, MD, 20 mg at 09/29/21 0932    clonazePAM (KLONOPIN) tablet 0.5 mg, 0.5 mg, Oral, BID PRN, Ora Dancer, MD, 0.5 mg at 09/29/21 1018    atorvastatin (LIPITOR) tablet 40 mg, 40 mg, Oral, Nightly, Ora Dancer, MD, 40 mg at 09/28/21 2108    venlafaxine (EFFEXOR XR) extended release capsule 75 mg, 75 mg, Oral, Daily with breakfast, Ora Dancer, MD, 75 mg at 09/29/21 0932    budesonide (PULMICORT) nebulizer suspension 500 mcg, 0.5 mg, Nebulization, BID, ZOIE Tan - CNP, 500 mcg at 09/28/21 7675    Arformoterol Tartrate (BROVANA) nebulizer solution 15 mcg, 15 mcg, Nebulization, BID, ZOIE Tan - CNP, 15 mcg at 09/28/21 0272    Examination:  /60   Pulse 67   Temp 98.1 °F (36.7 °C) (Temporal)   Resp 16   Ht 5' 4\" (1.626 m)   Wt 113 lb (51.3 kg)   SpO2 98%   BMI 19.40 kg/m²   Gait - steady    HOSPITAL COURSE[de-identified]  Following admission to the hospital, patient had a complete physical exam and blood work up, which he was medically cleared and admitted to [facility] for psychiatric evaluation and stabilization. The patient was monitored closely with suicide and appropriate precautions. He was started on venlafaxine XR 75 mg daily for depression, and Latuda for bipolar disorder. He was continued on his home medications as indicated.   He was encouraged to participate in group and other milieu activity and started to feel better with this combination of treatment. There has been significant progress in the improvement of symptoms since admission. The patient has been an active participant in his treatment, and discharge planning. Patient was no longer suicidal, homicidal, manic or psychotic. He received the required treatment with medication, participated in group milieu, remained engaged in unit activities, learned appropriate coping skills. He was seen to be watching television socializing with peers using the phone. There were no mention or gestures of self-harm or harm to others. His mental status has returned to baseline. The treatment team believes the patient obtain maximum benefit out of this hospitalization and does not meet the criteria for inpatient hospitalization anymore. However he will continue to benefit from outpatient follow-up and treatment to maintain stability. Collateral information has been obtained and reconciled and there are no concerns about his safety. He has no access to guns or weapons. He appreciates the help that he received here. This patient no longer meets criteria for inpatient hospitalization. He was discharged home in psychiatrically stable condition. Appetite:  [x] Normal  [] Increased  [] Decreased    Sleep:       [x] Normal  [] Fair       [] Poor            Energy:    [x] Normal  [] Increased  [] Decreased     SI [] Present  [x] Absent  HI  []Present  [x] Absent   Aggression:  [] yes  [] no  Patient is [x] able  [] unable to CONTRACT FOR SAFETY   Medication side effects(SE):  [x] None(Psych.  Meds.) [] Other      Mental Status Examination on discharge:    Level of consciousness:  within normal limits   Appearance:  well-appearing  Behavior/Motor:  no abnormalities noted  Attitude toward examiner:  attentive and good eye contact  Speech:  spontaneous, normal rate and normal volume   Mood: euthymic  Affect:  mood congruent  Thought processes:  linear and goal directed   Thought content: The patient is devoid of suicidal or homicidal ideation intent or plan. Devoid of auditory or visual hallucinations or other perceptual disturbances, there are no overt or covert signs of psychosis or paranoia. There are no neurovegetative signs of depression. Cognition:  oriented to person, place, and time   Concentration intact  Memory intact  Insight good   Judgement fair   Fund of Knowledge adequate      ASSESSMENT:  Patient symptoms are:  [x] Well controlled  [x] Improving  [] Worsening  [] No change    Reason for more than one antipsychotic:  [x] N/A  [] 3 Failed Monotherapy attempts (Drugs tried:)  [] Crossover to a new antipsychotic  [] Taper to Monotherapy from Polypharmacy  [] Augmentation of clozapine therapy due to treatment resistance to single therapy    Diagnosis:  Principal Problem:    Bipolar disorder current episode depressed ([facility])  Active Problems:    Suicidal ideation    Polysubstance abuse ([facility]  Resolved Problems:    * No resolved hospital problems. *      LABS:    No results for input(s): WBC, HGB, PLT in the last 72 hours. No results for input(s): NA, K, CL, CO2, BUN, CREATININE, GLUCOSE in the last 72 hours. No results for input(s): BILITOT, ALKPHOS, AST, ALT in the last 72 hours. Lab Results   Component Value Date    LABAMPH NOT DETECTED 09/28/2021    LABAMPH NOT DETECTED 07/04/2011    BARBSCNU NOT DETECTED 09/28/2021    LABBENZ POSITIVE 09/28/2021    LABBENZ SEE BELOW 07/01/2014    CANNAB POSITIVE  07/04/2011    LABMETH NOT DETECTED 09/28/2021    OPIATESCREENURINE NOT DETECTED 09/28/2021    PHENCYCLIDINESCREENURINE NOT DETECTED 09/28/2021    ETOH <10 09/24/2021     Lab Results   Component Value Date    TSH 22.830 09/24/2021     No results found for: LITHIUM  Lab Results   Component Value Date    VALPROATE 31 (L) 06/02/2016       RISK ASSESSMENT AT DISCHARGE: Low risk for suicide and homicide.      Treatment Plan:  The patient's diagnosis, treatment plan, medication management were formulated after patient was seen directly by the attending physician and myself and all relevant documentation was reviewed. The patient was referred to outpatient/inpatient substance abuse rehabilitation programming. He was educated multiple times during the hospitalization that if he chooses to continue to use drugs or alcohol, he may potentially act out impulsively, resulting in serious harm to self or others, even though unintentional.  He was also educated that mental health treatment cannot be optimized with ongoing use of drugs. He demonstrated understanding has the capacity to understand that. Patient was offered inpatient substance abuse debilitation programming however declined. Risk, benefit, side effects, possible outcomes of the medication and alternatives discussed with the patient and the patient demonstrated understanding. The patient was also educated that the outcome of treatment will depend on the medication compliance as directed by the prescribers along with regular follow-up, compliance with the labs and other work-up, as clinically indicated. .    Encourage patient to attend outpatient follow up appointment and therapy, outpatient follow-up appointments of been scheduled prior to discharge. Patient was advised to call the outpatient provider, visit the nearest ED or call 911 if symptoms are not manageable. Patient's family member was contacted prior to the discharge and the patient has been discharged to his home in psychiatrically stable condition.          Medication List      START taking these medications    traZODone 50 MG tablet  Commonly known as: DESYREL  Take 1 tablet by mouth nightly as needed for Sleep        CHANGE how you take these medications    lisinopril 10 MG tablet  Commonly known as: PRINIVIL;ZESTRIL  Take 1 tablet by mouth daily  What changed: how much to take     venlafaxine 75 MG extended release capsule  Commonly known as: EFFEXOR XR  Take 1 capsule by mouth daily (with breakfast)  What changed: Another medication with the same name was removed. Continue taking this medication, and follow the directions you see here. CONTINUE taking these medications    atorvastatin 40 MG tablet  Commonly known as: LIPITOR  Take 1 tablet by mouth nightly     Breo Ellipta 200-25 MCG/INH Aepb inhaler  Generic drug: Fluticasone furoate-vilanterol     clonazePAM 0.5 MG tablet  Commonly known as: KLONOPIN  Take 1 tablet by mouth 2 times daily as needed for Anxiety for up to 3 days. finasteride 5 MG tablet  Commonly known as: PROSCAR  Take 1 tablet by mouth daily     hydrOXYzine 25 MG capsule  Commonly known as: Vistaril  Take 1 capsule by mouth 3 times daily as needed for Itching     levETIRAcetam 500 MG tablet  Commonly known as: KEPPRA  Take 1 tablet by mouth 2 times daily     lurasidone 60 MG Tabs tablet  Commonly known as: LATUDA  Take 1 tablet by mouth daily     methIMAzole 10 MG tablet  Commonly known as: TAPAZOLE     RA Balanced Nutritional Plus Liqd  Take 1 Bottle by mouth daily     rivaroxaban 20 MG Tabs tablet  Commonly known as: XARELTO  Take 1 tablet by mouth daily With food     therapeutic multivitamin-minerals tablet           Where to Get Your Medications      These medications were sent to [facility] [address] [facility] [address]    Phone: [phone]   · traZODone 50 MG tablet       NOTE: This report was transcribed using voice recognition software. Every effort was made to ensure accuracy; however, inadvertent computerized transcription errors may be present.     TIME SPEND - 35 MINUTES TO COMPLETE THE EVALUATION, DISCHARGE SUMMARY, MEDICATION RECONCILIATION AND FOLLOW UP CARE     Signed:  ZOIE Lazar CNP  9/29/2021  11:18 AM

## 2021-10-03 ENCOUNTER — HOSPITAL ENCOUNTER (EMERGENCY)
Age: 66
Discharge: PSYCHIATRIC OR SUBSTANCE USE FACILITY | End: 2021-10-03
Attending: EMERGENCY MEDICINE
Payer: MEDICARE

## 2021-10-03 VITALS
DIASTOLIC BLOOD PRESSURE: 65 MMHG | TEMPERATURE: 97.9 F | HEIGHT: 64 IN | SYSTOLIC BLOOD PRESSURE: 106 MMHG | BODY MASS INDEX: 19.4 KG/M2 | OXYGEN SATURATION: 97 % | RESPIRATION RATE: 18 BRPM | HEART RATE: 75 BPM

## 2021-10-03 DIAGNOSIS — R45.89 SUICIDAL BEHAVIOR WITHOUT ATTEMPTED SELF-INJURY: ICD-10-CM

## 2021-10-03 DIAGNOSIS — R45.851 SUICIDAL IDEATION: Primary | ICD-10-CM

## 2021-10-03 LAB
ACETAMINOPHEN LEVEL: <5 MCG/ML (ref 10–30)
ALBUMIN SERPL-MCNC: 4.4 G/DL (ref 3.5–5.2)
ALP BLD-CCNC: 63 U/L (ref 40–129)
ALT SERPL-CCNC: 24 U/L (ref 0–40)
AMPHETAMINE SCREEN, URINE: NOT DETECTED
ANION GAP SERPL CALCULATED.3IONS-SCNC: 11 MMOL/L (ref 7–16)
AST SERPL-CCNC: 20 U/L (ref 0–39)
BARBITURATE SCREEN URINE: NOT DETECTED
BASOPHILS ABSOLUTE: 0.04 E9/L (ref 0–0.2)
BASOPHILS RELATIVE PERCENT: 0.6 % (ref 0–2)
BENZODIAZEPINE SCREEN, URINE: POSITIVE
BILIRUB SERPL-MCNC: 0.3 MG/DL (ref 0–1.2)
BUN BLDV-MCNC: 16 MG/DL (ref 6–23)
CALCIUM SERPL-MCNC: 9.2 MG/DL (ref 8.6–10.2)
CANNABINOID SCREEN URINE: POSITIVE
CHLORIDE BLD-SCNC: 103 MMOL/L (ref 98–107)
CO2: 26 MMOL/L (ref 22–29)
COCAINE METABOLITE SCREEN URINE: NOT DETECTED
CREAT SERPL-MCNC: 1 MG/DL (ref 0.7–1.2)
EOSINOPHILS ABSOLUTE: 0.39 E9/L (ref 0.05–0.5)
EOSINOPHILS RELATIVE PERCENT: 5.7 % (ref 0–6)
ETHANOL: <10 MG/DL (ref 0–0.08)
FENTANYL SCREEN, URINE: NOT DETECTED
GFR AFRICAN AMERICAN: >60
GFR NON-AFRICAN AMERICAN: >60 ML/MIN/1.73
GLUCOSE BLD-MCNC: 114 MG/DL (ref 74–99)
HCT VFR BLD CALC: 39.9 % (ref 37–54)
HEMOGLOBIN: 13.6 G/DL (ref 12.5–16.5)
IMMATURE GRANULOCYTES #: 0.03 E9/L
IMMATURE GRANULOCYTES %: 0.4 % (ref 0–5)
INFLUENZA A: NOT DETECTED
INFLUENZA B: NOT DETECTED
LYMPHOCYTES ABSOLUTE: 1.18 E9/L (ref 1.5–4)
LYMPHOCYTES RELATIVE PERCENT: 17.2 % (ref 20–42)
Lab: ABNORMAL
MCH RBC QN AUTO: 31.7 PG (ref 26–35)
MCHC RBC AUTO-ENTMCNC: 34.1 % (ref 32–34.5)
MCV RBC AUTO: 93 FL (ref 80–99.9)
METHADONE SCREEN, URINE: NOT DETECTED
MONOCYTES ABSOLUTE: 0.62 E9/L (ref 0.1–0.95)
MONOCYTES RELATIVE PERCENT: 9.1 % (ref 2–12)
NEUTROPHILS ABSOLUTE: 4.59 E9/L (ref 1.8–7.3)
NEUTROPHILS RELATIVE PERCENT: 67 % (ref 43–80)
OPIATE SCREEN URINE: POSITIVE
OXYCODONE URINE: NOT DETECTED
PDW BLD-RTO: 14.5 FL (ref 11.5–15)
PHENCYCLIDINE SCREEN URINE: NOT DETECTED
PLATELET # BLD: 172 E9/L (ref 130–450)
PMV BLD AUTO: 12.1 FL (ref 7–12)
POTASSIUM SERPL-SCNC: 3.9 MMOL/L (ref 3.5–5)
RBC # BLD: 4.29 E12/L (ref 3.8–5.8)
SALICYLATE, SERUM: <0.3 MG/DL (ref 0–30)
SARS-COV-2 RNA, RT PCR: NOT DETECTED
SODIUM BLD-SCNC: 140 MMOL/L (ref 132–146)
TOTAL PROTEIN: 7.1 G/DL (ref 6.4–8.3)
TRICYCLIC ANTIDEPRESSANTS SCREEN SERUM: NEGATIVE NG/ML
WBC # BLD: 6.9 E9/L (ref 4.5–11.5)

## 2021-10-03 PROCEDURE — 80179 DRUG ASSAY SALICYLATE: CPT

## 2021-10-03 PROCEDURE — 99285 EMERGENCY DEPT VISIT HI MDM: CPT

## 2021-10-03 PROCEDURE — 87636 SARSCOV2 & INF A&B AMP PRB: CPT

## 2021-10-03 PROCEDURE — 6370000000 HC RX 637 (ALT 250 FOR IP): Performed by: STUDENT IN AN ORGANIZED HEALTH CARE EDUCATION/TRAINING PROGRAM

## 2021-10-03 PROCEDURE — 93005 ELECTROCARDIOGRAM TRACING: CPT | Performed by: STUDENT IN AN ORGANIZED HEALTH CARE EDUCATION/TRAINING PROGRAM

## 2021-10-03 PROCEDURE — 82077 ASSAY SPEC XCP UR&BREATH IA: CPT

## 2021-10-03 PROCEDURE — 80307 DRUG TEST PRSMV CHEM ANLYZR: CPT

## 2021-10-03 PROCEDURE — 80053 COMPREHEN METABOLIC PANEL: CPT

## 2021-10-03 PROCEDURE — 36415 COLL VENOUS BLD VENIPUNCTURE: CPT

## 2021-10-03 PROCEDURE — 85025 COMPLETE CBC W/AUTO DIFF WBC: CPT

## 2021-10-03 PROCEDURE — 80143 DRUG ASSAY ACETAMINOPHEN: CPT

## 2021-10-03 RX ORDER — CLONAZEPAM 0.5 MG/1
1 TABLET ORAL ONCE
Status: COMPLETED | OUTPATIENT
Start: 2021-10-03 | End: 2021-10-03

## 2021-10-03 RX ADMIN — CLONAZEPAM 1 MG: 0.5 TABLET ORAL at 17:11

## 2021-10-03 ASSESSMENT — ENCOUNTER SYMPTOMS
SHORTNESS OF BREATH: 0
ABDOMINAL PAIN: 0
DIARRHEA: 0
VOMITING: 0
ABDOMINAL DISTENTION: 0
COUGH: 0
COLOR CHANGE: 0
STRIDOR: 0
NAUSEA: 0
WHEEZING: 0
BACK PAIN: 0
CONSTIPATION: 0

## 2021-10-03 NOTE — ED PROVIDER NOTES
[address] [facility] ENCOUNTER      Pt Name: Lexy Bailey  MRN: [id number]  [place] 1955  Date of evaluation: 10/3/2021      CHIEF COMPLAINT       Chief Complaint   Patient presents with    Suicidal     with plan to cut throat        HPI  Lexy Bailey is a 73 y.o. male with a medical history of depression, presents with suicidal ideation with plan. Patient states that he wants to stab himself in the throat with a knife. States that he has had previous suicide attempts in the past.  No alleviating or exacerbating factors. Not taking his antidepression medication for weeks. Denies any visual or auditory hallucinations. Denies any homicidal ideation. Denies fevers, chills, nausea, vomiting, chest pain, shortness of breath, dumping, flank pain, dysuria, hematuria, diarrhea, constipation, new rashes or sores. Denies any illicit drug use. Except as noted above the remainder of the review of systems was reviewed and negative. Review of Systems   Constitutional: Negative for activity change, appetite change, diaphoresis, fatigue, fever and unexpected weight change. HENT: Negative for congestion. Eyes: Negative for visual disturbance. Respiratory: Negative for cough, shortness of breath, wheezing and stridor. Cardiovascular: Negative for chest pain, palpitations and leg swelling. Gastrointestinal: Negative for abdominal distention, abdominal pain, constipation, diarrhea, nausea and vomiting. Endocrine: Negative for polyphagia and polyuria. Genitourinary: Negative for dysuria and hematuria. Musculoskeletal: Negative for back pain. Skin: Negative for color change, pallor, rash and wound. Neurological: Negative for dizziness and syncope. Hematological: Negative for adenopathy. Does not bruise/bleed easily. Psychiatric/Behavioral: Positive for suicidal ideas. Negative for agitation. grandfather and paternal grandmother; Heart Failure in his paternal grandfather and paternal grandmother. The patients home medications have been reviewed.     Allergies: Codeine, Dye [iodides], Ketorolac tromethamine, Peanuts [peanut oil], and Shellfish-derived products    -------------------------------------------------- RESULTS -------------------------------------------------    LABS:  Results for orders placed or performed during the hospital encounter of 10/03/21   COVID-19 & Influenza Combo    Specimen: Nasopharyngeal Swab   Result Value Ref Range    SARS-CoV-2 RNA, RT PCR NOT DETECTED NOT DETECTED    INFLUENZA A NOT DETECTED NOT DETECTED    INFLUENZA B NOT DETECTED NOT DETECTED   Comprehensive Metabolic Panel   Result Value Ref Range    Sodium 140 132 - 146 mmol/L    Potassium 3.9 3.5 - 5.0 mmol/L    Chloride 103 98 - 107 mmol/L    CO2 26 22 - 29 mmol/L    Anion Gap 11 7 - 16 mmol/L    Glucose 114 (H) 74 - 99 mg/dL    BUN 16 6 - 23 mg/dL    CREATININE 1.0 0.7 - 1.2 mg/dL    GFR Non-African American >60 >=60 mL/min/1.73    GFR African American >60     Calcium 9.2 8.6 - 10.2 mg/dL    Total Protein 7.1 6.4 - 8.3 g/dL    Albumin 4.4 3.5 - 5.2 g/dL    Total Bilirubin 0.3 0.0 - 1.2 mg/dL    Alkaline Phosphatase 63 40 - 129 U/L    ALT 24 0 - 40 U/L    AST 20 0 - 39 U/L   CBC Auto Differential   Result Value Ref Range    WBC 6.9 4.5 - 11.5 E9/L    RBC 4.29 3.80 - 5.80 E12/L    Hemoglobin 13.6 12.5 - 16.5 g/dL    Hematocrit 39.9 37.0 - 54.0 %    MCV 93.0 80.0 - 99.9 fL    MCH 31.7 26.0 - 35.0 pg    MCHC 34.1 32.0 - 34.5 %    RDW 14.5 11.5 - 15.0 fL    Platelets 126 902 - 285 E9/L    MPV 12.1 (H) 7.0 - 12.0 fL    Neutrophils % 67.0 43.0 - 80.0 %    Immature Granulocytes % 0.4 0.0 - 5.0 %    Lymphocytes % 17.2 (L) 20.0 - 42.0 %    Monocytes % 9.1 2.0 - 12.0 %    Eosinophils % 5.7 0.0 - 6.0 %    Basophils % 0.6 0.0 - 2.0 %    Neutrophils Absolute 4.59 1.80 - 7.30 E9/L    Immature Granulocytes # 0.03 E9/L Lymphocytes Absolute 1.18 (L) 1.50 - 4.00 E9/L    Monocytes Absolute 0.62 0.10 - 0.95 E9/L    Eosinophils Absolute 0.39 0.05 - 0.50 E9/L    Basophils Absolute 0.04 0.00 - 0.20 E9/L   URINE DRUG SCREEN   Result Value Ref Range    Amphetamine Screen, Urine NOT DETECTED Negative <1000 ng/mL    Barbiturate Screen, Ur NOT DETECTED Negative < 200 ng/mL    Benzodiazepine Screen, Urine POSITIVE (A) Negative < 200 ng/mL    Cannabinoid Scrn, Ur POSITIVE (A) Negative < 50ng/mL    Cocaine Metabolite Screen, Urine NOT DETECTED Negative < 300 ng/mL    Opiate Scrn, Ur POSITIVE (A) Negative < 300ng/mL    PCP Screen, Urine NOT DETECTED Negative < 25 ng/mL    Methadone Screen, Urine NOT DETECTED Negative <300 ng/mL    Oxycodone Urine NOT DETECTED Negative <100 ng/mL    FENTANYL SCREEN, URINE NOT DETECTED Negative <1 ng/mL    Drug Screen Comment: see below    Serum Drug Screen   Result Value Ref Range    Ethanol Lvl <10 mg/dL    Acetaminophen Level <5.0 (L) 10.0 - 53.4 mcg/mL    Salicylate, Serum <3.5 0.0 - 30.0 mg/dL    TCA Scrn NEGATIVE Cutoff:300 ng/mL       RADIOLOGY:  No orders to display     EKG read interpreted by me. Heart rate 57. Sinus bradycardia. Short DC. No WPW. Normal axis deviation. No QTC prolongation. No ST elevations or depressions. Stable compared to previous EKG.    ------------------------- NURSING NOTES AND VITALS REVIEWED ---------------------------  Date / Time Roomed:  10/3/2021 12:34 PM  ED Bed Assignment:  [facility]    The nursing notes within the ED encounter and vital signs as below have been reviewed.      Patient Vitals for the past 24 hrs:   BP Temp Pulse Resp SpO2 Height   10/03/21 1225 135/73 98 °F (36.7 °C) 77 18 99 % 5' 4\" (1.626 m)       Oxygen Saturation Interpretation: Normal    ------------------------------------------ PROGRESS NOTES ------------------------------------------    Counseling:  I have spoken with the patient and discussed todays results, in addition to providing specific details for the plan of care and counseling regarding the diagnosis and prognosis. Their questions are answered at this time and they are agreeable with the plan of admission.    --------------------------------- ADDITIONAL PROVIDER NOTES ---------------------------------  Consultations:  Spoke with Behavioral health. Discussed case. They will admit the patient. This patient's ED course included: a personal history and physicial examination, re-evaluation prior to disposition and multiple bedside re-evaluations    This patient has remained hemodynamically stable during their ED course. Diagnosis:  1. Suicidal ideation        Disposition:  Patient's disposition: Admit to mental health unit - medically cleared for admission  Patient's condition is fair.           [place] MD  Resident  10/03/21 [id number]

## 2021-10-03 NOTE — ED NOTES
PT STATED - \"I DON'T WANT TO GO UPSTAIRS - I REFUSE TO STAY HERE\"    PT HAS BEEN ACCEPTED TO [address] [place] [address] Road BY DR. Carmelina Gonzales TO ROOM 102B    PHYSICIANS WILL TRANSPORT BY 1930    [address], [address] [facility], [facility]  10/03/21 [facility] [address] [facility]  10/03/21 [facility] [address] [facility]  10/03/21 1851

## 2021-10-03 NOTE — ED NOTES
Emergency Department [facility] AN AFFILIATE OF [facility] Biopsychosocial Assessment Note    Chief Complaint:   Suicidal with plan to cut throat     MSE:  PT PRESENTS WITH UNSTABLE THOUGHTS, REPORTS TO BE SAD AND DEPRESSED, HAS POOR INSIGHT AND JUDGEMENT, DENIES HI AND NO HALLUCINATIONS - SUICIDAL WITH A PLAN TO CUT HIS THROAT. Clinical Summary/History:   I MET WITH PT, WHO REPORTS THAT HE IS SUICIDAL WITH A PLAN TO KILL HIMSELF BY CUTTING HIS THROAT. HIS STRESSOR IS THAT HE IS LOSING HIS APARTMENT BECAUSE HE IS SHORT ON HIS RENT. PT HAS A BROTHER WHO IS SUPPORTIVE, HE LIVES ALONE. PT HAS A  HX OF DEPRESSION AND TREATS AT [facility] WITH NP BETY SCHOFIELD, HE HAS NO COUNSELOR. PT DENIES HI, DENIES HALLUCINATIONS, PREVIOUS ATTEMPT BY OD ON PILLS, LAST ADMISSION WAS 5 DAYS AGO ON 7W. Gender  [x] Male [] Female [] Transgender  [] Other    Sexual Orientation    [x] Heterosexual [] Homosexual [] Bisexual [] Other    Suicidal Behavioral: CSSR-S Complete. [x] Reports:    [] Past [] Present   [] Denies    Homicidal/ Violent Behavior  [] Reports:   [] Past [] Present   [x] Denies     Hallucinations/Delusions   [] Reports:   [x] Denies     Substance Use/Alcohol Use/Addiction: SBIRT Screen Complete.    [] Reports:   [x] Denies     Trauma History  [] Reports:  [x] Denies     Collateral Information:   NA    Level of Care/Disposition Plan  [] Home:   [] Outpatient Provider:   [] Crisis Unit:   [x] Inpatient Psychiatric Unit:  [] Other:        TONY Tavera  10/03/21 7087

## 2021-10-03 NOTE — ED NOTES
PD @ bedside cash collected totaling $844, receipt placed on the chart.           Chester Couch RN  10/03/21 0645

## 2021-10-03 NOTE — ED NOTES
Pt has a large sum of cash with him that he would like to be placed in the safe. PD notified.       Mayra Moyer RN  10/03/21 8120

## 2021-10-04 LAB
EKG ATRIAL RATE: 55 BPM
EKG P-R INTERVAL: 96 MS
EKG Q-T INTERVAL: 416 MS
EKG QRS DURATION: 82 MS
EKG QTC CALCULATION (BAZETT): 404 MS
EKG R AXIS: -19 DEGREES
EKG T AXIS: 63 DEGREES
EKG VENTRICULAR RATE: 57 BPM

## 2021-10-04 PROCEDURE — 93010 ELECTROCARDIOGRAM REPORT: CPT | Performed by: INTERNAL MEDICINE

## 2021-10-13 ENCOUNTER — HOSPITAL ENCOUNTER (INPATIENT)
Age: 66
LOS: 4 days | Discharge: HOME | DRG: 885 | End: 2021-10-18
Attending: EMERGENCY MEDICINE | Admitting: PSYCHIATRY & NEUROLOGY
Payer: MEDICARE

## 2021-10-13 DIAGNOSIS — R45.851 SUICIDAL IDEATION: Primary | ICD-10-CM

## 2021-10-13 LAB
ACETAMINOPHEN LEVEL: <5 MCG/ML (ref 10–30)
ALBUMIN SERPL-MCNC: 4.7 G/DL (ref 3.5–5.2)
ALP BLD-CCNC: 72 U/L (ref 40–129)
ALT SERPL-CCNC: 29 U/L (ref 0–40)
AMPHETAMINE SCREEN, URINE: NOT DETECTED
ANION GAP SERPL CALCULATED.3IONS-SCNC: 20 MMOL/L (ref 7–16)
AST SERPL-CCNC: 54 U/L (ref 0–39)
BARBITURATE SCREEN URINE: NOT DETECTED
BASOPHILS ABSOLUTE: 0.05 E9/L (ref 0–0.2)
BASOPHILS RELATIVE PERCENT: 0.5 % (ref 0–2)
BENZODIAZEPINE SCREEN, URINE: POSITIVE
BILIRUB SERPL-MCNC: 0.6 MG/DL (ref 0–1.2)
BILIRUBIN URINE: NEGATIVE
BLOOD, URINE: NEGATIVE
BUN BLDV-MCNC: 13 MG/DL (ref 6–23)
CALCIUM SERPL-MCNC: 9.6 MG/DL (ref 8.6–10.2)
CANNABINOID SCREEN URINE: NOT DETECTED
CHLORIDE BLD-SCNC: 100 MMOL/L (ref 98–107)
CLARITY: CLEAR
CO2: 18 MMOL/L (ref 22–29)
COCAINE METABOLITE SCREEN URINE: POSITIVE
COLOR: YELLOW
CREAT SERPL-MCNC: 1 MG/DL (ref 0.7–1.2)
EOSINOPHILS ABSOLUTE: 0.54 E9/L (ref 0.05–0.5)
EOSINOPHILS RELATIVE PERCENT: 5.7 % (ref 0–6)
ETHANOL: 172 MG/DL (ref 0–0.08)
FENTANYL SCREEN, URINE: NOT DETECTED
GFR AFRICAN AMERICAN: >60
GFR NON-AFRICAN AMERICAN: >60 ML/MIN/1.73
GLUCOSE BLD-MCNC: 77 MG/DL (ref 74–99)
GLUCOSE URINE: NEGATIVE MG/DL
HCT VFR BLD CALC: 42.6 % (ref 37–54)
HEMOGLOBIN: 14.4 G/DL (ref 12.5–16.5)
IMMATURE GRANULOCYTES #: 0.03 E9/L
IMMATURE GRANULOCYTES %: 0.3 % (ref 0–5)
INFLUENZA A: NOT DETECTED
INFLUENZA B: NOT DETECTED
KETONES, URINE: NEGATIVE MG/DL
LEUKOCYTE ESTERASE, URINE: NEGATIVE
LYMPHOCYTES ABSOLUTE: 1.45 E9/L (ref 1.5–4)
LYMPHOCYTES RELATIVE PERCENT: 15.3 % (ref 20–42)
Lab: ABNORMAL
MCH RBC QN AUTO: 31.2 PG (ref 26–35)
MCHC RBC AUTO-ENTMCNC: 33.8 % (ref 32–34.5)
MCV RBC AUTO: 92.4 FL (ref 80–99.9)
METHADONE SCREEN, URINE: NOT DETECTED
MONOCYTES ABSOLUTE: 1.02 E9/L (ref 0.1–0.95)
MONOCYTES RELATIVE PERCENT: 10.7 % (ref 2–12)
NEUTROPHILS ABSOLUTE: 6.41 E9/L (ref 1.8–7.3)
NEUTROPHILS RELATIVE PERCENT: 67.5 % (ref 43–80)
NITRITE, URINE: NEGATIVE
OPIATE SCREEN URINE: NOT DETECTED
OXYCODONE URINE: NOT DETECTED
PDW BLD-RTO: 13.9 FL (ref 11.5–15)
PH UA: 5.5 (ref 5–9)
PHENCYCLIDINE SCREEN URINE: NOT DETECTED
PLATELET # BLD: 207 E9/L (ref 130–450)
PMV BLD AUTO: 12.2 FL (ref 7–12)
POTASSIUM SERPL-SCNC: 3.7 MMOL/L (ref 3.5–5)
PROTEIN UA: NEGATIVE MG/DL
RBC # BLD: 4.61 E12/L (ref 3.8–5.8)
SALICYLATE, SERUM: <0.3 MG/DL (ref 0–30)
SARS-COV-2 RNA, RT PCR: NOT DETECTED
SODIUM BLD-SCNC: 138 MMOL/L (ref 132–146)
SPECIFIC GRAVITY UA: 1.02 (ref 1–1.03)
TOTAL PROTEIN: 8 G/DL (ref 6.4–8.3)
TRICYCLIC ANTIDEPRESSANTS SCREEN SERUM: NEGATIVE NG/ML
UROBILINOGEN, URINE: 0.2 E.U./DL
WBC # BLD: 9.5 E9/L (ref 4.5–11.5)

## 2021-10-13 PROCEDURE — 82077 ASSAY SPEC XCP UR&BREATH IA: CPT

## 2021-10-13 PROCEDURE — 36415 COLL VENOUS BLD VENIPUNCTURE: CPT

## 2021-10-13 PROCEDURE — 82550 ASSAY OF CK (CPK): CPT

## 2021-10-13 PROCEDURE — 85025 COMPLETE CBC W/AUTO DIFF WBC: CPT

## 2021-10-13 PROCEDURE — 80307 DRUG TEST PRSMV CHEM ANLYZR: CPT

## 2021-10-13 PROCEDURE — 80143 DRUG ASSAY ACETAMINOPHEN: CPT

## 2021-10-13 PROCEDURE — 81003 URINALYSIS AUTO W/O SCOPE: CPT

## 2021-10-13 PROCEDURE — 87636 SARSCOV2 & INF A&B AMP PRB: CPT

## 2021-10-13 PROCEDURE — 99285 EMERGENCY DEPT VISIT HI MDM: CPT

## 2021-10-13 PROCEDURE — 80053 COMPREHEN METABOLIC PANEL: CPT

## 2021-10-13 PROCEDURE — 93005 ELECTROCARDIOGRAM TRACING: CPT | Performed by: EMERGENCY MEDICINE

## 2021-10-13 PROCEDURE — 80179 DRUG ASSAY SALICYLATE: CPT

## 2021-10-14 LAB
EKG ATRIAL RATE: 73 BPM
EKG P AXIS: -17 DEGREES
EKG P-R INTERVAL: 132 MS
EKG Q-T INTERVAL: 404 MS
EKG QRS DURATION: 90 MS
EKG QTC CALCULATION (BAZETT): 445 MS
EKG R AXIS: -36 DEGREES
EKG T AXIS: 69 DEGREES
EKG VENTRICULAR RATE: 73 BPM
ETHANOL: 48 MG/DL (ref 0–0.08)
TOTAL CK: 1049 U/L (ref 20–200)
TOTAL CK: 1151 U/L (ref 20–200)
TOTAL CK: 1883 U/L (ref 20–200)

## 2021-10-14 PROCEDURE — 6370000000 HC RX 637 (ALT 250 FOR IP): Performed by: PSYCHIATRY & NEUROLOGY

## 2021-10-14 PROCEDURE — 2580000003 HC RX 258: Performed by: EMERGENCY MEDICINE

## 2021-10-14 PROCEDURE — 82550 ASSAY OF CK (CPK): CPT

## 2021-10-14 PROCEDURE — 93010 ELECTROCARDIOGRAM REPORT: CPT | Performed by: INTERNAL MEDICINE

## 2021-10-14 PROCEDURE — 82077 ASSAY SPEC XCP UR&BREATH IA: CPT

## 2021-10-14 PROCEDURE — 6370000000 HC RX 637 (ALT 250 FOR IP): Performed by: NURSE PRACTITIONER

## 2021-10-14 PROCEDURE — 1240000000 HC EMOTIONAL WELLNESS R&B

## 2021-10-14 RX ORDER — METHIMAZOLE 5 MG/1
10 TABLET ORAL 3 TIMES DAILY
Status: DISCONTINUED | OUTPATIENT
Start: 2021-10-14 | End: 2021-10-18 | Stop reason: HOSPADM

## 2021-10-14 RX ORDER — ATORVASTATIN CALCIUM 40 MG/1
40 TABLET, FILM COATED ORAL NIGHTLY
Status: DISCONTINUED | OUTPATIENT
Start: 2021-10-14 | End: 2021-10-18 | Stop reason: HOSPADM

## 2021-10-14 RX ORDER — HYDROXYZINE PAMOATE 50 MG/1
50 CAPSULE ORAL 3 TIMES DAILY PRN
Status: DISCONTINUED | OUTPATIENT
Start: 2021-10-14 | End: 2021-10-18 | Stop reason: HOSPADM

## 2021-10-14 RX ORDER — LISINOPRIL 10 MG/1
10 TABLET ORAL DAILY
Status: DISCONTINUED | OUTPATIENT
Start: 2021-10-14 | End: 2021-10-18 | Stop reason: HOSPADM

## 2021-10-14 RX ORDER — 0.9 % SODIUM CHLORIDE 0.9 %
2000 INTRAVENOUS SOLUTION INTRAVENOUS ONCE
Status: COMPLETED | OUTPATIENT
Start: 2021-10-14 | End: 2021-10-14

## 2021-10-14 RX ORDER — HALOPERIDOL 2 MG/1
3 TABLET ORAL EVERY 6 HOURS PRN
Status: DISCONTINUED | OUTPATIENT
Start: 2021-10-14 | End: 2021-10-18 | Stop reason: HOSPADM

## 2021-10-14 RX ORDER — FINASTERIDE 5 MG/1
5 TABLET, FILM COATED ORAL DAILY
Status: DISCONTINUED | OUTPATIENT
Start: 2021-10-14 | End: 2021-10-18 | Stop reason: HOSPADM

## 2021-10-14 RX ORDER — LEVETIRACETAM 500 MG/1
500 TABLET ORAL 2 TIMES DAILY
Status: DISCONTINUED | OUTPATIENT
Start: 2021-10-14 | End: 2021-10-18 | Stop reason: HOSPADM

## 2021-10-14 RX ORDER — ACETAMINOPHEN 325 MG/1
650 TABLET ORAL EVERY 4 HOURS PRN
Status: DISCONTINUED | OUTPATIENT
Start: 2021-10-14 | End: 2021-10-18 | Stop reason: HOSPADM

## 2021-10-14 RX ORDER — MULTIVITAMIN WITH IRON
1 TABLET ORAL DAILY
Status: DISCONTINUED | OUTPATIENT
Start: 2021-10-14 | End: 2021-10-18 | Stop reason: HOSPADM

## 2021-10-14 RX ORDER — MAGNESIUM HYDROXIDE/ALUMINUM HYDROXICE/SIMETHICONE 120; 1200; 1200 MG/30ML; MG/30ML; MG/30ML
30 SUSPENSION ORAL PRN
Status: DISCONTINUED | OUTPATIENT
Start: 2021-10-14 | End: 2021-10-18 | Stop reason: HOSPADM

## 2021-10-14 RX ORDER — 0.9 % SODIUM CHLORIDE 0.9 %
1000 INTRAVENOUS SOLUTION INTRAVENOUS ONCE
Status: COMPLETED | OUTPATIENT
Start: 2021-10-14 | End: 2021-10-14

## 2021-10-14 RX ORDER — NICOTINE 21 MG/24HR
1 PATCH, TRANSDERMAL 24 HOURS TRANSDERMAL DAILY
Status: DISCONTINUED | OUTPATIENT
Start: 2021-10-14 | End: 2021-10-18 | Stop reason: HOSPADM

## 2021-10-14 RX ORDER — HALOPERIDOL 5 MG/ML
3 INJECTION INTRAMUSCULAR EVERY 6 HOURS PRN
Status: DISCONTINUED | OUTPATIENT
Start: 2021-10-14 | End: 2021-10-18 | Stop reason: HOSPADM

## 2021-10-14 RX ORDER — TRAZODONE HYDROCHLORIDE 50 MG/1
50 TABLET ORAL NIGHTLY PRN
Status: DISCONTINUED | OUTPATIENT
Start: 2021-10-14 | End: 2021-10-18 | Stop reason: HOSPADM

## 2021-10-14 RX ORDER — DIAZEPAM 5 MG/1
5 TABLET ORAL EVERY 8 HOURS PRN
Status: DISCONTINUED | OUTPATIENT
Start: 2021-10-14 | End: 2021-10-18 | Stop reason: HOSPADM

## 2021-10-14 RX ADMIN — METHIMAZOLE 10 MG: 5 TABLET ORAL at 21:51

## 2021-10-14 RX ADMIN — SODIUM CHLORIDE 2000 ML: 9 INJECTION, SOLUTION INTRAVENOUS at 02:37

## 2021-10-14 RX ADMIN — LISINOPRIL 10 MG: 10 TABLET ORAL at 21:52

## 2021-10-14 RX ADMIN — ATORVASTATIN CALCIUM 40 MG: 40 TABLET, FILM COATED ORAL at 21:51

## 2021-10-14 RX ADMIN — TRAZODONE HYDROCHLORIDE 50 MG: 50 TABLET ORAL at 21:51

## 2021-10-14 RX ADMIN — Medication 1 TABLET: at 21:51

## 2021-10-14 RX ADMIN — SODIUM CHLORIDE 1000 ML: 9 INJECTION, SOLUTION INTRAVENOUS at 07:32

## 2021-10-14 RX ADMIN — FINASTERIDE 5 MG: 5 TABLET, FILM COATED ORAL at 21:52

## 2021-10-14 RX ADMIN — LEVETIRACETAM 500 MG: 500 TABLET, FILM COATED ORAL at 21:51

## 2021-10-14 RX ADMIN — HYDROXYZINE PAMOATE 50 MG: 50 CAPSULE ORAL at 13:19

## 2021-10-14 RX ADMIN — RIVAROXABAN 20 MG: 20 TABLET, FILM COATED ORAL at 17:42

## 2021-10-14 ASSESSMENT — SLEEP AND FATIGUE QUESTIONNAIRES
DIFFICULTY STAYING ASLEEP: NO
DIFFICULTY ARISING: NO
RESTFUL SLEEP: YES
DO YOU USE A SLEEP AID: NO
DO YOU HAVE DIFFICULTY SLEEPING: NO
SLEEP PATTERN: NORMAL
DIFFICULTY FALLING ASLEEP: NO
AVERAGE NUMBER OF SLEEP HOURS: 6

## 2021-10-14 ASSESSMENT — LIFESTYLE VARIABLES: HISTORY_ALCOHOL_USE: YES

## 2021-10-14 ASSESSMENT — PATIENT HEALTH QUESTIONNAIRE - PHQ9: SUM OF ALL RESPONSES TO PHQ QUESTIONS 1-9: 9

## 2021-10-14 ASSESSMENT — PAIN SCALES - GENERAL: PAINLEVEL_OUTOF10: 0

## 2021-10-14 NOTE — ED NOTES
Dr. Josue Chung notified of repeat CK level 1,049, verbal order received.       Janet Kern RN  10/14/21 9285

## 2021-10-14 NOTE — ED NOTES
[facility]  at bedside evaluating patient at this time.       Janet Kern RN  10/14/21 0000

## 2021-10-14 NOTE — PROGRESS NOTES
[facility]  Admission Note     Patient transferred from [facility] AN AFFILIATE OF [facility]. Patient came to the ER suicidal with a plan to shoot himself. Patient denies SI/HI/Hallucinations at this time. Patient endorses high levels of depression and anxiety because he is in the process of being evicted from his apartment. Patient appears flat, sad, and anxious but brightens during conversation. Patient minimizes his alcohol and drug use. Patient agreeable to treatment. Admission Type:   Admission Type:  Involuntary    Reason for admission:  Reason for Admission: \"I am getting evicted from my apartment and was suicidal\"    PATIENT STRENGTHS:  Strengths: Communication, Connection to output provider    Patient Strengths and Limitations:  Limitations: Tendency to isolate self, Inappropriate/potentially harmful leisure interests    Addictive Behavior:   Addictive Behavior  In the past 3 months, have you felt or has someone told you that you have a problem with:  : None  Do you have a history of Chemical Use?: Yes  Do you have a history of Alcohol Use?: Yes  Do you have a history of Street Drug Abuse?: No  Histroy of Prescripton Drug Abuse?: Yes    Medical Problems:   Past Medical History:   Diagnosis Date    Anxiety     Arthritis     Asthma     Bipolar 1 disorder ([address]     Chronic combined systolic and diastolic CHF (congestive heart failure) ([address] 05/27/2018    COPD (chronic obstructive pulmonary disease) ([address]     Depression     Diabetes mellitus ([address]     GERD (gastroesophageal reflux disease)     Hepatitis C     resolved    Hypertension     Hyperthyroidism 8/2/2012    Hypothyroidism due to medication     Mass of testicle     Mesothelioma [facility])     pt states possibly not    Neuromuscular disorder ([address]     Other disorders of kidney and ureter     kidney stones; most recent 08/27/2015    Paroxysmal A-fib ([address]     discussed with PCP- patient does have hx of PAfib    Peptic ulcer     Prostate enlargement     Pulmonary embolism (HCC)     Intermediate risk for PE on VQ scan.     Seizures ([address]     Thyroid disease     hyperthyroid       Status EXAM:  Status and Exam  Normal: No  Facial Expression: Flat, Sad  Affect: Congruent  Level of Consciousness: Alert  Mood:Normal: No  Mood: Anxious  Motor Activity:Normal: Yes  Interview Behavior: Cooperative  Preception: [place] to Person, [place] Medicus to Time, [place] to Place, [place] to Situation  Attention:Normal: No  Attention: Distractible  Thought Processes: Circumstantial  Thought Content:Normal: No  Thought Content: Preoccupations  Hallucinations: None  Delusions: No  Memory:Normal: No  Memory: Poor Recent  Insight and Judgment: No  Insight and Judgment: Poor Judgment, Poor Insight  Present Suicidal Ideation: No  Present Homicidal Ideation: No    Tobacco Screening:  Practical Counseling, on admission, marisol X, if applicable and completed (first 3 are required if patient doesn't refuse):            (X)  Recognizing danger situations (included triggers and roadblocks)                    (X)  Coping skills (new ways to manage stress, exercise, relaxation techniques, changing routine, distraction)                                                           (X)  Basic information about quitting (benefits of quitting, techniques in how to quit, available resources  ( ) Referral for counseling faxed to [facility]                                           ( ) Patient refused counseling  (X) Patient has not smoked in the last 30 days    Metabolic Screening:    Lab Results   Component Value Date    LABA1C 5.8 05/19/2018       Lab Results   Component Value Date    CHOL 133 05/19/2018    CHOL 148 07/06/2012     Lab Results   Component Value Date    TRIG 42 05/19/2018    TRIG 63 07/06/2012     Lab Results   Component Value Date    HDL 54 05/19/2018    HDL 65.0 07/06/2012     No components found for: [facility] EVALUATION AND TREATMENT [place]  Lab Results   Component Value Date    LABVLDL 8 05/19/2018         Body mass index is 19.4 kg/m². BP Readings from Last 2 Encounters:   10/14/21 (!) 103/57   10/03/21 106/65           Pt admitted with followings belongings:  Dentures: None  Vision - Corrective Lenses: None  Hearing Aid: None  Jewelry: None  Body Piercings Removed: N/A  Clothing: Footwear, Pants, Shirt, Socks, Undergarments (Comment)  Were All Patient Medications Collected?: Not Applicable     Patient oriented to surroundings and program expectations and copy of patient rights given. Received admission packet:  Yes. Consents reviewed, signed Yes. Patient verbalize understanding:  Yes. Patient education on precautions:  Yes                   Rico Mcwilliams, RN

## 2021-10-14 NOTE — ED NOTES
Pt accepted by Dr Aldon Apgar per [facility] AN AFFILIATE OF [facility] nurse Héctor Corrales on [address] notified, Arias fall in Admitting advised og assigned room number 7310 B.       [facility], [place], [place]  10/14/21 1300

## 2021-10-14 NOTE — ED PROVIDER NOTES
HPI:  10/13/21, Time: 8:37 PM EDT         Prashant Latham is a 73 y.o. male presenting to the ED for psychiatric evaluation. Patient states he is suicidal.  He wants to shoot himself with a gun. No attempt. No homicidal ideation. No hallucinations. He states he was just [facility] from [facility] yesterday, however, he still having the thoughts. He did drink today. Denies any other symptoms or complaints. Review of Systems:   A complete review of systems was performed and pertinent positives and negatives are stated within HPI, all other systems reviewed and are negative.          --------------------------------------------- PAST HISTORY ---------------------------------------------  Past Medical History:  has a past medical history of Anxiety, Arthritis, Asthma, Bipolar 1 disorder ([address] Chronic combined systolic and diastolic CHF (congestive heart failure) ([address] COPD (chronic obstructive pulmonary disease) ([address] Depression, Diabetes mellitus ([address] GERD (gastroesophageal reflux disease), Hepatitis C, Hypertension, Hyperthyroidism, Hypothyroidism due to medication, Mass of testicle, Mesothelioma ([address] Neuromuscular disorder ([address] Other disorders of kidney and ureter, Paroxysmal A-fib ([address] Peptic ulcer, Prostate enlargement, Pulmonary embolism ([address] Seizures ([address] and Thyroid disease. Past Surgical History:  has a past surgical history that includes Appendectomy; Lithotripsy; Hemorrhoid surgery; Bladder surgery; Endoscopy, colon, diagnostic (11/13/2015); Abdomen surgery; Colonoscopy; Cardiac surgery; vascular surgery; and Cardiac catheterization (05/21/2018). Social History:  reports that he quit smoking about 11 years ago. His smoking use included cigarettes. He quit after 10.00 years of use. He quit smokeless tobacco use about 3 years ago. His smokeless tobacco use included chew. He reports current drug use. Drug: Cocaine. He reports that he does not drink alcohol.     Family History: family history includes Cancer in his father, maternal grandfather, and mother; Diabetes in his father, maternal aunt, maternal aunt, maternal aunt, maternal grandmother, and mother; Heart Disease in his paternal grandfather and paternal grandmother; Heart Failure in his paternal grandfather and paternal grandmother. The patients home medications have been reviewed. Allergies: Codeine, Dye [iodides], Ketorolac tromethamine, Peanuts [peanut oil], and Shellfish-derived products    -------------------------------------------------- RESULTS -------------------------------------------------  All laboratory and radiology results have been personally reviewed by myself   LABS:  Results for orders placed or performed during the hospital encounter of 10/13/21   CBC Auto Differential   Result Value Ref Range    WBC 9.5 4.5 - 11.5 E9/L    RBC 4.61 3.80 - 5.80 E12/L    Hemoglobin 14.4 12.5 - 16.5 g/dL    Hematocrit 42.6 37.0 - 54.0 %    MCV 92.4 80.0 - 99.9 fL    MCH 31.2 26.0 - 35.0 pg    MCHC 33.8 32.0 - 34.5 %    RDW 13.9 11.5 - 15.0 fL    Platelets 942 014 - 667 E9/L    MPV 12.2 (H) 7.0 - 12.0 fL    Neutrophils % 67.5 43.0 - 80.0 %    Immature Granulocytes % 0.3 0.0 - 5.0 %    Lymphocytes % 15.3 (L) 20.0 - 42.0 %    Monocytes % 10.7 2.0 - 12.0 %    Eosinophils % 5.7 0.0 - 6.0 %    Basophils % 0.5 0.0 - 2.0 %    Neutrophils Absolute 6.41 1.80 - 7.30 E9/L    Immature Granulocytes # 0.03 E9/L    Lymphocytes Absolute 1.45 (L) 1.50 - 4.00 E9/L    Monocytes Absolute 1.02 (H) 0.10 - 0.95 E9/L    Eosinophils Absolute 0.54 (H) 0.05 - 0.50 E9/L    Basophils Absolute 0.05 0.00 - 0.20 E9/L       RADIOLOGY:  Interpreted by Radiologist.  No orders to display       ------------------------- NURSING NOTES AND VITALS REVIEWED ---------------------------   The nursing notes within the ED encounter and vital signs as below have been reviewed.    BP (!) 103/59   Pulse 62   Temp 96.6 °F (35.9 °C) (Temporal)   Resp 18   SpO2 98% Oxygen Saturation Interpretation: Normal      ---------------------------------------------------PHYSICAL EXAM--------------------------------------      Constitutional/General: Alert and oriented x3, well appearing, non toxic in NAD  Head: Normocephalic and atraumatic  Eyes: PERRL, EOMI  Mouth: Oropharynx clear, handling secretions, no trismus  Neck: Supple, full ROM,   Pulmonary: Lungs clear to auscultation bilaterally, no wheezes, rales, or rhonchi. Not in respiratory distress  Cardiovascular:  Regular rate and rhythm, no murmurs, gallops, or rubs. 2+ distal pulses  Abdomen: Soft, non tender, non distended,   Extremities: Moves all extremities x 4. Warm and well perfused  Skin: warm and dry without rash  Neurologic: GCS 15,  Psych: Normal Affect      ------------------------------ ED COURSE/MEDICAL DECISION MAKING----------------------  Medications - No data to display      EKG: Sinus rhythm, rate 73, no STEMI    ED COURSE:       Medical Decision Making:    Medically clear     Counseling: The emergency provider has spoken with the patient and discussed todays results, in addition to providing specific details for the plan of care and counseling regarding the diagnosis and prognosis. Questions are answered at this time and they are agreeable with the plan.      --------------------------------- IMPRESSION AND DISPOSITION ---------------------------------    IMPRESSION  1. Suicidal ideation        DISPOSITION  Disposition: Admit to mental health unit - medically cleared for admission  Patient condition is stable      NOTE: This report was transcribed using voice recognition software.  Every effort was made to ensure accuracy; however, inadvertent computerized transcription errors may be present        Mima Wheatley MD  10/13/21 2038
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
## 2021-10-14 NOTE — ED NOTES
Pt signed OLY for his brother, Darcy Kirk, [phone], who lives in [place]. Pt said to answer any questions and to let Anh Sanchez know where his going.      DAVID Quiñones, [facility]  10/14/21 9297

## 2021-10-14 NOTE — ED NOTES
Discussed case with Dr Aleyda Pope NP, awaiting return call for Plan of care     Anais Farias RN  10/14/21 0141

## 2021-10-14 NOTE — ED NOTES
Report given to Nadia Mendoza RN.        Daphnie Salazar RN  10/14/21 131 Lydia Timmons RN  10/14/21 1470

## 2021-10-14 NOTE — ED NOTES
Pt brought in by EMS intoxicated changed,  bag consisting jacket, pr shoes, clothes placed in locker 27.

## 2021-10-15 PROBLEM — F60.89 CLUSTER B PERSONALITY DISORDER (HCC): Status: ACTIVE | Noted: 2021-10-15

## 2021-10-15 PROBLEM — Z91.199 HISTORY OF NONCOMPLIANCE WITH MEDICAL TREATMENT, PRESENTING HAZARDS TO HEALTH: Status: ACTIVE | Noted: 2021-10-15

## 2021-10-15 PROCEDURE — 6370000000 HC RX 637 (ALT 250 FOR IP): Performed by: NURSE PRACTITIONER

## 2021-10-15 PROCEDURE — 6370000000 HC RX 637 (ALT 250 FOR IP): Performed by: PSYCHIATRY & NEUROLOGY

## 2021-10-15 PROCEDURE — 1240000000 HC EMOTIONAL WELLNESS R&B

## 2021-10-15 PROCEDURE — 99222 1ST HOSP IP/OBS MODERATE 55: CPT | Performed by: NURSE PRACTITIONER

## 2021-10-15 RX ORDER — CLONAZEPAM 0.5 MG/1
0.5 TABLET ORAL 2 TIMES DAILY PRN
Status: DISCONTINUED | OUTPATIENT
Start: 2021-10-15 | End: 2021-10-18 | Stop reason: HOSPADM

## 2021-10-15 RX ORDER — VENLAFAXINE HYDROCHLORIDE 75 MG/1
75 CAPSULE, EXTENDED RELEASE ORAL
Status: DISCONTINUED | OUTPATIENT
Start: 2021-10-16 | End: 2021-10-18 | Stop reason: HOSPADM

## 2021-10-15 RX ADMIN — METHIMAZOLE 10 MG: 5 TABLET ORAL at 20:53

## 2021-10-15 RX ADMIN — LURASIDONE HYDROCHLORIDE 60 MG: 20 TABLET, FILM COATED ORAL at 10:56

## 2021-10-15 RX ADMIN — LISINOPRIL 10 MG: 10 TABLET ORAL at 09:17

## 2021-10-15 RX ADMIN — METHIMAZOLE 10 MG: 5 TABLET ORAL at 09:17

## 2021-10-15 RX ADMIN — RIVAROXABAN 20 MG: 20 TABLET, FILM COATED ORAL at 17:57

## 2021-10-15 RX ADMIN — TRAZODONE HYDROCHLORIDE 50 MG: 50 TABLET ORAL at 20:53

## 2021-10-15 RX ADMIN — FINASTERIDE 5 MG: 5 TABLET, FILM COATED ORAL at 09:19

## 2021-10-15 RX ADMIN — ATORVASTATIN CALCIUM 40 MG: 40 TABLET, FILM COATED ORAL at 20:53

## 2021-10-15 RX ADMIN — CLONAZEPAM 0.5 MG: 0.5 TABLET ORAL at 12:28

## 2021-10-15 RX ADMIN — HYDROXYZINE PAMOATE 50 MG: 50 CAPSULE ORAL at 18:02

## 2021-10-15 RX ADMIN — METHIMAZOLE 10 MG: 5 TABLET ORAL at 14:18

## 2021-10-15 RX ADMIN — HYDROXYZINE PAMOATE 50 MG: 50 CAPSULE ORAL at 09:17

## 2021-10-15 RX ADMIN — LEVETIRACETAM 500 MG: 500 TABLET, FILM COATED ORAL at 20:53

## 2021-10-15 RX ADMIN — Medication 1 TABLET: at 09:16

## 2021-10-15 RX ADMIN — LEVETIRACETAM 500 MG: 500 TABLET, FILM COATED ORAL at 09:17

## 2021-10-15 ASSESSMENT — PATIENT HEALTH QUESTIONNAIRE - PHQ9: SUM OF ALL RESPONSES TO PHQ QUESTIONS 1-9: 11

## 2021-10-15 ASSESSMENT — SLEEP AND FATIGUE QUESTIONNAIRES
DO YOU USE A SLEEP AID: NO
AVERAGE NUMBER OF SLEEP HOURS: 6
DO YOU HAVE DIFFICULTY SLEEPING: NO

## 2021-10-15 ASSESSMENT — PAIN SCALES - GENERAL
PAINLEVEL_OUTOF10: 0
PAINLEVEL_OUTOF10: 0

## 2021-10-15 ASSESSMENT — LIFESTYLE VARIABLES: HISTORY_ALCOHOL_USE: YES

## 2021-10-15 NOTE — PROGRESS NOTES
Group Therapy Note  Patient attended goals group and stated daily goal as to get rid of my depression. Group Therapy Note    Date: 10/15/2021  Start Time: 10:00  End Time:  10:30  Number of Participants: 10    Type of Group: Psychoeducation    Wellness Binder Information  Module Name: Coping Skills    Patient's Goal: To id positive coping skills to use on a daily basis    Notes: attended group and was able to participate. Status After Intervention:  Unchanged    Participation Level:  Active Listener    Participation Quality: Attentive      Speech:  hesitant      Thought Process/Content: Linear      Affective Functioning: Flat      Mood: depressed      Level of consciousness:  Alert      Response to Learning: Progressing to goal      Endings: None Reported    Modes of Intervention: Education      Discipline Responsible: Psychoeducational Specialist      Signature:  TONY Dupree

## 2021-10-15 NOTE — PROGRESS NOTES
[facility]  Initial Interdisciplinary Treatment Plan NOTE    Review Date & Time: 10/15/21 0900     Patient was in treatment team    Admission Type:   Admission Type: Involuntary    Reason for admission:  Reason for Admission: \"I am getting evicted from my apartment and was suicidal\"      Estimated Length of Stay Update:  1-3 days  Estimated Discharge Date Update: 10/18/21    EDUCATION:   Learner Progress Toward Treatment Goals: Reviewed results and recommendations of this team and Reviewed goals and plan of care    Method: Small group    Outcome: Verbalized understanding    PATIENT GOALS: \"Get rid of my depression\"    PLAN/TREATMENT RECOMMENDATIONS UPDATE: Take prescribed medications, attend/participate in groups. Continue to provide emotional support to patient.     GOALS UPDATE:   Time frame for Short-Term Goals: 1-3 days    Kamille Morton RN
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
## 2021-10-15 NOTE — PLAN OF CARE
Problem: Depressive Behavior With or Without Suicide Precautions:  Goal: Ability to disclose and discuss suicidal ideas will improve  Description: Ability to disclose and discuss suicidal ideas will improve  Outcome: Met This Shift     Problem: Depressive Behavior With or Without Suicide Precautions:  Goal: Absence of self-harm  Description: Absence of self-harm  10/15/2021 1157 by Tosha Ribera RN  Outcome: Met This Shift  10/15/2021 0429 by Rodney Jacobo RN  Outcome: Ongoing     Problem: Depressive Behavior With or Without Suicide Precautions:  Goal: Able to verbalize and/or display a decrease in depressive symptoms  Description: Able to verbalize and/or display a decrease in depressive symptoms  Outcome: Ongoing     Problem: Depressive Behavior With or Without Suicide Precautions:  Goal: Participates in care planning  Description: Participates in care planning  Outcome: Ongoing     Patient denies SI/HI/Hallucinations. Patient endorses high levels of depression, rating his depression a 10/10. Patient appears flat and sad but brightens during conversation. Patient observed out on the unit watching TV and socializing with peers. Patient taking prescribed medications, eating provided meals, and attending groups.

## 2021-10-15 NOTE — GROUP NOTE
Group Therapy Note    Date: 10/15/2021    Group Start Time: 1045  Group End Time: 1115  Group Topic: Cognitive Skills    [facility]    DAVID Rosen LSW        Group Therapy Note    Attendees: 9         Patient's Goal:  Pt will be able to discuss cognitive distortions, and how they can negatively impact our everyday lives. Notes:  Pt made connections and participated in group      Status After Intervention:  Improved    Participation Level:  Active Listener    Participation Quality: Appropriate, Attentive, Sharing and Supportive      Speech:  normal      Thought Process/Content: Logical      Affective Functioning: Congruent      Mood: depressed      Level of consciousness:  Alert and Attentive      Response to Learning: Able to verbalize current knowledge/experience      Endings: None Reported    Modes of Intervention: Education, Support, Socialization, Exploration, Clarifying, Problem-solving and Activity      Discipline Responsible: /Counselor      Signature:  DAVID Rosen LSW

## 2021-10-16 PROCEDURE — 6370000000 HC RX 637 (ALT 250 FOR IP): Performed by: PSYCHIATRY & NEUROLOGY

## 2021-10-16 PROCEDURE — 6370000000 HC RX 637 (ALT 250 FOR IP): Performed by: NURSE PRACTITIONER

## 2021-10-16 PROCEDURE — 1240000000 HC EMOTIONAL WELLNESS R&B

## 2021-10-16 PROCEDURE — 99232 SBSQ HOSP IP/OBS MODERATE 35: CPT | Performed by: NURSE PRACTITIONER

## 2021-10-16 RX ADMIN — FINASTERIDE 5 MG: 5 TABLET, FILM COATED ORAL at 10:12

## 2021-10-16 RX ADMIN — METHIMAZOLE 10 MG: 5 TABLET ORAL at 10:13

## 2021-10-16 RX ADMIN — HYDROXYZINE PAMOATE 50 MG: 50 CAPSULE ORAL at 21:05

## 2021-10-16 RX ADMIN — VENLAFAXINE HYDROCHLORIDE 75 MG: 75 CAPSULE, EXTENDED RELEASE ORAL at 10:12

## 2021-10-16 RX ADMIN — LEVETIRACETAM 500 MG: 500 TABLET, FILM COATED ORAL at 21:05

## 2021-10-16 RX ADMIN — ATORVASTATIN CALCIUM 40 MG: 40 TABLET, FILM COATED ORAL at 21:05

## 2021-10-16 RX ADMIN — ACETAMINOPHEN 650 MG: 325 TABLET ORAL at 19:23

## 2021-10-16 RX ADMIN — Medication 1 TABLET: at 10:12

## 2021-10-16 RX ADMIN — CLONAZEPAM 0.5 MG: 0.5 TABLET ORAL at 00:00

## 2021-10-16 RX ADMIN — LEVETIRACETAM 500 MG: 500 TABLET, FILM COATED ORAL at 10:12

## 2021-10-16 RX ADMIN — CLONAZEPAM 0.5 MG: 0.5 TABLET ORAL at 11:52

## 2021-10-16 RX ADMIN — LISINOPRIL 10 MG: 10 TABLET ORAL at 10:12

## 2021-10-16 RX ADMIN — TRAZODONE HYDROCHLORIDE 50 MG: 50 TABLET ORAL at 21:05

## 2021-10-16 RX ADMIN — METHIMAZOLE 10 MG: 5 TABLET ORAL at 21:05

## 2021-10-16 RX ADMIN — LURASIDONE HYDROCHLORIDE 60 MG: 20 TABLET, FILM COATED ORAL at 10:12

## 2021-10-16 RX ADMIN — RIVAROXABAN 20 MG: 20 TABLET, FILM COATED ORAL at 18:26

## 2021-10-16 RX ADMIN — METHIMAZOLE 10 MG: 5 TABLET ORAL at 13:45

## 2021-10-16 ASSESSMENT — PAIN SCALES - GENERAL
PAINLEVEL_OUTOF10: 0
PAINLEVEL_OUTOF10: 0
PAINLEVEL_OUTOF10: 10
PAINLEVEL_OUTOF10: 0

## 2021-10-16 NOTE — PROGRESS NOTES
Has been out of room most of the shift. Excited about possibility of going home on Monday.  Some sexual preoccupation stated \"I would like to have a supermodel \"

## 2021-10-16 NOTE — PROGRESS NOTES
BEHAVIORAL HEALTH FOLLOW-UP NOTE     10/16/2021     Patient was seen and examined in person, Chart reviewed   Patient's case discussed with staff/team    Chief Complaint: \" I am okay. \"    Interim History: Patient seen in his room flat blunted affect. Offers no conversation. Denies any depression denies any anxiety minimizing circumstances hospitalization minimizing his drug use. He denies SI/HI intent or plan currently denies auditory visual hallucinations. Isolative to his room not attending groups or socializing with peers      Appetite:   [x] Normal/Unchanged  [] Increased  [] Decreased      Sleep:       [x] Normal/Unchanged  [] Fair       [] Poor              Energy:    [x] Normal/Unchanged  [] Increased  [] Decreased        SI [] Present  [x] Absent    HI  []Present  [x] Absent     Aggression:  [] yes  [x] no    Patient is [x] able  [] unable to CONTRACT FOR SAFETY     PAST MEDICAL/PSYCHIATRIC HISTORY:   Past Medical History:   Diagnosis Date    Anxiety     Arthritis     Asthma     Bipolar 1 disorder ([address]     Chronic combined systolic and diastolic CHF (congestive heart failure) ([address] 05/27/2018    COPD (chronic obstructive pulmonary disease) ([address]     Depression     Diabetes mellitus ([address]     GERD (gastroesophageal reflux disease)     Hepatitis C     resolved    Hypertension     Hyperthyroidism 8/2/2012    Hypothyroidism due to medication     Mass of testicle     Mesothelioma [facility])     pt states possibly not    Neuromuscular disorder ([address]     Other disorders of kidney and ureter     kidney stones; most recent 08/27/2015    Paroxysmal A-fib ([address]     discussed with PCP- patient does have hx of PAfib    Peptic ulcer     Prostate enlargement     Pulmonary embolism ([address]     Intermediate risk for PE on VQ scan.     Seizures ([address]     Thyroid disease     hyperthyroid       FAMILY/SOCIAL HISTORY:  Family History   Problem Relation Age of Onset    Cancer Mother     Diabetes Mother    [employer] Cancer Father     Diabetes Father     Diabetes Maternal Aunt     Diabetes Maternal Grandmother     Cancer Maternal Grandfather     Heart Disease Paternal Grandmother     Heart Failure Paternal Grandmother     Heart Disease Paternal Grandfather     Heart Failure Paternal Grandfather     Diabetes Maternal Aunt     Diabetes Maternal Aunt      Social History     Socioeconomic History    Marital status: Single     Spouse name: Not on file    Number of children: Not on file    Years of education: Not on file    Highest education level: Not on file   Occupational History    Occupation: disability   Tobacco Use    Smoking status: Former Smoker     Years: 10.00     Types: Cigarettes     Quit date: 1/10/2010     Years since quittin.7    Smokeless tobacco: Former User     Types: [address] date:    Vaping Use    Vaping Use: Never used   Substance and Sexual Activity    Alcohol use: No     Alcohol/week: 0.0 standard drinks     Comment: recovered alcoholic; 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    Drug use: Yes     Types: Cocaine    Sexual activity: Yes     Comment: heroin   Other Topics Concern    Not on file   Social History Narrative    Not on file     Social Determinants of Health     Financial Resource Strain:     Difficulty of Paying Living Expenses:    Food Insecurity:     Worried About Running Out of Food in the Last Year:     [address] in the Last Year:    Transportation Needs:     Lack of Transportation (Medical):      Lack of Transportation (Non-Medical):    Physical Activity:     Days of Exercise per Week:     Minutes of Exercise per Session:    Stress:     Feeling of Stress :    Social Connections:     Frequency of Communication with Friends and Family:     Frequency of Social Gatherings with Friends and Family:     Attends [employer] Services:     Active Member of Clubs or Organizations:     Attends Club or Organization Meetings:     Marital Status:    Intimate Partner Violence:     at 10/16/21 1012    atorvastatin (LIPITOR) tablet 40 mg, 40 mg, Oral, Nightly, Katarina Chris, APRN - CNP, 40 mg at 10/15/21 2053    lisinopril (PRINIVIL;ZESTRIL) tablet 10 mg, 10 mg, Oral, Daily, Katarina Chris, APRN - CNP, 10 mg at 10/16/21 1012    finasteride (PROSCAR) tablet 5 mg, 5 mg, Oral, Daily, Katarina Chris, APRN - CNP, 5 mg at 10/16/21 1012    multivitamin 1 tablet, 1 tablet, Oral, Daily, Katarina Chris, APRN - CNP, 1 tablet at 10/16/21 1012    methIMAzole (TAPAZOLE) tablet 10 mg, 10 mg, Oral, TID, Katarina Chris, APRN - CNP, 10 mg at 10/16/21 1345      Examination:  /75   Pulse 71   Temp 98.5 °F (36.9 °C) (Temporal)   Resp 17   Ht 5' 4\" (1.626 m)   Wt 113 lb (51.3 kg)   SpO2 97%   BMI 19.40 kg/m²   Gait - steady  Medication side effects(SE): Denies    Mental Status Examination:    Level of consciousness:  within normal limits   Appearance:  fair grooming and fair hygiene  Behavior/Motor:  no abnormalities noted  Attitude toward examiner:  cooperative  Speech:  spontaneous, normal rate and normal volume   Mood: \" I am okay. \"  Affect: Mood incongruent flat and blunted  Thought processes: Linear without flight of ideas loose associations  Thought content: Devoid of any auditory visualizations delusions or other perceptual normalities. Denies SI/HI intent or plan  Cognition:  oriented to person, place, and time   Concentration distractible  Insight poor   Judgement poor     ASSESSMENT:   Patient symptoms are:  [] Well controlled  [] Improving  [] Worsening  [x] No change      Diagnosis:   Principal Problem:    Bipolar disorder current episode depressed ([facility]  Active Problems:    Benzodiazepine dependence ([facility]    Suicidal ideation    Cluster B personality disorder ([facility]    History of noncompliance with medical treatment, presenting hazards to health  Resolved Problems:    * No resolved hospital problems.  *      LABS:    Recent Labs     10/13/21  2014   WBC 9.5   HGB 14.4    Recent Labs     10/13/21  2014      K 3.7      CO2 18*   BUN 13   CREATININE 1.0   GLUCOSE 77     Recent Labs     10/13/21  2014   BILITOT 0.6   ALKPHOS 72   AST 54*   ALT 29     Lab Results   Component Value Date    LABAMPH NOT DETECTED 10/13/2021    LABAMPH NOT DETECTED 07/04/2011    BARBSCNU NOT DETECTED 10/13/2021    LABBENZ POSITIVE 10/13/2021    LABBENZ SEE BELOW 07/01/2014    CANNAB POSITIVE  07/04/2011    LABMETH NOT DETECTED 10/13/2021    OPIATESCREENURINE NOT DETECTED 10/13/2021    PHENCYCLIDINESCREENURINE NOT DETECTED 10/13/2021    ETOH 48 10/14/2021     Lab Results   Component Value Date    TSH 22.830 09/24/2021     No results found for: LITHIUM  Lab Results   Component Value Date    VALPROATE 31 (L) 06/02/2016           Treatment Plan:  Reviewed current Medications with the patient. Risks, benefits, side effects, drug-to-drug interactions and alternatives to treatment were discussed. Collateral information:   CD evaluation  Encourage patient to attend group and other milieu activities.   Discharge planning discussed with the patient and treatment team.    Continue Latuda 60 mg daily  Continue Effexor XR 75 mg daily    PSYCHOTHERAPY/COUNSELING:  [x] Therapeutic interview  [x] Supportive  [] CBT  [] Ongoing  [] Other    [x] Patient continues to need, on a daily basis, active treatment furnished directly by or requiring the supervision of inpatient psychiatric personnel      Anticipated Length of stay: 3 to 7 days based on stability            Electronically signed by ZOIE Peterson CNP on 27/36/3205 at 3:35 PM

## 2021-10-16 NOTE — PLAN OF CARE
Problem: Depressive Behavior With or Without Suicide Precautions:  Goal: Able to verbalize acceptance of life and situations over which he or she has no control  Description: Able to verbalize acceptance of life and situations over which he or she has no control  Outcome: Met This Shift     Problem: Depressive Behavior With or Without Suicide Precautions:  Goal: Able to verbalize and/or display a decrease in depressive symptoms  Description: Able to verbalize and/or display a decrease in depressive symptoms  10/16/2021 0014 by Omie Lesch, RN  Outcome: Met This Shift     Problem: Depressive Behavior With or Without Suicide Precautions:  Goal: Ability to disclose and discuss suicidal ideas will improve  Description: Ability to disclose and discuss suicidal ideas will improve  10/16/2021 0014 by Omie Lesch, RN  Outcome: Met This Shift     Problem: Depressive Behavior With or Without Suicide Precautions:  Goal: Able to verbalize support systems  Description: Able to verbalize support systems  Outcome: Ongoing     Problem: Depressive Behavior With or Without Suicide Precautions:  Goal: Absence of self-harm  Description: Absence of self-harm  10/16/2021 0014 by Omie Lesch, RN  Outcome: Met This Shift     Problem: Depressive Behavior With or Without Suicide Precautions:  Goal: Patient specific goal  Description: Patient specific goal  Outcome: Ongoing     Problem: Depressive Behavior With or Without Suicide Precautions:  Goal: Participates in care planning  Description: Participates in care planning  10/16/2021 0014 by Omie Lesch, RN  Outcome: Met This Shift

## 2021-10-17 PROCEDURE — 1240000000 HC EMOTIONAL WELLNESS R&B

## 2021-10-17 PROCEDURE — 99232 SBSQ HOSP IP/OBS MODERATE 35: CPT | Performed by: PSYCHIATRY & NEUROLOGY

## 2021-10-17 PROCEDURE — 6370000000 HC RX 637 (ALT 250 FOR IP): Performed by: NURSE PRACTITIONER

## 2021-10-17 PROCEDURE — 6370000000 HC RX 637 (ALT 250 FOR IP): Performed by: PSYCHIATRY & NEUROLOGY

## 2021-10-17 RX ADMIN — LISINOPRIL 10 MG: 10 TABLET ORAL at 09:28

## 2021-10-17 RX ADMIN — RIVAROXABAN 20 MG: 20 TABLET, FILM COATED ORAL at 17:42

## 2021-10-17 RX ADMIN — LEVETIRACETAM 500 MG: 500 TABLET, FILM COATED ORAL at 09:28

## 2021-10-17 RX ADMIN — LEVETIRACETAM 500 MG: 500 TABLET, FILM COATED ORAL at 21:07

## 2021-10-17 RX ADMIN — LURASIDONE HYDROCHLORIDE 60 MG: 20 TABLET, FILM COATED ORAL at 09:28

## 2021-10-17 RX ADMIN — METHIMAZOLE 10 MG: 5 TABLET ORAL at 09:27

## 2021-10-17 RX ADMIN — CLONAZEPAM 0.5 MG: 0.5 TABLET ORAL at 11:57

## 2021-10-17 RX ADMIN — HYDROXYZINE PAMOATE 50 MG: 50 CAPSULE ORAL at 21:07

## 2021-10-17 RX ADMIN — ACETAMINOPHEN 650 MG: 325 TABLET ORAL at 10:06

## 2021-10-17 RX ADMIN — FINASTERIDE 5 MG: 5 TABLET, FILM COATED ORAL at 09:28

## 2021-10-17 RX ADMIN — VENLAFAXINE HYDROCHLORIDE 75 MG: 75 CAPSULE, EXTENDED RELEASE ORAL at 09:28

## 2021-10-17 RX ADMIN — ATORVASTATIN CALCIUM 40 MG: 40 TABLET, FILM COATED ORAL at 21:07

## 2021-10-17 RX ADMIN — TRAZODONE HYDROCHLORIDE 50 MG: 50 TABLET ORAL at 21:07

## 2021-10-17 RX ADMIN — METHIMAZOLE 10 MG: 5 TABLET ORAL at 13:52

## 2021-10-17 RX ADMIN — Medication 1 TABLET: at 09:28

## 2021-10-17 RX ADMIN — METHIMAZOLE 10 MG: 5 TABLET ORAL at 21:07

## 2021-10-17 ASSESSMENT — PAIN SCALES - GENERAL
PAINLEVEL_OUTOF10: 0
PAINLEVEL_OUTOF10: 0
PAINLEVEL_OUTOF10: 8

## 2021-10-17 NOTE — CARE COORDINATION
Patient signed a OLY in soft chart. This is for his brother who lives in [place], his name is Lb Gaitan. He has not his phone number, but his brother owns a heating and air conditioning  Store and we could find the number that way.     Electronically signed by DAVID Mosqueda LSW on 10/17/2021 at 6:04 PM

## 2021-10-17 NOTE — PROGRESS NOTES
BEHAVIORAL HEALTH FOLLOW-UP NOTE     10/17/2021     Patient was seen and examined in person, Chart reviewed   Patient's case discussed with staff/team    Chief Complaint: \" when can I go home? \"    Interim History: Patient seen this morning he is discharge focused asking about when he can go home. Denies all symptoms today. Symptoms limited insight about his hospitalization she denies any suicidal thoughts denies side effects to medications. Several issues per nursing report. Appetite:   [x] Normal/Unchanged  [] Increased  [] Decreased      Sleep:       [x] Normal/Unchanged  [] Fair       [] Poor              Energy:    [x] Normal/Unchanged  [] Increased  [] Decreased        SI [] Present  [x] Absent    HI  []Present  [x] Absent     Aggression:  [] yes  [x] no    Patient is [x] able  [] unable to CONTRACT FOR SAFETY     PAST MEDICAL/PSYCHIATRIC HISTORY:   Past Medical History:   Diagnosis Date    Anxiety     Arthritis     Asthma     Bipolar 1 disorder ([address]     Chronic combined systolic and diastolic CHF (congestive heart failure) ([address] 05/27/2018    COPD (chronic obstructive pulmonary disease) ([address]     Depression     Diabetes mellitus ([address]     GERD (gastroesophageal reflux disease)     Hepatitis C     resolved    Hypertension     Hyperthyroidism 8/2/2012    Hypothyroidism due to medication     Mass of testicle     Mesothelioma [facility])     pt states possibly not    Neuromuscular disorder ([address]     Other disorders of kidney and ureter     kidney stones; most recent 08/27/2015    Paroxysmal A-fib ([address]     discussed with PCP- patient does have hx of PAfib    Peptic ulcer     Prostate enlargement     Pulmonary embolism ([address]     Intermediate risk for PE on VQ scan.     Seizures ([address]     Thyroid disease     hyperthyroid       FAMILY/SOCIAL HISTORY:  Family History   Problem Relation Age of Onset   [place] Cancer Mother     Diabetes Mother     Cancer Father     Diabetes Father     Diabetes Maternal Aunt     Diabetes Maternal Grandmother     Cancer Maternal Grandfather     Heart Disease Paternal Grandmother     Heart Failure Paternal Grandmother     Heart Disease Paternal Grandfather     Heart Failure Paternal Grandfather     Diabetes Maternal Aunt     Diabetes Maternal Aunt      Social History     Socioeconomic History    Marital status: Single     Spouse name: Not on file    Number of children: Not on file    Years of education: Not on file    Highest education level: Not on file   Occupational History    Occupation: disability   Tobacco Use    Smoking status: Former Smoker     Years: 10.00     Types: Cigarettes     Quit date: 1/10/2010     Years since quittin.7    Smokeless tobacco: Former User     Types: [address] date:    Vaping Use    Vaping Use: Never used   Substance and Sexual Activity    Alcohol use: No     Alcohol/week: 0.0 standard drinks     Comment: recovered alcoholic; 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    Drug use: Yes     Types: Cocaine    Sexual activity: Yes     Comment: heroin   Other Topics Concern    Not on file   Social History Narrative    Not on file     Social Determinants of Health     Financial Resource Strain:     Difficulty of Paying Living Expenses:    Food Insecurity:     Worried About Running Out of Food in the Last Year:     [address] in the Last Year:    Transportation Needs:     Lack of Transportation (Medical):      Lack of Transportation (Non-Medical):    Physical Activity:     Days of Exercise per Week:     Minutes of Exercise per Session:    Stress:     Feeling of Stress :    Social Connections:     Frequency of Communication with Friends and Family:     Frequency of Social Gatherings with Friends and Family:     Attends [employer] Services:     Active Member of Clubs or Organizations:     Attends Club or Organization Meetings:     Marital Status:    Intimate Partner Violence:     Fear of Current or Ex-Partner:     Emotionally Abused:     Physically Abused:     Sexually Abused:            ROS:  [x] All negative/unchanged except if checked.  Explain positive(checked items) below:  [] Constitutional  [] Eyes  [] Ear/Nose/Mouth/Throat  [] Respiratory  [] CV  [] GI  []   [] Musculoskeletal  [] Skin/Breast  [] Neurological  [] Endocrine  [] Heme/Lymph  [] Allergic/Immunologic    Explanation:     MEDICATIONS:    Current Facility-Administered Medications:     clonazePAM (KLONOPIN) tablet 0.5 mg, 0.5 mg, Oral, BID PRN, Katarina Perez APRN - CNP, 0.5 mg at 10/17/21 1157    lurasidone (LATUDA) tablet 60 mg, 60 mg, Oral, Daily, Katarina Perez APRN - CNP, 60 mg at 10/17/21 0928    venlafaxine (EFFEXOR XR) extended release capsule 75 mg, 75 mg, Oral, Daily with breakfast, Katarina Perez APRN - CNP, 75 mg at 10/17/21 5990    acetaminophen (TYLENOL) tablet 650 mg, 650 mg, Oral, Q4H PRN, Mami Adrian MD, 650 mg at 10/17/21 1006    magnesium hydroxide (MILK OF MAGNESIA) 400 MG/5ML suspension 30 mL, 30 mL, Oral, Daily PRN, Mami Adrian MD    nicotine (NICODERM CQ) 21 MG/24HR 1 patch, 1 patch, TransDERmal, Daily, Mami Adrian MD    aluminum & magnesium hydroxide-simethicone (MAALOX) 200-200-20 MG/5ML suspension 30 mL, 30 mL, Oral, PRN, Mami Adrian MD    hydrOXYzine (VISTARIL) capsule 50 mg, 50 mg, Oral, TID PRN, Mami Adrian MD, 50 mg at 10/16/21 2105    haloperidol (HALDOL) tablet 3 mg, 3 mg, Oral, Q6H PRN **OR** haloperidol lactate (HALDOL) injection 3 mg, 3 mg, IntraMUSCular, Q6H PRN, Mami Adrian MD    traZODone (DESYREL) tablet 50 mg, 50 mg, Oral, Nightly PRN, Mami Adrian MD, 50 mg at 10/16/21 2105    diazePAM (VALIUM) tablet 5 mg, 5 mg, Oral, Q8H PRN, Mami Adrian MD    rivaroxaban (XARELTO) tablet 20 mg, 20 mg, Oral, Daily, ZOIE Belcher CNP, 20 mg at 10/16/21 1826    levETIRAcetam (KEPPRA) tablet 500 mg, 500 mg, Oral, BID, ZOIE Belcher CNP, 500 mg at 10/17/21 0165   atorvastatin (LIPITOR) tablet 40 mg, 40 mg, Oral, Nightly, Frankey Slice, APRN - CNP, 40 mg at 10/16/21 2105    lisinopril (PRINIVIL;ZESTRIL) tablet 10 mg, 10 mg, Oral, Daily, Frankey Slice, APRN - CNP, 10 mg at 10/17/21 4128    finasteride (PROSCAR) tablet 5 mg, 5 mg, Oral, Daily, Frankey Slice, APRN - CNP, 5 mg at 10/17/21 5381    multivitamin 1 tablet, 1 tablet, Oral, Daily, Frankey Slice, APRN - CNP, 1 tablet at 10/17/21 7225    methIMAzole (TAPAZOLE) tablet 10 mg, 10 mg, Oral, TID, Frankey Slice, APRN - CNP, 10 mg at 10/17/21 0549      Examination:  /62   Pulse 67   Temp 98.7 °F (37.1 °C) (Infrared)   Resp 16   Ht 5' 4\" (1.626 m)   Wt 113 lb (51.3 kg)   SpO2 97%   BMI 19.40 kg/m²   Gait - steady  Medication side effects(SE): Denies    Mental Status Examination:    Level of consciousness:  within normal limits   Appearance:  fair grooming and fair hygiene  Behavior/Motor:  no abnormalities noted  Attitude toward examiner:  cooperative  Speech:  spontaneous, normal rate and normal volume   Mood: \" I am okay. \"  Affect: Mood incongruent flat and blunted  Thought processes: Linear without flight of ideas loose associations  Thought content: Devoid of any auditory visualizations delusions or other perceptual normalities. Denies SI/HI intent or plan  Cognition:  oriented to person, place, and time   Concentration distractible  Insight poor   Judgement poor     ASSESSMENT:   Patient symptoms are:  [] Well controlled  [] Improving  [] Worsening  [x] No change      Diagnosis:   Principal Problem:    Bipolar disorder current episode depressed ([facility]  Active Problems:    Benzodiazepine dependence ([facility]    Suicidal ideation    [facility] disorder [facility]    History of noncompliance with medical treatment, presenting hazards to health  Resolved Problems:    * No resolved hospital problems. *      LABS:    No results for input(s): WBC, HGB, PLT in the last 72 hours.   No results for input(s): NA, K, CL, CO2, BUN, CREATININE, GLUCOSE in the last 72 hours. No results for input(s): BILITOT, ALKPHOS, AST, ALT in the last 72 hours. Lab Results   Component Value Date    LABAMPH NOT DETECTED 10/13/2021    LABAMPH NOT DETECTED 07/04/2011    BARBSCNU NOT DETECTED 10/13/2021    LABBENZ POSITIVE 10/13/2021    LABBENZ SEE BELOW 07/01/2014    CANNAB POSITIVE  07/04/2011    LABMETH NOT DETECTED 10/13/2021    OPIATESCREENURINE NOT DETECTED 10/13/2021    PHENCYCLIDINESCREENURINE NOT DETECTED 10/13/2021    ETOH 48 10/14/2021     Lab Results   Component Value Date    TSH 22.830 09/24/2021     No results found for: LITHIUM  Lab Results   Component Value Date    VALPROATE 31 (L) 06/02/2016           Treatment Plan:  Reviewed current Medications with the patient. Risks, benefits, side effects, drug-to-drug interactions and alternatives to treatment were discussed. Collateral information:   CD evaluation  Encourage patient to attend group and other milieu activities.   Discharge planning discussed with the patient and treatment team.    Continue Latuda 60 mg daily  Continue Effexor XR 75 mg daily    PSYCHOTHERAPY/COUNSELING:  [x] Therapeutic interview  [x] Supportive  [] CBT  [] Ongoing  [] Other    [x] Patient continues to need, on a daily basis, active treatment furnished directly by or requiring the supervision of inpatient psychiatric personnel      Anticipated Length of stay: 3 to 7 days based on stability            Electronically signed by Chandrakant Light MD on 10/17/2021 at 12:55 PM

## 2021-10-17 NOTE — PROGRESS NOTES
Patient A+Ox 4, denies SI, HI, an hallucinations. Pt rates both anxiety and depression 10/10. Patient is pleasant and brightens with conversation. Patient states that he is worried about upcoming surgery. Compliant with medication regimen, cooperataive with staff. No behaviors, pt observed watching television.  Will continue with current treatment plan

## 2021-10-17 NOTE — PLAN OF CARE
Problem: Depressive Behavior With or Without Suicide Precautions:  Goal: Able to verbalize acceptance of life and situations over which he or she has no control  Description: Able to verbalize acceptance of life and situations over which he or she has no control  Outcome: Ongoing  Goal: Able to verbalize and/or display a decrease in depressive symptoms  Description: Able to verbalize and/or display a decrease in depressive symptoms  Outcome: Ongoing

## 2021-10-17 NOTE — PLAN OF CARE
Problem: Depressive Behavior With or Without Suicide Precautions:  Goal: Able to verbalize acceptance of life and situations over which he or she has no control  Description: Able to verbalize acceptance of life and situations over which he or she has no control  Outcome: Ongoing     Problem: Depressive Behavior With or Without Suicide Precautions:  Goal: Able to verbalize and/or display a decrease in depressive symptoms  Description: Able to verbalize and/or display a decrease in depressive symptoms  10/16/2021 2102 by Alireza Echevarria RN  Outcome: Ongoing  10/16/2021 1809 by Stephie Donnelly RN  Outcome: Ongoing     Problem: Depressive Behavior With or Without Suicide Precautions:  Goal: Able to verbalize support systems  Description: Able to verbalize support systems  Outcome: Ongoing

## 2021-10-18 VITALS
WEIGHT: 113 LBS | RESPIRATION RATE: 16 BRPM | HEART RATE: 63 BPM | HEIGHT: 64 IN | BODY MASS INDEX: 19.29 KG/M2 | SYSTOLIC BLOOD PRESSURE: 120 MMHG | DIASTOLIC BLOOD PRESSURE: 72 MMHG | OXYGEN SATURATION: 98 % | TEMPERATURE: 98.2 F

## 2021-10-18 PROCEDURE — 99239 HOSP IP/OBS DSCHRG MGMT >30: CPT | Performed by: NURSE PRACTITIONER

## 2021-10-18 PROCEDURE — 6370000000 HC RX 637 (ALT 250 FOR IP): Performed by: NURSE PRACTITIONER

## 2021-10-18 RX ORDER — VENLAFAXINE HYDROCHLORIDE 75 MG/1
75 CAPSULE, EXTENDED RELEASE ORAL
Qty: 30 CAPSULE | Refills: 0 | Status: ON HOLD | OUTPATIENT
Start: 2021-10-19 | End: 2021-12-09 | Stop reason: HOSPADM

## 2021-10-18 RX ADMIN — FINASTERIDE 5 MG: 5 TABLET, FILM COATED ORAL at 08:53

## 2021-10-18 RX ADMIN — VENLAFAXINE HYDROCHLORIDE 75 MG: 75 CAPSULE, EXTENDED RELEASE ORAL at 08:53

## 2021-10-18 RX ADMIN — Medication 1 TABLET: at 08:53

## 2021-10-18 RX ADMIN — LURASIDONE HYDROCHLORIDE 60 MG: 20 TABLET, FILM COATED ORAL at 08:53

## 2021-10-18 RX ADMIN — CLONAZEPAM 0.5 MG: 0.5 TABLET ORAL at 04:41

## 2021-10-18 RX ADMIN — METHIMAZOLE 10 MG: 5 TABLET ORAL at 08:53

## 2021-10-18 RX ADMIN — LISINOPRIL 10 MG: 10 TABLET ORAL at 08:54

## 2021-10-18 RX ADMIN — LEVETIRACETAM 500 MG: 500 TABLET, FILM COATED ORAL at 08:53

## 2021-10-18 NOTE — DISCHARGE SUMMARY
DISCHARGE SUMMARY      Patient ID:  Danette Lamas  [id number]  89 y.o.  1955    Admit date: 10/13/2021    Discharge date and time: 10/18/2021    Admitting Physician: Bryan Morales MD     Discharge Physician: Dr Ekaterina Michaels MD    Discharge Diagnoses:   Patient Active Problem List   Diagnosis    Hyperthyroidism    Hepatitis C    Mood disorder ([address]    Mild mitral regurgitation    Hep C w/o coma, chronic (HCC)    Chronic obstructive pulmonary disease ([address]    Major depressive disorder, recurrent ([address]    Dyspnea and respiratory abnormalities    Respiratory failure with hypoxia ([address]    Severe protein-calorie malnutrition ([address]    Asthma    A-fib ([address]    Benign prostatic hyperplasia    Essential (primary) hypertension    H/O drug abuse ([address]    Gastroduodenal ulcer    Seasonal allergic rhinitis    Type 2 diabetes mellitus ([address]    Vitamin D deficiency    LV dysfunction    S/P MVR (mitral valve repair)    NSTEMI (non-ST elevated myocardial infarction) ([address]    Suicidal ideations    Bipolar affective disorder, currently depressed, moderate (HCC)    Benzodiazepine dependence ([address]    Suicidal ideation    Bipolar disorder current episode depressed ([address]    Polysubstance abuse ([address]    Cluster B personality disorder ([address]    History of noncompliance with medical treatment, presenting hazards to health       Admission Condition: poor    Discharged Condition: stable    Admission Circumstance:   Patient presented to [facility] ED immediately after being released from [facility] stating that he was suicidal because he is loosing his apartment      PAST MEDICAL/PSYCHIATRIC HISTORY:   Past Medical History:   Diagnosis Date    Anxiety     Arthritis     Asthma     Bipolar 1 disorder ([address]     Chronic combined systolic and diastolic CHF (congestive heart failure) ([address] 05/27/2018    COPD (chronic obstructive pulmonary disease) (HCC)     Depression     Diabetes mellitus (HCC)     GERD (gastroesophageal reflux disease)     Hepatitis C     resolved    Hypertension     Hyperthyroidism 2012    Hypothyroidism due to medication     Mass of testicle     Mesothelioma [facility])     pt states possibly not    Neuromuscular disorder ([address]     Other disorders of kidney and ureter     kidney stones; most recent 2015    Paroxysmal A-fib [facility])     discussed with PCP- patient does have hx of PAfib    Peptic ulcer     Prostate enlargement     Pulmonary embolism (HCC)     Intermediate risk for PE on VQ scan.     Seizures ([address]     Thyroid disease     hyperthyroid       FAMILY/SOCIAL HISTORY:  Family History   Problem Relation Age of Onset    Cancer Mother     Diabetes Mother     Cancer Father     Diabetes Father     Diabetes Maternal Aunt     Diabetes Maternal Grandmother     Cancer Maternal Grandfather     Heart Disease Paternal Grandmother     Heart Failure Paternal Grandmother     Heart Disease Paternal Grandfather     Heart Failure Paternal Grandfather     Diabetes Maternal Aunt     Diabetes Maternal Aunt      Social History     Socioeconomic History    Marital status: Single     Spouse name: Not on file    Number of children: Not on file    Years of education: Not on file    Highest education level: Not on file   Occupational History    Occupation: disability   Tobacco Use    Smoking status: Former Smoker     Years: 10.00     Types: Cigarettes     Quit date: 1/10/2010     Years since quittin.7    Smokeless tobacco: Former User     Types: Chew     Quit date:    Vaping Use    Vaping Use: Never used   Substance and Sexual Activity    Alcohol use: No     Alcohol/week: 0.0 standard drinks     Comment: recovered alcoholic; last use 7517    Drug use: Yes     Types: Cocaine    Sexual activity: Yes     Comment: heroin   Other Topics Concern    Not on file   Social History Narrative    Not on file     Social Determinants of Health     Financial Resource Strain:    [facility] Difficulty of Paying Living Expenses:    Food Insecurity:     Worried About Running Out of Food in the Last Year:     [address] in the Last Year:    Transportation Needs:     Lack of Transportation (Medical):      Lack of Transportation (Non-Medical):    Physical Activity:     Days of Exercise per Week:     Minutes of Exercise per Session:    Stress:     Feeling of Stress :    Social Connections:     Frequency of Communication with Friends and Family:     Frequency of Social Gatherings with Friends and Family:     Attends Gnosticist Services:     Active Member of Clubs or Organizations:     Attends Club or Organization Meetings:     Marital Status:    Intimate Partner Violence:     Fear of Current or Ex-Partner:     Emotionally Abused:     Physically Abused:     Sexually Abused:        MEDICATIONS:    Current Facility-Administered Medications:     clonazePAM (KLONOPIN) tablet 0.5 mg, 0.5 mg, Oral, BID PRN, Migel Whitley APRN - CNP, 0.5 mg at 10/18/21 0441    lurasidone (LATUDA) tablet 60 mg, 60 mg, Oral, Daily, ZOIE Castellon - CNP, 60 mg at 10/18/21 0853    venlafaxine (EFFEXOR XR) extended release capsule 75 mg, 75 mg, Oral, Daily with breakfast, Migel Whitley APRN - CNP, 75 mg at 10/18/21 0853    acetaminophen (TYLENOL) tablet 650 mg, 650 mg, Oral, Q4H PRN, Seble Coombs MD, 650 mg at 10/17/21 1006    magnesium hydroxide (MILK OF MAGNESIA) 400 MG/5ML suspension 30 mL, 30 mL, Oral, Daily PRN, Seble Coombs MD    nicotine (NICODERM CQ) 21 MG/24HR 1 patch, 1 patch, TransDERmal, Daily, Seble Coombs MD    aluminum & magnesium hydroxide-simethicone (MAALOX) 200-200-20 MG/5ML suspension 30 mL, 30 mL, Oral, PRN, Seble Coombs MD    hydrOXYzine (VISTARIL) capsule 50 mg, 50 mg, Oral, TID PRN, Seble Coombs MD, 50 mg at 10/17/21 2107    haloperidol (HALDOL) tablet 3 mg, 3 mg, Oral, Q6H PRN **OR** haloperidol lactate (HALDOL) injection 3 mg, 3 mg, IntraMUSCular, Q6H PRN, Bryan Morales MD    traZODone (DESYREL) tablet 50 mg, 50 mg, Oral, Nightly PRN, Bryan Morales MD, 50 mg at 10/17/21 2107    diazePAM (VALIUM) tablet 5 mg, 5 mg, Oral, Q8H PRN, Bryan Morales MD    rivaroxaban (XARELTO) tablet 20 mg, 20 mg, Oral, Daily, Deneise Bolds, APRN - CNP, 20 mg at 10/17/21 1742    levETIRAcetam (KEPPRA) tablet 500 mg, 500 mg, Oral, BID, Deneise Bolds, APRN - CNP, 500 mg at 10/18/21 8308    atorvastatin (LIPITOR) tablet 40 mg, 40 mg, Oral, Nightly, Deneise Bolds, APRN - CNP, 40 mg at 10/17/21 2107    lisinopril (PRINIVIL;ZESTRIL) tablet 10 mg, 10 mg, Oral, Daily, Deneise Bolds, APRN - CNP, 10 mg at 10/18/21 0854    finasteride (PROSCAR) tablet 5 mg, 5 mg, Oral, Daily, Deneise Bolds, APRN - CNP, 5 mg at 10/18/21 8118    multivitamin 1 tablet, 1 tablet, Oral, Daily, Deneise Bolds, APRN - CNP, 1 tablet at 10/18/21 0853    methIMAzole (TAPAZOLE) tablet 10 mg, 10 mg, Oral, TID, Deneise Bolds, APRN - CNP, 10 mg at 10/18/21 4300    Examination:  /72   Pulse 63   Temp 98.2 °F (36.8 °C) (Temporal)   Resp 16   Ht 5' 4\" (1.626 m)   Wt 113 lb (51.3 kg)   SpO2 98%   BMI 19.40 kg/m²   Gait - steady    HOSPITAL COURSE[de-identified]  Following admission to the hospital, patient had a complete physical exam and blood work up, which he was medically cleared and admitted to [facility] for psychiatric evaluation and stabilization. The patient was monitored closely with suicide and appropriate precautions. He was started on Effexor XR 75 mg daily for depression and anxiety, he was continued on his Latuda 60 mg daily and continued on his other home medications as clinically indicated. He was encouraged to participate in group and other milieu activity and started to feel better with this combination of treatment. There has been significant progress in the improvement of symptoms since admission.   The patient has been an active participant in his treatment, and discharge planning. Following admission to the hospital, patient had a complete physical exam and blood work up, which he was medically cleared and admitted to [facility] for psychiatric evaluation and stabilization. The patient was monitored closely with suicide and appropriate precautions. He was started on   He was encouraged to participate in group and other milieu activity and started to feel better with this combination of treatment. There has been significant progress in the improvement of symptoms since admission. The patient has been an active participant in his treatment, and discharge planning. Appetite:  [x] Normal  [] Increased  [] Decreased    Sleep:       [x] Normal  [] Fair       [] Poor            Energy:    [x] Normal  [] Increased  [] Decreased     SI [] Present  [x] Absent  HI  []Present  [x] Absent   Aggression:  [] yes  [] no  Patient is [x] able  [] unable to CONTRACT FOR SAFETY   Medication side effects(SE):  [x] None(Psych. Meds.) [] Other      Mental Status Examination on discharge:    Level of consciousness:  within normal limits   Appearance:  well-appearing  Behavior/Motor:  no abnormalities noted  Attitude toward examiner:  attentive and good eye contact  Speech:  spontaneous, normal rate and normal volume   Mood: euthymic  Affect:  mood congruent  Thought processes:  linear and goal directed   Thought content: The patient is devoid of suicidal or homicidal ideation intent or plan. Devoid of auditory or visual hallucinations or other perceptual disturbances, there are no overt or covert signs of psychosis or paranoia. There are no neurovegetative signs of depression.   Cognition:  oriented to person, place, and time   Concentration intact  Memory intact  Insight and judgment fair  Fund of Knowledge adequate      ASSESSMENT:  Patient symptoms are:  [x] Well controlled  [x] Improving  [] Worsening  [] No change    Reason for more than one antipsychotic:  [x] N/A  [] 3 Failed Monotherapy attempts (Drugs tried:)  [] Crossover to a new antipsychotic  [] Taper to Monotherapy from Polypharmacy  [] Augmentation of clozapine therapy due to treatment resistance to single therapy    Diagnosis:  Principal Problem:    Bipolar disorder current episode depressed ([facility]  Active Problems:    Benzodiazepine dependence ([facility]    Suicidal ideation    Cluster B personality disorder ([facility]    History of noncompliance with medical treatment, presenting hazards to health  Resolved Problems:    * No resolved hospital problems. *      LABS:    No results for input(s): WBC, HGB, PLT in the last 72 hours. No results for input(s): NA, K, CL, CO2, BUN, CREATININE, GLUCOSE in the last 72 hours. No results for input(s): BILITOT, ALKPHOS, AST, ALT in the last 72 hours. Lab Results   Component Value Date    LABAMPH NOT DETECTED 10/13/2021    LABAMPH NOT DETECTED 07/04/2011    BARBSCNU NOT DETECTED 10/13/2021    LABBENZ POSITIVE 10/13/2021    LABBENZ SEE BELOW 07/01/2014    CANNAB POSITIVE  07/04/2011    LABMETH NOT DETECTED 10/13/2021    OPIATESCREENURINE NOT DETECTED 10/13/2021    PHENCYCLIDINESCREENURINE NOT DETECTED 10/13/2021    ETOH 48 10/14/2021     Lab Results   Component Value Date    TSH 22.830 09/24/2021     No results found for: LITHIUM  Lab Results   Component Value Date    VALPROATE 31 (L) 06/02/2016       RISK ASSESSMENT AT DISCHARGE: Low risk for suicide and homicide. Treatment Plan:  Psychosis the patient's diagnosis, treatment plan, medication management were formulated after patient was seen directly by the attending physician and myself and all relevant documentation was reviewed. Risk, benefit, side effects, possible outcomes of the medication and alternatives discussed with the patient and the patient demonstrated understanding.   The patient was also educated that the outcome of treatment will depend on the medication compliance as directed by the prescribers along with regular follow-up, compliance with the labs and other work-up, as clinically indicated. Well with    The patient was referred to outpatient/inpatient substance abuse rehabilitation programming. He was educated multiple times during the hospitalization that if he chooses to continue to use drugs or alcohol, he may potentially act out impulsively, resulting in serious harm to self or others, even though unintentional.  He was also educated that mental health treatment cannot be optimized with ongoing use of drugs. He demonstrated understanding has the capacity to understand that. Patient was offered inpatient drug rehabilitation programming or outpatient IOP drug rehabilitation programming however he is declined. Patient was advised to call the outpatient provider, visit the nearest ED or call 911 if symptoms are not manageable. Patient's family member was contacted prior to the discharge, and the patient was discharged home in psychiatrically stable condition. Medication List      CONTINUE taking these medications    atorvastatin 40 MG tablet  Commonly known as: LIPITOR  Take 1 tablet by mouth nightly     Breo Ellipta 200-25 MCG/INH Aepb inhaler  Generic drug: Fluticasone furoate-vilanterol     clonazePAM 0.5 MG tablet  Commonly known as: KLONOPIN  Take 1 tablet by mouth 2 times daily as needed for Anxiety for up to 3 days.      finasteride 5 MG tablet  Commonly known as: PROSCAR  Take 1 tablet by mouth daily     hydrOXYzine 25 MG capsule  Commonly known as: Vistaril  Take 1 capsule by mouth 3 times daily as needed for Itching     levETIRAcetam 500 MG tablet  Commonly known as: KEPPRA  Take 1 tablet by mouth 2 times daily     lisinopril 10 MG tablet  Commonly known as: PRINIVIL;ZESTRIL  Take 1 tablet by mouth daily     lurasidone 60 MG Tabs tablet  Commonly known as: LATUDA  Take 1 tablet by mouth daily     methIMAzole 10 MG tablet  Commonly known as: TAPAZOLE     RA Balanced Nutritional Plus Liqd  Take 1 Bottle by mouth daily rivaroxaban 20 MG Tabs tablet  Commonly known as: XARELTO  Take 1 tablet by mouth daily With food     therapeutic multivitamin-minerals tablet     traZODone 50 MG tablet  Commonly known as: DESYREL  Take 1 tablet by mouth nightly as needed for Sleep     venlafaxine 75 MG extended release capsule  Commonly known as: EFFEXOR XR  Take 1 capsule by mouth daily (with breakfast)  Start taking on: October 19, 2021           Where to Get Your Medications      These medications were sent to [facility] [phone]  [address] [place] [id number]    Phone: [phone]   · venlafaxine 75 MG extended release capsule       NOTE: This report was transcribed using voice recognition software. Every effort was made to ensure accuracy; however, inadvertent computerized transcription errors may be present.      TIME SPEND - 35 MINUTES TO COMPLETE THE EVALUATION, DISCHARGE SUMMARY, MEDICATION RECONCILIATION AND FOLLOW UP CARE     Signed:  ZOIE Vega CNP  10/18/2021  12:16 PM

## 2021-10-18 NOTE — CARE COORDINATION
In order to ensure appropriate transition and discharge planning is in place, the following documents have been transmitted to [facility] as the new outpatient provider:     · The d/c diagnosis was transmitted to the next care provider  · The reason for hospitalization was transmitted to the next care provider  · The d/c medications (dosage and indication) were transmitted to the next care provider   · The continuing care plan was transmitted to the next care provider

## 2021-10-18 NOTE — PLAN OF CARE
Problem: Depressive Behavior With or Without Suicide Precautions:  Goal: Able to verbalize acceptance of life and situations over which he or she has no control  Description: Able to verbalize acceptance of life and situations over which he or she has no control  Outcome: Ongoing  Goal: Able to verbalize and/or display a decrease in depressive symptoms  Description: Able to verbalize and/or display a decrease in depressive symptoms  Outcome: Ongoing  Goal: Ability to disclose and discuss suicidal ideas will improve  Description: Ability to disclose and discuss suicidal ideas will improve  Outcome: Ongoing  Goal: Able to verbalize support systems  Description: Able to verbalize support systems  Outcome: Ongoing  Goal: Absence of self-harm  Description: Absence of self-harm  Outcome: Ongoing    Patient denies suicidal ideations, homicidal ideations, and hallucinations. He is medication compliant and in control of his behavior.

## 2021-10-18 NOTE — PROGRESS NOTES
Patient has been out on the unit watching television. Patient is pleasant, calm, and cooperative. Patient denies all and denies anxiety but rates his depression a 5/10. Patient is bright with conversation but talks about being scared for his upcoming surgery. Patient is encouraged to continue to work towards discharge goal by complying with medications, attending groups and to seek staff if feelings are overwhelming. Environmental rounds completed per unit policy to maintain safety of everyone on the unit. Staff will offer support and interventions as requested or required.

## 2021-10-18 NOTE — PROGRESS NOTES
[facility]  Discharge Note    Pt discharged with followings belongings:   Dentures: None  Vision - Corrective Lenses: None  Hearing Aid: None  Jewelry: None  Body Piercings Removed: N/A  Clothing: Pants, Jacket / coat, Footwear, Other (Comment) (belt, shoes, jeans, leather jacket, hat)  Were All Patient Medications Collected?: Not Applicable  Other Valuables: Wallet, Other (Comment), Cell phone (phone, wallet-[place] ID, debit card, misc cards)   Valuables sent home with patient  or returned to patient. Patient education on aftercare instructions: yes  Information faxed to n/a by n/a  at 1:54 PM .Patient verbalize understanding of AVS:  yes.     Status EXAM upon discharge:  Status and Exam  Normal: No  Facial Expression: Worried, Sad  Affect: Congruent  Level of Consciousness: Alert  Mood:Normal: No  Mood: Depressed, Sad  Motor Activity:Normal: Yes  Motor Activity: Increased  Interview Behavior: Cooperative  Preception: [place] to Person, Tivis Castor to Time, [place] to Place, [place] to Situation  Attention:Normal: Yes  Attention: Distractible  Thought Processes: Circumstantial  Thought Content:Normal: No  Thought Content: Preoccupations  Hallucinations: None  Delusions: No  Memory:Normal: Yes  Memory: Poor Recent  Insight and Judgment: No  Insight and Judgment: Poor Insight  Present Suicidal Ideation: No  Present Homicidal Ideation: No      Metabolic Screening:    Lab Results   Component Value Date    LABA1C 5.8 05/19/2018       Lab Results   Component Value Date    CHOL 133 05/19/2018    CHOL 148 07/06/2012     Lab Results   Component Value Date    TRIG 42 05/19/2018    TRIG 63 07/06/2012     Lab Results   Component Value Date    HDL 54 05/19/2018    HDL 65.0 07/06/2012     No components found for: [facility] EVALUATION AND TREATMENT [place]  Lab Results   Component Value Date    LABVLDL 8 05/19/2018       Herminia Meek RN

## 2021-10-18 NOTE — PROGRESS NOTES
Group Therapy Note    Patient attended goals group and stated daily goal as to be discharged today and continue to work on my depression. Group Therapy Note    Date: 10/18/2021  Start Time: 10:00  End Time:  10:30  Number of Participants: 11    Type of Group: Psychoeducation    Wellness Binder Information  Module Name: Self-care    Patient's Goal: To id ways to improve self-care. Notes: Attended group and was able to participate. Status After Intervention:  Improved    Participation Level:  Active Listener and Interactive    Participation Quality: Attentive      Speech:  hesitant      Thought Process/Content: Linear      Affective Functioning: Flat      Mood: anxious and depressed      Level of consciousness:  Alert      Response to Learning: Progressing to goal      Endings: None Reported    Modes of Intervention: Education      Discipline Responsible: Psychoeducational Specialist      Signature:  Maryella Mcburney, LSW

## 2021-10-18 NOTE — PLAN OF CARE
Problem: Depressive Behavior With or Without Suicide Precautions:  Goal: Ability to disclose and discuss suicidal ideas will improve  Description: Ability to disclose and discuss suicidal ideas will improve  Outcome: Met This Shift     Problem: Depressive Behavior With or Without Suicide Precautions:  Goal: Able to verbalize acceptance of life and situations over which he or she has no control  Description: Able to verbalize acceptance of life and situations over which he or she has no control  10/17/2021 2050 by Maria Eugenia Menjivar RN  Outcome: Not Met This Shift     Problem: Depressive Behavior With or Without Suicide Precautions:  Goal: Able to verbalize and/or display a decrease in depressive symptoms  Description: Able to verbalize and/or display a decrease in depressive symptoms  10/17/2021 2050 by Maria Eugenia Menjivar RN  Outcome: Not Met This Shift

## 2021-11-09 ENCOUNTER — HOSPITAL ENCOUNTER (INPATIENT)
Age: 66
LOS: 6 days | Discharge: HOME | DRG: 885 | End: 2021-11-15
Attending: EMERGENCY MEDICINE | Admitting: PSYCHIATRY & NEUROLOGY
Payer: MEDICARE

## 2021-11-09 DIAGNOSIS — R45.851 SUICIDAL IDEATION: Primary | ICD-10-CM

## 2021-11-09 LAB
ACETAMINOPHEN LEVEL: <5 MCG/ML (ref 10–30)
ALBUMIN SERPL-MCNC: 4.3 G/DL (ref 3.5–5.2)
ALP BLD-CCNC: 60 U/L (ref 40–129)
ALT SERPL-CCNC: 15 U/L (ref 0–40)
AMPHETAMINE SCREEN, URINE: NOT DETECTED
ANION GAP SERPL CALCULATED.3IONS-SCNC: 12 MMOL/L (ref 7–16)
AST SERPL-CCNC: 16 U/L (ref 0–39)
BARBITURATE SCREEN URINE: NOT DETECTED
BASOPHILS ABSOLUTE: 0.06 E9/L (ref 0–0.2)
BASOPHILS RELATIVE PERCENT: 1 % (ref 0–2)
BENZODIAZEPINE SCREEN, URINE: NOT DETECTED
BILIRUB SERPL-MCNC: 0.3 MG/DL (ref 0–1.2)
BUN BLDV-MCNC: 7 MG/DL (ref 6–23)
CALCIUM SERPL-MCNC: 9 MG/DL (ref 8.6–10.2)
CANNABINOID SCREEN URINE: POSITIVE
CHLORIDE BLD-SCNC: 101 MMOL/L (ref 98–107)
CO2: 23 MMOL/L (ref 22–29)
COCAINE METABOLITE SCREEN URINE: POSITIVE
CREAT SERPL-MCNC: 1.1 MG/DL (ref 0.7–1.2)
EOSINOPHILS ABSOLUTE: 0.34 E9/L (ref 0.05–0.5)
EOSINOPHILS RELATIVE PERCENT: 5.5 % (ref 0–6)
ETHANOL: <10 MG/DL (ref 0–0.08)
FENTANYL SCREEN, URINE: NOT DETECTED
GFR AFRICAN AMERICAN: >60
GFR NON-AFRICAN AMERICAN: >60 ML/MIN/1.73
GLUCOSE BLD-MCNC: 83 MG/DL (ref 74–99)
HCT VFR BLD CALC: 37.2 % (ref 37–54)
HEMOGLOBIN: 12.8 G/DL (ref 12.5–16.5)
IMMATURE GRANULOCYTES #: 0.02 E9/L
IMMATURE GRANULOCYTES %: 0.3 % (ref 0–5)
INFLUENZA A: NOT DETECTED
INFLUENZA B: NOT DETECTED
LYMPHOCYTES ABSOLUTE: 1.44 E9/L (ref 1.5–4)
LYMPHOCYTES RELATIVE PERCENT: 23.5 % (ref 20–42)
Lab: ABNORMAL
MCH RBC QN AUTO: 31.8 PG (ref 26–35)
MCHC RBC AUTO-ENTMCNC: 34.4 % (ref 32–34.5)
MCV RBC AUTO: 92.3 FL (ref 80–99.9)
METHADONE SCREEN, URINE: NOT DETECTED
MONOCYTES ABSOLUTE: 0.48 E9/L (ref 0.1–0.95)
MONOCYTES RELATIVE PERCENT: 7.8 % (ref 2–12)
NEUTROPHILS ABSOLUTE: 3.79 E9/L (ref 1.8–7.3)
NEUTROPHILS RELATIVE PERCENT: 61.9 % (ref 43–80)
OPIATE SCREEN URINE: NOT DETECTED
OXYCODONE URINE: NOT DETECTED
PDW BLD-RTO: 12.9 FL (ref 11.5–15)
PHENCYCLIDINE SCREEN URINE: NOT DETECTED
PLATELET # BLD: 152 E9/L (ref 130–450)
PMV BLD AUTO: 11.4 FL (ref 7–12)
POTASSIUM SERPL-SCNC: 4 MMOL/L (ref 3.5–5)
RBC # BLD: 4.03 E12/L (ref 3.8–5.8)
SALICYLATE, SERUM: <0.3 MG/DL (ref 0–30)
SARS-COV-2 RNA, RT PCR: NOT DETECTED
SODIUM BLD-SCNC: 136 MMOL/L (ref 132–146)
TOTAL CK: 107 U/L (ref 20–200)
TOTAL PROTEIN: 7.2 G/DL (ref 6.4–8.3)
TRICYCLIC ANTIDEPRESSANTS SCREEN SERUM: NEGATIVE NG/ML
WBC # BLD: 6.1 E9/L (ref 4.5–11.5)

## 2021-11-09 PROCEDURE — 36415 COLL VENOUS BLD VENIPUNCTURE: CPT

## 2021-11-09 PROCEDURE — 6370000000 HC RX 637 (ALT 250 FOR IP): Performed by: EMERGENCY MEDICINE

## 2021-11-09 PROCEDURE — 1240000000 HC EMOTIONAL WELLNESS R&B

## 2021-11-09 PROCEDURE — 80053 COMPREHEN METABOLIC PANEL: CPT

## 2021-11-09 PROCEDURE — 80143 DRUG ASSAY ACETAMINOPHEN: CPT

## 2021-11-09 PROCEDURE — 99284 EMERGENCY DEPT VISIT MOD MDM: CPT

## 2021-11-09 PROCEDURE — 87636 SARSCOV2 & INF A&B AMP PRB: CPT

## 2021-11-09 PROCEDURE — 82077 ASSAY SPEC XCP UR&BREATH IA: CPT

## 2021-11-09 PROCEDURE — 82550 ASSAY OF CK (CPK): CPT

## 2021-11-09 PROCEDURE — 93005 ELECTROCARDIOGRAM TRACING: CPT | Performed by: EMERGENCY MEDICINE

## 2021-11-09 PROCEDURE — 80307 DRUG TEST PRSMV CHEM ANLYZR: CPT

## 2021-11-09 PROCEDURE — 80179 DRUG ASSAY SALICYLATE: CPT

## 2021-11-09 PROCEDURE — 85025 COMPLETE CBC W/AUTO DIFF WBC: CPT

## 2021-11-09 RX ORDER — ACETAMINOPHEN 325 MG/1
650 TABLET ORAL ONCE
Status: DISCONTINUED | OUTPATIENT
Start: 2021-11-09 | End: 2021-11-15 | Stop reason: HOSPADM

## 2021-11-09 RX ORDER — CLONAZEPAM 0.5 MG/1
0.5 TABLET ORAL ONCE
Status: COMPLETED | OUTPATIENT
Start: 2021-11-09 | End: 2021-11-09

## 2021-11-09 RX ADMIN — CLONAZEPAM 0.5 MG: 0.5 TABLET ORAL at 17:40

## 2021-11-09 NOTE — ED NOTES
Bed: -28  Expected date: 11/9/21  Expected time:   Means of arrival: [facility]  Comments:  KHAI Lima RN  11/09/21 5799
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
## 2021-11-10 PROBLEM — F12.10 CANNABIS ABUSE: Status: ACTIVE | Noted: 2021-11-10

## 2021-11-10 PROBLEM — F33.9 MAJOR DEPRESSIVE DISORDER, RECURRENT (HCC): Status: RESOLVED | Noted: 2018-01-26 | Resolved: 2021-11-10

## 2021-11-10 PROBLEM — F14.10 COCAINE ABUSE (HCC): Status: ACTIVE | Noted: 2021-11-10

## 2021-11-10 PROBLEM — F31.32 BIPOLAR AFFECTIVE DISORDER, CURRENTLY DEPRESSED, MODERATE (HCC): Status: RESOLVED | Noted: 2021-08-30 | Resolved: 2021-11-10

## 2021-11-10 LAB
EKG ATRIAL RATE: 58 BPM
EKG P AXIS: -41 DEGREES
EKG P-R INTERVAL: 138 MS
EKG Q-T INTERVAL: 434 MS
EKG QRS DURATION: 86 MS
EKG QTC CALCULATION (BAZETT): 426 MS
EKG R AXIS: -27 DEGREES
EKG T AXIS: 66 DEGREES
EKG VENTRICULAR RATE: 58 BPM

## 2021-11-10 PROCEDURE — 6370000000 HC RX 637 (ALT 250 FOR IP): Performed by: NURSE PRACTITIONER

## 2021-11-10 PROCEDURE — 6370000000 HC RX 637 (ALT 250 FOR IP): Performed by: PSYCHIATRY & NEUROLOGY

## 2021-11-10 PROCEDURE — 1240000000 HC EMOTIONAL WELLNESS R&B

## 2021-11-10 PROCEDURE — 99222 1ST HOSP IP/OBS MODERATE 55: CPT | Performed by: NURSE PRACTITIONER

## 2021-11-10 PROCEDURE — 93010 ELECTROCARDIOGRAM REPORT: CPT | Performed by: INTERNAL MEDICINE

## 2021-11-10 RX ORDER — BUDESONIDE 0.5 MG/2ML
0.5 INHALANT ORAL 2 TIMES DAILY
Status: DISCONTINUED | OUTPATIENT
Start: 2021-11-10 | End: 2021-11-15 | Stop reason: HOSPADM

## 2021-11-10 RX ORDER — HYDROXYZINE HYDROCHLORIDE 10 MG/1
50 TABLET, FILM COATED ORAL 3 TIMES DAILY PRN
Status: DISCONTINUED | OUTPATIENT
Start: 2021-11-10 | End: 2021-11-15 | Stop reason: HOSPADM

## 2021-11-10 RX ORDER — VENLAFAXINE HYDROCHLORIDE 75 MG/1
75 CAPSULE, EXTENDED RELEASE ORAL
Status: DISCONTINUED | OUTPATIENT
Start: 2021-11-11 | End: 2021-11-15 | Stop reason: HOSPADM

## 2021-11-10 RX ORDER — LISINOPRIL 10 MG/1
10 TABLET ORAL DAILY
Status: DISCONTINUED | OUTPATIENT
Start: 2021-11-10 | End: 2021-11-15 | Stop reason: HOSPADM

## 2021-11-10 RX ORDER — MAGNESIUM HYDROXIDE/ALUMINUM HYDROXICE/SIMETHICONE 120; 1200; 1200 MG/30ML; MG/30ML; MG/30ML
30 SUSPENSION ORAL PRN
Status: DISCONTINUED | OUTPATIENT
Start: 2021-11-10 | End: 2021-11-15 | Stop reason: HOSPADM

## 2021-11-10 RX ORDER — ACETAMINOPHEN 325 MG/1
650 TABLET ORAL EVERY 4 HOURS PRN
Status: DISCONTINUED | OUTPATIENT
Start: 2021-11-10 | End: 2021-11-15 | Stop reason: HOSPADM

## 2021-11-10 RX ORDER — LEVETIRACETAM 500 MG/1
500 TABLET ORAL 2 TIMES DAILY
Status: DISCONTINUED | OUTPATIENT
Start: 2021-11-10 | End: 2021-11-15 | Stop reason: HOSPADM

## 2021-11-10 RX ORDER — ATORVASTATIN CALCIUM 40 MG/1
40 TABLET, FILM COATED ORAL NIGHTLY
Status: DISCONTINUED | OUTPATIENT
Start: 2021-11-10 | End: 2021-11-15 | Stop reason: HOSPADM

## 2021-11-10 RX ORDER — HALOPERIDOL 2 MG/1
3 TABLET ORAL EVERY 6 HOURS PRN
Status: DISCONTINUED | OUTPATIENT
Start: 2021-11-10 | End: 2021-11-15 | Stop reason: HOSPADM

## 2021-11-10 RX ORDER — ARFORMOTEROL TARTRATE 15 UG/2ML
15 SOLUTION RESPIRATORY (INHALATION) 2 TIMES DAILY
Status: DISCONTINUED | OUTPATIENT
Start: 2021-11-10 | End: 2021-11-15 | Stop reason: HOSPADM

## 2021-11-10 RX ORDER — NICOTINE 21 MG/24HR
1 PATCH, TRANSDERMAL 24 HOURS TRANSDERMAL DAILY
Status: DISCONTINUED | OUTPATIENT
Start: 2021-11-10 | End: 2021-11-15 | Stop reason: HOSPADM

## 2021-11-10 RX ORDER — BUDESONIDE AND FORMOTEROL FUMARATE DIHYDRATE 160; 4.5 UG/1; UG/1
2 AEROSOL RESPIRATORY (INHALATION) 2 TIMES DAILY
Status: DISCONTINUED | OUTPATIENT
Start: 2021-11-10 | End: 2021-11-10 | Stop reason: CLARIF

## 2021-11-10 RX ORDER — FINASTERIDE 5 MG/1
5 TABLET, FILM COATED ORAL DAILY
Status: DISCONTINUED | OUTPATIENT
Start: 2021-11-10 | End: 2021-11-15 | Stop reason: HOSPADM

## 2021-11-10 RX ORDER — TRAZODONE HYDROCHLORIDE 50 MG/1
50 TABLET ORAL NIGHTLY PRN
Status: DISCONTINUED | OUTPATIENT
Start: 2021-11-10 | End: 2021-11-15 | Stop reason: HOSPADM

## 2021-11-10 RX ORDER — HALOPERIDOL 5 MG/ML
3 INJECTION INTRAMUSCULAR EVERY 6 HOURS PRN
Status: DISCONTINUED | OUTPATIENT
Start: 2021-11-10 | End: 2021-11-15 | Stop reason: HOSPADM

## 2021-11-10 RX ADMIN — ATORVASTATIN CALCIUM 40 MG: 40 TABLET, FILM COATED ORAL at 20:52

## 2021-11-10 RX ADMIN — LURASIDONE HYDROCHLORIDE 20 MG: 20 TABLET, FILM COATED ORAL at 12:07

## 2021-11-10 RX ADMIN — LISINOPRIL 10 MG: 10 TABLET ORAL at 12:07

## 2021-11-10 RX ADMIN — TRAZODONE HYDROCHLORIDE 50 MG: 50 TABLET ORAL at 20:52

## 2021-11-10 RX ADMIN — HYDROXYZINE HYDROCHLORIDE 50 MG: 10 TABLET ORAL at 12:07

## 2021-11-10 RX ADMIN — LEVETIRACETAM 500 MG: 500 TABLET, FILM COATED ORAL at 12:07

## 2021-11-10 RX ADMIN — LEVETIRACETAM 500 MG: 500 TABLET, FILM COATED ORAL at 20:52

## 2021-11-10 RX ADMIN — FINASTERIDE 5 MG: 5 TABLET, FILM COATED ORAL at 12:07

## 2021-11-10 RX ADMIN — HYDROXYZINE HYDROCHLORIDE 50 MG: 10 TABLET ORAL at 20:52

## 2021-11-10 RX ADMIN — RIVAROXABAN 20 MG: 20 TABLET, FILM COATED ORAL at 12:07

## 2021-11-10 ASSESSMENT — SLEEP AND FATIGUE QUESTIONNAIRES
SLEEP PATTERN: NORMAL
DO YOU USE A SLEEP AID: NO
DIFFICULTY ARISING: NO
SLEEP PATTERN: NORMAL
AVERAGE NUMBER OF SLEEP HOURS: 6
DO YOU HAVE DIFFICULTY SLEEPING: NO
AVERAGE NUMBER OF SLEEP HOURS: 6
DO YOU HAVE DIFFICULTY SLEEPING: NO
DO YOU USE A SLEEP AID: NO
RESTFUL SLEEP: YES
DIFFICULTY FALLING ASLEEP: NO
DIFFICULTY STAYING ASLEEP: NO

## 2021-11-10 ASSESSMENT — PAIN SCALES - GENERAL
PAINLEVEL_OUTOF10: 0
PAINLEVEL_OUTOF10: 0

## 2021-11-10 ASSESSMENT — PATIENT HEALTH QUESTIONNAIRE - PHQ9
SUM OF ALL RESPONSES TO PHQ QUESTIONS 1-9: 11
SUM OF ALL RESPONSES TO PHQ QUESTIONS 1-9: 11

## 2021-11-10 ASSESSMENT — LIFESTYLE VARIABLES
HISTORY_ALCOHOL_USE: NO
HISTORY_ALCOHOL_USE: YES

## 2021-11-10 NOTE — ED NOTES
Patient has been accepted for admission to [facility] by Dr. Bryson Stanley. Patient will go to room 7303A. Called admitting and notified [address]. [facility] RN is aware to call nurse to nurse and put in for patient transport.        DAVID [place], [place]  11/09/21 8753

## 2021-11-10 NOTE — ED NOTES
PT HAS COME UP TO THE NURSES AND SW DESK SEVERAL TIMES AND SAID \"I WANT TO KILL MYSELF\" AND THEN HE RETURNS BACK TO HIS ROOM WITHOUT INCIDENT.      TONY Jacobo  11/09/21 2122

## 2021-11-10 NOTE — PROGRESS NOTES
73 yr old male presents to  psych with an admitting Dx of suicidal ideations. It's reported that patient was having suicidal ideations to overdose because of a pending eviction. Patient is A+Ox 4. Denies thoughts of HI and hallucinations. Displays a sad affect and states that he is sad and depressed d/t a pending eviction date of 11/22. Calm and pleasant. Pt admits to chemical use and has a positive drug screen for benzos and cocaine. Patient's medical Hx includes COPD, dyspnea and respiratory abnormalities, asthma, hyperthyroidism, type 2 diabetes, primary hypertension, benign prostatic hyperplasia, hep-C, and A-fib. Psych Hx includes SI, bipolar disorder, polysubstance abuse, and cluster B personality. Patient was offered food and fluids and refused both. All forms have been explained, agreed upon, and signed.  No behaviors observed
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
## 2021-11-10 NOTE — PLAN OF CARE
Problem: Depressive Behavior With or Without Suicide Precautions:  Goal: Able to verbalize acceptance of life and situations over which he or she has no control  Description: Able to verbalize acceptance of life and situations over which he or she has no control  Outcome: Ongoing     Problem: Suicide risk  Goal: Provide patient with safe environment  Description: Provide patient with safe environment  Outcome: Ongoing     Problem: Depressive Behavior With or Without Suicide Precautions:  Goal: Able to verbalize and/or display a decrease in depressive symptoms  Description: Able to verbalize and/or display a decrease in depressive symptoms  Outcome: Ongoing     Problem: Depressive Behavior With or Without Suicide Precautions:  Goal: Able to verbalize support systems  Description: Able to verbalize support systems  Outcome: Ongoing
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
## 2021-11-10 NOTE — ED NOTES
Report called to [facility]     Garcia Dominguez RN  11/10/21 0000

## 2021-11-10 NOTE — PLAN OF CARE
Denies SI/HI   denies hallucinations  pleasant on approach  out of bed for breakfast   will continue to monitor
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
## 2021-11-10 NOTE — PROGRESS NOTES
[facility]  Admission Note     Admission Type:   Admission Type: Involuntary    Reason for admission:  Reason for Admission: \"I'm losing my apartment and I have nowhere to go and they're going to throw all of my things into the street the 22nd of this month. \"    PATIENT STRENGTHS:  Strengths: Communication, Social Skills, Connection to output provider    Patient Strengths and Limitations:  Limitations: Tendency to isolate self, Difficulty problem solving/relies on others to help solve problems, External locus of control    Addictive Behavior:   Addictive Behavior  In the past 3 months, have you felt or has someone told you that you have a problem with:  : None  Do you have a history of Chemical Use?: Yes  Do you have a history of Alcohol Use?: Yes  Do you have a history of Street Drug Abuse?: Yes  Histroy of Prescripton Drug Abuse?: Yes    Medical Problems:   Past Medical History:   Diagnosis Date    Anxiety     Arthritis     Asthma     Bipolar 1 disorder (HCC)     Chronic combined systolic and diastolic CHF (congestive heart failure) ([address] 05/27/2018    COPD (chronic obstructive pulmonary disease) ([address]     Depression     Diabetes mellitus ([address]     GERD (gastroesophageal reflux disease)     Hepatitis C     resolved    Hypertension     Hyperthyroidism 8/2/2012    Hypothyroidism due to medication     Mass of testicle     Mesothelioma [facility])     pt states possibly not    Neuromuscular disorder ([address]     Other disorders of kidney and ureter     kidney stones; most recent 08/27/2015    Paroxysmal A-fib ([address]     discussed with PCP- patient does have hx of PAfib    Peptic ulcer     Prostate enlargement     Pulmonary embolism ([address]     Intermediate risk for PE on VQ scan.     Seizures ([address]     Thyroid disease     hyperthyroid       Status EXAM:  Status and Exam  Normal: No  Facial Expression: Worried, Sad  Affect: Congruent  Level of Consciousness: Alert  Mood:Normal: No  Mood: Depressed, Sad  Motor Activity:Normal: Yes  Interview Behavior: Cooperative  Preception: [place] to Person, [place]y Bo[place] to Time, [place] to Place, [place] to Situation  Attention:Normal: No  Attention: Distractible  Thought Processes: Circumstantial  Thought Content:Normal: No  Thought Content: Preoccupations  Hallucinations: None  Delusions: No  Memory:Normal: No  Memory: Poor Recent  Insight and Judgment: No  Insight and Judgment: Poor Judgment  Present Suicidal Ideation: No  Present Homicidal Ideation: No    Tobacco Screening:  Practical Counseling, on admission, marisol X, if applicable and completed (first 3 are required if patient doesn't refuse): ( X)  Recognizing danger situations (included triggers and roadblocks)                    (X )  Coping skills (new ways to manage stress, exercise, relaxation techniques, changing routine, distraction)                                                           ( X)  Basic information about quitting (benefits of quitting, techniques in how to quit, available resources  ( ) Referral for counseling faxed to [facility]                                           ( ) Patient refused counseling  (X ) Patient has not smoked in the last 30 days    Metabolic Screening:    Lab Results   Component Value Date    LABA1C 5.8 05/19/2018       Lab Results   Component Value Date    CHOL 133 05/19/2018    CHOL 148 07/06/2012     Lab Results   Component Value Date    TRIG 42 05/19/2018    TRIG 63 07/06/2012     Lab Results   Component Value Date    HDL 54 05/19/2018    HDL 65.0 07/06/2012     No components found for: LDLCAL  Lab Results   Component Value Date    LABVLDL 8 05/19/2018         Body mass index is 19.4 kg/m².     BP Readings from Last 2 Encounters:   11/10/21 126/73   10/18/21 120/72           Pt admitted with followings belongings:  Dentures: Uppers, Lowers  Vision - Corrective Lenses: None  Hearing Aid: None  Jewelry: None  Body Piercings Removed: N/A  Clothing: Footwear, Other (Comment), Shirt, Pants, Sweater [employer])  Were All Patient Medications Collected?: Not Applicable  Other Valuables: [facility]     Patient's home medications were N/A. Patient oriented to surroundings and program expectations and copy of patient rights given. Received admission packet:  YES. Consents reviewed, signed YES. Patient verbalize understanding:  YES.   Patient education on precautions: Viktoria Gautam RN

## 2021-11-11 PROCEDURE — 6370000000 HC RX 637 (ALT 250 FOR IP): Performed by: NURSE PRACTITIONER

## 2021-11-11 PROCEDURE — 94640 AIRWAY INHALATION TREATMENT: CPT

## 2021-11-11 PROCEDURE — 6360000002 HC RX W HCPCS: Performed by: NURSE PRACTITIONER

## 2021-11-11 PROCEDURE — 99231 SBSQ HOSP IP/OBS SF/LOW 25: CPT | Performed by: NURSE PRACTITIONER

## 2021-11-11 PROCEDURE — 6370000000 HC RX 637 (ALT 250 FOR IP): Performed by: PSYCHIATRY & NEUROLOGY

## 2021-11-11 PROCEDURE — 94664 DEMO&/EVAL PT USE INHALER: CPT

## 2021-11-11 PROCEDURE — 1240000000 HC EMOTIONAL WELLNESS R&B

## 2021-11-11 RX ORDER — CLONAZEPAM 1 MG/1
1 TABLET ORAL 3 TIMES DAILY PRN
Status: ON HOLD | COMMUNITY
End: 2021-11-15 | Stop reason: HOSPADM

## 2021-11-11 RX ORDER — DIAZEPAM 5 MG/1
5 TABLET ORAL EVERY 12 HOURS PRN
Status: DISPENSED | OUTPATIENT
Start: 2021-11-11 | End: 2021-11-13

## 2021-11-11 RX ADMIN — LEVETIRACETAM 500 MG: 500 TABLET, FILM COATED ORAL at 12:24

## 2021-11-11 RX ADMIN — RIVAROXABAN 20 MG: 20 TABLET, FILM COATED ORAL at 12:24

## 2021-11-11 RX ADMIN — ARFORMOTEROL TARTRATE 15 MCG: 15 SOLUTION RESPIRATORY (INHALATION) at 09:15

## 2021-11-11 RX ADMIN — BUDESONIDE 500 MCG: 0.5 SUSPENSION RESPIRATORY (INHALATION) at 20:37

## 2021-11-11 RX ADMIN — HYDROXYZINE HYDROCHLORIDE 50 MG: 10 TABLET ORAL at 12:24

## 2021-11-11 RX ADMIN — ATORVASTATIN CALCIUM 40 MG: 40 TABLET, FILM COATED ORAL at 20:45

## 2021-11-11 RX ADMIN — ARFORMOTEROL TARTRATE 15 MCG: 15 SOLUTION RESPIRATORY (INHALATION) at 20:36

## 2021-11-11 RX ADMIN — TRAZODONE HYDROCHLORIDE 50 MG: 50 TABLET ORAL at 20:45

## 2021-11-11 RX ADMIN — LURASIDONE HYDROCHLORIDE 20 MG: 20 TABLET, FILM COATED ORAL at 12:24

## 2021-11-11 RX ADMIN — VENLAFAXINE HYDROCHLORIDE 75 MG: 75 CAPSULE, EXTENDED RELEASE ORAL at 12:24

## 2021-11-11 RX ADMIN — FINASTERIDE 5 MG: 5 TABLET, FILM COATED ORAL at 12:24

## 2021-11-11 RX ADMIN — BUDESONIDE 500 MCG: 0.5 SUSPENSION RESPIRATORY (INHALATION) at 09:16

## 2021-11-11 RX ADMIN — DIAZEPAM 5 MG: 5 TABLET ORAL at 20:45

## 2021-11-11 RX ADMIN — LEVETIRACETAM 500 MG: 500 TABLET, FILM COATED ORAL at 20:45

## 2021-11-11 ASSESSMENT — PAIN SCALES - GENERAL
PAINLEVEL_OUTOF10: 0
PAINLEVEL_OUTOF10: 0

## 2021-11-11 NOTE — PROGRESS NOTES
BEHAVIORAL HEALTH FOLLOW-UP NOTE     11/11/2021     Patient was seen and examined in person, Chart reviewed   Patient's case discussed with staff/team    Chief Complaint:  Requesting Klonopin     Interim History:   Patient is seen up on the unit today and later he is observed sleeping in his room. He is in no distress, he is asking for klonopin by name. He will be provide valium 5 mg twice daily PRN as a precaution for benzodiazepine withdrawal. He denies AVH. He states that he is depressed, he appears blunted. Lacks insight to his need for treatment, lacks insight into his substance abuse problem. Appetite: [x] Normal/Unchanged  [] Increased  [] Decreased      Sleep:       [x] Normal/Unchanged  [] Fair       [] Poor              Energy:    [x] Normal/Unchanged  [] Increased  [] Decreased        SI [] Present  [x] Absent    HI  []Present  [x] Absent     Aggression:  [] yes  [x] no    Patient is [x] able  [] unable to CONTRACT FOR SAFETY     PAST MEDICAL/PSYCHIATRIC HISTORY:   Past Medical History:   Diagnosis Date    Anxiety     Arthritis     Asthma     Bipolar 1 disorder ([address]     Chronic combined systolic and diastolic CHF (congestive heart failure) ([address] 05/27/2018    COPD (chronic obstructive pulmonary disease) ([address]     Depression     Diabetes mellitus ([address]     GERD (gastroesophageal reflux disease)     Hepatitis C     resolved    Hypertension     Hyperthyroidism 8/2/2012    Hypothyroidism due to medication     Mass of testicle     Mesothelioma [facility])     pt states possibly not    Neuromuscular disorder ([address]     Other disorders of kidney and ureter     kidney stones; most recent 08/27/2015    Paroxysmal A-fib ([address]     discussed with PCP- patient does have hx of PAfib    Peptic ulcer     Prostate enlargement     Pulmonary embolism ([address]     Intermediate risk for PE on VQ scan.     Seizures ([address]     Thyroid disease     hyperthyroid       FAMILY/SOCIAL HISTORY:  Family History Problem Relation Age of Onset    Cancer Mother     Diabetes Mother     Cancer Father     Diabetes Father     Diabetes Maternal Aunt     Diabetes Maternal Grandmother     Cancer Maternal Grandfather     Heart Disease Paternal Grandmother     Heart Failure Paternal Grandmother     Heart Disease Paternal Grandfather     Heart Failure Paternal Grandfather     Diabetes Maternal Aunt     Diabetes Maternal Aunt      Social History     Socioeconomic History    Marital status: Single     Spouse name: Not on file    Number of children: Not on file    Years of education: Not on file    Highest education level: Not on file   Occupational History    Occupation: disability   Tobacco Use    Smoking status: Former Smoker     Years: 10.00     Types: Cigarettes     Quit date: 1/10/2010     Years since quittin.8    Smokeless tobacco: Former User     Types: [address] date:    Vaping Use    Vaping Use: Never used   Substance and Sexual Activity    Alcohol use: No     Alcohol/week: 0.0 standard drinks     Comment: recovered alcoholic; last use 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    Drug use: Yes     Types: Cocaine    Sexual activity: Yes     Comment: heroin   Other Topics Concern    Not on file   Social History Narrative    Not on file     Social Determinants of Health     Financial Resource Strain:     Difficulty of Paying Living Expenses: Not on file   Food Insecurity:     Worried About Running Out of Food in the Last Year: Not on file    [address] in the Last Year: Not on file   Transportation Needs:     Lack of Transportation (Medical): Not on file    Lack of Transportation (Non-Medical):  Not on file   Physical Activity:     Days of Exercise per Week: Not on file    Minutes of Exercise per Session: Not on file   Stress:     Feeling of Stress : Not on file   Social Connections:     Frequency of Communication with Friends and Family: Not on file    Frequency of Social Gatherings with Friends and Family: Not on finasteride (PROSCAR) tablet 5 mg, 5 mg, Oral, Daily, Katarina Chris, APRN - CNP, 5 mg at 11/11/21 1224    levETIRAcetam (KEPPRA) tablet 500 mg, 500 mg, Oral, BID, Katarina Chris, APRN - CNP, 500 mg at 11/11/21 1224    lisinopril (PRINIVIL;ZESTRIL) tablet 10 mg, 10 mg, Oral, Daily, Katarina Chris, APRN - CNP, 10 mg at 11/10/21 1207    rivaroxaban (XARELTO) tablet 20 mg, 20 mg, Oral, Daily, Katarina Chris, APRN - CNP, 20 mg at 11/11/21 1224    venlafaxine (EFFEXOR XR) extended release capsule 75 mg, 75 mg, Oral, Daily with breakfast, Katarina Chris, APRN - CNP, 75 mg at 11/11/21 1224    budesonide (PULMICORT) nebulizer suspension 500 mcg, 0.5 mg, Nebulization, BID, Katarina Chris, APRN - CNP, 500 mcg at 11/11/21 6026    Arformoterol Tartrate (BROVANA) nebulizer solution 15 mcg, 15 mcg, Nebulization, BID, Katarina Chris, APRN - CNP, 15 mcg at 11/11/21 0915    lurasidone (LATUDA) tablet 20 mg, 20 mg, Oral, Daily, Katarina Chris, APRN - CNP, 20 mg at 11/11/21 1224    acetaminophen (TYLENOL) tablet 650 mg, 650 mg, Oral, Once, Ede Espinal MD      Examination:  BP (!) 94/58   Pulse 64   Temp 97.6 °F (36.4 °C) (Temporal)   Resp 16   Ht 5' 4\" (1.626 m)   Wt 113 lb (51.3 kg)   SpO2 98%   BMI 19.40 kg/m²   Gait - steady  Medication side effects(SE):     Mental Status Examination:    Level of consciousness:  within normal limits   Appearance:  fair grooming and fair hygiene  Behavior/Motor:  no abnormalities noted  Attitude toward examiner:  cooperative  Speech:  normal rate and normal volume   Mood: decreased range  Affect:  blunted  Thought processes:  Oriska  Thought content: The patient is devoid of suicidal or homicidal ideation intent or plan. Devoid of auditory or visual hallucinations or other perceptual disturbances, there are no overt or covert signs of psychosis or paranoia. There are no neurovegetative signs of depression.   Cognition:  oriented to person, place, and time   Concentration poor  Insight poor   Judgement poor     ASSESSMENT:   Patient symptoms are:  [] Well controlled  [] Improving  [] Worsening  [x] No change      Diagnosis:   Principal Problem:    Bipolar disorder current episode depressed ([facility]  Active Problems:    History of noncompliance with medical treatment, presenting hazards to health    Cocaine abuse ([facility]    Cannabis abuse  Resolved Problems:    Bipolar affective disorder, currently depressed, moderate ([facility]      LABS:    Recent Labs     11/09/21  1705   WBC 6.1   HGB 12.8        Recent Labs     11/09/21  1705      K 4.0      CO2 23   BUN 7   CREATININE 1.1   GLUCOSE 83     Recent Labs     11/09/21  1705   BILITOT 0.3   ALKPHOS 60   AST 16   ALT 15     Lab Results   Component Value Date    LABAMPH NOT DETECTED 11/09/2021    LABAMPH NOT DETECTED 07/04/2011    BARBSCNU NOT DETECTED 11/09/2021    LABBENZ NOT DETECTED 11/09/2021    LABBENZ SEE BELOW 07/01/2014    CANNAB POSITIVE  07/04/2011    LABMETH NOT DETECTED 11/09/2021    OPIATESCREENURINE NOT DETECTED 11/09/2021    PHENCYCLIDINESCREENURINE NOT DETECTED 11/09/2021    ETOH <10 11/09/2021     Lab Results   Component Value Date    TSH 22.830 09/24/2021     No results found for: LITHIUM  Lab Results   Component Value Date    VALPROATE 31 (L) 06/02/2016       Treatment Plan:  The patient's diagnosis, treatment plan, medication management were formulated after patient was seen directly by the attending physician and myself and all relevant documentation was reviewed. Risk, benefit, side effects, possible outcomes of the medication and alternatives discussed with the patient and the patient demonstrated understanding. The patient was also educated that the outcome of treatment will depend on the medication compliance as directed by the prescribers along with regular follow-up, compliance with the labs and other work-up, as clinically indicated.     The patient was referred to outpatient/inpatient substance abuse rehabilitation programming. He was educated multiple times during the hospitalization that if he chooses to continue to use drugs or alcohol, he may potentially act out impulsively, resulting in serious harm to self or others, even though unintentional.  He was also educated that mental health treatment cannot be optimized with ongoing use of drugs. He demonstrated understanding has the capacity to understand that. Latuda 20 mg daily  Effexor XR 75 mg daily   Home medications continued as indicated    Collateral information: Followed by social work  CD evaluation  Encourage patient to attend group and other milieu activities. Discharge planning discussed with the patient and treatment team.    PSYCHOTHERAPY/COUNSELING:  [x] Therapeutic interview  [x] Supportive  [] CBT  [] Ongoing  [] Other    [x] Patient continues to need, on a daily basis, active treatment furnished directly by or requiring the supervision of inpatient psychiatric personnel      Anticipated Length of stay: 5 - 7 days based on stability    NOTE: This report was transcribed using voice recognition software.  Every effort was made to ensure accuracy; however, inadvertent computerized transcription errors may be present.       Electronically signed by Chanetta Heimlich, APRN - CNP on 11/11/2021 at 2:32 PM

## 2021-11-11 NOTE — PLAN OF CARE
Problem: Suicide risk  Goal: Provide patient with safe environment  Description: Provide patient with safe environment  11/10/2021 2114 by Neena Padilla RN  Outcome: Met This Shift     Problem: Depressive Behavior With or Without Suicide Precautions:  Goal: Able to verbalize acceptance of life and situations over which he or she has no control  Description: Able to verbalize acceptance of life and situations over which he or she has no control  11/10/2021 2114 by Neena Padilla RN  Outcome: Met This Shift     Problem: Depressive Behavior With or Without Suicide Precautions:  Goal: Able to verbalize and/or display a decrease in depressive symptoms  Description: Able to verbalize and/or display a decrease in depressive symptoms  11/10/2021 2114 by Neena Padilla RN  Outcome: Met This Shift     Problem: Depressive Behavior With or Without Suicide Precautions:  Goal: Able to verbalize support systems  Description: Able to verbalize support systems  11/10/2021 2114 by Neena Padilla RN  Outcome: Met This Shift    Patient denies SI/HI and hallucinations. Patient flat but brightens with conversation. Patient rates anxiety and depression 10/10. Patient is out on the unit and social with select peers. Patient takes prescribed medications without issue. Will continue to offer support and comfort to patient.

## 2021-11-12 PROCEDURE — 1240000000 HC EMOTIONAL WELLNESS R&B

## 2021-11-12 PROCEDURE — 6370000000 HC RX 637 (ALT 250 FOR IP): Performed by: NURSE PRACTITIONER

## 2021-11-12 PROCEDURE — 94640 AIRWAY INHALATION TREATMENT: CPT

## 2021-11-12 PROCEDURE — 6360000002 HC RX W HCPCS: Performed by: NURSE PRACTITIONER

## 2021-11-12 PROCEDURE — 6370000000 HC RX 637 (ALT 250 FOR IP): Performed by: PSYCHIATRY & NEUROLOGY

## 2021-11-12 PROCEDURE — 99231 SBSQ HOSP IP/OBS SF/LOW 25: CPT | Performed by: NURSE PRACTITIONER

## 2021-11-12 RX ADMIN — ARFORMOTEROL TARTRATE 15 MCG: 15 SOLUTION RESPIRATORY (INHALATION) at 09:42

## 2021-11-12 RX ADMIN — DIAZEPAM 5 MG: 5 TABLET ORAL at 10:59

## 2021-11-12 RX ADMIN — HYDROXYZINE HYDROCHLORIDE 50 MG: 10 TABLET ORAL at 21:03

## 2021-11-12 RX ADMIN — DIAZEPAM 5 MG: 5 TABLET ORAL at 23:06

## 2021-11-12 RX ADMIN — ATORVASTATIN CALCIUM 40 MG: 40 TABLET, FILM COATED ORAL at 21:03

## 2021-11-12 RX ADMIN — VENLAFAXINE HYDROCHLORIDE 75 MG: 75 CAPSULE, EXTENDED RELEASE ORAL at 09:41

## 2021-11-12 RX ADMIN — LURASIDONE HYDROCHLORIDE 20 MG: 20 TABLET, FILM COATED ORAL at 09:41

## 2021-11-12 RX ADMIN — RIVAROXABAN 20 MG: 20 TABLET, FILM COATED ORAL at 09:41

## 2021-11-12 RX ADMIN — TRAZODONE HYDROCHLORIDE 50 MG: 50 TABLET ORAL at 21:03

## 2021-11-12 RX ADMIN — LISINOPRIL 10 MG: 10 TABLET ORAL at 09:40

## 2021-11-12 RX ADMIN — LEVETIRACETAM 500 MG: 500 TABLET, FILM COATED ORAL at 09:41

## 2021-11-12 RX ADMIN — FINASTERIDE 5 MG: 5 TABLET, FILM COATED ORAL at 09:41

## 2021-11-12 RX ADMIN — BUDESONIDE 500 MCG: 0.5 SUSPENSION RESPIRATORY (INHALATION) at 09:42

## 2021-11-12 RX ADMIN — LEVETIRACETAM 500 MG: 500 TABLET, FILM COATED ORAL at 21:03

## 2021-11-12 ASSESSMENT — PAIN SCALES - GENERAL
PAINLEVEL_OUTOF10: 0
PAINLEVEL_OUTOF10: 0

## 2021-11-12 NOTE — PROGRESS NOTES
[facility]  Day 3 Interdisciplinary Treatment Plan NOTE    Review Date & Time: 11/12/21 0900    Patient was in treatment team    Estimated Length of Stay Update:  3-5 days  Estimated Discharge Date Update: 11/14/21     EDUCATION:   Learner Progress Toward Treatment Goals: Reviewed results and recommendations of this team and Reviewed goals and plan of care    Method: Small group    Outcome: Verbalized understanding    PATIENT GOALS: None at this time    PLAN/TREATMENT RECOMMENDATIONS UPDATE: Take prescribed medications, attend/participate in groups. Continue to provide emotional support to patient.     GOALS UPDATE:   Time frame for Short-Term Goals: Prior to discharge      Vidya Caba RN

## 2021-11-12 NOTE — GROUP NOTE
Group Therapy Note    Date: 11/12/2021    Group Start Time: 1045  Group End Time: 200  Group Topic: Cognitive Skills    [facility]    Thankful DAVID Pablo, TONY        Group Therapy Note    Attendees: 9    Patient's Goal:  Patient will learn fair fighting rules, boundaries, warning signs and techniques for handling disagreements. Notes:  Patient was an active participant in group therapy. Status After Intervention:  Unchanged    Participation Level: Active Listener and Interactive    Participation Quality: Appropriate, Attentive and Sharing      Speech:  normal      Thought Process/Content: Logical      Affective Functioning: Congruent      Mood: anxious      Level of consciousness:  Alert, Oriented x4 and Attentive      Response to Learning: Able to verbalize current knowledge/experience, Able to verbalize/acknowledge new learning and Able to retain information      Endings: None Reported    Modes of Intervention: Education, Support, Socialization, Exploration, Clarifying and Problem-solving      Discipline Responsible: /Counselor      Signature:   DAVID Jones, TONY

## 2021-11-12 NOTE — PLAN OF CARE
Patient calm, cooperative, pleasant during assessment. Appears anxious and worried, brightens with conversation. Observed out on unit, social with peers. Patient states intermittent SI that comes and goes without plan but is denying SI/HI/AVH at this time. Patient rates depression/anxiety 10/10 d/t living situation and upcoming heart surgery. Compliant with medications and in control of behaviors. Will continue to monitor and provide support.     Problem: Suicide risk  Goal: Provide patient with safe environment  Description: Provide patient with safe environment  Outcome: Met This Shift     Problem: Depressive Behavior With or Without Suicide Precautions:  Goal: Ability to disclose and discuss suicidal ideas will improve  Description: Ability to disclose and discuss suicidal ideas will improve  11/11/2021 2114 by Kirsty Lawson RN  Outcome: Met This Shift     Problem: Depressive Behavior With or Without Suicide Precautions:  Goal: Absence of self-harm  Description: Absence of self-harm  11/11/2021 2114 by Kirsty Lawson RN  Outcome: Met This Shift     Problem: Depressive Behavior With or Without Suicide Precautions:  Goal: Able to verbalize acceptance of life and situations over which he or she has no control  Description: Able to verbalize acceptance of life and situations over which he or she has no control  11/11/2021 2114 by Kirsty Lawson RN  Outcome: Ongoing

## 2021-11-12 NOTE — PROGRESS NOTES
Group Therapy Note    Patient attended goals group and stated daily goal as to be more positive. Group Therapy Note    Date: 11/12/2021  Start Time: 10:00  End Time: 10:30  Number of Participants: 10    Type of Group: Psychoeducation    Wellness Binder Information  Module Name: Self-care    Patient's Goal: To id positive ways to take care of one self in wellness recovery. Notes: Attended group and was an active listener. Status After Intervention:  Improved    Participation Level:  Active Listener and Interactive    Participation Quality: Attentive and Sharing      Speech:  hesitant      Thought Process/Content: Linear      Affective Functioning: Flat      Mood: depressed      Level of consciousness:  Alert      Response to Learning: Progressing to goal      Endings: None Reported    Modes of Intervention: Education      Discipline Responsible: Psychoeducational Specialist      Signature:  TONY Chong

## 2021-11-12 NOTE — CARE COORDINATION
Sw met face-to-face with this pt discuss discharge planning. Pt states that he is being evicted from his apartment on 11/22/21. Pt states that he would like to get a new apartment. Sw printed out the pt apartments in this pt's price range, and gave him contact numbers. Pt encouraged to start the process now, as it takes time. Pt agreeable. Sw made aware of his appointments at [facility]. Sw will continue to assist as needed.

## 2021-11-12 NOTE — PROGRESS NOTES
BEHAVIORAL HEALTH FOLLOW-UP NOTE     11/12/2021     Patient was seen and examined in person, Chart reviewed   Patient's case discussed with staff/team    Chief Complaint:  \"I am really good today. \"      Interim History:   Patient is seen up on the unit today and later he is observed sleeping in his room. He is in no distress, he is no longer asking for klonopin by name. He will be provide valium 5 mg twice daily PRN as a precaution for benzodiazepine withdrawal. He is brighter today and overly exaggerated. He denies AVH. He states that he is depressed, he appears blunted. Lacks insight to his need for treatment, lacks insight into his substance abuse problem. Appetite: [x] Normal/Unchanged  [] Increased  [] Decreased      Sleep:       [x] Normal/Unchanged  [] Fair       [] Poor              Energy:    [x] Normal/Unchanged  [] Increased  [] Decreased        SI [] Present  [x] Absent    HI  []Present  [x] Absent     Aggression:  [] yes  [x] no    Patient is [x] able  [] unable to CONTRACT FOR SAFETY     PAST MEDICAL/PSYCHIATRIC HISTORY:   Past Medical History:   Diagnosis Date    Anxiety     Arthritis     Asthma     Bipolar 1 disorder ([address]     Chronic combined systolic and diastolic CHF (congestive heart failure) ([address] 05/27/2018    COPD (chronic obstructive pulmonary disease) ([address]     Depression     Diabetes mellitus ([address]     GERD (gastroesophageal reflux disease)     Hepatitis C     resolved    Hypertension     Hyperthyroidism 8/2/2012    Hypothyroidism due to medication     Mass of testicle     Mesothelioma [facility])     pt states possibly not    Neuromuscular disorder ([address]     Other disorders of kidney and ureter     kidney stones; most recent 08/27/2015    Paroxysmal A-fib ([address]     discussed with PCP- patient does have hx of PAfib    Peptic ulcer     Prostate enlargement     Pulmonary embolism ([address]     Intermediate risk for PE on VQ scan.     Seizures ([address]     Thyroid disease hyperthyroid       FAMILY/SOCIAL HISTORY:  Family History   Problem Relation Age of Onset    Cancer Mother     Diabetes Mother     Cancer Father     Diabetes Father     Diabetes Maternal Aunt     Diabetes Maternal Grandmother     Cancer Maternal Grandfather     Heart Disease Paternal Grandmother     Heart Failure Paternal Grandmother     Heart Disease Paternal Grandfather     Heart Failure Paternal Grandfather     Diabetes Maternal Aunt     Diabetes Maternal Aunt      Social History     Socioeconomic History    Marital status: Single     Spouse name: Not on file    Number of children: Not on file    Years of education: Not on file    Highest education level: Not on file   Occupational History    Occupation: disability   Tobacco Use    Smoking status: Former Smoker     Years: 10.00     Types: Cigarettes     Quit date: 1/10/2010     Years since quittin.8    Smokeless tobacco: Former User     Types: [address] date:    Vaping Use    Vaping Use: Never used   Substance and Sexual Activity    Alcohol use: No     Alcohol/week: 0.0 standard drinks     Comment: recovered alcoholic; 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    Drug use: Yes     Types: Cocaine    Sexual activity: Yes     Comment: heroin   Other Topics Concern    Not on file   Social History Narrative    Not on file     Social Determinants of Health     Financial Resource Strain:     Difficulty of Paying Living Expenses: Not on file   Food Insecurity:     Worried About [address] in the Last Year: Not on file    [address] in the Last Year: Not on file   Transportation Needs:     Lack of Transportation (Medical): Not on file    Lack of Transportation (Non-Medical):  Not on file   Physical Activity:     Days of Exercise per Week: Not on file    Minutes of Exercise per Session: Not on file   Stress:     Feeling of Stress : Not on file   Social Connections:     Frequency of Communication with Friends and Family: Not on file    Frequency of Social Gatherings with Friends and Family: Not on file    Attends Congregation Services: Not on file    Active Member of Clubs or Organizations: Not on file    Attends Club or Organization Meetings: Not on file    Marital Status: Not on file   Intimate Partner Violence:     Fear of Current or Ex-Partner: Not on file    Emotionally Abused: Not on file    Physically Abused: Not on file    Sexually Abused: Not on file   Housing Stability:     Unable to Pay for Housing in the Last Year: Not on file    Number of [place] in the Last Year: Not on file    Unstable Housing in the Last Year: Not on file           ROS:  [x] All negative/unchanged except if checked.  Explain positive(checked items) below:  [] Constitutional  [] Eyes  [] Ear/Nose/Mouth/Throat  [] Respiratory  [] CV  [] GI  []   [] Musculoskeletal  [] Skin/Breast  [] Neurological  [] Endocrine  [] Heme/Lymph  [] Allergic/Immunologic    Explanation:     MEDICATIONS:    Current Facility-Administered Medications:     diazePAM (VALIUM) tablet 5 mg, 5 mg, Oral, Q12H PRN, Uche Lick, APRN - CNP, 5 mg at 11/11/21 2045    acetaminophen (TYLENOL) tablet 650 mg, 650 mg, Oral, Q4H PRN, Tomás Martins MD    magnesium hydroxide (MILK OF MAGNESIA) 400 MG/5ML suspension 30 mL, 30 mL, Oral, Daily PRN, Tomás Martins MD    nicotine (NICODERM CQ) 21 MG/24HR 1 patch, 1 patch, TransDERmal, Daily, Tomás Martins MD    aluminum & magnesium hydroxide-simethicone (MAALOX) 323-245-34 MG/5ML suspension 30 mL, 30 mL, Oral, PRN, Tomás Martins MD    hydrOXYzine (ATARAX) tablet 50 mg, 50 mg, Oral, TID PRN, Tomás Martins MD, 50 mg at 11/11/21 1224    haloperidol (HALDOL) tablet 3 mg, 3 mg, Oral, Q6H PRN **OR** haloperidol lactate (HALDOL) injection 3 mg, 3 mg, IntraMUSCular, Q6H PRN, Tomás Martins MD    traZODone (DESYREL) tablet 50 mg, 50 mg, Oral, Nightly PRN, Tomás Martins MD, 50 mg at 11/11/21 2045    atorvastatin (LIPITOR) tablet 40 mg, 40 mg, Oral, Nightly, Kiarra Sarmiento APRN - CNP, 40 mg at 11/11/21 2045    finasteride (PROSCAR) tablet 5 mg, 5 mg, Oral, Daily, Rutcourtneyl Torsten, APRN - CNP, 5 mg at 11/12/21 0941    levETIRAcetam (KEPPRA) tablet 500 mg, 500 mg, Oral, BID, Rutbe Sarmiento APRN - CNP, 500 mg at 11/12/21 0941    lisinopril (PRINIVIL;ZESTRIL) tablet 10 mg, 10 mg, Oral, Daily, Rutcourtneyl Torsten APRN - CNP, 10 mg at 11/12/21 0940    rivaroxaban (XARELTO) tablet 20 mg, 20 mg, Oral, Daily, Rutcourtneyl Torsten, APRN - CNP, 20 mg at 11/12/21 0941    venlafaxine (EFFEXOR XR) extended release capsule 75 mg, 75 mg, Oral, Daily with breakfast, Rutcourtneyl Torsten, APRN - CNP, 75 mg at 11/12/21 0941    budesonide (PULMICORT) nebulizer suspension 500 mcg, 0.5 mg, Nebulization, BID, Rutcourtneyl Torsten, APRN - CNP, 500 mcg at 11/11/21 2037    Arformoterol Tartrate (BROVANA) nebulizer solution 15 mcg, 15 mcg, Nebulization, BID, Rutcourtneyl Torsten, APRN - CNP, 15 mcg at 11/11/21 2036    lurasidone (LATUDA) tablet 20 mg, 20 mg, Oral, Daily, Didierl Torsten APRN - CNP, 20 mg at 11/12/21 0941    acetaminophen (TYLENOL) tablet 650 mg, 650 mg, Oral, Once, Michelle Guevara MD      Examination:  /60   Pulse 54   Temp 97.8 °F (36.6 °C) (Temporal)   Resp 18   Ht 5' 4\" (1.626 m)   Wt 113 lb (51.3 kg)   SpO2 97%   BMI 19.40 kg/m²   Gait - steady  Medication side effects(SE):     Mental Status Examination:    Level of consciousness:  within normal limits   Appearance:  fair grooming and fair hygiene  Behavior/Motor:  no abnormalities noted  Attitude toward examiner:  cooperative  Speech:  normal rate and normal volume   Mood: decreased range  Affect:  blunted  Thought processes:  [place]  Thought content: The patient is devoid of suicidal or homicidal ideation intent or plan. Devoid of auditory or visual hallucinations or other perceptual disturbances, there are no overt or covert signs of psychosis or paranoia.   There are no neurovegetative signs of clinically indicated. The patient was referred to outpatient/inpatient substance abuse rehabilitation programming. He was educated multiple times during the hospitalization that if he chooses to continue to use drugs or alcohol, he may potentially act out impulsively, resulting in serious harm to self or others, even though unintentional.  He was also educated that mental health treatment cannot be optimized with ongoing use of drugs. He demonstrated understanding has the capacity to understand that. Latuda 20 mg daily  Effexor XR 75 mg daily   Home medications continued as indicated    Collateral information: Followed by social work  CD evaluation  Encourage patient to attend group and other milieu activities. Discharge planning discussed with the patient and treatment team.    PSYCHOTHERAPY/COUNSELING:  [x] Therapeutic interview  [x] Supportive  [] CBT  [] Ongoing  [] Other    [x] Patient continues to need, on a daily basis, active treatment furnished directly by or requiring the supervision of inpatient psychiatric personnel      Anticipated Length of stay: 5 - 7 days based on stability    NOTE: This report was transcribed using voice recognition software.  Every effort was made to ensure accuracy; however, inadvertent computerized transcription errors may be present.       Electronically signed by ZOIE Gonzales CNP on 11/12/2021 at 10:36 AM

## 2021-11-12 NOTE — PLAN OF CARE
Patient denies SI/HI/Hallucinations. Per 11/11/2021 assessment patient was postive SI during morning assessment and afternoon conversation patient denies SI. Patient is alert and oriented. Mood is flat but he brightens with conversation. Patient is cooperative with answering questions and has been in and out of room for meals and watching T.V intermittently.        Problem: Depressive Behavior With or Without Suicide Precautions:  Goal: Able to verbalize acceptance of life and situations over which he or she has no control  Description: Able to verbalize acceptance of life and situations over which he or she has no control  Outcome: Ongoing  Goal: Ability to disclose and discuss suicidal ideas will improve  Description: Ability to disclose and discuss suicidal ideas will improve  Outcome: Ongoing  Goal: Absence of self-harm  Description: Absence of self-harm  Outcome: Ongoing     Problem: Depressive Behavior With or Without Suicide Precautions:  Goal: Able to verbalize acceptance of life and situations over which he or she has no control  Description: Able to verbalize acceptance of life and situations over which he or she has no control  Outcome: Ongoing  Goal: Ability to disclose and discuss suicidal ideas will improve  Description: Ability to disclose and discuss suicidal ideas will improve  Outcome: Ongoing  Goal: Absence of self-harm  Description: Absence of self-harm  Outcome: Ongoing

## 2021-11-13 PROCEDURE — 99232 SBSQ HOSP IP/OBS MODERATE 35: CPT | Performed by: NURSE PRACTITIONER

## 2021-11-13 PROCEDURE — 1240000000 HC EMOTIONAL WELLNESS R&B

## 2021-11-13 PROCEDURE — 6370000000 HC RX 637 (ALT 250 FOR IP): Performed by: NURSE PRACTITIONER

## 2021-11-13 PROCEDURE — 6370000000 HC RX 637 (ALT 250 FOR IP): Performed by: PSYCHIATRY & NEUROLOGY

## 2021-11-13 RX ADMIN — TRAZODONE HYDROCHLORIDE 50 MG: 50 TABLET ORAL at 21:12

## 2021-11-13 RX ADMIN — LEVETIRACETAM 500 MG: 500 TABLET, FILM COATED ORAL at 08:48

## 2021-11-13 RX ADMIN — ATORVASTATIN CALCIUM 40 MG: 40 TABLET, FILM COATED ORAL at 21:12

## 2021-11-13 RX ADMIN — LEVETIRACETAM 500 MG: 500 TABLET, FILM COATED ORAL at 21:12

## 2021-11-13 RX ADMIN — FINASTERIDE 5 MG: 5 TABLET, FILM COATED ORAL at 08:47

## 2021-11-13 RX ADMIN — LISINOPRIL 10 MG: 10 TABLET ORAL at 12:54

## 2021-11-13 RX ADMIN — HYDROXYZINE HYDROCHLORIDE 50 MG: 10 TABLET ORAL at 21:12

## 2021-11-13 RX ADMIN — LURASIDONE HYDROCHLORIDE 20 MG: 20 TABLET, FILM COATED ORAL at 08:47

## 2021-11-13 RX ADMIN — RIVAROXABAN 20 MG: 20 TABLET, FILM COATED ORAL at 08:47

## 2021-11-13 RX ADMIN — DIAZEPAM 5 MG: 5 TABLET ORAL at 12:54

## 2021-11-13 RX ADMIN — VENLAFAXINE HYDROCHLORIDE 75 MG: 75 CAPSULE, EXTENDED RELEASE ORAL at 08:47

## 2021-11-13 ASSESSMENT — PAIN SCALES - GENERAL: PAINLEVEL_OUTOF10: 0

## 2021-11-13 NOTE — PLAN OF CARE
Patient calm, pleasant, cooperative. Denies SI/HI/AVH and depression/anxiety. Patient out on the unit, social with peers. Compliant with medications and in control of behaviors. Will continue to monitor and provide support.     Problem: Suicide risk  Goal: Provide patient with safe environment  Description: Provide patient with safe environment  Outcome: Met This Shift     Problem: Depressive Behavior With or Without Suicide Precautions:  Goal: Able to verbalize and/or display a decrease in depressive symptoms  Description: Able to verbalize and/or display a decrease in depressive symptoms  Outcome: Met This Shift     Problem: Depressive Behavior With or Without Suicide Precautions:  Goal: Ability to disclose and discuss suicidal ideas will improve  Description: Ability to disclose and discuss suicidal ideas will improve  11/12/2021 2223 by Jemal Santiago RN  Outcome: Met This Shift     Problem: Depressive Behavior With or Without Suicide Precautions:  Goal: Absence of self-harm  Description: Absence of self-harm  11/12/2021 2223 by Jemal Santiago RN  Outcome: Met This Shift

## 2021-11-13 NOTE — PROGRESS NOTES
BEHAVIORAL HEALTH FOLLOW-UP NOTE     11/13/2021     Patient was seen and examined in person, Chart reviewed   Patient's case discussed with staff/team    Chief Complaint:  \"I am doing really good\"      Interim History:   Patient seen in his room states he is \"doing really good. \"  He states he feels better. His affect seems brighter more pleasant he denies SI/HI intent or plan denies any auditory visual hallucinations affect appears brighter    Appetite: [x] Normal/Unchanged  [] Increased  [] Decreased      Sleep:       [x] Normal/Unchanged  [] Fair       [] Poor              Energy:    [x] Normal/Unchanged  [] Increased  [] Decreased        SI [] Present  [x] Absent    HI  []Present  [x] Absent     Aggression:  [] yes  [x] no    Patient is [x] able  [] unable to CONTRACT FOR SAFETY     PAST MEDICAL/PSYCHIATRIC HISTORY:   Past Medical History:   Diagnosis Date    Anxiety     Arthritis     Asthma     Bipolar 1 disorder ([address]     Chronic combined systolic and diastolic CHF (congestive heart failure) ([address] 05/27/2018    COPD (chronic obstructive pulmonary disease) ([address]     Depression     Diabetes mellitus ([address]     GERD (gastroesophageal reflux disease)     Hepatitis C     resolved    Hypertension     Hyperthyroidism 8/2/2012    Hypothyroidism due to medication     Mass of testicle     Mesothelioma [facility])     pt states possibly not    Neuromuscular disorder ([address]     Other disorders of kidney and ureter     kidney stones; most recent 08/27/2015    Paroxysmal A-fib ([address]     discussed with PCP- patient does have hx of PAfib    Peptic ulcer     Prostate enlargement     Pulmonary embolism ([address]     Intermediate risk for PE on VQ scan.     Seizures ([address]     Thyroid disease     hyperthyroid       FAMILY/SOCIAL HISTORY:  Family History   Problem Relation Age of Onset    Cancer Mother     Diabetes Mother     Cancer Father     Diabetes Father     Diabetes Maternal Aunt     Diabetes Maternal Grandmother     Cancer Maternal Grandfather     Heart Disease Paternal Grandmother     Heart Failure Paternal Grandmother     Heart Disease Paternal Grandfather     Heart Failure Paternal Grandfather     Diabetes Maternal Aunt     Diabetes Maternal Aunt      Social History     Socioeconomic History    Marital status: Single     Spouse name: Not on file    Number of children: Not on file    Years of education: Not on file    Highest education level: Not on file   Occupational History    Occupation: disability   Tobacco Use    Smoking status: Former Smoker     Years: 10.00     Types: Cigarettes     Quit date: 1/10/2010     Years since quittin.8    Smokeless tobacco: Former User     Types: [address] date:    Vaping Use    Vaping Use: Never used   Substance and Sexual Activity    Alcohol use: No     Alcohol/week: 0.0 standard drinks     Comment: recovered alcoholic; last use 595    Drug use: Yes     Types: Cocaine    Sexual activity: Yes     Comment: heroin   Other Topics Concern    Not on file   Social History Narrative    Not on file     Social Determinants of Health     Financial Resource Strain:     Difficulty of Paying Living Expenses: Not on file   Food Insecurity:     Worried About [address] in the Last Year: Not on file    [address] in the Last Year: Not on file   Transportation Needs:     Lack of Transportation (Medical): Not on file    Lack of Transportation (Non-Medical):  Not on file   Physical Activity:     Days of Exercise per Week: Not on file    Minutes of Exercise per Session: Not on file   Stress:     Feeling of Stress : Not on file   Social Connections:     Frequency of Communication with Friends and Family: Not on file    Frequency of Social Gatherings with Friends and Family: Not on file    Attends Synagogue Services: Not on file    Active Member of Clubs or Organizations: Not on file    Attends Club or Organization Meetings: Not on file  Marital Status: Not on file   Intimate Partner Violence:     Fear of Current or Ex-Partner: Not on file    Emotionally Abused: Not on file    Physically Abused: Not on file    Sexually Abused: Not on file   Housing Stability:     Unable to Pay for Housing in the Last Year: Not on file    Number of [place] in the Last Year: Not on file    Unstable Housing in the Last Year: Not on file           ROS:  [x] All negative/unchanged except if checked.  Explain positive(checked items) below:  [] Constitutional  [] Eyes  [] Ear/Nose/Mouth/Throat  [] Respiratory  [] CV  [] GI  []   [] Musculoskeletal  [] Skin/Breast  [] Neurological  [] Endocrine  [] Heme/Lymph  [] Allergic/Immunologic    Explanation:     MEDICATIONS:    Current Facility-Administered Medications:     diazePAM (VALIUM) tablet 5 mg, 5 mg, Oral, Q12H PRN, Deneise Bolds, APRN - CNP, 5 mg at 11/12/21 2306    acetaminophen (TYLENOL) tablet 650 mg, 650 mg, Oral, Q4H PRN, Alla Jaramillo MD    magnesium hydroxide (MILK OF MAGNESIA) 400 MG/5ML suspension 30 mL, 30 mL, Oral, Daily PRN, Alla Jaramillo MD    nicotine (NICODERM CQ) 21 MG/24HR 1 patch, 1 patch, TransDERmal, Daily, Alla Jaramillo MD    aluminum & magnesium hydroxide-simethicone (MAALOX) 355-180-62 MG/5ML suspension 30 mL, 30 mL, Oral, PRN, Alla Jaramillo MD    hydrOXYzine (ATARAX) tablet 50 mg, 50 mg, Oral, TID PRN, Alla Jaramillo MD, 50 mg at 11/12/21 2103    haloperidol (HALDOL) tablet 3 mg, 3 mg, Oral, Q6H PRN **OR** haloperidol lactate (HALDOL) injection 3 mg, 3 mg, IntraMUSCular, Q6H PRN, Alla Jaramillo MD    traZODone (DESYREL) tablet 50 mg, 50 mg, Oral, Nightly PRN, Alla Jaramillo MD, 50 mg at 11/12/21 2103    atorvastatin (LIPITOR) tablet 40 mg, 40 mg, Oral, Nightly, Deneise Bolds, APRN - CNP, 40 mg at 11/12/21 2103    finasteride (PROSCAR) tablet 5 mg, 5 mg, Oral, Daily, Kalyn Logan APRN - CNP, 5 mg at 11/13/21 0847    levETIRAcetam (KEPPRA) tablet 500 mg, 500 mg, Oral, BID, Julius Prado, APRN - CNP, 500 mg at 11/13/21 0848    lisinopril (PRINIVIL;ZESTRIL) tablet 10 mg, 10 mg, Oral, Daily, Julius Prado, APRN - CNP, 10 mg at 11/12/21 0940    rivaroxaban (XARELTO) tablet 20 mg, 20 mg, Oral, Daily, Madrid Johan, APRN - CNP, 20 mg at 11/13/21 0847    venlafaxine (EFFEXOR XR) extended release capsule 75 mg, 75 mg, Oral, Daily with breakfast, Julius Prado, APRN - CNP, 75 mg at 11/13/21 0847    budesonide (PULMICORT) nebulizer suspension 500 mcg, 0.5 mg, Nebulization, BID, Julius Wellerlucy, APRN - CNP, 500 mcg at 11/12/21 2099    Arformoterol Tartrate (BROVANA) nebulizer solution 15 mcg, 15 mcg, Nebulization, BID, Madrid Johan, APRN - CNP, 15 mcg at 11/12/21 5450    lurasidone (LATUDA) tablet 20 mg, 20 mg, Oral, Daily, Madrid Johan, APRN - CNP, 20 mg at 11/13/21 0847    acetaminophen (TYLENOL) tablet 650 mg, 650 mg, Oral, Once, Erika Koroma MD      Examination:  BP (!) 99/55   Pulse 63   Temp 98.7 °F (37.1 °C) (Temporal)   Resp 14   Ht 5' 4\" (1.626 m)   Wt 113 lb (51.3 kg)   SpO2 98%   BMI 19.40 kg/m²   Gait - steady  Medication side effects(SE):     Mental Status Examination:    Level of consciousness:  within normal limits   Appearance:  fair grooming and fair hygiene  Behavior/Motor:  no abnormalities noted  Attitude toward examiner:  cooperative  Speech:  normal rate and normal volume   Mood: decreased range  Affect:  blunted  Thought processes:  [facility]  Thought content: The patient is devoid of suicidal or homicidal ideation intent or plan. Devoid of auditory or visual hallucinations or other perceptual disturbances, there are no overt or covert signs of psychosis or paranoia. There are no neurovegetative signs of depression.   Cognition:  oriented to person, place, and time   Concentration poor  Insight poor   Judgement poor     ASSESSMENT:   Patient symptoms are:  [] Well controlled  [] Improving  [] Worsening  [x] No change      Diagnosis:   Principal Problem:    Bipolar disorder current episode depressed ([facility]  Active Problems:    History of noncompliance with medical treatment, presenting hazards to health    Cocaine abuse ([facility]    Cannabis abuse  Resolved Problems:    Bipolar affective disorder, currently depressed, moderate ([facility]      LABS:    No results for input(s): WBC, HGB, PLT in the last 72 hours. No results for input(s): NA, K, CL, CO2, BUN, CREATININE, GLUCOSE in the last 72 hours. No results for input(s): BILITOT, ALKPHOS, AST, ALT in the last 72 hours. Lab Results   Component Value Date    LABAMPH NOT DETECTED 11/09/2021    LABAMPH NOT DETECTED 07/04/2011    BARBSCNU NOT DETECTED 11/09/2021    LABBENZ NOT DETECTED 11/09/2021    LABBENZ SEE BELOW 07/01/2014    CANNAB POSITIVE  07/04/2011    LABMETH NOT DETECTED 11/09/2021    OPIATESCREENURINE NOT DETECTED 11/09/2021    PHENCYCLIDINESCREENURINE NOT DETECTED 11/09/2021    ETOH <10 11/09/2021     Lab Results   Component Value Date    TSH 22.830 09/24/2021     No results found for: LITHIUM  Lab Results   Component Value Date    VALPROATE 31 (L) 06/02/2016       Treatment Plan:  The patient's diagnosis, treatment plan, medication management were formulated after patient was seen directly by the attending physician and myself and all relevant documentation was reviewed. Risk, benefit, side effects, possible outcomes of the medication and alternatives discussed with the patient and the patient demonstrated understanding. The patient was also educated that the outcome of treatment will depend on the medication compliance as directed by the prescribers along with regular follow-up, compliance with the labs and other work-up, as clinically indicated. The patient was referred to outpatient/inpatient substance abuse rehabilitation programming.   He was educated multiple times during the hospitalization that if he chooses to continue to use drugs or alcohol, he may

## 2021-11-14 PROCEDURE — 6370000000 HC RX 637 (ALT 250 FOR IP): Performed by: NURSE PRACTITIONER

## 2021-11-14 PROCEDURE — 6370000000 HC RX 637 (ALT 250 FOR IP): Performed by: PSYCHIATRY & NEUROLOGY

## 2021-11-14 PROCEDURE — 1240000000 HC EMOTIONAL WELLNESS R&B

## 2021-11-14 PROCEDURE — 99232 SBSQ HOSP IP/OBS MODERATE 35: CPT | Performed by: NURSE PRACTITIONER

## 2021-11-14 RX ADMIN — LEVETIRACETAM 500 MG: 500 TABLET, FILM COATED ORAL at 21:08

## 2021-11-14 RX ADMIN — FINASTERIDE 5 MG: 5 TABLET, FILM COATED ORAL at 08:22

## 2021-11-14 RX ADMIN — LISINOPRIL 10 MG: 10 TABLET ORAL at 08:22

## 2021-11-14 RX ADMIN — RIVAROXABAN 20 MG: 20 TABLET, FILM COATED ORAL at 08:21

## 2021-11-14 RX ADMIN — LEVETIRACETAM 500 MG: 500 TABLET, FILM COATED ORAL at 08:21

## 2021-11-14 RX ADMIN — TRAZODONE HYDROCHLORIDE 50 MG: 50 TABLET ORAL at 21:08

## 2021-11-14 RX ADMIN — VENLAFAXINE HYDROCHLORIDE 75 MG: 75 CAPSULE, EXTENDED RELEASE ORAL at 08:22

## 2021-11-14 RX ADMIN — LURASIDONE HYDROCHLORIDE 20 MG: 20 TABLET, FILM COATED ORAL at 08:22

## 2021-11-14 RX ADMIN — ATORVASTATIN CALCIUM 40 MG: 40 TABLET, FILM COATED ORAL at 21:08

## 2021-11-14 RX ADMIN — HYDROXYZINE HYDROCHLORIDE 50 MG: 10 TABLET ORAL at 21:08

## 2021-11-14 ASSESSMENT — PAIN SCALES - GENERAL: PAINLEVEL_OUTOF10: 10

## 2021-11-14 NOTE — GROUP NOTE
Group Therapy Note    Date: 11/14/2021    Group Start Time: 1100  Group End Time: 8802  Group Topic: Psychoeducation    [facility] AC[facility]        Group Therapy Note    Attendees: 11       Notes: Active and engaged during discussion on healthy boundaries. Able to share and relate to peers.         Status After Intervention:  Unchanged    Participation Level: Minimal    Participation Quality: Attentive      Speech:  hesitant      Thought Process/Content: Perseverating      Affective Functioning: Flat      Mood: anxious and depressed      Level of consciousness:  Alert and Attentive      Response to Learning: Progressing to goal      Endings: None Reported    Modes of Intervention: Education, Support, Socialization and Exploration      Discipline Responsible: Psychoeducational Specialist      Signature:  Rick Disla, [address]

## 2021-11-14 NOTE — PLAN OF CARE
Problem: Suicide risk  Goal: Provide patient with safe environment  Description: Provide patient with safe environment  Outcome: Met This Shift     Problem: Depressive Behavior With or Without Suicide Precautions:  Goal: Able to verbalize support systems  Description: Able to verbalize support systems  Outcome: Met This Shift     Problem: Depressive Behavior With or Without Suicide Precautions:  Goal: Absence of self-harm  Description: Absence of self-harm  Outcome: Met This Shift    Patient denies SI/HI and hallucinations. Patient is bright, pleasant, cooperative, and social. Patient takes prescribed medications without issue. Will continue to offer support and comfort to patient.

## 2021-11-14 NOTE — PROGRESS NOTES
BEHAVIORAL HEALTH FOLLOW-UP NOTE     11/14/2021     Patient was seen and examined in person, Chart reviewed   Patient's case discussed with staff/team    Chief Complaint:  \"I am doing really good\"      Interim History:   Patient seen up on the unit socializing with peers attending groups states he is \"doing really good. \"  He states he feels better. His affect seems brighter more pleasant he denies SI/HI intent or plan denies any auditory visual hallucinations affect appears brighter    Appetite: [x] Normal/Unchanged  [] Increased  [] Decreased      Sleep:       [x] Normal/Unchanged  [] Fair       [] Poor              Energy:    [x] Normal/Unchanged  [] Increased  [] Decreased        SI [] Present  [x] Absent    HI  []Present  [x] Absent     Aggression:  [] yes  [x] no    Patient is [x] able  [] unable to CONTRACT FOR SAFETY     PAST MEDICAL/PSYCHIATRIC HISTORY:   Past Medical History:   Diagnosis Date    Anxiety     Arthritis     Asthma     Bipolar 1 disorder ([address]     Chronic combined systolic and diastolic CHF (congestive heart failure) ([address] 05/27/2018    COPD (chronic obstructive pulmonary disease) ([address]     Depression     Diabetes mellitus ([address]     GERD (gastroesophageal reflux disease)     Hepatitis C     resolved    Hypertension     Hyperthyroidism 8/2/2012    Hypothyroidism due to medication     Mass of testicle     Mesothelioma [facility])     pt states possibly not    Neuromuscular disorder ([address]     Other disorders of kidney and ureter     kidney stones; most recent 08/27/2015    Paroxysmal A-fib ([address]     discussed with PCP- patient does have hx of PAfib    Peptic ulcer     Prostate enlargement     Pulmonary embolism ([address]     Intermediate risk for PE on VQ scan.     Seizures ([address]     Thyroid disease     hyperthyroid       FAMILY/SOCIAL HISTORY:  Family History   Problem Relation Age of Onset   [facility] Cancer Mother     Diabetes Mother     Cancer Father     Diabetes Father     Diabetes or Organization Meetings: Not on file    Marital Status: Not on file   Intimate Partner Violence:     Fear of Current or Ex-Partner: Not on file    Emotionally Abused: Not on file    Physically Abused: Not on file    Sexually Abused: Not on file   Housing Stability:     Unable to Pay for Housing in the Last Year: Not on file    Number of [place] in the Last Year: Not on file    Unstable Housing in the Last Year: Not on file           ROS:  [x] All negative/unchanged except if checked.  Explain positive(checked items) below:  [] Constitutional  [] Eyes  [] Ear/Nose/Mouth/Throat  [] Respiratory  [] CV  [] GI  []   [] Musculoskeletal  [] Skin/Breast  [] Neurological  [] Endocrine  [] Heme/Lymph  [] Allergic/Immunologic    Explanation:     MEDICATIONS:    Current Facility-Administered Medications:     acetaminophen (TYLENOL) tablet 650 mg, 650 mg, Oral, Q4H PRN, Clarita Rosado MD    magnesium hydroxide (MILK OF MAGNESIA) 400 MG/5ML suspension 30 mL, 30 mL, Oral, Daily PRN, Clarita Rosado MD    nicotine (NICODERM CQ) 21 MG/24HR 1 patch, 1 patch, TransDERmal, Daily, Clarita Rosado MD    aluminum & magnesium hydroxide-simethicone (MAALOX) 200-200-20 MG/5ML suspension 30 mL, 30 mL, Oral, PRN, Clarita Rosado MD    hydrOXYzine (ATARAX) tablet 50 mg, 50 mg, Oral, TID PRN, Clarita Rosado MD, 50 mg at 11/13/21 2112    haloperidol (HALDOL) tablet 3 mg, 3 mg, Oral, Q6H PRN **OR** haloperidol lactate (HALDOL) injection 3 mg, 3 mg, IntraMUSCular, Q6H PRN, Clarita Rosado MD    traZODone (DESYREL) tablet 50 mg, 50 mg, Oral, Nightly PRN, Clarita Rosado MD, 50 mg at 11/13/21 2112    atorvastatin (LIPITOR) tablet 40 mg, 40 mg, Oral, Nightly, ZOIE Sánchez CNP, 40 mg at 11/13/21 2112    finasteride (PROSCAR) tablet 5 mg, 5 mg, Oral, Daily, ZOIE Sánchez CNP, 5 mg at 11/14/21 9138    levETIRAcetam (KEPPRA) tablet 500 mg, 500 mg, Oral, BID, ZOIE Sánchez CNP, 500 mg at 11/14/21 7406    lisinopril (PRINIVIL;ZESTRIL) tablet 10 mg, 10 mg, Oral, Daily, Michelle Mayans, APRN - CNP, 10 mg at 11/14/21 0510    rivaroxaban (XARELTO) tablet 20 mg, 20 mg, Oral, Daily, Michelle Mayans, APRN - CNP, 20 mg at 11/14/21 5629    venlafaxine (EFFEXOR XR) extended release capsule 75 mg, 75 mg, Oral, Daily with breakfast, Michelle Mayans, APRN - CNP, 75 mg at 11/14/21 0792    budesonide (PULMICORT) nebulizer suspension 500 mcg, 0.5 mg, Nebulization, BID, Michelle Mayans, APRN - CNP, 500 mcg at 11/12/21 1769    Arformoterol Tartrate (BROVANA) nebulizer solution 15 mcg, 15 mcg, Nebulization, BID, Michelle Mayans, APRN - CNP, 15 mcg at 11/12/21 1816    lurasidone (LATUDA) tablet 20 mg, 20 mg, Oral, Daily, Michelle Mayans, APRN - CNP, 20 mg at 11/14/21 5673    acetaminophen (TYLENOL) tablet 650 mg, 650 mg, Oral, Once, Pablo Sandoval MD      Examination:  BP (!) 105/56   Pulse 70   Temp 98 °F (36.7 °C) (Temporal)   Resp 14   Ht 5' 4\" (1.626 m)   Wt 113 lb (51.3 kg)   SpO2 96%   BMI 19.40 kg/m²   Gait - steady  Medication side effects(SE):     Mental Status Examination:    Level of consciousness:  within normal limits   Appearance:  fair grooming and fair hygiene  Behavior/Motor:  no abnormalities noted  Attitude toward examiner:  cooperative  Speech:  normal rate and normal volume   Mood: decreased range  Affect:  blunted  Thought processes:  [place]  Thought content: The patient is devoid of suicidal or homicidal ideation intent or plan. Devoid of auditory or visual hallucinations or other perceptual disturbances, there are no overt or covert signs of psychosis or paranoia. There are no neurovegetative signs of depression.   Cognition:  oriented to person, place, and time   Concentration poor  Insight poor   Judgement poor     ASSESSMENT:   Patient symptoms are:  [] Well controlled  [] Improving  [] Worsening  [x] No change      Diagnosis:   Principal Problem:    Bipolar disorder current episode depressed ([facility]  Active Problems:    History of noncompliance with medical treatment, presenting hazards to health    Cocaine abuse ([facility]    Cannabis abuse  Resolved Problems:    Bipolar affective disorder, currently depressed, moderate ([facility]      LABS:    No results for input(s): WBC, HGB, PLT in the last 72 hours. No results for input(s): NA, K, CL, CO2, BUN, CREATININE, GLUCOSE in the last 72 hours. No results for input(s): BILITOT, ALKPHOS, AST, ALT in the last 72 hours. Lab Results   Component Value Date    LABAMPH NOT DETECTED 11/09/2021    LABAMPH NOT DETECTED 07/04/2011    BARBSCNU NOT DETECTED 11/09/2021    LABBENZ NOT DETECTED 11/09/2021    LABBENZ SEE BELOW 07/01/2014    CANNAB POSITIVE  07/04/2011    LABMETH NOT DETECTED 11/09/2021    OPIATESCREENURINE NOT DETECTED 11/09/2021    PHENCYCLIDINESCREENURINE NOT DETECTED 11/09/2021    ETOH <10 11/09/2021     Lab Results   Component Value Date    TSH 22.830 09/24/2021     No results found for: LITHIUM  Lab Results   Component Value Date    VALPROATE 31 (L) 06/02/2016       Treatment Plan:  The patient's diagnosis, treatment plan, medication management were formulated after patient was seen directly by the attending physician and myself and all relevant documentation was reviewed. Risk, benefit, side effects, possible outcomes of the medication and alternatives discussed with the patient and the patient demonstrated understanding. The patient was also educated that the outcome of treatment will depend on the medication compliance as directed by the prescribers along with regular follow-up, compliance with the labs and other work-up, as clinically indicated. The patient was referred to outpatient/inpatient substance abuse rehabilitation programming.   He was educated multiple times during the hospitalization that if he chooses to continue to use drugs or alcohol, he may potentially act out impulsively, resulting in serious harm to self or others, even though unintentional.  He was also educated that mental health treatment cannot be optimized with ongoing use of drugs. He demonstrated understanding has the capacity to understand that. Latuda 20 mg daily  Effexor XR 75 mg daily   Home medications continued as indicated    Collateral information: Followed by social work  CD evaluation  Encourage patient to attend group and other milieu activities. Discharge planning discussed with the patient and treatment team.    PSYCHOTHERAPY/COUNSELING:  [x] Therapeutic interview  [x] Supportive  [] CBT  [] Ongoing  [] Other    [x] Patient continues to need, on a daily basis, active treatment furnished directly by or requiring the supervision of inpatient psychiatric personnel      Anticipated Length of stay: 5 - 7 days based on stability    NOTE: This report was transcribed using voice recognition software.  Every effort was made to ensure accuracy; however, inadvertent computerized transcription errors may be present.       Electronically signed by ZOIE Ford CNP on 10/20/8969 at 11:11 AM

## 2021-11-14 NOTE — PLAN OF CARE
Problem: Depressive Behavior With or Without Suicide Precautions:  Goal: Able to verbalize and/or display a decrease in depressive symptoms  Description: Able to verbalize and/or display a decrease in depressive symptoms  Outcome: Met This Shift     Problem: Depressive Behavior With or Without Suicide Precautions:  Goal: Ability to disclose and discuss suicidal ideas will improve  Description: Ability to disclose and discuss suicidal ideas will improve  Outcome: Met This Shift     Pt denies SI/HI/AVH, anxiety and depression. Pt in his room most of morning but observed out on unit after lunch social with peers. Pt is bright, pleasant, calm and cooperative. Pt is taking prescribed medication and attends group. Will continue to monitor.

## 2021-11-14 NOTE — GROUP NOTE
Group Therapy Note    Date: 11/14/2021    Group Start Time: 1200  Group End Time: 200  Group Topic: Cognitive Skills    [facility]    DAVID Hernandes LSW        Group Therapy Note    Attendees: 13         Patient's Goal: To participate in the group activities on coping skills for anxiety: deep breathing, progressive muscle relaxation, challenging irrational thoughts, and positive imagery. Notes: PT was an active participant in group activities. Status After Intervention:  Improved    Participation Level:  Active Listener and Interactive    Participation Quality: Appropriate and Attentive      Speech:  normal      Thought Process/Content: Logical      Affective Functioning: Congruent      Mood: anxious      Level of consciousness:  Alert and Oriented x4      Response to Learning: Able to verbalize current knowledge/experience      Endings: None Reported    Modes of Intervention: Education, Support, Socialization, Exploration, Clarifying and Problem-solving      Discipline Responsible: /Counselor      Signature:  DAVID Stock LSW

## 2021-11-15 VITALS
BODY MASS INDEX: 19.29 KG/M2 | HEIGHT: 64 IN | DIASTOLIC BLOOD PRESSURE: 70 MMHG | SYSTOLIC BLOOD PRESSURE: 121 MMHG | HEART RATE: 88 BPM | WEIGHT: 113 LBS | RESPIRATION RATE: 16 BRPM | TEMPERATURE: 98.8 F | OXYGEN SATURATION: 98 %

## 2021-11-15 PROCEDURE — 6370000000 HC RX 637 (ALT 250 FOR IP): Performed by: PSYCHIATRY & NEUROLOGY

## 2021-11-15 PROCEDURE — 99239 HOSP IP/OBS DSCHRG MGMT >30: CPT | Performed by: NURSE PRACTITIONER

## 2021-11-15 PROCEDURE — 6370000000 HC RX 637 (ALT 250 FOR IP): Performed by: NURSE PRACTITIONER

## 2021-11-15 RX ORDER — NICOTINE 21 MG/24HR
1 PATCH, TRANSDERMAL 24 HOURS TRANSDERMAL DAILY
Qty: 30 PATCH | Refills: 3 | Status: ON HOLD
Start: 2021-11-16 | End: 2021-12-09 | Stop reason: HOSPADM

## 2021-11-15 RX ADMIN — FINASTERIDE 5 MG: 5 TABLET, FILM COATED ORAL at 09:46

## 2021-11-15 RX ADMIN — LISINOPRIL 10 MG: 10 TABLET ORAL at 10:14

## 2021-11-15 RX ADMIN — HYDROXYZINE HYDROCHLORIDE 50 MG: 10 TABLET ORAL at 09:47

## 2021-11-15 RX ADMIN — RIVAROXABAN 20 MG: 20 TABLET, FILM COATED ORAL at 09:46

## 2021-11-15 RX ADMIN — LURASIDONE HYDROCHLORIDE 20 MG: 20 TABLET, FILM COATED ORAL at 09:47

## 2021-11-15 RX ADMIN — VENLAFAXINE HYDROCHLORIDE 75 MG: 75 CAPSULE, EXTENDED RELEASE ORAL at 09:46

## 2021-11-15 RX ADMIN — LEVETIRACETAM 500 MG: 500 TABLET, FILM COATED ORAL at 09:46

## 2021-11-15 NOTE — CARE COORDINATION
In order to ensure appropriate transition and discharge planning is in place, the following documents have been transmitted to [facility], as the new outpatient provider:     · The d/c diagnosis was transmitted to the next care provider  · The reason for hospitalization was transmitted to the next care provider  · The d/c medications (dosage and indication) were transmitted to the next care provider   · The continuing care plan was transmitted to the next care provider

## 2021-11-15 NOTE — GROUP NOTE
Group Therapy Note    Date: 11/15/2021    Group Start Time: 1000  Group End Time: 1050  Group Topic: Psychoeducation    [facility]    [facility]allos, [facility]        Group Therapy Note      Number of participants: 9  Type of group: Psychoeducation  Mode of intervention: Education, Support, Socialization, Exploration, Clarifying, Problem-solving, and Activity  Topic: Developing Self Awareness  Objective: Pt will identify beliefs, principles, values, motivation, and what they want out of recovery and share 1 way to implement these traits in recovery.        Patient's Goal:  \"Work on anger\"     Notes:  Pt interactive during group identifying beliefs, principles, values, motivation and what he wants out of recovery. Pt shared one thing he is not willing to compromise on is working on his anger. Shared he expresses his anger by swearing at everyone but shared that therapy does help him and wants to express his anger in a more appropriate manner. Accepting of support and feedback given by peers. Status After Intervention:  Improved    Participation Level:  Active Listener and Interactive    Participation Quality: Appropriate, Attentive, Sharing and Supportive      Speech:  normal      Thought Process/Content: Logical      Affective Functioning: Congruent      Mood: euthymic      Level of consciousness:  Alert, Oriented x4 and Attentive      Response to Learning: Able to verbalize current knowledge/experience, Able to verbalize/acknowledge new learning, Able to retain information, Capable of insight, Able to change behavior and Progressing to goal      Endings: None Reported    Modes of Intervention: Education, Support, Socialization, Exploration, Clarifying, Problem-solving and Activity
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
## 2021-11-15 NOTE — PROGRESS NOTES
[facility]  Discharge Note    Pt discharged with followings belongings:   Dentures: Uppers, Lowers  Vision - Corrective Lenses: None  Hearing Aid: None  Jewelry: None  Body Piercings Removed: N/A  Clothing: Footwear, Other (Comment), Shirt, Pants, Sweater (Hat)  Were All Patient Medications Collected?: Not Applicable  Other Valuables: [address] sent home with patient or returned to patient. Patient education on aftercare instructions: yes  Information faxed to n/a by n/a  at 1:22 PM .Patient verbalize understanding of AVS:  yes.     Status EXAM upon discharge:  Status and Exam  Normal: No  Facial Expression: Brightened  Affect: Congruent  Level of Consciousness: Alert  Mood:Normal: No  Mood: Labile, Irritable  Motor Activity:Normal: Yes  Motor Activity: Agitated  Interview Behavior: Cooperative  Preception: [place] to Person, [place] to Time, [place] to Place, [place] to Situation  Attention:Normal: No  Attention: Distractible  Thought Processes: Circumstantial  Thought Content:Normal: No  Thought Content: Preoccupations  Hallucinations: None  Delusions: No  Memory:Normal: Yes  Memory: Poor Recent  Insight and Judgment: No  Insight and Judgment: Poor Judgment, Unrealistic  Present Suicidal Ideation: No  Present Homicidal Ideation: No      Metabolic Screening:    Lab Results   Component Value Date    LABA1C 5.8 05/19/2018       Lab Results   Component Value Date    CHOL 133 05/19/2018    CHOL 148 07/06/2012     Lab Results   Component Value Date    TRIG 42 05/19/2018    TRIG 63 07/06/2012     Lab Results   Component Value Date    HDL 54 05/19/2018    HDL 65.0 07/06/2012     No components found for: [facility] EVALUATION AND TREATMENT [place]  Lab Results   Component Value Date    LABVLDL 8 05/19/2018       Mariah Ruiz RN

## 2021-11-15 NOTE — PLAN OF CARE
Problem: Suicide risk  Goal: Provide patient with safe environment  Description: Provide patient with safe environment  Outcome: Ongoing     Problem: Depressive Behavior With or Without Suicide Precautions:  Goal: Able to verbalize acceptance of life and situations over which he or she has no control  Description: Able to verbalize acceptance of life and situations over which he or she has no control  11/15/2021 1013 by Lora Valdez RN  Outcome: Ongoing     Problem: Depressive Behavior With or Without Suicide Precautions:  Goal: Ability to disclose and discuss suicidal ideas will improve  Description: Ability to disclose and discuss suicidal ideas will improve  11/15/2021 1013 by Lora Valdez RN  Outcome: Ongoing     Problem: Depressive Behavior With or Without Suicide Precautions:  Goal: Absence of self-harm  Description: Absence of self-harm  11/15/2021 1013 by Lora Valdez RN  Outcome: Ongoing    Patient denies suicidal ideations, homicidal ideations, and hallucinations. Mood is irritable. He refused vital signs this morning and proceeded to yell and curse at staff. Patient pointing his finger in this nurses face, yelling \"I want my fucking klonopin! Where did this doctor get his license? \" Patient educated on behavioral expectations. Provided time in room to deescalate.

## 2021-11-15 NOTE — PLAN OF CARE
Problem: Depressive Behavior With or Without Suicide Precautions:  Goal: Ability to disclose and discuss suicidal ideas will improve  Description: Ability to disclose and discuss suicidal ideas will improve  11/14/2021 2226 by Ben Oakes RN  Outcome: Met This Shift     Problem: Depressive Behavior With or Without Suicide Precautions:  Goal: Absence of self-harm  Description: Absence of self-harm  Outcome: Met This Shift     Problem: Depressive Behavior With or Without Suicide Precautions:  Goal: Able to verbalize acceptance of life and situations over which he or she has no control  Description: Able to verbalize acceptance of life and situations over which he or she has no control  Outcome: Ongoing     Problem: Depressive Behavior With or Without Suicide Precautions:  Goal: Able to verbalize and/or display a decrease in depressive symptoms  Description: Able to verbalize and/or display a decrease in depressive symptoms  11/14/2021 2226 by Ben Oakes RN  Outcome: Ongoing    Patient denies SI/HI and hallucinations. Initially, patient was bright, pleasant, and cooperative. Patient then became agitated and loud, stating that he did not get the right medications. Patient was verbally aggressive and threatening. Patient stated \"I hate the fucking doctor he won't give me my drugs, I'm going to leave here and smoke crack. \" Patient was able to be deescalated. Patient takes prescribed medications with encouragement. Will continue to offer support and comfort to patient.

## 2021-11-15 NOTE — CARE COORDINATION
Sw spoke with this pt regarding his discharge. Pt was bright, friendly, and laughing with staff and peers. Pt to discharge home to his apartment. Pt states that he is being evicted from his apartment on 11/22/21. Sw printed out the pt apartments in this pt's price range, and gave him contact numbers. Pt denied SI/ HI/ hallucinations/ delusions. Appointments scheduled at [facility].

## 2021-11-19 ENCOUNTER — HOSPITAL ENCOUNTER (EMERGENCY)
Age: 66
Discharge: PSYCHIATRIC OR SUBSTANCE USE FACILITY | End: 2021-11-21
Attending: EMERGENCY MEDICINE
Payer: MEDICARE

## 2021-11-19 DIAGNOSIS — R45.851 SUICIDAL IDEATION: Primary | ICD-10-CM

## 2021-11-19 LAB
ACETAMINOPHEN LEVEL: <5 MCG/ML (ref 10–30)
ALBUMIN SERPL-MCNC: 4.5 G/DL (ref 3.5–5.2)
ALP BLD-CCNC: 60 U/L (ref 40–129)
ALT SERPL-CCNC: 15 U/L (ref 0–40)
AMPHETAMINE SCREEN, URINE: NOT DETECTED
ANION GAP SERPL CALCULATED.3IONS-SCNC: 10 MMOL/L (ref 7–16)
AST SERPL-CCNC: 16 U/L (ref 0–39)
BARBITURATE SCREEN URINE: NOT DETECTED
BASOPHILS ABSOLUTE: 0.03 E9/L (ref 0–0.2)
BASOPHILS RELATIVE PERCENT: 0.4 % (ref 0–2)
BENZODIAZEPINE SCREEN, URINE: POSITIVE
BILIRUB SERPL-MCNC: 0.3 MG/DL (ref 0–1.2)
BUN BLDV-MCNC: 14 MG/DL (ref 6–23)
CALCIUM SERPL-MCNC: 8.9 MG/DL (ref 8.6–10.2)
CANNABINOID SCREEN URINE: POSITIVE
CHLORIDE BLD-SCNC: 101 MMOL/L (ref 98–107)
CO2: 25 MMOL/L (ref 22–29)
COCAINE METABOLITE SCREEN URINE: POSITIVE
CREAT SERPL-MCNC: 1 MG/DL (ref 0.7–1.2)
EOSINOPHILS ABSOLUTE: 0.23 E9/L (ref 0.05–0.5)
EOSINOPHILS RELATIVE PERCENT: 2.8 % (ref 0–6)
ETHANOL: <10 MG/DL (ref 0–0.08)
FENTANYL SCREEN, URINE: NOT DETECTED
GFR AFRICAN AMERICAN: >60
GFR NON-AFRICAN AMERICAN: >60 ML/MIN/1.73
GLUCOSE BLD-MCNC: 107 MG/DL (ref 74–99)
HCT VFR BLD CALC: 37.2 % (ref 37–54)
HEMOGLOBIN: 12.6 G/DL (ref 12.5–16.5)
IMMATURE GRANULOCYTES #: 0.03 E9/L
IMMATURE GRANULOCYTES %: 0.4 % (ref 0–5)
INFLUENZA A: NOT DETECTED
INFLUENZA B: NOT DETECTED
LYMPHOCYTES ABSOLUTE: 1.33 E9/L (ref 1.5–4)
LYMPHOCYTES RELATIVE PERCENT: 16.3 % (ref 20–42)
Lab: ABNORMAL
MCH RBC QN AUTO: 31.3 PG (ref 26–35)
MCHC RBC AUTO-ENTMCNC: 33.9 % (ref 32–34.5)
MCV RBC AUTO: 92.3 FL (ref 80–99.9)
METHADONE SCREEN, URINE: NOT DETECTED
MONOCYTES ABSOLUTE: 0.67 E9/L (ref 0.1–0.95)
MONOCYTES RELATIVE PERCENT: 8.2 % (ref 2–12)
NEUTROPHILS ABSOLUTE: 5.88 E9/L (ref 1.8–7.3)
NEUTROPHILS RELATIVE PERCENT: 71.9 % (ref 43–80)
OPIATE SCREEN URINE: NOT DETECTED
OXYCODONE URINE: NOT DETECTED
PDW BLD-RTO: 13.3 FL (ref 11.5–15)
PHENCYCLIDINE SCREEN URINE: NOT DETECTED
PLATELET # BLD: 169 E9/L (ref 130–450)
PMV BLD AUTO: 11.7 FL (ref 7–12)
POTASSIUM SERPL-SCNC: 4.1 MMOL/L (ref 3.5–5)
RBC # BLD: 4.03 E12/L (ref 3.8–5.8)
SALICYLATE, SERUM: 1.6 MG/DL (ref 0–30)
SARS-COV-2 RNA, RT PCR: NOT DETECTED
SODIUM BLD-SCNC: 136 MMOL/L (ref 132–146)
TOTAL PROTEIN: 6.9 G/DL (ref 6.4–8.3)
TRICYCLIC ANTIDEPRESSANTS SCREEN SERUM: NEGATIVE NG/ML
WBC # BLD: 8.2 E9/L (ref 4.5–11.5)

## 2021-11-19 PROCEDURE — 6360000002 HC RX W HCPCS: Performed by: EMERGENCY MEDICINE

## 2021-11-19 PROCEDURE — 94664 DEMO&/EVAL PT USE INHALER: CPT

## 2021-11-19 PROCEDURE — 82077 ASSAY SPEC XCP UR&BREATH IA: CPT

## 2021-11-19 PROCEDURE — 80307 DRUG TEST PRSMV CHEM ANLYZR: CPT

## 2021-11-19 PROCEDURE — 85025 COMPLETE CBC W/AUTO DIFF WBC: CPT

## 2021-11-19 PROCEDURE — 36415 COLL VENOUS BLD VENIPUNCTURE: CPT

## 2021-11-19 PROCEDURE — 93005 ELECTROCARDIOGRAM TRACING: CPT | Performed by: EMERGENCY MEDICINE

## 2021-11-19 PROCEDURE — 87636 SARSCOV2 & INF A&B AMP PRB: CPT

## 2021-11-19 PROCEDURE — 80053 COMPREHEN METABOLIC PANEL: CPT

## 2021-11-19 PROCEDURE — 80179 DRUG ASSAY SALICYLATE: CPT

## 2021-11-19 PROCEDURE — 99285 EMERGENCY DEPT VISIT HI MDM: CPT

## 2021-11-19 PROCEDURE — 80143 DRUG ASSAY ACETAMINOPHEN: CPT

## 2021-11-19 RX ORDER — ALBUTEROL SULFATE 2.5 MG/3ML
2.5 SOLUTION RESPIRATORY (INHALATION) ONCE
Status: COMPLETED | OUTPATIENT
Start: 2021-11-19 | End: 2021-11-19

## 2021-11-19 RX ADMIN — ALBUTEROL SULFATE 2.5 MG: 2.5 SOLUTION RESPIRATORY (INHALATION) at 23:44

## 2021-11-19 ASSESSMENT — PAIN DESCRIPTION - LOCATION: LOCATION: CHEST

## 2021-11-19 ASSESSMENT — PAIN SCALES - GENERAL: PAINLEVEL_OUTOF10: 5

## 2021-11-20 LAB — TOTAL CK: 163 U/L (ref 20–200)

## 2021-11-20 PROCEDURE — 6360000002 HC RX W HCPCS: Performed by: EMERGENCY MEDICINE

## 2021-11-20 PROCEDURE — 6370000000 HC RX 637 (ALT 250 FOR IP): Performed by: EMERGENCY MEDICINE

## 2021-11-20 PROCEDURE — 96372 THER/PROPH/DIAG INJ SC/IM: CPT

## 2021-11-20 PROCEDURE — 82550 ASSAY OF CK (CPK): CPT

## 2021-11-20 RX ORDER — CLONAZEPAM 1 MG/1
1 TABLET ORAL 3 TIMES DAILY
Status: ON HOLD | COMMUNITY
Start: 2021-11-19 | End: 2021-12-07

## 2021-11-20 RX ORDER — LORAZEPAM 2 MG/ML
2 INJECTION INTRAMUSCULAR ONCE
Status: COMPLETED | OUTPATIENT
Start: 2021-11-20 | End: 2021-11-20

## 2021-11-20 RX ORDER — CLONAZEPAM 0.5 MG/1
1 TABLET ORAL 3 TIMES DAILY
Status: DISCONTINUED | OUTPATIENT
Start: 2021-11-20 | End: 2021-11-21 | Stop reason: HOSPADM

## 2021-11-20 RX ORDER — TRAZODONE HYDROCHLORIDE 50 MG/1
50 TABLET ORAL NIGHTLY PRN
Status: DISCONTINUED | OUTPATIENT
Start: 2021-11-20 | End: 2021-11-21 | Stop reason: HOSPADM

## 2021-11-20 RX ORDER — FINASTERIDE 5 MG/1
5 TABLET, FILM COATED ORAL DAILY
Status: DISCONTINUED | OUTPATIENT
Start: 2021-11-20 | End: 2021-11-21 | Stop reason: HOSPADM

## 2021-11-20 RX ORDER — VENLAFAXINE HYDROCHLORIDE 75 MG/1
75 CAPSULE, EXTENDED RELEASE ORAL
Status: DISCONTINUED | OUTPATIENT
Start: 2021-11-21 | End: 2021-11-21 | Stop reason: HOSPADM

## 2021-11-20 RX ORDER — ATORVASTATIN CALCIUM 40 MG/1
40 TABLET, FILM COATED ORAL DAILY
Status: DISCONTINUED | OUTPATIENT
Start: 2021-11-20 | End: 2021-11-21 | Stop reason: HOSPADM

## 2021-11-20 RX ORDER — LEVETIRACETAM 500 MG/1
500 TABLET ORAL 2 TIMES DAILY
Status: DISCONTINUED | OUTPATIENT
Start: 2021-11-20 | End: 2021-11-21 | Stop reason: HOSPADM

## 2021-11-20 RX ORDER — METHIMAZOLE 5 MG/1
5 TABLET ORAL 3 TIMES DAILY
Status: DISCONTINUED | OUTPATIENT
Start: 2021-11-20 | End: 2021-11-21 | Stop reason: HOSPADM

## 2021-11-20 RX ORDER — LISINOPRIL 10 MG/1
10 TABLET ORAL DAILY
Status: DISCONTINUED | OUTPATIENT
Start: 2021-11-20 | End: 2021-11-21 | Stop reason: HOSPADM

## 2021-11-20 RX ADMIN — ATORVASTATIN CALCIUM 40 MG: 40 TABLET, FILM COATED ORAL at 13:42

## 2021-11-20 RX ADMIN — LEVETIRACETAM 500 MG: 500 TABLET, FILM COATED ORAL at 19:54

## 2021-11-20 RX ADMIN — CLONAZEPAM 1 MG: 0.5 TABLET ORAL at 13:42

## 2021-11-20 RX ADMIN — LEVETIRACETAM 500 MG: 500 TABLET, FILM COATED ORAL at 13:41

## 2021-11-20 RX ADMIN — CLONAZEPAM 1 MG: 0.5 TABLET ORAL at 19:54

## 2021-11-20 RX ADMIN — LORAZEPAM 2 MG: 2 INJECTION INTRAMUSCULAR; INTRAVENOUS at 02:28

## 2021-11-20 RX ADMIN — LISINOPRIL 10 MG: 10 TABLET ORAL at 13:41

## 2021-11-20 NOTE — ED NOTES
Bed: HE  Expected date:   Expected time:   Means of arrival:   Comments:     Can Falcon RN  11/19/21 2128

## 2021-11-20 NOTE — ED NOTES
Pt stating \" I want to leave, I have court on Monday and if I don't attend Ill have a warrant out for my arrest.\" This RN instructed that pt. Made SI threats and social work will be talking to him.  Pt not allowed to leave      Yolette Gutierrez RN  11/20/21 6230
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
## 2021-11-20 NOTE — ED PROVIDER NOTES
HPI:  11/19/21, Time: 11:24 PM HALEIGH Saavedra is a 73 y.o. male presenting to the ED for suicidal ideation. Patient states he is suicidal because he relapsed on crack. Denies any other ingestion. He wants to stab himself in the heart. Denies attempt. Denies any hallucinations. Denies any other symptoms or complaints. Review of Systems:   A complete review of systems was performed and pertinent positives and negatives are stated within HPI, all other systems reviewed and are negative.          --------------------------------------------- PAST HISTORY ---------------------------------------------  Past Medical History:  has a past medical history of Anxiety, Arthritis, Asthma, Bipolar 1 disorder ([address] Chronic combined systolic and diastolic CHF (congestive heart failure) ([address] COPD (chronic obstructive pulmonary disease) ([address] Depression, Diabetes mellitus ([address] GERD (gastroesophageal reflux disease), Hepatitis C, Hypertension, Hyperthyroidism, Hypothyroidism due to medication, Mass of testicle, Mesothelioma ([address] Neuromuscular disorder ([address] Other disorders of kidney and ureter, Paroxysmal A-fib ([address] Peptic ulcer, Prostate enlargement, Pulmonary embolism ([address] Seizures ([address] and Thyroid disease. Past Surgical History:  has a past surgical history that includes Appendectomy; Lithotripsy; Hemorrhoid surgery; Bladder surgery; Endoscopy, colon, diagnostic (11/13/2015); Abdomen surgery; Colonoscopy; Cardiac surgery; vascular surgery; and Cardiac catheterization (05/21/2018). Social History:  reports that he quit smoking about 11 years ago. His smoking use included cigarettes. He quit after 10.00 years of use. He quit smokeless tobacco use about 3 years ago. His smokeless tobacco use included chew. He reports current drug use. Drug: Cocaine. He reports that he does not drink alcohol.     Family History: family history includes Cancer in his father, maternal grandfather, and mother; (L) 20.0 - 42.0 %    Monocytes % 8.2 2.0 - 12.0 %    Eosinophils % 2.8 0.0 - 6.0 %    Basophils % 0.4 0.0 - 2.0 %    Neutrophils Absolute 5.88 1.80 - 7.30 E9/L    Immature Granulocytes # 0.03 E9/L    Lymphocytes Absolute 1.33 (L) 1.50 - 4.00 E9/L    Monocytes Absolute 0.67 0.10 - 0.95 E9/L    Eosinophils Absolute 0.23 0.05 - 0.50 E9/L    Basophils Absolute 0.03 0.00 - 0.20 E9/L   Serum Drug Screen   Result Value Ref Range    Ethanol Lvl <10 mg/dL    Acetaminophen Level <5.0 (L) 10.0 - 29.6 mcg/mL    Salicylate, Serum 1.6 0.0 - 30.0 mg/dL    TCA Scrn NEGATIVE Cutoff:300 ng/mL   Urine Drug Screen   Result Value Ref Range    Amphetamine Screen, Urine NOT DETECTED Negative <1000 ng/mL    Barbiturate Screen, Ur NOT DETECTED Negative < 200 ng/mL    Benzodiazepine Screen, Urine POSITIVE (A) Negative < 200 ng/mL    Cannabinoid Scrn, Ur POSITIVE (A) Negative < 50ng/mL    Cocaine Metabolite Screen, Urine POSITIVE (A) Negative < 300 ng/mL    Opiate Scrn, Ur NOT DETECTED Negative < 300ng/mL    PCP Screen, Urine NOT DETECTED Negative < 25 ng/mL    Methadone Screen, Urine NOT DETECTED Negative <300 ng/mL    Oxycodone Urine NOT DETECTED Negative <100 ng/mL    FENTANYL SCREEN, URINE NOT DETECTED Negative <1 ng/mL    Drug Screen Comment: see below        RADIOLOGY:  Interpreted by Radiologist.  No orders to display       ------------------------- NURSING NOTES AND VITALS REVIEWED ---------------------------   The nursing notes within the ED encounter and vital signs as below have been reviewed.    /85   Pulse 89   Temp 98 °F (36.7 °C)   Resp 18   SpO2 97%   Oxygen Saturation Interpretation: Normal      ---------------------------------------------------PHYSICAL EXAM--------------------------------------      Constitutional/General: Alert and oriented x3, well appearing, non toxic in NAD  Head: Normocephalic and atraumatic  Eyes: PERRL, EOMI  Mouth: Oropharynx clear, handling secretions, no trismus  Neck: Supple, full ROM, Pulmonary: Lungs clear to auscultation bilaterally, no wheezes, rales, or rhonchi. Not in respiratory distress  Cardiovascular:  Regular rate and rhythm, no murmurs, gallops, or rubs. 2+ distal pulses  Abdomen: Soft, non tender, non distended,   Extremities: Moves all extremities x 4. Warm and well perfused  Skin: warm and dry without rash  Neurologic: GCS 15,  Psych: Normal Affect      ------------------------------ ED COURSE/MEDICAL DECISION MAKING----------------------  Medications   albuterol (PROVENTIL) nebulizer solution 2.5 mg (2.5 mg Nebulization Given 11/19/21 6565)       EKG: Sinus, rate 58, no STEMI      ED COURSE:       Medical Decision Making:    Medically clear    Counseling: The emergency provider has spoken with the patient and discussed todays results, in addition to providing specific details for the plan of care and counseling regarding the diagnosis and prognosis. Questions are answered at this time and they are agreeable with the plan.      --------------------------------- IMPRESSION AND DISPOSITION ---------------------------------    IMPRESSION  1. Suicidal ideation        DISPOSITION  Disposition: Admit to mental health unit - medically cleared for admission  Patient condition is stable      NOTE: This report was transcribed using voice recognition software.  Every effort was made to ensure accuracy; however, inadvertent computerized transcription errors may be present       Elvis Gorman MD  11/20/21 8417

## 2021-11-20 NOTE — ED NOTES
Patient is worried about his court date and his things in his apartment. Pt is aware that he has to go and speak with a physiatrist. Pt is calm and cooperative. RN ordered pt coffee.       Milagros Rodrigues RN  11/20/21 1773

## 2021-11-21 VITALS
HEART RATE: 81 BPM | DIASTOLIC BLOOD PRESSURE: 50 MMHG | SYSTOLIC BLOOD PRESSURE: 120 MMHG | RESPIRATION RATE: 16 BRPM | TEMPERATURE: 98.1 F | OXYGEN SATURATION: 96 %

## 2021-11-21 NOTE — ED NOTES
Report called to [facility] RN at [facility] [phone]. Pt has been calm and cooperative. Physician ambulance here within 2-3 hrs      Louise Medrano RN  11/21/21 7446

## 2021-11-21 NOTE — ED NOTES
Bed: [facility]  Expected date:   Expected time:   Means of arrival:   Comments:  [address], RN  11/20/21 2120

## 2021-11-21 NOTE — ED NOTES
Resting quietly      Claudia Calderon, [facility]  11/21/21 6462

## 2021-11-21 NOTE — ED NOTES
Patient has been asking for ativan \"shots\" to help him sleep.  Refused his trazodone     Charl Gosselin, RN  11/20/21 2119

## 2021-11-21 NOTE — ED NOTES
Pt move to private room 29 due to being conformational with pt in next bed for safety      Maya Rosario RN  11/20/21 1100

## 2021-11-21 NOTE — ED NOTES
Spoke to [employer] from [employer] and was informed Pt has been accepted to [facility] by Dr. Hakeem Craven. Pt will be going to room 110B. SW informed [facility] RN to call nurse to nurse at [phone]. Transportation has been arranged with [facility] with an ETA of 2-3 hours. Packet is completed and ready for transport.       DAVID Ponce, [place]  11/21/21 2051

## 2021-11-22 LAB
EKG ATRIAL RATE: 81 BPM
EKG P AXIS: 5 DEGREES
EKG P-R INTERVAL: 138 MS
EKG Q-T INTERVAL: 366 MS
EKG QRS DURATION: 70 MS
EKG QTC CALCULATION (BAZETT): 425 MS
EKG R AXIS: 95 DEGREES
EKG T AXIS: 19 DEGREES
EKG VENTRICULAR RATE: 81 BPM

## 2021-11-22 PROCEDURE — 93010 ELECTROCARDIOGRAM REPORT: CPT | Performed by: INTERNAL MEDICINE

## 2021-12-02 ENCOUNTER — HOSPITAL ENCOUNTER (EMERGENCY)
Age: 66
Discharge: HOME | End: 2021-12-02
Attending: EMERGENCY MEDICINE
Payer: MEDICARE

## 2021-12-02 ENCOUNTER — HOSPITAL ENCOUNTER (EMERGENCY)
Age: 66
Discharge: LEFT BEFORE DISCHARGE | End: 2021-12-02
Attending: EMERGENCY MEDICINE
Payer: MEDICARE

## 2021-12-02 ENCOUNTER — HOSPITAL ENCOUNTER (EMERGENCY)
Age: 66
Discharge: LEFT BEFORE DISCHARGE | End: 2021-12-02
Attending: STUDENT IN AN ORGANIZED HEALTH CARE EDUCATION/TRAINING PROGRAM
Payer: MEDICARE

## 2021-12-02 VITALS
WEIGHT: 133 LBS | SYSTOLIC BLOOD PRESSURE: 143 MMHG | BODY MASS INDEX: 23.57 KG/M2 | RESPIRATION RATE: 18 BRPM | OXYGEN SATURATION: 96 % | TEMPERATURE: 98.6 F | HEIGHT: 63 IN | DIASTOLIC BLOOD PRESSURE: 56 MMHG | HEART RATE: 104 BPM

## 2021-12-02 VITALS
BODY MASS INDEX: 19.29 KG/M2 | OXYGEN SATURATION: 98 % | HEIGHT: 64 IN | HEART RATE: 102 BPM | DIASTOLIC BLOOD PRESSURE: 68 MMHG | WEIGHT: 113 LBS | TEMPERATURE: 97.1 F | RESPIRATION RATE: 14 BRPM | SYSTOLIC BLOOD PRESSURE: 157 MMHG

## 2021-12-02 VITALS
TEMPERATURE: 97.1 F | BODY MASS INDEX: 24.8 KG/M2 | HEART RATE: 87 BPM | DIASTOLIC BLOOD PRESSURE: 62 MMHG | WEIGHT: 140 LBS | SYSTOLIC BLOOD PRESSURE: 147 MMHG | RESPIRATION RATE: 17 BRPM | OXYGEN SATURATION: 99 % | HEIGHT: 63 IN

## 2021-12-02 DIAGNOSIS — F44.5 PSEUDOSEIZURE: Primary | ICD-10-CM

## 2021-12-02 DIAGNOSIS — R56.9 SEIZURE-LIKE ACTIVITY (HCC): Primary | ICD-10-CM

## 2021-12-02 DIAGNOSIS — Z76.5 DRUG-SEEKING BEHAVIOR: ICD-10-CM

## 2021-12-02 LAB
EKG ATRIAL RATE: 100 BPM
EKG P-R INTERVAL: 122 MS
EKG Q-T INTERVAL: 346 MS
EKG QRS DURATION: 78 MS
EKG QTC CALCULATION (BAZETT): 446 MS
EKG R AXIS: 29 DEGREES
EKG T AXIS: 77 DEGREES
EKG VENTRICULAR RATE: 100 BPM

## 2021-12-02 PROCEDURE — 99283 EMERGENCY DEPT VISIT LOW MDM: CPT

## 2021-12-02 PROCEDURE — 93010 ELECTROCARDIOGRAM REPORT: CPT | Performed by: INTERNAL MEDICINE

## 2021-12-02 PROCEDURE — 99284 EMERGENCY DEPT VISIT MOD MDM: CPT

## 2021-12-02 PROCEDURE — 93005 ELECTROCARDIOGRAM TRACING: CPT | Performed by: EMERGENCY MEDICINE

## 2021-12-02 RX ORDER — LEVETIRACETAM 500 MG/1
2000 TABLET ORAL ONCE
Status: DISCONTINUED | OUTPATIENT
Start: 2021-12-02 | End: 2021-12-02 | Stop reason: HOSPADM

## 2021-12-02 NOTE — ED PROVIDER NOTES
HPI:  12/2/21,   Time: 1:03 PM HALEIGH Talbot is a 73 y.o. male presenting to the ED for pseudoseziure, beginning unk ago. The complaint has been intermittent, mild in severity, and worsened by nothing. Just dc from er for same, wanted ativan [facility] visit, didn't get it, called ems, they gave him im ativan. Pt no c/o currently, no fever/chills/sweats/n/v/d/abd pain/cough/congestion. Review of Systems:   Pertinent positives and negatives are stated within HPI, all other systems reviewed and are negative.          --------------------------------------------- PAST HISTORY ---------------------------------------------  Past Medical History:  has a past medical history of Anxiety, Arthritis, Asthma, Bipolar 1 disorder ([address] Chronic combined systolic and diastolic CHF (congestive heart failure) ([address] COPD (chronic obstructive pulmonary disease) ([address] Depression, Diabetes mellitus ([address] GERD (gastroesophageal reflux disease), Hepatitis C, Hypertension, Hyperthyroidism, Hypothyroidism due to medication, Mass of testicle, Mesothelioma ([address] Neuromuscular disorder ([address] Other disorders of kidney and ureter, Paroxysmal A-fib ([address] Peptic ulcer, Prostate enlargement, Pulmonary embolism ([address] Seizures ([address] and Thyroid disease. Past Surgical History:  has a past surgical history that includes Appendectomy; Lithotripsy; Hemorrhoid surgery; Bladder surgery; Endoscopy, colon, diagnostic (11/13/2015); Abdomen surgery; Colonoscopy; Cardiac surgery; vascular surgery; and Cardiac catheterization (05/21/2018). Social History:  reports that he quit smoking about 11 years ago. His smoking use included cigarettes. He quit after 10.00 years of use. He quit smokeless tobacco use about 3 years ago. His smokeless tobacco use included chew. He reports current drug use. Drug: Cocaine. He reports that he does not drink alcohol.     Family History: family history includes Cancer in his father, maternal grandfather, and mother; Diabetes in his father, maternal aunt, maternal aunt, maternal aunt, maternal grandmother, and mother; Heart Disease in his paternal grandfather and paternal grandmother; Heart Failure in his paternal grandfather and paternal grandmother. The patients home medications have been reviewed. Allergies: Codeine, Dye [iodides], Ketorolac tromethamine, Peanuts [peanut oil], and Shellfish-derived products        ---------------------------------------------------PHYSICAL EXAM--------------------------------------    Constitutional/General: Alert and oriented x3, well appearing, non toxic in NAD  Head: Normocephalic and atraumatic  Eyes: PERRL, EOMI, conjunctive normal, sclera non icteric  Mouth: Oropharynx clear, handling secretions,  Neck: Supple, full ROM, non tender to palpation in the midline, no stridor, no crepitus, no meningeal signs  Respiratory:. Not in respiratory distress  Cardiovascular:   2+ distal pulses  Chest: No chest wall tenderness  GI:  Abdomen Soft, Non tender, Non distended. .   Musculoskeletal: Moves all extremities x 4. Warm and well perfused, no clubbing, cyanosis, or edema. Capillary refill <3 seconds  Integument: skin warm and dry. No rashes. Lymphatic: no lymphadenopathy noted  Neurologic: GCS 15, no focal deficits, symmetric strength 5/5 in the upper and lower extremities bilaterally, sensory nml, cn2-12 intact  Psychiatric: Normal Affect    -------------------------------------------------- RESULTS -------------------------------------------------  I have personally reviewed all laboratory and imaging results for this patient. Results are listed below. LABS:  No results found for this visit on 12/02/21. RADIOLOGY:  Interpreted by Radiologist.  No orders to display       EKG: This EKG is signed and interpreted by the EP.     Time:   Rate:   Rhythm:   Interpretation:   Comparison:       ------------------------- NURSING NOTES AND VITALS REVIEWED ---------------------------   The nursing notes within the ED encounter and vital signs as below have been reviewed by myself. BP (!) 147/62   Pulse 87   Temp 97.1 °F (36.2 °C)   Resp 17   Ht 5' 3\" (1.6 m)   Wt 140 lb (63.5 kg)   SpO2 99%   BMI 24.80 kg/m²   Oxygen Saturation Interpretation: Normal    The patients available past medical records and past encounters were reviewed. ------------------------------ ED COURSE/MEDICAL DECISION MAKING----------------------  Medications - No data to display      ED COURSE:       Medical Decision Making:    Pt just dc, sig psych overlay, requesting ativan, got by ems, wanted to leave after getting ativan, refuses w/u      This patient's ED course included: a personal history and physicial examination    This patient has remained hemodynamically stable during their ED course. Counseling: The emergency provider has spoken with the patient and discussed todays results, in addition to providing specific details for the plan of care and counseling regarding the diagnosis and prognosis. Questions are answered at this time and they are agreeable with the plan.       --------------------------------- IMPRESSION AND DISPOSITION ---------------------------------    IMPRESSION  1. Pseudoseizure        DISPOSITION  Disposition: Discharge to home  Patient condition is stable    NOTE: This report was transcribed using voice recognition software.  Every effort was made to ensure accuracy; however, inadvertent computerized transcription errors may be present        Juana Davidson MD  12/02/21 6827

## 2021-12-02 NOTE — ED PROVIDER NOTES
Department of Emergency Medicine   ED  Provider Note  Admit Date/RoomTime: 12/2/2021  5:31 PM  ED Room: 05/05          History of Present Illness:  12/2/21, Time: 6:26 PM EST  Chief Complaint   Patient presents with    Seizures     Pt with h/o seizures and breakthrough seizures has been under increased stress lately and had 3 seizures today. Seen at [facility] today for this       Saba Erickson is a 73 y.o. male presenting to the ED for possible seizure. The patient states he has past medical history of seizures and takes Keppra. He has had 3-4 seizures today. He describes them as shaking of his entire body without loss of consciousness. He is able to converse during these episodes. He has not fallen or hit his head. No headache, numbness, tingling or weakness. The patient states he was seen earlier today at [facility] and given IM Ativan which resolved his seizures. He is on Keppra at baseline and does not feel he has missed this medication. He denies any chest pain, shortness of breath, abdominal pain, nausea, vomiting, fevers, or other complaints at this time. The patient is on Xarelto for pulmonary embolism and atrial fibrillation and also endorses compliance with this medication. He feels his seizures are more frequent currently has he has noted increased stress. He recently had a significant other passed away. Denies suicidal or homicidal ideation.     Review of Systems:   Constitutional symptoms: Denies fever  Eyes: Denies eye pain  Ears, Nose, Mouth, Throat: Denies ear pain  Cardiovascular: Denies chest pain  Respiratory: Denies shortness of breath  Gastrointestinal: Denies blood per rectum  Genitourinary: Denies hematuria  Skin: Denies rashes  Neurological: Positive for full body shaking and possible seizure  Musculoskeletal: Denies extremity pain    --------------------------------------------- PAST HISTORY ---------------------------------------------  Past Medical History:  has a past medical history of Anxiety, Arthritis, Asthma, Bipolar 1 disorder ([facility] Chronic combined systolic and diastolic CHF (congestive heart failure) ([facility] COPD (chronic obstructive pulmonary disease) ([facility] Depression, Diabetes mellitus ([facility] GERD (gastroesophageal reflux disease), Hepatitis C, Hypertension, Hyperthyroidism, Hypothyroidism due to medication, Mass of testicle, Mesothelioma ([facility] Neuromuscular disorder ([facility] Other disorders of kidney and ureter, Paroxysmal A-fib ([facility] Peptic ulcer, Prostate enlargement, Pulmonary embolism ([facility] Seizures ([facility] and Thyroid disease. Past Surgical History:  has a past surgical history that includes Appendectomy; Lithotripsy; Hemorrhoid surgery; Bladder surgery; Endoscopy, colon, diagnostic (11/13/2015); Abdomen surgery; Colonoscopy; Cardiac surgery; vascular surgery; and Cardiac catheterization (05/21/2018). Social History:  reports that he quit smoking about 11 years ago. His smoking use included cigarettes. He quit after 10.00 years of use. He quit smokeless tobacco use about 3 years ago. His smokeless tobacco use included chew. He reports current drug use. Drug: Cocaine. He reports that he does not drink alcohol. Family History: family history includes Cancer in his father, maternal grandfather, and mother; Diabetes in his father, maternal aunt, maternal aunt, maternal aunt, maternal grandmother, and mother; Heart Disease in his paternal grandfather and paternal grandmother; Heart Failure in his paternal grandfather and paternal grandmother. . Unless otherwise noted, family history is non contributory    The patients home medications have been reviewed.     Allergies: Codeine, Dye [iodides], Ketorolac tromethamine, Peanuts [peanut oil], and Shellfish-derived products    I have reviewed the past medical history, past surgical history, social history, and family history    ---------------------------------------------------PHYSICAL EXAM--------------------------------------    General: The patient is conversational and lying comfortably in bed. The patient is shaking his upper and lower extremities throughout my examination. With distraction this resolves. Head: Normocephalic and atraumatic. Eyes: Sclera non-icteric and no conjunctival injection  ENT: Nasal and oral membranes moist.  No tongue fasciculations  Neck: Trachea midline. No JVD  Respiratory: Lungs clear to auscultation bilaterally. Patient speaking in full sentences. Cardiovascular: Regular rate. No pedal edema. Gastrointestinal:  Abdomen is soft and nondistended. Bowel sounds present. There is no tenderness. There is no guarding or rebound. Musculoskeletal: No deformity  Skin: Skin warm and dry. No rashes. Neurologic: No gross motor deficits. No aphasia. Pupils are equal and reactive to light. Extraocular eye movements intact. Sensation intact bilaterally. Symmetric facies. Tongue protrudes midline. 5 out of 5 symmetric strength of the upper and lower extremities. Negative finger-to-nose testing. Alert and oriented. With distraction, the patient's shaking resolves. He is performing purposeful shaking of the upper and lower extremities. No tongue fasciculations. No meningismus. Psychiatric: Normal affect.    -------------------------------------------------- RESULTS -------------------------------------------------  I have personally reviewed all laboratory and imaging results for this patient. Results are listed below.      LABS: (Lab results interpreted by me)  No results found for this visit on 12/02/21.,     RADIOLOGY:  Interpreted by Radiologist unless otherwise specified  No orders to display       ------------------------- NURSING NOTES AND VITALS REVIEWED ---------------------------   The nursing notes within the ED encounter and vital signs as below have been reviewed by myself  BP (!) 143/56   Pulse 104   Temp 98.6 °F (37 °C) (Temporal)   Resp 18   Ht 5' 3\" (1.6 m)   Wt 133 lb (60.3 kg)   SpO2 96%   BMI 23.56 kg/m²     Oxygen Saturation Interpretation: Normal    The patients available past medical records and past encounters were reviewed. ------------------------------ ED COURSE/MEDICAL DECISION MAKING----------------------  Medications   levETIRAcetam (KEPPRA) tablet 2,000 mg (has no administration in time range)       Medical Decision Making: This is a 73 y.o. male presenting to the ED for possible seizure. On initial presentation the patient is found to be hypertensive, tachycardic, afebrile and saturating well on room air. The patient is originally documented as tachycardic however on my examination he is no longer tachycardic. Heart rate 94. Based on history and physical exam, we have a broad differential.  Patient does have history of seizures and according to the [facility] is on Keppra. I did consider possible seizure prior to arrival however, the patient is demonstrating evidence of pseudoseizure-like activity. He is purposely shaking and requesting Ativan. He is conversational throughout this and able to follow commands. He has no evidence of alcohol withdrawal or tongue fasciculations at this time. The patient denies history of trauma and has a nonfocal neurological exam.  Review of EMR shows the patient has been seen twice at [facility] today. He was exhibiting pseudoseizure-like behavior and drug-seeking behavior at that time. Of note, the patient also has history of pulmonary embolism but denies chest pain or shortness of breath. He endorses compliance with his anticoagulation. This cannot be further explored. We will obtain point-of-care glucose to ensure he is not hypoglycemic. Will obtain an EKG to assess for possible arrhythmia. The patient will be loaded with Keppra and kept on seizure and fall precautions.     The patient came out of his room prior to work-up initiation and stated his seizures had stopped. He eloped from the building. We did not discuss findings or further evaluation at that time. This patient's ED course included: a personal history and physicial examination    This patient has remained hemodynamically stable during their ED course. Consultations:  None    Oxygen Saturation Interpretation: 96 % on room air. Normal    Counseling:   I have spoken with the patient and discussed todays results, in addition to providing specific details for the plan of care and counseling regarding the diagnosis and prognosis. Questions are answered at this time and they are agreeable with the plan.     --------------------------------- IMPRESSION AND DISPOSITION ---------------------------------    IMPRESSION  1. Seizure-like activity ([address]        DISPOSITION  Disposition: Eloped  Patient condition is fair    I, Dr. Delia Izaguirre, am the primary provider of record    NOTE: This report was transcribed using voice recognition software.  Every effort was made to ensure accuracy; however, inadvertent computerized transcription errors may be present        Delia Izaguirre MD  12/02/21 6155

## 2021-12-02 NOTE — ED NOTES
Bed: 24  Expected date: 12/2/21  Expected time:   Means of arrival:   Comments:     Emily Singletary RN  12/02/21 7097

## 2021-12-02 NOTE — ED PROVIDER NOTES
Department of Emergency Medicine   ED  Provider Note  Admit Date/RoomTime: 12/2/2021  9:57 AM  ED Room: 24/24          History of Present Illness:  12/2/21, Time: 9:59 AM EST  Chief Complaint   Patient presents with    Seizures     Patient appeared to be having seizure like activity in main lobby, was emergently taken to ED for tx. Patient aroused easily to verbal stimuli and seizure stopped. Loli Andrade is a 73 y.o. male presenting to the ED for reported seizure activity in the lobby, beginning less than 20 minutes ago. Patient was reportedly visiting his mother when he was in our main lobby and appeared to have seizure activity. Rapid response was called for the lobby and patient was brought to ED. Patient is shaking all 4 extremities, stopped and open his eyes asking what had happened. Patient states he has seizures and Ativan is the only thing that will help. He states that he does not take any seizure medication but has several seizures per month. He denies any fall or head trauma. No recent fevers or other medical symptoms. Review of Systems:   Pertinent positives and negatives are stated within HPI, all other systems reviewed and are negative.        --------------------------------------------- PAST HISTORY ---------------------------------------------  Past Medical History:  has a past medical history of Anxiety, Arthritis, Asthma, Bipolar 1 disorder ([address] Chronic combined systolic and diastolic CHF (congestive heart failure) ([address] COPD (chronic obstructive pulmonary disease) ([address] Depression, Diabetes mellitus ([address] GERD (gastroesophageal reflux disease), Hepatitis C, Hypertension, Hyperthyroidism, Hypothyroidism due to medication, Mass of testicle, Mesothelioma ([address] Neuromuscular disorder ([address] Other disorders of kidney and ureter, Paroxysmal A-fib ([address] Peptic ulcer, Prostate enlargement, Pulmonary embolism ([address] Seizures ([address] and Thyroid disease.     Past Surgical History:  has a past surgical history that includes Appendectomy; Lithotripsy; Hemorrhoid surgery; Bladder surgery; Endoscopy, colon, diagnostic (11/13/2015); Abdomen surgery; Colonoscopy; Cardiac surgery; vascular surgery; and Cardiac catheterization (05/21/2018). Social History:  reports that he quit smoking about 11 years ago. His smoking use included cigarettes. He quit after 10.00 years of use. He quit smokeless tobacco use about 3 years ago. His smokeless tobacco use included chew. He reports current drug use. Drug: Cocaine. He reports that he does not drink alcohol. Family History: family history includes Cancer in his father, maternal grandfather, and mother; Diabetes in his father, maternal aunt, maternal aunt, maternal aunt, maternal grandmother, and mother; Heart Disease in his paternal grandfather and paternal grandmother; Heart Failure in his paternal grandfather and paternal grandmother. . Unless otherwise noted, family history is non contributory    The patients home medications have been reviewed. Allergies: Codeine, Dye [iodides], Ketorolac tromethamine, Peanuts [peanut oil], and Shellfish-derived products        ---------------------------------------------------PHYSICAL EXAM--------------------------------------    Constitutional/General: Alert and oriented x3  Head: Normocephalic and atraumatic  Eyes: PERRL, EOMI, sclera non icteric  Mouth: Oropharynx clear, handling secretions, no trismus, no asymmetry of the posterior oropharynx or uvular edema  Neck: Supple, full ROM, no stridor, no meningeal signs  Respiratory: Lungs clear to auscultation bilaterally, no wheezes, rales, or rhonchi. Not in respiratory distress  Cardiovascular:  Regular rate. Regular rhythm. No murmurs, no aortic murmurs, no gallops, or rubs. 2+ distal pulses. Equal extremity pulses. Chest: No chest wall tenderness  GI:  Abdomen Soft, Non tender, Non distended. No rebound, guarding, or rigidity.  No pulsatile masses. Musculoskeletal: Moves all extremities x 4. Warm and well perfused, no clubbing, cyanosis, or edema. Capillary refill <3 seconds  Integument: skin warm and dry. No rashes. Neurologic: GCS 15, no focal deficits, symmetric strength 5/5 in the upper and lower extremities bilaterally  Psychiatric: Normal Affect          -------------------------------------------------- RESULTS -------------------------------------------------  I have personally reviewed all laboratory and imaging results for this patient. Results are listed below. LABS: (Lab results interpreted by me)  Results for orders placed or performed during the hospital encounter of 21   EKG 12 Lead   Result Value Ref Range    Ventricular Rate 100 BPM    Atrial Rate 100 BPM    P-R Interval 122 ms    QRS Duration 78 ms    Q-T Interval 346 ms    QTc Calculation (Bazett) 446 ms    R Axis 29 degrees    T Axis 77 degrees   ,       RADIOLOGY:  Interpreted by Radiologist unless otherwise specified  No orders to display         EKG Interpretation  Interpreted by emergency department physician, Dr. Mariah Keller    Date of EK21  Time: 6959    Rhythm: normal sinus   Rate: 100  Axis: normal  Conduction: normal  ST Segments: no acute change  T Waves: no acute change    Clinical Impression: No findings suggestive of acute ischemia or injury        ------------------------- NURSING NOTES AND VITALS REVIEWED ---------------------------   The nursing notes within the ED encounter and vital signs as below have been reviewed by myself  BP (!) 157/68   Pulse 102   Temp 97.1 °F (36.2 °C)   Resp 14   Ht 5' 4\" (1.626 m)   Wt 113 lb (51.3 kg)   SpO2 98%   BMI 19.40 kg/m²     Oxygen Saturation Interpretation: Normal    The patients available past medical records and past encounters were reviewed.         ------------------------------ ED COURSE/MEDICAL DECISION MAKING----------------------  Medications - No data to display        The cardiac monitor revealed sinus rhythm with a heart rate in the 90s as interpreted by me. The cardiac monitor was ordered secondary to the patient's chest pain and to monitor for patient for dysrhythmia. CPT [id number]           Medical Decision Making:     I, Dr. Dante Salas, am the primary provider of record    81-year-old male presenting with reported seizure activity. Patient arrives appearing to have pseudoseizure. When I spoke to him he then opened his eyes and said \"what happened I have grand mal seizures and the only thing that works is Ativan. \"  Patient did not appear to have any post ictal symptoms, low suspicion that this was true seizure activity. Patient is hemodynamically stable, neurologically intact. Patient stated that he would need Ativan for his seizures. I stated that he is no longer seizing and this is not indicated. EKG was able to be obtained which shows no acute ischemia or injury. Patient then requested to sign out AMA. It also appears that patient's mother is currently at a nursing home and is not a patient in the hospital.  I feel that this event had a secondary gain to obtain medications. Nonetheless discussion with patient regarding the risks of leaving [address] with no further work-up including possible true seizure activity, injury and death. Patient is now walking out of the department without discharge instruction. Re-Evaluations: This patient's ED course included: a personal history and physicial examination    This patient has remained hemodynamically stable during their ED course. Counseling: The emergency provider has spoken with the patient and discussed todays results, in addition to providing specific details for the plan of care and counseling regarding the diagnosis and prognosis.   Questions are answered at this time and they are agreeable with the plan.       --------------------------------- IMPRESSION AND DISPOSITION

## 2021-12-04 ENCOUNTER — APPOINTMENT (OUTPATIENT)
Dept: CT IMAGING | Age: 66
End: 2021-12-04
Payer: MEDICARE

## 2021-12-04 ENCOUNTER — HOSPITAL ENCOUNTER (EMERGENCY)
Age: 66
Discharge: HOME | End: 2021-12-04
Attending: EMERGENCY MEDICINE
Payer: MEDICARE

## 2021-12-04 ENCOUNTER — HOSPITAL ENCOUNTER (EMERGENCY)
Age: 66
Discharge: ANOTHER HOSPITAL | End: 2021-12-05
Attending: EMERGENCY MEDICINE
Payer: MEDICARE

## 2021-12-04 ENCOUNTER — APPOINTMENT (OUTPATIENT)
Dept: GENERAL RADIOLOGY | Age: 66
End: 2021-12-04
Payer: MEDICARE

## 2021-12-04 VITALS
HEART RATE: 83 BPM | DIASTOLIC BLOOD PRESSURE: 75 MMHG | SYSTOLIC BLOOD PRESSURE: 132 MMHG | RESPIRATION RATE: 20 BRPM | TEMPERATURE: 97.8 F | OXYGEN SATURATION: 99 %

## 2021-12-04 VITALS
RESPIRATION RATE: 18 BRPM | DIASTOLIC BLOOD PRESSURE: 59 MMHG | OXYGEN SATURATION: 97 % | SYSTOLIC BLOOD PRESSURE: 118 MMHG | HEART RATE: 77 BPM | TEMPERATURE: 98.4 F

## 2021-12-04 DIAGNOSIS — R07.9 CHEST PAIN, UNSPECIFIED TYPE: Primary | ICD-10-CM

## 2021-12-04 DIAGNOSIS — R56.9 SEIZURE-LIKE ACTIVITY (HCC): Primary | ICD-10-CM

## 2021-12-04 DIAGNOSIS — K76.89 LIVER CYST: ICD-10-CM

## 2021-12-04 DIAGNOSIS — K57.90 DIVERTICULOSIS: ICD-10-CM

## 2021-12-04 DIAGNOSIS — R55 SYNCOPE AND COLLAPSE: ICD-10-CM

## 2021-12-04 LAB
ANION GAP SERPL CALCULATED.3IONS-SCNC: 12 MMOL/L (ref 7–16)
BUN BLDV-MCNC: 13 MG/DL (ref 6–23)
CALCIUM SERPL-MCNC: 8.8 MG/DL (ref 8.6–10.2)
CHLORIDE BLD-SCNC: 99 MMOL/L (ref 98–107)
CO2: 23 MMOL/L (ref 22–29)
CREAT SERPL-MCNC: 0.9 MG/DL (ref 0.7–1.2)
GFR AFRICAN AMERICAN: >60
GFR NON-AFRICAN AMERICAN: >60 ML/MIN/1.73
GLUCOSE BLD-MCNC: 98 MG/DL (ref 74–99)
POTASSIUM SERPL-SCNC: 3.9 MMOL/L (ref 3.5–5)
SODIUM BLD-SCNC: 134 MMOL/L (ref 132–146)

## 2021-12-04 PROCEDURE — 70450 CT HEAD/BRAIN W/O DYE: CPT

## 2021-12-04 PROCEDURE — 96375 TX/PRO/DX INJ NEW DRUG ADDON: CPT

## 2021-12-04 PROCEDURE — 96374 THER/PROPH/DIAG INJ IV PUSH: CPT

## 2021-12-04 PROCEDURE — 99283 EMERGENCY DEPT VISIT LOW MDM: CPT

## 2021-12-04 PROCEDURE — 80048 BASIC METABOLIC PNL TOTAL CA: CPT

## 2021-12-04 PROCEDURE — 96361 HYDRATE IV INFUSION ADD-ON: CPT

## 2021-12-04 PROCEDURE — 99284 EMERGENCY DEPT VISIT MOD MDM: CPT

## 2021-12-04 PROCEDURE — 71045 X-RAY EXAM CHEST 1 VIEW: CPT

## 2021-12-04 RX ORDER — DIPHENHYDRAMINE HYDROCHLORIDE 50 MG/ML
50 INJECTION INTRAMUSCULAR; INTRAVENOUS ONCE
Status: COMPLETED | OUTPATIENT
Start: 2021-12-04 | End: 2021-12-05

## 2021-12-04 RX ORDER — METHYLPREDNISOLONE SODIUM SUCCINATE 125 MG/2ML
125 INJECTION, POWDER, LYOPHILIZED, FOR SOLUTION INTRAMUSCULAR; INTRAVENOUS DAILY
Status: DISCONTINUED | OUTPATIENT
Start: 2021-12-05 | End: 2021-12-05 | Stop reason: HOSPADM

## 2021-12-04 RX ORDER — SODIUM CHLORIDE 9 MG/ML
INJECTION, SOLUTION INTRAVENOUS CONTINUOUS
Status: DISCONTINUED | OUTPATIENT
Start: 2021-12-04 | End: 2021-12-05 | Stop reason: HOSPADM

## 2021-12-04 ASSESSMENT — PAIN SCALES - GENERAL: PAINLEVEL_OUTOF10: 10

## 2021-12-04 ASSESSMENT — ENCOUNTER SYMPTOMS
ABDOMINAL PAIN: 0
COUGH: 0
BACK PAIN: 0
VOMITING: 0
SORE THROAT: 0
WHEEZING: 0
EYE REDNESS: 0
DIARRHEA: 0
NAUSEA: 0
ABDOMINAL PAIN: 0
VOMITING: 0
SINUS PRESSURE: 0
NAUSEA: 0
SHORTNESS OF BREATH: 0
EYE DISCHARGE: 0
SHORTNESS OF BREATH: 0
EYE PAIN: 0

## 2021-12-04 ASSESSMENT — PAIN DESCRIPTION - LOCATION: LOCATION: CHEST

## 2021-12-04 ASSESSMENT — PAIN DESCRIPTION - PAIN TYPE: TYPE: ACUTE PAIN

## 2021-12-04 ASSESSMENT — PAIN DESCRIPTION - DIRECTION: RADIATING_TOWARDS: RIGHT ARM

## 2021-12-04 ASSESSMENT — PAIN DESCRIPTION - FREQUENCY: FREQUENCY: CONTINUOUS

## 2021-12-04 ASSESSMENT — PAIN DESCRIPTION - PROGRESSION: CLINICAL_PROGRESSION: NOT CHANGED

## 2021-12-04 ASSESSMENT — PAIN DESCRIPTION - ORIENTATION: ORIENTATION: MID

## 2021-12-04 ASSESSMENT — PAIN DESCRIPTION - ONSET: ONSET: GRADUAL

## 2021-12-04 NOTE — ED NOTES
Bed: 06  Expected date:   Expected time:   Means of arrival:   Comments:  KHAI Acharya RN  12/04/21 5295

## 2021-12-04 NOTE — ED NOTES
Pt yelled out for this RN stating \"nurse, I'm having a seizure! \" Once in the room pt was waving his hands and verbalizing that he was having a seizure, then hand waving stopped.  MD notified     Luz Joshua RN  12/04/21 6870

## 2021-12-04 NOTE — ED PROVIDER NOTES
Chief complaint: Seizure-like activity  Chief complaint: Seizure    HPI:  12/4/21, Time: 5:34 PM EST    HPI               Leon Morillo is a 73 y.o. male presenting to the ED for seizure-like activity. The history is obtained from the patient as well as the patient's medical record. The patient's presenting emergency department acutely no seizure-like activity. The patient states he does have a history of seizures. He does take Keppra. He states he has been compliant with his medications but repeatedly states that Keppra does not work and that he is needing Ativan. He has been evaluated in the emergency department multiple times per his medical record and has exhibited. The patient dates that he did have a seizure today he states it lasted 1 hour. When present nothing made it better. Nothing made it worse. He states he was alert and able to call EMS during the seizure. He states he did fall and hit his head. He denies any numbness, weakness or paresthesias. ROS:   Review of Systems   Constitutional: Negative for chills and fever. HENT: Negative for congestion. Eyes: Negative for redness. Respiratory: Negative for shortness of breath. Cardiovascular: Negative for chest pain. Gastrointestinal: Negative for abdominal pain, nausea and vomiting. Genitourinary: Negative for dysuria. Musculoskeletal: Negative for arthralgias. Skin: Negative for rash. Neurological: Positive for seizures. Negative for light-headedness. Psychiatric/Behavioral: Negative for confusion.    All other systems reviewed and are negative.      --------------------------------------------- PAST HISTORY ---------------------------------------------  Past Medical History:  has a past medical history of Anxiety, Arthritis, Asthma, Bipolar 1 disorder ([facility] Chronic combined systolic and diastolic CHF (congestive heart failure) ([facility] COPD (chronic obstructive pulmonary disease) ([facility] Depression, Diabetes mellitus ([facility] GERD (gastroesophageal reflux disease), Hepatitis C, Hypertension, Hyperthyroidism, Hypothyroidism due to medication, Mass of testicle, Mesothelioma ([facility] Neuromuscular disorder ([facility] Other disorders of kidney and ureter, Paroxysmal A-fib ([facility] Peptic ulcer, Prostate enlargement, Pulmonary embolism ([facility] Seizures ([facility] and Thyroid disease. Past Surgical History:  has a past surgical history that includes Appendectomy; Lithotripsy; Hemorrhoid surgery; Bladder surgery; Endoscopy, colon, diagnostic (11/13/2015); Abdomen surgery; Colonoscopy; Cardiac surgery; vascular surgery; and Cardiac catheterization (05/21/2018). Social History:  reports that he quit smoking about 11 years ago. His smoking use included cigarettes. He quit after 10.00 years of use. He quit smokeless tobacco use about 3 years ago. His smokeless tobacco use included chew. He reports current drug use. Drug: Cocaine. He reports that he does not drink alcohol. Family History: family history includes Cancer in his father, maternal grandfather, and mother; Diabetes in his father, maternal aunt, maternal aunt, maternal aunt, maternal grandmother, and mother; Heart Disease in his paternal grandfather and paternal grandmother; Heart Failure in his paternal grandfather and paternal grandmother. The patients home medications have been reviewed. Allergies: Codeine, Dye [iodides], Ketorolac tromethamine, Peanuts [peanut oil], and Shellfish-derived products    ---------------------------------------------------PHYSICAL EXAM--------------------------------------  Constitutional/General: Alert and oriented x3, well appearing, non toxic in NAD  Head: Normocephalic and atraumatic  Mouth: Oropharynx clear, handling secretions, no trismus  Neck: Supple, full ROM,  Pulmonary: Lungs clear to auscultation bilaterally, no wheezes, rales, or rhonchi. Not in respiratory distress  Cardiovascular:  Regular rate. Regular rhythm.  No murmurs  Chest: no chest wall tenderness  Abdomen: Soft. Non tender. Non distended. No rebound, guarding, or rigidity. No pulsatile masses appreciated. Musculoskeletal: Moves all extremities x 4. Warm and well perfused, no clubbing, cyanosis, or edema. Capillary refill <3 seconds  Skin: warm and dry. No rashes. Neurologic: GCS 15, 5/5 muscle strength bilateral upper and lower extremities  Psych: Normal Affect    -------------------------------------------------- RESULTS -------------------------------------------------  I have personally reviewed all laboratory and imaging results for this patient. Results are listed below. LABS:  Results for orders placed or performed during the hospital encounter of 74/90/24   Basic Metabolic Panel   Result Value Ref Range    Sodium 134 132 - 146 mmol/L    Potassium 3.9 3.5 - 5.0 mmol/L    Chloride 99 98 - 107 mmol/L    CO2 23 22 - 29 mmol/L    Anion Gap 12 7 - 16 mmol/L    Glucose 98 74 - 99 mg/dL    BUN 13 6 - 23 mg/dL    CREATININE 0.9 0.7 - 1.2 mg/dL    GFR Non-African American >60 >=60 mL/min/1.73    GFR African American >60     Calcium 8.8 8.6 - 10.2 mg/dL       RADIOLOGY:  Interpreted by Radiologist.  CT HEAD WO CONTRAST   Final Result   No acute intracranial abnormality. Pansinus disease.                ------------------------- NURSING NOTES AND VITALS REVIEWED ---------------------------   The nursing notes within the ED encounter and vital signs as below have been reviewed by myself. BP (!) 118/59   Pulse 77   Temp 98.4 °F (36.9 °C)   Resp 18   SpO2 97%   Oxygen Saturation Interpretation: Normal    The patients available past medical records and past encounters were reviewed. ------------------------------ ED COURSE/MEDICAL DECISION MAKING----------------------  Medications - No data to display          Medical Decision Making:   I, Dr. Lou Ingram am the primary physician of record.     Calli Garcia is a 73 y.o. male who presents to the ED for Seizure like activity. Differential diagnosis includes but not limited to intracranial hemorrhage, hypoglycemia, hyponatremia. Patient did have BMP and CT head unremarkable. The patient had no seizure like activity in the ED and does have a history of seizures and does take Keppra. Re-Evaluations/Consultations: The patient is in no acute distress            This patient's ED course included: History, physical examination, reevaluation prior to disposition, labs and imaging    This patient has remained hemodynamically stable during their ED course. Counseling: The emergency provider has spoken with the patient and discussed todays results, in addition to providing specific details for the plan of care and counseling regarding the diagnosis and prognosis. Questions are answered at this time and they are agreeable with the plan.       --------------------------------- IMPRESSION AND DISPOSITION ---------------------------------    IMPRESSION  1. Seizure-like activity ([address]        DISPOSITION  Disposition: Discharge to home  Patient condition is stable        NOTE: This report was transcribed using voice recognition software.  Every effort was made to ensure accuracy; however, inadvertent computerized transcription errors may be present          Deandra Stapleton DO  12/05/21 4286
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
## 2021-12-04 NOTE — ED NOTES
Pt demanding ativan because [facility] doesn't work. \" pt states \"I'm gonna have another seizure, watch! \" MD notified.  Seizure precautions remain in place     [facility], RN  12/04/21 4035

## 2021-12-04 NOTE — ED NOTES
Seizure precautions in place     [facility], RN  12/04/21 4547

## 2021-12-05 ENCOUNTER — HOSPITAL ENCOUNTER (INPATIENT)
Age: 66
LOS: 3 days | Discharge: HOME | DRG: 885 | End: 2021-12-09
Attending: EMERGENCY MEDICINE | Admitting: PSYCHIATRY & NEUROLOGY
Payer: MEDICARE

## 2021-12-05 ENCOUNTER — APPOINTMENT (OUTPATIENT)
Dept: CT IMAGING | Age: 66
End: 2021-12-05
Payer: MEDICARE

## 2021-12-05 DIAGNOSIS — F41.1 ANXIETY STATE: ICD-10-CM

## 2021-12-05 DIAGNOSIS — R45.851 SUICIDAL IDEATION: ICD-10-CM

## 2021-12-05 DIAGNOSIS — R07.9 CHEST PAIN, UNSPECIFIED TYPE: Primary | ICD-10-CM

## 2021-12-05 LAB
ACETAMINOPHEN LEVEL: <5 MCG/ML (ref 10–30)
ACETAMINOPHEN LEVEL: <5 MCG/ML (ref 10–30)
ALBUMIN SERPL-MCNC: 3.9 G/DL (ref 3.5–5.2)
ALP BLD-CCNC: 59 U/L (ref 40–129)
ALT SERPL-CCNC: 14 U/L (ref 0–40)
AMPHETAMINE SCREEN, URINE: NOT DETECTED
AMPHETAMINE SCREEN, URINE: NOT DETECTED
ANION GAP SERPL CALCULATED.3IONS-SCNC: 13 MMOL/L (ref 7–16)
AST SERPL-CCNC: 23 U/L (ref 0–39)
BARBITURATE SCREEN URINE: NOT DETECTED
BARBITURATE SCREEN URINE: NOT DETECTED
BASOPHILS ABSOLUTE: 0.03 E9/L (ref 0–0.2)
BASOPHILS RELATIVE PERCENT: 0.4 % (ref 0–2)
BENZODIAZEPINE SCREEN, URINE: POSITIVE
BENZODIAZEPINE SCREEN, URINE: POSITIVE
BILIRUB SERPL-MCNC: 0.5 MG/DL (ref 0–1.2)
BILIRUBIN URINE: NEGATIVE
BLOOD, URINE: NEGATIVE
BUN BLDV-MCNC: 11 MG/DL (ref 6–23)
CALCIUM SERPL-MCNC: 8.6 MG/DL (ref 8.6–10.2)
CANNABINOID SCREEN URINE: NOT DETECTED
CANNABINOID SCREEN URINE: NOT DETECTED
CHLORIDE BLD-SCNC: 104 MMOL/L (ref 98–107)
CLARITY: CLEAR
CO2: 21 MMOL/L (ref 22–29)
COCAINE METABOLITE SCREEN URINE: NOT DETECTED
COCAINE METABOLITE SCREEN URINE: NOT DETECTED
COLOR: YELLOW
CREAT SERPL-MCNC: 0.8 MG/DL (ref 0.7–1.2)
EOSINOPHILS ABSOLUTE: 0.34 E9/L (ref 0.05–0.5)
EOSINOPHILS RELATIVE PERCENT: 5 % (ref 0–6)
ETHANOL: <10 MG/DL (ref 0–0.08)
ETHANOL: <10 MG/DL (ref 0–0.08)
FENTANYL SCREEN, URINE: NOT DETECTED
FENTANYL SCREEN, URINE: NOT DETECTED
GFR AFRICAN AMERICAN: >60
GFR NON-AFRICAN AMERICAN: >60 ML/MIN/1.73
GLUCOSE BLD-MCNC: 88 MG/DL (ref 74–99)
GLUCOSE URINE: NEGATIVE MG/DL
HCT VFR BLD CALC: 38.3 % (ref 37–54)
HEMOGLOBIN: 13 G/DL (ref 12.5–16.5)
IMMATURE GRANULOCYTES #: 0.02 E9/L
IMMATURE GRANULOCYTES %: 0.3 % (ref 0–5)
INFLUENZA A: NOT DETECTED
INFLUENZA B: NOT DETECTED
KETONES, URINE: 15 MG/DL
LEUKOCYTE ESTERASE, URINE: NEGATIVE
LYMPHOCYTES ABSOLUTE: 1.24 E9/L (ref 1.5–4)
LYMPHOCYTES RELATIVE PERCENT: 18.1 % (ref 20–42)
Lab: ABNORMAL
Lab: ABNORMAL
MCH RBC QN AUTO: 31 PG (ref 26–35)
MCHC RBC AUTO-ENTMCNC: 33.9 % (ref 32–34.5)
MCV RBC AUTO: 91.2 FL (ref 80–99.9)
METHADONE SCREEN, URINE: NOT DETECTED
METHADONE SCREEN, URINE: NOT DETECTED
MONOCYTES ABSOLUTE: 0.6 E9/L (ref 0.1–0.95)
MONOCYTES RELATIVE PERCENT: 8.7 % (ref 2–12)
NEUTROPHILS ABSOLUTE: 4.63 E9/L (ref 1.8–7.3)
NEUTROPHILS RELATIVE PERCENT: 67.5 % (ref 43–80)
NITRITE, URINE: NEGATIVE
OPIATE SCREEN URINE: NOT DETECTED
OPIATE SCREEN URINE: NOT DETECTED
OXYCODONE URINE: NOT DETECTED
OXYCODONE URINE: NOT DETECTED
PDW BLD-RTO: 13.5 FL (ref 11.5–15)
PH UA: 6 (ref 5–9)
PHENCYCLIDINE SCREEN URINE: NOT DETECTED
PHENCYCLIDINE SCREEN URINE: NOT DETECTED
PLATELET # BLD: 156 E9/L (ref 130–450)
PMV BLD AUTO: 11.6 FL (ref 7–12)
POTASSIUM SERPL-SCNC: 4.7 MMOL/L (ref 3.5–5)
PROTEIN UA: NEGATIVE MG/DL
RBC # BLD: 4.2 E12/L (ref 3.8–5.8)
REASON FOR REJECTION: NORMAL
REASON FOR REJECTION: NORMAL
REJECTED TEST: NORMAL
REJECTED TEST: NORMAL
SALICYLATE, SERUM: <0.3 MG/DL (ref 0–30)
SALICYLATE, SERUM: <0.3 MG/DL (ref 0–30)
SARS-COV-2 RNA, RT PCR: NOT DETECTED
SODIUM BLD-SCNC: 138 MMOL/L (ref 132–146)
SPECIFIC GRAVITY UA: <=1.005 (ref 1–1.03)
TOTAL PROTEIN: 6.8 G/DL (ref 6.4–8.3)
TRICYCLIC ANTIDEPRESSANTS SCREEN SERUM: NEGATIVE NG/ML
TRICYCLIC ANTIDEPRESSANTS SCREEN SERUM: NEGATIVE NG/ML
TROPONIN, HIGH SENSITIVITY: 12 NG/L (ref 0–11)
TROPONIN, HIGH SENSITIVITY: 12 NG/L (ref 0–11)
UROBILINOGEN, URINE: 0.2 E.U./DL
WBC # BLD: 6.9 E9/L (ref 4.5–11.5)

## 2021-12-05 PROCEDURE — 80179 DRUG ASSAY SALICYLATE: CPT

## 2021-12-05 PROCEDURE — 2580000003 HC RX 258: Performed by: EMERGENCY MEDICINE

## 2021-12-05 PROCEDURE — 80307 DRUG TEST PRSMV CHEM ANLYZR: CPT

## 2021-12-05 PROCEDURE — 6360000004 HC RX CONTRAST MEDICATION: Performed by: RADIOLOGY

## 2021-12-05 PROCEDURE — 99285 EMERGENCY DEPT VISIT HI MDM: CPT

## 2021-12-05 PROCEDURE — 84484 ASSAY OF TROPONIN QUANT: CPT

## 2021-12-05 PROCEDURE — 70450 CT HEAD/BRAIN W/O DYE: CPT

## 2021-12-05 PROCEDURE — 6360000002 HC RX W HCPCS: Performed by: STUDENT IN AN ORGANIZED HEALTH CARE EDUCATION/TRAINING PROGRAM

## 2021-12-05 PROCEDURE — 6370000000 HC RX 637 (ALT 250 FOR IP): Performed by: STUDENT IN AN ORGANIZED HEALTH CARE EDUCATION/TRAINING PROGRAM

## 2021-12-05 PROCEDURE — 2500000003 HC RX 250 WO HCPCS: Performed by: STUDENT IN AN ORGANIZED HEALTH CARE EDUCATION/TRAINING PROGRAM

## 2021-12-05 PROCEDURE — 81003 URINALYSIS AUTO W/O SCOPE: CPT

## 2021-12-05 PROCEDURE — 96374 THER/PROPH/DIAG INJ IV PUSH: CPT

## 2021-12-05 PROCEDURE — 72125 CT NECK SPINE W/O DYE: CPT

## 2021-12-05 PROCEDURE — 36415 COLL VENOUS BLD VENIPUNCTURE: CPT

## 2021-12-05 PROCEDURE — 82077 ASSAY SPEC XCP UR&BREATH IA: CPT

## 2021-12-05 PROCEDURE — 93005 ELECTROCARDIOGRAM TRACING: CPT | Performed by: EMERGENCY MEDICINE

## 2021-12-05 PROCEDURE — 80143 DRUG ASSAY ACETAMINOPHEN: CPT

## 2021-12-05 PROCEDURE — 96361 HYDRATE IV INFUSION ADD-ON: CPT

## 2021-12-05 PROCEDURE — 85025 COMPLETE CBC W/AUTO DIFF WBC: CPT

## 2021-12-05 PROCEDURE — 96375 TX/PRO/DX INJ NEW DRUG ADDON: CPT

## 2021-12-05 PROCEDURE — 80053 COMPREHEN METABOLIC PANEL: CPT

## 2021-12-05 PROCEDURE — 96372 THER/PROPH/DIAG INJ SC/IM: CPT

## 2021-12-05 PROCEDURE — 6370000000 HC RX 637 (ALT 250 FOR IP): Performed by: EMERGENCY MEDICINE

## 2021-12-05 PROCEDURE — 74174 CTA ABD&PLVS W/CONTRAST: CPT

## 2021-12-05 PROCEDURE — 87636 SARSCOV2 & INF A&B AMP PRB: CPT

## 2021-12-05 PROCEDURE — 71275 CT ANGIOGRAPHY CHEST: CPT

## 2021-12-05 RX ORDER — LEVETIRACETAM 100 MG/ML
500 SOLUTION ORAL ONCE
Status: COMPLETED | OUTPATIENT
Start: 2021-12-05 | End: 2021-12-05

## 2021-12-05 RX ORDER — CLONAZEPAM 0.5 MG/1
1 TABLET ORAL ONCE
Status: COMPLETED | OUTPATIENT
Start: 2021-12-05 | End: 2021-12-05

## 2021-12-05 RX ORDER — LORAZEPAM 1 MG/1
2 TABLET ORAL ONCE
Status: COMPLETED | OUTPATIENT
Start: 2021-12-05 | End: 2021-12-05

## 2021-12-05 RX ORDER — TRAZODONE HYDROCHLORIDE 50 MG/1
50 TABLET ORAL ONCE
Status: COMPLETED | OUTPATIENT
Start: 2021-12-05 | End: 2021-12-05

## 2021-12-05 RX ORDER — LORAZEPAM 2 MG/ML
1 INJECTION INTRAMUSCULAR ONCE
Status: COMPLETED | OUTPATIENT
Start: 2021-12-05 | End: 2021-12-05

## 2021-12-05 RX ADMIN — SODIUM CHLORIDE: 9 INJECTION, SOLUTION INTRAVENOUS at 04:08

## 2021-12-05 RX ADMIN — LORAZEPAM 1 MG: 2 INJECTION INTRAMUSCULAR; INTRAVENOUS at 00:16

## 2021-12-05 RX ADMIN — LORAZEPAM 2 MG: 1 TABLET ORAL at 08:30

## 2021-12-05 RX ADMIN — TRAZODONE HYDROCHLORIDE 50 MG: 50 TABLET ORAL at 20:32

## 2021-12-05 RX ADMIN — CLONAZEPAM 1 MG: 0.5 TABLET ORAL at 20:32

## 2021-12-05 RX ADMIN — IOPAMIDOL 90 ML: 755 INJECTION, SOLUTION INTRAVENOUS at 03:11

## 2021-12-05 RX ADMIN — LEVETIRACETAM 500 MG: 500 SOLUTION ORAL at 21:10

## 2021-12-05 RX ADMIN — DIPHENHYDRAMINE HYDROCHLORIDE 50 MG: 50 INJECTION, SOLUTION INTRAMUSCULAR; INTRAVENOUS at 00:15

## 2021-12-05 RX ADMIN — FAMOTIDINE 20 MG: 10 INJECTION INTRAVENOUS at 00:15

## 2021-12-05 RX ADMIN — LORAZEPAM 2 MG: 1 TABLET ORAL at 14:06

## 2021-12-05 ASSESSMENT — PAIN SCALES - GENERAL: PAINLEVEL_OUTOF10: 8

## 2021-12-05 ASSESSMENT — PAIN DESCRIPTION - LOCATION: LOCATION: CHEST

## 2021-12-05 ASSESSMENT — PAIN DESCRIPTION - PAIN TYPE: TYPE: CHRONIC PAIN

## 2021-12-05 ASSESSMENT — PATIENT HEALTH QUESTIONNAIRE - PHQ9: SUM OF ALL RESPONSES TO PHQ QUESTIONS 1-9: 6

## 2021-12-05 NOTE — ED NOTES
[facility] to [facility] pending and EKG and stress test or cardiac cath from the last year results be sent showing negative results. NEHA GABY Pierre and Nely Judge made aware of request for medication information.       DAVID Ching, [place]  12/05/21 8803

## 2021-12-05 NOTE — ED NOTES
[facility] whom informed this RN [facility] requesting pt's results for a negative cath or stress test within the past year. Informed them that this rn believed this was unethical to do so and that pt just recently left from there was admitted on 11/20/2021. Informed Dr Ok Adrian of this request also stated pt is medically clear pt's troponin level  WNL Spoke with None found within a year. This information given to Virgil Guzman  @ this time.      Devendra Lechuga, RN  12/05/21 [address], RN  12/05/21 192
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
## 2021-12-05 NOTE — PROGRESS NOTES
Pt removed two cross earrings prior to CT scan. Pt given sealed envelope with earrings after CT scan. Pt had envelope leaving CT room.

## 2021-12-05 NOTE — ED NOTES
Pt signed out AMA despite health risks explained to him by myself and Dr William Proctor.        Christiano Perrin, RN  12/05/21 0078

## 2021-12-05 NOTE — ED TRIAGE NOTES
Patient denies SI/HI on questioning but admits to saying that he is suicidal to another nurse out front. Patient says his panic attacks cause him all of his troubles as well as his high anxiety.

## 2021-12-05 NOTE — ED NOTES
Call to [facility] [phone], spoke with Des Andrade, completed referral, noted pt's request to go to [facility], awaiting call back re: status of referral.       Venice Oconnor, MSW, [facility]  12/05/21 [address], MSW, [place]  12/05/21 5285

## 2021-12-05 NOTE — ED NOTES
Radiology Procedure Waiver   Name: Mike Andrade  : 1955  MRN: [id number]    Date:  21    Time: 11:33 PM EST    Benefits of immediately proceeding with Radiology exam(s) without pre-testing outweigh the risks or are not indicated as specified below and therefore the following is/are being waived:    [] Pregnancy test   [] Patients LMP on-time and regular.   [] Patient had Tubal Ligation or has other Contraception Device. [] Patient  is Menopausal or Premenarcheal.    [] Patient had Full or Partial Hysterectomy. [x] Protocol for Iodine allergy    [] MRI Questionnaire     [x] BUN/Creatinine   [] Patient age w/no hx of renal dysfunction. [] Patient on Dialysis. [] Recent Normal Labs.   Electronically signed by Jodi Villalpando MD on 21 at 11:33 PM EST               Jodi Villalpando MD  21 3483

## 2021-12-05 NOTE — ED PROVIDER NOTES
50-year-old male presenting emergency department for chest pain, began 1 hour prior, sharp pain, substernal, radiates through to his right arm and back, 10 out of 10 in severity, sudden onset. He apparently also supposedly had a syncopal event as well. He states he is on Xarelto. Has a history of mitral valve replacement, and supposedly uses aortic valve replaced as well. He denies any fevers, chills, nausea, vomiting, diarrhea, burning with urination, blood in his urine. States his right arm does not feel weak, but is painful. Onset of chest pain 1 hour ago, has been persistent, sudden onset, severe in severity, nothing makes it better, worsens with palpation, has been constant, chest pain associated with right arm pain. Review of Systems   Constitutional: Negative for chills and fever. HENT: Negative for ear pain, sinus pressure and sore throat. Eyes: Negative for pain and discharge. Respiratory: Negative for cough, shortness of breath and wheezing. Cardiovascular: Positive for chest pain. Gastrointestinal: Negative for abdominal pain, diarrhea, nausea and vomiting. Genitourinary: Negative for dysuria and frequency. Musculoskeletal: Positive for arthralgias. Negative for back pain. Skin: Negative for rash and wound. Neurological: Positive for syncope. Negative for weakness and headaches. Hematological: Negative for adenopathy. All other systems reviewed and are negative. Physical Exam  Vitals and nursing note reviewed. Constitutional:       Appearance: Normal appearance. He is well-developed. HENT:      Head: Normocephalic and atraumatic. Right Ear: External ear normal.      Left Ear: External ear normal.      Nose: Nose normal.      Mouth/Throat:      Mouth: Mucous membranes are moist.   Eyes:      Extraocular Movements: Extraocular movements intact. Conjunctiva/sclera: Conjunctivae normal.      Pupils: Pupils are equal, round, and reactive to light. Cardiovascular:      Rate and Rhythm: Normal rate and regular rhythm. Pulses: Normal pulses. Heart sounds: Normal heart sounds. No murmur heard. Pulmonary:      Effort: Pulmonary effort is normal. No respiratory distress. Breath sounds: Normal breath sounds. No wheezing or rales. Chest:      Chest wall: Tenderness (Right side) present. Abdominal:      General: Bowel sounds are normal.      Palpations: Abdomen is soft. Tenderness: There is no abdominal tenderness. There is no guarding or rebound. Musculoskeletal:         General: No swelling, tenderness or deformity. Cervical back: Normal range of motion and neck supple. Skin:     General: Skin is warm and dry. Neurological:      General: No focal deficit present. Mental Status: He is alert and oriented to person, place, and time. Cranial Nerves: No cranial nerve deficit. Sensory: No sensory deficit. Motor: No weakness. Procedures     MDM     ED Course as of 12/05/21 0528   Sat Dec 04, 2021   2334 Weighted patient's labs for scanning, noted in his chart that he had an allergy to iodine, patient states that she tolerates iodine and the allergy according to the comments is actually listed due to his shellfish allergy, will pretreat patient. [JG]   Domi Dec 05, 2021   0108 EKG: This EKG is signed by emergency department physician. Rate: 71  Rhythm: Sinus  Interpretation: no acute changes  Comparison: was normal      [JG]   0110 Patient states his chest pain is now resolved and he would like a meal [JG]   0454 Patient states he needs to leave the disease not worsening the emergency department any longer, second troponin has not resulted, recommended admission based off his chest pain and syncopal episode, does not wish to do so, he will sign out [address] He is of sound mind and decision-making capabilities.  [JG]   4840 Unfortunately, Mike Andrade at 4:55 AM decided to leave the Emergency Department Against Medical Advice. Artem Calderón is clinically sober, free of any distracting injury, appears to be of sound mind with intact judgement and insight, and in my opinion has the capacity to make decisions. he presented to the Emergency Department to be evaluated for [unfilled] and understands that I am concerned about the possibility of cardiac pathology. I have explained the nature of the evaluation so for and he understands the results and that the evaluation so far has been limited and cannot exclude cardiac pathology and that by not undergoing further evaluation and management specific risks include: Permanent disability and death. We have discussed alternative treatments and the patient was adamant about being discharged and referred to @PCP@   Austen Ros is unwilling to stay for the recommended evaluation and management and wishes to leave against medical advice. I am unable to convince the patient to stay, I have asked them to return as soon as possible to complete their evaluation. I have answered all their questions          [JG]      ED Course User Index  [JG] Tahira Valle MD        ED Course as of 12/05/21 0528   Sat Dec 04, 2021   2334 Weighted patient's labs for scanning, noted in his chart that he had an allergy to iodine, patient states that she tolerates iodine and the allergy according to the comments is actually listed due to his shellfish allergy, will pretreat patient. [JG]   Domi Dec 05, 2021   0108 EKG: This EKG is signed by emergency department physician.     Rate: 71  Rhythm: Sinus  Interpretation: no acute changes  Comparison: was normal      [JG]   0110 Patient states his chest pain is now resolved and he would like a meal [JG]   0454 Patient states he needs to leave the disease not worsening the emergency department any longer, second troponin has not resulted, recommended admission based off his chest pain and syncopal episode, does not wish to do so, he will sign out [address] He is of sound mind and decision-making capabilities. [JG]   9038 Unfortunately, Dejan Ornelas at 4:55 AM decided to leave the Emergency Department Against Medical Advice. Dejan Ornelas is clinically sober, free of any distracting injury, appears to be of sound mind with intact judgement and insight, and in my opinion has the capacity to make decisions. he presented to the Emergency Department to be evaluated for [unfilled] and understands that I am concerned about the possibility of cardiac pathology. I have explained the nature of the evaluation so for and he understands the results and that the evaluation so far has been limited and cannot exclude cardiac pathology and that by not undergoing further evaluation and management specific risks include: Permanent disability and death. We have discussed alternative treatments and the patient was adamant about being discharged and referred to @PCP@   Nik Stack is unwilling to stay for the recommended evaluation and management and wishes to leave against medical advice. I am unable to convince the patient to stay, I have asked them to return as soon as possible to complete their evaluation. I have answered all their questions          [JG]      ED Course User Index  [JG] Kari Leiva MD     Patient presented emergency department for chest pain, associated episode of syncope as well, was 10 out of 10 and sudden in onset radiating to his right arm. There is concern for dissection, he has an allergy to iodine he was pretreated CT is obtained, did not show any dissection, he was given Ativan as well as he was very anxious, initial troponin was 12, EKG was unremarkable. Other laboratory work was within his baseline. Incidental findings included on patient's discharge paperwork.   He did not wish to stay in the emergency department any longer, recommended admission based off his chest pain and syncopal episode, he was not agreeable to do so as he did not want to stay in the hospital, he signed out [address], he was of sound mind and decision-making capabilities. Instructed follow-up with his primary care physician, strong return precautions given.  --------------------------------------------- PAST HISTORY ---------------------------------------------  Past Medical History:  has a past medical history of Anxiety, Arthritis, Asthma, Bipolar 1 disorder ([facility] [address] Chronic combined systolic and diastolic CHF (congestive heart failure) ([facility] [address] COPD (chronic obstructive pulmonary disease) ([facility] [address] Depression, Diabetes mellitus ([address] GERD (gastroesophageal reflux disease), Hepatitis C, Hypertension, Hyperthyroidism, Hypothyroidism due to medication, Mass of testicle, Mesothelioma ([address] Neuromuscular disorder ([address] Other disorders of kidney and ureter, Paroxysmal A-fib ([address] Peptic ulcer, Prostate enlargement, Pulmonary embolism ([address] Seizures ([facility] [address] and Thyroid disease. Past Surgical History:  has a past surgical history that includes Appendectomy; Lithotripsy; Hemorrhoid surgery; Bladder surgery; Endoscopy, colon, diagnostic (11/13/2015); Abdomen surgery; Colonoscopy; Cardiac surgery; vascular surgery; and Cardiac catheterization (05/21/2018). Social History:  reports that he quit smoking about 11 years ago. His smoking use included cigarettes. He quit after 10.00 years of use. He quit smokeless tobacco use about 3 years ago. His smokeless tobacco use included chew. He reports current drug use. Drug: Cocaine. He reports that he does not drink alcohol.     Family History: family history includes Cancer in his father, maternal grandfather, and mother; Diabetes in his father, maternal aunt, maternal aunt, maternal aunt, maternal grandmother, and mother; Heart Disease in his paternal grandfather and paternal grandmother; Heart Failure in his paternal grandfather and paternal grandmother. The patients home medications have been reviewed.     Allergies: Codeine, Dye [iodides], Ketorolac tromethamine, Peanuts [peanut oil], and Shellfish-derived products    -------------------------------------------------- RESULTS -------------------------------------------------  Labs:  Results for orders placed or performed during the hospital encounter of 12/04/21   CBC auto differential   Result Value Ref Range    WBC 6.9 4.5 - 11.5 E9/L    RBC 4.20 3.80 - 5.80 E12/L    Hemoglobin 13.0 12.5 - 16.5 g/dL    Hematocrit 38.3 37.0 - 54.0 %    MCV 91.2 80.0 - 99.9 fL    MCH 31.0 26.0 - 35.0 pg    MCHC 33.9 32.0 - 34.5 %    RDW 13.5 11.5 - 15.0 fL    Platelets 165 523 - 749 E9/L    MPV 11.6 7.0 - 12.0 fL    Neutrophils % 67.5 43.0 - 80.0 %    Immature Granulocytes % 0.3 0.0 - 5.0 %    Lymphocytes % 18.1 (L) 20.0 - 42.0 %    Monocytes % 8.7 2.0 - 12.0 %    Eosinophils % 5.0 0.0 - 6.0 %    Basophils % 0.4 0.0 - 2.0 %    Neutrophils Absolute 4.63 1.80 - 7.30 E9/L    Immature Granulocytes # 0.02 E9/L    Lymphocytes Absolute 1.24 (L) 1.50 - 4.00 E9/L    Monocytes Absolute 0.60 0.10 - 0.95 E9/L    Eosinophils Absolute 0.34 0.05 - 0.50 E9/L    Basophils Absolute 0.03 0.00 - 0.20 E9/L   Comprehensive Metabolic Panel   Result Value Ref Range    Sodium 138 132 - 146 mmol/L    Potassium 4.7 3.5 - 5.0 mmol/L    Chloride 104 98 - 107 mmol/L    CO2 21 (L) 22 - 29 mmol/L    Anion Gap 13 7 - 16 mmol/L    Glucose 88 74 - 99 mg/dL    BUN 11 6 - 23 mg/dL    CREATININE 0.8 0.7 - 1.2 mg/dL    GFR Non-African American >60 >=60 mL/min/1.73    GFR African American >60     Calcium 8.6 8.6 - 10.2 mg/dL    Total Protein 6.8 6.4 - 8.3 g/dL    Albumin 3.9 3.5 - 5.2 g/dL    Total Bilirubin 0.5 0.0 - 1.2 mg/dL    Alkaline Phosphatase 59 40 - 129 U/L    ALT 14 0 - 40 U/L    AST 23 0 - 39 U/L   Serum Drug Screen   Result Value Ref Range    Ethanol Lvl <10 mg/dL    Acetaminophen Level <5.0 (L) 10.0 - 30.0 mcg/mL Salicylate, Serum <0.9 0.0 - 30.0 mg/dL    TCA Scrn NEGATIVE Cutoff:300 ng/mL   URINE DRUG SCREEN   Result Value Ref Range    Amphetamine Screen, Urine NOT DETECTED Negative <1000 ng/mL    Barbiturate Screen, Ur NOT DETECTED Negative < 200 ng/mL    Benzodiazepine Screen, Urine POSITIVE (A) Negative < 200 ng/mL    Cannabinoid Scrn, Ur NOT DETECTED Negative < 50ng/mL    Cocaine Metabolite Screen, Urine NOT DETECTED Negative < 300 ng/mL    Opiate Scrn, Ur NOT DETECTED Negative < 300ng/mL    PCP Screen, Urine NOT DETECTED Negative < 25 ng/mL    Methadone Screen, Urine NOT DETECTED Negative <300 ng/mL    Oxycodone Urine NOT DETECTED Negative <100 ng/mL    FENTANYL SCREEN, URINE NOT DETECTED Negative <1 ng/mL    Drug Screen Comment: see below    SPECIMEN REJECTION   Result Value Ref Range    Rejected Test TRP5     Reason for Rejection see below    SPECIMEN REJECTION   Result Value Ref Range    Rejected Test trp5     Reason for Rejection see below    Troponin   Result Value Ref Range    Troponin, High Sensitivity 12 (H) 0 - 11 ng/L   EKG 12 Lead   Result Value Ref Range    Ventricular Rate 71 BPM    Atrial Rate 71 BPM    P-R Interval 130 ms    QRS Duration 84 ms    Q-T Interval 390 ms    QTc Calculation (Bazett) 423 ms    R Axis -28 degrees    T Axis 56 degrees       Radiology:  CT Head WO Contrast    Result Date: 12/5/2021  EXAMINATION: CT OF THE HEAD WITHOUT CONTRAST  12/5/2021 2:54 am TECHNIQUE: CT of the head was performed without the administration of intravenous contrast. Dose modulation, iterative reconstruction, and/or weight based adjustment of the mA/kV was utilized to reduce the radiation dose to as low as reasonably achievable. COMPARISON: 12/04/2021 HISTORY: ORDERING SYSTEM PROVIDED HISTORY: head injury TECHNOLOGIST PROVIDED HISTORY: Reason for exam:->head injury Has a \"code stroke\" or \"stroke alert\" been called? ->No Decision Support Exception - unselect if not a suspected or confirmed emergency medical condition->Emergency Medical Condition ([place]) What reading provider will be dictating this exam?->CRC FINDINGS: BRAIN/VENTRICLES: There is no acute intracranial hemorrhage, mass effect or midline shift. No abnormal extra-axial fluid collection. The gray-white differentiation is maintained without evidence of an acute infarct. There is no evidence of hydrocephalus. ORBITS: The visualized portion of the orbits demonstrate no acute abnormality. SINUSES: There is unchanged sinus disease. Specifically there is ethmoid sinus and left frontal sinus opacification. There is right maxillary sinus air-fluid level. Air-fluid level also seen within right frontal sinus. Findings are concerning for sinusitis. SOFT TISSUES/SKULL:  No acute abnormality of the visualized skull or soft tissues. Unchanged findings of sinusitis. Otherwise no acute intracranial abnormality seen. CT HEAD WO CONTRAST    Result Date: 12/4/2021  EXAMINATION: CT OF THE HEAD WITHOUT CONTRAST  12/4/2021 8:08 pm TECHNIQUE: CT of the head was performed without the administration of intravenous contrast. Dose modulation, iterative reconstruction, and/or weight based adjustment of the mA/kV was utilized to reduce the radiation dose to as low as reasonably achievable. COMPARISON: None. HISTORY: ORDERING SYSTEM PROVIDED HISTORY: headache TECHNOLOGIST PROVIDED HISTORY: Has a \"code stroke\" or \"stroke alert\" been called? ->No Reason for exam:->headache Decision Support Exception - unselect if not a suspected or confirmed emergency medical condition->Emergency Medical Condition (MA) What reading provider will be dictating this exam?->CRC FINDINGS: BRAIN/VENTRICLES: There is no acute intracranial hemorrhage, mass effect or midline shift. No abnormal extra-axial fluid collection. The gray-white differentiation is maintained without evidence of an acute infarct. There is no evidence of hydrocephalus.  ORBITS: The visualized portion of the orbits demonstrate no acute abnormality. SINUSES: Pansinus disease. SOFT TISSUES/SKULL:  No acute abnormality of the visualized skull or soft tissues. No acute intracranial abnormality. Pansinus disease. CT Cervical Spine WO Contrast    Result Date: 12/5/2021  EXAMINATION: CT OF THE CERVICAL SPINE WITHOUT CONTRAST 12/5/2021 2:54 am TECHNIQUE: CT of the cervical spine was performed without the administration of intravenous contrast. Multiplanar reformatted images are provided for review. Dose modulation, iterative reconstruction, and/or weight based adjustment of the mA/kV was utilized to reduce the radiation dose to as low as reasonably achievable. COMPARISON: None. HISTORY: ORDERING SYSTEM PROVIDED HISTORY: fall, head neck pain TECHNOLOGIST PROVIDED HISTORY: Reason for exam:->fall, head neck pain Decision Support Exception - unselect if not a suspected or confirmed emergency medical condition->Emergency Medical Condition (MA) What reading provider will be dictating this exam?->CRC FINDINGS: BONES/ALIGNMENT: There is no acute fracture or traumatic malalignment. DEGENERATIVE CHANGES: There is multilevel mild to moderate degenerative disc disease and facet arthropathy. Uncovertebral spurring causes some neural foraminal narrowing at C4-5, right more than left. SOFT TISSUES: There is no prevertebral soft tissue swelling. No acute abnormality of the cervical spine. XR CHEST PORTABLE    Result Date: 12/4/2021  EXAMINATION: ONE XRAY VIEW OF THE CHEST 12/4/2021 11:48 pm COMPARISON: 08/21/2021 HISTORY: ORDERING SYSTEM PROVIDED HISTORY: chest pain TECHNOLOGIST PROVIDED HISTORY: Reason for exam:->chest pain What reading provider will be dictating this exam?->CRC FINDINGS: Patient is status post median sternotomy. There is no cardiomegaly or pulmonary vascular congestion. The lungs and the pleural spaces are clear. There is no pneumothorax seen. No acute abnormality identified.      CTA CHEST W CONTRAST    Result Date: 12/5/2021  EXAMINATION: CTA OF THE CHEST 12/5/2021 2:54 am TECHNIQUE: CTA of the chest was performed after the administration of intravenous contrast.  Multiplanar reformatted images are provided for review. MIP images are provided for review. Dose modulation, iterative reconstruction, and/or weight based adjustment of the mA/kV was utilized to reduce the radiation dose to as low as reasonably achievable. COMPARISON: None. HISTORY: ORDERING SYSTEM PROVIDED HISTORY: r/o dissection TECHNOLOGIST PROVIDED HISTORY: Reason for exam:->r/o dissection Decision Support Exception - unselect if not a suspected or confirmed emergency medical condition->Emergency Medical Condition ([place]) What reading provider will be dictating this exam?->CRC FINDINGS: Pulmonary Arteries: Pulmonary arteries are adequately opacified for evaluation. No evidence of intraluminal filling defect to suggest pulmonary embolism. Main pulmonary artery is normal in caliber. Mediastinum: No evidence of significant mediastinal lymphadenopathy based on size criteria. There are 6-7 mm AP window and right paratracheal lymph nodes present. These could be reactive in nature. .  The heart and pericardium demonstrate no acute abnormality. There is no acute abnormality of the thoracic aorta. Lungs/pleura: The lungs are without acute process. There is a 5 mm area of subpleural thickening at the right lung base posteriorly on axial image 98. No focal consolidation or pulmonary edema. No evidence of pleural effusion or pneumothorax. Upper Abdomen: Limited images of the upper abdomen are unremarkable. Soft Tissues/Bones: No acute bone or soft tissue abnormality. 1.  No evidence of pulmonary embolism or acute pulmonary abnormality. 2.  Minimal right basilar subpleural thickening that is most likely postinflammatory in nature.      CTA ABDOMEN PELVIS W CONTRAST    Result Date: 12/5/2021  EXAMINATION: CTA OF THE ABDOMEN AND PELVIS WITH CONTRAST 12/5/2021 2:54 am: ------------------------- NURSING NOTES AND VITALS REVIEWED ---------------------------  Date / Time Roomed:  12/4/2021 11:41 PM  ED Bed Assignment:  [id number]    The nursing notes within the ED encounter and vital signs as below have been reviewed. /75   Pulse 83   Temp 97.8 °F (36.6 °C)   Resp 20   SpO2 99%   Oxygen Saturation Interpretation: Normal      ------------------------------------------ PROGRESS NOTES ------------------------------------------      I have spoken with the patient and discussed todays availabe results to this point, in addition to providing specific details for the plan of care and counseling regarding the diagnosis and prognosis. Their questions are answered, however they are not agreeable to admission.     --------------------------------- ADDITIONAL PROVIDER NOTES ---------------------------------  This patient has chosen to leave against medical advice. I have personally explained to them that choosing to do so may result in permanent bodily harm or death. I discussed at length that without further evaluation and monitoring there may be unforeseen circumstances and deterioration resulting in permanent bodily harm or death as a result of their choice. They are alert, oriented, and competent at this time. They state that they are aware of the serious risks as explained, but they continue to wish to leave against medical   advice. In light of their decision to leave against medical advice, follow-up has been arranged and they are aware of the importance of following up as instructed. They have been advised that they should return to the ED immediately if they change their mind at any time, or if their condition begins to change or worsen. The follow up plan has been discussed in detail and they are aware of the specific conditions for emergent return, as well as the importance of follow-up.       New Prescriptions    No medications on file Diagnosis:  1. Chest pain, unspecified type    2. Syncope and collapse    3. Diverticulosis    4. Liver cyst        Disposition:  Patient's disposition: Left against medical advice. Rl Dasilva MD  Resident  12/05/21 6560    ATTENDING PROVIDER ATTESTATION:     I have personally performed and/or participated in the history, exam, medical decision making, and procedures and agree with all pertinent clinical information. I have also reviewed and agree with the past medical, family and social history unless otherwise noted. I have discussed this patient in detail with the resident, and provided the instruction and education regarding chest pain. My findings/Plan: I was the primary provider for patient. Patient presenting here because of chest pains. Patient was just recently seen in the emergency department for seizure. Patient was treated and released. He apparently left and became weak and then started having chest pains. Patient reports he did pass out. Patient was found on the ground. Patient reports he did strike his head. Patient is on Xarelto. Patient does have a history of valve replacement. Patient complaining of neck pain as well on arrival.  Patient reports the pain is in his left chest he does report the pain goes into his right arm. Patient reporting no fever no chills. He reports no productive cough. He is awake alert oriented x3 heart lung exam normal abdomen soft nontender he is able move all extremities. He does have mild tenderness to his neck. Labs and EKG noted reviewed. EKG was compared to prior. Patient underwent CT of head as well as neck due to his fall and the fact that he is on Xarelto. Patient also underwent CT of his chest due to the fact that he had a syncopal episode was complaining of pains in his chest going to his back. Patient labs are reviewed as well as CTs. Patient was made aware of findings and plan for admission.   Patient declined admission he was made aware risk of death and disability was told to return anytime for any further problems. This patient has chosen to leave against medical advice. I have personally explained to them that choosing to do so may result in permanent bodily harm or death. I discussed at length that without further evaluation and monitoring there may be unforeseen circumstances and deterioration resulting in permanent bodily harm or death as a result of their choice. They are alert, oriented, and competent at this time. They state that they are aware of the serious risks as explained, but they continue to wish to leave against medical advice. In light of their decision to leave against medical advice, follow-up has been arranged and they are aware of the importance of following up as instructed. They have been advised that they should return to the ED immediately if they change their mind at any time, or if their condition begins to change or worsen.               Kena Grant MD  12/05/21 39 Shari Olguin MD  12/05/21 7406

## 2021-12-05 NOTE — ED NOTES
Pt to be referred to [facility], viewed lab work to insure completion, nurse reports some lab work such as CMP, CBC was done last night on 12/4/2021 admission close to midnight, please see info on 12/4/2021 admission.       [address], DAVID, [facility]  12/05/21 5373

## 2021-12-05 NOTE — ED PROVIDER NOTES
HPI:  12/5/21,   Time: 7:56 AM HALEIGH Millard is a 73 y.o. male presenting to the ED for sharp chest pain secondary to stress as well as suicidal ideation with thoughts of shooting himself and access to a firearm at his house, beginning a few minutes ago. The complaint has been intermittent, moderate in severity, and worsened by nothing. Patient now denies any suicidal ideation. He states he is anxious because he is having continued pain in his chest.  He just had an extensive work-up which was negative    ROS:   Pertinent positives and negatives are stated within HPI, all other systems reviewed and are negative.  --------------------------------------------- PAST HISTORY ---------------------------------------------  Past Medical History:  has a past medical history of Anxiety, Arthritis, Asthma, Bipolar 1 disorder ([address] Chronic combined systolic and diastolic CHF (congestive heart failure) ([address] COPD (chronic obstructive pulmonary disease) ([address] Depression, Diabetes mellitus ([address] GERD (gastroesophageal reflux disease), Hepatitis C, Hypertension, Hyperthyroidism, Hypothyroidism due to medication, Mass of testicle, Mesothelioma ([address] Neuromuscular disorder ([address] Other disorders of kidney and ureter, Paroxysmal A-fib ([address] Peptic ulcer, Prostate enlargement, Pulmonary embolism ([address] Seizures ([address] and Thyroid disease. Past Surgical History:  has a past surgical history that includes Appendectomy; Lithotripsy; Hemorrhoid surgery; Bladder surgery; Endoscopy, colon, diagnostic (11/13/2015); Abdomen surgery; Colonoscopy; Cardiac surgery; vascular surgery; and Cardiac catheterization (05/21/2018). Social History:  reports that he quit smoking about 11 years ago. His smoking use included cigarettes. He quit after 10.00 years of use. He quit smokeless tobacco use about 3 years ago. His smokeless tobacco use included chew. He reports current drug use. Drug: Cocaine.  He reports that he does not drink alcohol. Family History: family history includes Cancer in his father, maternal grandfather, and mother; Diabetes in his father, maternal aunt, maternal aunt, maternal aunt, maternal grandmother, and mother; Heart Disease in his paternal grandfather and paternal grandmother; Heart Failure in his paternal grandfather and paternal grandmother. The patients home medications have been reviewed.     Allergies: Codeine, Dye [iodides], Ketorolac tromethamine, Peanuts [peanut oil], and Shellfish-derived products    -------------------------------------------------- RESULTS -------------------------------------------------  All laboratory and radiology results have been personally reviewed by myself   LABS:  Results for orders placed or performed during the hospital encounter of 12/05/21   COVID-19 & Influenza Combo    Specimen: Nasopharyngeal Swab   Result Value Ref Range    SARS-CoV-2 RNA, RT PCR NOT DETECTED NOT DETECTED    INFLUENZA A NOT DETECTED NOT DETECTED    INFLUENZA B NOT DETECTED NOT DETECTED   Urine Drug Screen   Result Value Ref Range    Amphetamine Screen, Urine NOT DETECTED Negative <1000 ng/mL    Barbiturate Screen, Ur NOT DETECTED Negative < 200 ng/mL    Benzodiazepine Screen, Urine POSITIVE (A) Negative < 200 ng/mL    Cannabinoid Scrn, Ur NOT DETECTED Negative < 50ng/mL    Cocaine Metabolite Screen, Urine NOT DETECTED Negative < 300 ng/mL    Opiate Scrn, Ur NOT DETECTED Negative < 300ng/mL    PCP Screen, Urine NOT DETECTED Negative < 25 ng/mL    Methadone Screen, Urine NOT DETECTED Negative <300 ng/mL    Oxycodone Urine NOT DETECTED Negative <100 ng/mL    FENTANYL SCREEN, URINE NOT DETECTED Negative <1 ng/mL    Drug Screen Comment: see below    Serum Drug Screen   Result Value Ref Range    TCA Scrn NEGATIVE Cutoff:300 ng/mL   Urinalysis   Result Value Ref Range    Color, UA Yellow Straw/Yellow    Clarity, UA Clear Clear    Glucose, Ur Negative Negative mg/dL    Bilirubin Urine Negative Negative    Ketones, Urine 15 (A) Negative mg/dL    Specific Gravity, UA <=1.005 1.005 - 1.030    Blood, Urine Negative Negative    pH, UA 6.0 5.0 - 9.0    Protein, UA Negative Negative mg/dL    Urobilinogen, Urine 0.2 <2.0 E.U./dL    Nitrite, Urine Negative Negative    Leukocyte Esterase, Urine Negative Negative       RADIOLOGY:  Interpreted by Radiologist.  No orders to display       ------------------------- NURSING NOTES AND VITALS REVIEWED ---------------------------   The nursing notes within the ED encounter and vital signs as below have been reviewed. /68   Pulse 66   Temp 97.4 °F (36.3 °C)   Resp 16   Ht 5' 3\" (1.6 m)   Wt 133 lb (60.3 kg)   SpO2 100%   BMI 23.56 kg/m²   Oxygen Saturation Interpretation: Normal      ---------------------------------------------------PHYSICAL EXAM--------------------------------------      Constitutional/General: Alert and oriented x3, well appearing, non toxic in NAD  Head: NC/AT  Eyes: PERRL, EOMI  Mouth: Oropharynx clear, handling secretions, no trismus  Neck: Supple, full ROM, no meningeal signs  Pulmonary: Lungs clear to auscultation bilaterally, no wheezes, rales, or rhonchi. Not in respiratory distress  Cardiovascular:  Regular rate and rhythm, no murmurs, gallops, or rubs. 2+ distal pulses  Abdomen: Soft, non tender, non distended,   Extremities: Moves all extremities x 4. Warm and well perfused  Skin: warm and dry without rash  Neurologic: GCS 15,  Psych: Anxious affect and mood denies suicidal ideation or plan denies homicidal ideation. Was clearly suicidal when he initially arrived and told EMS he wants to shoot himself    EKG: This EKG is signed and interpreted by me.     Rate: 73  Rhythm: Sinus  Interpretation: no acute changes  Comparison: stable as compared to patient's most recent EKG    ------------------------------ ED COURSE/MEDICAL DECISION MAKING----------------------  Medications   LORazepam (ATIVAN) tablet 2 mg (2 mg Oral Given 12/5/21 2244)         Medical Decision Making:    Patient was given oral Ativan his EKG was repeated all of his other blood work and CAT scans were done within the last 24 hours. He was given a dose of oral Ativan for anxiety here and evaluated by the . He was medically cleared for psychiatric admission. Counseling: The emergency provider has spoken with the patient and discussed todays results, in addition to providing specific details for the plan of care and counseling regarding the diagnosis and prognosis. Questions are answered at this time and they are agreeable with the plan.      --------------------------------- IMPRESSION AND DISPOSITION ---------------------------------    IMPRESSION  1. Chest pain, unspecified type    2. Suicidal ideation    3.  Anxiety state        DISPOSITION  Disposition: Other Disposition: As per social work  Patient condition is stable                  Rosi Archer MD  12/05/21 0479

## 2021-12-05 NOTE — ED NOTES
Bed: 17A-17  Expected date:   Expected time:   Means of arrival:   Comments:  EMS-Jenni Bravo RN  12/04/21 9395

## 2021-12-06 PROBLEM — R45.851 DEPRESSION WITH SUICIDAL IDEATION: Status: ACTIVE | Noted: 2021-12-06

## 2021-12-06 PROBLEM — F32.A DEPRESSION WITH SUICIDAL IDEATION: Status: ACTIVE | Noted: 2021-12-06

## 2021-12-06 LAB
EKG ATRIAL RATE: 71 BPM
EKG ATRIAL RATE: 73 BPM
EKG P-R INTERVAL: 126 MS
EKG P-R INTERVAL: 130 MS
EKG Q-T INTERVAL: 390 MS
EKG Q-T INTERVAL: 394 MS
EKG QRS DURATION: 82 MS
EKG QRS DURATION: 84 MS
EKG QTC CALCULATION (BAZETT): 423 MS
EKG QTC CALCULATION (BAZETT): 434 MS
EKG R AXIS: -28 DEGREES
EKG R AXIS: 13 DEGREES
EKG T AXIS: 56 DEGREES
EKG T AXIS: 58 DEGREES
EKG VENTRICULAR RATE: 71 BPM
EKG VENTRICULAR RATE: 73 BPM

## 2021-12-06 PROCEDURE — 93010 ELECTROCARDIOGRAM REPORT: CPT | Performed by: INTERNAL MEDICINE

## 2021-12-06 PROCEDURE — 6370000000 HC RX 637 (ALT 250 FOR IP): Performed by: NURSE PRACTITIONER

## 2021-12-06 PROCEDURE — 1240000000 HC EMOTIONAL WELLNESS R&B

## 2021-12-06 PROCEDURE — 6360000002 HC RX W HCPCS: Performed by: EMERGENCY MEDICINE

## 2021-12-06 RX ORDER — NICOTINE 21 MG/24HR
1 PATCH, TRANSDERMAL 24 HOURS TRANSDERMAL DAILY
Status: DISCONTINUED | OUTPATIENT
Start: 2021-12-06 | End: 2021-12-08

## 2021-12-06 RX ORDER — HALOPERIDOL 5 MG/ML
3 INJECTION INTRAMUSCULAR EVERY 6 HOURS PRN
Status: DISCONTINUED | OUTPATIENT
Start: 2021-12-06 | End: 2021-12-09 | Stop reason: HOSPADM

## 2021-12-06 RX ORDER — HYDROXYZINE PAMOATE 50 MG/1
50 CAPSULE ORAL 3 TIMES DAILY PRN
Status: DISCONTINUED | OUTPATIENT
Start: 2021-12-06 | End: 2021-12-09 | Stop reason: HOSPADM

## 2021-12-06 RX ORDER — MAGNESIUM HYDROXIDE/ALUMINUM HYDROXICE/SIMETHICONE 120; 1200; 1200 MG/30ML; MG/30ML; MG/30ML
30 SUSPENSION ORAL PRN
Status: DISCONTINUED | OUTPATIENT
Start: 2021-12-06 | End: 2021-12-09 | Stop reason: HOSPADM

## 2021-12-06 RX ORDER — LORAZEPAM 2 MG/ML
2 INJECTION INTRAMUSCULAR ONCE
Status: COMPLETED | OUTPATIENT
Start: 2021-12-06 | End: 2021-12-06

## 2021-12-06 RX ORDER — ACETAMINOPHEN 325 MG/1
650 TABLET ORAL EVERY 4 HOURS PRN
Status: DISCONTINUED | OUTPATIENT
Start: 2021-12-06 | End: 2021-12-09 | Stop reason: HOSPADM

## 2021-12-06 RX ORDER — TRAZODONE HYDROCHLORIDE 50 MG/1
50 TABLET ORAL NIGHTLY PRN
Status: DISCONTINUED | OUTPATIENT
Start: 2021-12-06 | End: 2021-12-09 | Stop reason: HOSPADM

## 2021-12-06 RX ORDER — HALOPERIDOL 1 MG/1
3 TABLET ORAL EVERY 6 HOURS PRN
Status: DISCONTINUED | OUTPATIENT
Start: 2021-12-06 | End: 2021-12-09 | Stop reason: HOSPADM

## 2021-12-06 RX ADMIN — LORAZEPAM 2 MG: 2 INJECTION INTRAMUSCULAR; INTRAVENOUS at 07:37

## 2021-12-06 RX ADMIN — TRAZODONE HYDROCHLORIDE 50 MG: 50 TABLET ORAL at 21:01

## 2021-12-06 ASSESSMENT — SLEEP AND FATIGUE QUESTIONNAIRES
DO YOU USE A SLEEP AID: YES
DO YOU USE A SLEEP AID: NO
DIFFICULTY ARISING: YES
SLEEP PATTERN: DIFFICULTY FALLING ASLEEP;NIGHTMARES/TERRORS;DISTURBED/INTERRUPTED SLEEP
DO YOU HAVE DIFFICULTY SLEEPING: YES
DIFFICULTY STAYING ASLEEP: YES
AVERAGE NUMBER OF SLEEP HOURS: 0
RESTFUL SLEEP: NO
AVERAGE NUMBER OF SLEEP HOURS: 6
DO YOU HAVE DIFFICULTY SLEEPING: NO
DIFFICULTY FALLING ASLEEP: YES

## 2021-12-06 ASSESSMENT — PAIN SCALES - GENERAL: PAINLEVEL_OUTOF10: 0

## 2021-12-06 ASSESSMENT — LIFESTYLE VARIABLES
HISTORY_ALCOHOL_USE: YES
HISTORY_ALCOHOL_USE: YES

## 2021-12-06 ASSESSMENT — PATIENT HEALTH QUESTIONNAIRE - PHQ9
SUM OF ALL RESPONSES TO PHQ QUESTIONS 1-9: 27
SUM OF ALL RESPONSES TO PHQ QUESTIONS 1-9: 3

## 2021-12-06 NOTE — PROGRESS NOTES
[facility]  Admission Note     Admission Type:   Admission Type: Involuntary    Reason for admission:  Reason for Admission: \"I'm losing my apartment and I feel like a loser. \"    PATIENT STRENGTHS:  Strengths: Communication, Connection to output provider    Patient Strengths and Limitations:  Limitations: Tendency to isolate self, General negative or hopeless attitude about future/recovery, Inappropriate/potentially harmful leisure interests, Apathetic / unmotivated    Addictive Behavior:   Addictive Behavior  In the past 3 months, have you felt or has someone told you that you have a problem with:  : Other(Comments) (\"drug problem\")  Do you have a history of Chemical Use?: No  Do you have a history of Alcohol Use?: Yes  Do you have a history of Street Drug Abuse?: Yes  Histroy of Prescripton Drug Abuse?: No    Medical Problems:   Past Medical History:   Diagnosis Date    Anxiety     Arthritis     Asthma     [facility]     Chronic combined systolic and diastolic CHF (congestive heart failure) ([facility] 05/27/2018    COPD (chronic obstructive pulmonary disease) ([facility]     Depression     Diabetes mellitus ([facility]     GERD (gastroesophageal reflux disease)     Hepatitis C     resolved    Hypertension     Hyperthyroidism 8/2/2012    Hypothyroidism due to medication     Mass of testicle     Mesothelioma [facility])     pt states possibly not    Neuromuscular disorder ([address]     Other disorders of kidney and ureter     kidney stones; most recent 08/27/2015    Paroxysmal A-fib ([address]     discussed with PCP- patient does have hx of PAfib    Peptic ulcer     Prostate enlargement     Pulmonary embolism ([address]     Intermediate risk for PE on VQ scan.     Seizures ([address]     Thyroid disease     hyperthyroid       Status EXAM:  Status and Exam  Normal: No  Facial Expression: Sad  Affect: Congruent  Level of Consciousness: Alert  Mood:Normal: No  Mood: Depressed, Anxious, Ashamed/humiliated, Sad  Motor Activity:Normal: Yes  Motor Activity: Decreased  Interview Behavior: Cooperative  Preception: [place] to Person, [place] to Place, [place] to Situation, [place] to Time  Attention:Normal: Yes  Thought Processes: Perseveration, Circumstantial  Thought Content:Normal: No  Thought Content: Preoccupations  Hallucinations: None  Delusions: No  Memory:Normal: Yes  Memory: Poor Recent, Confabulation  Insight and Judgment: No  Insight and Judgment: Poor Judgment, Poor Insight  Present Suicidal Ideation: No  Present Homicidal Ideation: No    Tobacco Screening:  Practical Counseling, on admission, marisol X, if applicable and completed (first 3 are required if patient doesn't refuse):            ( )  Recognizing danger situations (included triggers and roadblocks)                    ( )  Coping skills (new ways to manage stress, exercise, relaxation techniques, changing routine, distraction)                                                           ( )  Basic information about quitting (benefits of quitting, techniques in how to quit, available resources  ( ) Referral for counseling faxed to [facility]                                           ( ) Patient refused counseling  ( ) Patient has not smoked in the last 30 days    Metabolic Screening:    Lab Results   Component Value Date    LABA1C 5.8 05/19/2018       Lab Results   Component Value Date    CHOL 133 05/19/2018    CHOL 148 07/06/2012     Lab Results   Component Value Date    TRIG 42 05/19/2018    TRIG 63 07/06/2012     Lab Results   Component Value Date    HDL 54 05/19/2018    HDL 65.0 07/06/2012     No components found for: LDLCAL  Lab Results   Component Value Date    LABVLDL 8 05/19/2018         Body mass index is 23.56 kg/m². BP Readings from Last 2 Encounters:   12/06/21 120/68   12/04/21 132/75           Pt admitted with followings belongings:  Dentures: Lowers, Uppers  Clothing:  Footwear, Jacket / coat, Pants, Shirt  Other Valuables: (keys/wallet/cell phone-no )     Patient oriented to surroundings and program expectations and copy of patient rights given. Received admission packet at time of admission. Consents reviewed & signed. Patient verbalized understanding & educated on precautions at time of admission.               Gabby Jaquez RN

## 2021-12-06 NOTE — CARE COORDINATION
Biopsychosocial Assessment Note    Social work met with patient to complete the biopsychosocial assessment and CSSR-S. Mental Status Exam: Pt appeared to be alert and oriented x 4. Pt was friendly and cooperative throughout assessment. Pt's thought process was preoccupied, speech was clear. Affect was flat. Pt currently denying SI/ HI/ hallucinations/ delusions. Chief Complaint: Yamileth Jameson is a 72 yo male who presents to the ED via ambulance, pt is on a pink slip by the ED doc due to statements to EMT's that he is suicidal with a plan to shoot himself and access to weapon. Pt denies suicidal ideation intent and plan to shoot self to triage nurse, doctor and SW. Pt states: \"Sometimes when I get upset or mad I have wild thoughts and say stuff like that. \" Pt does report he can be impulsive when upset or mad. Does report a hx of 3 suicide attempts in the past some years ago. \"    Patient Report: Pt's last admission to this psychiatric facility was 11/9/21. Pt states that he was feeling overwhelmed about his living situation and just needed to talk to someone. Pt states that he has a court date on December 7th to discuss his eviction from his apartment. Pt admits to three lifetime suicide attempts. Pt currently denying SI/ HI/ hallucinations/ delusions. Pt admits to substance use in the form of crack cocaine. Pt states that he relapsed after being sober for 10 years. Pt states that he may be interested in going to rehab at discharge. Sw alerted [facility], who states that they will come see the pt. Pt denied trauma history, but this varies throughout admissions.      Gender  [x] Male [] Female [] Transgender  [] Other    Sexual Orientation    [x] Heterosexual [] Homosexual [] Bisexual [] Other    Suicidal Ideation  [x] Past [] Present [] Denies     Homicidal Ideation  [] Past [] Present [x] Denies     Hallucinations/Delusions (Specify type)  [] Reports [x] Denies     Substance Use/Alcohol Use/Addiction  [x] Reports [] Denies     Tobacco Use (within the last 6 months)  [x] Reports [] Denies     Trauma History  [] Reports [x] Denies     Collateral Contact (OLY signed)  Name:   Relationship:  Number:     Collateral Information: Pt refused to sign a OLY       Access to Weapons per Collateral Contact: [] Reports [x] Denies       Follow up provider preference: [facility] for discharge  Location (where do they plan on discharging to?): Rehab or home    Transportation (who will pick them up at discharge?) TBD depending on where he discharges- pt will take the bus if he discharges home    Medications (will they have money for copays at discharge?): TBD depending on where the pt discharges- meds to be sent to inpatient rehab or be sent outpatient if the pt discharges home

## 2021-12-06 NOTE — PROGRESS NOTES
Patient A&Ox4. Pt denies SI, HI, and hallucinations. Pt rates anxiety and depression 10/10. Patient is pleasant and cooperative with sad affect. During admission pt was tearful when discussing past trauma, relapse and pending eviction from currently residence. Pt is visible on the unit. Pt is bright while being social with peers. Pt is med. & meal compliant. Pt with even and unlabored breathing, no signs of acute physical distress noted. Will continue to monitor and offer support as needed.

## 2021-12-06 NOTE — ED NOTES
Dr. Teja Espinal was in to see pt this morning due to medical complaints.     Pt has been referred to the access center:    Declined:  2021 [address]     Capacity:  [address]- no answer         [facility]  12/06/21 0878

## 2021-12-06 NOTE — ED NOTES
ASSIGNED [id number] TO [facility] IN [address] [place] [address], [facility] [place]  12/06/21 5580

## 2021-12-06 NOTE — ED NOTES
Spoke with Mitzy Adrian @ [phone] with [facility] clarified request of Negative stress test and cath with in one year. Stated their dr was requesting it d/t the increased troponin aware pt was there recently however their senior medical director requesting this information. Informed none done in past year. Informed this rn pt to be declined.       Carmelina Klein RN  12/05/21 5931

## 2021-12-06 NOTE — ED NOTES
Spoke with Cate Gama NP, le for patient to be admitted to [facility]  12/06/21 3581

## 2021-12-07 PROBLEM — R45.851 DEPRESSION WITH SUICIDAL IDEATION: Status: RESOLVED | Noted: 2021-12-06 | Resolved: 2021-12-07

## 2021-12-07 PROBLEM — F32.A DEPRESSION WITH SUICIDAL IDEATION: Status: RESOLVED | Noted: 2021-12-06 | Resolved: 2021-12-07

## 2021-12-07 PROBLEM — N40.0 BENIGN PROSTATIC HYPERPLASIA: Status: RESOLVED | Noted: 2018-05-21 | Resolved: 2021-12-07

## 2021-12-07 PROBLEM — I48.91 A-FIB (HCC): Status: RESOLVED | Noted: 2017-06-09 | Resolved: 2021-12-07

## 2021-12-07 PROBLEM — J45.909 ASTHMA: Status: RESOLVED | Noted: 2018-05-21 | Resolved: 2021-12-07

## 2021-12-07 PROCEDURE — 99222 1ST HOSP IP/OBS MODERATE 55: CPT | Performed by: NURSE PRACTITIONER

## 2021-12-07 PROCEDURE — 6370000000 HC RX 637 (ALT 250 FOR IP): Performed by: NURSE PRACTITIONER

## 2021-12-07 PROCEDURE — 1240000000 HC EMOTIONAL WELLNESS R&B

## 2021-12-07 RX ORDER — BUDESONIDE 0.25 MG/2ML
0.25 INHALANT ORAL 2 TIMES DAILY
Status: DISCONTINUED | OUTPATIENT
Start: 2021-12-07 | End: 2021-12-09 | Stop reason: HOSPADM

## 2021-12-07 RX ORDER — VENLAFAXINE HYDROCHLORIDE 37.5 MG/1
37.5 CAPSULE, EXTENDED RELEASE ORAL
Status: DISCONTINUED | OUTPATIENT
Start: 2021-12-08 | End: 2021-12-09 | Stop reason: HOSPADM

## 2021-12-07 RX ORDER — DIVALPROEX SODIUM 250 MG/1
250 TABLET, DELAYED RELEASE ORAL EVERY 12 HOURS SCHEDULED
Status: DISCONTINUED | OUTPATIENT
Start: 2021-12-07 | End: 2021-12-09 | Stop reason: HOSPADM

## 2021-12-07 RX ORDER — LEVETIRACETAM 500 MG/1
500 TABLET ORAL 2 TIMES DAILY
Status: DISCONTINUED | OUTPATIENT
Start: 2021-12-07 | End: 2021-12-09 | Stop reason: HOSPADM

## 2021-12-07 RX ORDER — LISINOPRIL 5 MG/1
5 TABLET ORAL DAILY
Status: DISCONTINUED | OUTPATIENT
Start: 2021-12-07 | End: 2021-12-09 | Stop reason: HOSPADM

## 2021-12-07 RX ORDER — BUDESONIDE AND FORMOTEROL FUMARATE DIHYDRATE 160; 4.5 UG/1; UG/1
2 AEROSOL RESPIRATORY (INHALATION) 2 TIMES DAILY
Status: DISCONTINUED | OUTPATIENT
Start: 2021-12-07 | End: 2021-12-07 | Stop reason: CLARIF

## 2021-12-07 RX ORDER — CHLORDIAZEPOXIDE HYDROCHLORIDE 25 MG/1
25 CAPSULE, GELATIN COATED ORAL 3 TIMES DAILY PRN
Status: ACTIVE | OUTPATIENT
Start: 2021-12-07 | End: 2021-12-09

## 2021-12-07 RX ORDER — ARFORMOTEROL TARTRATE 15 UG/2ML
15 SOLUTION RESPIRATORY (INHALATION) 2 TIMES DAILY
Status: DISCONTINUED | OUTPATIENT
Start: 2021-12-07 | End: 2021-12-09 | Stop reason: HOSPADM

## 2021-12-07 RX ORDER — FINASTERIDE 5 MG/1
5 TABLET, FILM COATED ORAL DAILY
Status: DISCONTINUED | OUTPATIENT
Start: 2021-12-07 | End: 2021-12-09 | Stop reason: HOSPADM

## 2021-12-07 RX ADMIN — FINASTERIDE 5 MG: 5 TABLET, FILM COATED ORAL at 11:17

## 2021-12-07 RX ADMIN — TRAZODONE HYDROCHLORIDE 50 MG: 50 TABLET ORAL at 20:51

## 2021-12-07 RX ADMIN — LEVETIRACETAM 500 MG: 500 TABLET, FILM COATED ORAL at 11:17

## 2021-12-07 RX ADMIN — LISINOPRIL 5 MG: 5 TABLET ORAL at 17:04

## 2021-12-07 RX ADMIN — RIVAROXABAN 20 MG: 20 TABLET, FILM COATED ORAL at 12:45

## 2021-12-07 RX ADMIN — DIVALPROEX SODIUM 250 MG: 250 TABLET, DELAYED RELEASE ORAL at 20:51

## 2021-12-07 RX ADMIN — HYDROXYZINE PAMOATE 50 MG: 50 CAPSULE ORAL at 20:51

## 2021-12-07 RX ADMIN — LEVETIRACETAM 500 MG: 500 TABLET, FILM COATED ORAL at 20:51

## 2021-12-07 ASSESSMENT — PAIN SCALES - GENERAL: PAINLEVEL_OUTOF10: 0

## 2021-12-07 NOTE — CARE COORDINATION
SW met with patient to discuss discharge plan. Pt was very tearful, stated that his mother was recently diagnosed with dementia and he has been using crack cocaine to \"get through\". SW provided active listening and emotional support. SW discussed discharge options, including inpatient substance abuse rehab. Pt denied the need for substance abuse rehab, stated that he lives alone in an apartment, attends [place] [id number] meetings and has support from his sponsor. Plan is for pt to discharge home and follow up with [facility] (Appointments made).     Jennifer Woods MSW, PHILLY

## 2021-12-07 NOTE — PLAN OF CARE
Problem: Suicide risk  Description: Suicide risk  Goal: Provide patient with safe environment  Description: Provide patient with safe environment  Outcome: Met This Shift     Problem: Depressive Behavior With or Without Suicide Precautions:  Goal: Able to verbalize and/or display a decrease in depressive symptoms  Description: Able to verbalize and/or display a decrease in depressive symptoms  Outcome: Met This Shift  Goal: Absence of self-harm  Description: Absence of self-harm  Outcome: Met This Shift

## 2021-12-07 NOTE — PROGRESS NOTES
Patient has been out on the unit, pleasant, calm, and cooperative. Patient denies all and denies depression but rates his anxiety a 10/10. Patient is social with others and observed to appear brightened. Patient is encouraged to continue to work towards discharge goal by complying with medications, attending groups and to seek staff if feelings are overwhelming. Environmental rounds completed per unit policy to maintain safety of everyone on the unit. Staff will offer support and interventions as requested or required.

## 2021-12-07 NOTE — PLAN OF CARE
[facility]  Initial Interdisciplinary Treatment Plan NOTE    Review Date & Time: 12/7/2021  0900    Patient was in treatment team    Admission Type:   Admission Type: Involuntary    Reason for admission:  Reason for Admission: \"I'm losing my apartment and I feel like a loser. \"      Estimated Length of Stay Update:  3-5 days  Estimated Discharge Date Update: 12/10/2021    EDUCATION:   Learner Progress Toward Treatment Goals: Reviewed results and recommendations of this team and Reviewed goals and plan of care    Method: Small group    Outcome: Verbalized understanding and Demonstrated Understanding    PATIENT GOALS:  To be positive    PLAN/TREATMENT RECOMMENDATIONS UPDATE: Pt will take prescribed medication. Pt will attend and participate in group. Will monitor pt.       GOALS UPDATE:   Time frame for Short-Term Goals: 1-3 days    Pearlie Najjar, RN
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
## 2021-12-07 NOTE — PLAN OF CARE
Problem: Depressive Behavior With or Without Suicide Precautions:  Goal: Able to verbalize and/or display a decrease in depressive symptoms  Description: Able to verbalize and/or display a decrease in depressive symptoms  12/7/2021 1202 by Brooks Peck RN  Outcome: Ongoing     Problem: Depressive Behavior With or Without Suicide Precautions:  Goal: Ability to disclose and discuss suicidal ideas will improve  Description: Ability to disclose and discuss suicidal ideas will improve  Outcome: Met This Shift     Pt denies SI/HI/AVH and rates anxiety and depression 10/10. Pt is out on the unit social with peers. Pt is pleasant, calm and cooperative. Pt is attending groups and eating provided meals. Will continue to monitor.

## 2021-12-07 NOTE — PROGRESS NOTES
Spiritual Support Group Note    Number of Participants in Group:     6                   Time:   2:30    Goal: Relief from isolation and loneliness             Ashley Sharing             Self-understanding and gain insight              Acceptance and belonging            Recognize they are not alone                Socialization             Empowerment       Encouragement    Topic:  [x] Spiritual Wellness and Self Care                  [x] Hope                     [] Connecting with Divine/Others        [] Thankfulness and Gratitude               []  Meaningfulness and Purpose               [] Forgiveness               [x] Peace               [] Connect to [employer]      [] Other    Participation Level:   [x] Active Listener   [] Minimal   [] Monopolizing   [] Interactive   [] No Participation   []  Other:     Attention:   [x] Alert   [] Distractible   [] Drowsy   [] Poor   [] Other:    Manner:   [x] Cooperative   [] Suspicious   [] Withdrawn   [] Guarded   [] Irritable   [] Inhospitable   [] Other:     Others Comments from Group:

## 2021-12-07 NOTE — GROUP NOTE
Group Therapy Note    Date: 12/7/2021    Group Start Time: 1110  Group End Time: 1140  Group Topic: Cognitive Skills    [facility]    Thankful DAVID Pablo, TONY        Group Therapy Note    Attendees: 10       Patient's Goal:  Patient will learn about trauma and how it effects us, along with evidenced based treatment interventions available. Notes:  Patient was an active participant in group therapy. Status After Intervention:  Improved    Participation Level: Active Listener and Interactive    Participation Quality: Appropriate, Attentive, Sharing and Supportive      Speech:  normal      Thought Process/Content: Logical      Affective Functioning: Blunted      Mood: anxious      Level of consciousness:  Alert and Attentive      Response to Learning: Able to verbalize current knowledge/experience, Able to verbalize/acknowledge new learning and Able to retain information      Endings: None Reported    Modes of Intervention: Education, Support, Socialization, Exploration, Clarifying and Problem-solving      Discipline Responsible: /Counselor      Signature:   DAVID Torres, TONY

## 2021-12-07 NOTE — GROUP NOTE
Group Therapy Note    Date: 12/7/2021    Group Start Time: 1000  Group End Time: 1030  Group Topic: Psychoeducation    [facility]    Yanira Cevallos, CTRS        Group Therapy Note      Number of participants: 7  Type of group: Psychoeducation  Mode of intervention: Education, Support, Socialization, Exploration, Clarifying, and Problem-solving  Topic: Healthy Boundaries  Objective: Pt will identify 1 way to implement healthy boundaries in recovery. Patient's Goal:  'Quit having suicidal thoughts\"     Notes:  Pt offered minimal interaction during group but was an active listener throughout. Accepting of handout and support to others. Status After Intervention:  Unchanged    Participation Level:  Active Listener and Minimal    Participation Quality: Appropriate and Attentive      Speech:  normal      Thought Process/Content: Logical      Affective Functioning: Congruent      Mood: euthymic      Level of consciousness:  Alert, Oriented x4 and Attentive      Response to Learning: Able to verbalize current knowledge/experience, Able to verbalize/acknowledge new learning, Able to retain information, Capable of insight, Able to change behavior and Progressing to goal      Endings: None Reported    Modes of Intervention: Education, Support, Socialization, Exploration, Clarifying and Problem-solving

## 2021-12-08 PROCEDURE — 1240000000 HC EMOTIONAL WELLNESS R&B

## 2021-12-08 PROCEDURE — 99231 SBSQ HOSP IP/OBS SF/LOW 25: CPT | Performed by: NURSE PRACTITIONER

## 2021-12-08 PROCEDURE — 6370000000 HC RX 637 (ALT 250 FOR IP): Performed by: NURSE PRACTITIONER

## 2021-12-08 RX ORDER — IBUPROFEN 600 MG/1
600 TABLET ORAL ONCE
Status: COMPLETED | OUTPATIENT
Start: 2021-12-08 | End: 2021-12-08

## 2021-12-08 RX ADMIN — IBUPROFEN 600 MG: 600 TABLET, FILM COATED ORAL at 16:14

## 2021-12-08 RX ADMIN — VENLAFAXINE HYDROCHLORIDE 37.5 MG: 37.5 CAPSULE, EXTENDED RELEASE ORAL at 08:49

## 2021-12-08 RX ADMIN — LEVETIRACETAM 500 MG: 500 TABLET, FILM COATED ORAL at 20:40

## 2021-12-08 RX ADMIN — HYDROXYZINE PAMOATE 50 MG: 50 CAPSULE ORAL at 20:40

## 2021-12-08 RX ADMIN — LISINOPRIL 5 MG: 5 TABLET ORAL at 08:49

## 2021-12-08 RX ADMIN — LEVETIRACETAM 500 MG: 500 TABLET, FILM COATED ORAL at 08:49

## 2021-12-08 RX ADMIN — RIVAROXABAN 20 MG: 20 TABLET, FILM COATED ORAL at 08:49

## 2021-12-08 RX ADMIN — TRAZODONE HYDROCHLORIDE 50 MG: 50 TABLET ORAL at 20:40

## 2021-12-08 RX ADMIN — DIVALPROEX SODIUM 250 MG: 250 TABLET, DELAYED RELEASE ORAL at 20:40

## 2021-12-08 RX ADMIN — FINASTERIDE 5 MG: 5 TABLET, FILM COATED ORAL at 08:49

## 2021-12-08 RX ADMIN — DIVALPROEX SODIUM 250 MG: 250 TABLET, DELAYED RELEASE ORAL at 08:49

## 2021-12-08 ASSESSMENT — PAIN SCALES - GENERAL: PAINLEVEL_OUTOF10: 5

## 2021-12-08 NOTE — GROUP NOTE
Group Therapy Note    Date: 12/8/2021    Group Start Time: 1000  Group End Time: 9793  Group Topic: Psychoeducation    [facility]    Yanira Cevallos, CTRS        Group Therapy Note    Number of participants: 9  Type of group: Psychoeducation  Mode of intervention: Education, Support, Socialization, Exploration, Clarifying, and Problem-solving  Topic: Staying Motivated: Gratitude, Guidance, Grit  Objective: Pt will identify 3 ways to incorporate gratitude, guidance, and grit in to recovery. Patient's Goal:  \"Just to be happy\"     Notes:   Pt interactive during group sharing 3 ways to incorporate gratitude, guidance, and grit in recovery. Pt gave support and feedback to others. Status After Intervention:  Improved    Participation Level:  Active Listener and Interactive    Participation Quality: Appropriate, Attentive, Sharing and Supportive      Speech:  normal      Thought Process/Content: Logical      Affective Functioning: Congruent      Mood: euthymic      Level of consciousness:  Alert, Oriented x4 and Attentive      Response to Learning: Able to verbalize current knowledge/experience, Able to verbalize/acknowledge new learning, Able to retain information, Capable of insight, Able to change behavior and Progressing to goal      Endings: None Reported    Modes of Intervention: Education, Support, Socialization, Exploration, Clarifying and Problem-solving

## 2021-12-08 NOTE — PROGRESS NOTES
Patient has been out on the unit, cooperative but irritable. Patient denies all and states that his anxiety and depression are \"horrible\" and patient is unable to rate them at this time. Patient is upset because his Ativan has not been restarted and patient states that \"I've been on that for years and they're not giving it to me and they haven't started me on my antidepressant either. \"  Explained to patient that his Effexor is being restarted in the morning. Patient still unhappy about his Ativan not being restarted. Patient was yelling at the LPN and this nurse overheard patient threatening to \"burn this place down\" and to \"come back here and blow it up. \"  Patient then apologized for saying these things and stated \"sometimes I just say impulsive things, but I'm not going to do it. \"  Patient is social with other patients. Patient is incongruent because although he states that he is anxious/depressed, patient is observed laughing and socializing with other patients while out on the unit. Patient is encouraged to continue to work towards discharge goal by complying with medications, attending groups and to seek staff if feelings are overwhelming. Environmental rounds completed per unit policy to maintain safety of everyone on the unit. Staff will offer support and interventions as requested or required.

## 2021-12-08 NOTE — PROGRESS NOTES
BEHAVIORAL HEALTH FOLLOW-UP NOTE     12/8/2021     Patient was seen and examined in person, Chart reviewed   Patient's case discussed with staff/team  I personally seen and assessed the patient this morning and agree with the below assessment by the medical student. Mental status examination revealed a 71-year-old  man casually dressed calm cooperative and was able to recognize me after I entered into his room.  Psychomotor revealed no agitation retardation or any other abnormal movement.  Eye contact was fair.  Speech is normal rate tone, able to answer questions with relevance.  Mood\" I am depressed\" affect is blunted depressed congruent with stated mood.  Thought process linear without flight of ideas or looseness of association, goal directed.  Thought contents are devoid of auditory visual hallucination delusion or any other perceptual abnormalities.  Denies suicidal ideation now but he said he does not want to live anymore he endorsed a plan to overdose.  Denies homicidal ideation.  Insight, judgement and impulse control are poor. Cognitive function seem to be at the baseline.  Alert and oriented pleasant person. Chief Complaint: \"I'm feeling fine. \"    Interim History:   Patient was seen in the milieu talking to peers. Patient is irritable about being hungry. Patient states his mood is fine. He denies neurovegetative symptoms of depression. States he has trouble sleeping and trazodone does not help. Denies suicidal ideation, intent, and plan. Denies homicidal ideation.      Appetite:   [x] Normal/Unchanged  [] Increased  [] Decreased      Sleep:       [] Normal/Unchanged  [] Fair       [x] Poor              Energy:    [x] Normal/Unchanged  [] Increased  [] Decreased        SI [] Present  [x] Absent    HI  []Present  [x] Absent     Aggression:  [] yes  [x] no    Patient is [x] able  [] unable to CONTRACT FOR SAFETY     PAST MEDICAL/PSYCHIATRIC HISTORY:   Past Medical History:   Diagnosis Date    Anxiety     Arthritis     Asthma     [facility]ipolar [facility]     Chronic combined systolic and diastolic CHF (congestive heart failure) ([facility] 2018    COPD (chronic obstructive pulmonary disease) (HCC)     Depression     Diabetes mellitus (HCC)     GERD (gastroesophageal reflux disease)     Hepatitis C     resolved    Hypertension     Hyperthyroidism 2012    Hypothyroidism due to medication     Mass of testicle     [facility]othelioma [facility])     pt states possibly not    Neuromuscular disorder ([facility]     Other disorders of kidney and ureter     kidney stones; most recent 2015    Paroxysmal A-fib ([facility]     discussed with PCP- patient does have hx of PAfib    Peptic ulcer     Prostate enlargement     Pulmonary embolism (HCC)     Intermediate risk for PE on VQ scan.     Seizures ([facility]     Thyroid disease     hyperthyroid       FAMILY/SOCIAL HISTORY:  Family History   Problem Relation Age of Onset    Cancer Mother     Diabetes Mother     Cancer Father     Diabetes Father     Diabetes Maternal Aunt     Diabetes Maternal Grandmother     Cancer Maternal Grandfather     Heart Disease Paternal Grandmother     Heart Failure Paternal Grandmother     Heart Disease Paternal Grandfather     Heart Failure Paternal Grandfather     Diabetes Maternal Aunt     Diabetes Maternal Aunt      Social History     Socioeconomic History    Marital status: Single     Spouse name: Not on file    Number of children: Not on file    Years of education: Not on file    Highest education level: Not on file   Occupational History    Occupation: disability   Tobacco Use    Smoking status: Former Smoker     Years: 10.00     Types: Cigarettes     Quit date: 1/10/2010     Years since quittin.9    Smokeless tobacco: Former User     Types: [address] date:    Vaping Use    Vaping Use: Never used   Substance and Sexual Activity    Alcohol use: No     Alcohol/week: 0.0 standard drinks     Comment: recovered alcoholic; 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    Drug use: Yes     Types: Cocaine     Comment: \"I just started smoking crack again\"    Sexual activity: Yes     Comment: heroin   Other Topics Concern    Not on file   Social History Narrative    Not on file     Social Determinants of Health     Financial Resource Strain:     Difficulty of Paying Living Expenses: Not on file   Food Insecurity:     Worried About Running Out of Food in the Last Year: Not on file    Mark of Food in the Last Year: Not on file   Transportation Needs:     Lack of Transportation (Medical): Not on file    Lack of Transportation (Non-Medical): Not on file   Physical Activity:     Days of Exercise per Week: Not on file    Minutes of Exercise per Session: Not on file   Stress:     Feeling of Stress : Not on file   Social Connections:     Frequency of Communication with Friends and Family: Not on file    Frequency of Social Gatherings with Friends and Family: Not on file    Attends [employer] Services: Not on file    Active Member of [address] [place] [address] or Organizations: Not on file    Attends Club or Organization Meetings: Not on file    Marital Status: Not on file   Intimate Partner Violence:     Fear of Current or Ex-Partner: Not on file    Emotionally Abused: Not on file    Physically Abused: Not on file    Sexually Abused: Not on file   Housing Stability:     Unable to Pay for Housing in the Last Year: Not on file    Number of [place] in the Last Year: Not on file    Unstable Housing in the Last Year: Not on file           ROS:  [x] All negative/unchanged except if checked.  Explain positive(checked items) below:  [] Constitutional  [] Eyes  [] Ear/Nose/Mouth/Throat  [] Respiratory  [] CV  [] GI  []   [] Musculoskeletal  [] Skin/Breast  [] Neurological  [] Endocrine  [] Heme/Lymph  [] Allergic/Immunologic    Explanation:     MEDICATIONS:    Current Facility-Administered Medications:     finasteride (PROSCAR) tablet 5 mg, 5 mg, Oral, Daily, Ashok Cypher, APRN - CNP, 5 mg at 12/08/21 0849    levETIRAcetam (KEPPRA) tablet 500 mg, 500 mg, Oral, BID, Ashok Cypher, APRN - CNP, 500 mg at 12/08/21 0849    lisinopril (PRINIVIL;ZESTRIL) tablet 5 mg, 5 mg, Oral, Daily, Ashok Cypher, APRN - CNP, 5 mg at 12/08/21 0849    rivaroxaban (XARELTO) tablet 20 mg, 20 mg, Oral, Daily, Ashok Cypher, APRN - CNP, 20 mg at 12/08/21 0849    chlordiazePOXIDE (LIBRIUM) capsule 25 mg, 25 mg, Oral, TID PRN, Ashok Cypher, APRN - CNP    divalproex (DEPAKOTE) DR tablet 250 mg, 250 mg, Oral, 2 times per day, Ashok Cypher, APRN - CNP, 250 mg at 12/08/21 0849    venlafaxine (EFFEXOR XR) extended release capsule 37.5 mg, 37.5 mg, Oral, Daily with breakfast, Ashok Cypher, APRN - CNP, 37.5 mg at 12/08/21 0849    budesonide (PULMICORT) nebulizer suspension 250 mcg, 0.25 mg, Nebulization, BID **AND** Arformoterol Tartrate (BROVANA) nebulizer solution 15 mcg, 15 mcg, Nebulization, BID, Ashok Cypher, APRN - CNP    acetaminophen (TYLENOL) tablet 650 mg, 650 mg, Oral, Q4H PRN, Ashok Cypher, APRN - CNP    magnesium hydroxide (MILK OF MAGNESIA) 400 MG/5ML suspension 30 mL, 30 mL, Oral, Daily PRN, Ashok Cypher, APRN - CNP    aluminum & magnesium hydroxide-simethicone (MAALOX) 200-200-20 MG/5ML suspension 30 mL, 30 mL, Oral, PRN, Ashok Cypher, APRN - CNP    hydrOXYzine (VISTARIL) capsule 50 mg, 50 mg, Oral, TID PRN, Ashok Cypher, APRN - CNP, 50 mg at 12/07/21 2051    haloperidol (HALDOL) tablet 3 mg, 3 mg, Oral, Q6H PRN **OR** haloperidol lactate (HALDOL) injection 3 mg, 3 mg, IntraMUSCular, Q6H PRN, Ashok Ayala, APRN - CNP    traZODone (DESYREL) tablet 50 mg, 50 mg, Oral, Nightly PRN, Ashok Ayala, APRN - CNP, 50 mg at 12/07/21 2051      Examination:  BP (!) 112/55   Pulse 71   Temp 98.6 °F (37 °C) (Temporal)   Resp 16   Ht 5' 3\" (1.6 m)   Wt 133 lb (60.3 kg)   SpO2 98%   BMI 23.56 kg/m²   Gait - steady  Medication side effects(SE):     Mental Status Examination:    Level of consciousness:  within normal limits   Appearance:  fair grooming and fair hygiene  Behavior/Motor:  irritable  Attitude toward examiner:  cooperative and good eye contact  Speech:  normal rate and normal volume   Mood: \"I'm feeling fine. \"  Affect:  mood congruent and irritable  Thought processes:  Goal directed   Thought content:  Suicidal Ideation:  denies suicidal ideation  Delusions:  no evidence of delusions  Perceptual Disturbance:  denies any perceptual disturbance  Cognition:  oriented to person, place, and time   Concentration intact  Insight fair   Judgement fair     ASSESSMENT:   Patient symptoms are:  [] Well controlled  [x] Improving  [] Worsening  [] No change      Diagnosis:   Principal Problem:    Bipolar disorder current episode depressed ([facility]  Active Problems:    [facility]od[facility]ine dependence [facility]    Cluster B personality disorder ([facility]    Cocaine abuse ([facility]  Resolved Problems:    Depression with suicidal ideation      LABS:    No results for input(s): WBC, HGB, PLT in the last 72 hours. No results for input(s): NA, K, CL, CO2, BUN, CREATININE, GLUCOSE in the last 72 hours. No results for input(s): BILITOT, ALKPHOS, AST, ALT in the last 72 hours. Lab Results   Component Value Date    LABAMPH NOT DETECTED 12/05/2021    LABAMPH NOT DETECTED 07/04/2011    BARBSCNU NOT DETECTED 12/05/2021    LABBENZ POSITIVE 12/05/2021    LABBENZ SEE BELOW 07/01/2014    CANNAB POSITIVE  07/04/2011    LABMETH NOT DETECTED 12/05/2021    OPIATESCREENURINE NOT DETECTED 12/05/2021    PHENCYCLIDINESCREENURINE NOT DETECTED 12/05/2021    ETOH <10 12/05/2021     Lab Results   Component Value Date    TSH 22.830 09/24/2021     No results found for: LITHIUM  Lab Results   Component Value Date    VALPROATE 31 (L) 06/02/2016       RISK ASSESSMENT:     Treatment Plan:  Reviewed current Medications with the patient.    Risks, benefits, side effects, drug-to-drug interactions and alternatives to treatment were discussed. Collateral information:   CD evaluation  Encourage patient to attend group and other milieu activities.   Discharge planning discussed with the patient and treatment team.    PSYCHOTHERAPY/COUNSELING:  [x] Therapeutic interview  [x] Supportive  [] CBT  [] Ongoing  [] Other    [x] Patient continues to need, on a daily basis, active treatment furnished directly by or requiring the supervision of inpatient psychiatric personnel      Anticipated Length of stay:            Electronically signed by Checo Pa on 12/8/2021 at 10:02 AM

## 2021-12-08 NOTE — PLAN OF CARE
Problem: Depressive Behavior With or Without Suicide Precautions:  Goal: Able to verbalize acceptance of life and situations over which he or she has no control  Description: Able to verbalize acceptance of life and situations over which he or she has no control  Outcome: Not Met This Shift     Problem: Depressive Behavior With or Without Suicide Precautions:  Goal: Ability to disclose and discuss suicidal ideas will improve  Description: Ability to disclose and discuss suicidal ideas will improve  12/7/2021 2241 by Marisol Stewart RN  Outcome: Met This Shift     Problem: Suicide risk  Description: Suicide risk  Goal: Provide patient with safe environment  Description: Provide patient with safe environment  Outcome: Met This Shift

## 2021-12-08 NOTE — GROUP NOTE
Group Therapy Note    Date: 12/8/2021    Group Start Time: 1050  Group End Time: 1125  Group Topic: Cognitive Skills    [facility]    DAVID Monet LSW        Group Therapy Note    Attendees: 8         Patient's Goal: Pt will be able to verbalize the understanding of gratitude, and various exercise we can use to demonstrate gratitude. Notes:  Pt made connections and participated in group    Status After Intervention:  Improved    Participation Level:  Active Listener and Interactive    Participation Quality: Appropriate, Attentive, Sharing and Supportive      Speech:  normal      Thought Process/Content: Logical  Linear      Affective Functioning: Congruent      Mood: depressed      Level of consciousness:  Alert, Oriented x4 and Attentive      Response to Learning: Able to verbalize current knowledge/experience      Endings: None Reported    Modes of Intervention: Education, Support, Socialization, Exploration, Clarifying, Problem-solving and Activity      Discipline Responsible: /Counselor      Signature:  DAVID Monet LSW

## 2021-12-09 VITALS
TEMPERATURE: 97 F | OXYGEN SATURATION: 99 % | DIASTOLIC BLOOD PRESSURE: 78 MMHG | BODY MASS INDEX: 23.57 KG/M2 | HEART RATE: 73 BPM | HEIGHT: 63 IN | RESPIRATION RATE: 14 BRPM | SYSTOLIC BLOOD PRESSURE: 126 MMHG | WEIGHT: 133 LBS

## 2021-12-09 PROCEDURE — 99239 HOSP IP/OBS DSCHRG MGMT >30: CPT | Performed by: NURSE PRACTITIONER

## 2021-12-09 PROCEDURE — 6370000000 HC RX 637 (ALT 250 FOR IP): Performed by: NURSE PRACTITIONER

## 2021-12-09 RX ORDER — DIVALPROEX SODIUM 250 MG/1
250 TABLET, DELAYED RELEASE ORAL EVERY 12 HOURS SCHEDULED
Qty: 60 TABLET | Refills: 0 | Status: SHIPPED | OUTPATIENT
Start: 2021-12-09 | End: 2022-08-11

## 2021-12-09 RX ORDER — LEVETIRACETAM 500 MG/1
500 TABLET ORAL 2 TIMES DAILY
Qty: 60 TABLET | Refills: 0 | Status: SHIPPED | OUTPATIENT
Start: 2021-12-09 | End: 2022-07-20

## 2021-12-09 RX ORDER — ATORVASTATIN CALCIUM 40 MG/1
40 TABLET, FILM COATED ORAL NIGHTLY
Qty: 30 TABLET | Refills: 0 | Status: SHIPPED | OUTPATIENT
Start: 2021-12-09 | End: 2022-08-11

## 2021-12-09 RX ORDER — VENLAFAXINE HYDROCHLORIDE 37.5 MG/1
37.5 CAPSULE, EXTENDED RELEASE ORAL
Qty: 30 CAPSULE | Refills: 0 | Status: SHIPPED | OUTPATIENT
Start: 2021-12-10 | End: 2022-08-11

## 2021-12-09 RX ADMIN — FINASTERIDE 5 MG: 5 TABLET, FILM COATED ORAL at 08:53

## 2021-12-09 RX ADMIN — RIVAROXABAN 20 MG: 20 TABLET, FILM COATED ORAL at 08:53

## 2021-12-09 RX ADMIN — VENLAFAXINE HYDROCHLORIDE 37.5 MG: 37.5 CAPSULE, EXTENDED RELEASE ORAL at 08:53

## 2021-12-09 RX ADMIN — DIVALPROEX SODIUM 250 MG: 250 TABLET, DELAYED RELEASE ORAL at 08:53

## 2021-12-09 RX ADMIN — LISINOPRIL 5 MG: 5 TABLET ORAL at 08:53

## 2021-12-09 RX ADMIN — LEVETIRACETAM 500 MG: 500 TABLET, FILM COATED ORAL at 08:53

## 2021-12-09 ASSESSMENT — PAIN SCALES - GENERAL
PAINLEVEL_OUTOF10: 0
PAINLEVEL_OUTOF10: 0

## 2021-12-09 NOTE — PROGRESS NOTES
[facility]  Discharge Note    Pt discharged with followings belongings:   Dental Appliances: Lowers, Uppers  Clothing: Footwear, Jacket / coat, Pants, Shirt  Other Valuables:  (keys/wallet/cell phone-no )   Valuables sent home with patient or returned to patient. Patient education on aftercare instructions: Yes  at 12:20 PM .Patient verbalize understanding of AVS:  Yes.     Status EXAM upon discharge:  Status and Exam  Normal: Yes  Facial Expression: Brightened  Affect: Congruent  Level of Consciousness: Alert  Mood:Normal: No  Mood: Anxious  Motor Activity:Normal: Yes  Motor Activity: Increased  Interview Behavior: Cooperative  Preception: [place] to Person, [place] to Time, [place] to Place, [place] to Situation  Attention:Normal: No  Attention: Distractible  Thought Processes: Circumstantial  Thought Content:Normal: No  Thought Content: Preoccupations  Hallucinations: None  Delusions: No  Memory:Normal: No  Memory: Poor Recent  Insight and Judgment: Yes (Improving)  Insight and Judgment: Poor Judgment, Poor Insight  Present Suicidal Ideation: No  Present Homicidal Ideation: No      Metabolic Screening:    Lab Results   Component Value Date    LABA1C 5.8 05/19/2018       Lab Results   Component Value Date    CHOL 133 05/19/2018    CHOL 148 07/06/2012     Lab Results   Component Value Date    TRIG 42 05/19/2018    TRIG 63 07/06/2012     Lab Results   Component Value Date    HDL 54 05/19/2018    HDL 65.0 07/06/2012     No components found for: [facility] EVALUATION AND TREATMENT [place]  Lab Results   Component Value Date    LABVLDL 8 05/19/2018       Skyler Garcia RN

## 2021-12-09 NOTE — H&P
DISCHARGE SUMMARY      Patient ID:  Yen Andrade  [id number]  42 y.o.  1955    Admit date: 12/5/2021    Discharge date and time: 12/9/2021    Admitting Physician: Cathy Carrillo MD     Discharge Physician: Dr Robert Mckinney MD    Discharge Diagnoses:   Patient Active Problem List   Diagnosis    Hyperthyroidism    Hepatitis C    Mood disorder ([facility]    Mild mitral regurgitation    Hep C w/o coma, chronic (HCC)    Chronic obstructive pulmonary disease (HCC)    Dyspnea and respiratory abnormalities    Respiratory failure with hypoxia ([address]    Severe protein-calorie malnutrition ([address]    Essential (primary) hypertension    H/O drug abuse ([address]    Gastroduodenal ulcer    Seasonal allergic rhinitis    Type 2 diabetes mellitus ([address]    Vitamin D deficiency    LV dysfunction    S/P MVR (mitral valve repair)    NSTEMI (non-ST elevated myocardial infarction) ([address]    Suicidal ideations    Benzodiazepine dependence ([address]    Suicidal ideation    Bipolar disorder current episode depressed ([address]    Polysubstance abuse ([address]    Cluster B personality disorder ([address]    History of noncompliance with medical treatment, presenting hazards to health    Cocaine abuse ([facility]    Cannabis abuse       Admission Condition: poor    Discharged Condition: stable    Admission Circumstance:   Pt is a 72 yo male who presents to the ED via ambulance, pt is on a pink slip by the ED doc due to statements to EMT's that he is suicidal with a plan to shoot himself and access to weapon. Pt denies suicidal ideation intent and plan to shoot self to triage nurse, doctor and SW. Pt states: \"Sometimes when I get upset or mad I have wild thoughts and say stuff like that. \" Pt does report he can be impulsive when upset or mad.  Does report a hx of 3 suicide attempts in the past some years ago        PAST MEDICAL/PSYCHIATRIC HISTORY:   Past Medical History:   Diagnosis Date    Anxiety     Arthritis     Asthma     Bipolar 1 disorder ([facility]     Chronic combined systolic and diastolic CHF (congestive heart failure) ([facility] 2018    COPD (chronic obstructive pulmonary disease) (HCC)     Depression     Diabetes mellitus (HCC)     GERD (gastroesophageal reflux disease)     Hepatitis C     resolved    Hypertension     Hyperthyroidism 2012    Hypothyroidism due to medication     Mass of testicle     Mesothelioma [facility])     pt states possibly not    Neuromuscular disorder ([facility]     Other disorders of kidney and ureter     kidney stones; most recent 2015    Paroxysmal A-fib [facility])     discussed with PCP- patient does have hx of PAfib    Peptic ulcer     Prostate enlargement     Pulmonary embolism (HCC)     Intermediate risk for PE on VQ scan.  Seizures ([facility]     Thyroid disease     hyperthyroid       FAMILY/SOCIAL HISTORY:  Family History   Problem Relation Age of Onset    Cancer Mother     Diabetes Mother     Cancer Father     Diabetes Father     Diabetes Maternal Aunt     Diabetes Maternal Grandmother     Cancer Maternal Grandfather     Heart Disease Paternal Grandmother     Heart Failure Paternal Grandmother     Heart Disease Paternal Grandfather     Heart Failure Paternal Grandfather     Diabetes Maternal Aunt     Diabetes Maternal Aunt      Social History     Socioeconomic History    Marital status: Single     Spouse name: Not on file    Number of children: Not on file    Years of education: Not on file    Highest education level: Not on file   Occupational History    Occupation: disability   Tobacco Use    Smoking status: Former Smoker     Years: 10.00     Types: Cigarettes     Quit date: 1/10/2010     Years since quittin.9    Smokeless tobacco: Former User     Types: [address] date:    Vaping Use    Vaping Use: Never used   Substance and Sexual Activity    Alcohol use: No     Alcohol/week: 0.0 standard drinks     Comment: recovered alcoholic; last use 1505    Drug use:  Yes Types: Cocaine     Comment: \"I just started smoking crack again\"    Sexual activity: Yes     Comment: heroin   Other Topics Concern    Not on file   Social History Narrative    Not on file     Social Determinants of Health     Financial Resource Strain:     Difficulty of Paying Living Expenses: Not on file   Food Insecurity:     Worried About Running Out of Food in the Last Year: Not on file    Mark of Food in the Last Year: Not on file   Transportation Needs:     Lack of Transportation (Medical): Not on file    Lack of Transportation (Non-Medical):  Not on file   Physical Activity:     Days of Exercise per Week: Not on file    Minutes of Exercise per Session: Not on file   Stress:     Feeling of Stress : Not on file   Social Connections:     Frequency of Communication with Friends and Family: Not on file    Frequency of Social Gatherings with Friends and Family: Not on file    Attends Orthodoxy Services: Not on file    Active Member of [address] [place] [address] or Organizations: Not on file    Attends Club or Organization Meetings: Not on file    Marital Status: Not on file   Intimate Partner Violence:     Fear of Current or Ex-Partner: Not on file    Emotionally Abused: Not on file    Physically Abused: Not on file    Sexually Abused: Not on file   Housing Stability:     Unable to Pay for Housing in the Last Year: Not on file    Number of [place] in the Last Year: Not on file    Unstable Housing in the Last Year: Not on file       MEDICATIONS:    Current Facility-Administered Medications:     finasteride (PROSCAR) tablet 5 mg, 5 mg, Oral, Daily, Lennart Hippo, APRN - CNP, 5 mg at 12/09/21 0853    levETIRAcetam (KEPPRA) tablet 500 mg, 500 mg, Oral, BID, Lennart Hippo, APRN - CNP, 500 mg at 12/09/21 0853    lisinopril (PRINIVIL;ZESTRIL) tablet 5 mg, 5 mg, Oral, Daily, Lennart Hippo, APRN - CNP, 5 mg at 12/09/21 0853    rivaroxaban (XARELTO) tablet 20 mg, 20 mg, Oral, Daily, Lennart Hippo, APRN - CNP, 20 mg at 12/09/21 0853    divalproex (DEPAKOTE) DR tablet 250 mg, 250 mg, Oral, 2 times per day, ZOIE Connelly - CNP, 250 mg at 12/09/21 0853    venlafaxine (EFFEXOR XR) extended release capsule 37.5 mg, 37.5 mg, Oral, Daily with breakfast, ZOIE Connelly - CNP, 37.5 mg at 12/09/21 0853    budesonide (PULMICORT) nebulizer suspension 250 mcg, 0.25 mg, Nebulization, BID **AND** Arformoterol Tartrate (BROVANA) nebulizer solution 15 mcg, 15 mcg, Nebulization, BID, ZOIE Connelly CNP    acetaminophen (TYLENOL) tablet 650 mg, 650 mg, Oral, Q4H PRN, ZOIE Connelly - CNP    magnesium hydroxide (MILK OF MAGNESIA) 400 MG/5ML suspension 30 mL, 30 mL, Oral, Daily PRN, ZOIE Connelly CNP    aluminum & magnesium hydroxide-simethicone (MAALOX) 200-200-20 MG/5ML suspension 30 mL, 30 mL, Oral, PRN, ZOIE Connelly - CNP    hydrOXYzine (VISTARIL) capsule 50 mg, 50 mg, Oral, TID PRN, ZOIE Connelly - CNP, 50 mg at 12/08/21 2040    haloperidol (HALDOL) tablet 3 mg, 3 mg, Oral, Q6H PRN **OR** haloperidol lactate (HALDOL) injection 3 mg, 3 mg, IntraMUSCular, Q6H PRN, ZOIE Connelly CNP    traZODone (DESYREL) tablet 50 mg, 50 mg, Oral, Nightly PRN, ZOIE Connelly - CNP, 50 mg at 12/08/21 2040    Examination:  /78   Pulse 73   Temp 97 °F (36.1 °C) (Temporal)   Resp 14   Ht 5' 3\" (1.6 m)   Wt 133 lb (60.3 kg)   SpO2 99%   BMI 23.56 kg/m²   Gait - steady    HOSPITAL COURSE[de-identified]  Following admission to the hospital, patient had a complete physical exam and blood work up, which he was medically cleared and admitted to [facility] for psychiatric evaluation and stabilization. The patient was monitored closely with suicide and appropriate precautions. He was started on Effexor 37.5 mg daily, Depakote 250 mg twice daily for mood stabilization and he was continued on his home medications as clinically indicated.   This patient has a long history of chronic treatment noncompliance. He was encouraged to participate in group and other milieu activity and started to feel better with this combination of treatment. There has been significant progress in the improvement of symptoms since admission. The patient has been an active participant in his treatment, and discharge planning. Patient was no longer suicidal, homicidal, manic or psychotic. He received the required treatment with medication, participated in group milieu, remained engaged in unit activities, learned appropriate coping skills. He was seen to be watching television socializing with peers using the phone. There were no mention or gestures of self-harm or harm to others. His mental status has returned to baseline. The treatment team believes the patient obtain maximum benefit out of this hospitalization and does not meet the criteria for inpatient hospitalization anymore. However he will continue to benefit from outpatient follow-up and treatment to maintain stability. Collateral information has been obtained and reconciled and there are no concerns about his safety. He has no access to guns or weapons. He appreciates the help that he received here. This patient no longer meets criteria for inpatient hospitalization. He was discharged home to his family in psychiatrically stable condition. Appetite:  [x] Normal  [] Increased  [] Decreased    Sleep:       [x] Normal  [] Fair       [] Poor            Energy:    [x] Normal  [] Increased  [] Decreased     SI [] Present  [x] Absent  HI  []Present  [x] Absent   Aggression:  [] yes  [] no  Patient is [x] able  [] unable to CONTRACT FOR SAFETY   Medication side effects(SE):  [x] None(Psych.  Meds.) [] Other      Mental Status Examination on discharge:    Level of consciousness:  within normal limits   Appearance:  well-appearing  Behavior/Motor:  no abnormalities noted  Attitude toward examiner:  attentive and good eye contact  Speech:  spontaneous, normal rate and normal volume   Mood: euthymic  Affect:  mood congruent  Thought processes:  linear and goal directed   Thought content: The patient is devoid of suicidal or homicidal ideation intent or plan. Devoid of auditory or visual hallucinations or other perceptual disturbances, there are no overt or covert signs of psychosis or paranoia. There are no neurovegetative signs of depression. Cognition:  oriented to person, place, and time   Concentration intact  Memory intact  Insight good   Judgement fair   Fund of Knowledge adequate      ASSESSMENT:  Patient symptoms are:  [x] Well controlled  [x] Improving  [] Worsening  [] No change    Reason for more than one antipsychotic:  [x] N/A  [] 3 Failed Monotherapy attempts (Drugs tried:)  [] Crossover to a new antipsychotic  [] Taper to Monotherapy from Polypharmacy  [] Augmentation of clozapine therapy due to treatment resistance to single therapy    Diagnosis:  Principal Problem:    Bipolar disorder current episode depressed (HCC)  Active Problems:    Benzodiazepine dependence ([facility]    Cluster B personality disorder ([facility]    Cocaine abuse ([facility]  Resolved Problems:    Depression with suicidal ideation      LABS:    No results for input(s): WBC, HGB, PLT in the last 72 hours. No results for input(s): NA, K, CL, CO2, BUN, CREATININE, GLUCOSE in the last 72 hours. No results for input(s): BILITOT, ALKPHOS, AST, ALT in the last 72 hours.   Lab Results   Component Value Date    LABAMPH NOT DETECTED 12/05/2021    LABAMPH NOT DETECTED 07/04/2011    BARBSCNU NOT DETECTED 12/05/2021    LABBENZ POSITIVE 12/05/2021    LABBENZ SEE BELOW 07/01/2014    CANNAB POSITIVE  07/04/2011    LABMETH NOT DETECTED 12/05/2021    OPIATESCREENURINE NOT DETECTED 12/05/2021    PHENCYCLIDINESCREENURINE NOT DETECTED 12/05/2021    ETOH <10 12/05/2021     Lab Results   Component Value Date    TSH 22.830 09/24/2021     No results found for: LITHIUM  Lab Results   Component Value Date    VALPROATE 31 (L) 06/02/2016       RISK ASSESSMENT AT DISCHARGE: Low risk for suicide and homicide. Treatment Plan:  The patient's diagnosis, treatment plan, medication management were formulated after patient was seen directly by the attending physician and myself and all relevant documentation was reviewed. The patient was referred to outpatient/inpatient substance abuse rehabilitation programming. He was educated multiple times during the hospitalization that if he chooses to continue to use drugs or alcohol, he may potentially act out impulsively, resulting in serious harm to self or others, even though unintentional.  He was also educated that mental health treatment cannot be optimized with ongoing use of drugs. He demonstrated understanding has the capacity to understand that. Risk, benefit, side effects, possible outcomes of the medication and alternatives discussed with the patient and the patient demonstrated understanding. The patient was also educated that the outcome of treatment will depend on the medication compliance as directed by the prescribers along with regular follow-up, compliance with the labs and other work-up, as clinically indicated. Encourage patient to attend outpatient follow up appointment and therapy, outpatient follow-up appointments and and scheduled prior to discharge. Patient was recommended for dialectical behavioral therapy in the community for coping skills and to reduce her eliminate self-injurious or parasuicidal behaviors. Patient was advised to call the outpatient provider, visit the nearest ED or call 911 if symptoms are not manageable. Patient's family member was contacted prior to the discharge, patient has been discharged home in psychiatrically stable condition.          Medication List      START taking these medications    divalproex 250 MG DR tablet  Commonly known as: DEPAKOTE  Take 1 tablet by mouth every 12 hours        CHANGE how you take these medications    venlafaxine 37.5 MG extended release capsule  Commonly known as: EFFEXOR XR  Take 1 capsule by mouth daily (with breakfast)  Start taking on: December 10, 2021  What changed:   · medication strength  · how much to take        CONTINUE taking these medications    atorvastatin 40 MG tablet  Commonly known as: LIPITOR  Take 1 tablet by mouth nightly     Breo Ellipta 200-25 MCG/INH Aepb inhaler  Generic drug: Fluticasone furoate-vilanterol     finasteride 5 MG tablet  Commonly known as: PROSCAR  Take 1 tablet by mouth daily     levETIRAcetam 500 MG tablet  Commonly known as: KEPPRA  Take 1 tablet by mouth 2 times daily     lisinopril 10 MG tablet  Commonly known as: PRINIVIL;ZESTRIL  Take 1 tablet by mouth daily     lurasidone 20 MG Tabs tablet  Commonly known as: LATUDA  Take 1 tablet by mouth daily     methIMAzole 10 MG tablet  Commonly known as: TAPAZOLE     RA Balanced Nutritional Plus Liqd  Take 1 Bottle by mouth daily     rivaroxaban 20 MG Tabs tablet  Commonly known as: XARELTO  Take 1 tablet by mouth daily With food     therapeutic multivitamin-minerals tablet     traZODone 50 MG tablet  Commonly known as: DESYREL  Take 1 tablet by mouth nightly as needed for Sleep        STOP taking these medications    clonazePAM 1 MG tablet  Commonly known as: KLONOPIN     nicotine 21 MG/24HR  Commonly known as: Francisco Alexander           Where to Get Your Medications      You can get these medications from any pharmacy    Bring a paper prescription for each of these medications  · atorvastatin 40 MG tablet  · divalproex 250 MG DR tablet  · levETIRAcetam 500 MG tablet  · rivaroxaban 20 MG Tabs tablet  · venlafaxine 37.5 MG extended release capsule     NOTE: This report was transcribed using voice recognition software. Every effort was made to ensure accuracy; however, inadvertent computerized transcription errors may be present.       TIME SPEND - 35 MINUTES TO COMPLETE THE EVALUATION, DISCHARGE SUMMARY, MEDICATION RECONCILIATION AND FOLLOW UP CARE     Signed:  ZOIE Sam CNP  12/9/2021  11:11 AM

## 2021-12-09 NOTE — PROGRESS NOTES
Patient denies SI/HI/Hallucinations. Patient bright and pleasant during conversation. Patient eager to return home. Patient observed out on the unit watching TV, social with peers. Patient taking prescribed medications, eating provided meals, and attending groups.

## 2021-12-09 NOTE — CARE COORDINATION
In order to ensure appropriate transition and discharge planning is in place, the following documents have been transmitted to [facility], as the new outpatient provider:     The d/c diagnosis was transmitted to the next care provider   The reason for hospitalization was transmitted to the next care provider   The d/c medications (dosage and indication) were transmitted to the next care provider    The continuing care plan was transmitted to the next care provider

## 2021-12-09 NOTE — GROUP NOTE
Group Therapy Note    Date: 12/9/2021    Group Start Time: 1050  Group End Time: 1120  Group Topic: Cognitive Skills    [facility]    DAVID Wong, TONY        Group Therapy Note    Attendees: 4         Patient's Goal:  Pt will be able to verbalize understanding of the importance of building new healthy habits    Notes:  Pt made connections and participated in groups    Status After Intervention:  Improved    Participation Level:  Active Listener and Interactive    Participation Quality: Appropriate, Attentive and Sharing      Speech:  normal      Thought Process/Content: Perseverating      Affective Functioning: Congruent      Mood: depressed      Level of consciousness:  Alert and Oriented      Response to Learning: Able to verbalize current knowledge/experience      Endings: None Reported    Modes of Intervention: Education, Support, Socialization, Exploration, Clarifying, Problem-solving and Activity      Discipline Responsible: /Counselor      Signature:  DAVID Wong, TONY

## 2021-12-13 NOTE — DISCHARGE SUMMARY
DISCHARGE SUMMARY      Patient ID:  Princess Smalls  [id number]  40 y.o.  1955    Admit date: 12/5/2021    Discharge date and time: 12/13/2021    Admitting Physician: Acacia Ramos MD     Discharge Physician: Dr Key Soliman MD    Discharge Diagnoses:   Patient Active Problem List   Diagnosis    Hyperthyroidism    Hepatitis C    [facility] disorder [facility]    Mild mitral regurgitation    Hep C w/o coma, chronic (HCC)    Chronic obstructive pulmonary disease (HCC)    Dyspnea and respiratory abnormalities    Respiratory failure with hypoxia ([address]    Severe protein-calorie malnutrition ([address]    Essential (primary) hypertension    H/O drug abuse ([address]    Gastroduodenal ulcer    Seasonal allergic rhinitis    Type 2 diabetes mellitus ([address]    Vitamin D deficiency    LV dysfunction    S/P MVR (mitral valve repair)    NSTEMI (non-ST elevated myocardial infarction) ([address]    Suicidal ideations    Benzodiazepine dependence ([address]    Suicidal ideation    Bipolar disorder current episode depressed ([address]    Polysubstance abuse ([address]    Cluster B personality disorder ([address]    History of noncompliance with medical treatment, presenting hazards to health    Cocaine abuse ([facility]    Cannabis abuse       Admission Condition: poor    Discharged Condition: stable    Admission Circumstance:     Pt is a 72 yo male who presents to the ED via ambulance, pt is on a pink slip by the ED doc due to statements to EMT's that he is suicidal with a plan to shoot himself and access to weapon. Pt denies suicidal ideation intent and plan to shoot self to triage nurse, doctor and SW. Pt states: \"Sometimes when I get upset or mad I have wild thoughts and say stuff like that. \" Pt does report he can be impulsive when upset or mad.  Does report a hx of 3 suicide attempts in the past some years ago    PAST MEDICAL/PSYCHIATRIC HISTORY:   Past Medical History:   Diagnosis Date    Anxiety     Arthritis     Asthma     Bipolar 1 disorder ([facility]     Chronic combined systolic and diastolic CHF (congestive heart failure) ([facility] 2018    COPD (chronic obstructive pulmonary disease) (HCC)     Depression     Diabetes mellitus (HCC)     GERD (gastroesophageal reflux disease)     Hepatitis C     resolved    Hypertension     Hyperthyroidism 2012    Hypothyroidism due to medication     Mass of testicle     Mesothelioma [facility])     pt states possibly not    Neuromuscular disorder ([facility]     Other disorders of kidney and ureter     kidney stones; most recent 2015    Paroxysmal A-fib [facility])     discussed with PCP- patient does have hx of PAfib    Peptic ulcer     Prostate enlargement     Pulmonary embolism (HCC)     Intermediate risk for PE on VQ scan.  Seizures ([facility]     Thyroid disease     hyperthyroid       FAMILY/SOCIAL HISTORY:  Family History   Problem Relation Age of Onset    Cancer Mother     Diabetes Mother     Cancer Father     Diabetes Father     Diabetes Maternal Aunt     Diabetes Maternal Grandmother     Cancer Maternal Grandfather     Heart Disease Paternal Grandmother     Heart Failure Paternal Grandmother     Heart Disease Paternal Grandfather     Heart Failure Paternal Grandfather     Diabetes Maternal Aunt     Diabetes Maternal Aunt      Social History     Socioeconomic History    Marital status: Single     Spouse name: Not on file    Number of children: Not on file    Years of education: Not on file    Highest education level: Not on file   Occupational History    Occupation: disability   Tobacco Use    Smoking status: Former Smoker     Years: 10.00     Types: Cigarettes     Quit date: 1/10/2010     Years since quittin.9    Smokeless tobacco: Former User     Types: [address] date:    Vaping Use    Vaping Use: Never used   Substance and Sexual Activity    Alcohol use: No     Alcohol/week: 0.0 standard drinks     Comment: recovered alcoholic; last use 1309    Drug use:  Yes Types: Cocaine     Comment: \"I just started smoking crack again\"    Sexual activity: Yes     Comment: heroin   Other Topics Concern    Not on file   Social History Narrative    Not on file     Social Determinants of Health     Financial Resource Strain:     Difficulty of Paying Living Expenses: Not on file   Food Insecurity:     Worried About Running Out of Food in the Last Year: Not on file    Mark of Food in the Last Year: Not on file   Transportation Needs:     Lack of Transportation (Medical): Not on file    Lack of Transportation (Non-Medical): Not on file   Physical Activity:     Days of Exercise per Week: Not on file    Minutes of Exercise per Session: Not on file   Stress:     Feeling of Stress : Not on file   Social Connections:     Frequency of Communication with Friends and Family: Not on file    Frequency of Social Gatherings with Friends and Family: Not on file    Attends Jehovah's witness Services: Not on file    Active Member of [address] [place] [address] or Organizations: Not on file    Attends Club or Organization Meetings: Not on file    Marital Status: Not on file   Intimate Partner Violence:     Fear of Current or Ex-Partner: Not on file    Emotionally Abused: Not on file    Physically Abused: Not on file    Sexually Abused: Not on file   Housing Stability:     Unable to Pay for Housing in the Last Year: Not on file    Number of [place] in the Last Year: Not on file    Unstable Housing in the Last Year: Not on file       MEDICATIONS:  No current facility-administered medications for this encounter.     Current Outpatient Medications:     rivaroxaban (XARELTO) 20 MG TABS tablet, Take 1 tablet by mouth daily With food, Disp: 30 tablet, Rfl: 3    divalproex (DEPAKOTE) 250 MG DR tablet, Take 1 tablet by mouth every 12 hours, Disp: 60 tablet, Rfl: 0    levETIRAcetam (KEPPRA) 500 MG tablet, Take 1 tablet by mouth 2 times daily, Disp: 60 tablet, Rfl: 0    venlafaxine (EFFEXOR XR) 37.5 MG extended release capsule, Take 1 capsule by mouth daily (with breakfast), Disp: 30 capsule, Rfl: 0    atorvastatin (LIPITOR) 40 MG tablet, Take 1 tablet by mouth nightly, Disp: 30 tablet, Rfl: 0    lurasidone (LATUDA) 20 MG TABS tablet, Take 1 tablet by mouth daily, Disp: 30 tablet, Rfl: 0    traZODone (DESYREL) 50 MG tablet, Take 1 tablet by mouth nightly as needed for Sleep, Disp: 30 tablet, Rfl: 0    Multiple Vitamins-Minerals (THERAPEUTIC MULTIVITAMIN-MINERALS) tablet, Take 1 tablet by mouth daily, Disp: , Rfl:     BREO ELLIPTA 200-25 MCG/INH AEPB, , Disp: , Rfl:     methimazole (TAPAZOLE) 10 MG tablet, Take 10 mg by mouth 3 times daily, Disp: , Rfl:     lisinopril (PRINIVIL;ZESTRIL) 10 MG tablet, Take 1 tablet by mouth daily, Disp: 30 tablet, Rfl: 11    Nutritional Supplements (RA BALANCED NUTRITIONAL PLUS) LIQD, Take 1 Bottle by mouth daily, Disp: 10 Can, Rfl: 0    finasteride (PROSCAR) 5 MG tablet, Take 1 tablet by mouth daily, Disp: 30 tablet, Rfl: 3    Examination:  /78   Pulse 73   Temp 97 °F (36.1 °C) (Temporal)   Resp 14   Ht 5' 3\" (1.6 m)   Wt 133 lb (60.3 kg)   SpO2 99%   BMI 23.56 kg/m²   Gait - steady    HOSPITAL COURSE[de-identified]  Following admission to the hospital, patient had a complete physical exam and blood work up, which he was medically cleared and admitted to [facility] for psychiatric evaluation and stabilization. The patient was monitored closely with suicide and appropriate precautions. He was started on Effexor 37.5 mg daily, Depakote 250 mg twice daily for mood stabilization and he was continued on his home medications as clinically indicated. This patient has a long history of chronic treatment noncompliance. He was encouraged to participate in group and other milieu activity and started to feel better with this combination of treatment. There has been significant progress in the improvement of symptoms since admission.   The patient has been an active participant in his treatment, and discharge planning.     Patient was no longer suicidal, homicidal, manic or psychotic. He received the required treatment with medication, participated in group milieu, remained engaged in unit activities, learned appropriate coping skills. He was seen to be watching television socializing with peers using the phone. There were no mention or gestures of self-harm or harm to others. His mental status has returned to baseline. The treatment team believes the patient obtain maximum benefit out of this hospitalization and does not meet the criteria for inpatient hospitalization anymore. However he will continue to benefit from outpatient follow-up and treatment to maintain stability. Collateral information has been obtained and reconciled and there are no concerns about his safety. He has no access to guns or weapons. He appreciates the help that he received here. This patient no longer meets criteria for inpatient hospitalization. He was discharged home to his family in psychiatrically stable condition. Appetite:  [x] Normal  [] Increased  [] Decreased    Sleep:       [x] Normal  [] Fair       [] Poor            Energy:    [x] Normal  [] Increased  [] Decreased     SI [] Present  [x] Absent  HI  []Present  [x] Absent   Aggression:  [] yes  [] no  Patient is [x] able  [] unable to CONTRACT FOR SAFETY   Medication side effects(SE):  [x] None(Psych. Meds.) [] Other      Mental Status Examination on discharge:    Level of consciousness:  within normal limits   Appearance:  well-appearing  Behavior/Motor:  no abnormalities noted  Attitude toward examiner:  attentive and good eye contact  Speech:  spontaneous, normal rate and normal volume   Mood: euthymic  Affect:  mood congruent  Thought processes:  linear and goal directed   Thought content: The patient is devoid of suicidal or homicidal ideation intent or plan.   Devoid of auditory or visual hallucinations or other perceptual disturbances, there are no overt or covert signs of psychosis or paranoia. There are no neurovegetative signs of depression. Cognition:  oriented to person, place, and time   Concentration intact  Memory intact  Insight good   Judgement fair   Fund of Knowledge adequate    ASSESSMENT:  Patient symptoms are:  [x] Well controlled  [x] Improving  [] Worsening  [] No change    Reason for more than one antipsychotic:  [x] N/A  [] 3 Failed Monotherapy attempts (Drugs tried:)  [] Crossover to a new antipsychotic  [] Taper to Monotherapy from Polypharmacy  [] Augmentation of clozapine therapy due to treatment resistance to single therapy    Diagnosis:  Principal Problem:    Bipolar disorder current episode depressed (HCC)  Active Problems:    Benzodiazepine dependence ([facility]    Cluster B personality disorder ([facility]    Cocaine abuse ([facility]  Resolved Problems:    Depression with suicidal ideation      LABS:    No results for input(s): WBC, HGB, PLT in the last 72 hours. No results for input(s): NA, K, CL, CO2, BUN, CREATININE, GLUCOSE in the last 72 hours. No results for input(s): BILITOT, ALKPHOS, AST, ALT in the last 72 hours. Lab Results   Component Value Date    LABAMPH NOT DETECTED 12/05/2021    LABAMPH NOT DETECTED 07/04/2011    BARBSCNU NOT DETECTED 12/05/2021    LABBENZ POSITIVE 12/05/2021    LABBENZ SEE BELOW 07/01/2014    CANNAB POSITIVE  07/04/2011    LABMETH NOT DETECTED 12/05/2021    OPIATESCREENURINE NOT DETECTED 12/05/2021    PHENCYCLIDINESCREENURINE NOT DETECTED 12/05/2021    ETOH <10 12/05/2021     Lab Results   Component Value Date    TSH 22.830 09/24/2021     No results found for: LITHIUM  Lab Results   Component Value Date    VALPROATE 31 (L) 06/02/2016       RISK ASSESSMENT AT DISCHARGE: Low risk for suicide and homicide.      Treatment Plan:  The patient's diagnosis, treatment plan, medication management were formulated after patient was seen directly by the attending physician and myself and all relevant documentation was reviewed.     The patient was referred to outpatient/inpatient substance abuse rehabilitation programming. He was educated multiple times during the hospitalization that if he chooses to continue to use drugs or alcohol, he may potentially act out impulsively, resulting in serious harm to self or others, even though unintentional.  He was also educated that mental health treatment cannot be optimized with ongoing use of drugs. He demonstrated understanding has the capacity to understand that.     Risk, benefit, side effects, possible outcomes of the medication and alternatives discussed with the patient and the patient demonstrated understanding. The patient was also educated that the outcome of treatment will depend on the medication compliance as directed by the prescribers along with regular follow-up, compliance with the labs and other work-up, as clinically indicated.     Encourage patient to attend outpatient follow up appointment and therapy, outpatient follow-up appointments and and scheduled prior to discharge. Patient was recommended for dialectical behavioral therapy in the community for coping skills and to reduce her eliminate self-injurious or parasuicidal behaviors.     Patient was advised to call the outpatient provider, visit the nearest ED or call 911 if symptoms are not manageable.      Patient's family member was contacted prior to the discharge, patient has been discharged home in psychiatrically stable condition.          Medication List      START taking these medications    divalproex 250 MG DR tablet  Commonly known as: DEPAKOTE  Take 1 tablet by mouth every 12 hours        CHANGE how you take these medications    venlafaxine 37.5 MG extended release capsule  Commonly known as: EFFEXOR XR  Take 1 capsule by mouth daily (with breakfast)  What changed:   · medication strength  · how much to take        CONTINUE taking these medications    atorvastatin 40 MG tablet  Commonly known as: LIPITOR  Take 1 tablet by mouth nightly     Breo Ellipta 200-25 MCG/INH Aepb inhaler  Generic drug: Fluticasone furoate-vilanterol     finasteride 5 MG tablet  Commonly known as: PROSCAR  Take 1 tablet by mouth daily     levETIRAcetam 500 MG tablet  Commonly known as: KEPPRA  Take 1 tablet by mouth 2 times daily     lisinopril 10 MG tablet  Commonly known as: PRINIVIL;ZESTRIL  Take 1 tablet by mouth daily     lurasidone 20 MG Tabs tablet  Commonly known as: LATUDA  Take 1 tablet by mouth daily     methIMAzole 10 MG tablet  Commonly known as: TAPAZOLE     RA Balanced Nutritional Plus Liqd  Take 1 Bottle by mouth daily     rivaroxaban 20 MG Tabs tablet  Commonly known as: XARELTO  Take 1 tablet by mouth daily With food     therapeutic multivitamin-minerals tablet     traZODone 50 MG tablet  Commonly known as: DESYREL  Take 1 tablet by mouth nightly as needed for Sleep        STOP taking these medications    clonazePAM 1 MG tablet  Commonly known as: KLONOPIN     nicotine 21 MG/24HR  Commonly known as: Cathryne Pouch           Where to Get Your Medications      You can get these medications from any pharmacy    Bring a paper prescription for each of these medications  · atorvastatin 40 MG tablet  · divalproex 250 MG DR tablet  · levETIRAcetam 500 MG tablet  · rivaroxaban 20 MG Tabs tablet  · venlafaxine 37.5 MG extended release capsule       NOTE: This report was transcribed using voice recognition software. Every effort was made to ensure accuracy; however, inadvertent computerized transcription errors may be present.     TIME SPEND - 35 MINUTES TO COMPLETE THE EVALUATION, DISCHARGE SUMMARY, MEDICATION RECONCILIATION AND FOLLOW UP CARE     Signed:  ZOIE Daniels CNP  12/13/2021  8:21 AM

## 2021-12-14 PROBLEM — F31.9 BIPOLAR DISORDER (HCC): Status: ACTIVE | Noted: 2021-12-14

## 2021-12-23 ENCOUNTER — HOSPITAL ENCOUNTER (INPATIENT)
Age: 66
LOS: 4 days | Discharge: HOME | DRG: 885 | End: 2021-12-27
Attending: EMERGENCY MEDICINE | Admitting: PSYCHIATRY & NEUROLOGY
Payer: MEDICARE

## 2021-12-23 DIAGNOSIS — R45.851 SUICIDAL IDEATION: Primary | ICD-10-CM

## 2021-12-23 LAB
ACETAMINOPHEN LEVEL: <5 MCG/ML (ref 10–30)
ALBUMIN SERPL-MCNC: 3.8 G/DL (ref 3.5–5.2)
ALP BLD-CCNC: 61 U/L (ref 40–129)
ALT SERPL-CCNC: 11 U/L (ref 0–40)
AMPHETAMINE SCREEN, URINE: NOT DETECTED
ANION GAP SERPL CALCULATED.3IONS-SCNC: 10 MMOL/L (ref 7–16)
AST SERPL-CCNC: 14 U/L (ref 0–39)
BARBITURATE SCREEN URINE: NOT DETECTED
BASOPHILS ABSOLUTE: 0.04 E9/L (ref 0–0.2)
BASOPHILS RELATIVE PERCENT: 0.7 % (ref 0–2)
BENZODIAZEPINE SCREEN, URINE: NOT DETECTED
BILIRUB SERPL-MCNC: 0.3 MG/DL (ref 0–1.2)
BILIRUBIN URINE: NEGATIVE
BLOOD, URINE: NEGATIVE
BUN BLDV-MCNC: 13 MG/DL (ref 6–23)
CALCIUM SERPL-MCNC: 9 MG/DL (ref 8.6–10.2)
CANNABINOID SCREEN URINE: POSITIVE
CHLORIDE BLD-SCNC: 97 MMOL/L (ref 98–107)
CLARITY: CLEAR
CO2: 22 MMOL/L (ref 22–29)
COCAINE METABOLITE SCREEN URINE: NOT DETECTED
COLOR: YELLOW
CREAT SERPL-MCNC: 1 MG/DL (ref 0.7–1.2)
EOSINOPHILS ABSOLUTE: 0.32 E9/L (ref 0.05–0.5)
EOSINOPHILS RELATIVE PERCENT: 5.8 % (ref 0–6)
ETHANOL: <10 MG/DL (ref 0–0.08)
FENTANYL SCREEN, URINE: NOT DETECTED
GFR AFRICAN AMERICAN: >60
GFR NON-AFRICAN AMERICAN: >60 ML/MIN/1.73
GLUCOSE BLD-MCNC: 92 MG/DL (ref 74–99)
GLUCOSE URINE: NEGATIVE MG/DL
HCT VFR BLD CALC: 40.7 % (ref 37–54)
HEMOGLOBIN: 12.5 G/DL (ref 12.5–16.5)
IMMATURE GRANULOCYTES #: 0.01 E9/L
IMMATURE GRANULOCYTES %: 0.2 % (ref 0–5)
INFLUENZA A: NOT DETECTED
INFLUENZA B: NOT DETECTED
KETONES, URINE: NEGATIVE MG/DL
LEUKOCYTE ESTERASE, URINE: NEGATIVE
LYMPHOCYTES ABSOLUTE: 1.23 E9/L (ref 1.5–4)
LYMPHOCYTES RELATIVE PERCENT: 22.4 % (ref 20–42)
Lab: ABNORMAL
MCH RBC QN AUTO: 30.7 PG (ref 26–35)
MCHC RBC AUTO-ENTMCNC: 30.7 % (ref 32–34.5)
MCV RBC AUTO: 100 FL (ref 80–99.9)
METHADONE SCREEN, URINE: NOT DETECTED
MONOCYTES ABSOLUTE: 0.41 E9/L (ref 0.1–0.95)
MONOCYTES RELATIVE PERCENT: 7.5 % (ref 2–12)
NEUTROPHILS ABSOLUTE: 3.48 E9/L (ref 1.8–7.3)
NEUTROPHILS RELATIVE PERCENT: 63.4 % (ref 43–80)
NITRITE, URINE: NEGATIVE
OPIATE SCREEN URINE: NOT DETECTED
OXYCODONE URINE: NOT DETECTED
PDW BLD-RTO: 13.6 FL (ref 11.5–15)
PH UA: 7.5 (ref 5–9)
PHENCYCLIDINE SCREEN URINE: NOT DETECTED
PLATELET # BLD: 139 E9/L (ref 130–450)
PMV BLD AUTO: 12.5 FL (ref 7–12)
POTASSIUM SERPL-SCNC: 3.8 MMOL/L (ref 3.5–5)
PROTEIN UA: NEGATIVE MG/DL
RBC # BLD: 4.07 E12/L (ref 3.8–5.8)
SALICYLATE, SERUM: <0.3 MG/DL (ref 0–30)
SARS-COV-2 RNA, RT PCR: NOT DETECTED
SODIUM BLD-SCNC: 129 MMOL/L (ref 132–146)
SPECIFIC GRAVITY UA: 1.01 (ref 1–1.03)
TOTAL PROTEIN: 6.6 G/DL (ref 6.4–8.3)
TRICYCLIC ANTIDEPRESSANTS SCREEN SERUM: NEGATIVE NG/ML
UROBILINOGEN, URINE: 0.2 E.U./DL
VALPROIC ACID LEVEL: 3 MCG/ML (ref 50–100)
WBC # BLD: 5.5 E9/L (ref 4.5–11.5)

## 2021-12-23 PROCEDURE — 80143 DRUG ASSAY ACETAMINOPHEN: CPT

## 2021-12-23 PROCEDURE — 82077 ASSAY SPEC XCP UR&BREATH IA: CPT

## 2021-12-23 PROCEDURE — 85025 COMPLETE CBC W/AUTO DIFF WBC: CPT

## 2021-12-23 PROCEDURE — 1240000000 HC EMOTIONAL WELLNESS R&B

## 2021-12-23 PROCEDURE — 80053 COMPREHEN METABOLIC PANEL: CPT

## 2021-12-23 PROCEDURE — 99284 EMERGENCY DEPT VISIT MOD MDM: CPT

## 2021-12-23 PROCEDURE — 93005 ELECTROCARDIOGRAM TRACING: CPT | Performed by: EMERGENCY MEDICINE

## 2021-12-23 PROCEDURE — 80307 DRUG TEST PRSMV CHEM ANLYZR: CPT

## 2021-12-23 PROCEDURE — 6370000000 HC RX 637 (ALT 250 FOR IP): Performed by: PSYCHIATRY & NEUROLOGY

## 2021-12-23 PROCEDURE — 80179 DRUG ASSAY SALICYLATE: CPT

## 2021-12-23 PROCEDURE — 6370000000 HC RX 637 (ALT 250 FOR IP): Performed by: EMERGENCY MEDICINE

## 2021-12-23 PROCEDURE — 80164 ASSAY DIPROPYLACETIC ACD TOT: CPT

## 2021-12-23 PROCEDURE — 81003 URINALYSIS AUTO W/O SCOPE: CPT

## 2021-12-23 PROCEDURE — 87636 SARSCOV2 & INF A&B AMP PRB: CPT

## 2021-12-23 RX ORDER — TRAZODONE HYDROCHLORIDE 50 MG/1
50 TABLET ORAL NIGHTLY PRN
Status: DISCONTINUED | OUTPATIENT
Start: 2021-12-24 | End: 2021-12-23

## 2021-12-23 RX ORDER — HYDROXYZINE PAMOATE 50 MG/1
50 CAPSULE ORAL 3 TIMES DAILY PRN
Status: DISCONTINUED | OUTPATIENT
Start: 2021-12-23 | End: 2021-12-27 | Stop reason: HOSPADM

## 2021-12-23 RX ORDER — LEVETIRACETAM 500 MG/1
500 TABLET ORAL 2 TIMES DAILY
Status: DISCONTINUED | OUTPATIENT
Start: 2021-12-24 | End: 2021-12-23

## 2021-12-23 RX ORDER — ACETAMINOPHEN 325 MG/1
650 TABLET ORAL EVERY 6 HOURS PRN
Status: DISCONTINUED | OUTPATIENT
Start: 2021-12-23 | End: 2021-12-27 | Stop reason: HOSPADM

## 2021-12-23 RX ORDER — LEVETIRACETAM 500 MG/1
500 TABLET ORAL ONCE
Status: COMPLETED | OUTPATIENT
Start: 2021-12-23 | End: 2021-12-23

## 2021-12-23 RX ORDER — NICOTINE 21 MG/24HR
1 PATCH, TRANSDERMAL 24 HOURS TRANSDERMAL DAILY
Status: DISCONTINUED | OUTPATIENT
Start: 2021-12-24 | End: 2021-12-27 | Stop reason: HOSPADM

## 2021-12-23 RX ORDER — HALOPERIDOL 5 MG/ML
5 INJECTION INTRAMUSCULAR EVERY 6 HOURS PRN
Status: DISCONTINUED | OUTPATIENT
Start: 2021-12-23 | End: 2021-12-27 | Stop reason: HOSPADM

## 2021-12-23 RX ORDER — TRAZODONE HYDROCHLORIDE 50 MG/1
50 TABLET ORAL NIGHTLY PRN
Status: DISCONTINUED | OUTPATIENT
Start: 2021-12-23 | End: 2021-12-27 | Stop reason: HOSPADM

## 2021-12-23 RX ORDER — HALOPERIDOL 5 MG
5 TABLET ORAL EVERY 6 HOURS PRN
Status: DISCONTINUED | OUTPATIENT
Start: 2021-12-23 | End: 2021-12-27 | Stop reason: HOSPADM

## 2021-12-23 RX ORDER — MAGNESIUM HYDROXIDE/ALUMINUM HYDROXICE/SIMETHICONE 120; 1200; 1200 MG/30ML; MG/30ML; MG/30ML
30 SUSPENSION ORAL PRN
Status: DISCONTINUED | OUTPATIENT
Start: 2021-12-23 | End: 2021-12-27 | Stop reason: HOSPADM

## 2021-12-23 RX ORDER — LEVETIRACETAM 500 MG/1
500 TABLET ORAL 2 TIMES DAILY
Status: DISCONTINUED | OUTPATIENT
Start: 2021-12-23 | End: 2021-12-27 | Stop reason: HOSPADM

## 2021-12-23 RX ADMIN — LEVETIRACETAM 500 MG: 500 TABLET, FILM COATED ORAL at 23:25

## 2021-12-23 RX ADMIN — TRAZODONE HYDROCHLORIDE 50 MG: 50 TABLET ORAL at 22:51

## 2021-12-23 RX ADMIN — HYDROXYZINE PAMOATE 50 MG: 50 CAPSULE ORAL at 22:51

## 2021-12-23 RX ADMIN — LEVETIRACETAM 500 MG: 500 TABLET, FILM COATED ORAL at 17:41

## 2021-12-23 ASSESSMENT — PAIN SCALES - GENERAL
PAINLEVEL_OUTOF10: 0
PAINLEVEL_OUTOF10: 10

## 2021-12-23 ASSESSMENT — PAIN DESCRIPTION - LOCATION: LOCATION: SCROTUM

## 2021-12-23 NOTE — ED PROVIDER NOTES
Department of Emergency Medicine   ED  Provider Note  Admit Date/RoomTime: 12/23/2021  4:35 PM  ED Room: [address] [place] [address]          History of Present Illness:  12/23/21, Time: 6:57 PM EST  Chief Complaint   Patient presents with   [address] Problem     wanted to kill himself by cutting his throat  states has access to a knife                Bashir Diaz is a 73 y.o. male presenting to the ED for suicidal ideations, beginning several days ago. Patient states that for the past several days he has been feeling suicidal.  He plans to cut his throat with a knife. Patient then came to ED for evaluation. He denies any homicidal ideation. No drug or alcohol use. Denies hallucinations. Review of Systems:   Pertinent positives and negatives are stated within HPI, all other systems reviewed and are negative.        --------------------------------------------- PAST HISTORY ---------------------------------------------  Past Medical History:  has a past medical history of Anxiety, Arthritis, Asthma, Bipolar 1 disorder ([address] Chronic combined systolic and diastolic CHF (congestive heart failure) ([address] COPD (chronic obstructive pulmonary disease) ([address] Depression, Diabetes mellitus ([address] GERD (gastroesophageal reflux disease), Hepatitis C, Hypertension, Hyperthyroidism, Hypothyroidism due to medication, Mass of testicle, Mesothelioma ([address] Neuromuscular disorder ([address] Other disorders of kidney and ureter, Paroxysmal A-fib ([address] Peptic ulcer, Prostate enlargement, Pulmonary embolism ([address] Seizures ([address] and Thyroid disease. Past Surgical History:  has a past surgical history that includes Appendectomy; Lithotripsy; Hemorrhoid surgery; Bladder surgery; Endoscopy, colon, diagnostic (11/13/2015); Abdomen surgery; Colonoscopy; Cardiac surgery; vascular surgery; and Cardiac catheterization (05/21/2018). Social History:  reports that he quit smoking about 11 years ago.  His smoking use included cigarettes. He quit after 10.00 years of use. He quit smokeless tobacco use about 3 years ago. His smokeless tobacco use included chew. He reports current drug use. Drug: Cocaine. He reports that he does not drink alcohol. Family History: family history includes Cancer in his father, maternal grandfather, and mother; Diabetes in his father, maternal aunt, maternal aunt, maternal aunt, maternal grandmother, and mother; Heart Disease in his paternal grandfather and paternal grandmother; Heart Failure in his paternal grandfather and paternal grandmother. . Unless otherwise noted, family history is non contributory    The patients home medications have been reviewed. Allergies: Codeine, Dye [iodides], Ketorolac tromethamine, Peanuts [peanut oil], Shellfish-derived products, and Nitroglycerin        ---------------------------------------------------PHYSICAL EXAM--------------------------------------    Constitutional/General: Alert and oriented x3  Head: Normocephalic and atraumatic  Eyes: PERRL, EOMI, sclera non icteric  Mouth: Oropharynx clear, handling secretions, no trismus, no asymmetry of the posterior oropharynx or uvular edema  Neck: Supple, full ROM, no stridor, no meningeal signs  Respiratory: Lungs clear to auscultation bilaterally, no wheezes, rales, or rhonchi. Not in respiratory distress  Cardiovascular:  Regular rate. Regular rhythm. No murmurs, no aortic murmurs, no gallops, or rubs. 2+ distal pulses. Equal extremity pulses. Chest: No chest wall tenderness  GI:  Abdomen Soft, Non tender, Non distended. No rebound, guarding, or rigidity. No pulsatile masses. Musculoskeletal: Moves all extremities x 4. Warm and well perfused, no clubbing, cyanosis, or edema. Capillary refill <3 seconds  Integument: skin warm and dry. No rashes.    Neurologic: GCS 15, no focal deficits, symmetric strength 5/5 in the upper and lower extremities bilaterally  Psychiatric: Normal Affect          -------------------------------------------------- RESULTS -------------------------------------------------  I have personally reviewed all laboratory and imaging results for this patient. Results are listed below.      LABS: (Lab results interpreted by me)  Results for orders placed or performed during the hospital encounter of 12/23/21   COVID-19 & Influenza Combo    Specimen: Nasopharyngeal Swab   Result Value Ref Range    SARS-CoV-2 RNA, RT PCR NOT DETECTED NOT DETECTED    INFLUENZA A NOT DETECTED NOT DETECTED    INFLUENZA B NOT DETECTED NOT DETECTED   Comprehensive Metabolic Panel   Result Value Ref Range    Sodium 129 (L) 132 - 146 mmol/L    Potassium 3.8 3.5 - 5.0 mmol/L    Chloride 97 (L) 98 - 107 mmol/L    CO2 22 22 - 29 mmol/L    Anion Gap 10 7 - 16 mmol/L    Glucose 92 74 - 99 mg/dL    BUN 13 6 - 23 mg/dL    CREATININE 1.0 0.7 - 1.2 mg/dL    GFR Non-African American >60 >=60 mL/min/1.73    GFR African American >60     Calcium 9.0 8.6 - 10.2 mg/dL    Total Protein 6.6 6.4 - 8.3 g/dL    Albumin 3.8 3.5 - 5.2 g/dL    Total Bilirubin 0.3 0.0 - 1.2 mg/dL    Alkaline Phosphatase 61 40 - 129 U/L    ALT 11 0 - 40 U/L    AST 14 0 - 39 U/L   CBC Auto Differential   Result Value Ref Range    WBC 5.5 4.5 - 11.5 E9/L    RBC 4.07 3.80 - 5.80 E12/L    Hemoglobin 12.5 12.5 - 16.5 g/dL    Hematocrit 40.7 37.0 - 54.0 %    .0 (H) 80.0 - 99.9 fL    MCH 30.7 26.0 - 35.0 pg    MCHC 30.7 (L) 32.0 - 34.5 %    RDW 13.6 11.5 - 15.0 fL    Platelets 486 247 - 908 E9/L    MPV 12.5 (H) 7.0 - 12.0 fL    Neutrophils % 63.4 43.0 - 80.0 %    Immature Granulocytes % 0.2 0.0 - 5.0 %    Lymphocytes % 22.4 20.0 - 42.0 %    Monocytes % 7.5 2.0 - 12.0 %    Eosinophils % 5.8 0.0 - 6.0 %    Basophils % 0.7 0.0 - 2.0 %    Neutrophils Absolute 3.48 1.80 - 7.30 E9/L    Immature Granulocytes # 0.01 E9/L    Lymphocytes Absolute 1.23 (L) 1.50 - 4.00 E9/L    Monocytes Absolute 0.41 0.10 - 0.95 E9/L    Eosinophils Absolute 0.32 0.05 - 0.50 E9/L    Basophils Absolute 0.04 0.00 - 0.20 E9/L   Serum Drug Screen   Result Value Ref Range    Ethanol Lvl <10 mg/dL    Acetaminophen Level <5.0 (L) 10.0 - 69.2 mcg/mL    Salicylate, Serum <3.8 0.0 - 30.0 mg/dL    TCA Scrn NEGATIVE Cutoff:300 ng/mL   Urine Drug Screen   Result Value Ref Range    Amphetamine Screen, Urine NOT DETECTED Negative <1000 ng/mL    Barbiturate Screen, Ur NOT DETECTED Negative < 200 ng/mL    Benzodiazepine Screen, Urine NOT DETECTED Negative < 200 ng/mL    Cannabinoid Scrn, Ur POSITIVE (A) Negative < 50ng/mL    Cocaine Metabolite Screen, Urine NOT DETECTED Negative < 300 ng/mL    Opiate Scrn, Ur NOT DETECTED Negative < 300ng/mL    PCP Screen, Urine NOT DETECTED Negative < 25 ng/mL    Methadone Screen, Urine NOT DETECTED Negative <300 ng/mL    Oxycodone Urine NOT DETECTED Negative <100 ng/mL    FENTANYL SCREEN, URINE NOT DETECTED Negative <1 ng/mL    Drug Screen Comment: see below    VALPROIC ACID LEVEL, TOTAL   Result Value Ref Range    Valproic Acid Lvl 3 (L) 50 - 100 mcg/mL   EKG 12 Lead   Result Value Ref Range    Ventricular Rate 62 BPM    Atrial Rate 62 BPM    P-R Interval 140 ms    QRS Duration 86 ms    Q-T Interval 400 ms    QTc Calculation (Bazett) 406 ms    P Axis -26 degrees    R Axis -17 degrees    T Axis 45 degrees   ,       RADIOLOGY:  Interpreted by Radiologist unless otherwise specified  No orders to display         EKG Interpretation  Interpreted by emergency department physician, Dr. Jamila Kessler    Date of EK21  Time: 174    Rhythm: normal sinus   Rate: 62  Axis: normal  Conduction: normal  ST Segments: no acute change  T Waves: no acute change    Clinical Impression: No findings suggestive of acute ischemia or injury  Comparison to prior EKG: stable as compared to patient's most recent EKG      ------------------------- NURSING NOTES AND VITALS REVIEWED ---------------------------   The nursing notes within the ED encounter and vital signs as below have computerized transcription errors may be present        Anju Berumen DO  12/23/21 [id number]

## 2021-12-23 NOTE — ED TRIAGE NOTES
Patient states he is suicidal with plan to cut his throat and has access to a knife but put it down when the police arrived.

## 2021-12-24 PROBLEM — R45.851 SUICIDAL IDEATION: Status: RESOLVED | Noted: 2021-09-25 | Resolved: 2021-12-24

## 2021-12-24 LAB
EKG ATRIAL RATE: 62 BPM
EKG P AXIS: -26 DEGREES
EKG P-R INTERVAL: 140 MS
EKG Q-T INTERVAL: 400 MS
EKG QRS DURATION: 86 MS
EKG QTC CALCULATION (BAZETT): 406 MS
EKG R AXIS: -17 DEGREES
EKG T AXIS: 45 DEGREES
EKG VENTRICULAR RATE: 62 BPM

## 2021-12-24 PROCEDURE — 6370000000 HC RX 637 (ALT 250 FOR IP): Performed by: PSYCHIATRY & NEUROLOGY

## 2021-12-24 PROCEDURE — 99222 1ST HOSP IP/OBS MODERATE 55: CPT | Performed by: NURSE PRACTITIONER

## 2021-12-24 PROCEDURE — 6370000000 HC RX 637 (ALT 250 FOR IP): Performed by: NURSE PRACTITIONER

## 2021-12-24 PROCEDURE — 1240000000 HC EMOTIONAL WELLNESS R&B

## 2021-12-24 PROCEDURE — 93010 ELECTROCARDIOGRAM REPORT: CPT | Performed by: INTERNAL MEDICINE

## 2021-12-24 RX ORDER — BUDESONIDE AND FORMOTEROL FUMARATE DIHYDRATE 160; 4.5 UG/1; UG/1
2 AEROSOL RESPIRATORY (INHALATION) 2 TIMES DAILY
Status: DISCONTINUED | OUTPATIENT
Start: 2021-12-24 | End: 2021-12-24 | Stop reason: CLARIF

## 2021-12-24 RX ORDER — DIVALPROEX SODIUM 250 MG/1
250 TABLET, DELAYED RELEASE ORAL EVERY 12 HOURS SCHEDULED
Status: DISCONTINUED | OUTPATIENT
Start: 2021-12-24 | End: 2021-12-27 | Stop reason: HOSPADM

## 2021-12-24 RX ORDER — BUDESONIDE 0.25 MG/2ML
0.25 INHALANT ORAL 2 TIMES DAILY
Status: DISCONTINUED | OUTPATIENT
Start: 2021-12-24 | End: 2021-12-27 | Stop reason: HOSPADM

## 2021-12-24 RX ORDER — LISINOPRIL 10 MG/1
10 TABLET ORAL DAILY
Status: DISCONTINUED | OUTPATIENT
Start: 2021-12-24 | End: 2021-12-27 | Stop reason: HOSPADM

## 2021-12-24 RX ORDER — VENLAFAXINE HYDROCHLORIDE 37.5 MG/1
37.5 CAPSULE, EXTENDED RELEASE ORAL
Status: DISCONTINUED | OUTPATIENT
Start: 2021-12-24 | End: 2021-12-27 | Stop reason: HOSPADM

## 2021-12-24 RX ORDER — FINASTERIDE 5 MG/1
5 TABLET, FILM COATED ORAL DAILY
Status: DISCONTINUED | OUTPATIENT
Start: 2021-12-24 | End: 2021-12-27 | Stop reason: HOSPADM

## 2021-12-24 RX ORDER — ATORVASTATIN CALCIUM 40 MG/1
40 TABLET, FILM COATED ORAL NIGHTLY
Status: DISCONTINUED | OUTPATIENT
Start: 2021-12-24 | End: 2021-12-27 | Stop reason: HOSPADM

## 2021-12-24 RX ORDER — ARFORMOTEROL TARTRATE 15 UG/2ML
15 SOLUTION RESPIRATORY (INHALATION) 2 TIMES DAILY
Status: DISCONTINUED | OUTPATIENT
Start: 2021-12-24 | End: 2021-12-27 | Stop reason: HOSPADM

## 2021-12-24 RX ADMIN — ATORVASTATIN CALCIUM 40 MG: 40 TABLET, FILM COATED ORAL at 21:20

## 2021-12-24 RX ADMIN — HYDROXYZINE PAMOATE 50 MG: 50 CAPSULE ORAL at 08:48

## 2021-12-24 RX ADMIN — LEVETIRACETAM 500 MG: 500 TABLET, FILM COATED ORAL at 21:20

## 2021-12-24 RX ADMIN — VENLAFAXINE HYDROCHLORIDE 37.5 MG: 37.5 CAPSULE, EXTENDED RELEASE ORAL at 10:52

## 2021-12-24 RX ADMIN — TRAZODONE HYDROCHLORIDE 50 MG: 50 TABLET ORAL at 21:20

## 2021-12-24 RX ADMIN — FINASTERIDE 5 MG: 5 TABLET, FILM COATED ORAL at 10:52

## 2021-12-24 RX ADMIN — DIVALPROEX SODIUM 250 MG: 250 TABLET, DELAYED RELEASE ORAL at 21:20

## 2021-12-24 RX ADMIN — DIVALPROEX SODIUM 250 MG: 250 TABLET, DELAYED RELEASE ORAL at 13:40

## 2021-12-24 RX ADMIN — LISINOPRIL 10 MG: 10 TABLET ORAL at 14:37

## 2021-12-24 RX ADMIN — LEVETIRACETAM 500 MG: 500 TABLET, FILM COATED ORAL at 08:48

## 2021-12-24 RX ADMIN — RIVAROXABAN 20 MG: 20 TABLET, FILM COATED ORAL at 17:35

## 2021-12-24 ASSESSMENT — SLEEP AND FATIGUE QUESTIONNAIRES
SLEEP PATTERN: INSOMNIA
DIFFICULTY STAYING ASLEEP: YES
DIFFICULTY ARISING: NO
DO YOU USE A SLEEP AID: NO
DIFFICULTY FALLING ASLEEP: YES
DIFFICULTY STAYING ASLEEP: YES
RESTFUL SLEEP: NO
SLEEP PATTERN: DISTURBED/INTERRUPTED SLEEP;INSOMNIA
DIFFICULTY FALLING ASLEEP: YES
AVERAGE NUMBER OF SLEEP HOURS: 3
AVERAGE NUMBER OF SLEEP HOURS: 3
DO YOU HAVE DIFFICULTY SLEEPING: YES
DIFFICULTY ARISING: NO
RESTFUL SLEEP: NO
DO YOU USE A SLEEP AID: NO
DO YOU HAVE DIFFICULTY SLEEPING: YES

## 2021-12-24 ASSESSMENT — PATIENT HEALTH QUESTIONNAIRE - PHQ9
SUM OF ALL RESPONSES TO PHQ QUESTIONS 1-9: 14
SUM OF ALL RESPONSES TO PHQ QUESTIONS 1-9: 13

## 2021-12-24 ASSESSMENT — LIFESTYLE VARIABLES
HISTORY_ALCOHOL_USE: NO
HISTORY_ALCOHOL_USE: NO
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
## 2021-12-24 NOTE — PLAN OF CARE
Problem: Altered Mood, Depressive Behavior:  Goal: Able to verbalize and/or display a decrease in depressive symptoms  Description: Able to verbalize and/or display a decrease in depressive symptoms  Outcome: Met This Shift     Problem: Altered Mood, Depressive Behavior:  Goal: Absence of self-harm  Description: Absence of self-harm  Outcome: Met This Shift
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
## 2021-12-24 NOTE — CARE COORDINATION
KADEN attempted to contact Darrius Connorlisandro Monaco in [facility], [place] multiple times. No answer. Kaden left a VM to schedule follow up appointments.

## 2021-12-24 NOTE — BH NOTE
[facility]  Admission Note     Admitted 72 y/o w/m recently D/C from [facility] unrival c/o increased depressed mood and suicidal thought with plan stab self pt tells me \"Im the one that called the unit last night told them I had a knife to my throat\" pt facing eviction owes back rent for 3m smokes MJ daily sober from ETOH and crack for 1m attends AA regularly rand out of psych 1 week ago oriented to unit and room to bed     Admission Type:   Admission Type: Inpatient    Reason for admission:  Reason for Admission: \"Im depressed and more suicidal\"    PATIENT STRENGTHS:       Patient Strengths and Limitations:       Addictive Behavior:        Medical Problems:   Past Medical History:   Diagnosis Date    Anxiety     Arthritis     Asthma     Bipolar 1 disorder ([address]     Chronic combined systolic and diastolic CHF (congestive heart failure) ([facility] 05/27/2018    COPD (chronic obstructive pulmonary disease) (HCC)     Depression     Diabetes mellitus (HCC)     GERD (gastroesophageal reflux disease)     Hepatitis C     resolved    Hypertension     Hyperthyroidism 8/2/2012    Hypothyroidism due to medication     Mass of testicle     Mesothelioma [facility])     pt states possibly not    Neuromuscular disorder ([address]     Other disorders of kidney and ureter     kidney stones; most recent 08/27/2015    Paroxysmal A-fib ([address]     discussed with PCP- patient does have hx of PAfib    Peptic ulcer     Prostate enlargement     Pulmonary embolism (HCC)     Intermediate risk for PE on VQ scan.     Seizures ([address]     Thyroid disease     hyperthyroid       Status EXAM:  Status and Exam  Normal: Yes  Facial Expression: Avoids Gaze  Affect: Appropriate  Level of Consciousness: Alert  Mood:Normal: No  Mood: Depressed,Anxious,Sad  Motor Activity:Normal: Yes  Motor Activity: Decreased  Interview Behavior: Cooperative  Preception: [place] to Person,[place] to Time,Med[place] to Place,[place] to Situation  Attention:Normal: Yes  Attention: Hyperalert  Thought Processes:  (WNL)  Thought Content:Normal: Yes  Thought Content:  (WNL)  Hallucinations: None  Delusions: No  Memory:Normal: Yes  Memory:  (WNL)  Insight and Judgment: No  Insight and Judgment: Poor Judgment,Poor Insight  Present Suicidal Ideation: Yes  Present Homicidal Ideation: No    Tobacco Screening:  Practical Counseling, on admission, marisol X, if applicable and completed (first 3 are required if patient doesn't refuse): (x )  Recognizing danger situations (included triggers and roadblocks)                    (x )  Coping skills (new ways to manage stress, exercise, relaxation techniques, changing routine, distraction)                                                           ( x)  Basic information about quitting (benefits of quitting, techniques in how to quit, available resources  ( ) Referral for counseling faxed to [facility]                                           ( ) Patient refused counseling  ( ) Patient has not smoked in the last 30 days    Metabolic Screening:    Lab Results   Component Value Date    LABA1C 5.8 05/19/2018       Lab Results   Component Value Date    CHOL 133 05/19/2018    CHOL 148 07/06/2012     Lab Results   Component Value Date    TRIG 42 05/19/2018    TRIG 63 07/06/2012     Lab Results   Component Value Date    HDL 54 05/19/2018    HDL 65.0 07/06/2012     No components found for: LDLCAL  Lab Results   Component Value Date    LABVLDL 8 05/19/2018         Body mass index is 22.31 kg/m². BP Readings from Last 2 Encounters:   12/23/21 129/64   12/09/21 126/78           Pt admitted with followings belongings:  Clothing:  Dellie Inches / Curvin Bee (Comment)  Other Valuables: Cell phone,Money (Comment) (money with [facility], receipt # [id number])              Geovany Carlton RN

## 2021-12-24 NOTE — PLAN OF CARE
[facility]  Initial Interdisciplinary Treatment Plan NOTE    Review Date & Time: 12/24/2021 0900    Patient was in treatment team    Admission Type:   Admission Type: Inpatient    Reason for admission:  Reason for Admission: \"Im depressed and more suicidal\"      Estimated Length of Stay Update:  3-5 days  Estimated Discharge Date Update: 12/29/2021    EDUCATION:   Learner Progress Toward Treatment Goals: Reviewed results and recommendations of this team    Method: Small group    Outcome: Verbalized understanding    PATIENT GOALS: Be positive    PLAN/TREATMENT RECOMMENDATIONS UPDATE: Encourage group participation and continue to provide emotional support.     GOALS UPDATE:   Time frame for Short-Term Goals: 1-3 days    Renaldo Fierro RN
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
## 2021-12-25 PROCEDURE — 6370000000 HC RX 637 (ALT 250 FOR IP): Performed by: PSYCHIATRY & NEUROLOGY

## 2021-12-25 PROCEDURE — 1240000000 HC EMOTIONAL WELLNESS R&B

## 2021-12-25 PROCEDURE — 6370000000 HC RX 637 (ALT 250 FOR IP): Performed by: NURSE PRACTITIONER

## 2021-12-25 PROCEDURE — 99231 SBSQ HOSP IP/OBS SF/LOW 25: CPT | Performed by: NURSE PRACTITIONER

## 2021-12-25 RX ADMIN — LISINOPRIL 10 MG: 10 TABLET ORAL at 09:07

## 2021-12-25 RX ADMIN — FINASTERIDE 5 MG: 5 TABLET, FILM COATED ORAL at 09:07

## 2021-12-25 RX ADMIN — RIVAROXABAN 20 MG: 20 TABLET, FILM COATED ORAL at 09:09

## 2021-12-25 RX ADMIN — LEVETIRACETAM 500 MG: 500 TABLET, FILM COATED ORAL at 21:24

## 2021-12-25 RX ADMIN — DIVALPROEX SODIUM 250 MG: 250 TABLET, DELAYED RELEASE ORAL at 21:24

## 2021-12-25 RX ADMIN — DIVALPROEX SODIUM 250 MG: 250 TABLET, DELAYED RELEASE ORAL at 09:09

## 2021-12-25 RX ADMIN — ATORVASTATIN CALCIUM 40 MG: 40 TABLET, FILM COATED ORAL at 21:24

## 2021-12-25 RX ADMIN — VENLAFAXINE HYDROCHLORIDE 37.5 MG: 37.5 CAPSULE, EXTENDED RELEASE ORAL at 09:07

## 2021-12-25 RX ADMIN — HYDROXYZINE PAMOATE 50 MG: 50 CAPSULE ORAL at 09:07

## 2021-12-25 RX ADMIN — TRAZODONE HYDROCHLORIDE 50 MG: 50 TABLET ORAL at 21:24

## 2021-12-25 RX ADMIN — LEVETIRACETAM 500 MG: 500 TABLET, FILM COATED ORAL at 09:07

## 2021-12-25 NOTE — PLAN OF CARE
Problem: Altered Mood, Depressive Behavior:  Goal: Able to verbalize and/or display a decrease in depressive symptoms  Description: Able to verbalize and/or display a decrease in depressive symptoms  12/25/2021 1745 by Wild Soto RN  Outcome: Ongoing  12/25/2021 1328 by Wild Soto RN  Outcome: Ongoing  Goal: Absence of self-harm  Description: Absence of self-harm  12/25/2021 1745 by Wild Soto RN  Outcome: Ongoing  12/25/2021 1328 by Wild Soto RN  Outcome: Ongoing             PATIENT HAS BEEN OUT ON UNIT AND SOCIAL. MOOD AND AFFECT IMPROVING. DENIES SUICIDAL AND HOMICIDAL THOUGHTS. DENIES HALLUCINATIONS.

## 2021-12-25 NOTE — GROUP NOTE
Group Therapy Note    Date: 12/25/2021    Group Start Time: 1110  Group End Time: 1200  Group Topic: Cognitive Skills    [facility]     SOHAM Black        Group Therapy Note    Attendees: 10         Patient's Goal: To increase socialization with others. Notes:  Pt active participant in recreational activity group. Pt easily engaged with others , was able to share own experiences, and was open to learning new information. Status After Intervention:  Unchanged    Participation Level:  Active Listener and Interactive    Participation Quality: Appropriate, Attentive, Sharing and Supportive      Speech:  normal      Thought Process/Content: Logical      Affective Functioning: Congruent      Mood: euthymic      Level of consciousness:  Alert, Oriented x4 and Attentive      Response to Learning: Able to verbalize current knowledge/experience, Able to verbalize/acknowledge new learning and Progressing to goal      Endings: None Reported    Modes of Intervention: Education, Support, Socialization, Activity and Movement      Discipline Responsible: /Counselor      Signature:  SOHAM Toscano

## 2021-12-25 NOTE — PROGRESS NOTES
[facility] [address] NOTE     12/25/2021     Patient was seen and examined in person, Chart reviewed   Patient's case discussed with staff/team        Chief Complaint: \"I have a lot of problems. \"    Interim History:   Patient was seen in his room this morning, he is in no distress. Now denies SI/HI. States that he has a lot of problems, does not elaborate on these problems. He is eating his meals and taking his medications while on the unit. Denies all. Highly suspicious of secondary gain. Appetite:   [x] Normal/Unchanged  [] Increased  [] Decreased      Sleep:       [] Normal/Unchanged  [] Fair       [x] Poor              Energy:    [x] Normal/Unchanged  [] Increased  [] Decreased        SI [] Present  [x] Absent    HI  []Present  [x] Absent     Aggression:  [] yes  [x] no    Patient is [x] able  [] unable to CONTRACT FOR SAFETY     PAST MEDICAL/PSYCHIATRIC HISTORY:   Past Medical History:   Diagnosis Date    Anxiety     Arthritis     Asthma     Bipolar 1 disorder ([address]     Chronic combined systolic and diastolic CHF (congestive heart failure) ([address] 05/27/2018    COPD (chronic obstructive pulmonary disease) ([address]     Depression     Diabetes mellitus ([address]     GERD (gastroesophageal reflux disease)     Hepatitis C     resolved    Hypertension     Hyperthyroidism 8/2/2012    Hypothyroidism due to medication     Mass of testicle     Mesothelioma [facility])     pt states possibly not    Neuromuscular disorder ([address]     Other disorders of kidney and ureter     kidney stones; most recent 08/27/2015    Paroxysmal A-fib ([address]     discussed with PCP- patient does have hx of PAfib    Peptic ulcer     Prostate enlargement     Pulmonary embolism ([address]     Intermediate risk for PE on VQ scan.     Seizures ([address]     Thyroid disease     hyperthyroid       FAMILY/SOCIAL HISTORY:  Family History   Problem Relation Age of Onset    Cancer Mother     Diabetes Mother     Cancer Father     Diabetes Father  Diabetes Maternal Aunt     Diabetes Maternal Grandmother     Cancer Maternal Grandfather     Heart Disease Paternal Grandmother     Heart Failure Paternal Grandmother     Heart Disease Paternal Grandfather     Heart Failure Paternal Grandfather     Diabetes Maternal Aunt     Diabetes Maternal Aunt      Social History     Socioeconomic History    Marital status: Single     Spouse name: Not on file    Number of children: Not on file    Years of education: Not on file    Highest education level: Not on file   Occupational History    Occupation: disability   Tobacco Use    Smoking status: Former Smoker     Years: 10.00     Types: Cigarettes     Quit date: 1/10/2010     Years since quittin.9    Smokeless tobacco: Former User     Types: [address] date:    Vaping Use    Vaping Use: Never used   Substance and Sexual Activity    Alcohol use: No     Alcohol/week: 0.0 standard drinks     Comment: recovered alcoholic; last 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    Drug use: Yes     Types: Cocaine     Comment: \"I just started smoking crack again\"    Sexual activity: Yes     Comment: heroin   Other Topics Concern    Not on file   Social History Narrative    Not on file     Social Determinants of Health     Financial Resource Strain:     Difficulty of Paying Living Expenses: Not on file   Food Insecurity:     Worried About [address] in the Last Year: Not on file    [address] in the Last Year: Not on file   Transportation Needs:     Lack of Transportation (Medical): Not on file    Lack of Transportation (Non-Medical):  Not on file   Physical Activity:     Days of Exercise per Week: Not on file    Minutes of Exercise per Session: Not on file   Stress:     Feeling of Stress : Not on file   Social Connections:     Frequency of Communication with Friends and Family: Not on file    Frequency of Social Gatherings with Friends and Family: Not on file    Attends [employer]ology Services: Not on file   Gamino Active Member of Clubs or Organizations: Not on file    Attends Club or Organization Meetings: Not on file    Marital Status: Not on file   Intimate Partner Violence:     Fear of Current or Ex-Partner: Not on file    Emotionally Abused: Not on file    Physically Abused: Not on file    Sexually Abused: Not on file   Housing Stability:     Unable to Pay for Housing in the Last Year: Not on file    Number of [place] in the Last Year: Not on file    Unstable Housing in the Last Year: Not on file           ROS:  [x] All negative/unchanged except if checked.  Explain positive(checked items) below:  [] Constitutional  [] Eyes  [] Ear/Nose/Mouth/Throat  [] Respiratory  [] CV  [] GI  []   [] Musculoskeletal  [] Skin/Breast  [] Neurological  [] Endocrine  [] Heme/Lymph  [] Allergic/Immunologic    Explanation:     MEDICATIONS:    Current Facility-Administered Medications:     atorvastatin (LIPITOR) tablet 40 mg, 40 mg, Oral, Nightly, Titi Lay, APRN - CNP, 40 mg at 12/24/21 2120    divalproex (DEPAKOTE) DR tablet 250 mg, 250 mg, Oral, 2 times per day, Titi Lay APRN - CNP, 250 mg at 12/24/21 2120    lisinopril (PRINIVIL;ZESTRIL) tablet 10 mg, 10 mg, Oral, Daily, Titi Lay APRN - CNP, 10 mg at 12/24/21 1437    venlafaxine (EFFEXOR XR) extended release capsule 37.5 mg, 37.5 mg, Oral, Daily with breakfast, Titi Lay APRN - CNP, 37.5 mg at 12/24/21 1052    rivaroxaban (XARELTO) tablet 20 mg, 20 mg, Oral, Daily, Titi Lay, APRN - CNP, 20 mg at 12/24/21 1735    finasteride (PROSCAR) tablet 5 mg, 5 mg, Oral, Daily, Titi Lay APRN - CNP, 5 mg at 12/24/21 1052    budesonide (PULMICORT) nebulizer suspension 250 mcg, 0.25 mg, Nebulization, BID **AND** Arformoterol Tartrate (BROVANA) nebulizer solution 15 mcg, 15 mcg, Nebulization, BID, Titi Beagle, APRN - CNP    acetaminophen (TYLENOL) tablet 650 mg, 650 mg, Oral, Q6H PRN, Alla Gray MD    magnesium hydroxide (MILK OF MAGNESIA) 400 MG/5ML suspension 30 mL, 30 mL, Oral, Daily PRN, Seble Coombs MD    nicotine (NICODERM CQ) 21 MG/24HR 1 patch, 1 patch, TransDERmal, Daily, Seble Coombs MD    aluminum & magnesium hydroxide-simethicone (MAALOX) 200-200-20 MG/5ML suspension 30 mL, 30 mL, Oral, PRN, Seble Coombs MD    hydrOXYzine (VISTARIL) capsule 50 mg, 50 mg, Oral, TID PRN, Seble Coombs MD, 50 mg at 12/24/21 0848    haloperidol (HALDOL) tablet 5 mg, 5 mg, Oral, Q6H PRN **OR** haloperidol lactate (HALDOL) injection 5 mg, 5 mg, IntraMUSCular, Q6H PRN, Seble Coombs MD    traZODone (DESYREL) tablet 50 mg, 50 mg, Oral, Nightly PRN, Seble Coombs MD, 50 mg at 12/24/21 2120    levETIRAcetam (KEPPRA) tablet 500 mg, 500 mg, Oral, BID, Seble Coombs MD, 500 mg at 12/24/21 2120      Examination:  BP 90/60   Pulse 67   Temp 98.4 °F (36.9 °C) (Temporal)   Resp 15   Ht 5' 4\" (1.626 m)   Wt 130 lb (59 kg)   SpO2 100%   BMI 22.31 kg/m²   Gait - steady  Medication side effects(SE):     Mental Status Examination:    Level of consciousness:  within normal limits   Appearance:  fair grooming and fair hygiene  Behavior/Motor:  irritable  Attitude toward examiner:  cooperative and good eye contact  Speech:  normal rate and normal volume   Mood: \"I'm feeling fine. \"  Affect:  mood congruent and irritable  Thought processes:  Goal directed   Thought content:  Suicidal Ideation:  denies suicidal ideation  Delusions:  no evidence of delusions  Perceptual Disturbance:  denies any perceptual disturbance  Cognition:  oriented to person, place, and time   Concentration intact  Insight fair   Judgement fair     ASSESSMENT:   Patient symptoms are:  [] Well controlled  [x] Improving  [] Worsening  [] No change      Diagnosis:   Principal Problem:    Bipolar disorder ([facility]  Active Problems:    Cluster B personality disorder ([facility]    History of noncompliance with medical treatment, presenting hazards to health    Cannabis abuse  Resolved Problems:    Suicidal ideation      LABS:    Recent Labs     12/23/21  1754   WBC 5.5   HGB 12.5        Recent Labs     12/23/21  1754   *   K 3.8   CL 97*   CO2 22   BUN 13   CREATININE 1.0   GLUCOSE 92     Recent Labs     12/23/21  1754   BILITOT 0.3   ALKPHOS 61   AST 14   ALT 11     Lab Results   Component Value Date    LABAMPH NOT DETECTED 12/23/2021    LABAMPH NOT DETECTED 07/04/2011    BARBSCNU NOT DETECTED 12/23/2021    LABBENZ NOT DETECTED 12/23/2021    LABBENZ SEE BELOW 07/01/2014    CANNAB POSITIVE  07/04/2011    LABMETH NOT DETECTED 12/23/2021    OPIATESCREENURINE NOT DETECTED 12/23/2021    PHENCYCLIDINESCREENURINE NOT DETECTED 12/23/2021    ETOH <10 12/23/2021     Lab Results   Component Value Date    TSH 22.830 09/24/2021     No results found for: LITHIUM  Lab Results   Component Value Date    VALPROATE 3 (L) 12/23/2021       RISK ASSESSMENT:     Treatment Plan:  Reviewed current Medications with the patient. Risks, benefits, side effects, drug-to-drug interactions and alternatives to treatment were discussed. Collateral information:   CD evaluation  Encourage patient to attend group and other milieu activities.   Discharge planning discussed with the patient and treatment team.    PSYCHOTHERAPY/COUNSELING:  [x] Therapeutic interview  [x] Supportive  [] CBT  [] Ongoing  [] Other    [x] Patient continues to need, on a daily basis, active treatment furnished directly by or requiring the supervision of inpatient psychiatric personnel      Anticipated Length of stay:            Electronically signed by ZOIE Urrutia CNP on 12/25/2021 at 7:33 AM

## 2021-12-25 NOTE — PROGRESS NOTES
Group Therapy Note  Date: 12/25/2021  Start Time: 10:00  End Time:  10:45  Number of Participants: 10    Type of Group: Psychoeducation    Wellness Binder Information  Module Name: Doodling      Patient's Goal: To learn the technique of doodling art to help with relaxation. Notes: Attended group and was a participant. Status After Intervention:  Unchanged    Participation Level:  Active Listener    Participation Quality: Attentive      Speech:  hesitant      Thought Process/Content: Linear      Affective Functioning: Flat      Mood: depressed      Level of consciousness:  Alert      Response to Learning: Progressing to goal      Endings: None Reported    Modes of Intervention: Education and Support      Discipline Responsible: Psychoeducational Specialist      Signature:  TONY Mcclellan

## 2021-12-25 NOTE — PLAN OF CARE
Problem: Altered Mood, Depressive Behavior:  Goal: Able to verbalize and/or display a decrease in depressive symptoms  Description: Able to verbalize and/or display a decrease in depressive symptoms  Outcome: Ongoing  Goal: Absence of self-harm  Description: Absence of self-harm  Outcome: Ongoing           Loud and pressured. Behavior has been in good control. Less irritable. Denies suicidal and homicidal thoughts . denies hallucinations.

## 2021-12-25 NOTE — PLAN OF CARE
Problem: Suicide risk  Goal: Provide patient with safe environment  Description: Provide patient with safe environment  Outcome: Met This Shift     Problem: Altered Mood, Depressive Behavior:  Goal: Absence of self-harm  Description: Absence of self-harm  12/24/2021 2124 by Courtney Keyes RN  Outcome: Met This Shift     Problem: Altered Mood, Depressive Behavior:  Goal: Able to verbalize and/or display a decrease in depressive symptoms  Description: Able to verbalize and/or display a decrease in depressive symptoms  12/24/2021 2124 by Courtney Keyes RN  Outcome: Ongoing    Patient denies SI/HI and hallucinations. Patient is flat, sad, and isolative to his room. Patient rates depression and anxiety 10/10 related to his living situation. Patient takes prescribed medications without issue. Will continue to offer support and comfort.

## 2021-12-25 NOTE — PROGRESS NOTES
Patient attended afternoon activity enjoying a [employer] movie. Patient was one out of six participants.

## 2021-12-26 PROCEDURE — 6370000000 HC RX 637 (ALT 250 FOR IP): Performed by: PSYCHIATRY & NEUROLOGY

## 2021-12-26 PROCEDURE — 1240000000 HC EMOTIONAL WELLNESS R&B

## 2021-12-26 PROCEDURE — 99232 SBSQ HOSP IP/OBS MODERATE 35: CPT | Performed by: NURSE PRACTITIONER

## 2021-12-26 PROCEDURE — 6370000000 HC RX 637 (ALT 250 FOR IP): Performed by: NURSE PRACTITIONER

## 2021-12-26 RX ADMIN — FINASTERIDE 5 MG: 5 TABLET, FILM COATED ORAL at 09:08

## 2021-12-26 RX ADMIN — RIVAROXABAN 20 MG: 20 TABLET, FILM COATED ORAL at 09:09

## 2021-12-26 RX ADMIN — ATORVASTATIN CALCIUM 40 MG: 40 TABLET, FILM COATED ORAL at 20:58

## 2021-12-26 RX ADMIN — DIVALPROEX SODIUM 250 MG: 250 TABLET, DELAYED RELEASE ORAL at 20:58

## 2021-12-26 RX ADMIN — LEVETIRACETAM 500 MG: 500 TABLET, FILM COATED ORAL at 20:58

## 2021-12-26 RX ADMIN — VENLAFAXINE HYDROCHLORIDE 37.5 MG: 37.5 CAPSULE, EXTENDED RELEASE ORAL at 09:09

## 2021-12-26 RX ADMIN — HYDROXYZINE PAMOATE 50 MG: 50 CAPSULE ORAL at 13:58

## 2021-12-26 RX ADMIN — DIVALPROEX SODIUM 250 MG: 250 TABLET, DELAYED RELEASE ORAL at 09:08

## 2021-12-26 RX ADMIN — HYDROXYZINE PAMOATE 50 MG: 50 CAPSULE ORAL at 20:58

## 2021-12-26 RX ADMIN — LEVETIRACETAM 500 MG: 500 TABLET, FILM COATED ORAL at 09:08

## 2021-12-26 RX ADMIN — LISINOPRIL 10 MG: 10 TABLET ORAL at 09:08

## 2021-12-26 RX ADMIN — TRAZODONE HYDROCHLORIDE 50 MG: 50 TABLET ORAL at 20:58

## 2021-12-26 ASSESSMENT — PAIN SCALES - GENERAL
PAINLEVEL_OUTOF10: 0
PAINLEVEL_OUTOF10: 0

## 2021-12-26 NOTE — PROGRESS NOTES
Patient has been out on the unit, pleasant and cooperative. Patient did have an outburst when he yelled at a patient due to another patients' behaviors on the unit. Patient was redirected and later apologized for his behavior. Patient denies all and denies depression but states that he is \"always anxious. \"  Patient is encouraged to continue to work towards discharge goal by complying with medications, attending groups and to seek staff if feelings are overwhelming. Environmental rounds completed per unit policy to maintain safety of everyone on the unit. Staff will offer support and interventions as requested or required.

## 2021-12-26 NOTE — GROUP NOTE
Group Therapy Note    Date: 12/26/2021    Group Start Time: 6394  Group End Time: 9044  Group Topic: Cognitive Skills    [facility]    SOHAM Gonzalez        Group Therapy Note    Attendees: 10     Patient's Goal:  To increase socialization with others. Notes: Pt active participant in [address] trivia activity in which he engaged easily with others. Status After Intervention:  Unchanged    Participation Level:  Active Listener and Interactive    Participation Quality: Appropriate, Attentive, Sharing and Supportive      Speech:  normal      Thought Process/Content: Logical      Affective Functioning: Congruent      Mood: euthymic      Level of consciousness:  Alert, Oriented x4 and Attentive      Response to Learning: Able to verbalize current knowledge/experience, Able to verbalize/acknowledge new learning, Able to retain information and Progressing to goal      Endings: None Reported    Modes of Intervention: Education, Support, Socialization and Activity      Discipline Responsible: /Counselor      Signature:  SOHAM Gupta

## 2021-12-26 NOTE — PROGRESS NOTES
BEHAVIORAL HEALTH FOLLOW-UP NOTE     12/26/2021     Patient was seen and examined in person, Chart reviewed   Patient's case discussed with staff/team        Chief Complaint: \"I I am doing wonderful. \"    Interim History:   Patient is out on the unit bright pleasant socializing with peers vehemently denies SI/HI intent or plan denies auditory visual hallucinations voices readiness for discharge. He states he is going to go back to his apartment and start looking for a new apartment. He is future oriented. Eating well sleeping well no neurovegetative signs of depression denies any auditory visualizations no overt or covert signs of psychosis      Appetite:   [x] Normal/Unchanged  [] Increased  [] Decreased      Sleep:       [] Normal/Unchanged  [] Fair       [x] Poor              Energy:    [x] Normal/Unchanged  [] Increased  [] Decreased        SI [] Present  [x] Absent    HI  []Present  [x] Absent     Aggression:  [] yes  [x] no    Patient is [x] able  [] unable to CONTRACT FOR SAFETY     PAST MEDICAL/PSYCHIATRIC HISTORY:   Past Medical History:   Diagnosis Date    Anxiety     Arthritis     Asthma     Bipolar 1 disorder ([address]     Chronic combined systolic and diastolic CHF (congestive heart failure) ([address] 05/27/2018    [facility]     Depression     Diabetes mellitus ([address]     GERD (gastroesophageal reflux disease)     Hepatitis C     resolved    Hypertension     Hyperthyroidism 8/2/2012    Hypothyroidism due to medication     Mass of testicle     Mesothelioma [facility])     pt states possibly not    Neuromuscular disorder ([address]     Other disorders of kidney and ureter     kidney stones; most recent 08/27/2015    Paroxysmal A-fib ([address]     discussed with PCP- patient does have hx of PAfib    Peptic ulcer     Prostate enlargement     Pulmonary embolism ([address]     Intermediate risk for PE on VQ scan.     Seizures ([address]     Thyroid disease     hyperthyroid FAMILY/SOCIAL HISTORY:  Family History   Problem Relation Age of Onset    Cancer Mother     Diabetes Mother     Cancer Father     Diabetes Father     Diabetes Maternal Aunt     Diabetes Maternal Grandmother     Cancer Maternal Grandfather     Heart Disease Paternal Grandmother     Heart Failure Paternal Grandmother     Heart Disease Paternal Grandfather     Heart Failure Paternal Grandfather     Diabetes Maternal Aunt     Diabetes Maternal Aunt      Social History     Socioeconomic History    Marital status: Single     Spouse name: Not on file    Number of children: Not on file    Years of education: Not on file    Highest education level: Not on file   Occupational History    Occupation: disability   Tobacco Use    Smoking status: Former Smoker     Years: 10.00     Types: Cigarettes     Quit date: 1/10/2010     Years since quittin.9    Smokeless tobacco: Former User     Types: [address] date:    Vaping Use    Vaping Use: Never used   Substance and Sexual Activity    Alcohol use: No     Alcohol/week: 0.0 standard drinks     Comment: recovered alcoholic; 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    Drug use: Yes     Types: Cocaine     Comment: \"I just started smoking crack again\"    Sexual activity: Yes     Comment: heroin   Other Topics Concern    Not on file   Social History Narrative    Not on file     Social Determinants of Health     Financial Resource Strain:     Difficulty of Paying Living Expenses: Not on file   Food Insecurity:     Worried About [address] in the Last Year: Not on file    [address] in the Last Year: Not on file   Transportation Needs:     Lack of Transportation (Medical): Not on file    Lack of Transportation (Non-Medical):  Not on file   Physical Activity:     Days of Exercise per Week: Not on file    Minutes of Exercise per Session: Not on file   Stress:     Feeling of Stress : Not on file   Social Connections:     Frequency of Communication with Friends and Family: Not on file    Frequency of Social Gatherings with Friends and Family: Not on file    Attends Je[employer] witness Services: Not on file    Active Member of Clubs or Organizations: Not on file    Attends Club or Organization Meetings: Not on file    Marital Status: Not on file   Intimate Partner Violence:     Fear of Current or Ex-Partner: Not on file    Emotionally Abused: Not on file    Physically Abused: Not on file    Sexually Abused: Not on file   Housing Stability:     Unable to Pay for Housing in the Last Year: Not on file    Number of [place] in the Last Year: Not on file    Unstable Housing in the Last Year: Not on file           ROS:  [x] All negative/unchanged except if checked.  Explain positive(checked items) below:  [] Constitutional  [] Eyes  [] Ear/Nose/Mouth/Throat  [] Respiratory  [] CV  [] GI  []   [] Musculoskeletal  [] Skin/Breast  [] Neurological  [] Endocrine  [] Heme/Lymph  [] Allergic/Immunologic    Explanation:     MEDICATIONS:    Current Facility-Administered Medications:     atorvastatin (LIPITOR) tablet 40 mg, 40 mg, Oral, Nightly, Vin Tay, APRN - CNP, 40 mg at 12/25/21 2124    divalproex (DEPAKOTE) DR tablet 250 mg, 250 mg, Oral, 2 times per day, Vin Tay, APRN - CNP, 250 mg at 12/26/21 0908    lisinopril (PRINIVIL;ZESTRIL) tablet 10 mg, 10 mg, Oral, Daily, Vin Tay, APRN - CNP, 10 mg at 12/26/21 0908    venlafaxine (EFFEXOR XR) extended release capsule 37.5 mg, 37.5 mg, Oral, Daily with breakfast, Vin Tay, APRN - CNP, 37.5 mg at 12/26/21 0909    rivaroxaban (XARELTO) tablet 20 mg, 20 mg, Oral, Daily, Vin Tay, APRN - CNP, 20 mg at 12/26/21 7646    finasteride (PROSCAR) tablet 5 mg, 5 mg, Oral, Daily, Vin Tay, APRN - CNP, 5 mg at 12/26/21 0908    budesonide (PULMICORT) nebulizer suspension 250 mcg, 0.25 mg, Nebulization, BID **AND** Arformoterol Tartrate (BROVANA) nebulizer solution 15 mcg, 15 mcg, Nebulization, BID, Charmayne Sheer, APRN - CNP    acetaminophen (TYLENOL) tablet 650 mg, 650 mg, Oral, Q6H PRN, Nehemiah Santos MD    magnesium hydroxide (MILK OF MAGNESIA) 400 MG/5ML suspension 30 mL, 30 mL, Oral, Daily PRN, Nehemiah Santos MD    nicotine (NICODERM CQ) 21 MG/24HR 1 patch, 1 patch, TransDERmal, Daily, Nehemiah Santos MD    aluminum & magnesium hydroxide-simethicone (MAALOX) 200-200-20 MG/5ML suspension 30 mL, 30 mL, Oral, PRN, Nehemiah Santos MD    hydrOXYzine (VISTARIL) capsule 50 mg, 50 mg, Oral, TID PRN, Nehemiah Santos MD, 50 mg at 12/25/21 0907    haloperidol (HALDOL) tablet 5 mg, 5 mg, Oral, Q6H PRN **OR** haloperidol lactate (HALDOL) injection 5 mg, 5 mg, IntraMUSCular, Q6H PRN, Nehemiah Santos MD    traZODone (DESYREL) tablet 50 mg, 50 mg, Oral, Nightly PRN, Nehemiah Santos MD, 50 mg at 12/25/21 2124    levETIRAcetam (KEPPRA) tablet 500 mg, 500 mg, Oral, BID, Nehemiah Santos MD, 500 mg at 12/26/21 0908      Examination:  BP (!) 114/57   Pulse 78   Temp 97.9 °F (36.6 °C) (Temporal)   Resp 16   Ht 5' 4\" (1.626 m)   Wt 130 lb (59 kg)   SpO2 100%   BMI 22.31 kg/m²   Gait - steady  Medication side effects(SE):     Mental Status Examination:    Level of consciousness:  within normal limits   Appearance:  fair grooming and fair hygiene  Behavior/Motor:  irritable  Attitude toward examiner:  cooperative and good eye contact  Speech:  normal rate and normal volume   Mood: \"I'm feeling fine. \"  Affect:  mood congruent and irritable  Thought processes:  Goal directed   Thought content:  Suicidal Ideation:  denies suicidal ideation  Delusions:  no evidence of delusions  Perceptual Disturbance:  denies any perceptual disturbance  Cognition:  oriented to person, place, and time   Concentration intact  Insight fair   Judgement fair     ASSESSMENT:   Patient symptoms are:  [] Well controlled  [x] Improving  [] Worsening  [] No change      Diagnosis:   Principal Problem:    Bipolar disorder [facility])  Active Problems:    Cluster B personality disorder ([address]    History of noncompliance with medical treatment, presenting hazards to health    Cannabis abuse  Resolved Problems:    Suicidal ideation      LABS:    Recent Labs     12/23/21  1754   WBC 5.5   HGB 12.5        Recent Labs     12/23/21  1754   *   K 3.8   CL 97*   CO2 22   BUN 13   CREATININE 1.0   GLUCOSE 92     Recent Labs     12/23/21  1754   BILITOT 0.3   ALKPHOS 61   AST 14   ALT 11     Lab Results   Component Value Date    LABAMPH NOT DETECTED 12/23/2021    LABAMPH NOT DETECTED 07/04/2011    BARBSCNU NOT DETECTED 12/23/2021    LABBENZ NOT DETECTED 12/23/2021    LABBENZ SEE BELOW 07/01/2014    CANNAB POSITIVE  07/04/2011    LABMETH NOT DETECTED 12/23/2021    OPIATESCREENURINE NOT DETECTED 12/23/2021    PHENCYCLIDINESCREENURINE NOT DETECTED 12/23/2021    ETOH <10 12/23/2021     Lab Results   Component Value Date    TSH 22.830 09/24/2021     No results found for: LITHIUM  Lab Results   Component Value Date    VALPROATE 3 (L) 12/23/2021       RISK ASSESSMENT:     Treatment Plan:  Reviewed current Medications with the patient. Risks, benefits, side effects, drug-to-drug interactions and alternatives to treatment were discussed. Collateral information:   CD evaluation  Encourage patient to attend group and other milieu activities.   Discharge planning discussed with the patient and treatment team.    Increase Depakote to 250 mg twice daily for mood stabilization  Continue Effexor XR 37.5 mg daily    PSYCHOTHERAPY/COUNSELING:  [x] Therapeutic interview  [x] Supportive  [] CBT  [] Ongoing  [] Other    [x] Patient continues to need, on a daily basis, active treatment furnished directly by or requiring the supervision of inpatient psychiatric personnel      Anticipated Length of stay:            Electronically signed by ZOIE Meeks CNP on 05/91/0311 at 10:03 AM

## 2021-12-26 NOTE — PLAN OF CARE
Problem: Altered Mood, Depressive Behavior:  Goal: Able to verbalize and/or display a decrease in depressive symptoms  Description: Able to verbalize and/or display a decrease in depressive symptoms  Outcome: Ongoing  Goal: Absence of self-harm  Description: Absence of self-harm  Outcome: Ongoing             Patient out on unit social with peers and staff. Brighter and smiling. Restless legs. Denies suicidal and homicidal thoughts. Denies hallucinations. Attending group and medication compliant.

## 2021-12-26 NOTE — BH NOTE
Patient is feeling suicidal but denies plan or intent here. Feels safe. believers suicide is immoral because of his [employer] luz. Will get pastoral consult. Feel s overwhelmed with being evicted. Allow patient to verbalize and given support. Patient want sober living.

## 2021-12-27 VITALS
BODY MASS INDEX: 22.2 KG/M2 | OXYGEN SATURATION: 100 % | DIASTOLIC BLOOD PRESSURE: 60 MMHG | SYSTOLIC BLOOD PRESSURE: 104 MMHG | TEMPERATURE: 98.4 F | WEIGHT: 130 LBS | RESPIRATION RATE: 15 BRPM | HEART RATE: 62 BPM | HEIGHT: 64 IN

## 2021-12-27 PROCEDURE — 6370000000 HC RX 637 (ALT 250 FOR IP): Performed by: PSYCHIATRY & NEUROLOGY

## 2021-12-27 PROCEDURE — 99239 HOSP IP/OBS DSCHRG MGMT >30: CPT | Performed by: NURSE PRACTITIONER

## 2021-12-27 PROCEDURE — 6370000000 HC RX 637 (ALT 250 FOR IP): Performed by: NURSE PRACTITIONER

## 2021-12-27 RX ADMIN — LISINOPRIL 10 MG: 10 TABLET ORAL at 13:58

## 2021-12-27 RX ADMIN — FINASTERIDE 5 MG: 5 TABLET, FILM COATED ORAL at 09:10

## 2021-12-27 RX ADMIN — DIVALPROEX SODIUM 250 MG: 250 TABLET, DELAYED RELEASE ORAL at 09:11

## 2021-12-27 RX ADMIN — RIVAROXABAN 20 MG: 20 TABLET, FILM COATED ORAL at 09:10

## 2021-12-27 RX ADMIN — VENLAFAXINE HYDROCHLORIDE 37.5 MG: 37.5 CAPSULE, EXTENDED RELEASE ORAL at 09:11

## 2021-12-27 RX ADMIN — LEVETIRACETAM 500 MG: 500 TABLET, FILM COATED ORAL at 09:10

## 2021-12-27 ASSESSMENT — PAIN SCALES - GENERAL: PAINLEVEL_OUTOF10: 0

## 2021-12-27 NOTE — GROUP NOTE
Group Therapy Note    Date: 12/27/2021    Group Start Time: 1100  Group End Time: 1130  Group Topic: Cognitive Skills    [facility]    Thankful DAVID Pablo LSW        Group Therapy Note    Attendees: 11       Patient's Goal:  Patient will learn about the different types of cognitive distortions.     Notes:  Patient was an active participant in group therapy. Status After Intervention:  Improved    Participation Level: Active Listener and Interactive    Participation Quality: Appropriate, Attentive and Sharing      Speech:  normal      Thought Process/Content: Logical      Affective Functioning: Congruent      Mood: anxious      Level of consciousness:  Alert, Oriented x4 and Attentive      Response to Learning: Able to verbalize current knowledge/experience, Able to verbalize/acknowledge new learning and Able to retain information      Endings: None Reported    Modes of Intervention: Education, Support, Socialization, Exploration, Clarifying and Problem-solving      Discipline Responsible: /Counselor      Signature:   DAVID Garg LSW

## 2021-12-27 NOTE — DISCHARGE SUMMARY
Past Medical History:   Diagnosis Date    Anxiety     Arthritis     Asthma     Bipolar 1 disorder ([facility]     Chronic combined systolic and diastolic CHF (congestive heart failure) ([facility] 2018    COPD (chronic obstructive pulmonary disease) (HCC)     Depression     Diabetes mellitus (HCC)     GERD (gastroesophageal reflux disease)     Hepatitis C     resolved    Hypertension     Hyperthyroidism 2012    Hypothyroidism due to medication     Mass of testicle     Mesothelioma [facility])     pt states possibly not    Neuromuscular disorder ([facility]     Other disorders of kidney and ureter     kidney stones; most recent 2015    Paroxysmal A-fib ([facility]     discussed with PCP- patient does have hx of PAfib    Peptic ulcer     Prostate enlargement     Pulmonary embolism (HCC)     Intermediate risk for PE on VQ scan.  Seizures ([facility]     Thyroid disease     hyperthyroid       FAMILY/SOCIAL HISTORY:  Family History   Problem Relation Age of Onset    Cancer Mother     Diabetes Mother     Cancer Father     Diabetes Father     Diabetes Maternal Aunt     Diabetes Maternal Grandmother     Cancer Maternal Grandfather     Heart Disease Paternal Grandmother     Heart Failure Paternal Grandmother     Heart Disease Paternal Grandfather     Heart Failure Paternal Grandfather     Diabetes Maternal Aunt     Diabetes Maternal Aunt      Social History     Socioeconomic History    Marital status: Single     Spouse name: Not on file    Number of children: Not on file    Years of education: Not on file    Highest education level: Not on file   Occupational History    Occupation: disability   Tobacco Use    Smoking status: Former Smoker     Years: 10.00     Types: Cigarettes     Quit date: 1/10/2010     Years since quittin.9    Smokeless tobacco: Former User     Types: [address] date:    Vaping Use    Vaping Use: Never used   Substance and Sexual Activity    Alcohol use:  No Alcohol/week: 0.0 standard drinks     Comment: recovered alcoholic; 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    Drug use: Yes     Types: Cocaine     Comment: \"I just started smoking crack again\"    Sexual activity: Yes     Comment: heroin   Other Topics Concern    Not on file   Social History Narrative    Not on file     Social Determinants of Health     Financial Resource Strain:     Difficulty of Paying Living Expenses: Not on file   Food Insecurity:     Worried About Running Out of Food in the Last Year: Not on file    Mark of Food in the Last Year: Not on file   Transportation Needs:     Lack of Transportation (Medical): Not on file    Lack of Transportation (Non-Medical):  Not on file   Physical Activity:     Days of Exercise per Week: Not on file    Minutes of Exercise per Session: Not on file   Stress:     Feeling of Stress : Not on file   Social Connections:     Frequency of Communication with Friends and Family: Not on file    Frequency of Social Gatherings with Friends and Family: Not on file    Attends Shinto Services: Not on file    Active Member of [address] [place] [address] or Organizations: Not on file    Attends Club or Organization Meetings: Not on file    Marital Status: Not on file   Intimate Partner Violence:     Fear of Current or Ex-Partner: Not on file    Emotionally Abused: Not on file    Physically Abused: Not on file    Sexually Abused: Not on file   Housing Stability:     Unable to Pay for Housing in the Last Year: Not on file    Number of [place] in the Last Year: Not on file    Unstable Housing in the Last Year: Not on file       MEDICATIONS:    Current Facility-Administered Medications:     atorvastatin (LIPITOR) tablet 40 mg, 40 mg, Oral, Nightly, ZOIE Shepard - CNP, 40 mg at 12/26/21 2058    divalproex (DEPAKOTE) DR tablet 250 mg, 250 mg, Oral, 2 times per day, ZOIE Shepard - CNP, 250 mg at 12/27/21 0911    lisinopril (PRINIVIL;ZESTRIL) tablet 10 mg, 10 mg, Oral, Daily, Deedee Madrigal Leonora Alfaro, APRN - CNP, 10 mg at 12/26/21 0908    venlafaxine (EFFEXOR XR) extended release capsule 37.5 mg, 37.5 mg, Oral, Daily with breakfast, Celia Deng, APRN - CNP, 37.5 mg at 12/27/21 0911    rivaroxaban (XARELTO) tablet 20 mg, 20 mg, Oral, Daily, Celia Deng APRN - CNP, 20 mg at 12/27/21 0910    finasteride (PROSCAR) tablet 5 mg, 5 mg, Oral, Daily, Celiapatti Deng, APRN - CNP, 5 mg at 12/27/21 0910    budesonide (PULMICORT) nebulizer suspension 250 mcg, 0.25 mg, Nebulization, BID **AND** Arformoterol Tartrate (BROVANA) nebulizer solution 15 mcg, 15 mcg, Nebulization, BID, Celia Deng, APRN - CNP    acetaminophen (TYLENOL) tablet 650 mg, 650 mg, Oral, Q6H PRN, Arlen Dunne MD    magnesium hydroxide (MILK OF MAGNESIA) 400 MG/5ML suspension 30 mL, 30 mL, Oral, Daily PRN, Arlen Dunne MD    nicotine (NICODERM CQ) 21 MG/24HR 1 patch, 1 patch, TransDERmal, Daily, Arlen Dunne MD    aluminum & magnesium hydroxide-simethicone (MAALOX) 200-200-20 MG/5ML suspension 30 mL, 30 mL, Oral, PRN, Arlen Dunne MD    hydrOXYzine (VISTARIL) capsule 50 mg, 50 mg, Oral, TID PRN, Arlen Dunne MD, 50 mg at 12/26/21 2058    haloperidol (HALDOL) tablet 5 mg, 5 mg, Oral, Q6H PRN **OR** haloperidol lactate (HALDOL) injection 5 mg, 5 mg, IntraMUSCular, Q6H PRN, Arlen Dunne MD    traZODone (DESYREL) tablet 50 mg, 50 mg, Oral, Nightly PRN, Arlen Dunne MD, 50 mg at 12/26/21 2058    levETIRAcetam (KEPPRA) tablet 500 mg, 500 mg, Oral, BID, Arlen Dunne MD, 500 mg at 12/27/21 8191    Examination:  BP (!) 96/50   Pulse 62   Temp 98.4 °F (36.9 °C) (Temporal)   Resp 15   Ht 5' 4\" (1.626 m)   Wt 130 lb (59 kg)   SpO2 100%   BMI 22.31 kg/m²   Gait - steady    HOSPITAL COURSE[de-identified]  Following admission to the hospital, patient had a complete physical exam and blood work up, which he was medically cleared and admitted to [facility] for psychiatric evaluation and stabilization.   The patient was monitored closely with suicide and appropriate precautions. He was started on Effexor 37.5 mg daily, Depakote 250 mg twice daily for mood stabilization and he was continued on his home medications as clinically indicated. This patient has a long history of chronic treatment noncompliance. He was encouraged to participate in group and other milieu activity and started to feel better with this combination of treatment. There has been significant progress in the improvement of symptoms since admission. The patient has been an active participant in his treatment, and discharge planning.     Patient was no longer suicidal, homicidal, manic or psychotic. He received the required treatment with medication, participated in group milieu, remained engaged in unit activities, learned appropriate coping skills. He was seen to be watching television socializing with peers using the phone. There were no mention or gestures of self-harm or harm to others. His mental status has returned to baseline. The treatment team believes the patient obtain maximum benefit out of this hospitalization and does not meet the criteria for inpatient hospitalization anymore. However he will continue to benefit from outpatient follow-up and treatment to maintain stability. Collateral information has been obtained and reconciled and there are no concerns about his safety. He has no access to guns or weapons. He appreciates the help that he received here. This patient no longer meets criteria for inpatient hospitalization. He was discharged home to his family in psychiatrically stable condition.       Appetite:  [x] Normal  [] Increased  [] Decreased    Sleep:       [x] Normal  [] Fair       [] Poor            Energy:    [x] Normal  [] Increased  [] Decreased     SI [] Present  [x] Absent  HI  []Present  [x] Absent   Aggression:  [] yes  [] no  Patient is [x] able  [] unable to CONTRACT FOR SAFETY   Medication side effects(SE):  [x] None(Psych. Meds.) [] Other      Mental Status Examination on discharge:    Level of consciousness:  within normal limits   Appearance:  well-appearing  Behavior/Motor:  no abnormalities noted  Attitude toward examiner:  attentive and good eye contact  Speech:  spontaneous, normal rate and normal volume   Mood: euthymic  Affect:  mood congruent  Thought processes:  linear and goal directed   Thought content: The patient is devoid of suicidal or homicidal ideation intent or plan. Devoid of auditory or visual hallucinations or other perceptual disturbances, there are no overt or covert signs of psychosis or paranoia. There are no neurovegetative signs of depression. Cognition:  oriented to person, place, and time   Concentration intact  Memory intact  Insight good   Judgement fair   Fund of Knowledge adequate    ASSESSMENT:  Patient symptoms are:  [x] Well controlled  [] Improving  [] Worsening  [] No change    Reason for more than one antipsychotic:  [x] N/A  [] 3 Failed Monotherapy attempts (Drugs tried:)  [] Crossover to a new antipsychotic  [] Taper to Monotherapy from Polypharmacy  [] Augmentation of clozapine therapy due to treatment resistance to single therapy    Diagnosis:  Principal Problem:    Bipolar disorder ([facility]  Active Problems:    Cluster B personality disorder ([facility]    History of noncompliance with medical treatment, presenting hazards to health    Cannabis abuse  Resolved Problems:    Suicidal ideation      LABS:    No results for input(s): WBC, HGB, PLT in the last 72 hours. No results for input(s): NA, K, CL, CO2, BUN, CREATININE, GLUCOSE in the last 72 hours. No results for input(s): BILITOT, ALKPHOS, AST, ALT in the last 72 hours.   Lab Results   Component Value Date    LABAMPH NOT DETECTED 12/23/2021    LABAMPH NOT DETECTED 07/04/2011    BARBSCNU NOT DETECTED 12/23/2021    LABBENZ NOT DETECTED 12/23/2021    LABBENZ SEE BELOW 07/01/2014    CANNAB POSITIVE  07/04/2011    LABMETH NOT DETECTED 12/23/2021    OPIATESCREENURINE NOT DETECTED 12/23/2021    PHENCYCLIDINESCREENURINE NOT DETECTED 12/23/2021    ETOH <10 12/23/2021     Lab Results   Component Value Date    TSH 22.830 09/24/2021     No results found for: LITHIUM  Lab Results   Component Value Date    VALPROATE 3 (L) 12/23/2021       RISK ASSESSMENT AT DISCHARGE: Low risk for suicide and homicide. Treatment Plan:  The patient's diagnosis, treatment plan, medication management were formulated after patient was seen directly by the attending physician and myself and all relevant documentation was reviewed.     The patient was referred to outpatient/inpatient substance abuse rehabilitation programming. He was educated multiple times during the hospitalization that if he chooses to continue to use drugs or alcohol, he may potentially act out impulsively, resulting in serious harm to self or others, even though unintentional.  He was also educated that mental health treatment cannot be optimized with ongoing use of drugs. He demonstrated understanding has the capacity to understand that.     Risk, benefit, side effects, possible outcomes of the medication and alternatives discussed with the patient and the patient demonstrated understanding. The patient was also educated that the outcome of treatment will depend on the medication compliance as directed by the prescribers along with regular follow-up, compliance with the labs and other work-up, as clinically indicated.     Encourage patient to attend outpatient follow up appointment and therapy, outpatient follow-up appointments and and scheduled prior to discharge.   Patient was recommended for dialectical behavioral therapy in the community for coping skills and to reduce her eliminate self-injurious or parasuicidal behaviors.     Patient was advised to call the outpatient provider, visit the nearest ED or call 911 if symptoms are not manageable.      Patient's family member was contacted prior to the discharge, patient has been discharged home in psychiatrically stable condition. Medication List      CONTINUE taking these medications    atorvastatin 40 MG tablet  Commonly known as: LIPITOR  Take 1 tablet by mouth nightly     Breo Ellipta 200-25 MCG/INH Aepb inhaler  Generic drug: Fluticasone furoate-vilanterol     divalproex 250 MG DR tablet  Commonly known as: DEPAKOTE  Take 1 tablet by mouth every 12 hours     finasteride 5 MG tablet  Commonly known as: PROSCAR  Take 1 tablet by mouth daily     levETIRAcetam 500 MG tablet  Commonly known as: KEPPRA  Take 1 tablet by mouth 2 times daily     lisinopril 10 MG tablet  Commonly known as: PRINIVIL;ZESTRIL  Take 1 tablet by mouth daily     methIMAzole 10 MG tablet  Commonly known as: TAPAZOLE     RA Balanced Nutritional Plus Liqd  Take 1 Bottle by mouth daily     rivaroxaban 20 MG Tabs tablet  Commonly known as: XARELTO  Take 1 tablet by mouth daily With food     therapeutic multivitamin-minerals tablet     traZODone 50 MG tablet  Commonly known as: DESYREL  Take 1 tablet by mouth nightly as needed for Sleep     venlafaxine 37.5 MG extended release capsule  Commonly known as: EFFEXOR XR  Take 1 capsule by mouth daily (with breakfast)        STOP taking these medications    lurasidone 20 MG Tabs tablet  Commonly known as: LATUDA          NOTE: This report was transcribed using voice recognition software. Every effort was made to ensure accuracy; however, inadvertent computerized transcription errors may be present.     TIME SPEND - 35 MINUTES TO COMPLETE THE EVALUATION, DISCHARGE SUMMARY, MEDICATION RECONCILIATION AND FOLLOW UP CARE     Signed:  ZOIE Lindsey CNP  12/27/2021  10:50 AM

## 2021-12-27 NOTE — GROUP NOTE
Group Therapy Note    Date: 12/27/2021    Group Start Time: 1000  Group End Time: 3549  Group Topic: Psychoeducation    [facility] [address]        Group Therapy Note    Attendees: 15         Notes: Active and engaged during discussion on managing anxiety. Able to identify skill to utilize today. Recognizes benefits of supports. Status After Intervention:  Improved    Participation Level:  Active Listener and Interactive    Participation Quality: Appropriate, Attentive and Sharing      Speech:  normal      Thought Process/Content: Logical      Affective Functioning: Congruent      Mood: euthymic      Level of consciousness:  Alert and Attentive      Response to Learning: Progressing to goal      Endings: None Reported    Modes of Intervention: Education, Support, Socialization and Exploration      Discipline Responsible: Psychoeducational Specialist      Signature:  Virgil Stuart [place]

## 2021-12-27 NOTE — PROGRESS NOTES
PT. IS DISCHARGED TO HOME. AVS REVIEWED AND COPY GIVEN TO PT. WITH PT. VERBALIZING UNDERSTANDING AND POTENTIAL TO COMPLY TO MEDICATIONS AND FOLLOW UP APPTS. Maryam Blackwell AWAITING INSURANCE PROVIDED TRANSPORT TO HOME.

## 2021-12-27 NOTE — BH NOTE
Blood pressure 96/50 at 09:10. Scheduled Lisinopril held, RN informed.  All other scheduled medications given, see eMar.

## 2021-12-27 NOTE — GROUP NOTE
Group Therapy Note    Date: 12/27/2021    Group Start Time: 1400  Group End Time: 7592  Group Topic: Cognitive Skills    [facility] BH 1    DAVID Hernandes, GEOVANNAW        Group Therapy Note    Attendees: 7         Patient's Goal: To participate in group discussion on discharge planning, barriers anticipated, and how to work through feelings of nervousness for discharge. Notes: pt plans to return to his apartment at discharge. Pt reports concern with this plan because he is currently in the process of moving apartments after being evicted for not paying his rent. Pt plans to attend NA/AA meetings and work with his sponsor. Pt provided support to other group members. Status After Intervention:  Improved    Participation Level:  Active Listener and Interactive    Participation Quality: Appropriate, Attentive, Sharing and Supportive      Speech:  normal      Thought Process/Content: Logical      Affective Functioning: Congruent      Mood: euthymic      Level of consciousness:  Alert and Oriented x4      Response to Learning: Able to verbalize current knowledge/experience      Endings: None Reported    Modes of Intervention: Education, Support, Socialization, Exploration, Clarifying and Problem-solving      Discipline Responsible: /Counselor      Signature:  Jacques Prince, MSW, LSW

## 2021-12-27 NOTE — PROGRESS NOTES
Patient does not want his brother Sangeetha Mcneil to know he is admitted to the hospital. His brother Sangeetha Mcneil called without Pin code and no information given.

## 2021-12-27 NOTE — PLAN OF CARE
[facility]  Day 3 Interdisciplinary Treatment Plan NOTE    Review Date & Time: 12/27/21 1000    Patient was in treatment team    Estimated Length of Stay Update:   5 DAYS  Estimated Discharge Date Update:  TODAY    EDUCATION:   Learner Progress Toward Treatment Goals: Reviewed results and recommendations of this team    Method: Small group    Outcome: Verbalized understanding    PATIENT GOALS: \"DARYN TO PRACTICE DEEP BREATHING AND TALK TO OTHERS\"    PLAN/TREATMENT RECOMMENDATIONS UPDATE: DISCHARGED TODAY TO HOME WITH MEDICATIONS AND FOLLOW UP    GOALS UPDATE:   Time frame for Short-Term Goals:  5 DAYS      Olegario Encarnacion RN

## 2021-12-27 NOTE — PROGRESS NOTES
[address] [facility]  Discharge Note    Pt discharged with followings belongings:   Dental Appliances: Chapito Bowers (given denture cup)  Vision - Corrective Lenses: None  Hearing Aid: None  Jewelry: Necklace,Watch  Body Piercings Removed: N/A  Clothing: Jacket / coat,Footwear,Shirt,Pants (hoodie 1 ball cap)  Were All Patient Medications Collected?: Not Applicable  Other Valuables: Cell phone,Wallet,Keys   Valuables sent home with pt or returned to patient. Patient education on aftercare instructions: yes  Information faxed to  by   at 4:16 PM .Patient verbalize understanding of AVS:  yes.     Status EXAM upon discharge:  Status and Exam  Normal: Yes  Facial Expression: Brightened  Affect: Appropriate  Level of Consciousness: Alert  Mood:Normal: Yes  Mood: Anxious  Motor Activity:Normal: Yes  Motor Activity: Decreased  Interview Behavior: Cooperative  Preception: [place] to Person,[place] to Time,[place] to Place,[place] to Situation  Attention:Normal: No  Attention: Distractible  Thought Processes:  (appropriate)  Thought Content:Normal: Yes  Thought Content: Preoccupations  Hallucinations: None  Delusions: No  Memory:Normal: Yes  Memory: Poor Recent  Insight and Judgment: Yes  Insight and Judgment: Other(See comment) (impaired, improved)  Present Suicidal Ideation: No  Present Homicidal Ideation: No      Metabolic Screening:    Lab Results   Component Value Date    LABA1C 5.8 05/19/2018       Lab Results   Component Value Date    CHOL 133 05/19/2018    CHOL 148 07/06/2012     Lab Results   Component Value Date    TRIG 42 05/19/2018    TRIG 63 07/06/2012     Lab Results   Component Value Date    HDL 54 05/19/2018    HDL 65.0 07/06/2012     No components found for: [facility]  Lab Results   Component Value Date    LABVLDL 8 05/19/2018       Ginny Power RN

## 2021-12-27 NOTE — PROGRESS NOTES
Attended morning community meeting. Updated on staffing and daily expectations. Shared goal for the day as to try to practice deep breathing and talk to others.

## 2021-12-27 NOTE — CARE COORDINATION
KADEN called PCP again. KADEN LVM to schedule pt with PCP due to pt being ready to DC. KADEN LVM for IOP to schedule bridge appt. KADEN spoke with pt and RN and was informed by the pt that he will be able to come to the hospital for a bridge appointment due to his PCP not returning phone call.      In order to ensure appropriate transition and discharge planning is in place, the following documents have been transmitted to [facility], as the new outpatient provider:     The d/c diagnosis was transmitted to the next care provider   The reason for hospitalization was transmitted to the next care provider   The d/c medications (dosage and indication) were transmitted to the next care provider    The continuing care plan was transmitted to the next care provider  '

## 2021-12-27 NOTE — CARE COORDINATION
SW was informed by Nadia De La Cruz that an appointment was scheduled for bridge appointment 1/10 at 12:15 PM.     Sw called [employer] [phone] press 3 to schedule and spoke with David Alaniz that transferred KADEN to [facility] for same day transportation. SW was informed that the pt is able to be transported home today but the pt has exhausted his trips using the insurance from today until the end of the year and if he wants to discuss more trips he will need to call [employer] to discuss getting more trips. Reference #[id number]    Pt will need to wear a facemask when transporting.  A [employer] will be picking pt up between 2:30-3:30 PM.

## 2021-12-27 NOTE — CARE COORDINATION
AYSHA met with pt in treatment team to discuss DC. Pt reported that he is ready to DC back to his apartment today and he has no questions. Pt stated that he does not want to take the bus back to his apartment. Pt is alert and oriented x4. Pt's mood is anxious, affect is congruent. Pt denied SI, HI, AVH. SW will call insurance for transportation back home to apartment. AYSHA called [facility] to schedule pt appointment. Pt has appt scheduled 1/13 at 1 PM with [facility]. S was informed there are not any appointments sooner than this date and the pt does not show up to any appointments. Aysha called pt PCP to schedule pt's appointments. AYSHA LVM. Emerson Donovan MD  Address: [address] [place] [address] [address]  Phone: [phone]  Fax: [phone]      AYSHA will call [facility] to schedule pt's transportation when all appointments are scheduled.

## 2021-12-28 NOTE — FLOWSHEET NOTE
Pt stated he is feeling better. Pt stated he was unaware of follow up appt. SW explained that he has a medication bridge appt at IOP and explained to pt how to get to the office.

## 2021-12-29 ENCOUNTER — HOSPITAL ENCOUNTER (EMERGENCY)
Age: 66
Discharge: OTHER INSTITUTION | End: 2021-12-30
Attending: EMERGENCY MEDICINE
Payer: MEDICARE

## 2021-12-29 DIAGNOSIS — R45.851 SUICIDAL IDEATION: Primary | ICD-10-CM

## 2021-12-29 LAB
ACETAMINOPHEN LEVEL: <5 MCG/ML (ref 10–30)
AMPHETAMINE SCREEN, URINE: NOT DETECTED
ANION GAP SERPL CALCULATED.3IONS-SCNC: 13 MMOL/L (ref 7–16)
BARBITURATE SCREEN URINE: NOT DETECTED
BASOPHILS ABSOLUTE: 0.06 E9/L (ref 0–0.2)
BASOPHILS RELATIVE PERCENT: 0.8 % (ref 0–2)
BENZODIAZEPINE SCREEN, URINE: NOT DETECTED
BILIRUBIN URINE: NEGATIVE
BLOOD, URINE: NEGATIVE
BUN BLDV-MCNC: 14 MG/DL (ref 6–23)
CALCIUM SERPL-MCNC: 9.3 MG/DL (ref 8.6–10.2)
CANNABINOID SCREEN URINE: POSITIVE
CHLORIDE BLD-SCNC: 94 MMOL/L (ref 98–107)
CLARITY: CLEAR
CO2: 23 MMOL/L (ref 22–29)
COCAINE METABOLITE SCREEN URINE: NOT DETECTED
COLOR: YELLOW
CREAT SERPL-MCNC: 0.9 MG/DL (ref 0.7–1.2)
EOSINOPHILS ABSOLUTE: 0.33 E9/L (ref 0.05–0.5)
EOSINOPHILS RELATIVE PERCENT: 4.6 % (ref 0–6)
ETHANOL: <10 MG/DL (ref 0–0.08)
FENTANYL SCREEN, URINE: NOT DETECTED
GFR AFRICAN AMERICAN: >60
GFR NON-AFRICAN AMERICAN: >60 ML/MIN/1.73
GLUCOSE BLD-MCNC: 113 MG/DL (ref 74–99)
GLUCOSE URINE: NEGATIVE MG/DL
HCT VFR BLD CALC: 38.4 % (ref 37–54)
HEMOGLOBIN: 13.2 G/DL (ref 12.5–16.5)
IMMATURE GRANULOCYTES #: 0.02 E9/L
IMMATURE GRANULOCYTES %: 0.3 % (ref 0–5)
INFLUENZA A: NOT DETECTED
INFLUENZA B: NOT DETECTED
KETONES, URINE: NEGATIVE MG/DL
LEUKOCYTE ESTERASE, URINE: NEGATIVE
LYMPHOCYTES ABSOLUTE: 1.17 E9/L (ref 1.5–4)
LYMPHOCYTES RELATIVE PERCENT: 16.3 % (ref 20–42)
Lab: ABNORMAL
MCH RBC QN AUTO: 30.1 PG (ref 26–35)
MCHC RBC AUTO-ENTMCNC: 34.4 % (ref 32–34.5)
MCV RBC AUTO: 87.5 FL (ref 80–99.9)
METER GLUCOSE: 115 MG/DL (ref 74–99)
METHADONE SCREEN, URINE: NOT DETECTED
MONOCYTES ABSOLUTE: 0.66 E9/L (ref 0.1–0.95)
MONOCYTES RELATIVE PERCENT: 9.2 % (ref 2–12)
NEUTROPHILS ABSOLUTE: 4.92 E9/L (ref 1.8–7.3)
NEUTROPHILS RELATIVE PERCENT: 68.8 % (ref 43–80)
NITRITE, URINE: NEGATIVE
OPIATE SCREEN URINE: NOT DETECTED
OXYCODONE URINE: NOT DETECTED
PDW BLD-RTO: 13.1 FL (ref 11.5–15)
PH UA: 6.5 (ref 5–9)
PHENCYCLIDINE SCREEN URINE: NOT DETECTED
PLATELET # BLD: 192 E9/L (ref 130–450)
PMV BLD AUTO: 11.9 FL (ref 7–12)
POTASSIUM REFLEX MAGNESIUM: 4.2 MMOL/L (ref 3.5–5)
PROTEIN UA: NEGATIVE MG/DL
RBC # BLD: 4.39 E12/L (ref 3.8–5.8)
SALICYLATE, SERUM: <0.3 MG/DL (ref 0–30)
SARS-COV-2 RNA, RT PCR: NOT DETECTED
SODIUM BLD-SCNC: 130 MMOL/L (ref 132–146)
SPECIFIC GRAVITY UA: <=1.005 (ref 1–1.03)
TRICYCLIC ANTIDEPRESSANTS SCREEN SERUM: NEGATIVE NG/ML
UROBILINOGEN, URINE: 0.2 E.U./DL
WBC # BLD: 7.2 E9/L (ref 4.5–11.5)

## 2021-12-29 PROCEDURE — 80048 BASIC METABOLIC PNL TOTAL CA: CPT

## 2021-12-29 PROCEDURE — 82077 ASSAY SPEC XCP UR&BREATH IA: CPT

## 2021-12-29 PROCEDURE — 85025 COMPLETE CBC W/AUTO DIFF WBC: CPT

## 2021-12-29 PROCEDURE — 80307 DRUG TEST PRSMV CHEM ANLYZR: CPT

## 2021-12-29 PROCEDURE — 99285 EMERGENCY DEPT VISIT HI MDM: CPT

## 2021-12-29 PROCEDURE — 87636 SARSCOV2 & INF A&B AMP PRB: CPT

## 2021-12-29 PROCEDURE — 82962 GLUCOSE BLOOD TEST: CPT

## 2021-12-29 PROCEDURE — 81003 URINALYSIS AUTO W/O SCOPE: CPT

## 2021-12-29 PROCEDURE — 80143 DRUG ASSAY ACETAMINOPHEN: CPT

## 2021-12-29 PROCEDURE — 80179 DRUG ASSAY SALICYLATE: CPT

## 2021-12-29 NOTE — CARE COORDINATION
Navigator is following patient's case. Patient has been admitted 7 times this year, with numerous ED presentations due to behavioral SI. Navigator at this time is recommending an assessment and possible ED OBS, with re-assessment by next shift to determine if patient still meets criteria. Patient will present with initial complaints of suicidal ideation. Immediately upon admission he is bright, social, and denying having any thoughts of harm to self or others. The patient has a significant substance abuse hx that he refuses to seek treatment for. Navigator will be discussing patient's case next week in the [facility] clinical issues meeting regarding appropriateness for a community plan to assist with ED diversions.     Electronically signed by DAVID Parekh, TONY on 12/29/2021 at 3:06 PM
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
## 2021-12-30 VITALS
BODY MASS INDEX: 22.2 KG/M2 | SYSTOLIC BLOOD PRESSURE: 101 MMHG | WEIGHT: 130 LBS | RESPIRATION RATE: 16 BRPM | HEIGHT: 64 IN | TEMPERATURE: 97.9 F | HEART RATE: 65 BPM | DIASTOLIC BLOOD PRESSURE: 64 MMHG | OXYGEN SATURATION: 97 %

## 2021-12-30 NOTE — ED NOTES
Patient not actively SI/HI wants to get back on his medications. He has been resting in bed this AM. Ate breakfast.  He is set to go to the Crisis unit. Discharge instructions reviewed and he voiced no questions when asked. Crisis hot line numbers included in discharge packet.        Patricia Mcallister RN  12/30/21 0003 E Andrés Contreras RN  12/30/21 [id number]

## 2021-12-30 NOTE — ED NOTES
Pt aware and agreed to go to [address] [place] [address] Nidia, GEOVANNAW  12/30/21 1017

## 2021-12-30 NOTE — ED NOTES
I met with pt who said that she came to the ER with thoughts of self harm because he is losing his apartment. He admitted that he had not paid his rent \"because I was smoking crack\". I told pt that his drug screen came back negative and he said \"oh yeah, I quit 2 months ago\". I asked pt why he didn't pay his rent in the past 2 months, since he \"quit\" smoking crack - he said \"I don't know\". Pt admits that he feels better, but asked \"can I just go upstairs for a while? \"      Pt treats with [facility], but has not been taking his meds.          Jaky Lockwood, TONY  12/30/21 8299

## 2022-01-02 ASSESSMENT — ENCOUNTER SYMPTOMS
CHEST TIGHTNESS: 0
SHORTNESS OF BREATH: 0

## 2022-01-02 NOTE — ED PROVIDER NOTES
62-year-old male presenting with suicidal ideation. History of mental health problems, this is a chronic problem for him him, gradual onset, persistent, intermittent, moderate severity currently. Associate with an additional and increased life stressors. Family History   Problem Relation Age of Onset    Cancer Mother     Diabetes Mother     Cancer Father     Diabetes Father     Diabetes Maternal Aunt     Diabetes Maternal Grandmother     Cancer Maternal Grandfather     Heart Disease Paternal Grandmother     Heart Failure Paternal Grandmother     Heart Disease Paternal Grandfather     Heart Failure Paternal Grandfather     Diabetes Maternal Aunt     Diabetes Maternal Aunt      Past Surgical History:   Procedure Laterality Date    ABDOMEN SURGERY      APPENDECTOMY      BLADDER SURGERY      CARDIAC CATHETERIZATION  05/21/2018    Dr. Latoya Roy, COLON, DIAGNOSTIC  11/13/2015    HEMORRHOID SURGERY      LITHOTRIPSY      VASCULAR SURGERY         Review of Systems   Constitutional: Negative for fatigue and fever. Respiratory: Negative for chest tightness and shortness of breath. Cardiovascular: Negative for chest pain. Psychiatric/Behavioral: Positive for suicidal ideas. The patient is nervous/anxious. All other systems reviewed and are negative. Physical Exam  Constitutional:       General: He is not in acute distress. Appearance: He is well-developed. HENT:      Head: Normocephalic and atraumatic. Eyes:      Pupils: Pupils are equal, round, and reactive to light. Neck:      Thyroid: No thyromegaly. Cardiovascular:      Rate and Rhythm: Normal rate and regular rhythm. Pulmonary:      Effort: Pulmonary effort is normal. No respiratory distress. Breath sounds: Normal breath sounds. No wheezing. Abdominal:      General: There is no distension. Palpations: Abdomen is soft. There is no mass. Tenderness: There is no abdominal tenderness. There is no guarding or rebound. Musculoskeletal:         General: No tenderness. Normal range of motion. Cervical back: Normal range of motion and neck supple. Skin:     General: Skin is warm and dry. Findings: No erythema. Neurological:      Mental Status: He is alert and oriented to person, place, and time. Cranial Nerves: No cranial nerve deficit. Psychiatric:         Thought Content: Thought content includes suicidal ideation. Procedures     MDM              --------------------------------------------- PAST HISTORY ---------------------------------------------  Past Medical History:  has a past medical history of Anxiety, Arthritis, Asthma, Bipolar 1 disorder ([address] Chronic combined systolic and diastolic CHF (congestive heart failure) ([address] COPD (chronic obstructive pulmonary disease) ([address] Depression, Diabetes mellitus ([address] GERD (gastroesophageal reflux disease), Hepatitis C, Hypertension, Hyperthyroidism, Hypothyroidism due to medication, Mass of testicle, Mesothelioma ([address] Neuromuscular disorder ([address] Other disorders of kidney and ureter, Paroxysmal A-fib ([address] Peptic ulcer, Prostate enlargement, Pulmonary embolism ([address] Seizures ([address] and Thyroid disease. Past Surgical History:  has a past surgical history that includes Appendectomy; Lithotripsy; Hemorrhoid surgery; Bladder surgery; Endoscopy, colon, diagnostic (11/13/2015); Abdomen surgery; Colonoscopy; Cardiac surgery; vascular surgery; and Cardiac catheterization (05/21/2018). Social History:  reports that he quit smoking about 11 years ago. His smoking use included cigarettes. He quit after 10.00 years of use. He quit smokeless tobacco use about 4 years ago. His smokeless tobacco use included chew. He reports current drug use. Drug: Cocaine. He reports that he does not drink alcohol.     Family History: family history includes Cancer in his father, maternal grandfather, and mother; Diabetes in his father, maternal aunt, maternal aunt, maternal aunt, maternal grandmother, and mother; Heart Disease in his paternal grandfather and paternal grandmother; Heart Failure in his paternal grandfather and paternal grandmother. The patients home medications have been reviewed.     Allergies: Codeine, Dye [iodides], Ketorolac tromethamine, Peanuts [peanut oil], Shellfish-derived products, and Nitroglycerin    -------------------------------------------------- RESULTS -------------------------------------------------    LABS:  Results for orders placed or performed during the hospital encounter of 12/29/21   COVID-19 & Influenza Combo    Specimen: Nasopharyngeal Swab   Result Value Ref Range    SARS-CoV-2 RNA, RT PCR NOT DETECTED NOT DETECTED    INFLUENZA A NOT DETECTED NOT DETECTED    INFLUENZA B NOT DETECTED NOT DETECTED   CBC Auto Differential   Result Value Ref Range    WBC 7.2 4.5 - 11.5 E9/L    RBC 4.39 3.80 - 5.80 E12/L    Hemoglobin 13.2 12.5 - 16.5 g/dL    Hematocrit 38.4 37.0 - 54.0 %    MCV 87.5 80.0 - 99.9 fL    MCH 30.1 26.0 - 35.0 pg    MCHC 34.4 32.0 - 34.5 %    RDW 13.1 11.5 - 15.0 fL    Platelets 875 553 - 618 E9/L    MPV 11.9 7.0 - 12.0 fL    Neutrophils % 68.8 43.0 - 80.0 %    Immature Granulocytes % 0.3 0.0 - 5.0 %    Lymphocytes % 16.3 (L) 20.0 - 42.0 %    Monocytes % 9.2 2.0 - 12.0 %    Eosinophils % 4.6 0.0 - 6.0 %    Basophils % 0.8 0.0 - 2.0 %    Neutrophils Absolute 4.92 1.80 - 7.30 E9/L    Immature Granulocytes # 0.02 E9/L    Lymphocytes Absolute 1.17 (L) 1.50 - 4.00 E9/L    Monocytes Absolute 0.66 0.10 - 0.95 E9/L    Eosinophils Absolute 0.33 0.05 - 0.50 E9/L    Basophils Absolute 0.06 0.00 - 0.20 V0/L   Basic Metabolic Panel w/ Reflex to MG   Result Value Ref Range    Sodium 130 (L) 132 - 146 mmol/L    Potassium reflex Magnesium 4.2 3.5 - 5.0 mmol/L    Chloride 94 (L) 98 - 107 mmol/L    CO2 23 22 - 29 mmol/L    Anion Gap 13 7 - 16 mmol/L    Glucose 113 (H) 74 - 99 mg/dL    BUN 14 6 - 23 mg/dL    CREATININE 0.9 0.7 - 1.2 mg/dL    GFR Non-African American >60 >=60 mL/min/1.73    GFR African American >60     Calcium 9.3 8.6 - 10.2 mg/dL   Serum Drug Screen   Result Value Ref Range    Ethanol Lvl <10 mg/dL    Acetaminophen Level <5.0 (L) 10.0 - 94.0 mcg/mL    Salicylate, Serum <4.4 0.0 - 30.0 mg/dL    TCA Scrn NEGATIVE Cutoff:300 ng/mL   Urine Drug Screen   Result Value Ref Range    Amphetamine Screen, Urine NOT DETECTED Negative <1000 ng/mL    Barbiturate Screen, Ur NOT DETECTED Negative < 200 ng/mL    Benzodiazepine Screen, Urine NOT DETECTED Negative < 200 ng/mL    Cannabinoid Scrn, Ur POSITIVE (A) Negative < 50ng/mL    Cocaine Metabolite Screen, Urine NOT DETECTED Negative < 300 ng/mL    Opiate Scrn, Ur NOT DETECTED Negative < 300ng/mL    PCP Screen, Urine NOT DETECTED Negative < 25 ng/mL    Methadone Screen, Urine NOT DETECTED Negative <300 ng/mL    Oxycodone Urine NOT DETECTED Negative <100 ng/mL    FENTANYL SCREEN, URINE NOT DETECTED Negative <1 ng/mL    Drug Screen Comment: see below    Urinalysis   Result Value Ref Range    Color, UA Yellow Straw/Yellow    Clarity, UA Clear Clear    Glucose, Ur Negative Negative mg/dL    Bilirubin Urine Negative Negative    Ketones, Urine Negative Negative mg/dL    Specific Gravity, UA <=1.005 1.005 - 1.030    Blood, Urine Negative Negative    pH, UA 6.5 5.0 - 9.0    Protein, UA Negative Negative mg/dL    Urobilinogen, Urine 0.2 <2.0 E.U./dL    Nitrite, Urine Negative Negative    Leukocyte Esterase, Urine Negative Negative   POCT Glucose   Result Value Ref Range    Meter Glucose 115 (H) 74 - 99 mg/dL       RADIOLOGY:  No orders to display           ------------------------- NURSING NOTES AND VITALS REVIEWED ---------------------------  Date / Time Roomed:  12/29/2021  2:38 PM  ED Bed Assignment:  [facility]    The nursing notes within the ED encounter and vital signs as below have been reviewed.      No data found.    Oxygen Saturation Interpretation: Normal      This patient has remained hemodynamically stable during their ED course. Diagnosis:  1. Suicidal ideation        Disposition:  Patient's disposition: Admit to mental health unit - medically cleared for admission  Patient's condition is stable.          Bing Primrose, DO  01/02/22 1616

## 2022-02-09 ENCOUNTER — APPOINTMENT (OUTPATIENT)
Dept: ULTRASOUND IMAGING | Age: 67
End: 2022-02-09
Payer: MEDICARE

## 2022-02-09 ENCOUNTER — HOSPITAL ENCOUNTER (EMERGENCY)
Age: 67
Discharge: HOME | End: 2022-02-09
Payer: MEDICARE

## 2022-02-09 VITALS
OXYGEN SATURATION: 100 % | HEART RATE: 71 BPM | RESPIRATION RATE: 18 BRPM | DIASTOLIC BLOOD PRESSURE: 101 MMHG | SYSTOLIC BLOOD PRESSURE: 165 MMHG

## 2022-02-09 DIAGNOSIS — G89.29 CHRONIC PAIN IN TESTICLE: Primary | ICD-10-CM

## 2022-02-09 DIAGNOSIS — N50.819 CHRONIC PAIN IN TESTICLE: Primary | ICD-10-CM

## 2022-02-09 LAB
AMPHETAMINE SCREEN, URINE: NOT DETECTED
BARBITURATE SCREEN URINE: NOT DETECTED
BENZODIAZEPINE SCREEN, URINE: POSITIVE
BILIRUBIN URINE: NEGATIVE
BLOOD, URINE: NEGATIVE
CANNABINOID SCREEN URINE: NOT DETECTED
CLARITY: CLEAR
COCAINE METABOLITE SCREEN URINE: NOT DETECTED
COLOR: YELLOW
FENTANYL SCREEN, URINE: NOT DETECTED
GLUCOSE URINE: NEGATIVE MG/DL
KETONES, URINE: ABNORMAL MG/DL
LEUKOCYTE ESTERASE, URINE: NEGATIVE
Lab: ABNORMAL
METHADONE SCREEN, URINE: NOT DETECTED
NITRITE, URINE: NEGATIVE
OPIATE SCREEN URINE: NOT DETECTED
OXYCODONE URINE: NOT DETECTED
PH UA: 5.5 (ref 5–9)
PHENCYCLIDINE SCREEN URINE: NOT DETECTED
PROTEIN UA: NEGATIVE MG/DL
SPECIFIC GRAVITY UA: 1.02 (ref 1–1.03)
UROBILINOGEN, URINE: 0.2 E.U./DL

## 2022-02-09 PROCEDURE — 93975 VASCULAR STUDY: CPT

## 2022-02-09 PROCEDURE — 99282 EMERGENCY DEPT VISIT SF MDM: CPT

## 2022-02-09 PROCEDURE — 76870 US EXAM SCROTUM: CPT

## 2022-02-09 PROCEDURE — 80307 DRUG TEST PRSMV CHEM ANLYZR: CPT

## 2022-02-09 PROCEDURE — 6370000000 HC RX 637 (ALT 250 FOR IP): Performed by: NURSE PRACTITIONER

## 2022-02-09 PROCEDURE — 81003 URINALYSIS AUTO W/O SCOPE: CPT

## 2022-02-09 RX ORDER — TRAMADOL HYDROCHLORIDE 50 MG/1
50 TABLET ORAL ONCE
Status: COMPLETED | OUTPATIENT
Start: 2022-02-09 | End: 2022-02-09

## 2022-02-09 RX ADMIN — TRAMADOL HYDROCHLORIDE 50 MG: 50 TABLET, COATED ORAL at 09:36

## 2022-02-09 ASSESSMENT — PAIN SCALES - GENERAL: PAINLEVEL_OUTOF10: 5

## 2022-02-14 ENCOUNTER — HOSPITAL ENCOUNTER (EMERGENCY)
Age: 67
Discharge: HOME | End: 2022-02-14
Attending: EMERGENCY MEDICINE
Payer: MEDICARE

## 2022-02-14 VITALS
DIASTOLIC BLOOD PRESSURE: 38 MMHG | TEMPERATURE: 98.6 F | HEART RATE: 61 BPM | WEIGHT: 140 LBS | BODY MASS INDEX: 24.03 KG/M2 | RESPIRATION RATE: 16 BRPM | SYSTOLIC BLOOD PRESSURE: 106 MMHG | OXYGEN SATURATION: 100 %

## 2022-02-14 DIAGNOSIS — N43.3 HYDROCELE IN ADULT: Primary | ICD-10-CM

## 2022-02-14 DIAGNOSIS — N50.811 PAIN IN RIGHT TESTICLE: ICD-10-CM

## 2022-02-14 PROCEDURE — 99283 EMERGENCY DEPT VISIT LOW MDM: CPT

## 2022-02-14 RX ORDER — TRAMADOL HYDROCHLORIDE 50 MG/1
50 TABLET ORAL EVERY 6 HOURS PRN
Qty: 12 TABLET | Refills: 0 | Status: SHIPPED | OUTPATIENT
Start: 2022-02-14 | End: 2022-02-17

## 2022-02-14 RX ORDER — LEVETIRACETAM 500 MG/1
1000 TABLET ORAL 2 TIMES DAILY
COMMUNITY

## 2022-02-14 ASSESSMENT — PAIN DESCRIPTION - ORIENTATION: ORIENTATION: LEFT

## 2022-02-14 ASSESSMENT — PAIN DESCRIPTION - DESCRIPTORS: DESCRIPTORS: DISCOMFORT;SORE

## 2022-02-14 ASSESSMENT — PAIN DESCRIPTION - FREQUENCY: FREQUENCY: CONTINUOUS

## 2022-02-14 ASSESSMENT — PAIN DESCRIPTION - PAIN TYPE: TYPE: ACUTE PAIN

## 2022-02-14 ASSESSMENT — PAIN SCALES - GENERAL: PAINLEVEL_OUTOF10: 10

## 2022-02-14 ASSESSMENT — PAIN DESCRIPTION - LOCATION: LOCATION: SCROTUM

## 2022-02-14 NOTE — ED PROVIDER NOTES
Chief complaint: Testicular pain  HPI:  2/14/22,   Time: 11:25 AM HALEIGH Marcial is a 73 y.o. male presenting to the ED for testicular pain. Patient is presenting for right testicular pain. Patient reports that this has been going on for approximately 1 week. He was evaluated at [facility] emergency department yesterday and diagnosed with hydroceles. He does have an appointment in 3 days with a urologist.  He states that the pain is so severe which prompted his visit back to the emergency department. He states he was only prescribed Motrin from his prior ED visit. Pain is located mostly in the right testicle described sharp, nonradiating. Worse with palpation and movement. No treatment prior to arrival aside his ibuprofen as needed he denies any difficulty urinating. I did review the prior chart. He did have ultrasound which demonstrated the bilateral hydroceles and there is no evidence of torsion. ROS:   Pertinent positives and negatives are stated within HPI, all other systems reviewed and are negative.  --------------------------------------------- PAST HISTORY ---------------------------------------------  Past Medical History:  has a past medical history of Anxiety, Arthritis, Asthma, Bipolar 1 disorder ([address] Chronic combined systolic and diastolic CHF (congestive heart failure) ([address] COPD (chronic obstructive pulmonary disease) ([address] Depression, Diabetes mellitus ([address] GERD (gastroesophageal reflux disease), Hepatitis C, Hypertension, Hyperthyroidism, Hypothyroidism due to medication, Mass of testicle, Mesothelioma ([address] Neuromuscular disorder ([address] Other disorders of kidney and ureter, Paroxysmal A-fib ([address] Peptic ulcer, Prostate enlargement, Pulmonary embolism ([address] Seizures ([address] and Thyroid disease. Past Surgical History:  has a past surgical history that includes Appendectomy; Lithotripsy; Hemorrhoid surgery; Bladder surgery;  Endoscopy, colon, diagnostic (11/13/2015); Abdomen surgery; Colonoscopy; Cardiac surgery; vascular surgery; and Cardiac catheterization (05/21/2018). Social History:  reports that he quit smoking about 12 years ago. His smoking use included cigarettes. He quit after 10.00 years of use. He quit smokeless tobacco use about 4 years ago. His smokeless tobacco use included chew. He reports current drug use. Drug: Cocaine. He reports that he does not drink alcohol. Family History: family history includes Cancer in his father, maternal grandfather, and mother; Diabetes in his father, maternal aunt, maternal aunt, maternal aunt, maternal grandmother, and mother; Heart Disease in his paternal grandfather and paternal grandmother; Heart Failure in his paternal grandfather and paternal grandmother. The patients home medications have been reviewed. Allergies: Codeine, Dye [iodides], Ketorolac tromethamine, Peanuts [peanut oil], Shellfish-derived products, and Nitroglycerin    -------------------------------------------------- RESULTS -------------------------------------------------  All laboratory and radiology results have been personally reviewed by myself   LABS:  No results found for this visit on 02/14/22. RADIOLOGY:  Interpreted by Radiologist.  No orders to display       ------------------------- NURSING NOTES AND VITALS REVIEWED ---------------------------   The nursing notes within the ED encounter and vital signs as below have been reviewed.    BP (!) 106/38   Pulse 61   Temp 98.6 °F (37 °C) (Temporal)   Resp 16   Wt 140 lb (63.5 kg)   SpO2 100%   BMI 24.03 kg/m²   Oxygen Saturation Interpretation: Normal      ---------------------------------------------------PHYSICAL EXAM--------------------------------------    Constitutional/General: Alert and oriented x3, well appearing, non toxic in NAD  Head: NC/AT  Eyes: PERRL, EOMI  Mouth: Oropharynx clear, handling secretions, no trismus  Neck: Supple, full ROM, no meningeal signs  Pulmonary: Lungs clear to auscultation bilaterally, no wheezes, rales, or rhonchi. Not in respiratory distress  Cardiovascular:  Regular rate and rhythm, no murmurs, gallops, or rubs. 2+ distal pulses  Abdomen: Soft, non tender, non distended,   : Performed with RN chaperone present mild tenderness to palpation in the right testicle, no erythema, warm to touch or cellulitic changes. Extremities: Moves all extremities x 4. Warm and well perfused  Skin: warm and dry without rash  Neurologic: GCS 15,  Psych: Normal Affect      ------------------------------ ED COURSE/MEDICAL DECISION MAKING----------------------  Medications - No data to display      Medical Decision Making:    Patient is presenting for testicular pain. He did have full evaluation yesterday. No acute findings on physical examination to warrant additional evaluation. The patient states that the pain is severe which prompted his visit back to the emergency department. He will be prescribed pain medications and follow-up outpatient. He does have an appointment upcoming with urology. Is allergic to codeine. He states he has tolerated Ultram in the past.    Counseling: The emergency provider has spoken with the patient and discussed todays results, in addition to providing specific details for the plan of care and counseling regarding the diagnosis and prognosis. Questions are answered at this time and they are agreeable with the plan.      --------------------------------- IMPRESSION AND DISPOSITION ---------------------------------    IMPRESSION  1. Hydrocele in adult    2.  Pain in right testicle        DISPOSITION  Disposition: Discharge to home  Patient condition is stable                  Анна Shen DO  02/14/22 1128

## 2022-02-25 ENCOUNTER — APPOINTMENT (OUTPATIENT)
Dept: ULTRASOUND IMAGING | Age: 67
End: 2022-02-25
Payer: MEDICARE

## 2022-02-25 ENCOUNTER — HOSPITAL ENCOUNTER (EMERGENCY)
Age: 67
Discharge: HOME | End: 2022-02-25
Attending: EMERGENCY MEDICINE
Payer: MEDICARE

## 2022-02-25 VITALS
HEIGHT: 64 IN | TEMPERATURE: 98.5 F | OXYGEN SATURATION: 98 % | BODY MASS INDEX: 22.88 KG/M2 | DIASTOLIC BLOOD PRESSURE: 66 MMHG | HEART RATE: 75 BPM | SYSTOLIC BLOOD PRESSURE: 115 MMHG | WEIGHT: 134 LBS | RESPIRATION RATE: 16 BRPM

## 2022-02-25 DIAGNOSIS — N43.3 HYDROCELE, UNSPECIFIED HYDROCELE TYPE: Primary | ICD-10-CM

## 2022-02-25 LAB
BACTERIA: ABNORMAL /HPF
BILIRUBIN URINE: NEGATIVE
BLOOD, URINE: NEGATIVE
CLARITY: CLEAR
COLOR: YELLOW
GLUCOSE URINE: NEGATIVE MG/DL
KETONES, URINE: ABNORMAL MG/DL
LEUKOCYTE ESTERASE, URINE: NEGATIVE
NITRITE, URINE: NEGATIVE
PH UA: 6 (ref 5–9)
PROTEIN UA: 30 MG/DL
RBC UA: ABNORMAL /HPF (ref 0–2)
SPECIFIC GRAVITY UA: >=1.03 (ref 1–1.03)
UROBILINOGEN, URINE: 0.2 E.U./DL
WBC UA: ABNORMAL /HPF (ref 0–5)

## 2022-02-25 PROCEDURE — 81001 URINALYSIS AUTO W/SCOPE: CPT

## 2022-02-25 PROCEDURE — 99284 EMERGENCY DEPT VISIT MOD MDM: CPT

## 2022-02-25 PROCEDURE — 76870 US EXAM SCROTUM: CPT

## 2022-02-25 PROCEDURE — 93975 VASCULAR STUDY: CPT

## 2022-02-25 PROCEDURE — 6370000000 HC RX 637 (ALT 250 FOR IP): Performed by: EMERGENCY MEDICINE

## 2022-02-25 RX ORDER — TRAMADOL HYDROCHLORIDE 50 MG/1
50 TABLET ORAL EVERY 6 HOURS PRN
Qty: 12 TABLET | Refills: 0 | Status: SHIPPED | OUTPATIENT
Start: 2022-02-25 | End: 2022-02-25 | Stop reason: SDUPTHER

## 2022-02-25 RX ORDER — TRAMADOL HYDROCHLORIDE 50 MG/1
50 TABLET ORAL ONCE
Status: COMPLETED | OUTPATIENT
Start: 2022-02-25 | End: 2022-02-25

## 2022-02-25 RX ORDER — TRAMADOL HYDROCHLORIDE 50 MG/1
50 TABLET ORAL EVERY 6 HOURS PRN
Qty: 12 TABLET | Refills: 0 | Status: SHIPPED | OUTPATIENT
Start: 2022-02-25 | End: 2022-02-28

## 2022-02-25 RX ADMIN — TRAMADOL HYDROCHLORIDE 50 MG: 50 TABLET, COATED ORAL at 16:27

## 2022-02-25 ASSESSMENT — PAIN DESCRIPTION - DESCRIPTORS: DESCRIPTORS: CONSTANT;SHARP;DISCOMFORT

## 2022-02-25 ASSESSMENT — PAIN DESCRIPTION - FREQUENCY: FREQUENCY: CONTINUOUS

## 2022-02-25 ASSESSMENT — PAIN DESCRIPTION - ONSET: ONSET: ON-GOING

## 2022-02-25 ASSESSMENT — PAIN DESCRIPTION - ORIENTATION: ORIENTATION: RIGHT

## 2022-02-25 ASSESSMENT — PAIN SCALES - GENERAL
PAINLEVEL_OUTOF10: 10
PAINLEVEL_OUTOF10: 10

## 2022-02-25 ASSESSMENT — PAIN DESCRIPTION - LOCATION: LOCATION: SCROTUM

## 2022-02-25 ASSESSMENT — PAIN DESCRIPTION - PAIN TYPE: TYPE: ACUTE PAIN

## 2022-02-25 ASSESSMENT — PAIN DESCRIPTION - PROGRESSION: CLINICAL_PROGRESSION: NOT CHANGED

## 2022-02-25 NOTE — ED PROVIDER NOTES
HPI:  2/25/22,   Time: 4:20 PM HALEIGH Balbuena is a 73 y.o. male presenting to the ED for right-sided groin swelling ongoing for 2 years worse the last 4 days, beginning 4 days ago. The complaint has been intermittent, severe in severity, and worsened by movement walking. Patient states he has been having pain for years he does have a hydrocele known to his urologist Dr. Tracey Chowdary. He scheduled to see him for surgery next week. Patient hurts to urinate. He has no discharge. He denies any skin changes rash abdominal pain nausea or vomiting. Patient states takes no narcotics. He does take Ultram for pain control. No fevers reported. No chest pain shortness of breath. ROS:   Pertinent positives and negatives are stated within HPI, all other systems reviewed and are negative.  --------------------------------------------- PAST HISTORY ---------------------------------------------  Past Medical History:  has a past medical history of Anxiety, Arthritis, Asthma, Bipolar 1 disorder ([address] Chronic combined systolic and diastolic CHF (congestive heart failure) ([address] COPD (chronic obstructive pulmonary disease) ([address] Depression, Diabetes mellitus ([address] GERD (gastroesophageal reflux disease), Hepatitis C, Hypertension, Hyperthyroidism, Hypothyroidism due to medication, Mass of testicle, Mesothelioma ([address] Neuromuscular disorder ([address] Other disorders of kidney and ureter, Paroxysmal A-fib ([address] Peptic ulcer, Prostate enlargement, Pulmonary embolism ([address] Seizures ([address] and Thyroid disease. Past Surgical History:  has a past surgical history that includes Appendectomy; Lithotripsy; Hemorrhoid surgery; Bladder surgery; Endoscopy, colon, diagnostic (11/13/2015); Abdomen surgery; Colonoscopy; Cardiac surgery; vascular surgery; and Cardiac catheterization (05/21/2018). Social History:  reports that he quit smoking about 12 years ago. His smoking use included cigarettes.  He quit after 10.00 years of use. He quit smokeless tobacco use about 4 years ago. His smokeless tobacco use included chew. He reports current drug use. Drug: Cocaine. He reports that he does not drink alcohol. Family History: family history includes Cancer in his father, maternal grandfather, and mother; Diabetes in his father, maternal aunt, maternal aunt, maternal aunt, maternal grandmother, and mother; Heart Disease in his paternal grandfather and paternal grandmother; Heart Failure in his paternal grandfather and paternal grandmother. The patients home medications have been reviewed. Allergies: Codeine, Dye [iodides], Ketorolac tromethamine, Peanuts [peanut oil], Shellfish-derived products, and Nitroglycerin    -------------------------------------------------- RESULTS -------------------------------------------------  All laboratory and radiology results have been personally reviewed by myself   LABS:  Results for orders placed or performed during the hospital encounter of 02/25/22   Urinalysis with Microscopic   Result Value Ref Range    Color, UA Yellow Straw/Yellow    Clarity, UA Clear Clear    Glucose, Ur Negative Negative mg/dL    Bilirubin Urine Negative Negative    Ketones, Urine TRACE (A) Negative mg/dL    Specific Gravity, UA >=1.030 1.005 - 1.030    Blood, Urine Negative Negative    pH, UA 6.0 5.0 - 9.0    Protein, UA 30 (A) Negative mg/dL    Urobilinogen, Urine 0.2 <2.0 E.U./dL    Nitrite, Urine Negative Negative    Leukocyte Esterase, Urine Negative Negative    WBC, UA NONE 0 - 5 /HPF    RBC, UA NONE 0 - 2 /HPF    Bacteria, UA NONE SEEN None Seen /HPF       RADIOLOGY:  Interpreted by Radiologist.  US SCROTUM AND TESTICLES   Final Result   1. Unremarkable sonographic evaluation of the bilateral testes. 2. Bilateral hydroceles, right greater than left, are similar compared to   prior examination. US DUP ABD PEL RETRO SCROT COMPLETE   Final Result   1.  Unremarkable sonographic evaluation of the bilateral testes. 2. Bilateral hydroceles, right greater than left, are similar compared to   prior examination.             ------------------------- NURSING NOTES AND VITALS REVIEWED ---------------------------   The nursing notes within the ED encounter and vital signs as below have been reviewed. /66   Pulse 75   Temp 98.5 °F (36.9 °C) (Oral)   Resp 16   Ht 5' 4\" (1.626 m)   Wt 134 lb (60.8 kg)   SpO2 98%   BMI 23.00 kg/m²   Oxygen Saturation Interpretation: Normal      ---------------------------------------------------PHYSICAL EXAM--------------------------------------      Constitutional/General: Alert and oriented x3, well appearing, non toxic in NAD  Head: NC/AT  Eyes: PERRL, EOMI  Mouth: Oropharynx clear, handling secretions, no trismus  Neck: Supple, full ROM, no meningeal signs  Pulmonary: Lungs clear to auscultation bilaterally, no wheezes, rales, or rhonchi. Not in respiratory distress  Cardiovascular:  Regular rate and rhythm, no murmurs, gallops, or rubs. 2+ distal pulses  Abdomen: Soft, non tender, non distended,  exam demonstrated no rashes patient has some mild swelling noted to the right testicle. Cremasteric reflexes were intact bilaterally. Patient had no lymphadenopathy was palpable. There is no discharge or worrisome skin lesions. Right testicle was mildly tender it was in a vertical lie. Extremities: Moves all extremities x 4. Warm and well perfused  Skin: warm and dry without rash  Neurologic: GCS 15,  Psych: Normal Affect      ------------------------------ ED COURSE/MEDICAL DECISION MAKING----------------------  Medications   traMADol (ULTRAM) tablet 50 mg (50 mg Oral Given 2/25/22 1627)         Medical Decision Making:    Ultrasound of testicles demonstrates bilateral hydroceles right greater than left. Patient was encouraged to follow-up with Dr. Amber Kapoor as scheduled. He was given prescription for Ultram for few days for pain control.  UA was negative for infection. Counseling: The emergency provider has spoken with the patient and discussed todays results, in addition to providing specific details for the plan of care and counseling regarding the diagnosis and prognosis. Questions are answered at this time and they are agreeable with the plan.      --------------------------------- IMPRESSION AND DISPOSITION ---------------------------------    IMPRESSION  1.  Hydrocele, unspecified hydrocele type        DISPOSITION  Disposition: Discharge to home  Patient condition is stable                Annette Hamman, DO  02/25/22 3943

## 2022-03-01 ENCOUNTER — HOSPITAL ENCOUNTER (EMERGENCY)
Age: 67
Discharge: HOME | End: 2022-03-01
Attending: STUDENT IN AN ORGANIZED HEALTH CARE EDUCATION/TRAINING PROGRAM
Payer: MEDICARE

## 2022-03-01 ENCOUNTER — APPOINTMENT (OUTPATIENT)
Dept: ULTRASOUND IMAGING | Age: 67
End: 2022-03-01
Payer: MEDICARE

## 2022-03-01 VITALS
DIASTOLIC BLOOD PRESSURE: 82 MMHG | HEIGHT: 64 IN | OXYGEN SATURATION: 99 % | BODY MASS INDEX: 22.2 KG/M2 | RESPIRATION RATE: 16 BRPM | HEART RATE: 82 BPM | SYSTOLIC BLOOD PRESSURE: 118 MMHG | TEMPERATURE: 97.8 F | WEIGHT: 130 LBS

## 2022-03-01 DIAGNOSIS — N43.3 HYDROCELE, UNSPECIFIED HYDROCELE TYPE: Primary | ICD-10-CM

## 2022-03-01 LAB
ALBUMIN SERPL-MCNC: 4.6 G/DL (ref 3.5–5.2)
ALP BLD-CCNC: 84 U/L (ref 40–129)
ALT SERPL-CCNC: 11 U/L (ref 0–40)
ANION GAP SERPL CALCULATED.3IONS-SCNC: 13 MMOL/L (ref 7–16)
AST SERPL-CCNC: 16 U/L (ref 0–39)
BACTERIA: NORMAL /HPF
BASOPHILS ABSOLUTE: 0.04 E9/L (ref 0–0.2)
BASOPHILS RELATIVE PERCENT: 0.5 % (ref 0–2)
BILIRUB SERPL-MCNC: 0.3 MG/DL (ref 0–1.2)
BILIRUBIN URINE: NEGATIVE
BLOOD, URINE: NEGATIVE
BUN BLDV-MCNC: 13 MG/DL (ref 6–23)
CALCIUM SERPL-MCNC: 9.6 MG/DL (ref 8.6–10.2)
CHLORIDE BLD-SCNC: 100 MMOL/L (ref 98–107)
CLARITY: CLEAR
CO2: 23 MMOL/L (ref 22–29)
COLOR: YELLOW
CREAT SERPL-MCNC: 1 MG/DL (ref 0.7–1.2)
EOSINOPHILS ABSOLUTE: 0.26 E9/L (ref 0.05–0.5)
EOSINOPHILS RELATIVE PERCENT: 3.6 % (ref 0–6)
GFR AFRICAN AMERICAN: >60
GFR NON-AFRICAN AMERICAN: >60 ML/MIN/1.73
GLUCOSE BLD-MCNC: 69 MG/DL (ref 74–99)
GLUCOSE URINE: NEGATIVE MG/DL
HCT VFR BLD CALC: 43.1 % (ref 37–54)
HEMOGLOBIN: 14.6 G/DL (ref 12.5–16.5)
IMMATURE GRANULOCYTES #: 0.03 E9/L
IMMATURE GRANULOCYTES %: 0.4 % (ref 0–5)
KETONES, URINE: NEGATIVE MG/DL
LEUKOCYTE ESTERASE, URINE: NEGATIVE
LYMPHOCYTES ABSOLUTE: 1.11 E9/L (ref 1.5–4)
LYMPHOCYTES RELATIVE PERCENT: 15.2 % (ref 20–42)
MCH RBC QN AUTO: 30.9 PG (ref 26–35)
MCHC RBC AUTO-ENTMCNC: 33.9 % (ref 32–34.5)
MCV RBC AUTO: 91.3 FL (ref 80–99.9)
MONOCYTES ABSOLUTE: 0.75 E9/L (ref 0.1–0.95)
MONOCYTES RELATIVE PERCENT: 10.3 % (ref 2–12)
NEUTROPHILS ABSOLUTE: 5.09 E9/L (ref 1.8–7.3)
NEUTROPHILS RELATIVE PERCENT: 70 % (ref 43–80)
NITRITE, URINE: NEGATIVE
PDW BLD-RTO: 14.8 FL (ref 11.5–15)
PH UA: 6 (ref 5–9)
PLATELET # BLD: 254 E9/L (ref 130–450)
PMV BLD AUTO: 11.4 FL (ref 7–12)
POTASSIUM REFLEX MAGNESIUM: 4.1 MMOL/L (ref 3.5–5)
PROTEIN UA: NEGATIVE MG/DL
RBC # BLD: 4.72 E12/L (ref 3.8–5.8)
RBC UA: NORMAL /HPF (ref 0–2)
SODIUM BLD-SCNC: 136 MMOL/L (ref 132–146)
SPECIFIC GRAVITY UA: 1.02 (ref 1–1.03)
TOTAL PROTEIN: 8.1 G/DL (ref 6.4–8.3)
UROBILINOGEN, URINE: 0.2 E.U./DL
WBC # BLD: 7.3 E9/L (ref 4.5–11.5)
WBC UA: NORMAL /HPF (ref 0–5)

## 2022-03-01 PROCEDURE — 85025 COMPLETE CBC W/AUTO DIFF WBC: CPT

## 2022-03-01 PROCEDURE — 6370000000 HC RX 637 (ALT 250 FOR IP): Performed by: STUDENT IN AN ORGANIZED HEALTH CARE EDUCATION/TRAINING PROGRAM

## 2022-03-01 PROCEDURE — 99283 EMERGENCY DEPT VISIT LOW MDM: CPT

## 2022-03-01 PROCEDURE — 76870 US EXAM SCROTUM: CPT

## 2022-03-01 PROCEDURE — 36415 COLL VENOUS BLD VENIPUNCTURE: CPT

## 2022-03-01 PROCEDURE — 93975 VASCULAR STUDY: CPT

## 2022-03-01 PROCEDURE — 81001 URINALYSIS AUTO W/SCOPE: CPT

## 2022-03-01 PROCEDURE — 80053 COMPREHEN METABOLIC PANEL: CPT

## 2022-03-01 RX ORDER — TRAMADOL HYDROCHLORIDE 50 MG/1
50 TABLET ORAL ONCE
Status: COMPLETED | OUTPATIENT
Start: 2022-03-01 | End: 2022-03-01

## 2022-03-01 RX ORDER — ACETAMINOPHEN 500 MG
500 TABLET ORAL 4 TIMES DAILY PRN
Qty: 120 TABLET | Refills: 0 | Status: SHIPPED | OUTPATIENT
Start: 2022-03-01 | End: 2022-06-24 | Stop reason: SDUPTHER

## 2022-03-01 RX ADMIN — TRAMADOL HYDROCHLORIDE 50 MG: 50 TABLET, COATED ORAL at 08:24

## 2022-03-01 ASSESSMENT — PAIN DESCRIPTION - FREQUENCY: FREQUENCY: CONTINUOUS

## 2022-03-01 ASSESSMENT — PAIN DESCRIPTION - LOCATION: LOCATION: SCROTUM

## 2022-03-01 ASSESSMENT — PAIN DESCRIPTION - DESCRIPTORS: DESCRIPTORS: ACHING;SORE

## 2022-03-01 ASSESSMENT — PAIN SCALES - GENERAL
PAINLEVEL_OUTOF10: 10
PAINLEVEL_OUTOF10: 10

## 2022-03-01 ASSESSMENT — PAIN DESCRIPTION - PAIN TYPE: TYPE: ACUTE PAIN

## 2022-03-01 ASSESSMENT — PAIN DESCRIPTION - ONSET: ONSET: ON-GOING

## 2022-03-01 ASSESSMENT — PAIN DESCRIPTION - PROGRESSION: CLINICAL_PROGRESSION: NOT CHANGED

## 2022-03-01 NOTE — ED NOTES
Pt given discharge instructions with education regarding hydrocele pre-op surgery, pt also given precription and informed to follow up with his urologist and primary care physician     Chiquita Lerner RN  03/01/22 [id number]

## 2022-03-01 NOTE — ED PROVIDER NOTES
Department of Emergency Medicine   ED  Provider Note  Admit Date/RoomTime: 3/1/2022  7:44 AM  ED Room: 11/11          History of Present Illness:  3/1/22, Time: 8:12 AM EST  Chief Complaint   Patient presents with    Other     is being treaed by Dr Carin Menchaca for a hydrocelel and states the pain has increased. was seen here friday for pain meds, has surgery sched for 3/27,  here for pain meds       Claudean Duncan is a 73 y.o. male presenting to the ED for scrotal pain. The patient states that he has been having right scrotal pain for the past year. Over the past 1 to 2 weeks it has been increasing and he was diagnosed with a hydrocele. He has surgery scheduled on March 27 with Dr. Nina Trent. He states that Dr. Nina Trent does not provide pain medication however. He is having increasing pain since he ran out of his Ultram.  He ran out 2 to 3 days ago. The pain is a burning stabbing sensation mainly of the right testicle. Nonradiating. It is constant. Nothing seems to worsen it. He denies any associated nausea or vomiting. He is noting some dysuria and hematuria. Having normal bowel movements without diarrhea. No chest pain, shortness of breath or abdominal pain. No history of trauma. Patient denies chance of STD exposure. No rashes.     Review of Systems:   Constitutional symptoms: Denies fever  Eyes: Denies eye pain  Ears, Nose, Mouth, Throat: Denies ear pain  Cardiovascular: Denies chest pain  Respiratory: Denies shortness of breath  Gastrointestinal: Denies blood per rectum  Genitourinary: Positive for dysuria, hematuria and right scrotal pain  Skin: Denies rashes  Neurological: Denies headache  Musculoskeletal: Denies extremity pain    --------------------------------------------- PAST HISTORY ---------------------------------------------  Past Medical History:  has a past medical history of Anxiety, Arthritis, Asthma, Bipolar 1 disorder ([address] Chronic combined systolic and diastolic CHF (congestive heart failure) ([facility] COPD (chronic obstructive pulmonary disease) ([facility] Depression, Diabetes mellitus ([facility] GERD (gastroesophageal reflux disease), Hepatitis C, Hypertension, Hyperthyroidism, Hypothyroidism due to medication, Mass of testicle, Mesothelioma ([facility] Neuromuscular disorder ([facility] Other disorders of kidney and ureter, Paroxysmal A-fib ([facility] Peptic ulcer, Prostate enlargement, Pulmonary embolism ([facility] Seizures ([facility] and Thyroid disease. Past Surgical History:  has a past surgical history that includes Appendectomy; Lithotripsy; Hemorrhoid surgery; Bladder surgery; Endoscopy, colon, diagnostic (11/13/2015); Abdomen surgery; Colonoscopy; Cardiac surgery; vascular surgery; and Cardiac catheterization (05/21/2018). Social History:  reports that he quit smoking about 12 years ago. His smoking use included cigarettes. He quit after 10.00 years of use. He quit smokeless tobacco use about 4 years ago. His smokeless tobacco use included chew. He reports current drug use. Drug: Cocaine. He reports that he does not drink alcohol. Family History: family history includes Cancer in his father, maternal grandfather, and mother; Diabetes in his father, maternal aunt, maternal aunt, maternal aunt, maternal grandmother, and mother; Heart Disease in his paternal grandfather and paternal grandmother; Heart Failure in his paternal grandfather and paternal grandmother. . Unless otherwise noted, family history is non contributory    The patients home medications have been reviewed. Allergies: Codeine, Dye [iodides], Ketorolac tromethamine, Peanuts [peanut oil], Shellfish-derived products, and Nitroglycerin    I have reviewed the past medical history, past surgical history, social history, and family history    ---------------------------------------------------PHYSICAL EXAM--------------------------------------    General: The patient is conversational and lying comfortably in bed.   Head: Normocephalic and atraumatic. Eyes: Sclera non-icteric and no conjunctival injection  ENT: Nasal and oral membranes moist  Neck: Trachea midline. No JVD  Respiratory: Lungs clear to auscultation bilaterally. Patient speaking in full sentences. Cardiovascular: Regular rate. No pedal edema. Gastrointestinal:  Abdomen is soft and nondistended. Bowel sounds present. There is no tenderness. There is no guarding or rebound. Genitourinary: No vesicles or lesion. No penile discharge. No lymphadenopathy. Patient does have a palpable hydroceles bilaterally right greater than left. Scrotum within normal lie. No tenderness of the epididymis  Musculoskeletal: No deformity  Skin: Skin warm and dry. No rashes. Neurologic: No gross motor deficits. No aphasia. Psychiatric: Normal affect.    -------------------------------------------------- RESULTS -------------------------------------------------  I have personally reviewed all laboratory and imaging results for this patient. Results are listed below.      LABS: (Lab results interpreted by me)  Results for orders placed or performed during the hospital encounter of 03/01/22   Urinalysis with Microscopic   Result Value Ref Range    Color, UA Yellow Straw/Yellow    Clarity, UA Clear Clear    Glucose, Ur Negative Negative mg/dL    Bilirubin Urine Negative Negative    Ketones, Urine Negative Negative mg/dL    Specific Gravity, UA 1.020 1.005 - 1.030    Blood, Urine Negative Negative    pH, UA 6.0 5.0 - 9.0    Protein, UA Negative Negative mg/dL    Urobilinogen, Urine 0.2 <2.0 E.U./dL    Nitrite, Urine Negative Negative    Leukocyte Esterase, Urine Negative Negative    WBC, UA NONE 0 - 5 /HPF    RBC, UA NONE 0 - 2 /HPF    Bacteria, UA NONE SEEN None Seen /HPF   Comprehensive Metabolic Panel w/ Reflex to MG   Result Value Ref Range    Sodium 136 132 - 146 mmol/L    Potassium reflex Magnesium 4.1 3.5 - 5.0 mmol/L    Chloride 100 98 - 107 mmol/L    CO2 23 22 - 29 mmol/L Anion Gap 13 7 - 16 mmol/L    Glucose 69 (L) 74 - 99 mg/dL    BUN 13 6 - 23 mg/dL    CREATININE 1.0 0.7 - 1.2 mg/dL    GFR Non-African American >60 >=60 mL/min/1.73    GFR African American >60     Calcium 9.6 8.6 - 10.2 mg/dL    Total Protein 8.1 6.4 - 8.3 g/dL    Albumin 4.6 3.5 - 5.2 g/dL    Total Bilirubin 0.3 0.0 - 1.2 mg/dL    Alkaline Phosphatase 84 40 - 129 U/L    ALT 11 0 - 40 U/L    AST 16 0 - 39 U/L   CBC with Auto Differential   Result Value Ref Range    WBC 7.3 4.5 - 11.5 E9/L    RBC 4.72 3.80 - 5.80 E12/L    Hemoglobin 14.6 12.5 - 16.5 g/dL    Hematocrit 43.1 37.0 - 54.0 %    MCV 91.3 80.0 - 99.9 fL    MCH 30.9 26.0 - 35.0 pg    MCHC 33.9 32.0 - 34.5 %    RDW 14.8 11.5 - 15.0 fL    Platelets 633 646 - 348 E9/L    MPV 11.4 7.0 - 12.0 fL    Neutrophils % 70.0 43.0 - 80.0 %    Immature Granulocytes % 0.4 0.0 - 5.0 %    Lymphocytes % 15.2 (L) 20.0 - 42.0 %    Monocytes % 10.3 2.0 - 12.0 %    Eosinophils % 3.6 0.0 - 6.0 %    Basophils % 0.5 0.0 - 2.0 %    Neutrophils Absolute 5.09 1.80 - 7.30 E9/L    Immature Granulocytes # 0.03 E9/L    Lymphocytes Absolute 1.11 (L) 1.50 - 4.00 E9/L    Monocytes Absolute 0.75 0.10 - 0.95 E9/L    Eosinophils Absolute 0.26 0.05 - 0.50 E9/L    Basophils Absolute 0.04 0.00 - 0.20 E9/L   ,     RADIOLOGY:  Interpreted by Radiologist unless otherwise specified  [address]   Final Result   1. No evidence of testicular torsion. 2.  Stable moderate right and minimal left hydrocele. 3.  Mild left varicocele. US DUP ABD PEL RETRO SCROT COMPLETE   Final Result   1. No evidence of testicular torsion.       2.  Mild left varicocele.             ------------------------- NURSING NOTES AND VITALS REVIEWED ---------------------------   The nursing notes within the ED encounter and vital signs as below have been reviewed by myself  /82   Pulse 82   Temp 97.8 °F (36.6 °C) (Oral)   Resp 16   Ht 5' 4\" (1.626 m)   Wt 130 lb (59 kg)   SpO2 99%   BMI 22.31 kg/m²     Oxygen Saturation Interpretation: Normal    The patients available past medical records and past encounters were reviewed. ------------------------------ ED COURSE/MEDICAL DECISION MAKING----------------------  Medications   traMADol (ULTRAM) tablet 50 mg (50 mg Oral Given 3/1/22 0824)       Medical Decision Making: This is a 73 y.o. male presenting to the ED for scrotal pain. On initial presentation, the patient's vital signs are interpreted as normotensive, afebrile and saturating well on room air. Based on history and physical exam, we have a broad differential.  Patient has known hydrocele. Review of EMR shows he has been seen on February 9, 14th and 25th. The patient's pain may be secondary to completion of pain medication. Review of the patient's Reena Hora shows that he had 12 tabs of tramadol prescribed on February 25. As he is endorsing increasing pain, I also considered urinary tract infection, testicular torsion, epididymitis or orchitis. Patient denies concern for STDs at this time however. Physical exam is not consistent with torsion at this time. We will obtain basic laboratory studies and an ultrasound. Patient will be given tramadol for symptomatic relief. Ultrasound shows no evidence of torsion. Hydrocele seen. Left varicocele. This is interpreted by radiology. Urinalysis shows no evidence of urinary tract infection. Laboratory studies show mild hypoglycemia. Patient is given something to eat. Electrolytes and LFTs otherwise within normal limits. No leukocytosis or anemia. Patient is ambulating and tolerating by mouth. His pain is improved. He is stable for discharge. We have stressed the importance of appropriate follow-up with his primary care physician and urology. Patient does request pain medications. We will not provide him with tramadol at this time. He will be given a prescription for Tylenol.     This patient's ED course included: a personal history and physicial examination and re-evaluation prior to disposition    This patient has improved during their ED course. Consultations:  None    Oxygen Saturation Interpretation: 99 % on room air. Normal    Counseling:   I have spoken with the patient and discussed todays results, in addition to providing specific details for the plan of care and counseling regarding the diagnosis and prognosis. Questions are answered at this time and they are agreeable with the plan.     --------------------------------- IMPRESSION AND DISPOSITION ---------------------------------    IMPRESSION  1. Hydrocele, unspecified hydrocele type        DISPOSITION  Disposition: Discharge to home  Patient condition is fair    I, Dr. Mary Morin, am the primary provider of record    NOTE: This report was transcribed using voice recognition software.  Every effort was made to ensure accuracy; however, inadvertent computerized transcription errors may be present        Mary Morin MD  03/01/22 [id number]

## 2022-03-08 ENCOUNTER — APPOINTMENT (OUTPATIENT)
Dept: ULTRASOUND IMAGING | Age: 67
End: 2022-03-08
Payer: MEDICARE

## 2022-03-08 ENCOUNTER — HOSPITAL ENCOUNTER (EMERGENCY)
Age: 67
Discharge: HOME | End: 2022-03-08
Payer: MEDICARE

## 2022-03-08 VITALS
DIASTOLIC BLOOD PRESSURE: 65 MMHG | RESPIRATION RATE: 14 BRPM | OXYGEN SATURATION: 96 % | SYSTOLIC BLOOD PRESSURE: 122 MMHG | HEIGHT: 64 IN | HEART RATE: 82 BPM | WEIGHT: 130 LBS | TEMPERATURE: 97.5 F | BODY MASS INDEX: 22.2 KG/M2

## 2022-03-08 DIAGNOSIS — N50.3 EPIDIDYMAL CYST: ICD-10-CM

## 2022-03-08 DIAGNOSIS — N43.3 HYDROCELE, BILATERAL: Primary | ICD-10-CM

## 2022-03-08 DIAGNOSIS — S30.22XA CONTUSION OF SCROTUM AND TESTES, INITIAL ENCOUNTER: ICD-10-CM

## 2022-03-08 LAB
BACTERIA: NORMAL /HPF
BILIRUBIN URINE: NEGATIVE
BLOOD, URINE: NEGATIVE
CLARITY: CLEAR
COLOR: YELLOW
GLUCOSE URINE: NEGATIVE MG/DL
KETONES, URINE: NEGATIVE MG/DL
LEUKOCYTE ESTERASE, URINE: NEGATIVE
NITRITE, URINE: NEGATIVE
PH UA: 6 (ref 5–9)
PROTEIN UA: NEGATIVE MG/DL
RBC UA: NORMAL /HPF (ref 0–2)
SPECIFIC GRAVITY UA: 1.02 (ref 1–1.03)
UROBILINOGEN, URINE: 0.2 E.U./DL
WBC UA: NORMAL /HPF (ref 0–5)

## 2022-03-08 PROCEDURE — 99285 EMERGENCY DEPT VISIT HI MDM: CPT

## 2022-03-08 PROCEDURE — 93975 VASCULAR STUDY: CPT

## 2022-03-08 PROCEDURE — 76870 US EXAM SCROTUM: CPT

## 2022-03-08 PROCEDURE — 6370000000 HC RX 637 (ALT 250 FOR IP): Performed by: PHYSICIAN ASSISTANT

## 2022-03-08 PROCEDURE — 81001 URINALYSIS AUTO W/SCOPE: CPT

## 2022-03-08 RX ORDER — TRAMADOL HYDROCHLORIDE 50 MG/1
50 TABLET ORAL EVERY 6 HOURS PRN
Qty: 10 TABLET | Refills: 0 | Status: SHIPPED | OUTPATIENT
Start: 2022-03-08 | End: 2022-03-11

## 2022-03-08 RX ORDER — OXYCODONE HYDROCHLORIDE AND ACETAMINOPHEN 5; 325 MG/1; MG/1
1 TABLET ORAL ONCE
Status: COMPLETED | OUTPATIENT
Start: 2022-03-08 | End: 2022-03-08

## 2022-03-08 RX ADMIN — OXYCODONE AND ACETAMINOPHEN 1 TABLET: 5; 325 TABLET ORAL at 21:22

## 2022-03-08 ASSESSMENT — PAIN SCALES - GENERAL: PAINLEVEL_OUTOF10: 10
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
## 2022-03-11 ENCOUNTER — HOSPITAL ENCOUNTER (EMERGENCY)
Age: 67
Discharge: PSYCHIATRIC OR SUBSTANCE USE FACILITY | End: 2022-03-12
Attending: STUDENT IN AN ORGANIZED HEALTH CARE EDUCATION/TRAINING PROGRAM
Payer: MEDICARE

## 2022-03-11 DIAGNOSIS — R45.851 SUICIDAL IDEATION: Primary | ICD-10-CM

## 2022-03-11 LAB
ACETAMINOPHEN LEVEL: <5 MCG/ML (ref 10–30)
ALBUMIN SERPL-MCNC: 4.2 G/DL (ref 3.5–5.2)
ALP BLD-CCNC: 74 U/L (ref 40–129)
ALT SERPL-CCNC: 12 U/L (ref 0–40)
AMPHETAMINE SCREEN, URINE: NOT DETECTED
ANION GAP SERPL CALCULATED.3IONS-SCNC: 10 MMOL/L (ref 7–16)
AST SERPL-CCNC: 16 U/L (ref 0–39)
BARBITURATE SCREEN URINE: NOT DETECTED
BASOPHILS ABSOLUTE: 0.03 E9/L (ref 0–0.2)
BASOPHILS RELATIVE PERCENT: 0.5 % (ref 0–2)
BENZODIAZEPINE SCREEN, URINE: POSITIVE
BILIRUB SERPL-MCNC: 0.5 MG/DL (ref 0–1.2)
BUN BLDV-MCNC: 15 MG/DL (ref 6–23)
CALCIUM SERPL-MCNC: 8.9 MG/DL (ref 8.6–10.2)
CANNABINOID SCREEN URINE: POSITIVE
CHLORIDE BLD-SCNC: 100 MMOL/L (ref 98–107)
CO2: 29 MMOL/L (ref 22–29)
COCAINE METABOLITE SCREEN URINE: NOT DETECTED
CREAT SERPL-MCNC: 1 MG/DL (ref 0.7–1.2)
EOSINOPHILS ABSOLUTE: 0.26 E9/L (ref 0.05–0.5)
EOSINOPHILS RELATIVE PERCENT: 4 % (ref 0–6)
ETHANOL: <10 MG/DL (ref 0–0.08)
FENTANYL SCREEN, URINE: NOT DETECTED
GFR AFRICAN AMERICAN: >60
GFR NON-AFRICAN AMERICAN: >60 ML/MIN/1.73
GLUCOSE BLD-MCNC: 90 MG/DL (ref 74–99)
HCT VFR BLD CALC: 41.4 % (ref 37–54)
HEMOGLOBIN: 14.1 G/DL (ref 12.5–16.5)
IMMATURE GRANULOCYTES #: 0.01 E9/L
IMMATURE GRANULOCYTES %: 0.2 % (ref 0–5)
INFLUENZA A: NOT DETECTED
INFLUENZA B: NOT DETECTED
LYMPHOCYTES ABSOLUTE: 0.99 E9/L (ref 1.5–4)
LYMPHOCYTES RELATIVE PERCENT: 15.2 % (ref 20–42)
Lab: ABNORMAL
MCH RBC QN AUTO: 30.7 PG (ref 26–35)
MCHC RBC AUTO-ENTMCNC: 34.1 % (ref 32–34.5)
MCV RBC AUTO: 90.2 FL (ref 80–99.9)
METHADONE SCREEN, URINE: NOT DETECTED
MONOCYTES ABSOLUTE: 0.53 E9/L (ref 0.1–0.95)
MONOCYTES RELATIVE PERCENT: 8.1 % (ref 2–12)
NEUTROPHILS ABSOLUTE: 4.69 E9/L (ref 1.8–7.3)
NEUTROPHILS RELATIVE PERCENT: 72 % (ref 43–80)
OPIATE SCREEN URINE: NOT DETECTED
OXYCODONE URINE: NOT DETECTED
PDW BLD-RTO: 14.4 FL (ref 11.5–15)
PHENCYCLIDINE SCREEN URINE: NOT DETECTED
PLATELET # BLD: 167 E9/L (ref 130–450)
PMV BLD AUTO: 11.9 FL (ref 7–12)
POTASSIUM REFLEX MAGNESIUM: 4.1 MMOL/L (ref 3.5–5)
RBC # BLD: 4.59 E12/L (ref 3.8–5.8)
SALICYLATE, SERUM: <0.3 MG/DL (ref 0–30)
SARS-COV-2 RNA, RT PCR: NOT DETECTED
SODIUM BLD-SCNC: 139 MMOL/L (ref 132–146)
TOTAL PROTEIN: 7 G/DL (ref 6.4–8.3)
TRICYCLIC ANTIDEPRESSANTS SCREEN SERUM: NEGATIVE NG/ML
WBC # BLD: 6.5 E9/L (ref 4.5–11.5)

## 2022-03-11 PROCEDURE — 80307 DRUG TEST PRSMV CHEM ANLYZR: CPT

## 2022-03-11 PROCEDURE — 99285 EMERGENCY DEPT VISIT HI MDM: CPT

## 2022-03-11 PROCEDURE — 82077 ASSAY SPEC XCP UR&BREATH IA: CPT

## 2022-03-11 PROCEDURE — 80179 DRUG ASSAY SALICYLATE: CPT

## 2022-03-11 PROCEDURE — 36415 COLL VENOUS BLD VENIPUNCTURE: CPT

## 2022-03-11 PROCEDURE — 85025 COMPLETE CBC W/AUTO DIFF WBC: CPT

## 2022-03-11 PROCEDURE — 80143 DRUG ASSAY ACETAMINOPHEN: CPT

## 2022-03-11 PROCEDURE — 80053 COMPREHEN METABOLIC PANEL: CPT

## 2022-03-11 PROCEDURE — 87636 SARSCOV2 & INF A&B AMP PRB: CPT

## 2022-03-11 PROCEDURE — 93005 ELECTROCARDIOGRAM TRACING: CPT | Performed by: STUDENT IN AN ORGANIZED HEALTH CARE EDUCATION/TRAINING PROGRAM

## 2022-03-11 PROCEDURE — 6370000000 HC RX 637 (ALT 250 FOR IP): Performed by: STUDENT IN AN ORGANIZED HEALTH CARE EDUCATION/TRAINING PROGRAM

## 2022-03-11 RX ORDER — HYDROXYZINE PAMOATE 25 MG/1
25 CAPSULE ORAL ONCE
Status: COMPLETED | OUTPATIENT
Start: 2022-03-11 | End: 2022-03-11

## 2022-03-11 RX ADMIN — HYDROXYZINE PAMOATE 25 MG: 25 CAPSULE ORAL at 22:20

## 2022-03-11 ASSESSMENT — ENCOUNTER SYMPTOMS
SHORTNESS OF BREATH: 0
ABDOMINAL DISTENTION: 0
COUGH: 0
DIARRHEA: 0
BACK PAIN: 0
VOMITING: 0
NAUSEA: 0
SINUS PRESSURE: 0
ABDOMINAL PAIN: 0
SINUS PAIN: 0
CONSTIPATION: 0
RHINORRHEA: 0
ANAL BLEEDING: 0
EYE DISCHARGE: 0
CHEST TIGHTNESS: 0

## 2022-03-12 VITALS
RESPIRATION RATE: 16 BRPM | TEMPERATURE: 97.8 F | DIASTOLIC BLOOD PRESSURE: 71 MMHG | OXYGEN SATURATION: 98 % | HEART RATE: 81 BPM | SYSTOLIC BLOOD PRESSURE: 126 MMHG

## 2022-03-12 LAB — METER GLUCOSE: 95 MG/DL (ref 74–99)

## 2022-03-12 PROCEDURE — 82962 GLUCOSE BLOOD TEST: CPT

## 2022-03-12 NOTE — ED NOTES
Pt resting with eyes closed awakens easily alert oriented skin warm dry resp easy pt continues to admit to suicidal ideations with plan to run in traffic pt has fair eye contact flat affect is cooperative and calm at this time     Demar Sheikh RN  03/12/22 4322

## 2022-03-12 NOTE — ED NOTES
Patient continuously walking around and asking to speak with . Says, \"If I get out of here I am just going to kill myself. \"     Hanane Bacon RN  03/11/22 9290

## 2022-03-12 NOTE — ED NOTES
Call to [facility] SAT[facility] YOU and she stated that according to his assessment he is actively threatening to leave if he goes there. She stated that once he is stable we can refer him there. Pt requesting to go to [facility]. Pt is now referred to [facility]. Spoke with Dena Toribio. Packet faxed.        William Steiner, [facility]  03/12/22 [facility] , [facility]  03/12/22 1104

## 2022-03-12 NOTE — ED NOTES
Patient's belongings include pants shirt hat terri shoes placed into patient belonging back and locked in locker 28. Patient requested police lock wallet, necklace, watch in police locker. [facility]) Police Department Patient valuables receipt place into soft chart.      Rose Marie Ibarra RN  03/11/22 5457

## 2022-03-12 NOTE — ED PROVIDER NOTES
ATTENDING PROVIDER ATTESTATION:     Thierno Randle presented to the emergency department for evaluation of Psychiatric Evaluation (EMS called to [facility] for SI . Patient denies HI.)   and was initially evaluated by the Medical Resident. See Original ED Note for H&P and ED course above. I have reviewed and discussed the case, including pertinent history (medical, surgical, family and social) and exam findings with the Medical Resident assigned to Thierno Razia. I have personally performed and/or participated in the history, exam, medical decision making, and procedures and agree with all pertinent clinical information and any additional changes or corrections are noted below in my assessment and plan. I have discussed this patient in detail with the resident, and provided the instruction and education,    I have reviewed my findings and recommendations with the assigned Medical Resident, Thierno Randle and members of family present at the time of disposition. I have performed a history and physical examination of this patient and directly supervised the resident caring for this patient. History of Present Illness: This patient presents to the ED for Patient presents emerged department with suicidal ideation. Patient has a history of chronic suicidal ideations he is on medication which he states he has been compliant with. Denies any homicidal ideation, no delusions hallucinations that he describes.   Nothing is better or worse is constant duration    Review of Systems:   A complete review of systems was performed and pertinent positives and negatives are stated within HPI, all other systems reviewed and are negative.    --------------------------------------------- PAST HISTORY ---------------------------------------------  Past Medical History:  has a past medical history of Anxiety, Arthritis, Asthma, Bipolar 1 disorder ([facility] Chronic combined systolic and diastolic CHF (congestive heart failure) ([facility] COPD (chronic obstructive pulmonary disease) ([facility] Depression, Diabetes mellitus ([facility] GERD (gastroesophageal reflux disease), Hepatitis C, Hypertension, Hyperthyroidism, Hypothyroidism due to medication, Mass of testicle, Mesothelioma ([facility] Neuromuscular disorder ([facility] [address] Other disorders of kidney and ureter, Paroxysmal A-fib ([facility] Peptic ulcer, Prostate enlargement, Pulmonary embolism ([facility] [address] [facility] Seizures ([facility] [address] [facility] and Thyroid disease. Past Surgical History:  has a past surgical history that includes Appendectomy; Lithotripsy; Hemorrhoid surgery; Bladder surgery; Endoscopy, colon, diagnostic (11/13/2015); Abdomen surgery; Colonoscopy; Cardiac surgery; vascular surgery; and Cardiac catheterization (05/21/2018). Social History:  reports that he quit smoking about 12 years ago. His smoking use included cigarettes. He quit after 10.00 years of use. He quit smokeless tobacco use about 4 years ago. His smokeless tobacco use included chew. He reports previous drug use. Drug: Cocaine. He reports that he does not drink alcohol. Family History: family history includes Cancer in his father, maternal grandfather, and mother; Diabetes in his father, maternal aunt, maternal aunt, maternal aunt, maternal grandmother, and mother; Heart Disease in his paternal grandfather and paternal grandmother; Heart Failure in his paternal grandfather and paternal grandmother. Unless otherwise noted, family history is non contributory    The patients home medications have been reviewed. Allergies: Codeine, Dye [iodides], Ketorolac tromethamine, Peanuts [peanut oil], Shellfish-derived products, and Nitroglycerin    Physical Exam:  Constitutional: Appears in no distress  Head: Normocephalic, atraumatic  Eyes: Non-icteric slcera, no conjunctival injection  ENT: Moist mucous membranes,  Neck: Trachea midline, no JVD  Respiratory: Nonlabored respirations.  Lungs clear to auscultation bilaterally, no wheezes, rales, or rhonchi. Cardiovascular: Regular rate. Regular rhythm. No murmurs, no gallops, no rubs. Gastrointestinal: Abdomen Soft, Non tender, Non distended. No rebound tenderness, guarding, or rigidity. Extremities: No lower extremity edema  Genitourinary: No CVA tenderness, no suprapubic tenderness  Musculoskeletal: Moves all extremities, no deformity  Skin: Pink, warm, dry without rash. Neurologic: Alert, symmetric facies, no aphasia    My Medical Decision Making:   Patient presents emerged department with suicidal ideation without plan. This is a longstanding issue for this patient. Patient physical examination findings consistent with suicidal ideation. Labs reviewed unremarkable for any acute concerning abnormality. EKG:  EKG is interpreted myself as ventricular rate of 59 beats per there is a P wave before every QRS complex restrictions normal to QT/QTc is normal.  There is normal axis. No ST segment elevation or depression otherwise.'s EKG is interpreted myself as sinus bradycardia no ischemia or infarct    Labs imaging reviewed unremarkable for any acute concerning findings. Patient is cleared medically for further psychiatric evaluation    IMPRESSION:   1. Suicidal ideation        I, Dr. Nathanael Randall, am the primary provider of record. I directly supervised any procedures performed by the resident and was present for the procedure including all critical portions of the procedure. Nathanael Randall DO  Emergency Medicine    NOTE: This report was transcribed using voice recognition software.  Every effort was made to ensure accuracy; however, inadvertent computerized transcription errors may be present       Shalom Ansari DO  03/11/22 2058

## 2022-03-12 NOTE — ED PROVIDER NOTES
HPI     Patient is a 78 y.o. male presents with a chief complaint of SI  This has been occurring for a few days. Patient states that it gets better with nothign. Patient states that it gets worse with nothing. Patient states that it is moderate in severity. Patient states it was gradual in onset. *Patient presents from home. Patient states that he has been stressed out at work and intends to kill himself. Patient denies any chest pain or shortness of breath. Patient states he plans on running into traffic or taking medicine. Patient has attempted to take pills to overdose in the past.  Patient denies any ingestion of anything at this time. Patient denies any homicidal ideation. Patient states he has been taking his home meds appropriately. Patient states that he does not hear things that other people do not hear or see things that other people do not see. Patient was taken no drugs or alcohol today. Review of Systems   Constitutional: Negative for activity change, appetite change, fatigue and fever. HENT: Negative for congestion, rhinorrhea, sinus pressure and sinus pain. Eyes: Negative for discharge. Respiratory: Negative for cough, chest tightness and shortness of breath. Cardiovascular: Negative for chest pain and palpitations. Gastrointestinal: Negative for abdominal distention, abdominal pain, anal bleeding, constipation, diarrhea, nausea and vomiting. Endocrine: Negative for polydipsia and polyuria. Genitourinary: Negative for decreased urine volume, difficulty urinating, enuresis, flank pain, frequency and urgency. Musculoskeletal: Negative for arthralgias, back pain and neck stiffness. Skin: Negative for rash and wound. Neurological: Negative for dizziness, weakness, light-headedness and headaches. Psychiatric/Behavioral: Positive for suicidal ideas. Negative for agitation, behavioral problems, confusion, hallucinations and sleep disturbance.         Physical Kim Hoffmann MD      Patient is a 78 y.o. male presenting with suicidal ideation. Patient stated that he is going to kill himself. Patient is denying any current suicide attempt. Patient is repeatedly said that he is going to kill himself. Patient stated that he was kicked out of the home that he was staying in. Patient does state that he has anxiety and is asking for anxiety medicine. Patient denies any fevers, chills, nausea, vomiting, abdominal pain. Patient denies any homicidal ideation. Patient has repeatedly stopped resident physician and stated that the needs anxiety medicine and needs to be admitted for evaluation. Patient has been seen for this multiple times in the past.  Patient repeatedly came to physicians to state \"why have I not been pink slipped. \"  Patient stated that he needs to go upstairs to get the best help. Patient has been seen here multiple times for this in the past.  Social work came down and evaluated patient. Patient has recently been made homeless. Patient will be reevaluated in the morning by the navigator. Discussion with social work and patient will not be pink slipped at this time. Patient stated that he is willing to remain here and wait until the morning. Patient was given return precautions. Patient will follow up with their primary care provider. Patient is agreeable to this plan. Patient has remained stable throughout their stay in the ED. Patient was seen and evaluated by myself and my attending Debbie Daugherty DO. Assessment and Plan discussed with attending provider, please see attestation for final plan of care. This note was done using dictation software and there may be some grammatical errors associated with this. Luisa Austin MD       ED Course as of 03/12/22 0035   Bailey Hirsch Mar 11, 2022   0327 Patient is medically cleared to be evaluated by psych [JM]   [phone] Patient pulled resident physician aside and stated that he wants to be admitted.   Has requested multiple times to be pink slipped. States that he will go home and kill himself if he is not admitted to the 7 floor. Patient was informed social work will come and evaluate patient. [LM]   Sat Mar 12, 2022   0029 Discussed case with social work. Concerned that patient has been seen for this multiple times in the past and is currently searching for housing. Patient will not be pink slipped at this time but will be admitted to [facility] for reevaluation in the morning. [LM]      ED Course User Index  [LM] Kostas Everett MD       --------------------------------------------- PAST HISTORY ---------------------------------------------  Past Medical History:  has a past medical history of Anxiety, Arthritis, Asthma, Bipolar 1 disorder ([address] Chronic combined systolic and diastolic CHF (congestive heart failure) ([address] COPD (chronic obstructive pulmonary disease) ([address] Depression, Diabetes mellitus ([address] GERD (gastroesophageal reflux disease), Hepatitis C, Hypertension, Hyperthyroidism, Hypothyroidism due to medication, Mass of testicle, Mesothelioma ([address] Neuromuscular disorder ([address] Other disorders of kidney and ureter, Paroxysmal A-fib ([address] Peptic ulcer, Prostate enlargement, Pulmonary embolism ([address] Seizures ([address] and Thyroid disease. Past Surgical History:  has a past surgical history that includes Appendectomy; Lithotripsy; Hemorrhoid surgery; Bladder surgery; Endoscopy, colon, diagnostic (11/13/2015); Abdomen surgery; Colonoscopy; Cardiac surgery; vascular surgery; and Cardiac catheterization (05/21/2018). Social History:  reports that he quit smoking about 12 years ago. His smoking use included cigarettes. He quit after 10.00 years of use. He quit smokeless tobacco use about 4 years ago. His smokeless tobacco use included chew. He reports previous drug use. Drug: Cocaine. He reports that he does not drink alcohol.     Family History: family history includes Cancer in his father, maternal grandfather, and mother; Diabetes in his father, maternal aunt, maternal aunt, maternal aunt, maternal grandmother, and mother; Heart Disease in his paternal grandfather and paternal grandmother; Heart Failure in his paternal grandfather and paternal grandmother. The patients home medications have been reviewed.     Allergies: Codeine, Dye [iodides], Ketorolac tromethamine, Peanuts [peanut oil], Shellfish-derived products, and Nitroglycerin    -------------------------------------------------- RESULTS -------------------------------------------------  Labs:  Results for orders placed or performed during the hospital encounter of 03/11/22   COVID-19 & Influenza Combo    Specimen: Nasopharyngeal Swab   Result Value Ref Range    SARS-CoV-2 RNA, RT PCR NOT DETECTED NOT DETECTED    INFLUENZA A NOT DETECTED NOT DETECTED    INFLUENZA B NOT DETECTED NOT DETECTED   CBC with Auto Differential   Result Value Ref Range    WBC 6.5 4.5 - 11.5 E9/L    RBC 4.59 3.80 - 5.80 E12/L    Hemoglobin 14.1 12.5 - 16.5 g/dL    Hematocrit 41.4 37.0 - 54.0 %    MCV 90.2 80.0 - 99.9 fL    MCH 30.7 26.0 - 35.0 pg    MCHC 34.1 32.0 - 34.5 %    RDW 14.4 11.5 - 15.0 fL    Platelets 311 655 - 088 E9/L    MPV 11.9 7.0 - 12.0 fL    Neutrophils % 72.0 43.0 - 80.0 %    Immature Granulocytes % 0.2 0.0 - 5.0 %    Lymphocytes % 15.2 (L) 20.0 - 42.0 %    Monocytes % 8.1 2.0 - 12.0 %    Eosinophils % 4.0 0.0 - 6.0 %    Basophils % 0.5 0.0 - 2.0 %    Neutrophils Absolute 4.69 1.80 - 7.30 E9/L    Immature Granulocytes # 0.01 E9/L    Lymphocytes Absolute 0.99 (L) 1.50 - 4.00 E9/L    Monocytes Absolute 0.53 0.10 - 0.95 E9/L    Eosinophils Absolute 0.26 0.05 - 0.50 E9/L    Basophils Absolute 0.03 0.00 - 0.20 E9/L   Comprehensive Metabolic Panel w/ Reflex to MG   Result Value Ref Range    Sodium 139 132 - 146 mmol/L    Potassium reflex Magnesium 4.1 3.5 - 5.0 mmol/L    Chloride 100 98 - 107 mmol/L    CO2 29 22 - 29 mmol/L    Anion Gap 10 7 - 16 mmol/L Glucose 90 74 - 99 mg/dL    BUN 15 6 - 23 mg/dL    CREATININE 1.0 0.7 - 1.2 mg/dL    GFR Non-African American >60 >=60 mL/min/1.73    GFR African American >60     Calcium 8.9 8.6 - 10.2 mg/dL    Total Protein 7.0 6.4 - 8.3 g/dL    Albumin 4.2 3.5 - 5.2 g/dL    Total Bilirubin 0.5 0.0 - 1.2 mg/dL    Alkaline Phosphatase 74 40 - 129 U/L    ALT 12 0 - 40 U/L    AST 16 0 - 39 U/L   Serum Drug Screen   Result Value Ref Range    Ethanol Lvl <10 mg/dL    Acetaminophen Level <5.0 (L) 10.0 - 77.6 mcg/mL    Salicylate, Serum <8.9 0.0 - 30.0 mg/dL    TCA Scrn NEGATIVE Cutoff:300 ng/mL   Urine Drug Screen   Result Value Ref Range    Amphetamine Screen, Urine NOT DETECTED Negative <1000 ng/mL    Barbiturate Screen, Ur NOT DETECTED Negative < 200 ng/mL    Benzodiazepine Screen, Urine POSITIVE (A) Negative < 200 ng/mL    Cannabinoid Scrn, Ur POSITIVE (A) Negative < 50ng/mL    Cocaine Metabolite Screen, Urine NOT DETECTED Negative < 300 ng/mL    Opiate Scrn, Ur NOT DETECTED Negative < 300ng/mL    PCP Screen, Urine NOT DETECTED Negative < 25 ng/mL    Methadone Screen, Urine NOT DETECTED Negative <300 ng/mL    Oxycodone Urine NOT DETECTED Negative <100 ng/mL    FENTANYL SCREEN, URINE NOT DETECTED Negative <1 ng/mL    Drug Screen Comment: see below        Radiology:  No orders to display       ------------------------- NURSING NOTES AND VITALS REVIEWED ---------------------------  Date / Time Roomed:  3/11/2022  6:51 PM  ED Bed Assignment:  YOSELYN LARSON/    The nursing notes within the ED encounter and vital signs as below have been reviewed.    /69   Pulse 60   Temp 97.9 °F (36.6 °C)   Resp 16   SpO2 98%   Oxygen Saturation Interpretation: normal      ------------------------------------------ PROGRESS NOTES ------------------------------------------  12:35 AM EST  I have spoken with the patient and family if present and discussed todays results, in addition to providing specific details for the plan of care and counseling regarding the diagnosis and prognosis. Their questions are answered at this time and they are agreeable with the plan. I discussed at length with them reasons for immediate return here for re evaluation. They will followup with their primary care provider by calling their office as soon as possible.      --------------------------------- ADDITIONAL PROVIDER NOTES ---------------------------------  At this time the patient is without objective evidence of an acute process requiring hospitalization or inpatient management. They have remained hemodynamically stable throughout their entire ED visit and are stable for discharge with outpatient follow-up. The plan has been discussed in detail and they are aware of the specific conditions for emergent return, as well as the importance of follow-up. New Prescriptions    No medications on file       Diagnosis:  1. Suicidal ideation        Disposition:  Patient's disposition: ED opts to be evaluated by psych  Patient's condition is stable.          Lauryn Maher MD  Resident  03/12/22 9674

## 2022-03-12 NOTE — ED NOTES
Patient has flat affect; is sleepy but cooperative; does not engage in conversation     [employer], RN  03/11/22 2123

## 2022-03-12 NOTE — ED NOTES
Pt answers yes to all si questions states \"im always suicidal\" .      Vidya Soriano RN  03/12/22 2829

## 2022-03-12 NOTE — ED NOTES
Pt had belongings secured with [facility]. Verified [facility] has belongings. Called [facility], adv that pt must go to [facility] when discharged from their facility to .      [facility]  03/12/22 3772
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
## 2022-03-12 NOTE — ED NOTES
Fax to nursing office for [facility] [address] [place] [address]  03/11/22 8490

## 2022-03-12 NOTE — ED NOTES
Call from [facility] at [address] pt was accepted by Dr. Gail Halsted to dual unit- room 110B. N to N to be called to [phone].   Physicians will arrive @630pm.       Rey Trent, [facility]  03/12/22 Shari Andrade, [facility]  03/12/22 4003

## 2022-03-13 LAB
EKG ATRIAL RATE: 59 BPM
EKG P AXIS: -19 DEGREES
EKG P-R INTERVAL: 134 MS
EKG Q-T INTERVAL: 418 MS
EKG QRS DURATION: 82 MS
EKG QTC CALCULATION (BAZETT): 413 MS
EKG R AXIS: -16 DEGREES
EKG T AXIS: 27 DEGREES
EKG VENTRICULAR RATE: 59 BPM

## 2022-03-21 ENCOUNTER — HOSPITAL ENCOUNTER (EMERGENCY)
Age: 67
Discharge: HOME | End: 2022-03-21
Attending: EMERGENCY MEDICINE
Payer: MEDICARE

## 2022-03-21 VITALS
DIASTOLIC BLOOD PRESSURE: 69 MMHG | OXYGEN SATURATION: 98 % | SYSTOLIC BLOOD PRESSURE: 127 MMHG | HEIGHT: 64 IN | RESPIRATION RATE: 16 BRPM | WEIGHT: 130 LBS | TEMPERATURE: 98.5 F | HEART RATE: 87 BPM | BODY MASS INDEX: 22.2 KG/M2

## 2022-03-21 DIAGNOSIS — R41.3 MEMORY DISTURBANCE: ICD-10-CM

## 2022-03-21 DIAGNOSIS — N43.3 HYDROCELE, UNSPECIFIED HYDROCELE TYPE: Primary | ICD-10-CM

## 2022-03-21 PROCEDURE — 6370000000 HC RX 637 (ALT 250 FOR IP): Performed by: EMERGENCY MEDICINE

## 2022-03-21 PROCEDURE — 99284 EMERGENCY DEPT VISIT MOD MDM: CPT

## 2022-03-21 RX ORDER — OXYCODONE HYDROCHLORIDE AND ACETAMINOPHEN 5; 325 MG/1; MG/1
1 TABLET ORAL ONCE
Status: COMPLETED | OUTPATIENT
Start: 2022-03-21 | End: 2022-03-21

## 2022-03-21 RX ADMIN — OXYCODONE AND ACETAMINOPHEN 1 TABLET: 5; 325 TABLET ORAL at 16:26

## 2022-03-21 ASSESSMENT — ENCOUNTER SYMPTOMS
TROUBLE SWALLOWING: 0
VOMITING: 1
NAUSEA: 1
BLOOD IN STOOL: 0
ABDOMINAL PAIN: 0
COLOR CHANGE: 0
DIARRHEA: 0
SHORTNESS OF BREATH: 0
COUGH: 0
RHINORRHEA: 0

## 2022-03-21 ASSESSMENT — PAIN DESCRIPTION - LOCATION: LOCATION: OTHER (COMMENT)

## 2022-03-21 ASSESSMENT — PAIN SCALES - GENERAL
PAINLEVEL_OUTOF10: 6
PAINLEVEL_OUTOF10: 10

## 2022-03-21 ASSESSMENT — PAIN DESCRIPTION - DESCRIPTORS: DESCRIPTORS: ACHING
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
## 2022-03-22 ENCOUNTER — HOSPITAL ENCOUNTER (EMERGENCY)
Age: 67
Discharge: LEFT BEFORE DISCHARGE | End: 2022-03-22

## 2022-03-22 VITALS
DIASTOLIC BLOOD PRESSURE: 120 MMHG | BODY MASS INDEX: 24.8 KG/M2 | WEIGHT: 140 LBS | OXYGEN SATURATION: 99 % | RESPIRATION RATE: 17 BRPM | HEART RATE: 75 BPM | SYSTOLIC BLOOD PRESSURE: 148 MMHG | HEIGHT: 63 IN

## 2022-03-22 RX ORDER — ACETAMINOPHEN 325 MG/1
650 TABLET ORAL ONCE
Status: DISCONTINUED | OUTPATIENT
Start: 2022-03-22 | End: 2022-03-22

## 2022-03-22 ASSESSMENT — PAIN DESCRIPTION - LOCATION: LOCATION: SCROTUM

## 2022-03-22 ASSESSMENT — PAIN SCALES - GENERAL: PAINLEVEL_OUTOF10: 10

## 2022-03-22 ASSESSMENT — PAIN DESCRIPTION - DESCRIPTORS: DESCRIPTORS: THROBBING
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
## 2022-03-22 NOTE — ED NOTES
Pt became very agitated with this RN when asked to return to [address] [place] [address]. States he had been here for 3 hours and was starving. Informed him we could not have anything by mouth until worked up for his visit. Pt stated he was leaving as to \"not miss a meal\".  Pt eloped from [address] [place] [address], RN  03/22/22 8027

## 2022-03-28 ENCOUNTER — HOSPITAL ENCOUNTER (EMERGENCY)
Age: 67
Discharge: LEFT BEFORE DISCHARGE | End: 2022-03-28
Attending: EMERGENCY MEDICINE
Payer: MEDICARE

## 2022-03-28 VITALS
HEIGHT: 64 IN | HEART RATE: 87 BPM | OXYGEN SATURATION: 97 % | DIASTOLIC BLOOD PRESSURE: 76 MMHG | BODY MASS INDEX: 22.2 KG/M2 | RESPIRATION RATE: 16 BRPM | SYSTOLIC BLOOD PRESSURE: 130 MMHG | WEIGHT: 130 LBS | TEMPERATURE: 97.6 F

## 2022-03-28 VITALS
HEIGHT: 63 IN | HEART RATE: 77 BPM | TEMPERATURE: 98.4 F | BODY MASS INDEX: 24.8 KG/M2 | RESPIRATION RATE: 18 BRPM | WEIGHT: 140 LBS | SYSTOLIC BLOOD PRESSURE: 108 MMHG | DIASTOLIC BLOOD PRESSURE: 70 MMHG | OXYGEN SATURATION: 95 %

## 2022-03-28 DIAGNOSIS — N43.3 HYDROCELE, UNSPECIFIED HYDROCELE TYPE: ICD-10-CM

## 2022-03-28 DIAGNOSIS — Z53.29 LEFT AGAINST MEDICAL ADVICE: ICD-10-CM

## 2022-03-28 DIAGNOSIS — Z76.5 DRUG-SEEKING BEHAVIOR: ICD-10-CM

## 2022-03-28 DIAGNOSIS — N50.811 PAIN IN RIGHT TESTICLE: Primary | ICD-10-CM

## 2022-03-28 DIAGNOSIS — Z76.89 ENCOUNTER FOR PSYCHIATRIC ASSESSMENT: Primary | ICD-10-CM

## 2022-03-28 LAB
ACETAMINOPHEN LEVEL: <5 MCG/ML (ref 10–30)
AMPHETAMINE SCREEN, URINE: NOT DETECTED
BACTERIA: NORMAL /HPF
BARBITURATE SCREEN URINE: NOT DETECTED
BENZODIAZEPINE SCREEN, URINE: NOT DETECTED
BILIRUBIN URINE: NEGATIVE
BLOOD, URINE: NEGATIVE
CANNABINOID SCREEN URINE: NOT DETECTED
CLARITY: CLEAR
COCAINE METABOLITE SCREEN URINE: NOT DETECTED
COLOR: YELLOW
ETHANOL: <10 MG/DL (ref 0–0.08)
FENTANYL SCREEN, URINE: NOT DETECTED
GLUCOSE URINE: NEGATIVE MG/DL
KETONES, URINE: NEGATIVE MG/DL
LEUKOCYTE ESTERASE, URINE: NEGATIVE
Lab: ABNORMAL
METHADONE SCREEN, URINE: NOT DETECTED
NITRITE, URINE: NEGATIVE
OPIATE SCREEN URINE: NOT DETECTED
OXYCODONE URINE: POSITIVE
PH UA: 6.5 (ref 5–9)
PHENCYCLIDINE SCREEN URINE: NOT DETECTED
PROTEIN UA: NEGATIVE MG/DL
RBC UA: NORMAL /HPF (ref 0–2)
SALICYLATE, SERUM: <0.3 MG/DL (ref 0–30)
SPECIFIC GRAVITY UA: 1.01 (ref 1–1.03)
TRICYCLIC ANTIDEPRESSANTS SCREEN SERUM: NEGATIVE NG/ML
UROBILINOGEN, URINE: 0.2 E.U./DL
WBC UA: NORMAL /HPF (ref 0–5)

## 2022-03-28 PROCEDURE — 80307 DRUG TEST PRSMV CHEM ANLYZR: CPT

## 2022-03-28 PROCEDURE — 81001 URINALYSIS AUTO W/SCOPE: CPT

## 2022-03-28 PROCEDURE — 82077 ASSAY SPEC XCP UR&BREATH IA: CPT

## 2022-03-28 PROCEDURE — 99284 EMERGENCY DEPT VISIT MOD MDM: CPT

## 2022-03-28 PROCEDURE — 80179 DRUG ASSAY SALICYLATE: CPT

## 2022-03-28 PROCEDURE — 6370000000 HC RX 637 (ALT 250 FOR IP): Performed by: EMERGENCY MEDICINE

## 2022-03-28 PROCEDURE — 80143 DRUG ASSAY ACETAMINOPHEN: CPT

## 2022-03-28 RX ORDER — CLONAZEPAM 1 MG/1
1 TABLET ORAL 2 TIMES DAILY PRN
COMMUNITY
End: 2022-08-11 | Stop reason: ALTCHOICE

## 2022-03-28 RX ORDER — OXYCODONE HYDROCHLORIDE AND ACETAMINOPHEN 5; 325 MG/1; MG/1
1 TABLET ORAL ONCE
Status: COMPLETED | OUTPATIENT
Start: 2022-03-28 | End: 2022-03-28

## 2022-03-28 RX ADMIN — OXYCODONE AND ACETAMINOPHEN 1 TABLET: 5; 325 TABLET ORAL at 12:00

## 2022-03-28 ASSESSMENT — PAIN SCALES - GENERAL: PAINLEVEL_OUTOF10: 10

## 2022-03-28 NOTE — ED NOTES
PT walked out to [facility] at this time. Will continue to look for PT.       Bel Ramírez RN  03/28/22 6152
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
## 2022-03-29 NOTE — ED NOTES
Patient wanted to leave AMA, Notified Dr. Edith Kaur. [place] papers were signed. Patient left with all belongings.       Amara Rice  03/28/22 8474

## 2022-03-29 NOTE — ED PROVIDER NOTES
HPI:  3/28/22, Time: 9:00 PM EDT        Roby Zayas is a 78 y.o. male presenting to the ED for concern for suicidal ideation from staff at his living facility, beginning shortly prior to arrival.  Patient states it was a misunderstanding and that he was simply crying. He denies any suicidal or homicidal ideation. States he has been sober for the past 25 days. He denies any medical complaints at this time. Patient states he would like to return back to the mission without further evaluation. Patient reports history of a right-sided hydrocele that is unchanged. He denies any hallucinations and has been compliant with his medications. Patient denies fever/chills, sore throat, cough, congestion, chest pain, shortness of breath, edema, headache, visual disturbances, focal paresthesias, focal weakness, abdominal pain, nausea, vomiting, diarrhea, constipation, dysuria, hematuria, trauma, neck or back pain, rash or other complaints. ROS:   A complete review of systems was performed and all pertinent positives and negatives are stated within HPI, all other systems reviewed and are negative.            --------------------------------------------- PAST HISTORY ---------------------------------------------  Past Medical History:  has a past medical history of Anxiety, Arthritis, Asthma, Bipolar 1 disorder ([address] Chronic combined systolic and diastolic CHF (congestive heart failure) ([address] COPD (chronic obstructive pulmonary disease) ([address] Depression, Diabetes mellitus ([address] GERD (gastroesophageal reflux disease), Hepatitis C, Hypertension, Hyperthyroidism, Hypothyroidism due to medication, Mass of testicle, Mesothelioma ([address] Neuromuscular disorder ([address] Other disorders of kidney and ureter, Paroxysmal A-fib ([address] Peptic ulcer, Prostate enlargement, Pulmonary embolism ([address] Seizures ([address] and Thyroid disease.     Past Surgical History:  has a past surgical history that includes Appendectomy; Lithotripsy; Hemorrhoid surgery; Bladder surgery; Endoscopy, colon, diagnostic (11/13/2015); Abdomen surgery; Colonoscopy; Cardiac surgery; vascular surgery; and Cardiac catheterization (05/21/2018). Social History:  reports that he quit smoking about 12 years ago. His smoking use included cigarettes. He quit after 10.00 years of use. He quit smokeless tobacco use about 4 years ago. His smokeless tobacco use included chew. He reports previous drug use. Drug: Cocaine. He reports that he does not drink alcohol. Family History: family history includes Cancer in his father, maternal grandfather, and mother; Diabetes in his father, maternal aunt, maternal aunt, maternal aunt, maternal grandmother, and mother; Heart Disease in his paternal grandfather and paternal grandmother; Heart Failure in his paternal grandfather and paternal grandmother. The patients home medications have been reviewed. Allergies: Codeine, Dye [iodides], Ketorolac tromethamine, Peanuts [peanut oil], Shellfish-derived products, and Nitroglycerin        ----------------------------------------PHYSICAL EXAM--------------------------------------    Constitutional:  Well developed, well nourished, no acute distress, non-toxic appearance   Eyes:  PERRL, conjunctiva normal, EOMI  HENT:  Atraumatic, external ears normal, nose normal, oropharynx moist, no pharyngeal exudates. Neck- normal range of motion, no nuchal rigidity   Respiratory:  No respiratory distress, normal breath sounds, no rales, no wheezing   Cardiovascular:  Normal rate, normal rhythm, no murmurs, no gallops, no rubs. Radial and DP pulses 2+ bilaterally. GI:  Soft, nondistended, normal bowel sounds, nontender, no organomegaly, no mass, no rebound, no guarding   :  No costovertebral angle tenderness   Musculoskeletal:  No edema, no tenderness, no deformities.   Neurologic:  Alert & oriented x 3, CN 2-12 normal, normal motor function, normal sensory function, no focal deficits noted. Normal gait. Psychiatric:  Speech and behavior appropriate       -------------------------------------------------- RESULTS -------------------------------------------------  I have personally reviewed all laboratory and imaging results for this patient. Results are listed below. LABS:  Results for orders placed or performed during the hospital encounter of 03/28/22   URINE DRUG SCREEN   Result Value Ref Range    Drug Screen Comment: see below        RADIOLOGY:  Interpreted by Radiologist.  No orders to display           ------------------------- NURSING NOTES AND VITALS REVIEWED ---------------------------  The nursing notes within the ED encounter and vital signs as below have been reviewed by myself. /76   Pulse 87   Temp 97.6 °F (36.4 °C)   Resp 16   Ht 5' 4\" (1.626 m)   Wt 130 lb (59 kg)   SpO2 97%   BMI 22.31 kg/m²   Oxygen Saturation Interpretation: Normal      The patients available past medical records and past encounters were reviewed. ------------------------------ ED COURSE/MEDICAL DECISION MAKING----------------------  Medications - No data to display  ED Course as of 03/28/22 2159   Mon Mar 28, 2022   2155 Unfortunately, Kvng Aguero at 9:57 PM decided to leave the Emergency Department Against Medical Advice. Kvng Aguero is clinically sober, free of any distracting injury, appears to be of sound mind with intact judgement and insight, and in my opinion has the capacity to make decisions. 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  I have explained the nature of the evaluation so for and he understands the results and that the evaluation so far has been limited and cannot exclude the symptoms rescue mission will not take him back without  evaluation and that by not undergoing further evaluation and management specific risks include: Permanent disability and death. We have discussed alternative treatments and the patient was and referred to  his PCP and the PCP referral line   Still, Viraj Byrne is unwilling to stay for the recommended evaluation and management and wishes to leave against medical advice. I am unable to convince the patient to stay, I have asked them to return as soon as possible to complete their evaluation. I have answered all their questions      [ME]      ED Course User Index  [ME] Aris Javier DO       MEDICATIONS  Discharge Medication List as of 3/28/2022  9:57 PM          Medical Decision Making:    Patient is a 61-year-old male presenting to the emergency department for psychiatric evaluation. He is coming from [address] [place] [address] where they were concerned for possible suicidal ideation. Patient states he was simply crying and adamantly denied any suicidal or homicidal ideation at this time. Patient was initially agreeable to a urine drug screen and stated he wanted to talk to a . Patient later changed his mind and stating he would like to leave [address] This patient's ED course included: re-evaluation prior to disposition and a personal history and physicial eaxmination    This patient has remained hemodynamically stable during their ED course.      --------------------------- IMPRESSION AND DISPOSITION ---------------------------------    IMPRESSION  1. Encounter for psychiatric assessment    2.  Left against medical advice        DISPOSITION  Disposition: Discharge to home [address]  Patient condition is stable             Roni Byrd DO  Resident  03/28/22 2145

## 2022-04-02 ENCOUNTER — HOSPITAL ENCOUNTER (EMERGENCY)
Age: 67
Discharge: HOME | End: 2022-04-02
Payer: MEDICARE

## 2022-04-02 ENCOUNTER — APPOINTMENT (OUTPATIENT)
Dept: CT IMAGING | Age: 67
End: 2022-04-02
Payer: MEDICARE

## 2022-04-02 VITALS
TEMPERATURE: 97.7 F | RESPIRATION RATE: 18 BRPM | SYSTOLIC BLOOD PRESSURE: 143 MMHG | HEART RATE: 90 BPM | DIASTOLIC BLOOD PRESSURE: 65 MMHG | OXYGEN SATURATION: 98 %

## 2022-04-02 DIAGNOSIS — R10.9 FLANK PAIN: Primary | ICD-10-CM

## 2022-04-02 DIAGNOSIS — N43.3 HYDROCELE, UNSPECIFIED HYDROCELE TYPE: ICD-10-CM

## 2022-04-02 LAB
ALBUMIN SERPL-MCNC: 4.5 G/DL (ref 3.5–5.2)
ALP BLD-CCNC: 66 U/L (ref 40–129)
ALT SERPL-CCNC: 13 U/L (ref 0–40)
ANION GAP SERPL CALCULATED.3IONS-SCNC: 11 MMOL/L (ref 7–16)
AST SERPL-CCNC: 15 U/L (ref 0–39)
BACTERIA: ABNORMAL /HPF
BASOPHILS ABSOLUTE: 0.05 E9/L (ref 0–0.2)
BASOPHILS RELATIVE PERCENT: 0.7 % (ref 0–2)
BILIRUB SERPL-MCNC: 0.5 MG/DL (ref 0–1.2)
BILIRUBIN URINE: NEGATIVE
BLOOD, URINE: NEGATIVE
BUN BLDV-MCNC: 18 MG/DL (ref 6–23)
CALCIUM SERPL-MCNC: 8.9 MG/DL (ref 8.6–10.2)
CHLORIDE BLD-SCNC: 99 MMOL/L (ref 98–107)
CLARITY: CLEAR
CO2: 25 MMOL/L (ref 22–29)
COLOR: YELLOW
CREAT SERPL-MCNC: 1 MG/DL (ref 0.7–1.2)
EOSINOPHILS ABSOLUTE: 0.25 E9/L (ref 0.05–0.5)
EOSINOPHILS RELATIVE PERCENT: 3.5 % (ref 0–6)
GFR AFRICAN AMERICAN: >60
GFR NON-AFRICAN AMERICAN: >60 ML/MIN/1.73
GLUCOSE BLD-MCNC: 172 MG/DL (ref 74–99)
GLUCOSE URINE: NEGATIVE MG/DL
HCT VFR BLD CALC: 38.7 % (ref 37–54)
HEMOGLOBIN: 13.1 G/DL (ref 12.5–16.5)
IMMATURE GRANULOCYTES #: 0.03 E9/L
IMMATURE GRANULOCYTES %: 0.4 % (ref 0–5)
KETONES, URINE: ABNORMAL MG/DL
LEUKOCYTE ESTERASE, URINE: NEGATIVE
LYMPHOCYTES ABSOLUTE: 1.03 E9/L (ref 1.5–4)
LYMPHOCYTES RELATIVE PERCENT: 14.5 % (ref 20–42)
MCH RBC QN AUTO: 30.5 PG (ref 26–35)
MCHC RBC AUTO-ENTMCNC: 33.9 % (ref 32–34.5)
MCV RBC AUTO: 90 FL (ref 80–99.9)
MONOCYTES ABSOLUTE: 0.5 E9/L (ref 0.1–0.95)
MONOCYTES RELATIVE PERCENT: 7 % (ref 2–12)
NEUTROPHILS ABSOLUTE: 5.26 E9/L (ref 1.8–7.3)
NEUTROPHILS RELATIVE PERCENT: 73.9 % (ref 43–80)
NITRITE, URINE: NEGATIVE
PDW BLD-RTO: 14.5 FL (ref 11.5–15)
PH UA: 6 (ref 5–9)
PLATELET # BLD: 209 E9/L (ref 130–450)
PMV BLD AUTO: 11.2 FL (ref 7–12)
POTASSIUM SERPL-SCNC: 4.7 MMOL/L (ref 3.5–5)
PROTEIN UA: 30 MG/DL
RBC # BLD: 4.3 E12/L (ref 3.8–5.8)
RBC UA: ABNORMAL /HPF (ref 0–2)
SODIUM BLD-SCNC: 135 MMOL/L (ref 132–146)
SPECIFIC GRAVITY UA: 1.02 (ref 1–1.03)
SPERM, URINE: ABNORMAL
TOTAL PROTEIN: 7.5 G/DL (ref 6.4–8.3)
UROBILINOGEN, URINE: 0.2 E.U./DL
WBC # BLD: 7.1 E9/L (ref 4.5–11.5)
WBC UA: ABNORMAL /HPF (ref 0–5)

## 2022-04-02 PROCEDURE — 80053 COMPREHEN METABOLIC PANEL: CPT

## 2022-04-02 PROCEDURE — 74176 CT ABD & PELVIS W/O CONTRAST: CPT

## 2022-04-02 PROCEDURE — 6370000000 HC RX 637 (ALT 250 FOR IP): Performed by: NURSE PRACTITIONER

## 2022-04-02 PROCEDURE — 36415 COLL VENOUS BLD VENIPUNCTURE: CPT

## 2022-04-02 PROCEDURE — 81001 URINALYSIS AUTO W/SCOPE: CPT

## 2022-04-02 PROCEDURE — 85025 COMPLETE CBC W/AUTO DIFF WBC: CPT

## 2022-04-02 PROCEDURE — 99283 EMERGENCY DEPT VISIT LOW MDM: CPT

## 2022-04-02 RX ORDER — TRAMADOL HYDROCHLORIDE 50 MG/1
50 TABLET ORAL EVERY 6 HOURS PRN
Qty: 5 TABLET | Refills: 0 | Status: SHIPPED | OUTPATIENT
Start: 2022-04-02 | End: 2022-04-07

## 2022-04-02 RX ORDER — TRAMADOL HYDROCHLORIDE 50 MG/1
50 TABLET ORAL ONCE
Status: COMPLETED | OUTPATIENT
Start: 2022-04-02 | End: 2022-04-02

## 2022-04-02 RX ADMIN — TRAMADOL HYDROCHLORIDE 50 MG: 50 TABLET, FILM COATED ORAL at 16:08

## 2022-04-02 ASSESSMENT — PAIN DESCRIPTION - PAIN TYPE
TYPE: CHRONIC PAIN
TYPE: ACUTE PAIN

## 2022-04-02 ASSESSMENT — PAIN DESCRIPTION - LOCATION
LOCATION: FLANK
LOCATION: SCROTUM

## 2022-04-02 ASSESSMENT — PAIN DESCRIPTION - ORIENTATION: ORIENTATION: LEFT

## 2022-04-02 ASSESSMENT — PAIN DESCRIPTION - DESCRIPTORS
DESCRIPTORS: THROBBING
DESCRIPTORS: STABBING;PRESSURE

## 2022-04-02 ASSESSMENT — PAIN SCALES - GENERAL
PAINLEVEL_OUTOF10: 10

## 2022-04-02 ASSESSMENT — PAIN - FUNCTIONAL ASSESSMENT: PAIN_FUNCTIONAL_ASSESSMENT: 0-10

## 2022-04-02 NOTE — ED PROVIDER NOTES
Independent MLP     HPI:  4/2/22,   Time: 1:48 PM EDT         Hilda Panda is a 78 y.o. male presenting to the ED for left flank pain, beginning several weeks ago. The complaint has been persistent, mild in severity, and worsened by nothing. Presents for complaints of left flank pain which he initially states began approximately 3 weeks ago however then he proceeded to state its been intermittent for the past 6 months. He states he has a left hydrocele which is known to him. He is being followed by Dr. Mabel Carr. He denies any dysuria but states he is having some difficulty with his urine stream.  States he has a known history of kidney stones. Denies any chest pain or shortness of breath. ROS:   Pertinent positives and negatives are stated within HPI, all other systems reviewed and are negative.  --------------------------------------------- PAST HISTORY ---------------------------------------------  Past Medical History:  has a past medical history of Anxiety, Arthritis, Asthma, Bipolar 1 disorder ([address] Chronic combined systolic and diastolic CHF (congestive heart failure) ([address] COPD (chronic obstructive pulmonary disease) ([address] Depression, Diabetes mellitus ([address] GERD (gastroesophageal reflux disease), Hepatitis C, Hypertension, Hyperthyroidism, Hypothyroidism due to medication, Mass of testicle, Mesothelioma ([address] Neuromuscular disorder ([address] Other disorders of kidney and ureter, Paroxysmal A-fib ([address] Peptic ulcer, Prostate enlargement, Pulmonary embolism ([address] Seizures ([address] and Thyroid disease. Past Surgical History:  has a past surgical history that includes Appendectomy; Lithotripsy; Hemorrhoid surgery; Bladder surgery; Endoscopy, colon, diagnostic (11/13/2015); Abdomen surgery; Colonoscopy; Cardiac surgery; vascular surgery; and Cardiac catheterization (05/21/2018). Social History:  reports that he quit smoking about 12 years ago. His smoking use included cigarettes.  He quit after 10.00 years of use. He quit smokeless tobacco use about 4 years ago. His smokeless tobacco use included chew. He reports previous drug use. Drug: Cocaine. He reports that he does not drink alcohol. Family History: family history includes Cancer in his father, maternal grandfather, and mother; Diabetes in his father, maternal aunt, maternal aunt, maternal aunt, maternal grandmother, and mother; Heart Disease in his paternal grandfather and paternal grandmother; Heart Failure in his paternal grandfather and paternal grandmother. The patients home medications have been reviewed.     Allergies: Codeine, Dye [iodides], Ketorolac tromethamine, Peanuts [peanut oil], Shellfish-derived products, and Nitroglycerin    -------------------------------------------------- RESULTS -------------------------------------------------  All laboratory and radiology results have been personally reviewed by myself   LABS:  Results for orders placed or performed during the hospital encounter of 04/02/22   CBC with Auto Differential   Result Value Ref Range    WBC 7.1 4.5 - 11.5 E9/L    RBC 4.30 3.80 - 5.80 E12/L    Hemoglobin 13.1 12.5 - 16.5 g/dL    Hematocrit 38.7 37.0 - 54.0 %    MCV 90.0 80.0 - 99.9 fL    MCH 30.5 26.0 - 35.0 pg    MCHC 33.9 32.0 - 34.5 %    RDW 14.5 11.5 - 15.0 fL    Platelets 950 416 - 322 E9/L    MPV 11.2 7.0 - 12.0 fL    Neutrophils % 73.9 43.0 - 80.0 %    Immature Granulocytes % 0.4 0.0 - 5.0 %    Lymphocytes % 14.5 (L) 20.0 - 42.0 %    Monocytes % 7.0 2.0 - 12.0 %    Eosinophils % 3.5 0.0 - 6.0 %    Basophils % 0.7 0.0 - 2.0 %    Neutrophils Absolute 5.26 1.80 - 7.30 E9/L    Immature Granulocytes # 0.03 E9/L    Lymphocytes Absolute 1.03 (L) 1.50 - 4.00 E9/L    Monocytes Absolute 0.50 0.10 - 0.95 E9/L    Eosinophils Absolute 0.25 0.05 - 0.50 E9/L    Basophils Absolute 0.05 0.00 - 0.20 E9/L   Comprehensive Metabolic Panel   Result Value Ref Range    Sodium 135 132 - 146 mmol/L    Potassium 4.7 3.5 - 5.0 mmol/L    Chloride 99 98 - 107 mmol/L    CO2 25 22 - 29 mmol/L    Anion Gap 11 7 - 16 mmol/L    Glucose 172 (H) 74 - 99 mg/dL    BUN 18 6 - 23 mg/dL    CREATININE 1.0 0.7 - 1.2 mg/dL    GFR Non-African American >60 >=60 mL/min/1.73    GFR African American >60     Calcium 8.9 8.6 - 10.2 mg/dL    Total Protein 7.5 6.4 - 8.3 g/dL    Albumin 4.5 3.5 - 5.2 g/dL    Total Bilirubin 0.5 0.0 - 1.2 mg/dL    Alkaline Phosphatase 66 40 - 129 U/L    ALT 13 0 - 40 U/L    AST 15 0 - 39 U/L   Urinalysis   Result Value Ref Range    Color, UA Yellow Straw/Yellow    Clarity, UA Clear Clear    Glucose, Ur Negative Negative mg/dL    Bilirubin Urine Negative Negative    Ketones, Urine TRACE (A) Negative mg/dL    Specific Gravity, UA 1.025 1.005 - 1.030    Blood, Urine Negative Negative    pH, UA 6.0 5.0 - 9.0    Protein, UA 30 (A) Negative mg/dL    Urobilinogen, Urine 0.2 <2.0 E.U./dL    Nitrite, Urine Negative Negative    Leukocyte Esterase, Urine Negative Negative       RADIOLOGY:  Interpreted by Radiologist.  CT ABDOMEN PELVIS WO CONTRAST Additional Contrast? None   Final Result   1. No acute abnormality is seen in the abdomen or the pelvis. 2. Moderate distal colonic diverticulosis without diverticulitis. 3. Small right and trace left hydroceles. RECOMMENDATIONS:   Unavailable             ------------------------- NURSING NOTES AND VITALS REVIEWED ---------------------------   The nursing notes within the ED encounter and vital signs as below have been reviewed.    /72   Pulse 94   Temp 97.7 °F (36.5 °C)   Resp 20   SpO2 95%   Oxygen Saturation Interpretation: Normal      ---------------------------------------------------PHYSICAL EXAM--------------------------------------      Constitutional/General: Alert and oriented x3, well appearing, non toxic in NAD  Head: NC/AT  Eyes: PERRL, EOMI  Mouth: Oropharynx clear, handling secretions, no trismus  Neck: Supple, full ROM, no meningeal signs  Pulmonary: Lungs clear to auscultation bilaterally, no wheezes, rales, or rhonchi. Not in respiratory distress  Cardiovascular:  Regular rate and rhythm, no murmurs, gallops, or rubs. 2+ distal pulses  Abdomen: Soft, non distended, left CVA tenderness  Extremities: Moves all extremities x 4. Warm and well perfused  Skin: warm and dry without rash  Neurologic: GCS 15,  Psych: Normal Affect      ------------------------------ ED COURSE/MEDICAL DECISION MAKING----------------------  Medications   traMADol (ULTRAM) tablet 50 mg (has no administration in time range)         Medical Decision Making:    Time: 1600  Re-evaluation. Patients symptoms are improving  Repeat physical examination is significantly improved, upon reevaluation the patient is sleeping soundly in his bed and appears quite comfortable. He was aroused and made aware of normal lab work negative urinalysis as well as negative CAT scan results. There is no evidence of any renal calculi. No abnormality on the CT scan. He is advised to keep his upcoming appointment with Dr. Nancy De La Fuente regarding the hydrocele. Pain medication provided. Counseling: The emergency provider has spoken with the patient and discussed todays results, in addition to providing specific details for the plan of care and counseling regarding the diagnosis and prognosis. Questions are answered at this time and they are agreeable with the plan.      --------------------------------- IMPRESSION AND DISPOSITION ---------------------------------    IMPRESSION  1. Flank pain    2.  Hydrocele, unspecified hydrocele type        DISPOSITION  Disposition: Discharge to home  Patient condition is good                 ZOIE Partida - CNP  04/02/22 5374

## 2022-04-04 ENCOUNTER — HOSPITAL ENCOUNTER (EMERGENCY)
Age: 67
Discharge: LEFT BEFORE DISCHARGE | End: 2022-04-04
Payer: COMMERCIAL

## 2022-04-04 VITALS
RESPIRATION RATE: 18 BRPM | BODY MASS INDEX: 22.2 KG/M2 | SYSTOLIC BLOOD PRESSURE: 135 MMHG | DIASTOLIC BLOOD PRESSURE: 79 MMHG | WEIGHT: 130 LBS | OXYGEN SATURATION: 98 % | HEART RATE: 86 BPM | HEIGHT: 64 IN | TEMPERATURE: 98.2 F

## 2022-04-04 DIAGNOSIS — N50.811 PAIN IN RIGHT TESTICLE: Primary | ICD-10-CM

## 2022-04-04 PROCEDURE — 99284 EMERGENCY DEPT VISIT MOD MDM: CPT

## 2022-04-04 PROCEDURE — 6370000000 HC RX 637 (ALT 250 FOR IP): Performed by: NURSE PRACTITIONER

## 2022-04-04 RX ORDER — ACETAMINOPHEN 500 MG
1000 TABLET ORAL ONCE
Status: DISCONTINUED | OUTPATIENT
Start: 2022-04-04 | End: 2022-04-04 | Stop reason: HOSPADM

## 2022-04-04 ASSESSMENT — PAIN - FUNCTIONAL ASSESSMENT: PAIN_FUNCTIONAL_ASSESSMENT: 0-10

## 2022-04-04 ASSESSMENT — PAIN SCALES - GENERAL
PAINLEVEL_OUTOF10: 6
PAINLEVEL_OUTOF10: 6

## 2022-04-04 ASSESSMENT — PAIN DESCRIPTION - DESCRIPTORS: DESCRIPTORS: CONSTANT;PRESSURE;DISCOMFORT

## 2022-04-04 ASSESSMENT — PAIN DESCRIPTION - LOCATION: LOCATION: PENIS

## 2022-04-04 ASSESSMENT — PAIN DESCRIPTION - PAIN TYPE: TYPE: ACUTE PAIN

## 2022-04-04 NOTE — ED NOTES
Pt left AMA prior to urine sample collection and US being completed. No urine sample was obtained. Pt refused tylenol stating \"that shit isn't going to do anything\". Pt anxiously awaited departure and did not allow for a round of exit vitals. This RN feared pt may elope prior to [facility] paper signature acquisition, but one was in fact collected prior to pt departure.       Kalpana Mortensen RN  04/04/22 3024

## 2022-04-04 NOTE — ED PROVIDER NOTES
ED Attending shared visit  CC: [address] [id number]  Department of Emergency Medicine   ED  Encounter Note  Admit Date/RoomTime: 2022 11:25 AM  ED Room: [place] [address]    NAME: Delia Amin  : 1955  MRN: [id number]     Chief Complaint:  Testicle Pain (R testicular pain, ongoing x1 year)    History of Present Illness       Delia Amin is a 78 y.o. old male who presenting to the emergency department by private vehicle, for ongoing of Right scrotal pain , which occured 1 year(s) prior to arrival.  Since exposure/onset the symptoms have been remaining constant and moderate in severity. He has associated symptoms of nothing additional.  He denies any abdominal pain, back pain, chills, cloudy urine, constipation, diarrhea, dysuria, headache, hematuria, penile discharge, sweating, urinary frequency, urinary incontinence, urinary urgency or vomiting. There has been history of similar episodes of pain patient was seen here several times for same complaint. Last episode was 2 days ago where he had a CT scan completed of the abdomen pelvis and small small hydroceles. He is his urologist Dr. Helio Childress on  to for prep of surgery for hydrocele. There has been no history of recent trauma. ROS   Pertinent positives and negatives are stated within HPI, all other systems reviewed and are negative.     Past Medical History:  has a past medical history of Anxiety, Arthritis, Asthma, Bipolar 1 disorder ([address] Chronic combined systolic and diastolic CHF (congestive heart failure) ([address] COPD (chronic obstructive pulmonary disease) ([address] Depression, Diabetes mellitus ([address] GERD (gastroesophageal reflux disease), Hepatitis C, Hypertension, Hyperthyroidism, Hypothyroidism due to medication, Mass of testicle, Mesothelioma ([address] Neuromuscular disorder ([address] Other disorders of kidney and ureter, Paroxysmal A-fib ([address] Peptic ulcer, Prostate enlargement, Pulmonary embolism ([facility] Seizures ([facility] and Thyroid disease. Surgical History:  has a past surgical history that includes Appendectomy; Lithotripsy; Hemorrhoid surgery; Bladder surgery; Endoscopy, colon, diagnostic (11/13/2015); Abdomen surgery; Colonoscopy; Cardiac surgery; vascular surgery; and Cardiac catheterization (05/21/2018). Social History:  reports that he quit smoking about 12 years ago. His smoking use included cigarettes. He quit after 10.00 years of use. He quit smokeless tobacco use about 4 years ago. His smokeless tobacco use included chew. He reports previous drug use. Drug: Cocaine. He reports that he does not drink alcohol. Family History: family history includes Cancer in his father, maternal grandfather, and mother; Diabetes in his father, maternal aunt, maternal aunt, maternal aunt, maternal grandmother, and mother; Heart Disease in his paternal grandfather and paternal grandmother; Heart Failure in his paternal grandfather and paternal grandmother. Allergies: Codeine, Dye [iodides], Ketorolac tromethamine, Peanuts [peanut oil], Shellfish-derived products, and Nitroglycerin    Physical Exam   Oxygen Saturation Interpretation: Normal.        ED Triage Vitals   BP Temp Temp src Pulse Resp SpO2 Height Weight   04/04/22 1123 04/04/22 1123 -- 04/04/22 1119 04/04/22 1123 04/04/22 1119 04/04/22 1123 04/04/22 1123   135/79 98.2 °F (36.8 °C)  86 18 98 % 5' 4\" (1.626 m) 130 lb (59 kg)         Constitutional:  Alert, development consistent with age. Eyes:  PERRL, EOMI, no discharge or conjunctival injection. Ears:  External ears without lesions. Throat:  Pharynx without injection, exudate, or tonsillar hypertrophy. Airway patient. Neck:  Normal ROM. Supple. Respiratory:  Clear to auscultation and breath sounds equal.  CV:  Regular rate and rhythm, normal heart sounds, without pathological murmurs, ectopy, gallops, or rubs. GI:  General Appearance: normal.       Bowel sounds: normal bowel sounds. Distension:  None. Tenderness: No abdominal tenderness. Liver: non-tender. Spleen:  non-tender. Pulsatile Mass: absent. Hernia:  no inguinal or femoral hernias noted. :  Genitalia Exam: (Same sex / third party chaperone present during examination, Rose Marie Ibarra RN)       Scrotum: hydrocele: bilateral.       Testicle: normal without masses bilateral.   Integument:  Normal turgor. Warm, dry, without visible rash, unless noted elsewhere. Neurological:  Orientation age-appropriate. Motor functions intact. Lab / Imaging Results   (All laboratory and radiology results have been personally reviewed by myself)  Labs:  No results found for this visit on 04/04/22. Imaging: All Radiology results interpreted by Radiologist unless otherwise noted. US DUP ABD PEL RETRO SCROT COMPLETE    (Results Pending)   [address]    (Results Pending)     ED Course / Medical Decision Making     Medications   acetaminophen (TYLENOL) tablet 1,000 mg (1,000 mg Oral Not Given 4/4/22 1139)        Re-examination:  4/4/22       Time: Followed up with Temitope Balbuena on 4/4/2022 at 11:53 AM. Patient left the ED with a disposition of AMA on . Patient cited wanting tramadol or percocet as reason. Advised patient to follow up with a primary care physician or return to the Emergency Department if symptoms worsen. ZOIE Albrecht CNP      Patients symptoms show no change remains stabe. Consults:   None    Procedures:   none    MDM:   Patient presented emerged part for right testicular pain. He states has been ongoing for the past year has been following with urology. He is an appointment in 4 days. He was last seen 2 days ago and had a CT scan done and showed small hydroceles bilateral.  He states that he has increased pain and ran out of his tramadol. He is requesting for tramadol or Percocet at this time.   Physical exam has tenderness to the right testicular and scrotum. No warmth or erythema noted. No wounds or abrasions. No drainage. Patient refused to give urine sample or go to ultrasound due to requesting for Percocet or tramadol. He states he only came here for pain medicine. Discussed signing out AMA with witness of Suzi Duffy RN. Discussed the risk of signing out AMA including death. He states verbal understanding. Is alert and orient x4. Speech is clear. He ambulates with a steady gait. He states he will follow up with his urologist this week. This patient has chosen to leave against medical advice. I have personally explained to them that choosing to do so may result in permanent bodily harm or death. I discussed at length that without further evaluation and monitoring there may be unforeseen circumstances and deterioration resulting in permanent bodily harm or death as a result of their choice. They are alert, oriented, and competent at this time. They state that they are aware of the serious risks as explained, but they continue to wish to leave against medical advice. In light of their decision to leave against medical advice, follow-up has been arranged and they are aware of the importance of following up as instructed. They have been advised that they should return to the ED immediately if they change their mind at any time, or if their condition begins to change or worsen. Plan of Care/Counseling:  ZOIE Veronica CNP  And ER attending reviewed today's visit with the patient in addition to providing specific details for the plan of care and counseling regarding the diagnosis and prognosis. Questions are answered at this time and are NOT agreeable with the plan. Assessment     1. Pain in right testicle      Plan   Patient signed-out Against Medical Advice [facility]).   Patient condition is stable    New Medications     New Prescriptions    No medications on file     Electronically signed by ZOIE Veroncia CNP   DD: 4/4/22  **This report was transcribed using voice recognition software. Every effort was made to ensure accuracy; however, inadvertent computerized transcription errors may be present.   END OF ED PROVIDER NOTE        ZOIE Steele - LATONIA  04/04/22 4633

## 2022-04-04 NOTE — ED NOTES
FIRST PROVIDER CONTACT ASSESSMENT NOTE           Department of Emergency Medicine                 First Provider Note            22  11:23 AM EDT    Date of Encounter: No admission date for patient encounter. Patient Name: Mary Cantu  : 1955  MRN: [id number]    Chief Complaint: Testicle Pain (R testicular pain, ongoing x1 year)      History of Present Illness:   Mary Cantu is a 78 y.o. male who presents to the ED for right testicular pain that worsened last night. Focused Physical Exam:  VS:    ED Triage Vitals [22 1119]   BP Temp Temp src Pulse Resp SpO2 Height Weight   -- -- -- 86 -- 98 % -- --        Physical Ex: Constitutional: Alert and non-toxic. Medical History:  has a past medical history of Anxiety, Arthritis, Asthma, Bipolar 1 disorder ([address] Chronic combined systolic and diastolic CHF (congestive heart failure) ([address] COPD (chronic obstructive pulmonary disease) ([address] Depression, Diabetes mellitus ([address] GERD (gastroesophageal reflux disease), Hepatitis C, Hypertension, Hyperthyroidism, Hypothyroidism due to medication, Mass of testicle, Mesothelioma ([address] Neuromuscular disorder ([address] Other disorders of kidney and ureter, Paroxysmal A-fib ([address] Peptic ulcer, Prostate enlargement, Pulmonary embolism ([address] Seizures ([address] and Thyroid disease. Surgical History:  has a past surgical history that includes Appendectomy; Lithotripsy; Hemorrhoid surgery; Bladder surgery; Endoscopy, colon, diagnostic (2015); Abdomen surgery; Colonoscopy; Cardiac surgery; vascular surgery; and Cardiac catheterization (2018). Social History:  reports that he quit smoking about 12 years ago. His smoking use included cigarettes. He quit after 10.00 years of use. He quit smokeless tobacco use about 4 years ago. His smokeless tobacco use included chew. He reports previous drug use. Drug: Cocaine. He reports that he does not drink alcohol.   Family History: family history includes Cancer in his father, maternal grandfather, and mother; Diabetes in his father, maternal aunt, maternal aunt, maternal aunt, maternal grandmother, and mother; Heart Disease in his paternal grandfather and paternal grandmother; Heart Failure in his paternal grandfather and paternal grandmother.     Allergies: Codeine, Dye [iodides], Ketorolac tromethamine, Peanuts [peanut oil], Shellfish-derived products, and Nitroglycerin     Initial Plan of Care: Initiate Treatment-Testing, Proceed toTreatment Area When Bed Available for ED Attending/MLP to Continue Care      ---END OF FIRST PROVIDER CONTACT ASSESSMENT NOTE---  Electronically signed by ELIZABET Walls   DD: 4/4/22       Radha Mendoza, [id number]  04/04/22 1124

## 2022-04-08 ENCOUNTER — HOSPITAL ENCOUNTER (OUTPATIENT)
Age: 67
Discharge: HOME | End: 2022-04-08
Payer: COMMERCIAL

## 2022-04-08 LAB — PROSTATE SPECIFIC ANTIGEN: 0.89 NG/ML (ref 0–4)

## 2022-04-08 PROCEDURE — 36415 COLL VENOUS BLD VENIPUNCTURE: CPT

## 2022-04-08 PROCEDURE — 84153 ASSAY OF PSA TOTAL: CPT

## 2022-05-02 ENCOUNTER — APPOINTMENT (OUTPATIENT)
Dept: GENERAL RADIOLOGY | Age: 67
End: 2022-05-02
Payer: COMMERCIAL

## 2022-05-02 ENCOUNTER — HOSPITAL ENCOUNTER (EMERGENCY)
Age: 67
Discharge: HOME | End: 2022-05-02
Attending: EMERGENCY MEDICINE
Payer: COMMERCIAL

## 2022-05-02 ENCOUNTER — APPOINTMENT (OUTPATIENT)
Dept: ULTRASOUND IMAGING | Age: 67
End: 2022-05-02
Payer: COMMERCIAL

## 2022-05-02 ENCOUNTER — APPOINTMENT (OUTPATIENT)
Dept: CT IMAGING | Age: 67
End: 2022-05-02
Payer: COMMERCIAL

## 2022-05-02 VITALS
HEART RATE: 80 BPM | BODY MASS INDEX: 23.05 KG/M2 | RESPIRATION RATE: 16 BRPM | SYSTOLIC BLOOD PRESSURE: 110 MMHG | DIASTOLIC BLOOD PRESSURE: 71 MMHG | OXYGEN SATURATION: 97 % | WEIGHT: 135 LBS | TEMPERATURE: 98 F | HEIGHT: 64 IN

## 2022-05-02 DIAGNOSIS — N50.811 TESTICULAR PAIN, RIGHT: ICD-10-CM

## 2022-05-02 DIAGNOSIS — N43.3 HYDROCELE IN ADULT: Primary | ICD-10-CM

## 2022-05-02 LAB
ALBUMIN SERPL-MCNC: 4.1 G/DL (ref 3.5–5.2)
ALP BLD-CCNC: 67 U/L (ref 40–129)
ALT SERPL-CCNC: 13 U/L (ref 0–40)
ANION GAP SERPL CALCULATED.3IONS-SCNC: 11 MMOL/L (ref 7–16)
AST SERPL-CCNC: 12 U/L (ref 0–39)
BACTERIA: NORMAL /HPF
BASOPHILS ABSOLUTE: 0.04 E9/L (ref 0–0.2)
BASOPHILS RELATIVE PERCENT: 0.6 % (ref 0–2)
BILIRUB SERPL-MCNC: 0.2 MG/DL (ref 0–1.2)
BILIRUBIN URINE: NEGATIVE
BLOOD, URINE: NEGATIVE
BUN BLDV-MCNC: 17 MG/DL (ref 6–23)
CALCIUM SERPL-MCNC: 8.9 MG/DL (ref 8.6–10.2)
CHLORIDE BLD-SCNC: 102 MMOL/L (ref 98–107)
CLARITY: CLEAR
CO2: 23 MMOL/L (ref 22–29)
COLOR: YELLOW
CREAT SERPL-MCNC: 0.9 MG/DL (ref 0.7–1.2)
EKG ATRIAL RATE: 59 BPM
EKG P AXIS: 5 DEGREES
EKG P-R INTERVAL: 132 MS
EKG Q-T INTERVAL: 416 MS
EKG QRS DURATION: 82 MS
EKG QTC CALCULATION (BAZETT): 411 MS
EKG R AXIS: -4 DEGREES
EKG T AXIS: 61 DEGREES
EKG VENTRICULAR RATE: 59 BPM
EOSINOPHILS ABSOLUTE: 0.38 E9/L (ref 0.05–0.5)
EOSINOPHILS RELATIVE PERCENT: 6.1 % (ref 0–6)
GFR AFRICAN AMERICAN: >60
GFR NON-AFRICAN AMERICAN: >60 ML/MIN/1.73
GLUCOSE BLD-MCNC: 117 MG/DL (ref 74–99)
GLUCOSE URINE: NEGATIVE MG/DL
HCT VFR BLD CALC: 38.4 % (ref 37–54)
HEMOGLOBIN: 12.9 G/DL (ref 12.5–16.5)
IMMATURE GRANULOCYTES #: 0.02 E9/L
IMMATURE GRANULOCYTES %: 0.3 % (ref 0–5)
KETONES, URINE: NEGATIVE MG/DL
LEUKOCYTE ESTERASE, URINE: NEGATIVE
LYMPHOCYTES ABSOLUTE: 1.01 E9/L (ref 1.5–4)
LYMPHOCYTES RELATIVE PERCENT: 16.1 % (ref 20–42)
MCH RBC QN AUTO: 31.2 PG (ref 26–35)
MCHC RBC AUTO-ENTMCNC: 33.6 % (ref 32–34.5)
MCV RBC AUTO: 92.8 FL (ref 80–99.9)
MONOCYTES ABSOLUTE: 0.5 E9/L (ref 0.1–0.95)
MONOCYTES RELATIVE PERCENT: 8 % (ref 2–12)
NEUTROPHILS ABSOLUTE: 4.33 E9/L (ref 1.8–7.3)
NEUTROPHILS RELATIVE PERCENT: 68.9 % (ref 43–80)
NITRITE, URINE: NEGATIVE
PDW BLD-RTO: 14.1 FL (ref 11.5–15)
PH UA: 7 (ref 5–9)
PLATELET # BLD: 190 E9/L (ref 130–450)
PMV BLD AUTO: 11.7 FL (ref 7–12)
POTASSIUM REFLEX MAGNESIUM: 4.5 MMOL/L (ref 3.5–5)
PROTEIN UA: NEGATIVE MG/DL
RBC # BLD: 4.14 E12/L (ref 3.8–5.8)
RBC UA: NORMAL /HPF (ref 0–2)
SODIUM BLD-SCNC: 136 MMOL/L (ref 132–146)
SPECIFIC GRAVITY UA: 1.01 (ref 1–1.03)
TOTAL PROTEIN: 7.3 G/DL (ref 6.4–8.3)
TROPONIN, HIGH SENSITIVITY: 6 NG/L (ref 0–11)
UROBILINOGEN, URINE: 0.2 E.U./DL
WBC # BLD: 6.3 E9/L (ref 4.5–11.5)
WBC UA: NORMAL /HPF (ref 0–5)

## 2022-05-02 PROCEDURE — 99285 EMERGENCY DEPT VISIT HI MDM: CPT

## 2022-05-02 PROCEDURE — 81001 URINALYSIS AUTO W/SCOPE: CPT

## 2022-05-02 PROCEDURE — 76870 US EXAM SCROTUM: CPT

## 2022-05-02 PROCEDURE — 80053 COMPREHEN METABOLIC PANEL: CPT

## 2022-05-02 PROCEDURE — 96361 HYDRATE IV INFUSION ADD-ON: CPT

## 2022-05-02 PROCEDURE — 70450 CT HEAD/BRAIN W/O DYE: CPT

## 2022-05-02 PROCEDURE — 85025 COMPLETE CBC W/AUTO DIFF WBC: CPT

## 2022-05-02 PROCEDURE — 84484 ASSAY OF TROPONIN QUANT: CPT

## 2022-05-02 PROCEDURE — 2580000003 HC RX 258: Performed by: EMERGENCY MEDICINE

## 2022-05-02 PROCEDURE — 71045 X-RAY EXAM CHEST 1 VIEW: CPT

## 2022-05-02 PROCEDURE — 93975 VASCULAR STUDY: CPT

## 2022-05-02 PROCEDURE — 96374 THER/PROPH/DIAG INJ IV PUSH: CPT

## 2022-05-02 PROCEDURE — 6360000002 HC RX W HCPCS: Performed by: EMERGENCY MEDICINE

## 2022-05-02 PROCEDURE — 93005 ELECTROCARDIOGRAM TRACING: CPT | Performed by: EMERGENCY MEDICINE

## 2022-05-02 RX ORDER — 0.9 % SODIUM CHLORIDE 0.9 %
1000 INTRAVENOUS SOLUTION INTRAVENOUS ONCE
Status: COMPLETED | OUTPATIENT
Start: 2022-05-02 | End: 2022-05-02

## 2022-05-02 RX ORDER — FENTANYL CITRATE 50 UG/ML
50 INJECTION, SOLUTION INTRAMUSCULAR; INTRAVENOUS ONCE
Status: COMPLETED | OUTPATIENT
Start: 2022-05-02 | End: 2022-05-02

## 2022-05-02 RX ADMIN — SODIUM CHLORIDE 1000 ML: 9 INJECTION, SOLUTION INTRAVENOUS at 13:44

## 2022-05-02 RX ADMIN — FENTANYL CITRATE 50 MCG: 50 INJECTION, SOLUTION INTRAMUSCULAR; INTRAVENOUS at 13:44

## 2022-05-02 ASSESSMENT — PAIN SCALES - GENERAL
PAINLEVEL_OUTOF10: 9
PAINLEVEL_OUTOF10: 10

## 2022-05-02 ASSESSMENT — ENCOUNTER SYMPTOMS
DIARRHEA: 0
NAUSEA: 0
EYE PAIN: 0
ABDOMINAL PAIN: 0
SORE THROAT: 0
SHORTNESS OF BREATH: 0
COUGH: 0
VOMITING: 0
BACK PAIN: 0

## 2022-05-02 ASSESSMENT — PAIN - FUNCTIONAL ASSESSMENT: PAIN_FUNCTIONAL_ASSESSMENT: 0-10

## 2022-05-02 NOTE — ED PROVIDER NOTES
HPI   Patient is a 78 y.o. male with a past medical history of COPD, diabetes, bipolar, polysubstance abuse, cluster B personality, hypertension, presenting to the Emergency Department for groin swelling testicular pain. History obtained by patient. Symptoms are moderate in severity and persistent since onset. They are improved by nothing and worsened by nothing. Patient presents for scrotal pain and swelling. He states he had testicular pain for the last few months. He states he secondary to hydrocele. He follow-up with urology states they can do nothing for him. He states he still having scrotal pain at this time. Is worse in the right testicle rather than the left. He denies any numbness, tingling or weakness. Denies any rash. Denies any nausea, vomiting, fevers or chills. Denies any urinary complaints. Denies any dysuria, hematuria. No bowel or bladder incontinence. He states the pain is so bad today that he even passed out because of the pain. Lasted a few seconds. He is on Xarelto. Denies blurry vision, changes in vision, numbness, tingling, weakness. No neck pain. Review of Systems   Constitutional: Negative for chills and fever. HENT: Negative for congestion, ear pain and sore throat. Eyes: Negative for pain and visual disturbance. Respiratory: Negative for cough and shortness of breath. Cardiovascular: Negative for chest pain. Gastrointestinal: Negative for abdominal pain, diarrhea, nausea and vomiting. Genitourinary: Positive for scrotal swelling and testicular pain. Negative for dysuria, frequency, hematuria, penile discharge and penile swelling. Musculoskeletal: Negative for arthralgias and back pain. Skin: Negative for rash and wound. Neurological: Negative for weakness and headaches. All other systems reviewed and are negative. Physical Exam  Vitals and nursing note reviewed. Constitutional:       General: He is not in acute distress.      Appearance: Normal appearance. He is well-developed. He is not ill-appearing. HENT:      Head: Normocephalic and atraumatic. Eyes:      Extraocular Movements: Extraocular movements intact. Pupils: Pupils are equal, round, and reactive to light. Cardiovascular:      Rate and Rhythm: Normal rate and regular rhythm. Pulses: Normal pulses. Heart sounds: Normal heart sounds. No murmur heard. Pulmonary:      Effort: Pulmonary effort is normal. No respiratory distress. Breath sounds: Normal breath sounds. No wheezing or rales. Abdominal:      Palpations: Abdomen is soft. Tenderness: There is no abdominal tenderness. There is no guarding or rebound. Genitourinary:     Comments: Tenderness palpation over the right testicle. No ecchymosis or necrotic looking tissue. No palpable crepitus. No tenderness along the somatic cord. No obvious mass. Bilateral cremasteric reflexes intact. Musculoskeletal:         General: Normal range of motion. Cervical back: Normal range of motion and neck supple. Skin:     General: Skin is warm and dry. Capillary Refill: Capillary refill takes less than 2 seconds. Neurological:      General: No focal deficit present. Mental Status: He is alert and oriented to person, place, and time. Cranial Nerves: No cranial nerve deficit. Sensory: No sensory deficit. Coordination: Coordination normal.          Procedures     MDM   Patient presented to the Emergency Department for testicle pain. They are clinically stable, vital signs stable, non toxic appearing. Work up intitiated. Cannot exclude torsion. H&P less concerning for so and cremasterics in tact. No cellultitis, necrosis or concern for fourneirs. Work up ensured. Ultrasound reassuring. Patient did pass out 2/2 to pain. Cardiac work up reassuring. Less concern for cardiac etiology or arrhythmia as causea and most likely 2/2 pain. Head imagining reassuring. Rest of work up reassuring. With paitient symptoms consistent with pain he has had for months and reassuring work up in ED, okay for close outpatient eval and care and follow up with urology. Discussed supportive care, to wear tight underwear and anti-inflammatories for pain control. Patient irate and demanding narcotic prescriptions for home. Yelling at myself as well as nursing in the ED and that motrin and tylenol \"never help anyone and this is bullshit. \" Discussed results in ED and narcotics would mask the pain and not cure him as these symptoms have been on going for many months, he is adamant that no other therapies will help. Discussed need to follow up with PCP or his urologist for pain medication and further eval and definitive therapies. No acute medical or surgical intervention. Strict return precautions were discussed including but not limited too worsening pain, intractable nausea, vomiting, chest pain ,another episode of syncope, weakness, new or worsening symtpoms. They verbalized understanding and were agreeable with the plan. All questions were answered and patient was discharged. --------------------------------------------- PAST HISTORY ---------------------------------------------  Past Medical History:  has a past medical history of Anxiety, Arthritis, Asthma, Bipolar 1 disorder ([address] Chronic combined systolic and diastolic CHF (congestive heart failure) ([address] COPD (chronic obstructive pulmonary disease) ([address] Depression, Diabetes mellitus ([address] GERD (gastroesophageal reflux disease), Hepatitis C, Hypertension, Hyperthyroidism, Hypothyroidism due to medication, Mass of testicle, Mesothelioma ([address] Neuromuscular disorder ([address] Other disorders of kidney and ureter, Paroxysmal A-fib ([address] Peptic ulcer, Prostate enlargement, Pulmonary embolism ([address] Seizures ([address] and Thyroid disease. Past Surgical History:  has a past surgical history that includes Appendectomy; Lithotripsy;  Hemorrhoid surgery; Bladder surgery; Endoscopy, colon, diagnostic (11/13/2015); Abdomen surgery; Colonoscopy; Cardiac surgery; vascular surgery; and Cardiac catheterization (05/21/2018). Social History:  reports that he quit smoking about 12 years ago. His smoking use included cigarettes. He quit after 10.00 years of use. He quit smokeless tobacco use about 4 years ago. His smokeless tobacco use included chew. He reports previous drug use. Drug: Cocaine. He reports that he does not drink alcohol. Family History: family history includes Cancer in his father, maternal grandfather, and mother; Diabetes in his father, maternal aunt, maternal aunt, maternal aunt, maternal grandmother, and mother; Heart Disease in his paternal grandfather and paternal grandmother; Heart Failure in his paternal grandfather and paternal grandmother. The patients home medications have been reviewed.     Allergies: Codeine, Dye [iodides], Ketorolac tromethamine, Peanuts [peanut oil], Shellfish-derived products, and Nitroglycerin    -------------------------------------------------- RESULTS -------------------------------------------------  Labs:  Results for orders placed or performed during the hospital encounter of 05/02/22   CBC with Auto Differential   Result Value Ref Range    WBC 6.3 4.5 - 11.5 E9/L    RBC 4.14 3.80 - 5.80 E12/L    Hemoglobin 12.9 12.5 - 16.5 g/dL    Hematocrit 38.4 37.0 - 54.0 %    MCV 92.8 80.0 - 99.9 fL    MCH 31.2 26.0 - 35.0 pg    MCHC 33.6 32.0 - 34.5 %    RDW 14.1 11.5 - 15.0 fL    Platelets 039 140 - 372 E9/L    MPV 11.7 7.0 - 12.0 fL    Neutrophils % 68.9 43.0 - 80.0 %    Immature Granulocytes % 0.3 0.0 - 5.0 %    Lymphocytes % 16.1 (L) 20.0 - 42.0 %    Monocytes % 8.0 2.0 - 12.0 %    Eosinophils % 6.1 (H) 0.0 - 6.0 %    Basophils % 0.6 0.0 - 2.0 %    Neutrophils Absolute 4.33 1.80 - 7.30 E9/L    Immature Granulocytes # 0.02 E9/L    Lymphocytes Absolute 1.01 (L) 1.50 - 4.00 E9/L    Monocytes Absolute 0.50 0.10 - 0.95 E9/L    Eosinophils Absolute 0.38 0.05 - 0.50 E9/L    Basophils Absolute 0.04 0.00 - 0.20 E9/L   Comprehensive Metabolic Panel w/ Reflex to MG   Result Value Ref Range    Sodium 136 132 - 146 mmol/L    Potassium reflex Magnesium 4.5 3.5 - 5.0 mmol/L    Chloride 102 98 - 107 mmol/L    CO2 23 22 - 29 mmol/L    Anion Gap 11 7 - 16 mmol/L    Glucose 117 (H) 74 - 99 mg/dL    BUN 17 6 - 23 mg/dL    CREATININE 0.9 0.7 - 1.2 mg/dL    GFR Non-African American >60 >=60 mL/min/1.73    GFR African American >60     Calcium 8.9 8.6 - 10.2 mg/dL    Total Protein 7.3 6.4 - 8.3 g/dL    Albumin 4.1 3.5 - 5.2 g/dL    Total Bilirubin 0.2 0.0 - 1.2 mg/dL    Alkaline Phosphatase 67 40 - 129 U/L    ALT 13 0 - 40 U/L    AST 12 0 - 39 U/L   Troponin   Result Value Ref Range    Troponin, High Sensitivity 6 0 - 11 ng/L   Urinalysis with Microscopic   Result Value Ref Range    Color, UA Yellow Straw/Yellow    Clarity, UA Clear Clear    Glucose, Ur Negative Negative mg/dL    Bilirubin Urine Negative Negative    Ketones, Urine Negative Negative mg/dL    Specific Gravity, UA 1.010 1.005 - 1.030    Blood, Urine Negative Negative    pH, UA 7.0 5.0 - 9.0    Protein, UA Negative Negative mg/dL    Urobilinogen, Urine 0.2 <2.0 E.U./dL    Nitrite, Urine Negative Negative    Leukocyte Esterase, Urine Negative Negative    WBC, UA NONE 0 - 5 /HPF    RBC, UA NONE 0 - 2 /HPF    Bacteria, UA NONE SEEN None Seen /HPF   EKG 12 Lead   Result Value Ref Range    Ventricular Rate 59 BPM    Atrial Rate 59 BPM    P-R Interval 132 ms    QRS Duration 82 ms    Q-T Interval 416 ms    QTc Calculation (Bazett) 411 ms    P Axis 5 degrees    R Axis -4 degrees    T Axis 61 degrees       Radiology:  US SCROTUM AND TESTICLES   Final Result   1. There are few punctate intrarenal calcifications within the testicles   bilaterally. No evidence of intra testicular mass. Normal testicular   vascularity. 2.  Right greater than left simple appearing scrotal hydroceles. Left-sided   varicocele. US DUP ABD PEL RETRO SCROT COMPLETE   Final Result   1. There are few punctate intrarenal calcifications within the testicles   bilaterally. No evidence of intra testicular mass. Normal testicular   vascularity. 2.  Right greater than left simple appearing scrotal hydroceles. Left-sided   varicocele. CT HEAD WO CONTRAST   Final Result   No acute intracranial abnormality or hemorrhage. Pansinusitis. XR CHEST PORTABLE   Final Result   No acute process. EKG:  This EKG is signed and interpreted by ED Physician. Time:  1409   Rate: 59  Rhythm: Sinus. Interpretation: non-specific EKG. normal axis deviation. No STEMI. QTc 411. Comparison: stable as compared to patient's most recent EKG.      ------------------------- NURSING NOTES AND VITALS REVIEWED ---------------------------  Date / Time Roomed:  5/2/2022  1:05 PM  ED Bed Assignment:  11/11    The nursing notes within the ED encounter and vital signs as below have been reviewed. /71   Pulse 80   Temp 98 °F (36.7 °C) (Oral)   Resp 16   Ht 5' 4\" (1.626 m)   Wt 135 lb (61.2 kg)   SpO2 97%   BMI 23.17 kg/m²   Oxygen Saturation Interpretation: Normal      ------------------------------------------ PROGRESS NOTES ------------------------------------------  ED COURSE MEDICATIONS:                Medications   fentaNYL (SUBLIMAZE) injection 50 mcg (50 mcg IntraVENous Given 5/2/22 1344)   0.9 % sodium chloride bolus (0 mLs IntraVENous Stopped 5/2/22 1449)       I have spoken with the patient and discussed todays results, in addition to providing specific details for the plan of care and counseling regarding the diagnosis and prognosis. Their questions are answered at this time and they are agreeable with the plan. I discussed at length with them reasons for immediate return here for re evaluation.  They will followup with primary care by calling their office tomorrow. --------------------------------- ADDITIONAL PROVIDER NOTES ---------------------------------  At this time the patient is without objective evidence of an acute process requiring hospitalization or inpatient management. They have remained hemodynamically stable throughout their entire ED visit and are stable for discharge with outpatient follow-up. The plan has been discussed in detail and they are aware of the specific conditions for emergent return, as well as the importance of follow-up. Discharge Medication List as of 5/2/2022  3:41 PM          Diagnosis:  1. Hydrocele in adult    2. Testicular pain, right        Disposition:  Patient's disposition: Discharge to home  Patient's condition is stable.         Dilip Cristobal,   Resident  05/02/22 1911

## 2022-05-02 NOTE — ED NOTES
Pt raised voice at resident physician d/t not prescribing pain medication at discharge. RN went into room to discharge the patient and removed IV. Pt states \"I'm not signing anything. \" after RN reviewed discharge paperwork. Nurse asked if pt had ride home. Pt states \"I'll walk. \"  Pt asked to speak to the boss of the ED stating \"you guys are treating me like a junkie. \"  Nurse explained pt needs to follow up with PCP for pain management. Pt states \"why won't the doctor here do it?!\"  Nurse explain PCP has to monitor pain management and prescriptions. Pt states \"I want to speak to the boss! \"  Nurse notified resident physician.        Gray Rosario RN  05/02/22 7877

## 2022-05-02 NOTE — ED NOTES
Dr Ellen Martin spoke with patient at bedside. Pt refused antiinflammatory medications. [facility]  called and escorted pt to lobby.      Geena Wilhelm RN  05/02/22 6821

## 2022-06-21 ENCOUNTER — HOSPITAL ENCOUNTER (EMERGENCY)
Age: 67
Discharge: HOME | End: 2022-06-21
Attending: EMERGENCY MEDICINE
Payer: COMMERCIAL

## 2022-06-21 ENCOUNTER — APPOINTMENT (OUTPATIENT)
Dept: ULTRASOUND IMAGING | Age: 67
End: 2022-06-21
Payer: COMMERCIAL

## 2022-06-21 VITALS
WEIGHT: 130 LBS | BODY MASS INDEX: 22.2 KG/M2 | HEIGHT: 64 IN | SYSTOLIC BLOOD PRESSURE: 134 MMHG | TEMPERATURE: 97.8 F | RESPIRATION RATE: 20 BRPM | HEART RATE: 66 BPM | DIASTOLIC BLOOD PRESSURE: 66 MMHG | OXYGEN SATURATION: 95 %

## 2022-06-21 DIAGNOSIS — N50.819 PAIN IN TESTICLE, UNSPECIFIED LATERALITY: Primary | ICD-10-CM

## 2022-06-21 DIAGNOSIS — S30.22XA CONTUSION OF SCROTUM, INITIAL ENCOUNTER: ICD-10-CM

## 2022-06-21 DIAGNOSIS — N43.3 HYDROCELE, UNSPECIFIED HYDROCELE TYPE: ICD-10-CM

## 2022-06-21 LAB
AMPHETAMINE SCREEN, URINE: NOT DETECTED
ANION GAP SERPL CALCULATED.3IONS-SCNC: 10 MMOL/L (ref 7–16)
BARBITURATE SCREEN URINE: NOT DETECTED
BASOPHILS ABSOLUTE: 0.05 E9/L (ref 0–0.2)
BASOPHILS RELATIVE PERCENT: 0.8 % (ref 0–2)
BENZODIAZEPINE SCREEN, URINE: NOT DETECTED
BILIRUBIN URINE: NEGATIVE
BLOOD, URINE: NEGATIVE
BUN BLDV-MCNC: 15 MG/DL (ref 6–23)
CALCIUM SERPL-MCNC: 8.9 MG/DL (ref 8.6–10.2)
CANNABINOID SCREEN URINE: NOT DETECTED
CHLORIDE BLD-SCNC: 102 MMOL/L (ref 98–107)
CLARITY: CLEAR
CO2: 27 MMOL/L (ref 22–29)
COCAINE METABOLITE SCREEN URINE: NOT DETECTED
COLOR: YELLOW
CREAT SERPL-MCNC: 1.1 MG/DL (ref 0.7–1.2)
EOSINOPHILS ABSOLUTE: 0.59 E9/L (ref 0.05–0.5)
EOSINOPHILS RELATIVE PERCENT: 9.4 % (ref 0–6)
FENTANYL SCREEN, URINE: NOT DETECTED
GFR AFRICAN AMERICAN: >60
GFR NON-AFRICAN AMERICAN: >60 ML/MIN/1.73
GLUCOSE BLD-MCNC: 110 MG/DL (ref 74–99)
GLUCOSE URINE: NEGATIVE MG/DL
HCT VFR BLD CALC: 41.3 % (ref 37–54)
HEMOGLOBIN: 13.4 G/DL (ref 12.5–16.5)
IMMATURE GRANULOCYTES #: 0.01 E9/L
IMMATURE GRANULOCYTES %: 0.2 % (ref 0–5)
KETONES, URINE: NEGATIVE MG/DL
LEUKOCYTE ESTERASE, URINE: NEGATIVE
LYMPHOCYTES ABSOLUTE: 1.4 E9/L (ref 1.5–4)
LYMPHOCYTES RELATIVE PERCENT: 22.3 % (ref 20–42)
Lab: NORMAL
MCH RBC QN AUTO: 30.2 PG (ref 26–35)
MCHC RBC AUTO-ENTMCNC: 32.4 % (ref 32–34.5)
MCV RBC AUTO: 93.2 FL (ref 80–99.9)
METHADONE SCREEN, URINE: NOT DETECTED
MONOCYTES ABSOLUTE: 0.57 E9/L (ref 0.1–0.95)
MONOCYTES RELATIVE PERCENT: 9.1 % (ref 2–12)
NEUTROPHILS ABSOLUTE: 3.66 E9/L (ref 1.8–7.3)
NEUTROPHILS RELATIVE PERCENT: 58.2 % (ref 43–80)
NITRITE, URINE: NEGATIVE
OPIATE SCREEN URINE: NOT DETECTED
OXYCODONE URINE: NOT DETECTED
PDW BLD-RTO: 13.7 FL (ref 11.5–15)
PH UA: 7 (ref 5–9)
PHENCYCLIDINE SCREEN URINE: NOT DETECTED
PLATELET # BLD: 157 E9/L (ref 130–450)
PMV BLD AUTO: 11.5 FL (ref 7–12)
POTASSIUM REFLEX MAGNESIUM: 4.4 MMOL/L (ref 3.5–5)
PROTEIN UA: NEGATIVE MG/DL
RBC # BLD: 4.43 E12/L (ref 3.8–5.8)
SODIUM BLD-SCNC: 139 MMOL/L (ref 132–146)
SPECIFIC GRAVITY UA: 1.01 (ref 1–1.03)
UROBILINOGEN, URINE: 0.2 E.U./DL
WBC # BLD: 6.3 E9/L (ref 4.5–11.5)

## 2022-06-21 PROCEDURE — 36415 COLL VENOUS BLD VENIPUNCTURE: CPT

## 2022-06-21 PROCEDURE — 76870 US EXAM SCROTUM: CPT

## 2022-06-21 PROCEDURE — 81003 URINALYSIS AUTO W/O SCOPE: CPT

## 2022-06-21 PROCEDURE — 80307 DRUG TEST PRSMV CHEM ANLYZR: CPT

## 2022-06-21 PROCEDURE — 85025 COMPLETE CBC W/AUTO DIFF WBC: CPT

## 2022-06-21 PROCEDURE — 80048 BASIC METABOLIC PNL TOTAL CA: CPT

## 2022-06-21 PROCEDURE — 6370000000 HC RX 637 (ALT 250 FOR IP): Performed by: EMERGENCY MEDICINE

## 2022-06-21 PROCEDURE — 99284 EMERGENCY DEPT VISIT MOD MDM: CPT

## 2022-06-21 RX ORDER — OXYCODONE HYDROCHLORIDE AND ACETAMINOPHEN 5; 325 MG/1; MG/1
1 TABLET ORAL ONCE
Status: DISCONTINUED | OUTPATIENT
Start: 2022-06-21 | End: 2022-06-21

## 2022-06-21 RX ORDER — TRAMADOL HYDROCHLORIDE 50 MG/1
50 TABLET ORAL ONCE
Status: COMPLETED | OUTPATIENT
Start: 2022-06-21 | End: 2022-06-21

## 2022-06-21 RX ADMIN — TRAMADOL HYDROCHLORIDE 50 MG: 50 TABLET, COATED ORAL at 16:34

## 2022-06-21 ASSESSMENT — PAIN - FUNCTIONAL ASSESSMENT: PAIN_FUNCTIONAL_ASSESSMENT: 0-10

## 2022-06-21 ASSESSMENT — PAIN SCALES - GENERAL
PAINLEVEL_OUTOF10: 10
PAINLEVEL_OUTOF10: 10

## 2022-06-21 ASSESSMENT — PAIN DESCRIPTION - PAIN TYPE: TYPE: ACUTE PAIN

## 2022-06-21 ASSESSMENT — PAIN DESCRIPTION - DESCRIPTORS: DESCRIPTORS: PRESSURE

## 2022-06-21 ASSESSMENT — PAIN DESCRIPTION - FREQUENCY: FREQUENCY: CONTINUOUS

## 2022-06-21 ASSESSMENT — PAIN DESCRIPTION - LOCATION
LOCATION: SCROTUM
LOCATION: PENIS;OTHER (COMMENT)
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
## 2022-06-24 ENCOUNTER — OFFICE VISIT (OUTPATIENT)
Dept: INTERNAL MEDICINE | Age: 67
End: 2022-06-24
Payer: COMMERCIAL

## 2022-06-24 VITALS
DIASTOLIC BLOOD PRESSURE: 75 MMHG | WEIGHT: 136 LBS | HEART RATE: 76 BPM | TEMPERATURE: 98.2 F | BODY MASS INDEX: 23.22 KG/M2 | OXYGEN SATURATION: 98 % | RESPIRATION RATE: 16 BRPM | SYSTOLIC BLOOD PRESSURE: 120 MMHG | HEIGHT: 64 IN

## 2022-06-24 DIAGNOSIS — F31.63 BIPOLAR DISORDER, CURRENT EPISODE MIXED, SEVERE, WITHOUT PSYCHOTIC FEATURES (HCC): ICD-10-CM

## 2022-06-24 DIAGNOSIS — Z91.199 HISTORY OF NONCOMPLIANCE WITH MEDICAL TREATMENT, PRESENTING HAZARDS TO HEALTH: ICD-10-CM

## 2022-06-24 DIAGNOSIS — F31.4 BIPOLAR DISORDER, CURRENT EPISODE DEPRESSED, SEVERE, WITHOUT PSYCHOTIC FEATURES (HCC): ICD-10-CM

## 2022-06-24 DIAGNOSIS — N43.3 HYDROCELE IN ADULT: Primary | ICD-10-CM

## 2022-06-24 DIAGNOSIS — Z76.5 DRUG-SEEKING BEHAVIOR: ICD-10-CM

## 2022-06-24 DIAGNOSIS — N50.89 TESTICULAR MICROLITHIASIS: ICD-10-CM

## 2022-06-24 PROCEDURE — G8420 CALC BMI NORM PARAMETERS: HCPCS | Performed by: INTERNAL MEDICINE

## 2022-06-24 PROCEDURE — 1124F ACP DISCUSS-NO DSCNMKR DOCD: CPT | Performed by: INTERNAL MEDICINE

## 2022-06-24 PROCEDURE — 99214 OFFICE O/P EST MOD 30 MIN: CPT | Performed by: INTERNAL MEDICINE

## 2022-06-24 PROCEDURE — G8427 DOCREV CUR MEDS BY ELIG CLIN: HCPCS | Performed by: INTERNAL MEDICINE

## 2022-06-24 PROCEDURE — 3017F COLORECTAL CA SCREEN DOC REV: CPT | Performed by: INTERNAL MEDICINE

## 2022-06-24 PROCEDURE — 99212 OFFICE O/P EST SF 10 MIN: CPT | Performed by: INTERNAL MEDICINE

## 2022-06-24 PROCEDURE — 1036F TOBACCO NON-USER: CPT | Performed by: INTERNAL MEDICINE

## 2022-06-24 RX ORDER — ACETAMINOPHEN 500 MG
500 TABLET ORAL 4 TIMES DAILY PRN
Qty: 120 TABLET | Refills: 0 | Status: SHIPPED | OUTPATIENT
Start: 2022-06-24

## 2022-06-24 SDOH — ECONOMIC STABILITY: FOOD INSECURITY: WITHIN THE PAST 12 MONTHS, THE FOOD YOU BOUGHT JUST DIDN'T LAST AND YOU DIDN'T HAVE MONEY TO GET MORE.: SOMETIMES TRUE

## 2022-06-24 SDOH — ECONOMIC STABILITY: FOOD INSECURITY: WITHIN THE PAST 12 MONTHS, YOU WORRIED THAT YOUR FOOD WOULD RUN OUT BEFORE YOU GOT MONEY TO BUY MORE.: SOMETIMES TRUE

## 2022-06-24 ASSESSMENT — COLUMBIA-SUICIDE SEVERITY RATING SCALE - C-SSRS
4. HAVE YOU HAD THESE THOUGHTS AND HAD SOME INTENTION OF ACTING ON THEM?: YES
5. HAVE YOU STARTED TO WORK OUT OR WORKED OUT THE DETAILS OF HOW TO KILL YOURSELF? DO YOU INTEND TO CARRY OUT THIS PLAN?: YES
1. WITHIN THE PAST MONTH, HAVE YOU WISHED YOU WERE DEAD OR WISHED YOU COULD GO TO SLEEP AND NOT WAKE UP?: NO
3. HAVE YOU BEEN THINKING ABOUT HOW YOU MIGHT KILL YOURSELF?: YES
7. DID THIS OCCUR IN THE LAST THREE MONTHS: YES
6. HAVE YOU EVER DONE ANYTHING, STARTED TO DO ANYTHING, OR PREPARED TO DO ANYTHING TO END YOUR LIFE?: YES
2. HAVE YOU ACTUALLY HAD ANY THOUGHTS OF KILLING YOURSELF?: YES

## 2022-06-24 ASSESSMENT — ENCOUNTER SYMPTOMS
CONSTIPATION: 0
VOMITING: 0
PHOTOPHOBIA: 0
ABDOMINAL PAIN: 0
ALLERGIC/IMMUNOLOGIC NEGATIVE: 1
TROUBLE SWALLOWING: 0
SHORTNESS OF BREATH: 0
NAUSEA: 0
RESPIRATORY NEGATIVE: 1
COUGH: 0
BACK PAIN: 0
DIARRHEA: 0

## 2022-06-24 ASSESSMENT — PATIENT HEALTH QUESTIONNAIRE - PHQ9
8. MOVING OR SPEAKING SO SLOWLY THAT OTHER PEOPLE COULD HAVE NOTICED. OR THE OPPOSITE, BEING SO FIGETY OR RESTLESS THAT YOU HAVE BEEN MOVING AROUND A LOT MORE THAN USUAL: 3
SUM OF ALL RESPONSES TO PHQ QUESTIONS 1-9: 0
2. FEELING DOWN, DEPRESSED OR HOPELESS: 3
SUM OF ALL RESPONSES TO PHQ9 QUESTIONS 1 & 2: 0
SUM OF ALL RESPONSES TO PHQ QUESTIONS 1-9: 27
2. FEELING DOWN, DEPRESSED OR HOPELESS: 0
SUM OF ALL RESPONSES TO PHQ QUESTIONS 1-9: 27
1. LITTLE INTEREST OR PLEASURE IN DOING THINGS: 0
SUM OF ALL RESPONSES TO PHQ QUESTIONS 1-9: 0
SUM OF ALL RESPONSES TO PHQ QUESTIONS 1-9: 0
10. IF YOU CHECKED OFF ANY PROBLEMS, HOW DIFFICULT HAVE THESE PROBLEMS MADE IT FOR YOU TO DO YOUR WORK, TAKE CARE OF THINGS AT HOME, OR GET ALONG WITH OTHER PEOPLE: 3
SUM OF ALL RESPONSES TO PHQ QUESTIONS 1-9: 0
1. LITTLE INTEREST OR PLEASURE IN DOING THINGS: 3
4. FEELING TIRED OR HAVING LITTLE ENERGY: 3
SUM OF ALL RESPONSES TO PHQ9 QUESTIONS 1 & 2: 6
7. TROUBLE CONCENTRATING ON THINGS, SUCH AS READING THE NEWSPAPER OR WATCHING TELEVISION: 3
SUM OF ALL RESPONSES TO PHQ QUESTIONS 1-9: 27
5. POOR APPETITE OR OVEREATING: 3
9. THOUGHTS THAT YOU WOULD BE BETTER OFF DEAD, OR OF HURTING YOURSELF: 3
SUM OF ALL RESPONSES TO PHQ QUESTIONS 1-9: 24
6. FEELING BAD ABOUT YOURSELF - OR THAT YOU ARE A FAILURE OR HAVE LET YOURSELF OR YOUR FAMILY DOWN: 3
3. TROUBLE FALLING OR STAYING ASLEEP: 3

## 2022-06-24 ASSESSMENT — SOCIAL DETERMINANTS OF HEALTH (SDOH): HOW HARD IS IT FOR YOU TO PAY FOR THE VERY BASICS LIKE FOOD, HOUSING, MEDICAL CARE, AND HEATING?: HARD

## 2022-06-24 NOTE — PROGRESS NOTES
[facility] Internal Medicine      SUBJECTIVE:  Manjula Logan (:  1955) is a 78 y.o. male here for evaluation of the following chief complaint(s):  New Patient    He has a past medical history of Anxiety, Arthritis, Asthma, Bipolar 1 disorder ([address] Chronic combined systolic and diastolic CHF (congestive heart failure) ([address] COPD (chronic obstructive pulmonary disease) ([address] Depression, Diabetes mellitus ([address] GERD (gastroesophageal reflux disease), Hepatitis C, Hypertension, Hyperthyroidism, Hypothyroidism due to medication, Mass of testicle, Mesothelioma ([address] Neuromuscular disorder ([address] Other disorders of kidney and ureter, Paroxysmal A-fib ([address] Peptic ulcer, Prostate enlargement, Pulmonary embolism ([address] Seizures ([address] and Thyroid disease. Patient has multiple ED visit for testicular pain, US noted for hydrocele and testicular microlithiasis. Each time he was dishcerge on NSAID's and urology referral. He did follow up but was dismissed after improper behavior     [place] to get out and  with nurse girlfriend in [place]. He follows with [facility] and is on clonazepam, venlafaxine and trazodone from them. He is complaining of testicular pain, 10/10, continuous and specially asking for opoid based pain meds        Review of Systems   Constitutional: Negative. Negative for diaphoresis, fatigue and fever. HENT: Negative. Negative for trouble swallowing. Eyes: Negative for photophobia and visual disturbance. Respiratory: Negative. Negative for cough and shortness of breath. Cardiovascular: Negative. Negative for chest pain. Gastrointestinal: Negative for abdominal pain, constipation, diarrhea, nausea and vomiting. Endocrine: Negative. Genitourinary: Negative for difficulty urinating and dysuria.    Musculoskeletal: Negative for arthralgias, back pain, joint swelling and neck stiffness. Skin: Negative. Negative for rash. Allergic/Immunologic: Negative. Neurological: Negative for dizziness, seizures, weakness and headaches. Hematological: Negative for adenopathy. Does not bruise/bleed easily. Psychiatric/Behavioral: Negative for suicidal ideas. All other systems reviewed and are negative. Current Outpatient Medications on File Prior to Visit   Medication Sig Dispense Refill    clonazePAM (KLONOPIN) 1 MG tablet Take 1 mg by mouth 2 times daily as needed.  rivaroxaban (XARELTO) 20 MG TABS tablet Take 1 tablet by mouth daily With food 30 tablet 3    divalproex (DEPAKOTE) 250 MG DR tablet Take 1 tablet by mouth every 12 hours 60 tablet 0    levETIRAcetam (KEPPRA) 500 MG tablet Take 1 tablet by mouth 2 times daily 60 tablet 0    venlafaxine (EFFEXOR XR) 37.5 MG extended release capsule Take 1 capsule by mouth daily (with breakfast) 30 capsule 0    atorvastatin (LIPITOR) 40 MG tablet Take 1 tablet by mouth nightly 30 tablet 0    traZODone (DESYREL) 50 MG tablet Take 1 tablet by mouth nightly as needed for Sleep 30 tablet 0    Multiple Vitamins-Minerals (THERAPEUTIC MULTIVITAMIN-MINERALS) tablet Take 1 tablet by mouth daily      BREO ELLIPTA 200-25 MCG/INH AEPB       methimazole (TAPAZOLE) 10 MG tablet Take 10 mg by mouth 3 times daily      lisinopril (PRINIVIL;ZESTRIL) 10 MG tablet Take 1 tablet by mouth daily 30 tablet 11    finasteride (PROSCAR) 5 MG tablet Take 1 tablet by mouth daily 30 tablet 3    levETIRAcetam (KEPPRA) 500 MG tablet Take 1,000 mg by mouth 2 times daily      Rivaroxaban (XARELTO PO) Take by mouth      Nutritional Supplements (RA BALANCED NUTRITIONAL PLUS) LIQD Take 1 Bottle by mouth daily 10 Can 0     No current facility-administered medications on file prior to visit.        OBJECTIVE:    VS:   Vitals:    06/24/22 1020   BP: 120/75   Pulse: 76   Resp: 16   Temp: 98.2 °F (36.8 °C)   SpO2: 98%   Weight: 136 lb (61.7 kg)   Height: 5' 4\" (1.626 m)     Physical Exam  Vitals and nursing note reviewed. Constitutional:       General: He is not in acute distress. Appearance: Normal appearance. He is normal weight. HENT:      Head: Normocephalic and atraumatic. Right Ear: External ear normal.      Left Ear: External ear normal.      Nose: Nose normal. No rhinorrhea. Mouth/Throat:      Mouth: Mucous membranes are moist.   Eyes:      Extraocular Movements: Extraocular movements intact. Cardiovascular:      Rate and Rhythm: Normal rate and regular rhythm. Pulses: Normal pulses. Heart sounds: Normal heart sounds. No murmur heard. No friction rub. No gallop. Pulmonary:      Effort: Pulmonary effort is normal. No respiratory distress. Breath sounds: Normal breath sounds. No rhonchi. Abdominal:      General: There is no distension. Palpations: Abdomen is soft. Musculoskeletal:         General: No swelling. Normal range of motion. Cervical back: Normal range of motion and neck supple. Skin:     General: Skin is warm. Neurological:      General: No focal deficit present. Mental Status: He is alert and oriented to person, place, and time. Cranial Nerves: No cranial nerve deficit. Gait: Gait normal.   Psychiatric:         Mood and Affect: Mood normal.            ASSESSMENT/PLAN:  1. Hydrocele in adult  -     HCG, SERUM, QUALITATIVE; Future  -     AFP Tumor Marker; Future  -     Lactate Dehydrogenase; Future  -     acetaminophen (TYLENOL) 500 MG tablet; Take 1 tablet by mouth 4 times daily as needed for Pain, Disp-120 tablet, R-0Normal  -     External Referral To Urology  2. Bipolar disorder, current episode mixed, severe, without psychotic features ([address]  3. Drug-seeking behavior  4. Bipolar disorder, current episode depressed, severe, without psychotic features ([address]  5. History of noncompliance with medical treatment, presenting hazards to health  6.  Testicular microlithiasis  -     HCG, SERUM, QUALITATIVE; Future  -     AFP Tumor Marker; Future  -     Lactate Dehydrogenase; Future  -     acetaminophen (TYLENOL) 500 MG tablet; Take 1 tablet by mouth 4 times daily as needed for Pain, Disp-120 tablet, R-0Normal  -     External Referral To Urology       ED follow up: Hydrocele and testicular microlithiasis    US and ED course reviewed   External urology referral  Follow LD, HCG,  AFP  Allergic to NSAIDs, use tylenol as needed for pain     New patient    Other medical problems and healthcare maintenance to be discussed next visit      RTC:  Return in about 1 month (around 7/24/2022) for PCP follow up. I have reviewed my findings and recommendations with Chandni Ortiz and Dr. Jarad Snider.     Juni Lott MD   6/24/2022 2:14 PM

## 2022-06-24 NOTE — PROGRESS NOTES
[address] [place] [address],[address]  Internal Medicine Residency Clinic    Attending Physician Statement  I have discussed the case, including pertinent history and exam findings with the resident physician. I have seen and examined the patient and the key elements of the encounter have been performed by me. I agree with the assessment, plan and orders as documented by the resident. I have reviewed all pertinent PMHx, PSHx, FamHx, SocialHx, medications, and allergies and updated history as appropriate. Patient here for new patient appointment to establish care. Testicular Pain: patient has had multiple evaluations in the ED; patient has had multiple evaluations in the ED for the pain which showed no infections on UA or culture; no evidence of leukocytosis or fever; patient had ultrasound which showed hydrocele and testicular microlithiasis; referral to Dr. Franki Alfred but their office has fired the patient 2/2 behavior; referral placed to [facility] for the patient; blood work to evaluate for testicular cancer markers; PSA low     It was discussed in the room that this office does not prescribe any narcotics for patients. Patient visibly frustrated and states that he wasted his time at this appointment. Discussed that we could prescribe other medications for the patinet but he stated that they do not work. Depression: patient follows with outside facility and sees them on a regular basis; patient is compliant with his medications; patient denies any suicidal or homicidal thoughts or actions; patient does not have a plan    Remainder of medical problems as per resident note.     Salvatore Glover DO  6/24/2022 11:11 AM    Encounter time including independent chart review, discussion with patient, interpreting test results and/or external communications: 30'

## 2022-06-24 NOTE — PATIENT INSTRUCTIONS
Dear Mary Cantu,    Thank you for coming to your appointment today. I hope we have addressed all of your needs. Please make sure to do the following:  - Continue your medications as listed. - Get labs drawn before our next follow up. - Eat healthy food and do physical workout as tolerated   - Referrals have been made to Urology :  If you do not hear from the office in 1 week, please call the number listed. - We will see each other again in 1 month     Call for a sooner appointment if needed     Have a great day! Sincerely,  Mildred Gan M.D  6/24/2022  11:08 AM       Patient Education        Hydrocelectomy: Before Your Surgery  What is hydrocelectomy surgery? Hydrocelectomy is surgery to remove a hydrocele. A hydrocele is a fluid-filledsac inside the scrotum. A male can get a hydrocele on one or both sides of the scrotum. It can happen as a result of several things, such as trauma to the area, an infection, oranother problem inside the scrotum. You will be asleep during the surgery. The doctor makes a very small cut in your scrotum. This cut is called an incision. Then the doctor drains the fluid and removes the hydrocele sac. The doctor closes the incision with stitches. The stitches don't need to be removed. They will dissolve several weeks aftersurgery. The incision will leave a very small scar that will fade with time. Or your doctor may do a laparoscopic surgery. Your doctor does this surgery through a tiny incision in your scrotum. He or she uses a lighted tube withspecial tools. This surgery almost always stops the buildup of fluid in your scrotum. You may be able to leave the hospital on the same day as the surgery. You will probably be able to go back to work or school in 4 to 7 days. But you will needto avoid hard exercise or heavy lifting for 2 to 4 weeks. Follow-up care is a key part of your treatment and safety.  Be sure to make and go to all appointments, and call your doctor if you are having problems. It's also a good idea to know your test results and keep alist of the medicines you take. How do you prepare for surgery? Surgery can be stressful. This information will help you understand what youcan expect. And it will help you safely prepare for surgery. Preparing for surgery     Be sure you have someone to take you home. Anesthesia and pain medicine will make it unsafe for you to drive or get home on your own.      Understand exactly what surgery is planned, along with the risks, benefits, and other options.      If you take aspirin or some other blood thinner, ask your doctor if you should stop taking it before your surgery. Make sure that you understand exactly what your doctor wants you to do. These medicines increase the risk of bleeding.      Tell your doctor ALL the medicines, vitamins, supplements, and herbal remedies you take. Some may increase the risk of problems during your surgery. Your doctor will tell you if you should stop taking any of them before the surgery and how soon to do it.      Make sure your doctor and the hospital have a copy of your advance directive. If you don't have one, you may want to prepare one. It lets others know your health care wishes. It's a good thing to have before any type of surgery or procedure. What happens on the day of surgery?     Follow the instructions exactly about when to stop eating and drinking. If you don't, your surgery may be canceled. If your doctor told you to take your medicines on the day of surgery, take them with only a sip of water.      Follow your doctor's instructions about when to bathe or shower before your surgery. Do not apply lotions, perfumes, deodorants, or nail polish.      Do not shave the surgical site yourself.      Take off all jewelry and piercings. And take out contact lenses, if you wear them.    At the hospital or surgery center     Bring a picture ID.      The area for surgery is often marked to make sure there are no errors.      You will be kept comfortable and safe by your anesthesia provider. You will be asleep during the surgery.      The surgery will take about 1 hour. When should you call your doctor?  You have questions or concerns.      You don't understand how to prepare for your surgery.      You become ill before the surgery (such as fever, flu, or a cold).      You need to reschedule or have changed your mind about having the surgery. Where can you learn more? Go to https://Percello.Manifest. org and sign in to your airpim account. Enter B583 in the [facility] box to learn more about \"Hydrocelectomy: Before Your Surgery. \"     If you do not have an account, please click on the \"Sign Up Now\" link. Current as of: October 18, 2021               Content Version: 13.3  © 2006-2022 [employer]). Care instructions adapted under license by [facility] ([facility]). If you have questions about a medical condition or this instruction, always ask your healthcare professional. [place] [address] any warranty or liability for your use of this information. Patient Education        Testicular Pain: Care Instructions  Overview     Pain in the testicles can be caused by many things. These include an injury toyour testicles, an infection, and testicular torsion. Injuries and genital problems most often happen during sports or recreational activities, at work, or in a fall. Pain caused by an injury usually goes awayquickly. There is usually no long-term harm to your testicles. Infections that may cause pain include:   An infection of the testicles. This is called orchitis.  An abscess in the scrotum or testicles.  Some sexually transmitted infections (STIs).  A swelling of the tube attached to a testicle. This swelling is called epididymitis. It can cause pain and is sometimes caused by an infection.   Testicular torsion happens when a testicle twists on the spermatic cord. This cuts off the blood supply to the testicle. This is an emergency that oftenrequires immediate surgery. Follow-up care is a key part of your treatment and safety. Be sure to make and go to all appointments, and call your doctor if you are having problems. It's also a good idea to know your test results and keep alist of the medicines you take. How can you care for yourself at home?  Rest and protect your testicles and groin. Stop, change, or take a break from any activity that may be causing your pain or soreness.  Put ice or a cold pack on the area for 10 to 20 minutes at a time. Put a thin cloth between the ice and your skin.  Wear briefs, not boxers. Briefs help support the injured area. You can use snug underwear or compression shorts if it helps relieve your pain.  If your doctor prescribed antibiotics, take them as directed. Do not stop taking them just because you feel better. You need to take the full course of antibiotics.  Ask your doctor if you can take an over-the-counter pain medicine, such as acetaminophen (Tylenol), ibuprofen (Advil, Motrin), or naproxen (Aleve). Be safe with medicines. Read and follow all instructions on the label.  If the doctor gave you a prescription medicine for pain, take it as prescribed. When should you call for help? Call your doctor now or seek immediate medical care if:     You have severe or increasing pain.      You notice a change in how your testicles look or are positioned in your scrotum.      You notice new or worse swelling in your scrotum.      You have symptoms of a urinary problem, such as a urinary tract infection. These may include:  ? Pain or burning when you urinate. ? A frequent need to urinate without being able to pass much urine. ? Pain in the flank, which is just below the rib cage and above the waist on either side of the back. ? Blood in your urine. ? A fever.    Watch closely for changes in your health, and be sure to contact your doctor if:     You do not get better as expected. Where can you learn more? Go to https://chpepiceweb.Swapferit. org and sign in to your Glympse account. Enter B643 in the [employer] box to learn more about \"Testicular Pain: Care Instructions. \"     If you do not have an account, please click on the \"Sign Up Now\" link. Current as of: October 18, 2021               Content Version: 13.3  © 6031-1719 [employer]. Care instructions adapted under license by [facility] ([facility]). If you have questions about a medical condition or this instruction, always ask your healthcare professional. [address] any warranty or liability for your use of this information.

## 2022-06-24 NOTE — PROGRESS NOTES
Discharge instructions reviewed with patient per   . Follow up appt scheduled and AVS given to patient.

## 2022-07-20 ENCOUNTER — HOSPITAL ENCOUNTER (EMERGENCY)
Age: 67
Discharge: HOME | End: 2022-07-20
Attending: STUDENT IN AN ORGANIZED HEALTH CARE EDUCATION/TRAINING PROGRAM
Payer: COMMERCIAL

## 2022-07-20 VITALS
TEMPERATURE: 98.8 F | OXYGEN SATURATION: 93 % | HEART RATE: 113 BPM | WEIGHT: 145 LBS | RESPIRATION RATE: 16 BRPM | HEIGHT: 64 IN | SYSTOLIC BLOOD PRESSURE: 123 MMHG | DIASTOLIC BLOOD PRESSURE: 70 MMHG | BODY MASS INDEX: 24.75 KG/M2

## 2022-07-20 DIAGNOSIS — N50.811 PAIN IN RIGHT TESTICLE: Primary | ICD-10-CM

## 2022-07-20 LAB
ALBUMIN SERPL-MCNC: 4.3 G/DL (ref 3.5–5.2)
ALP BLD-CCNC: 73 U/L (ref 40–129)
ALT SERPL-CCNC: 12 U/L (ref 0–40)
ANION GAP SERPL CALCULATED.3IONS-SCNC: 12 MMOL/L (ref 7–16)
AST SERPL-CCNC: 15 U/L (ref 0–39)
BACTERIA: NORMAL /HPF
BILIRUB SERPL-MCNC: 0.2 MG/DL (ref 0–1.2)
BILIRUBIN URINE: NEGATIVE
BLOOD, URINE: NEGATIVE
BUN BLDV-MCNC: 16 MG/DL (ref 6–23)
CALCIUM SERPL-MCNC: 8.7 MG/DL (ref 8.6–10.2)
CHLORIDE BLD-SCNC: 95 MMOL/L (ref 98–107)
CLARITY: CLEAR
CO2: 25 MMOL/L (ref 22–29)
COLOR: YELLOW
CREAT SERPL-MCNC: 1.3 MG/DL (ref 0.7–1.2)
GFR AFRICAN AMERICAN: >60
GFR NON-AFRICAN AMERICAN: 55 ML/MIN/1.73
GLUCOSE BLD-MCNC: 87 MG/DL (ref 74–99)
GLUCOSE URINE: NEGATIVE MG/DL
KETONES, URINE: NEGATIVE MG/DL
LEUKOCYTE ESTERASE, URINE: NEGATIVE
NITRITE, URINE: NEGATIVE
PH UA: 6 (ref 5–9)
POTASSIUM REFLEX MAGNESIUM: 4.1 MMOL/L (ref 3.5–5)
PROTEIN UA: NEGATIVE MG/DL
RBC UA: NORMAL /HPF (ref 0–2)
SODIUM BLD-SCNC: 132 MMOL/L (ref 132–146)
SPECIFIC GRAVITY UA: 1.02 (ref 1–1.03)
TOTAL PROTEIN: 7.4 G/DL (ref 6.4–8.3)
UROBILINOGEN, URINE: 0.2 E.U./DL
WBC UA: NORMAL /HPF (ref 0–5)

## 2022-07-20 PROCEDURE — 82077 ASSAY SPEC XCP UR&BREATH IA: CPT

## 2022-07-20 PROCEDURE — 87591 N.GONORRHOEAE DNA AMP PROB: CPT

## 2022-07-20 PROCEDURE — 87088 URINE BACTERIA CULTURE: CPT

## 2022-07-20 PROCEDURE — 99283 EMERGENCY DEPT VISIT LOW MDM: CPT

## 2022-07-20 PROCEDURE — 81001 URINALYSIS AUTO W/SCOPE: CPT

## 2022-07-20 PROCEDURE — 80179 DRUG ASSAY SALICYLATE: CPT

## 2022-07-20 PROCEDURE — 87491 CHLMYD TRACH DNA AMP PROBE: CPT

## 2022-07-20 PROCEDURE — 80307 DRUG TEST PRSMV CHEM ANLYZR: CPT

## 2022-07-20 PROCEDURE — 80053 COMPREHEN METABOLIC PANEL: CPT

## 2022-07-20 PROCEDURE — 36415 COLL VENOUS BLD VENIPUNCTURE: CPT

## 2022-07-20 PROCEDURE — 80143 DRUG ASSAY ACETAMINOPHEN: CPT

## 2022-07-20 ASSESSMENT — PAIN DESCRIPTION - DESCRIPTORS: DESCRIPTORS: SHARP;THROBBING

## 2022-07-20 ASSESSMENT — PAIN DESCRIPTION - PAIN TYPE: TYPE: CHRONIC PAIN

## 2022-07-20 ASSESSMENT — ENCOUNTER SYMPTOMS
EYE REDNESS: 0
BACK PAIN: 0
DIARRHEA: 0
NAUSEA: 0
EYE PAIN: 0
WHEEZING: 0
SORE THROAT: 0
ABDOMINAL PAIN: 0
COUGH: 0
EYE DISCHARGE: 0
SINUS PRESSURE: 0
VOMITING: 0
SHORTNESS OF BREATH: 0

## 2022-07-20 ASSESSMENT — PAIN - FUNCTIONAL ASSESSMENT: PAIN_FUNCTIONAL_ASSESSMENT: 0-10

## 2022-07-20 ASSESSMENT — PAIN SCALES - GENERAL: PAINLEVEL_OUTOF10: 10

## 2022-07-20 ASSESSMENT — PAIN DESCRIPTION - LOCATION: LOCATION: GROIN

## 2022-07-20 ASSESSMENT — PAIN DESCRIPTION - FREQUENCY: FREQUENCY: CONTINUOUS

## 2022-07-20 ASSESSMENT — PAIN DESCRIPTION - ONSET: ONSET: ON-GOING

## 2022-07-20 NOTE — ED NOTES
pts doctor went in to give results of his visit and when I went in to provide discharge papers pt was gone.       Kylie Guy RN  07/20/22 0280

## 2022-07-20 NOTE — ED PROVIDER NOTES
Department of Emergency Medicine   ED  Provider Note  Admit Date/RoomTime: 7/20/2022  2:53 PM  ED Room: ARIEL/ARIEL          History of Present Illness:  7/20/22, Time: 3:08 PM EDT  Chief Complaint   Patient presents with    Groin Swelling     Was told he has a hydrocele. Bashir Diaz is a 78 y.o. male presenting to the ED for testicle pain, beginning years ago. The complaint has been persistent, severe in severity, and worsened by palpation. Patient states is been ongoing for some time. Chart review shows that he has had complaints regarding this for the past 10 years. He states that he was diagnosed with a hydrocele. Patient states that he was told that he needs them drained. He states that it is affecting his sex life and he is no longer able to have intercourse because of the pain. He states the only thing that does make it better is Ultram.  He states he has attempted to take numerous extra strength Tylenol as without improvement despite knowing that it is bad for his liver. He otherwise denies any fevers, chest pain, shortness of breath, nausea, vomiting, abdominal pain, dysuria, or discharge. Review of Systems   Constitutional:  Negative for chills and fever. HENT:  Negative for ear pain, sinus pressure and sore throat. Eyes:  Negative for pain, discharge and redness. Respiratory:  Negative for cough, shortness of breath and wheezing. Cardiovascular:  Negative for chest pain. Gastrointestinal:  Negative for abdominal pain, diarrhea, nausea and vomiting. Genitourinary:  Positive for scrotal swelling and testicular pain. Negative for dysuria and frequency. Musculoskeletal:  Negative for arthralgias and back pain. Skin:  Negative for rash and wound. Neurological:  Negative for weakness and headaches. Hematological:  Negative for adenopathy.    All other systems reviewed and are negative.       --------------------------------------------- PAST HISTORY ---------------------------------------------  Past Medical History:  has a past medical history of Anxiety, Arthritis, Asthma, Bipolar 1 disorder ([facility] Chronic combined systolic and diastolic CHF (congestive heart failure) ([facility] COPD (chronic obstructive pulmonary disease) ([facility] Depression, Diabetes mellitus ([facility] GERD (gastroesophageal reflux disease), Hepatitis C, Hypertension, Hyperthyroidism, Hypothyroidism due to medication, Mass of testicle, Mesothelioma ([facility] Neuromuscular disorder ([facility] Other disorders of kidney and ureter, Paroxysmal A-fib ([facility] Peptic ulcer, Prostate enlargement, Pulmonary embolism ([facility] Seizures ([facility] and Thyroid disease. Past Surgical History:  has a past surgical history that includes Appendectomy; Lithotripsy; Hemorrhoid surgery; Bladder surgery; Endoscopy, colon, diagnostic (11/13/2015); Abdomen surgery; Colonoscopy; Cardiac surgery; vascular surgery; and Cardiac catheterization (05/21/2018). Social History:  reports that he has never smoked. He quit smokeless tobacco use about 4 years ago. His smokeless tobacco use included chew. He reports that he does not currently use drugs after having used the following drugs: Cocaine. He reports that he does not drink alcohol. Family History: family history includes Cancer in his father, maternal grandfather, and mother; Diabetes in his father, maternal aunt, maternal aunt, maternal aunt, maternal grandmother, and mother; Heart Disease in his paternal grandfather and paternal grandmother; Heart Failure in his paternal grandfather and paternal grandmother. . Unless otherwise noted, family history is non contributory    The patients home medications have been reviewed.     Allergies: Codeine, Dye [iodides], Ketorolac tromethamine, Peanuts [peanut oil], Shellfish-derived products, and Nitroglycerin        ---------------------------------------------------PHYSICAL EXAM--------------------------------------    Physical Exam  Vitals and nursing note reviewed. Constitutional:       General: He is not in acute distress. Appearance: He is well-developed. HENT:      Head: Normocephalic and atraumatic. Eyes:      Conjunctiva/sclera: Conjunctivae normal.   Cardiovascular:      Rate and Rhythm: Normal rate and regular rhythm. Heart sounds: Normal heart sounds. No murmur heard. Pulmonary:      Effort: Pulmonary effort is normal. No respiratory distress. Breath sounds: Normal breath sounds. No wheezing or rales. Abdominal:      General: Bowel sounds are normal.      Palpations: Abdomen is soft. Tenderness: There is no abdominal tenderness. There is no guarding or rebound. Genitourinary:     Testes: Normal.      Comments: Chaperone was present. Testicles without swelling. Tenderness to palpation of the right testicle. No erythema noted. No discharge noted. No masses palpated. No high riding testicle noted. Musculoskeletal:         General: No tenderness or deformity. Cervical back: Normal range of motion and neck supple. Skin:     General: Skin is warm and dry. Neurological:      Mental Status: He is alert and oriented to person, place, and time. Cranial Nerves: No cranial nerve deficit. Coordination: Coordination normal.          -------------------------------------------------- RESULTS -------------------------------------------------  I have personally reviewed all laboratory and imaging results for this patient. Results are listed below.      LABS: (Lab results interpreted by me)  Results for orders placed or performed during the hospital encounter of 07/20/22   C.trachomatis N.gonorrhoeae DNA, Urine    Specimen: Urine   Result Value Ref Range    Source Urine    Urinalysis with Microscopic   Result Value Ref Range    Color, UA Yellow Straw/Yellow    Clarity, UA Clear Clear    Glucose, Ur Negative Negative mg/dL    Bilirubin Urine Negative Negative    Ketones, Urine Negative Negative mg/dL    Specific Gravity, UA 1.020 1.005 - 1.030    Blood, Urine Negative Negative    pH, UA 6.0 5.0 - 9.0    Protein, UA Negative Negative mg/dL    Urobilinogen, Urine 0.2 <2.0 E.U./dL    Nitrite, Urine Negative Negative    Leukocyte Esterase, Urine Negative Negative    WBC, UA NONE 0 - 5 /HPF    RBC, UA NONE 0 - 2 /HPF    Bacteria, UA NONE SEEN None Seen /HPF   Serum Drug Screen   Result Value Ref Range    Ethanol Lvl <10 mg/dL    Acetaminophen Level <5.0 (L) 10.0 - 40.0 mcg/mL    Salicylate, Serum <0.5 0.0 - 30.0 mg/dL   Comprehensive Metabolic Panel w/ Reflex to MG   Result Value Ref Range    Sodium 132 132 - 146 mmol/L    Potassium reflex Magnesium 4.1 3.5 - 5.0 mmol/L    Chloride 95 (L) 98 - 107 mmol/L    CO2 25 22 - 29 mmol/L    Anion Gap 12 7 - 16 mmol/L    Glucose 87 74 - 99 mg/dL    BUN 16 6 - 23 mg/dL    Creatinine 1.3 (H) 0.7 - 1.2 mg/dL    GFR Non-African American 55 >=60 mL/min/1.73    GFR African American >60     Calcium 8.7 8.6 - 10.2 mg/dL    Total Protein 7.4 6.4 - 8.3 g/dL    Albumin 4.3 3.5 - 5.2 g/dL    Total Bilirubin 0.2 0.0 - 1.2 mg/dL    Alkaline Phosphatase 73 40 - 129 U/L    ALT 12 0 - 40 U/L    AST 15 0 - 39 U/L   ,       RADIOLOGY:  Interpreted by Radiologist unless otherwise specified  No orders to display                    ------------------------- NURSING NOTES AND VITALS REVIEWED ---------------------------   The nursing notes within the ED encounter and vital signs as below have been reviewed by myself  /70   Pulse (!) 113   Temp 98.8 °F (37.1 °C) (Infrared)   Resp 16   Ht 5' 4\" (1.626 m)   Wt 145 lb (65.8 kg)   SpO2 93%   BMI 24.89 kg/m²     Oxygen Saturation Interpretation: Normal      The patients available past medical records and past encounters were reviewed.         ------------------------------ ED COURSE/MEDICAL DECISION MAKING----------------------  Medications - No data to display         Medical Decision Making:       Patient presents with concern for right-sided hydrocele. He states that he is painful. No swelling on physical exam.  No high riding testicle. No erythema. No discharge. Patient's pain is been ongoing for years at this time. Low concern for torsion. Low concern for fornier's. Urinalysis not show any signs of infection. GC was obtained and is pending. Patient did mention they took an excessive amount of Tylenol. Serum drug screen did not show concerning level acetaminophen. CMP did not show an elevation in his transaminases. Patient will be discharged with instructions to follow-up with his PCP and urologist.  In addition patient will be given a referral to pain management. Patient discharged home. Re-Evaluations:  Patient was reevaluted and continued to be stable. This patient's ED course included: a personal history and physicial examination and re-evaluation prior to disposition    This patient has remained hemodynamically stable during their ED course. Consultations:  None      Critical Care: None       Counseling: The emergency provider has spoken with the patient and discussed todays results, in addition to providing specific details for the plan of care and counseling regarding the diagnosis and prognosis. Questions are answered at this time and they are agreeable with the plan.       --------------------------------- IMPRESSION AND DISPOSITION ---------------------------------    IMPRESSION  1. Pain in right testicle        DISPOSITION  Disposition: Discharge to home  Patient condition is stable        NOTE: This report was transcribed using voice recognition software.  Every effort was made to ensure accuracy; however, inadvertent computerized transcription errors may be present           Rony Flood DO  Resident  07/20/22 [id number]

## 2022-07-21 LAB
ACETAMINOPHEN LEVEL: <5 MCG/ML (ref 10–30)
ETHANOL: <10 MG/DL (ref 0–0.08)
SALICYLATE, SERUM: <0.3 MG/DL (ref 0–30)
TRICYCLIC ANTIDEPRESSANTS SCREEN SERUM: NEGATIVE NG/ML

## 2022-07-22 LAB — URINE CULTURE, ROUTINE: NORMAL

## 2022-07-25 LAB
C. TRACHOMATIS DNA ,URINE: NEGATIVE
N. GONORRHOEAE DNA, URINE: NEGATIVE
SOURCE: NORMAL

## 2022-08-11 ENCOUNTER — OFFICE VISIT (OUTPATIENT)
Dept: INTERNAL MEDICINE | Age: 67
End: 2022-08-11
Payer: COMMERCIAL

## 2022-08-11 VITALS
OXYGEN SATURATION: 95 % | DIASTOLIC BLOOD PRESSURE: 70 MMHG | TEMPERATURE: 97.3 F | RESPIRATION RATE: 18 BRPM | WEIGHT: 147 LBS | HEART RATE: 72 BPM | HEIGHT: 64 IN | BODY MASS INDEX: 25.1 KG/M2 | SYSTOLIC BLOOD PRESSURE: 110 MMHG

## 2022-08-11 DIAGNOSIS — N50.811 PAIN IN BOTH TESTICLES: ICD-10-CM

## 2022-08-11 DIAGNOSIS — N50.812 PAIN IN BOTH TESTICLES: ICD-10-CM

## 2022-08-11 DIAGNOSIS — M16.0 OSTEOARTHRITIS OF BOTH HIPS, UNSPECIFIED OSTEOARTHRITIS TYPE: ICD-10-CM

## 2022-08-11 PROCEDURE — 1036F TOBACCO NON-USER: CPT | Performed by: INTERNAL MEDICINE

## 2022-08-11 PROCEDURE — 3017F COLORECTAL CA SCREEN DOC REV: CPT | Performed by: INTERNAL MEDICINE

## 2022-08-11 PROCEDURE — G8417 CALC BMI ABV UP PARAM F/U: HCPCS | Performed by: INTERNAL MEDICINE

## 2022-08-11 PROCEDURE — 1124F ACP DISCUSS-NO DSCNMKR DOCD: CPT | Performed by: INTERNAL MEDICINE

## 2022-08-11 PROCEDURE — 99212 OFFICE O/P EST SF 10 MIN: CPT | Performed by: INTERNAL MEDICINE

## 2022-08-11 PROCEDURE — 99213 OFFICE O/P EST LOW 20 MIN: CPT | Performed by: INTERNAL MEDICINE

## 2022-08-11 PROCEDURE — G8427 DOCREV CUR MEDS BY ELIG CLIN: HCPCS | Performed by: INTERNAL MEDICINE

## 2022-08-11 RX ORDER — SILDENAFIL 50 MG/1
TABLET, FILM COATED ORAL
COMMUNITY
Start: 2022-07-21

## 2022-08-11 RX ORDER — LURASIDONE HYDROCHLORIDE 120 MG/1
TABLET, FILM COATED ORAL
COMMUNITY
Start: 2022-08-10

## 2022-08-11 NOTE — ASSESSMENT & PLAN NOTE
Likely acetabular injury radiating to the groin  X-ray hip ordered  No opioids prescribed this visit. Patient refuses alternative to opioid for his pain relief. US 6/21/22  Small bilateral hydroceles, right greater than left. 2. 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 The right testis measures approximately 3.0 x 3.7 x 2.6 cm in size, and the   left testis measures approximately 2.0 x 3.6 x 2.6 cm in size. There is   bilateral testicular microlithiasis. The testes are of homogeneous   echotexture without focal mass. Normal blood flow is noted bilaterally. The   epididymides are unremarkable in appearance       US 5/2/22  There are few punctate intrarenal calcifications within the testicles   bilaterally. No evidence of intra testicular mass. Normal testicular   vascularity. 2.  Right greater than left simple appearing scrotal hydroceles. Left-sided   varicocele.

## 2022-08-11 NOTE — PROGRESS NOTES
[facility]  Internal Medicine Residency Program  ACC Note      SUBJECTIVE:  CC: had concerns including Back Pain (Worse since last night) and Testicle Pain. HPI:Raj Cohen (:  1955) is a 78 y.o. male here for a routine evaluation of the following: Back Pain (Worse since last night) and Testicle Pain         Patient is a pleasant 78 y.o. male who  has a past medical history of Anxiety, Arthritis, Asthma, Bipolar 1 disorder ([address] Chronic combined systolic and diastolic CHF (congestive heart failure) ([address] COPD (chronic obstructive pulmonary disease) ([address] Depression, Diabetes mellitus ([address] GERD (gastroesophageal reflux disease), Hepatitis C, Hypertension, Hyperthyroidism, Hypothyroidism due to medication, Mass of testicle, Mesothelioma ([address] Neuromuscular disorder ([address] Other disorders of kidney and ureter, Paroxysmal A-fib ([address] Peptic ulcer, Prostate enlargement, Pulmonary embolism ([address] Seizures ([address] and Thyroid disease. He presents to the clinic to address following concerns:    Acute concerns this visit:   Testicular pain  Patient states that he has 10/10 pain in his bilateral testicles right greater than left. Walking makes it worse and it is relieved with rest.  Patient states that NSAIDs have not relieved his pain in the past and would prefer Ultram for pain relief. Upon chart review, patient has presented to the ED multiple times with similar presentation. He states that it is worse this time and he has not been able to have intercourse for the past month with his girlfriend. When asked if he would be willing to try steroids, patient refuses and patient consistently request for Ultram.  Discussed with patient that clinic will be unable to prescribe opioids for his pain management. Patient keeps insisting that we should drug testing and given Ultram to prove that he is not drug-seeking.   Team discussed with patient that we will be unable to prescribe for the pain which showed no infections on UA or culture; no evidence of leukocytosis or fever; patient had ultrasound which showed hydrocele and testicular microlithiasis; referral to Dr. Mayelin Medina but their office has fired the patient 2/2 behavior; referral placed to [facility] for the patient; blood work to evaluate for testicular cancer markers; PSA low    Lab Results   Component Value Date/Time    LABA1C 5.8 05/19/2018 02:58 AM       PMH according to chart:      Diagnosis Date    Anxiety     Arthritis     Asthma     Bipolar 1 disorder ([address]     Chronic combined systolic and diastolic CHF (congestive heart failure) ([address] 05/27/2018    COPD (chronic obstructive pulmonary disease) ([address]     Depression     Diabetes mellitus ([address]     GERD (gastroesophageal reflux disease)     Hepatitis C     resolved    Hypertension     Hyperthyroidism 8/2/2012    Hypothyroidism due to medication     Mass of testicle     Mesothelioma [facility])     pt states possibly not    Neuromuscular disorder ([address]     Other disorders of kidney and ureter     kidney stones; most recent 08/27/2015    Paroxysmal A-fib ([address]     discussed with PCP- patient does have hx of PAfib    Peptic ulcer     Prostate enlargement     Pulmonary embolism ([address]     Intermediate risk for PE on VQ scan.     Seizures ([address]     Thyroid disease     hyperthyroid       Review of Systems    Outpatient Medications Marked as Taking for the 8/11/22 encounter (Office Visit) with Brad Gomez MD   Medication Sig Dispense Refill    levETIRAcetam (KEPPRA) 500 MG tablet Take 1,000 mg by mouth 2 times daily      Rivaroxaban (XARELTO PO) Take by mouth      divalproex (DEPAKOTE) 250 MG DR tablet Take 1 tablet by mouth every 12 hours 60 tablet 0    venlafaxine (EFFEXOR XR) 37.5 MG extended release capsule Take 1 capsule by mouth daily (with breakfast) (Patient taking differently: Take 75 mg by mouth in the morning and at bedtime 75 mg in the day and 36 mg at night.) 30 capsule 0    atorvastatin (LIPITOR) 40 MG tablet Take 1 tablet by mouth nightly 30 tablet 0    traZODone (DESYREL) 50 MG tablet Take 1 tablet by mouth nightly as needed for Sleep 30 tablet 0    Multiple Vitamins-Minerals (THERAPEUTIC MULTIVITAMIN-MINERALS) tablet Take 1 tablet by mouth daily      BREO ELLIPTA 200-25 MCG/INH AEPB       methimazole (TAPAZOLE) 10 MG tablet Take 10 mg by mouth 3 times daily      lisinopril (PRINIVIL;ZESTRIL) 10 MG tablet Take 1 tablet by mouth daily 30 tablet 11    Nutritional Supplements (RA BALANCED NUTRITIONAL PLUS) LIQD Take 1 Bottle by mouth daily 10 Can 0    finasteride (PROSCAR) 5 MG tablet Take 1 tablet by mouth daily 30 tablet 3       Prior to Visit Medications    Medication Sig Taking? Authorizing Provider   levETIRAcetam (KEPPRA) 500 MG tablet Take 1,000 mg by mouth 2 times daily Yes Historical Provider, MD   Rivaroxaban (XARELTO PO) Take by mouth Yes Historical Provider, MD   divalproex (DEPAKOTE) 250 MG DR tablet Take 1 tablet by mouth every 12 hours Yes Denga Bolds, APRN - CNP   venlafaxine (EFFEXOR XR) 37.5 MG extended release capsule Take 1 capsule by mouth daily (with breakfast)  Patient taking differently: Take 75 mg by mouth in the morning and at bedtime 75 mg in the day and 36 mg at night.  Yes Denrickeyse Bolds, APRN - CNP   atorvastatin (LIPITOR) 40 MG tablet Take 1 tablet by mouth nightly Yes Denrickeyse Bolds, APRN - CNP   traZODone (DESYREL) 50 MG tablet Take 1 tablet by mouth nightly as needed for Sleep Yes Deneise Bolds, APRN - CNP   Multiple Vitamins-Minerals (THERAPEUTIC MULTIVITAMIN-MINERALS) tablet Take 1 tablet by mouth daily Yes Historical Provider, MD James Boyer 200-25 MCG/INH AEPB  Yes Historical Provider, MD   methimazole (TAPAZOLE) 10 MG tablet Take 10 mg by mouth 3 times daily Yes Historical Provider, MD   lisinopril (PRINIVIL;ZESTRIL) 10 MG tablet Take 1 tablet by mouth daily Yes Shira Roque MD   Nutritional Supplements (RA BALANCED NUTRITIONAL PLUS) LIQD Take 1 Bottle by mouth daily Yes Vasyl Sanchez MD   finasteride (PROSCAR) 5 MG tablet Take 1 tablet by mouth daily Yes Vasyl Sanchez MD   LATUDA 120 MG tablet   Historical Provider, MD   sildenafil (VIAGRA) 50 MG tablet take 1 tablet by mouth 1 hour prior to intercourse  Historical Provider, MD   acetaminophen (TYLENOL) 500 MG tablet Take 1 tablet by mouth 4 times daily as needed for Pain  Patient not taking: Reported on 8/11/2022  Nick Salazar MD       Social History     Tobacco Use    Smoking status: Never    Smokeless tobacco: Former     Types: Chew     Quit date: 2018   Vaping Use    Vaping Use: Never used   Substance Use Topics    Alcohol use: No     Alcohol/week: 0.0 standard drinks     Comment: recovered alcoholic; 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 I have reviewed all pertinent PMHx, PSHx, FamHx, SocialHx, medications, and allergies and updated history as appropriate. OBJECTIVE:    VS: /70 (Site: Right Upper Arm, Position: Sitting, Cuff Size: Large Adult) Comment: manual  Pulse 72   Temp 97.3 °F (36.3 °C) (Temporal)   Resp 18   Ht 5' 4\" (1.626 m)   Wt 147 lb (66.7 kg)   SpO2 95% Comment: room air  BMI 25.23 kg/m²     Physical Exam  Constitutional:       General: He is not in acute distress. Appearance: He is well-developed. HENT:      Head: Normocephalic and atraumatic. Eyes:      General: No scleral icterus. Conjunctiva/sclera: Conjunctivae normal.   Neck:      Trachea: No tracheal deviation. Cardiovascular:      Rate and Rhythm: Normal rate. Heart sounds: No murmur heard. Pulmonary:      Effort: Pulmonary effort is normal. No respiratory distress. Breath sounds: Normal breath sounds. Abdominal:      General: Bowel sounds are normal. There is no distension. Palpations: Abdomen is soft. Tenderness: There is no abdominal tenderness. Hernia: A hernia is present.  Hernia is present in the ventral area. There is no hernia in the left inguinal area or right inguinal area. Genitourinary:     Penis: Circumcised. Tenderness present. No phimosis, erythema, discharge, swelling or lesions. Testes:         Right: Tenderness present. Swelling not present. Left: Tenderness present. Swelling not present. Epididymis:      Right: Not inflamed or enlarged. Tenderness present. No mass. Left: Not inflamed or enlarged. Tenderness present. No mass. Ankur stage (genital): 5. Musculoskeletal:      Cervical back: Normal range of motion. Right hip: Tenderness present. Decreased range of motion (Positive KERLINE and FADIR test). Left hip: Tenderness present. Decreased range of motion (Positive KERLINE and FADIR test). Skin:     General: Skin is warm and dry. Neurological:      Mental Status: He is alert and oriented to person, place, and time. Gait: Gait abnormal (Antalgic gait). Psychiatric:         Behavior: Behavior normal.         Thought Content: Thought content normal.         Judgment: Judgment normal.       No results found. HCM    To be discussed with primary at next visit. Health Maintenance Due   Topic Date Due    Annual Wellness Visit (AWV)  Never done    Hepatitis A vaccine (1 of 2 - Risk 2-dose series) Never done    Diabetic foot exam  Never done    Diabetic microalbuminuria test  Never done    Diabetic retinal exam  Never done    Colorectal Cancer Screen  Never done    DTaP/Tdap/Td vaccine (1 - Tdap) 12/14/2011    A1C test (Diabetic or Prediabetic)  05/19/2019    Lipids  05/19/2019    COVID-19 Vaccine (3 - Booster for Evelin Deborah series) 12/08/2021       RTC   Return for PCP f/u . No future appointments. I have reviewed my findings and recommendations with Ronda Parks and Dr Mason Juárez. Electronically signed by Андрей Sue MD, PhD PGY- 2 on 8/11/2022.     Internal Medicine Resident    This report was completed using computerize voiced recognition software. Every effort has been made to ensure accuracy; however, inadvertent computerized transcription errors may be present.

## 2022-08-11 NOTE — PATIENT INSTRUCTIONS
[facility] Internal Medicine Resident Service    Activity as tolerated  Diet: common adult, low fat, and low sodium  Be compliant with your medications and take them as prescribed. Special Instructions:     X ray of bilateral hip has been ordered please get it done as soon as able  We strongly advice against getting narcotics from the streets. Continue to take rest of medications as prescribed. Continue to exercise to maintain good health       Hospital Follow up: Other Follow-Ups:    No future appointments. If a referral was placed on your behalf during your visit, you should expect to receive information about the date and time of your referral appointment within 7-10 days. If not, please call the office at [phone] and ask to speak to the . Should you have further questions in regards to this visit, you can review your clinical note and after visit summary document on your My [employer] [facility] account. Other questions can be directed to our nurse line at [phone]. Discharge instruction reviewed with Patient. Patient verbalizes understanding. Script given.       Dora Scott MD PGY-2  8/11/2022  10:47 AM

## 2022-08-11 NOTE — PROGRESS NOTES
[facility]  Internal Medicine Residency Clinic    Attending Physician Statement  I have discussed the case, including pertinent history and exam findings with the resident physician. I have seen and examined the patient and the key elements of the encounter have been performed by me. I agree with the assessment, plan and orders as documented by the resident. I have reviewed all pertinent PMHx, PSHx, FamHx, SocialHx, medications, and allergies and updated history as appropriate. Patient presents for routine follow up of medical problems. Testicular pain -- suspect referred pain from hip OA   - Severe pain reduced ROM with FADIR of right hip, antalgic gait  - multiple ED evaluations, US and urologic evaluations (last hip xray 2011)  - xray hips, pending completion xray referral to sports med for intraarticular injection on right side  - recommended gabapentin 300 mg qhs; he demands Ultram and refused this; he sees multiple providers locally with significant irregularity in prescribers; he does reside at the [address] [place] [address]. Remainder of medical problems as per resident note.     Jason Olvera DO  8/11/2022 10:30 AM

## 2022-08-31 ENCOUNTER — HOSPITAL ENCOUNTER (EMERGENCY)
Age: 67
Discharge: LEFT BEFORE DISCHARGE | End: 2022-08-31

## 2022-08-31 VITALS
SYSTOLIC BLOOD PRESSURE: 114 MMHG | RESPIRATION RATE: 18 BRPM | OXYGEN SATURATION: 96 % | WEIGHT: 130 LBS | TEMPERATURE: 97 F | HEART RATE: 67 BPM | DIASTOLIC BLOOD PRESSURE: 71 MMHG | BODY MASS INDEX: 22.31 KG/M2

## 2022-08-31 DIAGNOSIS — Z53.21 ELOPED FROM EMERGENCY DEPARTMENT: Primary | ICD-10-CM

## 2022-08-31 ASSESSMENT — PAIN DESCRIPTION - PAIN TYPE: TYPE: CHRONIC PAIN

## 2022-08-31 ASSESSMENT — PAIN DESCRIPTION - FREQUENCY: FREQUENCY: CONTINUOUS

## 2022-08-31 ASSESSMENT — PAIN DESCRIPTION - LOCATION: LOCATION: BACK

## 2022-08-31 NOTE — ED NOTES
Department of Emergency Medicine  FIRST PROVIDER TRIAGE NOTE             Independent MLP           8/31/22  1:05 PM EDT    Date of Encounter: 8/31/22   MRN: [id number]      HPI: Dejan Ornelas is a 78 y.o. male who presents to the ED for Back Pain (X 20 years )  History of herniated disks and drug seeking behavior  ROS: Negative for cp or sob. PE: Gen Appearance/Constitutional: alert  Musculoskeletal: moves all extremities x 4 ambulatory with steady gait. Initial Plan of Care: All treatment areas with department are currently occupied.  Plan to order/Initiate the following while awaiting opening in ED: exam. Initiate Treatment-Testing, Proceed toTreatment Area When Bed Available for ED Attending/MLP to Continue Care    Electronically signed by ZOIE Medrano CNP   DD: 8/31/22      ZOIE Bermudez CNP  08/31/22 [id number]

## 2023-01-22 ENCOUNTER — APPOINTMENT (OUTPATIENT)
Dept: CT IMAGING | Age: 68
End: 2023-01-22
Payer: MEDICARE

## 2023-01-22 ENCOUNTER — HOSPITAL ENCOUNTER (EMERGENCY)
Age: 68
Discharge: HOME | End: 2023-01-22
Attending: EMERGENCY MEDICINE
Payer: MEDICARE

## 2023-01-22 VITALS
RESPIRATION RATE: 18 BRPM | OXYGEN SATURATION: 97 % | SYSTOLIC BLOOD PRESSURE: 120 MMHG | TEMPERATURE: 98.2 F | DIASTOLIC BLOOD PRESSURE: 82 MMHG | HEART RATE: 85 BPM

## 2023-01-22 DIAGNOSIS — M25.78 DEGENERATION OF INTERVERTEBRAL DISC OF LUMBAR REGION WITH OSTEOPHYTE OF LUMBAR VERTEBRA: ICD-10-CM

## 2023-01-22 DIAGNOSIS — M51.36 DEGENERATION OF INTERVERTEBRAL DISC OF LUMBAR REGION WITH OSTEOPHYTE OF LUMBAR VERTEBRA: ICD-10-CM

## 2023-01-22 DIAGNOSIS — M48.00 CENTRAL STENOSIS OF SPINAL CANAL: ICD-10-CM

## 2023-01-22 DIAGNOSIS — M51.26 LUMBAR HERNIATED DISC: Primary | ICD-10-CM

## 2023-01-22 PROCEDURE — 6370000000 HC RX 637 (ALT 250 FOR IP): Performed by: NURSE PRACTITIONER

## 2023-01-22 PROCEDURE — 6360000002 HC RX W HCPCS: Performed by: NURSE PRACTITIONER

## 2023-01-22 PROCEDURE — 72131 CT LUMBAR SPINE W/O DYE: CPT | Performed by: RADIOLOGY

## 2023-01-22 PROCEDURE — 72131 CT LUMBAR SPINE W/O DYE: CPT

## 2023-01-22 PROCEDURE — 99284 EMERGENCY DEPT VISIT MOD MDM: CPT

## 2023-01-22 PROCEDURE — 96372 THER/PROPH/DIAG INJ SC/IM: CPT

## 2023-01-22 RX ORDER — PREDNISONE 10 MG/1
TABLET ORAL
Qty: 20 TABLET | Refills: 0 | Status: SHIPPED | OUTPATIENT
Start: 2023-01-22 | End: 2023-01-29 | Stop reason: ALTCHOICE

## 2023-01-22 RX ORDER — OXYCODONE HYDROCHLORIDE AND ACETAMINOPHEN 5; 325 MG/1; MG/1
1 TABLET ORAL ONCE
Status: COMPLETED | OUTPATIENT
Start: 2023-01-22 | End: 2023-01-22

## 2023-01-22 RX ORDER — LIDOCAINE 50 MG/G
1 PATCH TOPICAL EVERY 24 HOURS
Qty: 10 PATCH | Refills: 0 | Status: SHIPPED | OUTPATIENT
Start: 2023-01-22 | End: 2023-01-29 | Stop reason: ALTCHOICE

## 2023-01-22 RX ORDER — ORPHENADRINE CITRATE 30 MG/ML
60 INJECTION INTRAMUSCULAR; INTRAVENOUS ONCE
Status: COMPLETED | OUTPATIENT
Start: 2023-01-22 | End: 2023-01-22

## 2023-01-22 RX ORDER — DEXAMETHASONE SODIUM PHOSPHATE 4 MG/ML
10 INJECTION, SOLUTION INTRA-ARTICULAR; INTRALESIONAL; INTRAMUSCULAR; INTRAVENOUS; SOFT TISSUE ONCE
Status: COMPLETED | OUTPATIENT
Start: 2023-01-22 | End: 2023-01-22

## 2023-01-22 RX ORDER — OXYCODONE HYDROCHLORIDE AND ACETAMINOPHEN 5; 325 MG/1; MG/1
1 TABLET ORAL EVERY 6 HOURS PRN
Qty: 10 TABLET | Refills: 0 | Status: SHIPPED | OUTPATIENT
Start: 2023-01-22 | End: 2023-01-25

## 2023-01-22 RX ADMIN — OXYCODONE AND ACETAMINOPHEN 1 TABLET: 5; 325 TABLET ORAL at 18:42

## 2023-01-22 RX ADMIN — DEXAMETHASONE SODIUM PHOSPHATE 10 MG: 4 INJECTION, SOLUTION INTRAMUSCULAR; INTRAVENOUS at 18:43

## 2023-01-22 RX ADMIN — ORPHENADRINE CITRATE 60 MG: 30 INJECTION INTRAMUSCULAR; INTRAVENOUS at 18:43

## 2023-01-22 ASSESSMENT — PAIN SCALES - GENERAL: PAINLEVEL_OUTOF10: 10

## 2023-01-22 NOTE — ED PROVIDER NOTES
Independent  HPI:  1/22/23, Time: 6:32 PM HALEIGH Andrade is a 78 y.o. male presenting to the ED for worsening lower lumbar back pain. Patient with known history of back pain but does not follow with any pain management doctor or neurosurgeon. He denies any injury or trauma that he can recall he reports that he believes he was told he has 4 herniated disc. He reports over the last several days been having worsening back pain and now the pain is radiating down his right lower extremity. Denies any saddle anesthesia, denies any unexplained incontinence. Also denies any difficulty with urination. Patient also denies any abdominal pain with this as well as no associated nausea or vomiting. Reports taking over-the-counter meds without any relief. Symptoms moderate in severity and persistent  Review of Systems:   A complete review of systems was performed and pertinent positives and negatives are stated within HPI, all other systems reviewed and are negative.          --------------------------------------------- PAST HISTORY ---------------------------------------------  Past Medical History:  has a past medical history of Anxiety, Arthritis, Asthma, Bipolar 1 disorder ([address] Chronic combined systolic and diastolic CHF (congestive heart failure) ([address] COPD (chronic obstructive pulmonary disease) ([address] Depression, Diabetes mellitus ([address] GERD (gastroesophageal reflux disease), Hepatitis C, Hypertension, Hyperthyroidism, Hypothyroidism due to medication, Mass of testicle, Mesothelioma ([address] Neuromuscular disorder ([address] Other disorders of kidney and ureter, Paroxysmal A-fib ([address] Peptic ulcer, Prostate enlargement, Pulmonary embolism ([address] Seizures ([address] and Thyroid disease. Past Surgical History:  has a past surgical history that includes Appendectomy; Lithotripsy; Hemorrhoid surgery; Bladder surgery; Endoscopy, colon, diagnostic (11/13/2015); Abdomen surgery;  Colonoscopy; Cardiac surgery; vascular surgery; and Cardiac catheterization (05/21/2018). Social History:  reports that he has never smoked. He quit smokeless tobacco use about 5 years ago. His smokeless tobacco use included chew. He reports that he does not currently use drugs after having used the following drugs: Cocaine. He reports that he does not drink alcohol. Family History: family history includes Cancer in his father, maternal grandfather, and mother; Diabetes in his father, maternal aunt, maternal aunt, maternal aunt, maternal grandmother, and mother; Heart Disease in his paternal grandfather and paternal grandmother; Heart Failure in his paternal grandfather and paternal grandmother. The patients home medications have been reviewed. Allergies: Codeine, Dye [iodides], Ketorolac tromethamine, Peanuts [peanut oil], Shellfish-derived products, and Nitroglycerin    -------------------------------------------------- RESULTS -------------------------------------------------  All laboratory and radiology results have been personally reviewed by myself   LABS:  No results found for this visit on 01/22/23. RADIOLOGY:  Interpreted by Gal Faria   Final Result   Unremarkable non-contrast CT of the lumbar spine. Multilevel bony and discogenic degenerative changes contributes to bilateral   neuroforaminal encroachment and bony central spinal canal stenosis, as   detailed above. RECOMMENDATIONS:   MRI lumbar spine.             ------------------------- NURSING NOTES AND VITALS REVIEWED ---------------------------   The nursing notes within the ED encounter and vital signs as below have been reviewed.    /82   Pulse 85   Temp 98.2 °F (36.8 °C) (Oral)   Resp 18   SpO2 97%   Oxygen Saturation Interpretation: Normal      ---------------------------------------------------PHYSICAL EXAM--------------------------------------      Constitutional/General: Alert and oriented x3, mildly uncomfortable   head: Normocephalic and atraumatic  Eyes: PERRL, EOMI  Mouth: Oropharynx clear, handling secretions, no trismus  Neck: Supple, full ROM,   Pulmonary: Lungs clear to auscultation bilaterally, no wheezes, rales, or rhonchi. Not in respiratory distress  Cardiovascular:  Regular rate and rhythm, no murmurs, gallops, or rubs. 2+ distal pulses  Abdomen: Soft, non tender, non distended,   Extremities: Moves all extremities x 4. Warm and well perfused, patient with point tenderness to left and right lateral lumbar aspects.  Patient with full sensation intact in bilateral lower extremities, compartments are soft.  Did have 2+ posterior tibial pulses.  Skin: warm and dry without rash  Neurologic: GCS 15,  Psych: Normal Affect        ------------------------------ ED COURSE/MEDICAL DECISION MAKING----------------------  Medications   oxyCODONE-acetaminophen (PERCOCET) 5-325 MG per tablet 1 tablet (1 tablet Oral Given 1/22/23 1842)   orphenadrine (NORFLEX) injection 60 mg (60 mg IntraMUSCular Given 1/22/23 1843)   dexamethasone (DECADRON) injection 10 mg (10 mg IntraMUSCular Given 1/22/23 1843)         ED COURSE:       Medical Decision Making: Raj Cohen is a 67 y.o. male presenting to the ED for worsening lower lumbar back pain.  Patient with known history of back pain but does not follow with any pain management doctor or neurosurgeon.  He denies any injury or trauma that he can recall he reports that he believes he was told he has 4 herniated disc.  He reports over the last several days been having worsening back pain and now the pain is radiating down his right lower extremity.  Denies any saddle anesthesia, denies any unexplained incontinence.  Also denies any difficulty with urination.  Patient also denies any abdominal pain with this as well as no associated nausea or vomiting.  Reports taking over-the-counter meds without any relief.  Symptoms moderate in severity and persistent.   Differential diagnosis includes lumbar herniated disc versus foraminal stenosis versus muscle skeletal pain versus cauda equina syndrome. Plan will be to medicate patient with 1 Percocet tablet 5-325 mg tablet, Norflex 60 mg IM injection as well as Decadron 10 mg IM injection. We will also obtain CT lumbar spine. Patient on reevaluation reports marked improvement in pain pain now just slightly. Patient with CT scan lumbar spine resulted showing overall unremarkable noncontrasted CT of the lumbar spine does show multilevel bony and disc echogenic degenerative changes contributing to bilateral neuroforaminal encroachment and bony centrals final canal stenosis as well as multilevel herniated disc. Patient is able to ambulate easily and independently. Plan will be for discharge home he was provided with referral to neurosurgery and was made aware to call in the morning for follow-up appointment and continued medical management. He will be sent home with prescriptions to assist with further reduction of pain. Was educated on strict return precautions as well as the importance of following up with understanding. Patient is neurovascular and hemodynamically intact. Patient expressed understanding. Patient will be safely discharged home. Counseling: The emergency provider has spoken with the patient and discussed todays results, in addition to providing specific details for the plan of care and counseling regarding the diagnosis and prognosis. Questions are answered at this time and they are agreeable with the plan.     History from : Patient and Medical records     Limitations to history : None    Chronic Conditions: has a past medical history of Anxiety, Arthritis, Asthma, Bipolar 1 disorder ([address] Chronic combined systolic and diastolic CHF (congestive heart failure) ([address] COPD (chronic obstructive pulmonary disease) ([address] Depression, Diabetes mellitus ([address] GERD (gastroesophageal reflux disease), Hepatitis C, Hypertension, Hyperthyroidism, Hypothyroidism due to medication, Mass of testicle, Mesothelioma ([address] Neuromuscular disorder ([address] Other disorders of kidney and ureter, Paroxysmal A-fib ([address] Peptic ulcer, Prostate enlargement, Pulmonary embolism ([address] Seizures ([address] and Thyroid disease. CONSULTS: Outpatient follow-up for neurosurgery    Discussion with Other Profesionals : None    Social Determinants :  reports that he has never smoked. He quit smokeless tobacco use about 5 years ago. His smokeless tobacco use included chew. He reports that he does not currently use drugs after having used the following drugs: Cocaine. He reports that he does not drink alcohol. Records Reviewed : Source patient and Inpatient Notes epic medical records        Disposition Considerations (Tests not ordered but considered, Shared Decision Making, Pt Expectation of Test or Tx.):   Appropriate for outpatient management yes and Evaluation by myself and discharge recommended. I am the Primary Clinician of Record.      --------------------------------- IMPRESSION AND DISPOSITION ---------------------------------    IMPRESSION  1. Lumbar herniated disc    2. Central stenosis of spinal canal    3. Degeneration of intervertebral disc of lumbar region with osteophyte of lumbar vertebra        DISPOSITION  Disposition: Discharge to home  Patient condition is good      NOTE: This report was transcribed using voice recognition software.  Every effort was made to ensure accuracy; however, inadvertent computerized transcription errors may be present      ZOIE Jensen CNP  01/22/23 2049

## 2023-01-27 ENCOUNTER — HOSPITAL ENCOUNTER (EMERGENCY)
Age: 68
Discharge: HOME | End: 2023-01-27
Payer: MEDICARE

## 2023-01-27 ENCOUNTER — OFFICE VISIT (OUTPATIENT)
Dept: NEUROSURGERY | Age: 68
End: 2023-01-27
Payer: MEDICARE

## 2023-01-27 VITALS
HEART RATE: 97 BPM | BODY MASS INDEX: 22.2 KG/M2 | SYSTOLIC BLOOD PRESSURE: 121 MMHG | TEMPERATURE: 97.7 F | HEIGHT: 64 IN | RESPIRATION RATE: 16 BRPM | DIASTOLIC BLOOD PRESSURE: 81 MMHG | WEIGHT: 130 LBS | OXYGEN SATURATION: 94 %

## 2023-01-27 VITALS
HEART RATE: 95 BPM | RESPIRATION RATE: 18 BRPM | SYSTOLIC BLOOD PRESSURE: 127 MMHG | OXYGEN SATURATION: 100 % | DIASTOLIC BLOOD PRESSURE: 84 MMHG | TEMPERATURE: 97.5 F

## 2023-01-27 DIAGNOSIS — Z09 HOSPITAL DISCHARGE FOLLOW-UP: ICD-10-CM

## 2023-01-27 DIAGNOSIS — M48.061 DEGENERATIVE LUMBAR SPINAL STENOSIS: Primary | ICD-10-CM

## 2023-01-27 DIAGNOSIS — M54.31 SCIATICA OF RIGHT SIDE: Primary | ICD-10-CM

## 2023-01-27 PROCEDURE — 1111F DSCHRG MED/CURRENT MED MERGE: CPT | Performed by: PHYSICIAN ASSISTANT

## 2023-01-27 PROCEDURE — 99203 OFFICE O/P NEW LOW 30 MIN: CPT | Performed by: PHYSICIAN ASSISTANT

## 2023-01-27 PROCEDURE — 6370000000 HC RX 637 (ALT 250 FOR IP): Performed by: PHYSICIAN ASSISTANT

## 2023-01-27 PROCEDURE — 99212 OFFICE O/P EST SF 10 MIN: CPT | Performed by: PHYSICIAN ASSISTANT

## 2023-01-27 PROCEDURE — 99283 EMERGENCY DEPT VISIT LOW MDM: CPT

## 2023-01-27 RX ORDER — OXYCODONE HYDROCHLORIDE 5 MG/1
5 TABLET ORAL EVERY 6 HOURS PRN
Qty: 12 TABLET | Refills: 0 | Status: SHIPPED | OUTPATIENT
Start: 2023-01-27 | End: 2023-01-30

## 2023-01-27 RX ORDER — OXYCODONE HYDROCHLORIDE 5 MG/1
5 TABLET ORAL ONCE
Status: COMPLETED | OUTPATIENT
Start: 2023-01-27 | End: 2023-01-27

## 2023-01-27 RX ADMIN — OXYCODONE 5 MG: 5 TABLET ORAL at 11:31

## 2023-01-27 ASSESSMENT — PAIN SCALES - GENERAL: PAINLEVEL_OUTOF10: 10

## 2023-01-27 ASSESSMENT — PAIN DESCRIPTION - DESCRIPTORS: DESCRIPTORS: ACHING

## 2023-01-27 ASSESSMENT — PAIN DESCRIPTION - LOCATION: LOCATION: BACK

## 2023-01-27 ASSESSMENT — PAIN DESCRIPTION - ORIENTATION: ORIENTATION: LOWER
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
## 2023-01-27 NOTE — PROGRESS NOTES
CLINICAL PHARMACY NOTE: MEDS TO BEDS    Total # of Prescriptions Filled: 1   The following medications were delivered to the patient:  Oxycodone 5 mg    Additional Documentation:     Patient picked up in the pharmacy

## 2023-01-27 NOTE — ED PROVIDER NOTES
Independent MABEL Visit. HPI:  1/27/23, Time: 11:13 AM HALEIGH Merida is a 78 y.o. male presenting to the ED for Back , leg pain , beginning couple weeks  ago. The complaint has been persistent, severe in severity, and worsened by movement of Back , standing, walking. Patient Comes in with complaint of lower back pain that radiates down the right leg to the foot that started few weeks ago he was seen in the emergency room had a CAT scan that showed multilevel bony and discogenic degenerative changes contributes to bilateral neuroforaminal encroachment and bony central spinal canal stenosis. Patient was seen today by neurosurgery was given referral to pain management he has an appointment set up for February 14 with his primary care presently out of pain medications. Patient is unable to take Tylenol due to hepatitis C and is unable to take Motrin medications secondary to being on Xarelto. Patient denies any abdominal pain no urinary or bowel incontinence no saddle paresthesias. Denies any fever or chills. No recent reinjury.       Review of Systems:   A complete review of systems was performed and pertinent positives and negatives are stated within HPI, all other systems reviewed and are negative.          --------------------------------------------- PAST HISTORY ---------------------------------------------  Past Medical History:  has a past medical history of Anxiety, Arthritis, Asthma, Bipolar 1 disorder ([address] Chronic combined systolic and diastolic CHF (congestive heart failure) ([address] COPD (chronic obstructive pulmonary disease) ([address] Depression, Diabetes mellitus ([address] GERD (gastroesophageal reflux disease), Hepatitis C, Hypertension, Hyperthyroidism, Hypothyroidism due to medication, Mass of testicle, Mesothelioma ([address] Neuromuscular disorder ([address] Other disorders of kidney and ureter, Paroxysmal A-fib ([address] Peptic ulcer, Prostate enlargement, Pulmonary embolism ([address] Seizures ([facility] and Thyroid disease. Past Surgical History:  has a past surgical history that includes Appendectomy; Lithotripsy; Hemorrhoid surgery; Bladder surgery; Endoscopy, colon, diagnostic (11/13/2015); Abdomen surgery; Colonoscopy; Cardiac surgery; vascular surgery; and Cardiac catheterization (05/21/2018). Social History:  reports that he has never smoked. He quit smokeless tobacco use about 5 years ago. His smokeless tobacco use included chew. He reports that he does not currently use drugs after having used the following drugs: Cocaine. He reports that he does not drink alcohol. Family History: family history includes Cancer in his father, maternal grandfather, and mother; Diabetes in his father, maternal aunt, maternal aunt, maternal aunt, maternal grandmother, and mother; Heart Disease in his paternal grandfather and paternal grandmother; Heart Failure in his paternal grandfather and paternal grandmother. The patients home medications have been reviewed. Allergies: Codeine, Dye [iodides], Ketorolac tromethamine, Peanuts [peanut oil], Shellfish-derived products, and Nitroglycerin    -------------------------------------------------- RESULTS -------------------------------------------------  All laboratory and radiology results have been personally reviewed by myself   LABS:  No results found for this visit on 01/27/23. RADIOLOGY:  Interpreted by Radiologist.  No orders to display       ------------------------- NURSING NOTES AND VITALS REVIEWED ---------------------------   The nursing notes within the ED encounter and vital signs as below have been reviewed.    /84   Pulse 95   Temp 97.5 °F (36.4 °C) (Temporal)   Resp 18   SpO2 100%   Oxygen Saturation Interpretation: Normal      ---------------------------------------------------PHYSICAL EXAM--------------------------------------      Constitutional/General: Alert and oriented x3, well appearing, non toxic in NAD  Head: Normocephalic and atraumatic  Eyes: PERRL, EOMI  Mouth: Oropharynx clear, handling secretions, no trismus  Neck: Supple, full ROM,   Pulmonary: Lungs clear to auscultation bilaterally, no wheezes, rales, or rhonchi. Not in respiratory distress  Cardiovascular:  Regular rate and rhythm, no murmurs, gallops, or rubs. 2+ distal pulses  Abdomen: Soft, non tender, non distended, CVA tenderness  Back lower lumbar right paravertebral tenderness  Extremities: Moves all extremities x 4. Warm and well perfused normal strength sensation pulses normal.  Patient is able to dorsi flex plantarflex foot  Skin: warm and dry without rash  Neurologic: GCS 15,  Psych: Normal Affect      ------------------------------ ED COURSE/MEDICAL DECISION MAKING----------------------  Medications   oxyCODONE (ROXICODONE) immediate release tablet 5 mg (5 mg Oral Given 1/27/23 1131)         ED COURSE:     Medical Decision Making    Patient presents to the ER for Back pain . Social Determinants include   Social Connections: Not on file    Social Determinants : None. Chronic conditions    Past Medical History:   Diagnosis Date    Anxiety     Arthritis     Asthma     Bipolar 1 disorder (HCC)     Chronic combined systolic and diastolic CHF (congestive heart failure) ([address] 05/27/2018    COPD (chronic obstructive pulmonary disease) (HCC)     Depression     Diabetes mellitus (HCC)     GERD (gastroesophageal reflux disease)     Hepatitis C     resolved    Hypertension     Hyperthyroidism 8/2/2012    Hypothyroidism due to medication     Mass of testicle     [facility])     pt states possibly not    Neuromuscular disorder ([address]     Other disorders of kidney and ureter     kidney stones; most recent 08/27/2015    Paroxysmal A-fib ([address]     discussed with PCP- patient does have hx of PAfib    Peptic ulcer     Prostate enlargement     Pulmonary embolism ([address]     Intermediate risk for PE on VQ scan.     Seizures ([address]     Thyroid disease hyperthyroid   . Physical exam right lumbar paravertebral tenderness normal strength sensation able to dorsi and plantarflex the foot pulses normal.  Vital signs /84Temp   97.5 °F    (36.4 °C)  Heart Rate 95Resp 18SpO2 100%Wt   130 lb    (59 kg)  Ht   5' 4\"    (162.6 cm)  BMI 22.31 kg/m²Pain Level 10. Differential diagnoses include but not limited to lumbar strain lumbar fracture lumbar radiculopathy spinal stenosis discitis, cauda equina referral of previous CT done on January 22 shows multilevel disc echogenic degenerative changes spinal stenosis no repeat studies were obtained at this time patient had no recent reinjury. Consults included none. Results were discussed with patient. Patient was given Roxicodone for their symptoms with moderate improvement. Patient will be discharged home with the following prescriptions, Roxicodone 5 mg every 6 hours as needed. Discussed appropriate use and potential side effects of starting the prescribed medications. Patient continues to be non-toxic on re-evaluation. Findings were discussed with the patient and reasons to immediately return to the ED were articulated to them. They will follow-up with their PMD and pain management as scheduled. Discharge Instructions:   Patient referred to  [address]  [phone]    Call in 1 day      MEDICATIONS:   DISCHARGE MEDICATIONS:  Discharge Medication List as of 1/27/2023 11:34 AM        START taking these medications    Details   oxyCODONE (ROXICODONE) 5 MG immediate release tablet Take 1 tablet by mouth every 6 hours as needed for Pain for up to 3 days. Intended supply: 3 days.  Take lowest dose possible to manage pain Max Daily Amount: 20 mg, Disp-12 tablet, R-0Normal             DISCONTINUED MEDICATIONS:  Discharge Medication List as of 1/27/2023 11:34 AM          Record Review:  Records Reviewed : Source review of recent [facility] encounter       Disposition Considerations: This patient's ED course included: a personal history and physicial examination and re-evaluation prior to disposition  This patient has remained hemodynamically stable during their ED course. I emphasized the importance of follow-up with the physician I referred them to in the timeframe recommended. I discussed with the patient emergent symptoms and the need to immediately return to the ER. Written information was included in their discharge instructions. Additional verbal discharge instructions were also given and discussed with the patient to supplement those generated by the EMR. We also discussed medications that were prescribed  (if any) including common side effects and interactions. The patient was advised to abstain from driving, operating heavy machinery or making significant decisions while taking medications such as opiates and muscle relaxers that may impair this. All questions were addressed. They understand return precautions and discharge instructions. The patient  expressed understanding. Vitals were stable and they were in no distress at discharge. Counseling: The emergency provider has spoken with the patient and discussed todays results, in addition to providing specific details for the plan of care and counseling regarding the diagnosis and prognosis. Questions are answered at this time and they are agreeable with the plan.      --------------------------------- IMPRESSION AND DISPOSITION ---------------------------------    IMPRESSION  1. Sciatica of right side        DISPOSITION  Disposition: Discharge to home  Patient condition is good      NOTE: This report was transcribed using voice recognition software.  Every effort was made to ensure accuracy; however, inadvertent computerized transcription errors may be present      Margie Rosarioma  01/27/23 1241

## 2023-01-29 ENCOUNTER — HOSPITAL ENCOUNTER (EMERGENCY)
Age: 68
Discharge: HOME | End: 2023-01-29
Attending: EMERGENCY MEDICINE
Payer: MEDICARE

## 2023-01-29 VITALS
DIASTOLIC BLOOD PRESSURE: 75 MMHG | OXYGEN SATURATION: 100 % | HEART RATE: 79 BPM | TEMPERATURE: 97.8 F | SYSTOLIC BLOOD PRESSURE: 133 MMHG | RESPIRATION RATE: 20 BRPM

## 2023-01-29 DIAGNOSIS — M54.50 ACUTE EXACERBATION OF CHRONIC LOW BACK PAIN: Primary | ICD-10-CM

## 2023-01-29 DIAGNOSIS — M54.31 SCIATICA OF RIGHT SIDE: ICD-10-CM

## 2023-01-29 DIAGNOSIS — G89.29 ACUTE EXACERBATION OF CHRONIC LOW BACK PAIN: Primary | ICD-10-CM

## 2023-01-29 PROCEDURE — 99283 EMERGENCY DEPT VISIT LOW MDM: CPT

## 2023-01-29 RX ORDER — METHYLPREDNISOLONE 4 MG/1
TABLET ORAL
Qty: 1 KIT | Refills: 0 | Status: SHIPPED | OUTPATIENT
Start: 2023-01-29 | End: 2023-02-04

## 2023-01-29 RX ORDER — CHLORZOXAZONE 250 MG/1
250 TABLET ORAL 3 TIMES DAILY PRN
Qty: 12 TABLET | Refills: 0 | Status: SHIPPED | OUTPATIENT
Start: 2023-01-29 | End: 2023-02-02

## 2023-01-29 RX ORDER — PREDNISONE 20 MG/1
60 TABLET ORAL ONCE
Status: DISCONTINUED | OUTPATIENT
Start: 2023-01-29 | End: 2023-01-29 | Stop reason: HOSPADM

## 2023-01-29 RX ORDER — ACETAMINOPHEN 500 MG
1000 TABLET ORAL ONCE
Status: DISCONTINUED | OUTPATIENT
Start: 2023-01-29 | End: 2023-01-29 | Stop reason: HOSPADM

## 2023-01-29 RX ORDER — LIDOCAINE 4 G/G
1 PATCH TOPICAL DAILY
Status: DISCONTINUED | OUTPATIENT
Start: 2023-01-29 | End: 2023-01-29 | Stop reason: HOSPADM

## 2023-01-29 RX ORDER — LIDOCAINE 50 MG/G
1 PATCH TOPICAL DAILY
Qty: 10 PATCH | Refills: 0 | Status: SHIPPED | OUTPATIENT
Start: 2023-01-29 | End: 2023-02-08
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
## 2023-01-31 ENCOUNTER — HOSPITAL ENCOUNTER (EMERGENCY)
Age: 68
Discharge: HOME | End: 2023-01-31
Payer: MEDICARE

## 2023-01-31 ENCOUNTER — APPOINTMENT (OUTPATIENT)
Dept: GENERAL RADIOLOGY | Age: 68
End: 2023-01-31
Payer: MEDICARE

## 2023-01-31 VITALS
HEART RATE: 91 BPM | DIASTOLIC BLOOD PRESSURE: 77 MMHG | RESPIRATION RATE: 18 BRPM | SYSTOLIC BLOOD PRESSURE: 132 MMHG | TEMPERATURE: 97.1 F | OXYGEN SATURATION: 100 %

## 2023-01-31 DIAGNOSIS — G89.29 ACUTE EXACERBATION OF CHRONIC LOW BACK PAIN: Primary | ICD-10-CM

## 2023-01-31 DIAGNOSIS — M54.50 ACUTE EXACERBATION OF CHRONIC LOW BACK PAIN: Primary | ICD-10-CM

## 2023-01-31 DIAGNOSIS — M54.32 SCIATICA OF LEFT SIDE: ICD-10-CM

## 2023-01-31 PROCEDURE — 99284 EMERGENCY DEPT VISIT MOD MDM: CPT

## 2023-01-31 PROCEDURE — 72100 X-RAY EXAM L-S SPINE 2/3 VWS: CPT

## 2023-01-31 PROCEDURE — 6360000002 HC RX W HCPCS: Performed by: PHYSICIAN ASSISTANT

## 2023-01-31 PROCEDURE — 96372 THER/PROPH/DIAG INJ SC/IM: CPT

## 2023-01-31 PROCEDURE — 6370000000 HC RX 637 (ALT 250 FOR IP): Performed by: PHYSICIAN ASSISTANT

## 2023-01-31 RX ORDER — ORPHENADRINE CITRATE 30 MG/ML
60 INJECTION INTRAMUSCULAR; INTRAVENOUS ONCE
Status: COMPLETED | OUTPATIENT
Start: 2023-01-31 | End: 2023-01-31

## 2023-01-31 RX ORDER — HYDROCODONE BITARTRATE AND ACETAMINOPHEN 5; 325 MG/1; MG/1
1 TABLET ORAL ONCE
Status: COMPLETED | OUTPATIENT
Start: 2023-01-31 | End: 2023-01-31

## 2023-01-31 RX ADMIN — HYDROCODONE BITARTRATE AND ACETAMINOPHEN 1 TABLET: 5; 325 TABLET ORAL at 11:20

## 2023-01-31 RX ADMIN — ORPHENADRINE CITRATE 60 MG: 30 INJECTION INTRAMUSCULAR; INTRAVENOUS at 11:20

## 2023-01-31 ASSESSMENT — PAIN SCALES - GENERAL: PAINLEVEL_OUTOF10: 7

## 2023-01-31 NOTE — DISCHARGE INSTRUCTIONS
You were seen 1/22, 1/27, 1/29 and today for the same issue. You have tried narcotics, lidocaine patches, steroids and muscle relaxers. No pain medication will be given upon discharge. Imaging reviewed with you, no fractures in the spine but there are diffuse degenerative changes. Please follow up with your family doctor.

## 2023-01-31 NOTE — ED NOTES
Mr Gerry Cortez wanted more pain meds.  Called in at [address] [place] [address]  01/31/23 [id number]

## 2023-01-31 NOTE — ED PROVIDER NOTES
Independent MABEL Visit. Seen with Coretta Dubin, PA-C    HPI:  1/31/23,   Time: 11:01 AM HALEIGH Cam is a 78 y.o. male presenting to the ED for back pain that has been present for over 20 years the patient reports. Pain is located in the left lower region of the back. With radiation of numbness and tingling down the left leg. He rates the pain a 10 out of 10. He is requesting Percocet until he can get into see his doctor. He reports an appointment next week with pain management and physical therapy. Patient was seen here on the 29th also requesting Percocet. However he was given steroids, lidocaine patch and a muscle relaxer none of which worked for him. Patient does report falling yesterday as he slipped and fell on the ice. He denies hitting his head and he does report taking blood thinners. He denies any fever, chills, body aches, headache, saddle anesthesia, bladder or bowel incontinence. Patient denies any chest pain, shortness of breath, nausea, vomiting, severe abdominal pain, change in bowel or bladder movements. Patient's only complaint is his \"chronic back pain. \"  ROS:   Pertinent positives and negatives are stated within HPI, all other systems reviewed and are negative.  --------------------------------------------- PAST HISTORY ---------------------------------------------  Past Medical History:  has a past medical history of Anxiety, Arthritis, Asthma, Bipolar 1 disorder ([facility] [address] Chronic combined systolic and diastolic CHF (congestive heart failure) ([facility] [address] COPD (chronic obstructive pulmonary disease) ([facility] [address] Depression, Diabetes mellitus ([facility] [address] GERD (gastroesophageal reflux disease), Hepatitis C, Hypertension, Hyperthyroidism, Hypothyroidism due to medication, Mass of testicle, Mesothelioma ([facility] [address] Neuromuscular disorder ([facility] [address] Other disorders of kidney and ureter, Paroxysmal A-fib ([address] Peptic ulcer, Prostate enlargement, Pulmonary embolism ([address] Seizures ([address] and Thyroid disease. Past Surgical History:  has a past surgical history that includes Appendectomy; Lithotripsy; Hemorrhoid surgery; Bladder surgery; Endoscopy, colon, diagnostic (11/13/2015); Abdomen surgery; Colonoscopy; Cardiac surgery; vascular surgery; and Cardiac catheterization (05/21/2018). Social History:  reports that he quit smoking about 13 years ago. His smoking use included cigarettes. He quit smokeless tobacco use about 5 years ago. His smokeless tobacco use included chew. He reports that he does not currently use drugs after having used the following drugs: Cocaine. He reports that he does not drink alcohol. Family History: family history includes Cancer in his father, maternal grandfather, and mother; Diabetes in his father, maternal aunt, maternal aunt, maternal aunt, maternal grandmother, and mother; Heart Disease in his paternal grandfather and paternal grandmother; Heart Failure in his paternal grandfather and paternal grandmother. The patients home medications have been reviewed. Allergies: Codeine, Dye [iodides], Ketorolac tromethamine, Peanuts [peanut oil], Shellfish-derived products, and Nitroglycerin    -------------------------------------------------- RESULTS -------------------------------------------------  All laboratory and radiology results have been personally reviewed by myself   LABS:  No results found for this visit on 01/31/23. RADIOLOGY:  Interpreted by Radiologist.  XR LUMBAR SPINE (2-3 VIEWS)   Final Result   Diffuse degenerative changes throughout the lumbar spine with dextroscoliosis   and no acute bony abnormality.             ------------------------- NURSING NOTES AND VITALS REVIEWED ---------------------------   The nursing notes within the ED encounter and vital signs as below have been reviewed.    /77   Pulse 91   Temp 97.1 °F (36.2 °C) (Temporal)   Resp 18   SpO2 100%   Oxygen Saturation Interpretation: Normal      ---------------------------------------------------PHYSICAL EXAM--------------------------------------  Constitutional/General: Alert and oriented x3, well appearing, non toxic in NAD  Head: NC/AT  Eyes: PERRL, EOMI  Mouth: Oropharynx clear, handling secretions, no trismus  Neck: Supple, full ROM, no meningeal signs  Pulmonary: Lungs clear to auscultation bilaterally, no wheezes, rales, or rhonchi. Not in respiratory distress  Cardiovascular:  Regular rate and rhythm, no murmurs, gallops, or rubs. 2+ distal pulses  Abdomen: Soft, non tender, non distended,   Extremities/back: No tenderness to palpation along the spinous processes. There is tenderness to palpation in the left paraspinal muscles. No step-off sign. Positive straight leg raise. 2+ posterior tibialis pulses. Moves all extremities x 4. Warm and well perfused. No evidence of abscess, mass or step-off deformity  Skin: warm and dry without rash  Neurologic: GCS 15, sensations intact  Psych: Normal Affect      ------------------------------ ED COURSE/MEDICAL DECISION MAKING----------------------  Medications   HYDROcodone-acetaminophen (NORCO) 5-325 MG per tablet 1 tablet (1 tablet Oral Given 1/31/23 1120)   orphenadrine (NORFLEX) injection 60 mg (60 mg IntraMUSCular Given 1/31/23 1120)         Medical Decision Making:       Vera Cam is a 78 y.o. male presenting to the ED for back pain that has been present for over 20 years the patient reports. Pain is located in the left lower region of the back. With radiation of numbness and tingling down the left leg. He rates the pain a 10 out of 10. He is requesting Percocet until he can get into see his doctor. He reports an appointment next week with pain management and physical therapy. Patient was seen here on the 29th also requesting Percocet. However he was given steroids, lidocaine patch and a muscle relaxer none of which worked for him.  Patient does report falling yesterday as he slipped and fell on the ice. He denies hitting his head and he does report taking blood thinners  He denies any fever, chills, body aches, headache, saddle anesthesia, bladder or bowel incontinence. Vital signs are stable patient is afebrile not tachycardic. Physical exam reveals tenderness to palpation in the left lower paraspinal muscles. Positive right leg raise. Ddx: Sciatica versus chronic low back pain versus epidural abscess versus cauda equina. Patient was evaluated by neurosurgery on January 27 of this year. On the 22nd he had a CT of the lumbar spine which revealed degenerative changes. PMPD reviewed and his narcotic score was 350. Discussed with the patient that his score is high and he is at risk for overdosing so he would not be sent home with any narcotics today. Patient given 1 [place] in the ED 5-325 mg oral.  Patient also received 60 mg IM of Norflex. XR lumbar spine: Degenerative changes. No acute fracture    Patient discharged home with no medications at this time. It was discussed with the patient that he was evaluated here on January 22, 2027, 29th and today for the same issue. Patient has no red flag symptoms, no IV drug abuse, no bladder or bowel incontinence, saddle anesthesia. He was strictly instructed to follow-up with his primary care doctor, pain management and therapy. Patient did walk today therefore no concern for driving a vehicle. Patient currently denying any headaches, blurry vision, chest pain, shortness of breath, fever, chills, nausea, vomiting, severe abdominal pain, change in bowel or bladder movements. Patient is fully ambulatory and has no neurologic or sensory deficits. At this time the patient is without objective evidence of an acute process requiring inpatient management. History provided is without report of trauma, or neurological symptom. Exam shows evidence of no radicular symptoms without myelopathy or neurovascular compromise.  Patient has no significant risk for spontaneous infectious process and is afebrile & non-toxic. Given symptomatic therapy in ER and prescriptions for home along with appropriate instructions regarding taking medications with food and using caution for drowsiness and sedation. No alcohol or driving while taking medication. Any red flag symptoms were to return immediately to the ER for evaluation but otherwise PCP follow up for reevaluation. Counseling: The emergency provider has spoken with the patient and discussed todays results, in addition to providing specific details for the plan of care and counseling regarding the diagnosis and prognosis. Questions are answered at this time and they are agreeable with the plan.      --------------------------------- IMPRESSION AND DISPOSITION ---------------------------------    IMPRESSION  1. Acute exacerbation of chronic low back pain    2.  Sciatica of left side        DISPOSITION  Disposition: Discharge to home  Patient condition is good                 Rajendra Mckeon PA-C  01/31/23 [id number]

## 2023-02-05 ENCOUNTER — HOSPITAL ENCOUNTER (EMERGENCY)
Age: 68
Discharge: HOME | End: 2023-02-05
Payer: MEDICARE

## 2023-02-05 VITALS
HEART RATE: 82 BPM | BODY MASS INDEX: 25.75 KG/M2 | SYSTOLIC BLOOD PRESSURE: 126 MMHG | TEMPERATURE: 97.5 F | DIASTOLIC BLOOD PRESSURE: 76 MMHG | OXYGEN SATURATION: 96 % | RESPIRATION RATE: 18 BRPM | WEIGHT: 150 LBS

## 2023-02-05 DIAGNOSIS — M54.50 ACUTE EXACERBATION OF CHRONIC LOW BACK PAIN: Primary | ICD-10-CM

## 2023-02-05 DIAGNOSIS — M54.31 SCIATICA OF RIGHT SIDE: ICD-10-CM

## 2023-02-05 DIAGNOSIS — G89.29 ACUTE EXACERBATION OF CHRONIC LOW BACK PAIN: Primary | ICD-10-CM

## 2023-02-05 PROCEDURE — 99283 EMERGENCY DEPT VISIT LOW MDM: CPT

## 2023-02-05 RX ORDER — OXYCODONE HYDROCHLORIDE AND ACETAMINOPHEN 5; 325 MG/1; MG/1
1 TABLET ORAL EVERY 6 HOURS PRN
Qty: 12 TABLET | Refills: 0 | Status: SHIPPED | OUTPATIENT
Start: 2023-02-05 | End: 2023-02-05 | Stop reason: SDUPTHER

## 2023-02-05 RX ORDER — OXYCODONE HYDROCHLORIDE AND ACETAMINOPHEN 5; 325 MG/1; MG/1
1 TABLET ORAL EVERY 6 HOURS PRN
Qty: 4 TABLET | Refills: 0 | Status: SHIPPED | OUTPATIENT
Start: 2023-02-05 | End: 2023-02-06

## 2023-02-05 ASSESSMENT — PAIN DESCRIPTION - FREQUENCY: FREQUENCY: CONTINUOUS

## 2023-02-05 ASSESSMENT — LIFESTYLE VARIABLES
HOW MANY STANDARD DRINKS CONTAINING ALCOHOL DO YOU HAVE ON A TYPICAL DAY: PATIENT DOES NOT DRINK
HOW OFTEN DO YOU HAVE A DRINK CONTAINING ALCOHOL: NEVER

## 2023-02-05 ASSESSMENT — PAIN SCALES - GENERAL
PAINLEVEL_OUTOF10: 9
PAINLEVEL_OUTOF10: 10

## 2023-02-05 ASSESSMENT — PAIN DESCRIPTION - LOCATION
LOCATION: BACK
LOCATION: BACK

## 2023-02-05 ASSESSMENT — PAIN DESCRIPTION - ONSET: ONSET: ON-GOING

## 2023-02-05 ASSESSMENT — PAIN - FUNCTIONAL ASSESSMENT
PAIN_FUNCTIONAL_ASSESSMENT: 0-10
PAIN_FUNCTIONAL_ASSESSMENT: 0-10

## 2023-02-05 ASSESSMENT — PAIN DESCRIPTION - ORIENTATION: ORIENTATION: LOWER;RIGHT

## 2023-02-05 ASSESSMENT — PAIN DESCRIPTION - DESCRIPTORS: DESCRIPTORS: SHOOTING

## 2023-02-05 NOTE — DISCHARGE INSTRUCTIONS
Follow-up with pain management and your doctor as instructed tomorrow. Unfortunately we are not going to be able to prescribe any further narcotics out of the ER, I will give you a 1 more day supply. Please return if you develop any numbness and tingling to your privates, bowel or bladder incontinence, bladder retention, weakness to the legs.

## 2023-02-05 NOTE — ED PROVIDER NOTES
Independent MABEL Visit. [address] [facility]  Department of Emergency Medicine   ED  Encounter Note  Admit Date/RoomTime: 2023 11:25 AM  ED Room: 15/15    NAME: Gloria Bernabe  : 1955  MRN: [id number]     Chief Complaint:  Back Pain (LOWER BACK PAIN, SHOOTING PAIN DOWN RIGHT LEG, STATES TOES GO NUMB)    History of Present Illness        Gloria Bernabe is a 78 y.o. old male with a prior history of chronic low back pain with multiple exacerbations presents with ongoing bilateral low back pain with right-sided sciatica. He has been evaluated in the ER for other occasions starting on 2023. 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, L2-L3 shows endplate degenerative changes with disc height loss and vacuum phenomena, he does have mild neuroforaminal encroachment and moderate central spinal stenosis at this level,L3-L4 shows endplate degenerative changes with disc loss height and vacuum disc phenomena with severe bilateral neuroforaminal encroachment and moderate central spinal canal stenosis, L5's 1 shows endplate degenerative changes with disc causing loss and vacuum disc phenomena with mild broad-based posterior disc bulge osteophyte complex with bilateral facet arthropathy with moderate bilateral neuroforaminal encroachment. On 2023 he had an x-ray of the lumbar spine during that ER visit, similar findings to the CT scan were revealed. Patient denies any known new injury to the back, denies saddle anesthesia, no urinary or bowel incontinence, no urinary retention. He has not had any fevers, denies any history of cancer, denies current IV drug use. States that he is out of pain medication and is requiring a refill.   He is not enrolled in pain management but states that he has been evaluated by surgery who have recommended hydrotherapy however he states that this does not work. ROS   Pertinent positives and negatives are stated within HPI, all other systems reviewed and are negative. Past Medical History:  has a past medical history of Anxiety, Arthritis, Asthma, Bipolar 1 disorder ([address] Chronic combined systolic and diastolic CHF (congestive heart failure) ([address] COPD (chronic obstructive pulmonary disease) ([address] Depression, Diabetes mellitus ([address] GERD (gastroesophageal reflux disease), Hepatitis C, Hypertension, Hyperthyroidism, Hypothyroidism due to medication, Mass of testicle, Mesothelioma ([address] Neuromuscular disorder ([address] Other disorders of kidney and ureter, Paroxysmal A-fib ([address] Peptic ulcer, Prostate enlargement, Pulmonary embolism ([address] Seizures ([facility] [address] and Thyroid disease. Surgical History:  has a past surgical history that includes Appendectomy; Lithotripsy; Hemorrhoid surgery; Bladder surgery; Endoscopy, colon, diagnostic (11/13/2015); Abdomen surgery; Colonoscopy; Cardiac surgery; vascular surgery; and Cardiac catheterization (05/21/2018). Social History:  reports that he quit smoking about 13 years ago. His smoking use included cigarettes. He quit smokeless tobacco use about 5 years ago. His smokeless tobacco use included chew. He reports that he does not currently use drugs after having used the following drugs: Cocaine. He reports that he does not drink alcohol. Family History: family history includes Cancer in his father, maternal grandfather, and mother; Diabetes in his father, maternal aunt, maternal aunt, maternal aunt, maternal grandmother, and mother; Heart Disease in his paternal grandfather and paternal grandmother; Heart Failure in his paternal grandfather and paternal grandmother.      Allergies: Codeine, Dye [iodides], Ketorolac tromethamine, Peanuts [peanut oil], Shellfish-derived products, and Nitroglycerin    Physical Exam   Oxygen Saturation Interpretation: Normal.        ED Triage Vitals   BP Temp Temp src Heart Rate Resp SpO2 Height Weight   02/05/23 1122 02/05/23 1122 -- 02/05/23 1122 02/05/23 1122 02/05/23 1122 -- 02/05/23 1130   122/70 97.5 °F (36.4 °C)  87 16 96 %  150 lb (68 kg)         Constitutional:  Alert, development consistent with age. HEENT:  NC/NT. Airway patent. Neck:  Normal ROM. Supple. Respiratory:  Clear to auscultation and breath sounds equal.  CV:  Regular rate and rhythm, normal heart sounds, without pathological murmurs, ectopy, gallops, or rubs. GI:  Abdomen Soft, nontender, good bowel sounds. No firm or pulsatile mass. Back: lower lumbar spine, sacral spine, and SI Joint right greater than left. No step-offs noted, no rashes or lesions noted, rectal exam reveals adequate rectal tone. Tenderness: Moderate. Swelling: no.              Range of Motion: full range of motion. CVA Tenderness: No CVA tenderness. Straight leg raising:  Bilateral positive at 45 degrees. Skin:  no wounds, erythema, or swelling. Distal Function:              Motor deficit: none. Sensory deficit: none. Pulse deficit: none. Calf Tenderness:  No Bilateral.               Edema:  none Both lower extremity(s). Deep Tendon Reflexes (DTR's) to bilateral knee's and ankle's are normo-reflexive   Gait:  wide based. Integument:  Normal turgor. Warm, dry, without visible rash. Lymphatics: No lymphangitis or adenopathy noted. Neurological:  Oriented. Motor functions intact. Lab / Imaging Results   (All laboratory and radiology results have been personally reviewed by myself)  Labs:  No results found for this visit on 02/05/23. Imaging: All Radiology results interpreted by Radiologist unless otherwise noted.   No orders to display       ED Course / Medical Decision Making   Medications - No data to display       Medical Decision Making/Counseling: This is a 73-year-old male who presents for the fifth time in the last 2 weeks to the emergency department for acute exacerbation of the chronic low back pain. States that he has run out of his narcotic medication. States that he has an appointment with his PCP tomorrow. He has had no new injury to his back, no saddle anesthesia, no urinary or bowel incontinence, no urine retention, mild sciatica pain down the right leg. On exam he is neurovascular intact, there are no step-offs noted to the spine on palpation, motor function to bilateral lower extremities is intact, DTRs are intact, rectal exam reveals adequate rectal tone. Patient has had recent imaging including CT of the lumbar spine and x-ray of the lumbar spine which reveals chronic degenerative changes and moderate ankylosing of the spine. Imaging was considered today however deferred as patient had recent imaging completed with no acute changes or injuries. I have discussed with patient he will need to follow-up with his PCP for enroll in pain management as the ER cannot continue to dispense narcotic pain medication to him. Patient received a prescription for 3 days of Percocet on 1/23/2023 with an additional 3 days of Percocet on 1/27/2023. I have advised him I will only give 4 additional tablets of the Percocet today, this will be a 1 day supply as he has an appointment with his tomorrow. Patient was advised to return to the ER with any saddle anesthesia, weakness to the extremities, loss of bowel or bladder continence, bladder retention, fevers. He will otherwise will need to follow-up as an outpatient. *At this time the patient is without objective evidence of an acute process requiring inpatient management. The emergency provider has spoken with the patient and discussed todays emergency visit, in addition to providing specific details for the plan of care and counseling regarding the diagnosis and prognosis.   He was counseled on the role of the emergency department regarding prescribing medications for chronic conditions including Narcotic and other controlled substances. Based on the presenting complaint and nature of illness, the requested medications will be provided today in prescription form for 1 day only and he is instructed to contact their provider for supplemental medications as soon as possible. Questions are answered at this time and they are agreeable with the plan. Assessment      1. Acute exacerbation of chronic low back pain    2. Sciatica of right side      Plan   Discharged home. Patient condition is good    New Medications     Discharge Medication List as of 2/5/2023 12:39 PM        START taking these medications    Details   oxyCODONE-acetaminophen (PERCOCET) 5-325 MG per tablet Take 1 tablet by mouth every 6 hours as needed for Pain for up to 1 day. Intended supply: 3 days. Take lowest dose possible to manage pain Max Daily Amount: 4 tablets, Disp-12 tablet, R-0Normal           Electronically signed by ZOIE Washington CNP   DD: 2/5/23  **This report was transcribed using voice recognition software. Every effort was made to ensure accuracy; however, inadvertent computerized transcription errors may be present.   END OF ED PROVIDER NOTE      ZOIE Washington CNP  02/07/23 [id number]

## 2023-02-10 ENCOUNTER — HOSPITAL ENCOUNTER (EMERGENCY)
Age: 68
Discharge: HOME | End: 2023-02-10
Payer: MEDICAID

## 2023-02-10 VITALS
RESPIRATION RATE: 18 BRPM | SYSTOLIC BLOOD PRESSURE: 118 MMHG | DIASTOLIC BLOOD PRESSURE: 68 MMHG | TEMPERATURE: 97.5 F | OXYGEN SATURATION: 98 % | HEART RATE: 93 BPM

## 2023-02-10 DIAGNOSIS — G89.29 ACUTE EXACERBATION OF CHRONIC LOW BACK PAIN: Primary | ICD-10-CM

## 2023-02-10 DIAGNOSIS — M54.50 ACUTE EXACERBATION OF CHRONIC LOW BACK PAIN: Primary | ICD-10-CM

## 2023-02-10 PROCEDURE — 6360000002 HC RX W HCPCS: Performed by: NURSE PRACTITIONER

## 2023-02-10 PROCEDURE — 96372 THER/PROPH/DIAG INJ SC/IM: CPT

## 2023-02-10 PROCEDURE — 99284 EMERGENCY DEPT VISIT MOD MDM: CPT

## 2023-02-10 PROCEDURE — 6370000000 HC RX 637 (ALT 250 FOR IP): Performed by: NURSE PRACTITIONER

## 2023-02-10 RX ORDER — ORPHENADRINE CITRATE 30 MG/ML
60 INJECTION INTRAMUSCULAR; INTRAVENOUS ONCE
Status: COMPLETED | OUTPATIENT
Start: 2023-02-10 | End: 2023-02-10

## 2023-02-10 RX ORDER — OXYCODONE HYDROCHLORIDE AND ACETAMINOPHEN 5; 325 MG/1; MG/1
2 TABLET ORAL ONCE
Status: COMPLETED | OUTPATIENT
Start: 2023-02-10 | End: 2023-02-10

## 2023-02-10 RX ORDER — KETOROLAC TROMETHAMINE 30 MG/ML
30 INJECTION, SOLUTION INTRAMUSCULAR; INTRAVENOUS ONCE
Status: COMPLETED | OUTPATIENT
Start: 2023-02-10 | End: 2023-02-10

## 2023-02-10 RX ADMIN — OXYCODONE AND ACETAMINOPHEN 2 TABLET: 5; 325 TABLET ORAL at 17:52

## 2023-02-10 RX ADMIN — KETOROLAC TROMETHAMINE 30 MG: 30 INJECTION, SOLUTION INTRAMUSCULAR at 17:53

## 2023-02-10 RX ADMIN — ORPHENADRINE CITRATE 60 MG: 30 INJECTION INTRAMUSCULAR; INTRAVENOUS at 17:53

## 2023-02-10 ASSESSMENT — PAIN SCALES - GENERAL: PAINLEVEL_OUTOF10: 10

## 2023-02-10 ASSESSMENT — LIFESTYLE VARIABLES
HOW OFTEN DO YOU HAVE A DRINK CONTAINING ALCOHOL: NEVER
HOW MANY STANDARD DRINKS CONTAINING ALCOHOL DO YOU HAVE ON A TYPICAL DAY: PATIENT DOES NOT DRINK

## 2023-02-10 ASSESSMENT — PAIN DESCRIPTION - ORIENTATION: ORIENTATION: MID

## 2023-02-10 ASSESSMENT — PAIN - FUNCTIONAL ASSESSMENT: PAIN_FUNCTIONAL_ASSESSMENT: 0-10

## 2023-02-10 ASSESSMENT — PAIN DESCRIPTION - LOCATION: LOCATION: BACK

## 2023-02-10 ASSESSMENT — PAIN DESCRIPTION - DESCRIPTORS: DESCRIPTORS: SHARP;SHOOTING

## 2023-02-10 NOTE — ED NOTES
Department of Emergency Medicine  FIRST PROVIDER TRIAGE NOTE             Independent MLP           2/10/23  4:37 PM EST    Date of Encounter: 2/10/23   MRN: [id number]      HPI: Sloan Iqbal is a 78 y.o. male who presents to the ED for No chief complaint on file. Patient presents complaining of chronic back pain and sciatica. Has taken 2 Hydrocodones and Ibuprofen without relief. He said he went to neurosurgery and they told him he needs to go to water therapy. ROS: Negative for cp, sob, abd pain, fever, cough, vomiting, or diarrhea. PE: Gen Appearance/Constitutional: alert  HEENT: NC/NT. PERRLA,  Airway patent. Initial Plan of Care: All treatment areas with department are currently occupied. Plan to order/Initiate the following while awaiting opening in ED: imaging studies.   Initiate Treatment-Testing, Proceed toTreatment Area When Bed Available for ED Attending/MLP to Continue Care    Electronically signed by ZOIE Moya CNP   DD: 2/10/23      ZOIE Moya CNP  02/10/23 [id number]
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
## 2023-02-12 ENCOUNTER — HOSPITAL ENCOUNTER (EMERGENCY)
Age: 68
Discharge: HOME | End: 2023-02-12
Attending: EMERGENCY MEDICINE
Payer: MEDICAID

## 2023-02-12 VITALS
TEMPERATURE: 97.5 F | HEART RATE: 79 BPM | OXYGEN SATURATION: 98 % | DIASTOLIC BLOOD PRESSURE: 80 MMHG | SYSTOLIC BLOOD PRESSURE: 136 MMHG

## 2023-02-12 DIAGNOSIS — G89.29 CHRONIC RIGHT-SIDED LOW BACK PAIN WITH RIGHT-SIDED SCIATICA: Primary | ICD-10-CM

## 2023-02-12 DIAGNOSIS — M54.41 CHRONIC RIGHT-SIDED LOW BACK PAIN WITH RIGHT-SIDED SCIATICA: Primary | ICD-10-CM

## 2023-02-12 PROCEDURE — 6370000000 HC RX 637 (ALT 250 FOR IP)

## 2023-02-12 PROCEDURE — 99283 EMERGENCY DEPT VISIT LOW MDM: CPT

## 2023-02-12 RX ORDER — GABAPENTIN 100 MG/1
100 CAPSULE ORAL 3 TIMES DAILY
Qty: 42 CAPSULE | Refills: 0 | Status: SHIPPED | OUTPATIENT
Start: 2023-02-12 | End: 2023-08-11

## 2023-02-12 RX ORDER — METHYLPREDNISOLONE 4 MG/1
TABLET ORAL
Qty: 21 TABLET | Refills: 0 | Status: SHIPPED | OUTPATIENT
Start: 2023-02-12 | End: 2023-02-18

## 2023-02-12 RX ORDER — OXYCODONE HYDROCHLORIDE AND ACETAMINOPHEN 5; 325 MG/1; MG/1
1 TABLET ORAL ONCE
Status: COMPLETED | OUTPATIENT
Start: 2023-02-12 | End: 2023-02-12

## 2023-02-12 RX ADMIN — OXYCODONE AND ACETAMINOPHEN 1 TABLET: 5; 325 TABLET ORAL at 12:25
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
## 2023-02-12 NOTE — ED PROVIDER NOTES
ATTENDING PROVIDER ATTESTATION:     Bashir Diaz presented to the emergency department for evaluation of Back Pain (Lower back pain ongoing for years per pt. )    I have reviewed and discussed the case, including pertinent history (medical, surgical, family and social) and exam findings with the Midlevel and the Nurse assigned to Bashir Diaz. I have personally performed and/or participated in the history, exam, medical decision making, and procedures and agree with all pertinent clinical information. I performed a face to face evaluation or the patient as well as guided the management and care of this patient. I have reviewed my findings and recommendations with Bashir Diaz and members of family present at the time of disposition. History of Present Illness:      Bashir Diaz is a 78 y.o. male presenting to the ED for chronic back pain. Patient states he has a longstanding back history has had chronic back pain for 20 years. He reports he has chronic sciatica. He is seen multiple doctors including neurosurgery and reports he is being referred to water therapy. He reports that the only thing that helps him for pain is \"Percocet \". He is here asking for Percocet. He denies fever or chills. He denies chest pain or shortness of breath. He denies bowel or bladder incontinence or saddle anesthesia. He denies motor weakness. He feels like this pain is exactly the same as his chronic ongoing symptoms. It is not worse. He just says he needs some to control the pain. He denies any history of spinal infections. No history of epidural abscess. There is no trauma. He denies any motor weakness. He denies anything new or different.       External record review  Controlled Substance Monitoring:    Acute and Chronic Pain Monitoring:   RX Monitoring 2/12/2023   Periodic Controlled Substance Monitoring Possible medication side effects, risk of tolerance/dependence & alternative treatments discussed. ;No signs of potential drug abuse or diversion identified. Some encounter information is confidential and restricted. Go to Review Flowsheets activity to see all data. Review of Systems:    Pertinent positives and negatives are stated within HPI    --------------------------------------------- PAST HISTORY ---------------------------------------------  Past Medical History:  has a past medical history of Anxiety, Arthritis, Asthma, Bipolar 1 disorder ([facility] Chronic combined systolic and diastolic CHF (congestive heart failure) ([facility] [facility] COPD (chronic obstructive pulmonary disease) ([facility] [address] [facility] Depression, Diabetes mellitus ([address] GERD (gastroesophageal reflux disease), Hepatitis C, Hypertension, Hyperthyroidism, Hypothyroidism due to medication, Mass of testicle, Mesothelioma ([address] Neuromuscular disorder ([address] Other disorders of kidney and ureter, Paroxysmal A-fib ([address] Peptic ulcer, Prostate enlargement, Pulmonary embolism ([address] Seizures ([address] and Thyroid disease. Past Surgical History:  has a past surgical history that includes Appendectomy; Lithotripsy; Hemorrhoid surgery; Bladder surgery; Endoscopy, colon, diagnostic (11/13/2015); Abdomen surgery; Colonoscopy; Cardiac surgery; vascular surgery; and Cardiac catheterization (05/21/2018). Social History:  reports that he quit smoking about 13 years ago. His smoking use included cigarettes. He quit smokeless tobacco use about 5 years ago. His smokeless tobacco use included chew. He reports that he does not currently use drugs after having used the following drugs: Cocaine. He reports that he does not drink alcohol.     Family History: family history includes Cancer in his father, maternal grandfather, and mother; Diabetes in his father, maternal aunt, maternal aunt, maternal aunt, maternal grandmother, and mother; Heart Disease in his paternal grandfather and paternal grandmother; Heart Failure in his paternal grandfather and paternal grandmother. The patients home medications have been reviewed. Allergies: Codeine, Dye [iodides], Ketorolac tromethamine, Peanuts [peanut oil], Shellfish-derived products, and Nitroglycerin      My Exam:         Constitutional/General: Alert and oriented x3  Head: Normocephalic and atraumatic  Eyes: PERRL, EOMI, conjunctiva normal, sclera non icteric  ENT:  Oropharynx clear, handling secretions,  Neck: Supple, full ROM, no stridor, no meningeal signs  Respiratory: Lungs clear to auscultation bilaterally, no wheezes, rales, or rhonchi. Not in respiratory distress  Cardiovascular:  Regular rate. Regular rhythm. No murmurs   Chest: No chest wall tenderness  Musculoskeletal: Moves all extremities x 4. Warm and well perfused, no clubbing, no cyanosis, no edema. Capillary refill <3 seconds  Integument: skin warm and dry. No rashes. Psychiatric: Normal Affect  Neurologic exam: The patient's Rhona Coma Scale is 15. No focal motor deficits. There are no focal sensory deficits. Deep tendon reflexes are intact and present bilaterally in the lower extremities. Babinski is absent. Gait is normal. Symmetric strength and sensation in the lower extremities bilaterally. Back exam: The patient has reproducible tenderness to palpation in the paravertebral lumbar region on the right. There is no evidence of localized erythema nor is there any focal warmth to the back. The patient has no evidence of single vertebral tenderness or any single area of interspace tenderness. There are no palpable deformities, lacerations, abrasions, or stepoffs. No midline cervical, thoracic, or lumbar spine tenderness.                     Say Patton MD am the primary provider of record    I have personally performed a substantive portion of the encounter including all aspects of the medical decision making:    Medical Decision Making  Problems Addressed:  Chronic right-sided low back pain with right-sided sciatica: chronic illness or injury    Risk  OTC drugs. Prescription drug management. Differential includes but not limited to chronic back pain, sciatica, epidural abscess or epidural hematoma, musculoskeletal causes. Patient has a normal exam.  No motor weakness. No bowel or bladder incontinence or saddle anesthesia. Normal reflexes. Normal gait. He has reproducible tenderness along the right paraspinal region and he describes his pain as something that has been chronic for 20 years. He said nothing is new or different. There is no red flag symptoms. No fever. No night sweats. Denies drug use. I see no indication for imaging at this time. He has had imaging recently. He has been worked up and referred to neurosurgery. No indication for labs. Nothing to suggest deep space infection. He is nontoxic-appearing. [facility] report does show multiple narcotic prescriptions as well. I recommended we try something different for pain such as Neurontin for this ongoing nerve pain to see if this helps. He was agreeable to try this.         1. Chronic right-sided low back pain with right-sided sciatica               Ariela Thomas MD  02/12/23 1225

## 2023-02-16 ENCOUNTER — HOSPITAL ENCOUNTER (EMERGENCY)
Age: 68
Discharge: LEFT BEFORE DISCHARGE | End: 2023-02-17
Payer: MEDICAID

## 2023-02-16 VITALS
OXYGEN SATURATION: 95 % | DIASTOLIC BLOOD PRESSURE: 99 MMHG | SYSTOLIC BLOOD PRESSURE: 157 MMHG | TEMPERATURE: 97.4 F | HEART RATE: 95 BPM | RESPIRATION RATE: 19 BRPM | WEIGHT: 270 LBS | BODY MASS INDEX: 46.35 KG/M2

## 2023-02-16 DIAGNOSIS — G89.29 ACUTE EXACERBATION OF CHRONIC LOW BACK PAIN: Primary | ICD-10-CM

## 2023-02-16 DIAGNOSIS — M54.50 ACUTE EXACERBATION OF CHRONIC LOW BACK PAIN: Primary | ICD-10-CM

## 2023-02-16 PROCEDURE — 99281 EMR DPT VST MAYX REQ PHY/QHP: CPT

## 2023-02-16 ASSESSMENT — PAIN SCALES - GENERAL: PAINLEVEL_OUTOF10: 10

## 2023-02-17 ENCOUNTER — HOSPITAL ENCOUNTER (EMERGENCY)
Age: 68
Discharge: HOME | End: 2023-02-17
Payer: MEDICAID

## 2023-02-17 VITALS
DIASTOLIC BLOOD PRESSURE: 94 MMHG | HEART RATE: 86 BPM | SYSTOLIC BLOOD PRESSURE: 117 MMHG | TEMPERATURE: 97.4 F | OXYGEN SATURATION: 96 % | RESPIRATION RATE: 17 BRPM

## 2023-02-17 DIAGNOSIS — M54.40 CHRONIC LOW BACK PAIN WITH SCIATICA, SCIATICA LATERALITY UNSPECIFIED, UNSPECIFIED BACK PAIN LATERALITY: Primary | ICD-10-CM

## 2023-02-17 DIAGNOSIS — G89.29 CHRONIC LOW BACK PAIN WITH SCIATICA, SCIATICA LATERALITY UNSPECIFIED, UNSPECIFIED BACK PAIN LATERALITY: Primary | ICD-10-CM

## 2023-02-17 PROCEDURE — 99282 EMERGENCY DEPT VISIT SF MDM: CPT

## 2023-02-17 NOTE — ED NOTES
Patient called in the waiting room multiple times, patient not seen. Patient marked as eloped.       Nisha Gayle RN  02/16/23 3603

## 2023-02-17 NOTE — ED PROVIDER NOTES
[facility]  Department of Emergency Medicine   ED  Encounter Note  Admit Date/RoomTime: 2023  8:36 AM  ED Room: [place] [address]    NAME: Gwen Youngblood  : 1955  MRN: [id number]     Chief Complaint:  Back Pain (Here for 8th day in a row. States that we dont ever do anything for him )    History of Present Illness       Gwen Youngblood is a 78 y.o. old male presents to the emergency department by private vehicle, for chronic low back pain which radiates down his left leg. Patient denies new injury or pain. Patient states he came here for tramadol. Patient states we do not ever give him medication that works. Patient was evaluated by neurosurgery on  and is supposed to get an MRI and follow-up with pain management but he did not set up either appointment yet. Patient denies IV drug use, recent injections, loss of bowel or bladder function, saddle paresthesias. Patient states his symptoms are mild in severity and describes it as an aching. Patient denies anything making it better or worse. Denies fever/chills, headache, vision change, dizziness, chest pain, dyspnea, abdominal pain, NVD, urinary symptoms, hematuria, numbness/weakness. ROS   Pertinent positives and negatives are stated within HPI, all other systems reviewed and are negative.     Past Medical History:  has a past medical history of Anxiety, Arthritis, Asthma, Bipolar 1 disorder ([address] Chronic combined systolic and diastolic CHF (congestive heart failure) ([address] COPD (chronic obstructive pulmonary disease) ([address] Depression, Diabetes mellitus ([address] GERD (gastroesophageal reflux disease), Hepatitis C, Hypertension, Hyperthyroidism, Hypothyroidism due to medication, Mass of testicle, Mesothelioma ([address] Neuromuscular disorder ([address] Other disorders of kidney and ureter, Paroxysmal A-fib ([address] Peptic ulcer, Prostate enlargement, Pulmonary embolism ([address] Seizures ([address] and Thyroid disease. Surgical History:  has a past surgical history that includes Appendectomy; Lithotripsy; Hemorrhoid surgery; Bladder surgery; Endoscopy, colon, diagnostic (11/13/2015); Abdomen surgery; Colonoscopy; Cardiac surgery; vascular surgery; and Cardiac catheterization (05/21/2018). Social History:  reports that he quit smoking about 13 years ago. His smoking use included cigarettes. He quit smokeless tobacco use about 5 years ago. His smokeless tobacco use included chew. He reports that he does not currently use drugs after having used the following drugs: Cocaine. He reports that he does not drink alcohol. Family History: family history includes Cancer in his father, maternal grandfather, and mother; Diabetes in his father, maternal aunt, maternal aunt, maternal aunt, maternal grandmother, and mother; Heart Disease in his paternal grandfather and paternal grandmother; Heart Failure in his paternal grandfather and paternal grandmother. Allergies: Codeine, Dye [iodides], Ketorolac tromethamine, Peanuts [peanut oil], Shellfish-derived products, and Nitroglycerin    Physical Exam   Oxygen Saturation Interpretation: Normal.        ED Triage Vitals   BP Temp Temp src Heart Rate Resp SpO2 Height Weight   02/17/23 0838 02/17/23 0833 -- 02/17/23 0833 02/17/23 0833 02/17/23 0833 -- --   (!) 117/94 97.4 °F (36.3 °C)  86 17 96 %           Constitutional:  Alert, development consistent with age. HEENT:  NC/NT. Airway patent. Neck:  Normal ROM. Supple. Respiratory:  Clear to auscultation and breath sounds equal.  CV:  Regular rate and rhythm, normal heart sounds, without pathological murmurs, ectopy, gallops, or rubs. GI:  Abdomen Soft, nontender, good bowel sounds. No firm or pulsatile mass. Back: lower lumbar spine and SI Joint left sided. Tenderness: Mild. Swelling: no.              Range of Motion: full range of motion. CVA Tenderness: No CVA tenderness. Skin:  no wounds, erythema, or swelling. Distal Function:              Motor deficit: none. Sensory deficit: none. Pulse deficit: none. Calf Tenderness:  No Bilateral.               Edema:  none Both lower extremity(s). Deep Tendon Reflexes (DTR's) to bilateral knee's and ankle's are normo-reflexive   Gait:  normal.  Integument:  Normal turgor. Warm, dry, without visible rash. Lymphatics: No lymphangitis or adenopathy noted. Neurological:  Oriented. Motor functions intact. Lab / Imaging Results   (All laboratory and radiology results have been personally reviewed by myself)  Labs:  No results found for this visit on 02/17/23. Imaging: All Radiology results interpreted by Radiologist unless otherwise noted. No orders to display       ED Course / Medical Decision Making   Medications - No data to display  ED Course as of 02/17/23 0928   Fri Feb 17, 2023   8922 Patient refusing toradol and norflex [KL]      ED Course User Index  [KL] Hansa Sherman PA-C       Consult(s):   None    Procedure(s):   None    Medical Decision Making:      History from : Patient    Limitations to history : None    Chronic Conditions: back pain, DM, asthma, CHF    CONSULTS: (Who and What was discussed)  None    Discussion with Other Profesionals : None    Social Determinants : None    Records Reviewed : None    CC/HPI Summary, DDx, ED Course, and Reassessment: Pt presenting  for chronic low back pain which radiates down his left leg. Patient denies new injury or pain. Patient states he came here for tramadol. Patient states we do not ever give him medication that works. Patient was evaluated by neurosurgery on 1/27 and is supposed to get an MRI and follow-up with pain management but he did not set up either appointment yet. Patient denies IV drug use, recent injections, loss of bowel or bladder function, saddle paresthesias.   Differential diagnosis includes chronic back pain, herniated disc, stenosis, sciatica, cauda equina. Patient denied red flag symptoms. Patient had no new injury or pain so no imaging ordered at this time. Patient offered Toradol and Norflex, steroids but he declined. Patient states he tramadol. Discussed with patient that he cannot keep coming back to the ED for pain medication when he is supposed to be seeing pain management so no narcotics will be given today. Patient verbalized understanding and we discussed the importance of follow-up with pain management and neurosurgery. Recommend patient return the ED with new or worsening symptoms. I am the Primary Clinician of Record. Plan of Care/Counseling:  YOLA Jorgensen reviewed today's visit with the patient in addition to providing specific details for the plan of care and counseling regarding the diagnosis and prognosis. Questions are answered at this time and are agreeable with the plan. Assessment      1. Chronic low back pain with sciatica, sciatica laterality unspecified, unspecified back pain laterality      Plan   Discharged home. Patient condition is stable    New Medications     Discharge Medication List as of 2/17/2023  9:06 AM        Electronically signed by YOLA Jorgensen   DD: 2/17/23  **This report was transcribed using voice recognition software. Every effort was made to ensure accuracy; however, inadvertent computerized transcription errors may be present.   END OF ED PROVIDER NOTE      YOLA Jorgensen  02/17/23 2652

## 2023-02-17 NOTE — ED PROVIDER NOTES
Independent [facility] Visit. [facility]  ED  Encounter Note  Admit Date/RoomTime: 2023 10:33 PM  ED Room: [facility]  NAME: Dewey Talbot  : 1955  MRN: [id number]  PCP: Jose R Garcia MD    CHIEF COMPLAINT     Back Pain (Patient states chronic back pain with a hx of sciatic back pain. Patient states he lifted a bag of cement and has since had an increase in back pain. )    HISTORY OF PRESENT ILLNESS        Dewey Talbot is a 78 y.o. male who presents to the ED via private vehicle for chronic back pain. Has a history of sciatica. States today he lifted a bag of cement which exacerbated his pain. He has multiple visits this month for similar complaints. No bowel or bladder dysfunction. No gait abnormality. No saddle anesthesia. REVIEW OF SYSTEMS     Pertinent positives and negatives are stated within HPI, all other systems reviewed and are negative. Past Medical History:  has a past medical history of Anxiety, Arthritis, Asthma, Bipolar 1 disorder ([address] Chronic combined systolic and diastolic CHF (congestive heart failure) ([address] COPD (chronic obstructive pulmonary disease) ([address] Depression, Diabetes mellitus ([address] GERD (gastroesophageal reflux disease), Hepatitis C, Hypertension, Hyperthyroidism, Hypothyroidism due to medication, Mass of testicle, Mesothelioma ([address] Neuromuscular disorder ([address] Other disorders of kidney and ureter, Paroxysmal A-fib ([address] Peptic ulcer, Prostate enlargement, Pulmonary embolism ([address] Seizures ([address] and Thyroid disease. Surgical History:  has a past surgical history that includes Appendectomy; Lithotripsy; Hemorrhoid surgery; Bladder surgery; Endoscopy, colon, diagnostic (2015); Abdomen surgery; Colonoscopy; Cardiac surgery; vascular surgery; and Cardiac catheterization (2018). Social History:  reports that he quit smoking about 13 years ago. His smoking use included cigarettes. He quit smokeless tobacco use about 5 years ago. His smokeless tobacco use included chew. He reports that he does not currently use drugs after having used the following drugs: Cocaine. He reports that he does not drink alcohol. Family History: family history includes Cancer in his father, maternal grandfather, and mother; Diabetes in his father, maternal aunt, maternal aunt, maternal aunt, maternal grandmother, and mother; Heart Disease in his paternal grandfather and paternal grandmother; Heart Failure in his paternal grandfather and paternal grandmother. Allergies: Codeine, Dye [iodides], Ketorolac tromethamine, Peanuts [peanut oil], Shellfish-derived products, and Nitroglycerin  CURRENT MEDICATIONS       Previous Medications    ACETAMINOPHEN (TYLENOL) 500 MG TABLET    Take 1 tablet by mouth 4 times daily as needed for Pain    ATORVASTATIN (LIPITOR) 40 MG TABLET    Take 1 tablet by mouth nightly    BREO ELLIPTA 200-25 MCG/INH AEPB        DIVALPROEX (DEPAKOTE) 250 MG DR TABLET    Take 1 tablet by mouth every 12 hours    FINASTERIDE (PROSCAR) 5 MG TABLET    Take 1 tablet by mouth daily    GABAPENTIN (NEURONTIN) 100 MG CAPSULE    Take 1 capsule by mouth 3 times daily for 180 days.  Intended supply: 90 days    LATUDA 120 MG TABLET        LEVETIRACETAM (KEPPRA) 500 MG TABLET    Take 1,000 mg by mouth 2 times daily    LISINOPRIL (PRINIVIL;ZESTRIL) 10 MG TABLET    Take 1 tablet by mouth daily    METHIMAZOLE (TAPAZOLE) 10 MG TABLET    Take 10 mg by mouth 3 times daily    METHYLPREDNISOLONE (MEDROL, MARIS,) 4 MG TABLET    Take as directed on package insert days 1-6    MULTIPLE VITAMINS-MINERALS (THERAPEUTIC MULTIVITAMIN-MINERALS) TABLET    Take 1 tablet by mouth daily    NUTRITIONAL SUPPLEMENTS (RA BALANCED NUTRITIONAL PLUS) LIQD    Take 1 Bottle by mouth daily    RIVAROXABAN (XARELTO PO)    Take by mouth    SILDENAFIL (VIAGRA) 50 MG TABLET    take 1 tablet by mouth 1 hour prior to intercourse    TRAZODONE (DESYREL) 50 MG TABLET    Take 1 tablet by mouth nightly as needed for Sleep    VENLAFAXINE (EFFEXOR XR) 37.5 MG EXTENDED RELEASE CAPSULE    Take 1 capsule by mouth daily (with breakfast)       SCREENINGS     [place] Coma Scale  Eye Opening: Spontaneous  Best Verbal Response: Oriented  Best Motor Response: Obeys commands  [place] Coma Scale Score: 15         CIWA Assessment  BP: (!) 157/99  Heart Rate: 95       PHYSICAL EXAM   Oxygen Saturation Interpretation: Normal on room air analysis. ED Triage Vitals   BP Temp Temp Source Heart Rate Resp SpO2 Height Weight   02/16/23 1939 02/16/23 1853 02/16/23 1853 02/16/23 1853 02/16/23 1939 02/16/23 1853 -- 02/16/23 1933   (!) 157/99 97.4 °F (36.3 °C) Temporal 93 19 97 %  270 lb (122.5 kg)         Physical Exam  General: Awake alert and oriented. Well-appearing. Nontoxic. HEENT: Normocephalic, atraumatic. Pupils equal  Neck: Normal range of motion  Cardiac: Regular rate  Respiratory: Respirations even, unlabored. No respiratory distress  Abdomen: Nondistended  Musculoskelatal: Moves all extremities x4 is 5 out of 5 motor strength in bilateral lower extremities. He has a steady gait  Neuro: Nonlateralizing  Skin: Flesh tone, warm, dry  Psych: Normal affect  DIAGNOSTIC RESULTS   (All laboratory and radiology results have been personally reviewed by myself)  Labs:  No results found for this visit on 02/16/23. Imaging: All Radiology results interpreted by Radiologist unless otherwise noted. No orders to display       ED COURSE   Vitals:    Vitals:    02/16/23 1853 02/16/23 1933 02/16/23 1939   BP:   (!) 157/99   Pulse: 93  95   Resp:   19   Temp: 97.4 °F (36.3 °C)     TempSrc: Temporal     SpO2: 97%  95%   Weight:  270 lb (122.5 kg)        Patient was given the following medications:  Medications - No data to display       PROCEDURES       REASSESSMENT   2/16/23       Time:   Patients condition .     CONSULTS:  None  DIFFERENTIAL DX_MDM   MDM:   Social Determinants : None    Records Reviewed : None_ n/a per encounter visit    CC/HPI Summary, DDx, ED Course, and Reassessment: Patient presents with Back Pain (Patient states chronic back pain with a hx of sciatic back pain. Patient states he lifted a bag of cement and has since had an increase in back pain. )  This patient was seen and evaluated in the emergency department for chronic back pain. He was sent back to the waiting room to await his CT scan as he has not had 1 recently and was found to have eloped from the waiting room prior to having this done or having a reevaluation. He did not have any neurologic deficits on initial examination and no concern for cauda equina spinal abscess or hematoma. Plan of Care/Counseling:  elopoed    ASSESSMENT     1. Acute exacerbation of chronic low back pain        DISPOSITION   Patient eloped from emergency department before evaluation completed. .  Patient condition is good    NEW MEDICATIONS     New Prescriptions    No medications on file     Electronically signed by ZOIE Curiel CNP   DD: 2/16/23  **This report was transcribed using voice recognition software. Every effort was made to ensure accuracy; however, inadvertent computerized transcription errors may be present.   END OF ED PROVIDER NOTE      ZOIE Curiel CNP  02/16/23 1650  ATTENDING PROVIDER ATTESTATION:     Supervising Physician, on-site, available for consultation, non-participatory in the evaluation or care of this patient       Jacob Sheridan MD  02/17/23 5471

## 2023-02-17 NOTE — FLOWSHEET NOTE
Patient states he hasn't voided since yesterday afternoon. Patient has c/o of left flank pain. Patient states he does not have a right kidney.  Patient attempted to be seen in the urgent care but was directed to come to the ED

## 2023-02-23 ENCOUNTER — HOSPITAL ENCOUNTER (EMERGENCY)
Age: 68
Discharge: HOME | End: 2023-02-23
Payer: MEDICAID

## 2023-02-23 VITALS
RESPIRATION RATE: 16 BRPM | SYSTOLIC BLOOD PRESSURE: 113 MMHG | OXYGEN SATURATION: 98 % | DIASTOLIC BLOOD PRESSURE: 68 MMHG | HEART RATE: 97 BPM | TEMPERATURE: 98.1 F

## 2023-02-23 DIAGNOSIS — M54.50 ACUTE EXACERBATION OF CHRONIC LOW BACK PAIN: Primary | ICD-10-CM

## 2023-02-23 DIAGNOSIS — G89.29 ACUTE EXACERBATION OF CHRONIC LOW BACK PAIN: Primary | ICD-10-CM

## 2023-02-23 PROCEDURE — 99283 EMERGENCY DEPT VISIT LOW MDM: CPT

## 2023-02-23 PROCEDURE — 6370000000 HC RX 637 (ALT 250 FOR IP): Performed by: NURSE PRACTITIONER

## 2023-02-23 RX ORDER — OXYCODONE HYDROCHLORIDE AND ACETAMINOPHEN 5; 325 MG/1; MG/1
1 TABLET ORAL ONCE
Status: COMPLETED | OUTPATIENT
Start: 2023-02-23 | End: 2023-02-23

## 2023-02-23 RX ADMIN — OXYCODONE AND ACETAMINOPHEN 1 TABLET: 5; 325 TABLET ORAL at 14:47

## 2023-02-23 ASSESSMENT — PAIN SCALES - GENERAL: PAINLEVEL_OUTOF10: 9

## 2023-02-23 ASSESSMENT — PAIN DESCRIPTION - LOCATION: LOCATION: BACK

## 2023-02-23 NOTE — ED PROVIDER NOTES
Independent [facility] Visit. [address]  Department of Emergency Medicine   ED  Encounter Note  Admit Date/RoomTime: 2023  2:14 PM  ED Room: [place] [address]    NAME: Kulwant Navarrete  : 1955  MRN: [id number]     Chief Complaint:  Back Pain (Diagnosed with sciatica last week. Increase in pain after lifting a bag of cement. States he took 3 800mg motrin's last night that did not help with the pain. Denies any recent injury)    History of Present Illness        Kulwant Navarrete is a 78 y.o. old male with a prior history of chronic back pain, presents to the emergency department by private vehicle, for non-traumatic acute-on-chronic, aching and cramping bilateral lower lumbar spine pain to the left thigh, for several week(s) prior to arrival.  There has been no recent injury as it relates to today's visit. Since onset the symptoms have been recurrent and worsening and is moderate-to-severe. He has associated signs & symptoms of nothing additional.   He denies any bladder or bowel incontinence , new weakness, tingling or paresthesias, recent back injection, recent spinal surgery, recent spinal/chiropractic manipulation, history of IVDA, fever, abdominal pain, bladder incontinence, bowel incontinence, bladder retention, bladder urgency, bowel urgency, saddle paresthesias , or sacral numbness. The pain is aggraveated by any movement and relieved by nothing despite medication use and rest.  He has never been enrolled in a pain management program and is currently being managed by neurosurgical back specialist.  Of note, patient has been seen 8 times in the ER in the last 30 days for the same complaint, on his last visit he was advised he would not be prescribed any further narcotics. ROS   Pertinent positives and negatives are stated within HPI, all other systems reviewed and are negative.     Past Medical History:  has a past medical history of Anxiety, Arthritis, Asthma, Bipolar 1 disorder ([facility] [address] Chronic combined systolic and diastolic CHF (congestive heart failure) ([facility] [address] COPD (chronic obstructive pulmonary disease) ([facility] [address] Depression, Diabetes mellitus ([facility] [address] GERD (gastroesophageal reflux disease), Hepatitis C, Hypertension, Hyperthyroidism, Hypothyroidism due to medication, Mass of testicle, Mesothelioma ([facility] [address] Neuromuscular disorder ([facility] [address] Other disorders of kidney and ureter, Paroxysmal A-fib ([facility] [address] Peptic ulcer, Prostate enlargement, Pulmonary embolism ([facility] [address] Seizures ([facility] [address] and Thyroid disease. Surgical History:  has a past surgical history that includes Appendectomy; Lithotripsy; Hemorrhoid surgery; Bladder surgery; Endoscopy, colon, diagnostic (11/13/2015); Abdomen surgery; Colonoscopy; Cardiac surgery; vascular surgery; and Cardiac catheterization (05/21/2018). Social History:  reports that he quit smoking about 13 years ago. His smoking use included cigarettes. He quit smokeless tobacco use about 5 years ago. His smokeless tobacco use included chew. He reports that he does not currently use drugs after having used the following drugs: Cocaine. He reports that he does not drink alcohol. Family History: family history includes Cancer in his father, maternal grandfather, and mother; Diabetes in his father, maternal aunt, maternal aunt, maternal aunt, maternal grandmother, and mother; Heart Disease in his paternal grandfather and paternal grandmother; Heart Failure in his paternal grandfather and paternal grandmother. Allergies: Codeine, Dye [iodides], Ketorolac tromethamine, Peanuts [peanut oil], Shellfish-derived products, and Nitroglycerin    Physical Exam   Oxygen Saturation Interpretation: Normal.        ED Triage Vitals [02/23/23 1409]   BP Temp Temp src Heart Rate Resp SpO2 Height Weight   -- 98.1 °F (36.7 °C) -- (!) 101 -- 96 % -- --         Constitutional:  Alert, development consistent with age. HEENT:  NC/NT. Airway patent. Neck:  Normal ROM. Supple.  Respiratory:  Clear to auscultation and breath sounds equal.  CV:  Regular rate and rhythm, normal heart sounds, without pathological murmurs, ectopy, gallops, or rubs.  GI:  Abdomen Soft, nontender, good bowel sounds.  No firm or pulsatile mass.  Back: lower lumbar spine, sacral spine, and SI Joint bilateral.             Tenderness: Moderate.  No step-offs noted.            Swelling: no.              Range of Motion: full range of motion.              CVA Tenderness: No CVA tenderness.            Straight leg raising:  Bilateral negative.            Skin:  no wounds, erythema, or swelling.  Distal Function:              Motor deficit: none.              Sensory deficit: none.               Pulse deficit: none.            Calf Tenderness:  No Bilateral.               Edema:  none Both lower extremity(s).             Deep Tendon Reflexes (DTR's) to bilateral knee's and ankle's are normo-reflexive   Gait:  normal.  Integument:  Normal turgor.  Warm, dry, without visible rash.  Lymphatics: No lymphangitis or adenopathy noted.  Neurological:  Oriented.  Motor functions intact.    Lab / Imaging Results   (All laboratory and radiology results have been personally reviewed by myself)  Labs:  No results found for this visit on 02/23/23.    Imaging:  All Radiology results interpreted by Radiologist unless otherwise noted.  No orders to display       ED Course / Medical Decision Making     Medications   oxyCODONE-acetaminophen (PERCOCET) 5-325 MG per tablet 1 tablet (1 tablet Oral Given 2/23/23 9767)          Medical Decision Making/Counseling: This is a 67-year-old male with a history of chronic low back pain who presents with worsening low back pain, states that he lifted a heavy bag of cement worsening his back pain.  Denies any direct trauma to the back, no recent IV drug use, no recent fever, infection, denies bowel or bladder incontinence, no saddle anesthesia.    On review of chart, patient was seen in various  system ERs 9 times in the last month, once on 1/22/2023, 1/27/2023, 1/29/2023, 1/31/2023, 2/5/2023, 2/10/2023, 2/12/2023, 2/16/2023 and 2/17/2023. His last CT of the lumbar spine that was completed on 1/31/2023 feels the following: There is dextroscoliosis in the lumbar spine with apex at L2-3. There is   possible grade 1 retrolisthesis at L3-4. The remaining alignment appears   anatomic in the lateral projection. There are no compression deformities. There is partial bony fusion across the L1-2 intervertebral disc space. There is marked narrowing of the intervertebral disc space heights at L2-3   and L3-4 as well as narrowing of the intervertebral disc space height at   L5-S1. There is endplate osteophyte formation at all levels of the lumbar   spine from degenerative disc disease. There is multilevel facet hypertrophy. The pedicles are intact at all levels. The paravertebral soft tissue   structures are unremarkable. There is arteriosclerosis. Patient denies any new direct trauma to the back since this previous CT. Repeat CT was considered but not ordered as patient did have a recent CT with no new trauma, no red flag symptoms such as saddle anesthesia, bowel or bladder incontinence, limb weakness. On exam he does have some tenderness to the low back, rectal tone is intact, DTRs to bilateral patella and Achilles are intact. No focal neurodeficit noted. I discussed at length with patient that no further narcotics will be prescribed from the ER, he is requesting a one-time dose of Percocet now. He is already established with neurosurgery and physical therapy, he is actually pursuing pain management. Patient was advised to further follow-up with neurosurgery and pain management. He may return to the ER for any bowel or bladder incontinence, saddle anesthesia, bladder retention, limb weakness.      *At this time the patient is without objective evidence of an acute process requiring inpatient management. The emergency provider has spoken with the patient and discussed todays emergency visit, in addition to providing specific details for the plan of care and counseling regarding the diagnosis and prognosis. He was counseled on the role of the emergency department regarding prescribing medications for chronic conditions including Narcotic and other controlled substances. Based on the presenting complaint and nature of illness, the requested medications will not be provided today in prescription form and he is instructed to contact their provider for supplemental medications as soon as possible. Questions are answered at this time and they are agreeable with the plan. Assessment      1. Acute exacerbation of chronic low back pain      Plan   Discharged home. Patient condition is good    New Medications     New Prescriptions    No medications on file     Electronically signed by ZOIE Hamilton CNP   DD: 2/23/23  **This report was transcribed using voice recognition software. Every effort was made to ensure accuracy; however, inadvertent computerized transcription errors may be present.   END OF ED PROVIDER NOTE      ZOIE Hamilton CNP  02/23/23 3031

## 2023-02-24 ENCOUNTER — HOSPITAL ENCOUNTER (EMERGENCY)
Age: 68
Discharge: HOME | End: 2023-02-24
Payer: MEDICAID

## 2023-02-24 VITALS
DIASTOLIC BLOOD PRESSURE: 78 MMHG | HEART RATE: 92 BPM | OXYGEN SATURATION: 96 % | SYSTOLIC BLOOD PRESSURE: 127 MMHG | RESPIRATION RATE: 16 BRPM | TEMPERATURE: 97.8 F

## 2023-02-24 DIAGNOSIS — G89.29 CHRONIC MIDLINE LOW BACK PAIN WITHOUT SCIATICA: Primary | ICD-10-CM

## 2023-02-24 DIAGNOSIS — M54.50 CHRONIC MIDLINE LOW BACK PAIN WITHOUT SCIATICA: Primary | ICD-10-CM

## 2023-02-24 PROCEDURE — 99282 EMERGENCY DEPT VISIT SF MDM: CPT

## 2023-02-24 ASSESSMENT — PAIN - FUNCTIONAL ASSESSMENT: PAIN_FUNCTIONAL_ASSESSMENT: NONE - DENIES PAIN

## 2023-02-24 NOTE — ED NOTES
Pt left after being told he will not be receiving pain medication. Pt walked out into parking lot then turned around and came back inside. Pt redirected back to [facility].        Mihir Mcallister RN  02/24/23 5608

## 2023-02-24 NOTE — ED PROVIDER NOTES
Independent [facility] Visit. [address]  Department of Emergency Medicine   ED  Encounter Note  Admit Date/RoomTime: No admission date for patient encounter. ED Room: Room/bed info not found    NAME: Vaibhav Sims  : 1955  MRN: [id number]     Chief Complaint:  Back Pain (Chronic back pain, worse today, states he \"comes here daily for percocet because no one will order him a prescription\". )    History of Present Illness        Vaibhav Sims is a 78 y.o. old male with a prior history of chronic back pain, presents to the emergency department by private vehicle, for non-traumatic chronic, aching central lower lumbar spine pain without radiation, for \"years\" . There has been no recent injury as it relates to today's visit. Since onset the symptoms have been stable and unchanged and is moderate in severity. He has associated signs & symptoms of nothing additional.   He denies any bladder or bowel incontinence  or new weakness, tingling or paresthesias. The pain is aggraveated by nothing in particular and relieved by nothing in particular. He is not currently enrolled in an pain management program or managed by PCP or back specialist.    Patient states he comes here daily for a Percocet. Had a discussion with him regarding needing to go to his PCP for chronic pain and needing pain management. He was evaluated less than 24 hours ago for pain and his percocet. ROS   Pertinent positives and negatives are stated within HPI, all other systems reviewed and are negative.     Past Medical History:  has a past medical history of Anxiety, Arthritis, Asthma, Bipolar 1 disorder ([address] Chronic combined systolic and diastolic CHF (congestive heart failure) ([address] COPD (chronic obstructive pulmonary disease) ([address] Depression, Diabetes mellitus ([address] GERD (gastroesophageal reflux disease), Hepatitis C, Hypertension, Hyperthyroidism, Hypothyroidism due to medication, Mass of testicle, Mesothelioma [facility]), Neuromuscular disorder ([facility] [address] Other disorders of kidney and ureter, Paroxysmal A-fib ([facility] [address] Peptic ulcer, Prostate enlargement, Pulmonary embolism ([facility] [address] Seizures ([facility] [address] and Thyroid disease. Surgical History:  has a past surgical history that includes Appendectomy; Lithotripsy; Hemorrhoid surgery; Bladder surgery; Endoscopy, colon, diagnostic (11/13/2015); Abdomen surgery; Colonoscopy; Cardiac surgery; vascular surgery; and Cardiac catheterization (05/21/2018). Social History:  reports that he quit smoking about 13 years ago. His smoking use included cigarettes. He quit smokeless tobacco use about 5 years ago. His smokeless tobacco use included chew. He reports that he does not currently use drugs after having used the following drugs: Cocaine. He reports that he does not drink alcohol. Family History: family history includes Cancer in his father, maternal grandfather, and mother; Diabetes in his father, maternal aunt, maternal aunt, maternal aunt, maternal grandmother, and mother; Heart Disease in his paternal grandfather and paternal grandmother; Heart Failure in his paternal grandfather and paternal grandmother. Allergies: Codeine, Dye [iodides], Ketorolac tromethamine, Peanuts [peanut oil], Shellfish-derived products, and Nitroglycerin    Physical Exam   Oxygen Saturation Interpretation: Normal.        ED Triage Vitals   BP Temp Temp src Heart Rate Resp SpO2 Height Weight   02/24/23 0924 -- -- 02/24/23 0918 02/24/23 0924 02/24/23 0918 -- --   127/78   91 16 98 %           Constitutional:  Alert, development consistent with age. HEENT:  NC/NT. Airway patent. Neck:  Normal ROM. Supple. Respiratory:  Clear to auscultation and breath sounds equal.  CV:  Regular rate and rhythm, normal heart sounds, without pathological murmurs, ectopy, gallops, or rubs. GI:  Abdomen Soft, nontender, good bowel sounds. No firm or pulsatile mass. Back: lower lumbar spine midline. Tenderness: Moderate. Swelling: no.              Range of Motion: full range of motion. CVA Tenderness: No CVA tenderness. Skin:  no wounds, erythema, or swelling. Distal Function:              Motor deficit: none. Sensory deficit: none. Pulse deficit: none. Gait:  normal.  Integument:  Normal turgor. Warm, dry, without visible rash. Lymphatics: No lymphangitis or adenopathy noted. Neurological:  Oriented. Motor functions intact. Lab / Imaging Results   (All laboratory and radiology results have been personally reviewed by myself)  Labs:  No results found for this visit on 02/24/23. Imaging: All Radiology results interpreted by Radiologist unless otherwise noted. No orders to display       ED Course / Medical Decision Making   Medications - No data to display       Consult(s):   None    Procedure(s):   None    Medical Decision Making/Counseling:    Patient presented to the ER for complaints of chronic low back pain. He states that he comes here daily for Percocet. He is ambulating, no distress. Moves all 4 extremities. Denies any loss of bowel or bladder control. I discussed with the patient here today that I will not prescribe him a one-time dose of Percocet. He was here less than 24 hours ago for the same complaint and received a Percocet. I advised he needs to see pain management. He states that he cannot get in to pain management for 2 weeks. He denies any new injuries. No numbness or tingling. Patient got upset that I would not prescribe him a Percocet and refused to be registered. Patient ambulated out of the ER without difficulty. Assessment      1. Chronic midline low back pain without sciatica      Plan   Discharged home.   Patient condition is stable    New Medications     New Prescriptions    No medications on file     Electronically signed by ZOIE Agrawal CNP   DD: 2/24/23  **This report was transcribed using voice recognition software. Every effort was made to ensure accuracy; however, inadvertent computerized transcription errors may be present.   END OF ED PROVIDER NOTE      ZOIE Brasher - [place]ON[place]  02/24/23 5121

## 2023-02-24 NOTE — ED NOTES
Police escorts to discharge pt.   Refusing vitals and refusing to sign      Etienne Matamoros RN  02/24/23 9505

## 2023-02-24 NOTE — DISCHARGE INSTRUCTIONS
FOLLOW UP WITH YOUR PCP. CALL TO GET A PCP NEAR WHERE YOU LIVE. I HAVE ATTACHED THE INFORMATION. RETURN TO THE ER FOR LOSS OF BOWEL/BLADDER CONTROL.

## 2023-02-24 NOTE — ED NOTES
Pt refusing to leave. Wondering around dept.  States he was using restroom     Daniele Real RN  02/24/23 6393

## 2023-02-25 ENCOUNTER — HOSPITAL ENCOUNTER (EMERGENCY)
Age: 68
Discharge: HOME | End: 2023-02-25
Payer: MEDICAID

## 2023-02-25 VITALS
SYSTOLIC BLOOD PRESSURE: 138 MMHG | BODY MASS INDEX: 25.61 KG/M2 | HEART RATE: 98 BPM | RESPIRATION RATE: 16 BRPM | TEMPERATURE: 97.8 F | DIASTOLIC BLOOD PRESSURE: 85 MMHG | OXYGEN SATURATION: 100 % | HEIGHT: 64 IN | WEIGHT: 150 LBS

## 2023-02-25 DIAGNOSIS — M54.50 ACUTE EXACERBATION OF CHRONIC LOW BACK PAIN: Primary | ICD-10-CM

## 2023-02-25 DIAGNOSIS — Z76.5 DRUG-SEEKING BEHAVIOR: ICD-10-CM

## 2023-02-25 DIAGNOSIS — G89.29 ACUTE EXACERBATION OF CHRONIC LOW BACK PAIN: Primary | ICD-10-CM

## 2023-02-25 PROCEDURE — 99283 EMERGENCY DEPT VISIT LOW MDM: CPT

## 2023-02-25 RX ORDER — METHYLPREDNISOLONE 4 MG/1
TABLET ORAL
Qty: 1 KIT | Refills: 0 | Status: ON HOLD | OUTPATIENT
Start: 2023-02-25

## 2023-02-25 RX ORDER — LIDOCAINE 50 MG/G
1 PATCH TOPICAL EVERY 24 HOURS
Qty: 10 PATCH | Refills: 0 | Status: ON HOLD | OUTPATIENT
Start: 2023-02-25 | End: 2023-03-07

## 2023-02-25 ASSESSMENT — PAIN DESCRIPTION - LOCATION
LOCATION: BACK
LOCATION: BACK

## 2023-02-25 ASSESSMENT — PAIN - FUNCTIONAL ASSESSMENT
PAIN_FUNCTIONAL_ASSESSMENT: 0-10
PAIN_FUNCTIONAL_ASSESSMENT: 0-10

## 2023-02-25 ASSESSMENT — PAIN DESCRIPTION - DESCRIPTORS: DESCRIPTORS: BURNING;SHARP

## 2023-02-25 ASSESSMENT — PAIN SCALES - GENERAL
PAINLEVEL_OUTOF10: 10
PAINLEVEL_OUTOF10: 10

## 2023-02-25 ASSESSMENT — PAIN DESCRIPTION - PAIN TYPE: TYPE: CHRONIC PAIN

## 2023-02-25 ASSESSMENT — PAIN DESCRIPTION - ORIENTATION: ORIENTATION: LOWER

## 2023-02-25 NOTE — ED PROVIDER NOTES
Independent MABEL Visit. Department of Emergency Medicine   ED  Provider Note  Admit Date/RoomTime: 2/25/2023  2:48 PM  ED Room: [place] [address]   Chief Complaint:   Back Pain (Chronic lower back pain. Per pt \"my sciatic nerve is touching my spine\")    History of Present Illness      Vaibhav Sims is a 78 y.o. old male who presents to the ED for chronic lower back pain. Patient states he has a history of sciatica and his \"sciatic nerve is touching my spine. \" He has seen multiple physicians for this. He frequently comes in the emergency department demanding narcotic pain medication. He understands he will not be getting this today as he is showing drug-seeking behavior. Patient denies any new injury or trauma. He states he does have 1year-old twins that he frequently picks up. He has not tried to follow-up with his family doctor. He did see neurosurgery back in January but has not tried to call them. He is supposed to be scheduled for an MRI. Patient has not followed up. He states the pain does travel down both legs but denies any loss of bowel or bladder or saddle paresthesias. Patient is able to ambulate around department with no difficulty. He has no chest pain, shortness of breath, abdominal pain, nausea, vomiting, diarrhea, rectal bleeding, or limb swelling. He is alert and oriented x3 and in no apparent distress at this exam.  Patient is nontoxic-appearing. He has not tried any over-the-counter medication. PCP: Yung Silva MD    ROS   Pertinent positives and negatives are stated within HPI, all other systems reviewed and are negative.     Past Medical History:  has a past medical history of Anxiety, Arthritis, Asthma, Bipolar 1 disorder ([address] Chronic combined systolic and diastolic CHF (congestive heart failure) ([address] COPD (chronic obstructive pulmonary disease) ([address] Depression, Diabetes mellitus ([address] GERD (gastroesophageal reflux disease), Hepatitis C, Hypertension, Hyperthyroidism, Hypothyroidism due to medication, Mass of testicle, Mesothelioma [facility]), Neuromuscular disorder ([facility] Other disorders of kidney and ureter, Paroxysmal A-fib ([facility] Peptic ulcer, Prostate enlargement, Pulmonary embolism ([facility] [address] [facility] Seizures ([facility] [address] and Thyroid disease. Past Surgical History:  has a past surgical history that includes Appendectomy; Lithotripsy; Hemorrhoid surgery; Bladder surgery; Endoscopy, colon, diagnostic (11/13/2015); Abdomen surgery; Colonoscopy; Cardiac surgery; vascular surgery; and Cardiac catheterization (05/21/2018). Social History:  reports that he quit smoking about 13 years ago. His smoking use included cigarettes. He quit smokeless tobacco use about 5 years ago. His smokeless tobacco use included chew. He reports that he does not currently use drugs after having used the following drugs: Cocaine. He reports that he does not drink alcohol. Family History: family history includes Cancer in his father, maternal grandfather, and mother; Diabetes in his father, maternal aunt, maternal aunt, maternal aunt, maternal grandmother, and mother; Heart Disease in his paternal grandfather and paternal grandmother; Heart Failure in his paternal grandfather and paternal grandmother. Allergies: Codeine, Dye [iodides], Ketorolac tromethamine, Peanuts [peanut oil], Shellfish-derived products, and Nitroglycerin    Physical Exam   VS:  /85   Pulse 98   Temp 97.8 °F (36.6 °C)   Resp 16   Ht 5' 4\" (1.626 m)   Wt 150 lb (68 kg)   SpO2 100%   BMI 25.75 kg/m²      Oxygen Saturation Interpretation: Normal.    Constitutional:  Alert and oriented x4, development consistent with age, NAD  HEENT:  NC/NT. No bruising or lacerations, Airway patent. Neck:  Normal ROM. Supple. No meningeal signs. Non-tender    Respiratory:  Clear to auscultation and breath sounds equal.  CV:  Regular rate and rhythm  GI:  Abdomen soft, nontender, non-distended, No firm or pulsatile mass.   Back: lower lumbar spine bilateral and midline. Tenderness: Moderate. Swelling: no.              Range of Motion: diminished range with pain. Skin:  no erythema, rash or swelling noted. Distal Function:              Motor deficit: none. Sensory deficit: none. Pulse deficit: none. Extremities: Full ROM x4,  No edema or tenderness  Integument:  Normal turgor. Warm, dry, without visible rash. Neurological: CN II-XII grossly intact, Motor functions intact   Gait: normal.    Lab / Imaging Results   (All laboratory and radiology results have been personally reviewed by myself)  Labs:  No results found for this visit on 02/25/23. Imaging: All Radiology results interpreted by Radiologist unless otherwise noted. No orders to display     Procedure(s):  None    -- MEDICAL DECISION MAKING --   History From: History from : Patient  Limitations to history : None    Record Review:  Outpatient Notes reviewed  Other Records Reviewed : None    CC/HPI Summary, DDx, ED Course, and Reassessment:   Patient presents to the ED for Back Pain (Chronic lower back pain. Per pt \"my sciatic nerve is touching my spine\")    This is a 28-year-old male who presents with chronic back pain. He is requesting narcotic pain medication, however, he was advised that we will not be getting this today due to his drug-seeking behavior. Patient will be sent home with Medrol Dosepak and Lidoderm patches to use for pain. He was also advised to take Tylenol. Patient educated on signs and symptoms that would require his emergent return to the ED. He is showing no signs of cauda equina. Patient is neurologically intact. He will be given the name of his neurosurgeon to call on Monday morning. He was also advised to call his family doctor and to go to already prescribed physical therapy appointment in 2 weeks.     Patient was given the following medications:  Medications - No data to display     Differential diagnoses included but not limited to sciatica, degenerative disc disease, herniated disc, disc bulge, cauda equina     Reassessment:  Patient continues to be non-toxic on re-evaluation. Chronic Conditions:   Past Medical History:   Diagnosis Date    Anxiety     Arthritis     Asthma     Bipolar 1 disorder (HCC)     Chronic combined systolic and diastolic CHF (congestive heart failure) ([facility] [address] 05/27/2018    COPD (chronic obstructive pulmonary disease) (HCC)     Depression     Diabetes mellitus (HCC)     GERD (gastroesophageal reflux disease)     Hepatitis C     resolved    Hypertension     Hyperthyroidism 8/2/2012    Hypothyroidism due to medication     Mass of testicle     Mesotheli[facility])     pt states possibly not    Neuromuscular disorder ([facility] [address]     Other disorders of kidney and ureter     kidney stones; most recent 08/27/2015    Paroxysmal A-fib ([facility] [address]     discussed with PCP- patient does have hx of PAfib    Peptic ulcer     Prostate enlargement     Pulmonary embolism ([address]     Intermediate risk for PE on VQ scan. Seizures ([facility] [address]     Thyroid disease     hyperthyroid     ED COURSE:      Any labs and imaging were reviewed by myself. Consultations:  None  Discussion with Other Profesionals : None    COUNSELING:   I have spoken with the patient/caregiver and discussed todays results, in addition to providing specific details for the plan of care and counseling regarding the diagnosis and prognosis and are agreeable with the plan. All results reviewed with pt and all questions answered. I discussed the differential, results and discharge plan with the patient and/or family/friend/caregiver if present. I emphasized the importance of follow-up with the physician I referred them to in the timeframe recommended. I explained reasons for the patient to return to the Emergency Department.  Additional verbal discharge instructions were also given and discussed with the patient to supplement those generated by the EMR. We also discussed medications that were prescribed (if any) including common side effects and interactions. All questions were addressed. They understand return precautions and discharge instructions. The patient and/or family/friend/caregiver expressed understanding. Vitals were stable and they were in no distress at discharge. Findings were discussed with the patient and reasons to immediately return to the ED were articulated to them. Patient will follow-up with their PMD and ortho spine  DISPOSITION CONSIDERATIONS:    Disposition Considerations:  (include Tests not done, Shared Decision Making, Pt Expectation of Test or Tx.):   Appropriate for outpatient management        Social Determinants:  Social Determinants : None    MEDICATIONS:   DISCHARGE MEDICATIONS:  New Prescriptions    LIDOCAINE (LIDODERM) 5 %    Place 1 patch onto the skin every 24 hours for 10 days 12 hours on, 12 hours off. METHYLPREDNISOLONE (MEDROL, MARIS,) 4 MG TABLET    Take by mouth. DISCONTINUED MEDICATIONS:  Discontinued Medications    No medications on file     DIAGNOSIS:     1. Acute exacerbation of chronic low back pain    2. Drug-seeking behavior      This patient's ED course included: a personal history and physicial examination  This patient has remained hemodynamically stable during their ED course. DISPOSITION:   Discharge to home. Patient condition is good. Discharge Instructions:   Patient referred to  Delvis Betancur MD  [address] [place] [address]. [place] [address]  [phone]    Call   to schedule appointment for ED follow-up    DO Lina Puckett [facility]  [phone]    Schedule an appointment as soon as possible for a visit in 2 days  for follow-up on ED visit      Electronically signed by Jose Guadalupe Riley PA-C   DD: 2/25/23  I am the Primary Clinician of Record. **This report was transcribed using voice recognition software.  Every effort was made to ensure accuracy; however, inadvertent computerized transcription errors may be present.     END OF PROVIDER NOTE       Memory YOLA Bowers  02/25/23 2737

## 2023-02-25 NOTE — ED NOTES
Department of Emergency Medicine    FIRST PROVIDER TRIAGE NOTE             Independent MLP           2/25/23  2:33 PM EST    Date of Encounter: 2/25/23   MRN: [id number]    Vitals:    02/25/23 1410   Pulse: 98   Resp: 18   Temp: 97.8 °F (36.6 °C)   TempSrc: Oral   SpO2: 100%   Weight: 150 lb (68 kg)   Height: 5' 4\" (1.626 m)      HPI: Darylene Shingles is a 78 y.o. male who presents to the ED for No chief complaint on file. Pt immediately asking for pain medications     ROS: Negative for cp, sob, or urinary complaints. Physical Exam:   Gen Appearance/Constitutional: alert  CV: regular rate     Initial Plan of Care: All treatment areas with department are currently occupied. Plan to order/Initiate the following while awaiting opening in ED: imaging studies.     Initial Plan of Care: Initiate Treatment-Testing, Proceed toTreatment Area When Bed Available for ED Attending/MLP to Continue Care    Electronically signed by Duyen Valencia PA-C   DD: 2/25/23       Duyen Valencia PA-C  02/25/23 4424

## 2023-03-03 ENCOUNTER — ANESTHESIA EVENT (OUTPATIENT)
Dept: OPERATING ROOM | Age: 68
End: 2023-03-03
Payer: COMMERCIAL

## 2023-03-03 ENCOUNTER — APPOINTMENT (OUTPATIENT)
Dept: CT IMAGING | Age: 68
DRG: 141 | End: 2023-03-03
Payer: MEDICARE

## 2023-03-03 ENCOUNTER — HOSPITAL ENCOUNTER (INPATIENT)
Age: 68
LOS: 4 days | Discharge: HOME | DRG: 141 | End: 2023-03-07
Attending: EMERGENCY MEDICINE | Admitting: INTERNAL MEDICINE
Payer: MEDICARE

## 2023-03-03 ENCOUNTER — APPOINTMENT (OUTPATIENT)
Dept: GENERAL RADIOLOGY | Age: 68
DRG: 141 | End: 2023-03-03
Payer: MEDICARE

## 2023-03-03 ENCOUNTER — ANESTHESIA (OUTPATIENT)
Dept: OPERATING ROOM | Age: 68
End: 2023-03-03
Payer: COMMERCIAL
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
## 2023-03-03 DIAGNOSIS — S02.600B OPEN FRACTURE OF BODY OF MANDIBLE, UNSPECIFIED LATERALITY, INITIAL ENCOUNTER (HCC): Primary | ICD-10-CM

## 2023-03-03 PROBLEM — S02.609B: Status: ACTIVE | Noted: 2023-03-03

## 2023-03-03 LAB
ALBUMIN SERPL-MCNC: 4.5 G/DL (ref 3.5–5.2)
ALP BLD-CCNC: 77 U/L (ref 40–129)
ALT SERPL-CCNC: 17 U/L (ref 0–40)
ANION GAP SERPL CALCULATED.3IONS-SCNC: 10 MMOL/L (ref 7–16)
APTT: 38.4 SEC (ref 24.5–35.1)
AST SERPL-CCNC: 21 U/L (ref 0–39)
BASOPHILS ABSOLUTE: 0.07 E9/L (ref 0–0.2)
BASOPHILS RELATIVE PERCENT: 0.5 % (ref 0–2)
BILIRUB SERPL-MCNC: 0.3 MG/DL (ref 0–1.2)
BUN BLDV-MCNC: 13 MG/DL (ref 6–23)
CALCIUM SERPL-MCNC: 9.5 MG/DL (ref 8.6–10.2)
CHLORIDE BLD-SCNC: 100 MMOL/L (ref 98–107)
CO2: 27 MMOL/L (ref 22–29)
CREAT SERPL-MCNC: 1 MG/DL (ref 0.7–1.2)
EOSINOPHILS ABSOLUTE: 0.02 E9/L (ref 0.05–0.5)
EOSINOPHILS RELATIVE PERCENT: 0.1 % (ref 0–6)
GFR SERPL CREATININE-BSD FRML MDRD: >60 ML/MIN/1.73
GLUCOSE BLD-MCNC: 131 MG/DL (ref 74–99)
HCT VFR BLD CALC: 44 % (ref 37–54)
HEMOGLOBIN: 14.5 G/DL (ref 12.5–16.5)
IMMATURE GRANULOCYTES #: 0.07 E9/L
IMMATURE GRANULOCYTES %: 0.5 % (ref 0–5)
INR BLD: 1.3
LYMPHOCYTES ABSOLUTE: 0.7 E9/L (ref 1.5–4)
LYMPHOCYTES RELATIVE PERCENT: 5.1 % (ref 20–42)
MCH RBC QN AUTO: 30.3 PG (ref 26–35)
MCHC RBC AUTO-ENTMCNC: 33 % (ref 32–34.5)
MCV RBC AUTO: 91.9 FL (ref 80–99.9)
MONOCYTES ABSOLUTE: 0.77 E9/L (ref 0.1–0.95)
MONOCYTES RELATIVE PERCENT: 5.6 % (ref 2–12)
NEUTROPHILS ABSOLUTE: 12.02 E9/L (ref 1.8–7.3)
NEUTROPHILS RELATIVE PERCENT: 88.2 % (ref 43–80)
PDW BLD-RTO: 13.7 FL (ref 11.5–15)
PLATELET # BLD: 206 E9/L (ref 130–450)
PMV BLD AUTO: 12.7 FL (ref 7–12)
POTASSIUM SERPL-SCNC: 4.6 MMOL/L (ref 3.5–5)
PROTHROMBIN TIME: 14 SEC (ref 9.3–12.4)
RBC # BLD: 4.79 E12/L (ref 3.8–5.8)
SODIUM BLD-SCNC: 137 MMOL/L (ref 132–146)
TOTAL PROTEIN: 7.9 G/DL (ref 6.4–8.3)
WBC # BLD: 13.7 E9/L (ref 4.5–11.5)

## 2023-03-03 PROCEDURE — 7100000000 HC PACU RECOVERY - FIRST 15 MIN: Performed by: OTOLARYNGOLOGY

## 2023-03-03 PROCEDURE — 3600000004 HC SURGERY LEVEL 4 BASE: Performed by: OTOLARYNGOLOGY

## 2023-03-03 PROCEDURE — 6370000000 HC RX 637 (ALT 250 FOR IP): Performed by: OTOLARYNGOLOGY

## 2023-03-03 PROCEDURE — 2709999900 HC NON-CHARGEABLE SUPPLY: Performed by: OTOLARYNGOLOGY

## 2023-03-03 PROCEDURE — 6360000002 HC RX W HCPCS

## 2023-03-03 PROCEDURE — 85610 PROTHROMBIN TIME: CPT

## 2023-03-03 PROCEDURE — 2720000010 HC SURG SUPPLY STERILE: Performed by: OTOLARYNGOLOGY

## 2023-03-03 PROCEDURE — C1713 ANCHOR/SCREW BN/BN,TIS/BN: HCPCS | Performed by: OTOLARYNGOLOGY

## 2023-03-03 PROCEDURE — 70486 CT MAXILLOFACIAL W/O DYE: CPT

## 2023-03-03 PROCEDURE — 99285 EMERGENCY DEPT VISIT HI MDM: CPT

## 2023-03-03 PROCEDURE — 21461 OPTX MNDBLR FX WO NTRDNTL: CPT | Performed by: OTOLARYNGOLOGY

## 2023-03-03 PROCEDURE — 85730 THROMBOPLASTIN TIME PARTIAL: CPT

## 2023-03-03 PROCEDURE — 71045 X-RAY EXAM CHEST 1 VIEW: CPT

## 2023-03-03 PROCEDURE — 70450 CT HEAD/BRAIN W/O DYE: CPT

## 2023-03-03 PROCEDURE — 2580000003 HC RX 258

## 2023-03-03 PROCEDURE — 3700000000 HC ANESTHESIA ATTENDED CARE: Performed by: OTOLARYNGOLOGY

## 2023-03-03 PROCEDURE — 2500000003 HC RX 250 WO HCPCS

## 2023-03-03 PROCEDURE — 72125 CT NECK SPINE W/O DYE: CPT

## 2023-03-03 PROCEDURE — 0NST04Z REPOSITION RIGHT MANDIBLE WITH INTERNAL FIXATION DEVICE, OPEN APPROACH: ICD-10-PCS | Performed by: OTOLARYNGOLOGY

## 2023-03-03 PROCEDURE — 3700000001 HC ADD 15 MINUTES (ANESTHESIA): Performed by: OTOLARYNGOLOGY

## 2023-03-03 PROCEDURE — 80053 COMPREHEN METABOLIC PANEL: CPT

## 2023-03-03 PROCEDURE — 96375 TX/PRO/DX INJ NEW DRUG ADDON: CPT

## 2023-03-03 PROCEDURE — 1200000000 HC SEMI PRIVATE

## 2023-03-03 PROCEDURE — 85025 COMPLETE CBC W/AUTO DIFF WBC: CPT

## 2023-03-03 PROCEDURE — 0NSV04Z REPOSITION LEFT MANDIBLE WITH INTERNAL FIXATION DEVICE, OPEN APPROACH: ICD-10-PCS | Performed by: OTOLARYNGOLOGY

## 2023-03-03 PROCEDURE — 6370000000 HC RX 637 (ALT 250 FOR IP)

## 2023-03-03 PROCEDURE — 7100000001 HC PACU RECOVERY - ADDTL 15 MIN: Performed by: OTOLARYNGOLOGY

## 2023-03-03 PROCEDURE — 3600000014 HC SURGERY LEVEL 4 ADDTL 15MIN: Performed by: OTOLARYNGOLOGY

## 2023-03-03 PROCEDURE — 0WQ5XZZ REPAIR LOWER JAW, EXTERNAL APPROACH: ICD-10-PCS | Performed by: OTOLARYNGOLOGY

## 2023-03-03 PROCEDURE — 76376 3D RENDER W/INTRP POSTPROCES: CPT

## 2023-03-03 PROCEDURE — 99221 1ST HOSP IP/OBS SF/LOW 40: CPT | Performed by: OTOLARYNGOLOGY

## 2023-03-03 PROCEDURE — 96365 THER/PROPH/DIAG IV INF INIT: CPT

## 2023-03-03 DEVICE — BONE SCREWS, CROSS-PIN: Type: IMPLANTABLE DEVICE | Site: MANDIBLE | Status: FUNCTIONAL

## 2023-03-03 DEVICE — LOCKING SCREWS, CROSS-PIN
Type: IMPLANTABLE DEVICE | Site: MANDIBLE | Status: FUNCTIONAL
Brand: SMARTLOCK

## 2023-03-03 DEVICE — MINI PLATE, PROFILE HEIGHT 1.5MM W. BAR: Type: IMPLANTABLE DEVICE | Site: MANDIBLE | Status: FUNCTIONAL

## 2023-03-03 RX ORDER — MEPERIDINE HYDROCHLORIDE 25 MG/ML
12.5 INJECTION INTRAMUSCULAR; INTRAVENOUS; SUBCUTANEOUS EVERY 5 MIN PRN
Status: DISCONTINUED | OUTPATIENT
Start: 2023-03-03 | End: 2023-03-03 | Stop reason: HOSPADM

## 2023-03-03 RX ORDER — LABETALOL HYDROCHLORIDE 5 MG/ML
INJECTION, SOLUTION INTRAVENOUS PRN
Status: DISCONTINUED | OUTPATIENT
Start: 2023-03-03 | End: 2023-03-03 | Stop reason: SDUPTHER

## 2023-03-03 RX ORDER — OXYCODONE HYDROCHLORIDE AND ACETAMINOPHEN 5; 325 MG/1; MG/1
1 TABLET ORAL EVERY 4 HOURS PRN
Status: DISCONTINUED | OUTPATIENT
Start: 2023-03-03 | End: 2023-03-07 | Stop reason: HOSPADM

## 2023-03-03 RX ORDER — ROCURONIUM BROMIDE 10 MG/ML
INJECTION, SOLUTION INTRAVENOUS PRN
Status: DISCONTINUED | OUTPATIENT
Start: 2023-03-03 | End: 2023-03-03 | Stop reason: SDUPTHER

## 2023-03-03 RX ORDER — SODIUM CHLORIDE 0.9 % (FLUSH) 0.9 %
5-40 SYRINGE (ML) INJECTION PRN
Status: DISCONTINUED | OUTPATIENT
Start: 2023-03-03 | End: 2023-03-03 | Stop reason: HOSPADM

## 2023-03-03 RX ORDER — FENTANYL CITRATE 50 UG/ML
50 INJECTION, SOLUTION INTRAMUSCULAR; INTRAVENOUS ONCE
Status: COMPLETED | OUTPATIENT
Start: 2023-03-03 | End: 2023-03-03

## 2023-03-03 RX ORDER — HYDROCODONE BITARTRATE AND ACETAMINOPHEN 5; 325 MG/1; MG/1
1 TABLET ORAL ONCE
Status: COMPLETED | OUTPATIENT
Start: 2023-03-03 | End: 2023-03-03

## 2023-03-03 RX ORDER — HALOPERIDOL 5 MG/ML
1 INJECTION INTRAMUSCULAR ONCE
Status: COMPLETED | OUTPATIENT
Start: 2023-03-03 | End: 2023-03-03

## 2023-03-03 RX ORDER — PROPOFOL 10 MG/ML
INJECTION, EMULSION INTRAVENOUS PRN
Status: DISCONTINUED | OUTPATIENT
Start: 2023-03-03 | End: 2023-03-03 | Stop reason: SDUPTHER

## 2023-03-03 RX ORDER — CEFAZOLIN SODIUM 1 G/3ML
INJECTION, POWDER, FOR SOLUTION INTRAMUSCULAR; INTRAVENOUS PRN
Status: DISCONTINUED | OUTPATIENT
Start: 2023-03-03 | End: 2023-03-03 | Stop reason: SDUPTHER

## 2023-03-03 RX ORDER — CHLORHEXIDINE GLUCONATE 0.12 MG/ML
15 RINSE ORAL
Status: DISCONTINUED | OUTPATIENT
Start: 2023-03-04 | End: 2023-03-07 | Stop reason: HOSPADM

## 2023-03-03 RX ORDER — SODIUM CHLORIDE 9 MG/ML
INJECTION, SOLUTION INTRAVENOUS PRN
Status: DISCONTINUED | OUTPATIENT
Start: 2023-03-03 | End: 2023-03-03 | Stop reason: HOSPADM

## 2023-03-03 RX ORDER — MIDAZOLAM HYDROCHLORIDE 1 MG/ML
1 INJECTION INTRAMUSCULAR; INTRAVENOUS ONCE
Status: COMPLETED | OUTPATIENT
Start: 2023-03-03 | End: 2023-03-03

## 2023-03-03 RX ORDER — MORPHINE SULFATE 4 MG/ML
4 INJECTION, SOLUTION INTRAMUSCULAR; INTRAVENOUS
Status: DISCONTINUED | OUTPATIENT
Start: 2023-03-03 | End: 2023-03-05

## 2023-03-03 RX ORDER — DEXTROSE MONOHYDRATE 50 MG/ML
INJECTION, SOLUTION INTRAVENOUS CONTINUOUS
Status: ACTIVE | OUTPATIENT
Start: 2023-03-03 | End: 2023-03-04

## 2023-03-03 RX ORDER — LISINOPRIL 10 MG/1
10 TABLET ORAL DAILY
Status: DISCONTINUED | OUTPATIENT
Start: 2023-03-03 | End: 2023-03-07 | Stop reason: HOSPADM

## 2023-03-03 RX ORDER — MAGNESIUM HYDROXIDE 1200 MG/15ML
LIQUID ORAL CONTINUOUS PRN
Status: DISCONTINUED | OUTPATIENT
Start: 2023-03-03 | End: 2023-03-03 | Stop reason: ALTCHOICE

## 2023-03-03 RX ORDER — ACETAMINOPHEN 650 MG/1
650 SUPPOSITORY RECTAL EVERY 6 HOURS PRN
Status: DISCONTINUED | OUTPATIENT
Start: 2023-03-03 | End: 2023-03-07 | Stop reason: HOSPADM

## 2023-03-03 RX ORDER — SODIUM CHLORIDE 0.9 % (FLUSH) 0.9 %
5-40 SYRINGE (ML) INJECTION EVERY 12 HOURS SCHEDULED
Status: DISCONTINUED | OUTPATIENT
Start: 2023-03-03 | End: 2023-03-03 | Stop reason: HOSPADM

## 2023-03-03 RX ORDER — ONDANSETRON 2 MG/ML
INJECTION INTRAMUSCULAR; INTRAVENOUS PRN
Status: DISCONTINUED | OUTPATIENT
Start: 2023-03-03 | End: 2023-03-03 | Stop reason: SDUPTHER

## 2023-03-03 RX ORDER — PHENYLEPHRINE HCL IN 0.9% NACL 1 MG/10 ML
SYRINGE (ML) INTRAVENOUS PRN
Status: DISCONTINUED | OUTPATIENT
Start: 2023-03-03 | End: 2023-03-03 | Stop reason: SDUPTHER

## 2023-03-03 RX ORDER — OXYMETAZOLINE HYDROCHLORIDE 0.05 G/100ML
SPRAY NASAL PRN
Status: DISCONTINUED | OUTPATIENT
Start: 2023-03-03 | End: 2023-03-03 | Stop reason: SDUPTHER

## 2023-03-03 RX ORDER — BUDESONIDE 0.5 MG/2ML
0.5 INHALANT ORAL 2 TIMES DAILY
Status: DISCONTINUED | OUTPATIENT
Start: 2023-03-03 | End: 2023-03-07 | Stop reason: HOSPADM

## 2023-03-03 RX ORDER — DIPHENHYDRAMINE HYDROCHLORIDE 50 MG/ML
12.5 INJECTION INTRAMUSCULAR; INTRAVENOUS
Status: DISCONTINUED | OUTPATIENT
Start: 2023-03-03 | End: 2023-03-03 | Stop reason: HOSPADM

## 2023-03-03 RX ORDER — MIDAZOLAM HYDROCHLORIDE 1 MG/ML
INJECTION INTRAMUSCULAR; INTRAVENOUS
Status: COMPLETED
Start: 2023-03-03 | End: 2023-03-03

## 2023-03-03 RX ORDER — SODIUM CHLORIDE 9 MG/ML
INJECTION, SOLUTION INTRAVENOUS PRN
Status: DISCONTINUED | OUTPATIENT
Start: 2023-03-03 | End: 2023-03-07 | Stop reason: HOSPADM

## 2023-03-03 RX ORDER — ONDANSETRON 4 MG/1
4 TABLET, ORALLY DISINTEGRATING ORAL EVERY 8 HOURS PRN
Status: DISCONTINUED | OUTPATIENT
Start: 2023-03-03 | End: 2023-03-07 | Stop reason: HOSPADM

## 2023-03-03 RX ORDER — LIDOCAINE HYDROCHLORIDE 20 MG/ML
INJECTION, SOLUTION INTRAVENOUS PRN
Status: DISCONTINUED | OUTPATIENT
Start: 2023-03-03 | End: 2023-03-03 | Stop reason: SDUPTHER

## 2023-03-03 RX ORDER — DEXAMETHASONE SODIUM PHOSPHATE 10 MG/ML
INJECTION INTRAMUSCULAR; INTRAVENOUS PRN
Status: DISCONTINUED | OUTPATIENT
Start: 2023-03-03 | End: 2023-03-03 | Stop reason: SDUPTHER

## 2023-03-03 RX ORDER — FINASTERIDE 5 MG/1
5 TABLET, FILM COATED ORAL DAILY
Status: DISCONTINUED | OUTPATIENT
Start: 2023-03-04 | End: 2023-03-07 | Stop reason: HOSPADM

## 2023-03-03 RX ORDER — LIDOCAINE HYDROCHLORIDE AND EPINEPHRINE 10; 10 MG/ML; UG/ML
20 INJECTION, SOLUTION INFILTRATION; PERINEURAL ONCE
Status: DISCONTINUED | OUTPATIENT
Start: 2023-03-03 | End: 2023-03-05

## 2023-03-03 RX ORDER — LEVETIRACETAM 500 MG/1
1000 TABLET ORAL 2 TIMES DAILY
Status: DISCONTINUED | OUTPATIENT
Start: 2023-03-03 | End: 2023-03-07 | Stop reason: HOSPADM

## 2023-03-03 RX ORDER — ARFORMOTEROL TARTRATE 15 UG/2ML
15 SOLUTION RESPIRATORY (INHALATION) 2 TIMES DAILY
Status: DISCONTINUED | OUTPATIENT
Start: 2023-03-03 | End: 2023-03-07 | Stop reason: HOSPADM

## 2023-03-03 RX ORDER — HALOPERIDOL 5 MG/ML
INJECTION INTRAMUSCULAR
Status: COMPLETED
Start: 2023-03-03 | End: 2023-03-03

## 2023-03-03 RX ORDER — SODIUM CHLORIDE 0.9 % (FLUSH) 0.9 %
5-40 SYRINGE (ML) INJECTION PRN
Status: DISCONTINUED | OUTPATIENT
Start: 2023-03-03 | End: 2023-03-07 | Stop reason: HOSPADM

## 2023-03-03 RX ORDER — MORPHINE SULFATE 2 MG/ML
2 INJECTION, SOLUTION INTRAMUSCULAR; INTRAVENOUS
Status: DISCONTINUED | OUTPATIENT
Start: 2023-03-03 | End: 2023-03-05

## 2023-03-03 RX ORDER — KETAMINE HCL IN NACL, ISO-OSM 100MG/10ML
SYRINGE (ML) INJECTION PRN
Status: DISCONTINUED | OUTPATIENT
Start: 2023-03-03 | End: 2023-03-03 | Stop reason: SDUPTHER

## 2023-03-03 RX ORDER — POLYETHYLENE GLYCOL 3350 17 G/17G
17 POWDER, FOR SOLUTION ORAL DAILY PRN
Status: DISCONTINUED | OUTPATIENT
Start: 2023-03-03 | End: 2023-03-07 | Stop reason: HOSPADM

## 2023-03-03 RX ORDER — BACITRACIN ZINC 500 [USP'U]/G
OINTMENT TOPICAL PRN
Status: DISCONTINUED | OUTPATIENT
Start: 2023-03-03 | End: 2023-03-03 | Stop reason: ALTCHOICE

## 2023-03-03 RX ORDER — FENTANYL CITRATE 50 UG/ML
INJECTION, SOLUTION INTRAMUSCULAR; INTRAVENOUS PRN
Status: DISCONTINUED | OUTPATIENT
Start: 2023-03-03 | End: 2023-03-03 | Stop reason: SDUPTHER

## 2023-03-03 RX ORDER — SODIUM CHLORIDE 9 MG/ML
INJECTION, SOLUTION INTRAVENOUS CONTINUOUS PRN
Status: DISCONTINUED | OUTPATIENT
Start: 2023-03-03 | End: 2023-03-03 | Stop reason: SDUPTHER

## 2023-03-03 RX ORDER — METHIMAZOLE 5 MG/1
10 TABLET ORAL 3 TIMES DAILY
Status: DISCONTINUED | OUTPATIENT
Start: 2023-03-03 | End: 2023-03-04

## 2023-03-03 RX ORDER — SODIUM CHLORIDE 0.9 % (FLUSH) 0.9 %
5-40 SYRINGE (ML) INJECTION EVERY 12 HOURS SCHEDULED
Status: DISCONTINUED | OUTPATIENT
Start: 2023-03-03 | End: 2023-03-07 | Stop reason: HOSPADM

## 2023-03-03 RX ORDER — MIDAZOLAM HYDROCHLORIDE 1 MG/ML
INJECTION INTRAMUSCULAR; INTRAVENOUS PRN
Status: DISCONTINUED | OUTPATIENT
Start: 2023-03-03 | End: 2023-03-03 | Stop reason: SDUPTHER

## 2023-03-03 RX ORDER — SUCCINYLCHOLINE/SOD CL,ISO/PF 200MG/10ML
SYRINGE (ML) INTRAVENOUS PRN
Status: DISCONTINUED | OUTPATIENT
Start: 2023-03-03 | End: 2023-03-03 | Stop reason: SDUPTHER

## 2023-03-03 RX ORDER — ONDANSETRON 2 MG/ML
4 INJECTION INTRAMUSCULAR; INTRAVENOUS EVERY 6 HOURS PRN
Status: DISCONTINUED | OUTPATIENT
Start: 2023-03-03 | End: 2023-03-07 | Stop reason: HOSPADM

## 2023-03-03 RX ORDER — OXYCODONE HYDROCHLORIDE AND ACETAMINOPHEN 5; 325 MG/1; MG/1
2 TABLET ORAL EVERY 4 HOURS PRN
Status: DISCONTINUED | OUTPATIENT
Start: 2023-03-03 | End: 2023-03-07 | Stop reason: HOSPADM

## 2023-03-03 RX ORDER — ACETAMINOPHEN 325 MG/1
650 TABLET ORAL EVERY 6 HOURS PRN
Status: DISCONTINUED | OUTPATIENT
Start: 2023-03-03 | End: 2023-03-07 | Stop reason: HOSPADM

## 2023-03-03 RX ADMIN — FENTANYL CITRATE 50 MCG: 50 INJECTION, SOLUTION INTRAMUSCULAR; INTRAVENOUS at 19:51

## 2023-03-03 RX ADMIN — FENTANYL CITRATE 50 MCG: 50 INJECTION, SOLUTION INTRAMUSCULAR; INTRAVENOUS at 15:42

## 2023-03-03 RX ADMIN — PROPOFOL 100 MG: 10 INJECTION, EMULSION INTRAVENOUS at 19:33

## 2023-03-03 RX ADMIN — NASAL DECONGESTANT 1 SPRAY: 0.05 SPRAY NASAL at 19:28

## 2023-03-03 RX ADMIN — MIDAZOLAM 2 MG: 1 INJECTION INTRAMUSCULAR; INTRAVENOUS at 19:33

## 2023-03-03 RX ADMIN — Medication 100 MCG: at 20:24

## 2023-03-03 RX ADMIN — LABETALOL HYDROCHLORIDE 2.5 MG: 5 INJECTION INTRAVENOUS at 21:23

## 2023-03-03 RX ADMIN — SODIUM CHLORIDE 3000 MG: 9 INJECTION, SOLUTION INTRAVENOUS at 15:45

## 2023-03-03 RX ADMIN — ONDANSETRON 4 MG: 2 INJECTION INTRAMUSCULAR; INTRAVENOUS at 19:43

## 2023-03-03 RX ADMIN — SUGAMMADEX 200 MG: 100 INJECTION, SOLUTION INTRAVENOUS at 21:42

## 2023-03-03 RX ADMIN — CEFAZOLIN 2 G: 1 INJECTION, POWDER, FOR SOLUTION INTRAMUSCULAR; INTRAVENOUS at 19:30

## 2023-03-03 RX ADMIN — HYDROCODONE BITARTRATE AND ACETAMINOPHEN 1 TABLET: 5; 325 TABLET ORAL at 12:44

## 2023-03-03 RX ADMIN — HALOPERIDOL 1 MG: 5 INJECTION INTRAMUSCULAR at 23:00

## 2023-03-03 RX ADMIN — Medication 120 MG: at 19:33

## 2023-03-03 RX ADMIN — DEXAMETHASONE SODIUM PHOSPHATE 10 MG: 10 INJECTION INTRAMUSCULAR; INTRAVENOUS at 19:43

## 2023-03-03 RX ADMIN — MIDAZOLAM 1 MG: 1 INJECTION INTRAMUSCULAR; INTRAVENOUS at 22:45

## 2023-03-03 RX ADMIN — MIDAZOLAM HYDROCHLORIDE 1 MG: 1 INJECTION INTRAMUSCULAR; INTRAVENOUS at 22:45

## 2023-03-03 RX ADMIN — FENTANYL CITRATE 50 MCG: 50 INJECTION, SOLUTION INTRAMUSCULAR; INTRAVENOUS at 20:35

## 2023-03-03 RX ADMIN — FENTANYL CITRATE 50 MCG: 50 INJECTION, SOLUTION INTRAMUSCULAR; INTRAVENOUS at 21:12

## 2023-03-03 RX ADMIN — NASAL DECONGESTANT 1 SPRAY: 0.05 SPRAY NASAL at 19:27

## 2023-03-03 RX ADMIN — LABETALOL HYDROCHLORIDE 2.5 MG: 5 INJECTION INTRAVENOUS at 21:40

## 2023-03-03 RX ADMIN — Medication 30 MG: at 19:33

## 2023-03-03 RX ADMIN — LIDOCAINE HYDROCHLORIDE 100 MG: 20 INJECTION, SOLUTION INTRAVENOUS at 19:33

## 2023-03-03 RX ADMIN — ROCURONIUM BROMIDE 30 MG: 10 INJECTION, SOLUTION INTRAVENOUS at 20:04

## 2023-03-03 RX ADMIN — Medication 200 MCG: at 20:04

## 2023-03-03 RX ADMIN — Medication 200 MCG: at 19:57

## 2023-03-03 RX ADMIN — HALOPERIDOL LACTATE 1 MG: 5 INJECTION, SOLUTION INTRAMUSCULAR at 23:00

## 2023-03-03 RX ADMIN — MIDAZOLAM HYDROCHLORIDE 1 MG: 1 INJECTION INTRAMUSCULAR; INTRAVENOUS at 22:30

## 2023-03-03 RX ADMIN — MIDAZOLAM 1 MG: 1 INJECTION INTRAMUSCULAR; INTRAVENOUS at 22:30

## 2023-03-03 RX ADMIN — FENTANYL CITRATE 100 MCG: 50 INJECTION, SOLUTION INTRAMUSCULAR; INTRAVENOUS at 19:33

## 2023-03-03 RX ADMIN — SODIUM CHLORIDE: 9 INJECTION, SOLUTION INTRAVENOUS at 19:15

## 2023-03-03 ASSESSMENT — PAIN - FUNCTIONAL ASSESSMENT: PAIN_FUNCTIONAL_ASSESSMENT: 0-10

## 2023-03-03 ASSESSMENT — ENCOUNTER SYMPTOMS
RESPIRATORY NEGATIVE: 1
GASTROINTESTINAL NEGATIVE: 1
EYES NEGATIVE: 1
ALLERGIC/IMMUNOLOGIC NEGATIVE: 1

## 2023-03-03 ASSESSMENT — PAIN DESCRIPTION - DESCRIPTORS: DESCRIPTORS: ACHING

## 2023-03-03 ASSESSMENT — PAIN SCALES - GENERAL
PAINLEVEL_OUTOF10: 10
PAINLEVEL_OUTOF10: 10

## 2023-03-03 ASSESSMENT — PAIN DESCRIPTION - LOCATION
LOCATION: JAW
LOCATION: JAW

## 2023-03-03 NOTE — ED NOTES
Pt ambulated to bathroom without assistance. Pt tolerated well, steady gait.      Shaquille Patel RN  03/03/23 0578

## 2023-03-03 NOTE — CONSULTS
OTOLARYNGOLOGY  CONSULT NOTE  3/3/2023    Physician Consulted: Dr. Clarisse Caal  Reason for Consult: bilateral mandible fracture  Referring Physician: Dr. Kulwant Cole    HPI  Princess Smalls is a 78 y.o. male with known history of narcotic abuse, DM, COPD, Hep C, Atrial fibrillation and bipolar I disorder who ENT was consulted for evaluation of bilateral mandible fracture secondary to fall. Patient reports that he tripped and fell at home hitting his jaw prior to arrival. He was wearing dentures at the time. Denies any prior history of head or neck surgery. CT face with fractures of the mandibular body bilaterally. Review of Systems   HENT:          Jaw and mouth pain     Past Medical History:   Diagnosis Date    Anxiety     Arthritis     Asthma     Bipolar 1 disorder (HCC)     Chronic combined systolic and diastolic CHF (congestive heart failure) ([address] 05/27/2018    COPD (chronic obstructive pulmonary disease) (HCC)     Depression     Diabetes mellitus (HCC)     GERD (gastroesophageal reflux disease)     Hepatitis C     resolved    Hypertension     Hyperthyroidism 8/2/2012    Hypothyroidism due to medication     Mass of testicle     Mesothelioma [facility])     pt states possibly not    Neuromuscular disorder ([address]     Other disorders of kidney and ureter     kidney stones; most recent 08/27/2015    Paroxysmal A-fib ([address]     discussed with PCP- patient does have hx of PAfib    Peptic ulcer     Prostate enlargement     Pulmonary embolism ([address]     Intermediate risk for PE on VQ scan.     Seizures ([address]     Thyroid disease     hyperthyroid       Past Surgical History:   Procedure Laterality Date    ABDOMEN SURGERY      APPENDECTOMY      BLADDER SURGERY      CARDIAC CATHETERIZATION  05/21/2018    Dr. Kimberly Mathew, COLON, DIAGNOSTIC  11/13/2015    HEMORRHOID SURGERY      LITHOTRIPSY      VASCULAR SURGERY         Medications Prior to Admission:    Prior to Admission medications Medication Sig Start Date End Date Taking? Authorizing Provider   methylPREDNISolone (MEDROL, MARIS,) 4 MG tablet Take by mouth. 2/25/23   Liseth Osorio PA-C   lidocaine (LIDODERM) 5 % Place 1 patch onto the skin every 24 hours for 10 days 12 hours on, 12 hours off. 2/25/23 3/7/23  Liseth Osorio PA-C   gabapentin (NEURONTIN) 100 MG capsule Take 1 capsule by mouth 3 times daily for 180 days. Intended supply: 90 days 2/12/23 8/11/23  ZOIE Lerma CNP   LATUDA 120 MG tablet  8/10/22   Historical Provider, MD   sildenafil (VIAGRA) 50 MG tablet take 1 tablet by mouth 1 hour prior to intercourse 7/21/22   Historical Provider, MD   acetaminophen (TYLENOL) 500 MG tablet Take 1 tablet by mouth 4 times daily as needed for Pain  Patient not taking: No sig reported 6/24/22   Gurpreet Ricardo MD   levETIRAcetam (KEPPRA) 500 MG tablet Take 1,000 mg by mouth 2 times daily    Historical Provider, MD   Rivaroxaban (XARELTO PO) Take by mouth    Historical Provider, MD   divalproex (DEPAKOTE) 250 MG DR tablet Take 1 tablet by mouth every 12 hours 12/9/21 8/11/22  ZOIE Bullock CNP   venlafaxine (EFFEXOR XR) 37.5 MG extended release capsule Take 1 capsule by mouth daily (with breakfast)  Patient taking differently: Take 75 mg by mouth in the morning and at bedtime 75 mg in the day and 36 mg at night.  12/10/21 8/11/22  ZOIE Bullock CNP   atorvastatin (LIPITOR) 40 MG tablet Take 1 tablet by mouth nightly 12/9/21 8/11/22  ZOIE Bullock CNP   traZODone (DESYREL) 50 MG tablet Take 1 tablet by mouth nightly as needed for Sleep 9/28/21 8/11/22  ZOIE Bullock CNP   Multiple Vitamins-Minerals (THERAPEUTIC MULTIVITAMIN-MINERALS) tablet Take 1 tablet by mouth daily    Historical Provider, MD Zain Mcwilliams 200-25 MCG/INH AEPB  4/5/19   Historical Provider, MD   methimazole (TAPAZOLE) 10 MG tablet Take 10 mg by mouth 3 times daily    Historical Provider, MD   lisinopril (PRINIVIL;ZESTRIL) 10 MG tablet Take 1 tablet by mouth daily 18   Haylee Sylvester MD   Nutritional Supplements (RA BALANCED NUTRITIONAL PLUS) LIQD Take 1 Bottle by mouth daily 18   Sandy Jaimes MD   finasteride (PROSCAR) 5 MG tablet Take 1 tablet by mouth daily 18   Sandy Jaimes MD       Allergies   Allergen Reactions    Codeine Anaphylaxis and Hives    Dye [Iodides] Anaphylaxis and Swelling     He claims he has had CT scans before including contrast?  Noted that if listed bc of shellfish-- this is NOT true cross reaction but an osmolaliity issue    Ketorolac Tromethamine Anaphylaxis    Peanuts [Peanut Oil] Anaphylaxis     Throat and eyes swell    Shellfish-Derived Products Anaphylaxis    Nitroglycerin Hives       Family History   Problem Relation Age of Onset    Cancer Mother     Diabetes Mother     Cancer Father     Diabetes Father     Diabetes Maternal Aunt     Diabetes Maternal Grandmother     Cancer Maternal Grandfather     Heart Disease Paternal Grandmother     Heart Failure Paternal Grandmother     Heart Disease Paternal Grandfather     Heart Failure Paternal Grandfather     Diabetes Maternal Aunt     Diabetes Maternal Aunt        Social History     Tobacco Use    Smoking status: Former     Years: 10.00     Types: Cigarettes     Quit date: 1/10/2010     Years since quittin.1    Smokeless tobacco: Former     Types: Chew     Quit date:    Vaping Use    Vaping Use: Never used   Substance Use Topics    Alcohol use: No     Alcohol/week: 0.0 standard drinks     Comment: recovered alcoholic; last use     Drug use: Not Currently     Types: Cocaine           PHYSICAL EXAM:    Vitals:    23 1344   BP: (!) 140/78   Pulse: 82   Resp: 18   Temp:    SpO2: 95%       General Appearance:  Laying in bed, awake, alert, answering questions  Head/face:  NC, Jobst dressing in place with blood  Eyes: PERRL, EOMI  ENT: Bilateral external ears WNL, Nares patent, Septum midline, linear laceration along the mucosal surface of the left lower lip which is oozing, anterior mandible is free-floating, tolerating secretions, significant trismus  Neck:Supple, no adenopathy  Lungs:  Non-labored, good respiratory effort, no stridor  Heart:  RR  Neuro: Facial nerve symmetric and intact. House Brackmann 1/6, bilaterally. LABS:  CBC  No results for input(s): WBC, HGB, HCT, PLT in the last 72 hours. RADIOLOGY  CT HEAD WO CONTRAST    Result Date: 3/3/2023  EXAMINATION: CT OF THE HEAD WITHOUT CONTRAST  3/3/2023 12:58 pm TECHNIQUE: CT of the head was performed without the administration of intravenous contrast. Automated exposure control, iterative reconstruction, and/or weight based adjustment of the mA/kV was utilized to reduce the radiation dose to as low as reasonably achievable. COMPARISON: 05/02/2022 HISTORY: ORDERING SYSTEM PROVIDED HISTORY: fall TECHNOLOGIST PROVIDED HISTORY: Reason for exam:->fall Has a \"code stroke\" or \"stroke alert\" been called? ->No Decision Support Exception - unselect if not a suspected or confirmed emergency medical condition->Emergency Medical Condition ([place]) What reading provider will be dictating this exam?->CRC FINDINGS: BRAIN/VENTRICLES: There is no acute intracranial hemorrhage, mass effect or midline shift. No abnormal extra-axial fluid collection. The gray-white differentiation is maintained without evidence of an acute infarct. There is no evidence of hydrocephalus. ORBITS: The visualized portion of the orbits demonstrate no acute abnormality. SINUSES: The visualized paranasal sinuses and mastoid air cells demonstrate no acute abnormality. There are findings of chronic pansinusitis SOFT TISSUES/SKULL:  No acute abnormality of the visualized skull or soft tissues. There is no fracture of the nasal bones. No acute intracranial abnormality.   Specifically, there is no acute intracranial hemorrhage Findings of chronic pansinusitis     CT FACIAL BONES WO CONTRAST    Result Date: 3/3/2023  EXAMINATION: CT OF THE FACE WITHOUT CONTRAST  3/3/2023 12:58 pm TECHNIQUE: CT of the face was performed without the administration of intravenous contrast. Multiplanar reformatted images are provided for review. Automated exposure control, iterative reconstruction, and/or weight based adjustment of the mA/kV was utilized to reduce the radiation dose to as low as reasonably achievable. COMPARISON: None HISTORY: ORDERING SYSTEM PROVIDED HISTORY: fall TECHNOLOGIST PROVIDED HISTORY: Reason for exam:->fall Decision Support Exception - unselect if not a suspected or confirmed emergency medical condition->Emergency Medical Condition (MA) What reading provider will be dictating this exam?->CRC FINDINGS: FACIAL BONES: There is oblique mildly comminuted fracture through the mandibular body bilaterally. The lateral fragments are anteriorly displaced by 1 shaft width. Temporomandibular joints within normal limits. Mandibular rami are maintained. The sinuses, orbital walls, maxilla, pterygoid plates, zygomatic arches, hard palate, nasal bones are intact. There is depression the left zygoma which is unchanged compared to December 5, 2021 felt represent old fracture. The temporomandibular joints are aligned. ORBITAL CONTENTS: The globes appear intact. The extraocular muscles, optic nerve sheath complexes and lacrimal glands appear unremarkable. No retrobulbar hematoma or mass is seen. SINUSES: There is moderate mucosal thickening frontal, ethmoid, in maxillary sinuses. Air-fluid level right maxillary sinus but no associated fracture evident. SOFT TISSUES: Soft tissue irregularity in swelling likely related to laceration seen over the mandible near midline. There is oblique fracture through the body of the mandible bilaterally Moderate paranasal sinus disease is persistent but less extensive than seen on prior CT of December 5, 2021.      CT CERVICAL SPINE WO CONTRAST    Result Date: 3/3/2023  EXAMINATION: CT OF THE CERVICAL SPINE WITHOUT CONTRAST 3/3/2023 12:58 pm TECHNIQUE: CT of the cervical spine was performed without the administration of intravenous contrast. Multiplanar reformatted images are provided for review. Automated exposure control, iterative reconstruction, and/or weight based adjustment of the mA/kV was utilized to reduce the radiation dose to as low as reasonably achievable. COMPARISON: None. HISTORY: ORDERING SYSTEM PROVIDED HISTORY: fall TECHNOLOGIST PROVIDED HISTORY: Reason for exam:->fall Decision Support Exception - unselect if not a suspected or confirmed emergency medical condition->Emergency Medical Condition ([place]) What reading provider will be dictating this exam?->CRC FINDINGS: The ring of C1 is intact as is the dense. There is no compression fracture of the cervical spine. No jumped or perched facet is noted. Multilevel degenerative disc and degenerative joint disease is noted. The prevertebral soft tissues are unremarkable. The airway is widely patent. Images through the lung apices are negative for a pneumothorax. 1. There is no acute compression fracture or subluxation of the cervical spine. 2. Multilevel degenerative disc and degenerative joint disease. XR CHEST PORTABLE    Result Date: 3/3/2023  EXAMINATION: ONE XRAY VIEW OF THE CHEST 3/3/2023 12:18 pm COMPARISON: 2nd May 2022 HISTORY: ORDERING SYSTEM PROVIDED HISTORY: Shortness of breath TECHNOLOGIST PROVIDED HISTORY: Reason for exam:->Shortness of breath What reading provider will be dictating this exam?->CRC FINDINGS: Postsurgical changes as before. The lungs are without acute focal process. There is no effusion or pneumothorax. The cardiomediastinal silhouette is without acute process. The osseous structures are without acute process. No acute process. ASSESSMENT:  78 y.o. male with extensive medical history presents with displaced, open, bilateral fractures of the body of the mandible.      PLAN:  All available labs, imaging and documentation reviewed. Plan for OR this evening for ORIF of mandible fractures and repair of oral lacerations  NPO  Unasyn for open fracture  Admission to medicine after OR due to multiple co-morbidities     Patient seen and examined by resident with attending on call Dr. Marilin Lemon.     Susan Garrett DO,  Resident Physician, PGY-1  Department of Otolaryngology, [facility] [place], [place])       Electronically signed by Susan Garrett DO on 3/3/23 at 3:38 PM EST

## 2023-03-03 NOTE — ED NOTES
This RN spoke to NP [address] [id number]. She just saw him on Tuesday and staff there had a major intervention with him to suggest going to rehab. Patient left there furious. The NP is concerned he did this to himself to receive narcotics. He currently lives at [facility], which is a mental health controled living environment. Shelia Manuel the NP said she spoke to Valentine  Who is the \"house mother. \" Shelia Manuel stated he keeps talking about living with a 1year old child but there are no children in that facility. [facility] is also seen [facility] where they are concerned he is drug seeking. They too have recommended rehab and pain management butt Jasmyn Bonilla keeps refusing.       Juni Arroyo RN  03/03/23 [id number]

## 2023-03-03 NOTE — ED NOTES
Patient taken to the decontamination room to change due to multiple bed bugs being found on patient clothes.  Patient admits to having bed bugs at home      [facility]  03/03/23 1120

## 2023-03-03 NOTE — ANESTHESIA PRE PROCEDURE
Department of Anesthesiology  Preprocedure Note       Name:  Lizz Rhodes   Age:  78 y.o.  :  1955                                          MRN:  [id number]         Date:  3/3/2023      Surgeon: Adry Miller):  Isabelle Renteria DO    Procedure: Procedure(s):  MANDIBLE OPEN REDUCTION INTERNAL FIXATION    Medications prior to admission:   Prior to Admission medications    Medication Sig Start Date End Date Taking? Authorizing Provider   methylPREDNISolone (MEDROL, MARIS,) 4 MG tablet Take by mouth. 23   Charlotte Moreno PA-C   lidocaine (LIDODERM) 5 % Place 1 patch onto the skin every 24 hours for 10 days 12 hours on, 12 hours off. 2/25/23 3/7/23  Charlotte Moreno PA-C   gabapentin (NEURONTIN) 100 MG capsule Take 1 capsule by mouth 3 times daily for 180 days. Intended supply: 90 days 23  ZOIE Menon CNP   LATUDA 120 MG tablet  8/10/22   Historical Provider, MD   sildenafil (VIAGRA) 50 MG tablet take 1 tablet by mouth 1 hour prior to intercourse 22   Historical Provider, MD   acetaminophen (TYLENOL) 500 MG tablet Take 1 tablet by mouth 4 times daily as needed for Pain  Patient not taking: No sig reported 22   Kallie Enrique MD   levETIRAcetam (KEPPRA) 500 MG tablet Take 1,000 mg by mouth 2 times daily    Historical Provider, MD   Rivaroxaban (XARELTO PO) Take by mouth    Historical Provider, MD   divalproex (DEPAKOTE) 250 MG DR tablet Take 1 tablet by mouth every 12 hours 21  ZOIE Cody CNP   venlafaxine (EFFEXOR XR) 37.5 MG extended release capsule Take 1 capsule by mouth daily (with breakfast)  Patient taking differently: Take 75 mg by mouth in the morning and at bedtime 75 mg in the day and 36 mg at night.  12/10/21 8/11/22  ZOIE Cody CNP   atorvastatin (LIPITOR) 40 MG tablet Take 1 tablet by mouth nightly 21  ZOIE Cody CNP   traZODone (DESYREL) 50 MG tablet Take 1 tablet by mouth nightly as needed for Sleep 9/28/21 8/11/22  Geno Tafoya, APRN - CNP   Multiple Vitamins-Minerals (THERAPEUTIC MULTIVITAMIN-MINERALS) tablet Take 1 tablet by mouth daily    Historical Provider, MD Sewell Drown 200-25 MCG/INH AEPB  4/5/19   Historical Provider, MD   methimazole (TAPAZOLE) 10 MG tablet Take 10 mg by mouth 3 times daily    Historical Provider, MD   lisinopril (PRINIVIL;ZESTRIL) 10 MG tablet Take 1 tablet by mouth daily 7/24/18   Meche Morales MD   Nutritional Supplements (RA BALANCED NUTRITIONAL PLUS) LIQD Take 1 Bottle by mouth daily 5/22/18   Jose Miguel Finn MD   finasteride (PROSCAR) 5 MG tablet Take 1 tablet by mouth daily 5/23/18   Jose Miguel Finn MD       Current medications:    Current Facility-Administered Medications   Medication Dose Route Frequency Provider Last Rate Last Admin    lidocaine-EPINEPHrine 1 %-[id number] injection 20 mL  20 mL IntraDERmal Once Pat Poole MD        sodium chloride flush 0.9 % injection 5-40 mL  5-40 mL IntraVENous 2 times per day Axel Jobs, DO        sodium chloride flush 0.9 % injection 5-40 mL  5-40 mL IntraVENous PRN Axel Jobs, DO        0.9 % sodium chloride infusion   IntraVENous PRN Axel Casillas, DO           Allergies:     Allergies   Allergen Reactions    Codeine Anaphylaxis and Hives    Dye [Iodides] Anaphylaxis and Swelling     He claims he has had CT scans before including contrast?  Noted that if listed bc of shellfish-- this is NOT true cross reaction but an osmolaliity issue    Ketorolac Tromethamine Anaphylaxis    Peanuts [Peanut Oil] Anaphylaxis     Throat and eyes swell    Shellfish-Derived Products Anaphylaxis    Nitroglycerin Hives       Problem List:    Patient Active Problem List   Diagnosis Code    Hyperthyroidism E05.90    Hepatitis C B19.20    Mood disorder ([address] F39    Pain in both testicles N50.811, N50.812    Mild mitral regurgitation I34.0    Hep C w/o coma, chronic (HCC) B18.2    Chronic obstructive pulmonary disease ([address] J44.9  Dyspnea and respiratory abnormalities R06.00, R06.89    Respiratory failure with hypoxia ([facility]) J96.91    Severe protein-calorie malnutrition ([facility] E43    Essential (primary) hypertension I10    H/O drug abuse ([facility] F19.11    Gastroduodenal ulcer K27.9    Seasonal allergic rhinitis J30.2    Type 2 diabetes mellitus ([facility]) E11.9    Vitamin D deficiency E55.9    LV dysfunction I51.9    S/P MVR (mitral valve repair) Z98.890    NSTEMI (non-ST elevated myocardial infarction) ([facility]) I21.4    Suicidal ideations R45.851    Benzodiazepine dependence ([facility]) F13.20    Bipolar disorder current episode depressed ([facility]) F31.30    Polysubstance abuse ([facility] F19.10    Cluster B personality disorder ([facility] F60.89    History of noncompliance with medical treatment, presenting hazards to health Z91.199    Cocaine abuse ([facility] F14.10    Cannabis abuse F12.10    Bipolar disorder ([facility] F31.9    Bilateral fracture of mandible, open, initial encounter ([facility] S02.609B       Past Medical History:        Diagnosis Date    Anxiety     Arthritis     Asthma     Bipolar 1 disorder (HCC)     Chronic combined systolic and diastolic CHF (congestive heart failure) ([facility] 05/27/2018    COPD (chronic obstructive pulmonary disease) ([facility])     Depression     Diabetes mellitus (HCC)     GERD (gastroesophageal reflux disease)     Hepatitis C     resolved    Hypertension     Hyperthyroidism 8/2/2012    Hypothyroidism due to medication     Mass of testicle     Mesothelioma [facility])     pt states possibly not    Neuromuscular disorder ([address]     Other disorders of kidney and ureter     kidney stones; most recent 08/27/2015    Paroxysmal A-fib ([address]     discussed with PCP- patient does have hx of PAfib    Peptic ulcer     Prostate enlargement     Pulmonary embolism (HCC)     Intermediate risk for PE on VQ scan.     Seizures ([address]     Thyroid disease     hyperthyroid       Past Surgical History:        Procedure Laterality Date  ABDOMEN SURGERY      APPENDECTOMY      BLADDER SURGERY      CARDIAC CATHETERIZATION  2018    Dr. Prabhakar Glover, COLON, DIAGNOSTIC  2015    HEMORRHOID SURGERY      LITHOTRIPSY      VASCULAR SURGERY         Social History:    Social History     Tobacco Use    Smoking status: Former     Years: 10.     Types: Cigarettes     Quit date: 1/10/2010     Years since quittin.1    Smokeless tobacco: Former     Types: Chew     Quit date:    Substance Use Topics    Alcohol use: No     Alcohol/week: 0.0 standard drinks     Comment: recovered alcoholic; last use                                 Counseling given: Not Answered      Vital Signs (Current):   Vitals:    23 1112 23 1344   BP: 135/88 (!) 140/78   Pulse: (!) 110 82   Resp: 20 18   Temp: 98.2 °F (36.8 °C)    TempSrc: Oral    SpO2: 95% 95%   Weight: 150 lb (68 kg)    Height: 5' 4\" (1.626 m)                                               BP Readings from Last 3 Encounters:   23 (!) 140/78   23 138/85   23 127/78       NPO Status:                                                                                 BMI:   Wt Readings from Last 3 Encounters:   23 150 lb (68 kg)   23 150 lb (68 kg)   23 270 lb (122.5 kg)     Body mass index is 25.75 kg/m².     CBC:   Lab Results   Component Value Date/Time    WBC 13.7 2023 04:20 PM    RBC 4.79 2023 04:20 PM    HGB 14.5 2023 04:20 PM    HCT 44.0 2023 04:20 PM    MCV 91.9 2023 04:20 PM    RDW 13.7 2023 04:20 PM     2023 04:20 PM       CMP:   Lab Results   Component Value Date/Time     2023 04:20 PM    K 4.6 2023 04:20 PM    K 4.1 2022 03:45 PM     2023 04:20 PM    CO2 27 2023 04:20 PM    BUN 13 2023 04:20 PM    CREATININE 1.0 2023 04:20 PM    GFRAA >60 2022 03:45 PM    LABGLOM >60 2023 04:20 PM GLUCOSE 131 03/03/2023 04:20 PM    GLUCOSE 157 09/16/2011 12:34 PM    PROT 7.9 03/03/2023 04:20 PM    CALCIUM 9.5 03/03/2023 04:20 PM    BILITOT 0.3 03/03/2023 04:20 PM    ALKPHOS 77 03/03/2023 04:20 PM    AST 21 03/03/2023 04:20 PM    ALT 17 03/03/2023 04:20 PM       POC Tests: No results for input(s): POCGLU, POCNA, POCK, POCCL, POCBUN, POCHEMO, POCHCT in the last 72 hours. Coags:   Lab Results   Component Value Date/Time    PROTIME 14.0 03/03/2023 04:20 PM    PROTIME 11.3 07/04/2011 06:55 PM    INR 1.3 03/03/2023 04:20 PM    APTT 38.4 03/03/2023 04:20 PM       HCG (If Applicable): No results found for: PREGTESTUR, PREGSERUM, HCG, HCGQUANT     ABGs: No results found for: PHART, PO2ART, FHU4YMJ, AZM1SXX, BEART, Q8RTCGGL     Type & Screen (If Applicable):  No results found for: LABABO, LABRH    Drug/Infectious Status (If Applicable):  No results found for: HIV, HEPCAB    COVID-19 Screening (If Applicable):   Lab Results   Component Value Date/Time    COVID19 NOT DETECTED 03/11/2022 08:03 PM           Anesthesia Evaluation  Patient summary reviewed no history of anesthetic complications:   Airway: Mallampati: Unable to assess / NA  TM distance: >3 FB   Neck ROM: full     Dental:    (+) edentulous      Pulmonary: breath sounds clear to auscultation  (+) COPD:  asthma:                           ROS comment: Former smoker   Cardiovascular:    (+) hypertension:, valvular problems/murmurs (S/P MVR (mitral valve repair)): MR, past MI: > 6 months, CAD: obstructive, CABG/stent:, dysrhythmias: atrial fibrillation, CHF (LVEF 03%): systolic and diastolic,         Rhythm: regular  Rate: normal           Beta Blocker:  Not on Beta Blocker         Neuro/Psych:   (+) seizures:, psychiatric history (Cluster B personality disorder (HCC)):depression/anxiety              ROS comment: Bipolar disorder ([address] GI/Hepatic/Renal:   (+) GERD:, PUD, hepatitis: C, renal disease: kidney stones,          ROS comment: Prostate enlargement. Endo/Other:    (+) DiabetesType II DM, , hypothyroidism, blood dyscrasia: anticoagulation therapy:., .                  ROS comment: Cocaine abuse (HCC)  Cannabis abuse     Abdominal:             Vascular:   + PE. Other Findings:           Anesthesia Plan      general     ASA 4 - emergent       Induction: intravenous. MIPS: Postoperative opioids intended and Prophylactic antiemetics administered. Anesthetic plan and risks discussed with patient. Plan discussed with CRNA.                     Angelita Feliciano MD   3/3/2023

## 2023-03-03 NOTE — PROGRESS NOTES
Dr Kaela Taylor notified of no H&P. G bath complete. All jewelry off. Consent signed. Pt only in a gown. No Type and screen done and ordered stat. Called lab but still waiting on hold.

## 2023-03-03 NOTE — H&P
[facility]  Internal Medicine Residency Program  History and Physical    Patient:  Adrian Oswald 78 y.o. male MRN: [id number]     Date of Service: 3/3/2023    Hospital Day: 1      Chief complaint: had concerns including Fall (Tripped and fell on curb, laceration to chin, on Xarelto, -LOC). History Obtained From:  Patient & EHR     Date of Admission:   History of Present Illness   Adrian Oswald is a 78 y.o. male with PMH of COPD, GERD, Hep C with high viral load, hyperthyroidism, polysubstance abuse, GERD, asthma, paroxysmal afib, bipolar I, seizures hyperthyroidism , HTN, HFrF not on GDMT, HLD, PE w/ intermediate risk who presented to the ED with chief complaint bilateral mandibular fracture s/p fall. Patient is a poor historian. According to the patient he woke up this morning and he was watching TV. He went for a walk and tripped. And that is how he ended up having the fractures in his mandible. When asked, the patient reports that he did not drink any alcohol today, and hasn't been drinking for a long time, he denies chest pain, headache, aura prior to the fall. He cannot recall if his legs gave away at that time. The patient cannot speak clear and open his jaw as he should because of the fracture. Very difficult to obtain history. When the patient fell, he was wearing his dentures. ED Course: Hemodynamically stable; initial vitals 135/88 mmHg, SPO2 95% on room air, RR 20, afebrile. Initially tachycardic at 110, but HR came down to 96. CT head w/o contrast show no acute intracranial hemorrhage - but chronic pansinusitis was seen. CT facial bones w/o contrast showed oblique mildly comminuted fracture through the mandibular body bilaterally w/ normal temporomandibular joints. CT cervical spine showed no acute compression fracture or subluxation of the cervical spine. Multilevel degenrative disc and degenerative joint disease.      ED Meds: Patient was given fentanyl 50 mcg, [place] 5-325 PO once; Unasyn 3g once. ED Fluids: None     Previous Hospital Admission: Multiple ED admissions for substance use. Past Medical History:       Diagnosis Date    Anxiety     Arthritis     Asthma     Bipolar 1 disorder (HCC)     Chronic combined systolic and diastolic CHF (congestive heart failure) ([address] 05/27/2018    COPD (chronic obstructive pulmonary disease) (HCC)     Depression     Diabetes mellitus (HCC)     GERD (gastroesophageal reflux disease)     Hepatitis C     resolved    Hypertension     Hyperthyroidism 8/2/2012    Hypothyroidism due to medication     Mass of testicle     [facility])     pt states possibly not    Neuromuscular disorder ([address]     Other disorders of kidney and ureter     kidney stones; most recent 08/27/2015    Paroxysmal A-fib ([address]     discussed with PCP- patient does have hx of PAfib    Peptic ulcer     Prostate enlargement     Pulmonary embolism ([address]     Intermediate risk for PE on VQ scan. Seizures ([address]     Thyroid disease     hyperthyroid       Past Surgical History:        Procedure Laterality Date    ABDOMEN SURGERY      APPENDECTOMY      BLADDER SURGERY      CARDIAC CATHETERIZATION  05/21/2018    Dr. Molli Babinski, COLON, DIAGNOSTIC  11/13/2015    HEMORRHOID SURGERY      LITHOTRIPSY      VASCULAR SURGERY         Medications Prior to Admission:    Prior to Admission medications    Medication Sig Start Date End Date Taking? Authorizing Provider   methylPREDNISolone (MEDROL, MARIS,) 4 MG tablet Take by mouth. 2/25/23   Gabriel Stephens PA-C   lidocaine (LIDODERM) 5 % Place 1 patch onto the skin every 24 hours for 10 days 12 hours on, 12 hours off. 2/25/23 3/7/23  Gabriel Stephens PA-C   gabapentin (NEURONTIN) 100 MG capsule Take 1 capsule by mouth 3 times daily for 180 days.  Intended supply: 90 days 2/12/23 8/11/23  Celeste Carrel, APRN - CNP   LATUDA 120 MG tablet  8/10/22   Historical Provider, MD   sildenafil (VIAGRA) 50 MG tablet take 1 tablet by mouth 1 hour prior to intercourse 7/21/22   Historical Provider, MD   acetaminophen (TYLENOL) 500 MG tablet Take 1 tablet by mouth 4 times daily as needed for Pain  Patient not taking: No sig reported 6/24/22   Anika Guillermo MD   levETIRAcetam (KEPPRA) 500 MG tablet Take 1,000 mg by mouth 2 times daily    Historical Provider, MD   Rivaroxaban (XARELTO PO) Take by mouth    Historical Provider, MD   divalproex (DEPAKOTE) 250 MG DR tablet Take 1 tablet by mouth every 12 hours 12/9/21 8/11/22  Frankey Slice, APRN - CNP   venlafaxine (EFFEXOR XR) 37.5 MG extended release capsule Take 1 capsule by mouth daily (with breakfast)  Patient taking differently: Take 75 mg by mouth in the morning and at bedtime 75 mg in the day and 36 mg at night. 12/10/21 8/11/22  Frankey Slice, APRN - CNP   atorvastatin (LIPITOR) 40 MG tablet Take 1 tablet by mouth nightly 12/9/21 8/11/22  Frankey Slice, APRN - CNP   traZODone (DESYREL) 50 MG tablet Take 1 tablet by mouth nightly as needed for Sleep 9/28/21 8/11/22  Frankey Slice, APRN - CNP   Multiple Vitamins-Minerals (THERAPEUTIC MULTIVITAMIN-MINERALS) tablet Take 1 tablet by mouth daily    Historical Provider, MD Debbie Guzman 200-25 MCG/INH AEPB  4/5/19   Historical Provider, MD   methimazole (TAPAZOLE) 10 MG tablet Take 10 mg by mouth 3 times daily    Historical Provider, MD   lisinopril (PRINIVIL;ZESTRIL) 10 MG tablet Take 1 tablet by mouth daily 7/24/18   Blue Chavez MD   Nutritional Supplements (RA BALANCED NUTRITIONAL PLUS) LIQD Take 1 Bottle by mouth daily 5/22/18   Jerry Allred MD   finasteride (PROSCAR) 5 MG tablet Take 1 tablet by mouth daily 5/23/18   Jerry Allred MD       Allergies:  Codeine, Dye [iodides], Ketorolac tromethamine, Peanuts [peanut oil], Shellfish-derived products, and Nitroglycerin    Social History:   TOBACCO:   reports that he quit smoking about 13 years ago. His smoking use included cigarettes.  He quit smokeless tobacco use about 5 years ago. His smokeless tobacco use included chew. ETOH:   reports no history of alcohol use. OCCUPATION: Unemployed     Family History:       Problem Relation Age of Onset    Cancer Mother     Diabetes Mother     Cancer Father     Diabetes Father     Diabetes Maternal Aunt     Diabetes Maternal Grandmother     Cancer Maternal Grandfather     Heart Disease Paternal Grandmother     Heart Failure Paternal Grandmother     Heart Disease Paternal Grandfather     Heart Failure Paternal Grandfather     Diabetes Maternal Aunt     Diabetes Maternal Aunt        REVIEW OF SYSTEMS:    Review of Systems   Constitutional: Negative. Eyes: Negative. Respiratory: Negative. Cardiovascular: Negative. Gastrointestinal: Negative. Endocrine: Negative. Genitourinary: Negative. Musculoskeletal: Negative. Skin: Negative. Allergic/Immunologic: Negative. Neurological: Negative. Hematological: Negative. Psychiatric/Behavioral: Negative. HEENT: Facial pain   Physical Exam   Vitals: /83   Pulse 96   Temp 97.8 °F (36.6 °C) (Temporal)   Resp 20   Ht 5' 4\" (1.626 m)   Wt 150 lb (68 kg)   SpO2 98%   BMI 25.75 kg/m²       Physical Exam:  Vitals: /83   Pulse 96   Temp 97.8 °F (36.6 °C) (Temporal)   Resp 20   Ht 5' 4\" (1.626 m)   Wt 150 lb (68 kg)   SpO2 98%   BMI 25.75 kg/m²     I & O - 24hr: I/O this shift: In: 500 [I.V.:500]  Out: -    General Appearance: Unkempt, clothes soiled with blood, ace wrap in face   HEENT:  Head: Normal, normocephalic, atraumatic. Eye: Normal external eye, conjunctiva, lids cornea, NICOLE.   Mouth: Difficulty to open jaw, blood in mouth, tenderness in bilateral mandibular joint areas   Neck: no JVD and supple, symmetrical, trachea midline  Lung: clear to auscultation bilaterally  Heart: regular rate and rhythm, S1, S2 normal, no murmur, click, rub or gallop  Abdomen: soft, non-tender; bowel sounds normal; no masses,  no organomegaly  Extremities:  Dry, scaly, no edema, no cyanosis   Musculokeletal: No joint swelling, no muscle tenderness. ROM normal in all joints of extremities. Neurologic: Mental status: Alert, oriented, thought content appropriate    Labs and Imaging Studies   Basic Labs  Recent Labs     03/03/23  1620      K 4.6      CO2 27   BUN 13   CREATININE 1.0   GLUCOSE 131*   CALCIUM 9.5       Recent Labs     03/03/23  1620   WBC 13.7*   RBC 4.79   HGB 14.5   HCT 44.0   MCV 91.9   MCH 30.3   MCHC 33.0   RDW 13.7      MPV 12.7*       CBC:   Lab Results   Component Value Date/Time    WBC 13.7 03/03/2023 04:20 PM    RBC 4.79 03/03/2023 04:20 PM    HGB 14.5 03/03/2023 04:20 PM    HCT 44.0 03/03/2023 04:20 PM    MCV 91.9 03/03/2023 04:20 PM    RDW 13.7 03/03/2023 04:20 PM     03/03/2023 04:20 PM     BMP:    Lab Results   Component Value Date/Time     03/03/2023 04:20 PM    K 4.6 03/03/2023 04:20 PM    K 4.1 07/20/2022 03:45 PM     03/03/2023 04:20 PM    CO2 27 03/03/2023 04:20 PM    BUN 13 03/03/2023 04:20 PM       Imaging Studies:     CT HEAD WO CONTRAST    Result Date: 3/3/2023  EXAMINATION: CT OF THE HEAD WITHOUT CONTRAST  3/3/2023 12:58 pm TECHNIQUE: CT of the head was performed without the administration of intravenous contrast. Automated exposure control, iterative reconstruction, and/or weight based adjustment of the mA/kV was utilized to reduce the radiation dose to as low as reasonably achievable. COMPARISON: 05/02/2022 HISTORY: ORDERING SYSTEM PROVIDED HISTORY: fall TECHNOLOGIST PROVIDED HISTORY: Reason for exam:->fall Has a \"code stroke\" or \"stroke alert\" been called? ->No Decision Support Exception - unselect if not a suspected or confirmed emergency medical condition->Emergency Medical Condition (MA) What reading provider will be dictating this exam?->CRC FINDINGS: BRAIN/VENTRICLES: There is no acute intracranial hemorrhage, mass effect or midline shift.   No abnormal extra-axial fluid collection. The gray-white differentiation is maintained without evidence of an acute infarct. There is no evidence of hydrocephalus. ORBITS: The visualized portion of the orbits demonstrate no acute abnormality. SINUSES: The visualized paranasal sinuses and mastoid air cells demonstrate no acute abnormality. There are findings of chronic pansinusitis SOFT TISSUES/SKULL:  No acute abnormality of the visualized skull or soft tissues. There is no fracture of the nasal bones. No acute intracranial abnormality. Specifically, there is no acute intracranial hemorrhage Findings of chronic pansinusitis     CT FACIAL BONES WO CONTRAST    Result Date: 3/3/2023  EXAMINATION: CT OF THE FACE WITHOUT CONTRAST  3/3/2023 12:58 pm TECHNIQUE: CT of the face was performed without the administration of intravenous contrast. Multiplanar reformatted images are provided for review. Automated exposure control, iterative reconstruction, and/or weight based adjustment of the mA/kV was utilized to reduce the radiation dose to as low as reasonably achievable. COMPARISON: None HISTORY: ORDERING SYSTEM PROVIDED HISTORY: fall TECHNOLOGIST PROVIDED HISTORY: Reason for exam:->fall Decision Support Exception - unselect if not a suspected or confirmed emergency medical condition->Emergency Medical Condition ([place]) What reading provider will be dictating this exam?->CRC FINDINGS: FACIAL BONES: There is oblique mildly comminuted fracture through the mandibular body bilaterally. The lateral fragments are anteriorly displaced by 1 shaft width. Temporomandibular joints within normal limits. Mandibular rami are maintained. The sinuses, orbital walls, maxilla, pterygoid plates, zygomatic arches, hard palate, nasal bones are intact. There is depression the left zygoma which is unchanged compared to December 5, 2021 felt represent old fracture. The temporomandibular joints are aligned. ORBITAL CONTENTS: The globes appear intact. The extraocular muscles, optic nerve sheath complexes and lacrimal glands appear unremarkable. No retrobulbar hematoma or mass is seen. SINUSES: There is moderate mucosal thickening frontal, ethmoid, in maxillary sinuses. Air-fluid level right maxillary sinus but no associated fracture evident. SOFT TISSUES: Soft tissue irregularity in swelling likely related to laceration seen over the mandible near midline. There is oblique fracture through the body of the mandible bilaterally Moderate paranasal sinus disease is persistent but less extensive than seen on prior CT of December 5, 2021. CT CERVICAL SPINE WO CONTRAST    Result Date: 3/3/2023  EXAMINATION: CT OF THE CERVICAL SPINE WITHOUT CONTRAST 3/3/2023 12:58 pm TECHNIQUE: CT of the cervical spine was performed without the administration of intravenous contrast. Multiplanar reformatted images are provided for review. Automated exposure control, iterative reconstruction, and/or weight based adjustment of the mA/kV was utilized to reduce the radiation dose to as low as reasonably achievable. COMPARISON: None. HISTORY: ORDERING SYSTEM PROVIDED HISTORY: fall TECHNOLOGIST PROVIDED HISTORY: Reason for exam:->fall Decision Support Exception - unselect if not a suspected or confirmed emergency medical condition->Emergency Medical Condition ([place]) What reading provider will be dictating this exam?->CRC FINDINGS: The ring of C1 is intact as is the dense. There is no compression fracture of the cervical spine. No jumped or perched facet is noted. Multilevel degenerative disc and degenerative joint disease is noted. The prevertebral soft tissues are unremarkable. The airway is widely patent. Images through the lung apices are negative for a pneumothorax. 1. There is no acute compression fracture or subluxation of the cervical spine. 2. Multilevel degenerative disc and degenerative joint disease.      XR CHEST PORTABLE    Result Date: 3/3/2023  EXAMINATION: ONE XRAY VIEW OF THE CHEST 3/3/2023 12:18 pm COMPARISON: 2nd May 2022 HISTORY: ORDERING SYSTEM PROVIDED HISTORY: Shortness of breath TECHNOLOGIST PROVIDED HISTORY: Reason for exam:->Shortness of breath What reading provider will be dictating this exam?->CRC FINDINGS: Postsurgical changes as before. The lungs are without acute focal process. There is no effusion or pneumothorax. The cardiomediastinal silhouette is without acute process. The osseous structures are without acute process. No acute process.      Resident's Assessment and Plan   Assessment:  Bilateral mandibular fracture s/p fall - s/p ORIF by ENT  Patient reports that he tripped and fell   CT facial bones showed bilateral mandibular fracture  ENT on board - follow recs   Continue with Unasyn, follow MRSA nares   Pain management by ENT    Leukocytosis w/ left shift - likely reactive vs infective   Afebrile- chills, or signs of consolidations on imaging   Follow repeat CBC      Hx of COPD - previous notes talks about possible COPD  Previously on Dulera and Spiriva   PFT 67% FEV/FVC ratio post bronch per PFT in 2018   Continue with Dulera     Hx of paroxysmal afib on Xarelto - not sure if taking - said he is taking it - reconfirm again with patient   Held in the setting of procedure   [id number]     Hx of Vit D deficiency - Consider rechecking levels    Hx of polysubstance abuse   Multiple ED visit for drug use in the past  Declined rehab in the past or pain medicine clinic referral   Previous notes report patient has drug-seeking behavior     Hx of HTN - on lisinopril     Hx of hyperthyroidism - on Methimazole- most recent TSH 22.830 in Sept 2021  Follow repeat TSH     Hx of HFrEF - not on GDMT -   Most recent echo showed EF 35% in 2018   Consider other GDMT meds - currently euvolemic     Hx of BPH - on Finasteride   Most recent PSA in April 2022 0.89     Hx of Seizures - on Keppra   Continue with Keppra     Hx of mild mitral regurgitation   Hx of allergic rhinitis   Hx of bipolar I disorder - on Depakote       PT/OT: Not indicated at this time   DVT ppx: PCDs  GI ppx: None     Code Status: Full code     Calli Giordano MD, PGY-1  Attending physician: Dr. Saima Sexton     NOTE: This report was transcribed using voice recognition software. Every effort was made to ensure accuracy; however, inadvertent computerized transcription errors may be present. Name band;

## 2023-03-04 ENCOUNTER — APPOINTMENT (OUTPATIENT)
Dept: MRI IMAGING | Age: 68
DRG: 141 | End: 2023-03-04
Payer: MEDICARE

## 2023-03-04 ENCOUNTER — APPOINTMENT (OUTPATIENT)
Dept: CT IMAGING | Age: 68
DRG: 141 | End: 2023-03-04
Payer: MEDICARE

## 2023-03-04 LAB
ACETAMINOPHEN LEVEL: 5.9 MCG/ML (ref 10–30)
AMPHETAMINE SCREEN, URINE: NOT DETECTED
ANION GAP SERPL CALCULATED.3IONS-SCNC: 13 MMOL/L (ref 7–16)
BARBITURATE SCREEN URINE: NOT DETECTED
BASOPHILS ABSOLUTE: 0.03 E9/L (ref 0–0.2)
BASOPHILS RELATIVE PERCENT: 0.2 % (ref 0–2)
BENZODIAZEPINE SCREEN, URINE: POSITIVE
BUN BLDV-MCNC: 16 MG/DL (ref 6–23)
CALCIUM SERPL-MCNC: 8.6 MG/DL (ref 8.6–10.2)
CANNABINOID SCREEN URINE: NOT DETECTED
CHLORIDE BLD-SCNC: 99 MMOL/L (ref 98–107)
CO2: 23 MMOL/L (ref 22–29)
COCAINE METABOLITE SCREEN URINE: NOT DETECTED
CREAT SERPL-MCNC: 1 MG/DL (ref 0.7–1.2)
EOSINOPHILS ABSOLUTE: 0 E9/L (ref 0.05–0.5)
EOSINOPHILS RELATIVE PERCENT: 0 % (ref 0–6)
ETHANOL: <10 MG/DL (ref 0–0.08)
FENTANYL SCREEN, URINE: POSITIVE
GFR SERPL CREATININE-BSD FRML MDRD: >60 ML/MIN/1.73
GLUCOSE BLD-MCNC: 156 MG/DL (ref 74–99)
HCT VFR BLD CALC: 37.8 % (ref 37–54)
HEMOGLOBIN: 12.4 G/DL (ref 12.5–16.5)
IMMATURE GRANULOCYTES #: 0.08 E9/L
IMMATURE GRANULOCYTES %: 0.6 % (ref 0–5)
LYMPHOCYTES ABSOLUTE: 0.81 E9/L (ref 1.5–4)
LYMPHOCYTES RELATIVE PERCENT: 6 % (ref 20–42)
Lab: ABNORMAL
MAGNESIUM: 2.2 MG/DL (ref 1.6–2.6)
MCH RBC QN AUTO: 30.4 PG (ref 26–35)
MCHC RBC AUTO-ENTMCNC: 32.8 % (ref 32–34.5)
MCV RBC AUTO: 92.6 FL (ref 80–99.9)
METHADONE SCREEN, URINE: NOT DETECTED
MONOCYTES ABSOLUTE: 1.07 E9/L (ref 0.1–0.95)
MONOCYTES RELATIVE PERCENT: 7.9 % (ref 2–12)
NEUTROPHILS ABSOLUTE: 11.55 E9/L (ref 1.8–7.3)
NEUTROPHILS RELATIVE PERCENT: 85.3 % (ref 43–80)
OPIATE SCREEN URINE: NOT DETECTED
OXYCODONE URINE: NOT DETECTED
PDW BLD-RTO: 13.9 FL (ref 11.5–15)
PHENCYCLIDINE SCREEN URINE: NOT DETECTED
PHOSPHORUS: 4.1 MG/DL (ref 2.5–4.5)
PLATELET # BLD: 182 E9/L (ref 130–450)
PMV BLD AUTO: 12.5 FL (ref 7–12)
POTASSIUM SERPL-SCNC: 4.4 MMOL/L (ref 3.5–5)
RBC # BLD: 4.08 E12/L (ref 3.8–5.8)
SALICYLATE, SERUM: <0.3 MG/DL (ref 0–30)
SODIUM BLD-SCNC: 135 MMOL/L (ref 132–146)
T4 FREE: 1.51 NG/DL (ref 0.93–1.7)
TRICYCLIC ANTIDEPRESSANTS SCREEN SERUM: NEGATIVE NG/ML
TSH SERPL DL<=0.05 MIU/L-ACNC: 0.16 UIU/ML (ref 0.27–4.2)
WBC # BLD: 13.5 E9/L (ref 4.5–11.5)

## 2023-03-04 PROCEDURE — 6360000002 HC RX W HCPCS

## 2023-03-04 PROCEDURE — 2700000000 HC OXYGEN THERAPY PER DAY

## 2023-03-04 PROCEDURE — 36415 COLL VENOUS BLD VENIPUNCTURE: CPT

## 2023-03-04 PROCEDURE — 6370000000 HC RX 637 (ALT 250 FOR IP)

## 2023-03-04 PROCEDURE — 6370000000 HC RX 637 (ALT 250 FOR IP): Performed by: INTERNAL MEDICINE

## 2023-03-04 PROCEDURE — 76376 3D RENDER W/INTRP POSTPROCES: CPT

## 2023-03-04 PROCEDURE — 80179 DRUG ASSAY SALICYLATE: CPT

## 2023-03-04 PROCEDURE — 84443 ASSAY THYROID STIM HORMONE: CPT

## 2023-03-04 PROCEDURE — 84439 ASSAY OF FREE THYROXINE: CPT

## 2023-03-04 PROCEDURE — 84100 ASSAY OF PHOSPHORUS: CPT

## 2023-03-04 PROCEDURE — 70486 CT MAXILLOFACIAL W/O DYE: CPT

## 2023-03-04 PROCEDURE — 82077 ASSAY SPEC XCP UR&BREATH IA: CPT

## 2023-03-04 PROCEDURE — 97161 PT EVAL LOW COMPLEX 20 MIN: CPT

## 2023-03-04 PROCEDURE — 85025 COMPLETE CBC W/AUTO DIFF WBC: CPT

## 2023-03-04 PROCEDURE — 80307 DRUG TEST PRSMV CHEM ANLYZR: CPT

## 2023-03-04 PROCEDURE — 94640 AIRWAY INHALATION TREATMENT: CPT

## 2023-03-04 PROCEDURE — 2580000003 HC RX 258

## 2023-03-04 PROCEDURE — 80143 DRUG ASSAY ACETAMINOPHEN: CPT

## 2023-03-04 PROCEDURE — 99233 SBSQ HOSP IP/OBS HIGH 50: CPT | Performed by: INTERNAL MEDICINE

## 2023-03-04 PROCEDURE — 72148 MRI LUMBAR SPINE W/O DYE: CPT

## 2023-03-04 PROCEDURE — 1200000000 HC SEMI PRIVATE

## 2023-03-04 PROCEDURE — 83735 ASSAY OF MAGNESIUM: CPT

## 2023-03-04 PROCEDURE — 80048 BASIC METABOLIC PNL TOTAL CA: CPT

## 2023-03-04 RX ORDER — LEVOTHYROXINE SODIUM 0.05 MG/1
25 TABLET ORAL DAILY
Status: DISCONTINUED | OUTPATIENT
Start: 2023-03-04 | End: 2023-03-07

## 2023-03-04 RX ORDER — DIVALPROEX SODIUM 250 MG/1
250 TABLET, DELAYED RELEASE ORAL EVERY 12 HOURS SCHEDULED
Status: DISCONTINUED | OUTPATIENT
Start: 2023-03-04 | End: 2023-03-07 | Stop reason: HOSPADM

## 2023-03-04 RX ADMIN — FINASTERIDE 5 MG: 5 TABLET, FILM COATED ORAL at 09:03

## 2023-03-04 RX ADMIN — ARFORMOTEROL TARTRATE 15 MCG: 15 SOLUTION RESPIRATORY (INHALATION) at 20:52

## 2023-03-04 RX ADMIN — CHLORHEXIDINE GLUCONATE 0.12% ORAL RINSE 15 ML: 1.2 LIQUID ORAL at 22:01

## 2023-03-04 RX ADMIN — MORPHINE SULFATE 4 MG: 4 INJECTION, SOLUTION INTRAMUSCULAR; INTRAVENOUS at 09:04

## 2023-03-04 RX ADMIN — BUDESONIDE 500 MCG: 0.5 SUSPENSION RESPIRATORY (INHALATION) at 20:52

## 2023-03-04 RX ADMIN — BUDESONIDE 500 MCG: 0.5 SUSPENSION RESPIRATORY (INHALATION) at 10:26

## 2023-03-04 RX ADMIN — DIVALPROEX SODIUM 250 MG: 250 TABLET, DELAYED RELEASE ORAL at 11:10

## 2023-03-04 RX ADMIN — CHLORHEXIDINE GLUCONATE 0.12% ORAL RINSE 15 ML: 1.2 LIQUID ORAL at 09:03

## 2023-03-04 RX ADMIN — DEXTROSE MONOHYDRATE 950 ML: 50 INJECTION, SOLUTION INTRAVENOUS at 00:40

## 2023-03-04 RX ADMIN — DIVALPROEX SODIUM 250 MG: 250 TABLET, DELAYED RELEASE ORAL at 22:02

## 2023-03-04 RX ADMIN — OXYCODONE AND ACETAMINOPHEN 2 TABLET: 5; 325 TABLET ORAL at 00:29

## 2023-03-04 RX ADMIN — LEVOTHYROXINE SODIUM 25 MCG: 0.05 TABLET ORAL at 11:10

## 2023-03-04 RX ADMIN — SODIUM CHLORIDE 3000 MG: 9 INJECTION, SOLUTION INTRAVENOUS at 00:44

## 2023-03-04 RX ADMIN — LEVETIRACETAM 1000 MG: 500 TABLET, FILM COATED ORAL at 22:01

## 2023-03-04 RX ADMIN — SODIUM CHLORIDE 3000 MG: 9 INJECTION, SOLUTION INTRAVENOUS at 12:47

## 2023-03-04 RX ADMIN — ARFORMOTEROL TARTRATE 15 MCG: 15 SOLUTION RESPIRATORY (INHALATION) at 10:26

## 2023-03-04 RX ADMIN — SODIUM CHLORIDE 3000 MG: 9 INJECTION, SOLUTION INTRAVENOUS at 06:20

## 2023-03-04 RX ADMIN — CHLORHEXIDINE GLUCONATE 0.12% ORAL RINSE 15 ML: 1.2 LIQUID ORAL at 18:02

## 2023-03-04 RX ADMIN — SODIUM CHLORIDE, PRESERVATIVE FREE 10 ML: 5 INJECTION INTRAVENOUS at 09:06

## 2023-03-04 RX ADMIN — OXYCODONE AND ACETAMINOPHEN 2 TABLET: 5; 325 TABLET ORAL at 04:56

## 2023-03-04 RX ADMIN — OXYCODONE AND ACETAMINOPHEN 2 TABLET: 5; 325 TABLET ORAL at 22:04

## 2023-03-04 RX ADMIN — LEVETIRACETAM 1000 MG: 500 TABLET, FILM COATED ORAL at 09:03

## 2023-03-04 RX ADMIN — SODIUM CHLORIDE, PRESERVATIVE FREE 10 ML: 5 INJECTION INTRAVENOUS at 00:45

## 2023-03-04 RX ADMIN — METHIMAZOLE 10 MG: 5 TABLET ORAL at 09:03

## 2023-03-04 RX ADMIN — OXYCODONE AND ACETAMINOPHEN 2 TABLET: 5; 325 TABLET ORAL at 18:03

## 2023-03-04 RX ADMIN — OXYCODONE AND ACETAMINOPHEN 2 TABLET: 5; 325 TABLET ORAL at 12:43

## 2023-03-04 RX ADMIN — CHLORHEXIDINE GLUCONATE 0.12% ORAL RINSE 15 ML: 1.2 LIQUID ORAL at 12:43

## 2023-03-04 ASSESSMENT — PAIN DESCRIPTION - LOCATION
LOCATION: MOUTH
LOCATION: HEAD;FACE
LOCATION: MOUTH
LOCATION: JAW

## 2023-03-04 ASSESSMENT — PAIN SCALES - GENERAL
PAINLEVEL_OUTOF10: 7
PAINLEVEL_OUTOF10: 10
PAINLEVEL_OUTOF10: 8
PAINLEVEL_OUTOF10: 10
PAINLEVEL_OUTOF10: 10
PAINLEVEL_OUTOF10: 6

## 2023-03-04 ASSESSMENT — PAIN DESCRIPTION - ORIENTATION
ORIENTATION: LEFT
ORIENTATION: LEFT
ORIENTATION: LOWER
ORIENTATION: LOWER

## 2023-03-04 ASSESSMENT — PAIN DESCRIPTION - DESCRIPTORS
DESCRIPTORS: SHOOTING;SHARP
DESCRIPTORS: ACHING;DISCOMFORT
DESCRIPTORS: SORE;STABBING;THROBBING
DESCRIPTORS: ACHING;CRAMPING;DISCOMFORT
DESCRIPTORS: ACHING;DULL;DISCOMFORT
DESCRIPTORS: ACHING;DISCOMFORT
DESCRIPTORS: SHOOTING;SHARP;DULL

## 2023-03-04 ASSESSMENT — PAIN - FUNCTIONAL ASSESSMENT
PAIN_FUNCTIONAL_ASSESSMENT: ACTIVITIES ARE NOT PREVENTED
PAIN_FUNCTIONAL_ASSESSMENT: PREVENTS OR INTERFERES SOME ACTIVE ACTIVITIES AND ADLS
PAIN_FUNCTIONAL_ASSESSMENT: PREVENTS OR INTERFERES SOME ACTIVE ACTIVITIES AND ADLS

## 2023-03-04 ASSESSMENT — PAIN SCALES - WONG BAKER
WONGBAKER_NUMERICALRESPONSE: 0
WONGBAKER_NUMERICALRESPONSE: 0

## 2023-03-04 ASSESSMENT — PAIN DESCRIPTION - FREQUENCY: FREQUENCY: CONTINUOUS

## 2023-03-04 NOTE — PROGRESS NOTES
Patient is very agitated and climbing out of the bed and is verbally yelling and throwing things. Patient is confused and [place] [facility]. Patient is hallucinating and visually seeing things in the room. Patient is requiring 2 L of oxygen via nasal cannula and will not keep oxygen in his nose. Patient given a total of 2mg of versed with no change in agitation. Spoke to internal med resident, Dr. Dee Angel, via phone and notified of of agitation. Dr. Karlos Dumont came to bedside and witnessed agitation. Order obtained for haldol 1mg IV and bilateral wrist restraints for safety.   Electronically signed by Annie England RN on 3/3/2023 at 11:08 PM

## 2023-03-04 NOTE — ANESTHESIA PROCEDURE NOTES
Arterial Line:    An arterial line was placed using surface landmarks, in the OR for the following indication(s): continuous blood pressure monitoring. A 20 gauge (size), 1 and 1/4 inch (length), Arrow (type) catheter was placed, Seldinger technique not used, into the left radial artery, secured by Tegaderm. Anesthesia type: General    Events:  patient tolerated procedure well with no complications. 3/3/2023 7:34 PM3/3/2023 7:36 PM  Anesthesiologist: Elpidio Lemos MD  Resident/CRNA: ZOIE Holguin - CRNA  Other anesthesia staff: Mattie Blackwell RN  Performed:  Other anesthesia staff   Preanesthetic Checklist  Completed: patient identified, IV checked, site marked, risks and benefits discussed, surgical/procedural consents, equipment checked, pre-op evaluation, timeout performed, anesthesia consent given, oxygen available and monitors applied/VS acknowledged

## 2023-03-04 NOTE — PROGRESS NOTES
Perfect serve sent to internal med resident Dr. Paul Sequeira that iv team was unable to get iv and recommend a line.

## 2023-03-04 NOTE — PROGRESS NOTES
OTOLARYNGOLOGY  DAILY PROGRESS NOTE  3/4/2023    Subjective:     Patient sleeping comfortably this AM. Reports pain is controlled with Percocet. Trismus improved. Required restraints initially after surgery but no other acute events per nursing. Objective:     /61   Pulse 75   Temp 99.5 °F (37.5 °C) (Temporal)   Resp 16   Ht 5' 4\" (1.626 m)   Wt 150 lb (68 kg)   SpO2 95%   BMI 25.75 kg/m²   PULSE OXIMETRY RANGE: SpO2  Av.2 %  Min: 89 %  Max: 100 %  I/O last 3 completed shifts: In: 99.3 [IV Piggyback:99.3]  Out: -     GENERAL:  Awake, alert, cooperative, no apparent distress  HENT: Normocephalic, atraumatic. Chin lacerations are clean, dry and intact. Moist oral mucosa. Sutures intact to gingivolabial sulcus. Trismus largely resolved. EYES: No sclera icterus, pupils equal  LUNGS:  No increased work of breathing, no stridor, On NC  CARDIOVASCULAR:  RR      Assessment/Plan:     78 y.o. male with displaced, open, bilateral fractures of the body of the mandible s/p ORIF and laceration repairs on 3/3.        Pain control with PRN Percocet and Morphine  Ok for clear liquid diet  Peridex 4 times daily after meals  Remainder of medical needs per primary team  ENT to follow    Patient seen and examined by resident, discussed with attending on call Dr. Sharath Cardenas DO,  Resident Physician, PGY-1  Department of Otolaryngology, [facility]       Electronically signed by Whit Torres DO on 3/4/2023 at 6:48 AM

## 2023-03-04 NOTE — PROGRESS NOTES
Physical Therapy    Initial Assessment     Name: Lynne Merida  : 1955  MRN: [id number]      Date of Service: 3/4/2023    Evaluating PT: Merlin Wyatt PT, DPT [id number]      Room #:  8486/9735-W  Diagnosis:  Bilateral fracture of mandible, open, initial encounter ([facility] [S02.609B]  PMHx/PSHx:   has a past medical history of Anxiety, Arthritis, Asthma, Bipolar 1 disorder ([facility] Chronic combined systolic and diastolic CHF (congestive heart failure) ([facility] COPD (chronic obstructive pulmonary disease) ([facility] Depression, Diabetes mellitus ([facility] GERD (gastroesophageal reflux disease), Hepatitis C, Hypertension, Hyperthyroidism, Hypothyroidism due to medication, Mass of testicle, Mesothelioma ([facility] Neuromuscular disorder ([facility] Other disorders of kidney and ureter, Paroxysmal A-fib ([facility] Peptic ulcer, Prostate enlargement, Pulmonary embolism ([facility] Seizures ([facility] and Thyroid disease. Procedure/Surgery:  3/3 Mandible ORIF  Precautions:  Fall Risk    SUBJECTIVE:    Pt lives at a group home in a 2 story house with 5+5 stair(s) and 2+2 rail(s) to enter. Full flight of stairs and 2 rails to second floor bed and bath. Pt ambulated with SPC PRN (sciatica) prior to admission. OBJECTIVE:   Initial Evaluation  Date: 3/4/23 Treatment Date: Short Term/ Long Term   Goals   AM-PAC 6 Clicks      Was pt agreeable to Eval/treatment? Yes     Does pt have pain? 10/10 jaw pain; RN aware     Bed Mobility  Rolling: Independent   Supine to sit: Independent   Sit to supine: Independent   Scooting: Independent   NA   Transfers Sit to stand: Supervision  Stand to sit: Supervision  Stand pivot: Supervision without AD  Sit to stand: Independent   Stand to sit:  Independent   Stand pivot: Independent    Ambulation   100 feet without AD with Supervision  >400 feet Independent    Stair negotiation: ascended and descended NT  >12 step(s) with 1 rail(s) Mod Independent    ROM BUE: Refer to OT note  BLE: WFL     Strength BUE: Refer to OT note  BLE: WFL     Balance Sitting EOB: Independent   Dynamic Standing: Supervision without AD  Dynamic Standing: Independent      Pt is A & O x: 4 to person, place, month/year, and situation. Sensation: Pt denies numbness and tingling of extremities. Edema: Unremarkable. Patient education  Pt educated on PT role in acute care setting. Patient response to education:   Pt verbalized understanding Pt demonstrated skill Pt requires further education in this area   Yes  NA No      ASSESSMENT:    Conditions Requiring Skilled Therapeutic Intervention:    [x]Decreased strength     []Decreased ROM  [x]Decreased functional mobility  [x]Decreased balance   []Decreased endurance   []Decreased posture  []Decreased sensation  []Decreased coordination   []Decreased vision  [x]Decreased safety awareness   [x]Increased pain       Comments:    Pt was in bed resting upon room entry; agreeable to PT evaluation. Pt was very pleasant and cooperative. Pt ambulated in hallway without difficulty. Pt states he uses SPC PRN due to hx of sciatica. All questions and concerns were addressed. Pt was left in bed with all needs met at conclusion of session. RN present. Treatment:  Patient practiced and was instructed in the following treatment:    Therapeutic activities:  Ambulation: Pt ambulated in hallway without AD. Pt was cued for safety. Pt's/family goals:  1. To return home. Prognosis is Good for reaching above PT goals. Patient and or family understand(s) diagnosis, prognosis, and plan of care. Yes.     PHYSICAL THERAPY PLAN OF CARE:    PT POC is established based on physician order and patient diagnosis     Referring provider/PT Order:    Start   Ordering Provider    03/04/23 1245  PT eval and treat  Start:  03/04/23 1245,   End:  03/04/23 1245,   ONE TIME,   Standing Count:  1 Occurrences,   R         Dorys Adams MD      Diagnosis:  Bilateral fracture of mandible, open, initial encounter ([facility] [U53.458Q]  Specific instructions for next treatment:  Progress activity. Current Treatment Recommendations:     [] Strengthening to improve independence with functional mobility   [] ROM to improve independence with functional mobility   [x] Balance Training to improve static/dynamic balance and to reduce fall risk  [] Endurance Training to improve activity tolerance during functional mobility   [x] Transfer Training to improve safety and independence with all functional transfers   [x] Gait Training to improve gait mechanics, endurance and assess need for appropriate assistive device  [] Stair Training in preparation for safe discharge home and/or into the community   [] Positioning to prevent skin breakdown and contractures  [x] Safety and Education Training   [] Patient/Caregiver Education   [] HEP  [] Other     PT long term treatment goals are located in above grid    Frequency of treatments: 2-5x/week x 1-2 weeks. Time in  1235  Time out  1245    Total Treatment Time  0 minutes     Evaluation Time includes thorough review of current medical information, gathering information on past medical history/social history and prior level of function, completion of standardized testing/informal observation of tasks, assessment of data and education on plan of care and goals.     CPT codes:  [x] Low Complexity PT evaluation 47310  [] Moderate Complexity PT evaluation 00899  [] High Complexity PT evaluation 70820  [] PT Re-evaluation 95348  [] Gait training 78963 0 minutes  [] Manual therapy 51209 0 minutes  [] Therapeutic activities 31087 0 minutes  [] Therapeutic exercises 90932 0 minutes  [] Neuromuscular reeducation 01135 0 minutes     Joaquin Roberts, PT, DPT  [id number]

## 2023-03-04 NOTE — PROGRESS NOTES
Pt upper dentures taken out on arrival to OR. Dentures placed in denture cup, labeled with patient sticker x 2 and will follow pt to recovery.

## 2023-03-04 NOTE — ANESTHESIA POSTPROCEDURE EVALUATION
1. Have you been to the ER, urgent care clinic since your last visit? Hospitalized since your last visit? No    2. Have you seen or consulted any other health care providers outside of the [address] [place] [address] since your last visit? Include any pap smears or colon screening.  No  Reviewed record in preparation for visit and have necessary documentation  Pt did not bring medication to office visit for review    Goals that were addressed and/or need to be completed during or after this appointment include   Health Maintenance Due   Topic Date Due    Hepatitis C Screening  1963    DTaP/Tdap/Td series (1 - Tdap) 04/27/1985    PAP AKA CERVICAL CYTOLOGY  04/27/1985    Shingrix Vaccine Age 50> (1 of 2) 04/27/2014    BREAST CANCER SCRN MAMMOGRAM  04/27/2014    FOBT Q 1 YEAR AGE 50-75  04/27/2014 Department of Anesthesiology  Postprocedure Note    Patient: Neftali Saucedo  MRN: [id number]  YOB: 1955  Date of evaluation: 3/4/2023      Procedure Summary     Date: 03/03/23 Room / Location: [facility] / [facility]    Anesthesia Start: 7599 Anesthesia Stop: 2200    Procedure: MANDIBLE OPEN REDUCTION INTERNAL FIXATION (Face) Diagnosis:       Closed fracture of mandible, unspecified laterality, unspecified mandibular site, initial encounter ([facility])      (Closed fracture of mandible, unspecified laterality, unspecified mandibular site, initial encounter ([facility] [S02.609A])    Surgeons: Zelalem Anguiano DO Responsible Provider: Angelita Feliciano MD    Anesthesia Type: General ASA Status: 4 - Emergent          Anesthesia Type: General    Arben Phase I: Arben Score: 9    Arben Phase II:        Anesthesia Post Evaluation    Patient location during evaluation: PACU  Patient participation: complete - patient participated  Level of consciousness: awake and alert  Airway patency: patent  Nausea & Vomiting: no nausea and no vomiting  Complications: no  Cardiovascular status: hemodynamically stable  Respiratory status: acceptable  Hydration status: euvolemic  Multimodal analgesia pain management approach

## 2023-03-04 NOTE — PROGRESS NOTES
Unable to obtain piv due to poor venous access. May benefit from midline or PICC. RN notified to obtain order from physician.

## 2023-03-04 NOTE — OP NOTE
Operative Note      Patient: Rodrigo Magallanes  YOB: 1955  MRN: [id number]    Date of Procedure: 3/3/2023    Pre-Op Diagnosis: Open fracture of mandible, bilateral mandibular body, initial encounter     Post-Op Diagnosis: Same       Procedure(s):  MANDIBLE OPEN REDUCTION INTERNAL FIXATION with laceration repair    Surgeon(s):  Zheng Canales DO    Assistant:   Resident: Andreas Drake DO    Anesthesia: General with nasotracheal intubation    Estimated Blood Loss (mL): less than 50     Complications: None    Specimens:   * No specimens in log *    Implants:  Implant Name Type Inv. Item Serial No.  Lot No. LRB No. Used Action   SCREW BNE L8MM DIA2MM EDVIN CRANIOMAXILLOFACIAL G CRSS PIN - [id number]  SCREW BNE L8MM DIA2MM EDVIN CRANIOMAXILLOFACIAL G CRSS PIN  ISMAEL CRANIOMAXILLOFACIAL-WD  N/A 2 Implanted   SCREW BNE L10MM DIA2MM MITCH CRANIOMAXILLOFACIAL G TI ST - [id number]  SCREW BNE L10MM DIA2MM MITCH CRANIOMAXILLOFACIAL G TI ST  ISMAEL CRANIOMAXILLOFACIAL-WD  N/A 2 Implanted   SCREW BNE L8MM DIA2MM EDVIN CRANIOMAXILLOFACIAL RICA SAMANTHA CRSS - [id number]  SCREW BNE L8MM DIA2MM EDVIN CRANIOMAXILLOFACIAL RICA SAMANTHA CRSS  ISMAEL CRANIOMAXILLOFACIAL-WD  N/A 2 Implanted   SCREW BNE L10MM DIA2MM EDVIN CRANIOMAXILLOFACIAL RICA SAMANTHA - [id number]  SCREW BNE L10MM DIA2MM EDVIN CRANIOMAXILLOFACIAL RICA SAMANTHA  ISMAEL CRANIOMAXILLOFACIAL-WD  N/A 2 Implanted   PLATE BONE BAR F3.5BV THK1.5MM 4 H MINI - [id number]  PLATE BONE BAR F1.9UB THK1.5MM 4 H MINI  ISMAEL DELMIS-WD  N/A 2 Implanted         Drains: * No LDAs found *    Findings: Bilateral mandibular body fractures    Detailed Description of Procedure:     INDICATIONS: This is a 78 y.o. male who tripped and fell earlier today and had his mandible fractured bilaterally along with a chin laceration and large laceration of the mandibular gingivolabial sulcus. Patient is edentulous.       PROCEDURE:  The patient was consented preoperatively and taken back to the   operating room, identified appropriately, placed in the supine position,   given anesthesia for nasal intubation. Once the patient was nasally intubated, the patient was prepped and draped in a sterile fashion. The patient's jaw was evaluated. There were open lacerations into the oral cavity in the area of the mandibular gingivolabial sulcus, which were incorporated into the anterior oral incision. Anterior oral incision  The fracture was found on the bilateral mandibular body area and the pre-existing laceration was incorporated into the incision which was made into the oral mucosa with Bovie cautery. This was taken down to the patient's lower mandible and care was taken not to injure the mental nerve which presented just beneath the incisor off of the mandible. The right mental nerve appeared involved in the fracture line and was already severed. Once this was accomplished, a [place] Miguelangel was used to remove the periosteum off of the mandible on both sides of the fracture line in the bilateral parasymphyseal area. Attention was first turned to the left fracture. Once this entire area was exposed, a 4 hole with gap fracture plate was used to bridge this area. 2  #8 and 2 #10 mm screws were then used and placed with good reduction of the parasymphyseal fracture. This process was then repeated on the right fracture with a  4 hole with gap fracture plate and 2 #8 and 2 #10 screws. Good reduction was achieved. Once the area was reduced, the oral cavity and oral incision  was closed with a 3-0 Chromic starting with the muscular layer and then the mucosal layer with multiple simple sutures. Attention was then turned to the chin lacerations. The inferior laceration measuring ~5 cm and the superior laceration measuring 1 cm. The edges of the incision were freshened with a #10 blade and hemostasis achieved with monopolar cautery.  The subcutaneous layer was closed with 4.0 vicryl and the superficial layer was closed with 5.0 fast gut in a running, locking fashion. The patient was then turned back to Anesthesia for appropriate awakening. Dr. Priyanka Grayson was present and scrubbed for the entire procedure.      3/3/2023  Rosena Dakin  [id number]      Electronically signed by Steffany Bell DO on 3/3/2023 at 10:00 PM

## 2023-03-04 NOTE — PLAN OF CARE
Received PS at ~2240  Concerns for hallucination, altered mentation and agitation. Patient seen at bedside. Patient was agitated and thought he was at \"the [place].\" Patient was verbally redirected and patient calmed down slightly but was still very agitated, unwilling to stay in bed and continually tried to climb out of bed. Patient given Haldol 1mg IV. Continued redirection with patient in PACU. Patient became more cooperative and less agitated. Patient redirected and calmed down. Patient placed on bilateral wrist restraints for safety and patient now compliant with plans for him to be transferred up to the floors.

## 2023-03-04 NOTE — PLAN OF CARE
Problem: Chronic Conditions and Co-morbidities  Goal: Patient's chronic conditions and co-morbidity symptoms are monitored and maintained or improved  Outcome: Progressing     Problem: Discharge Planning  Goal: Discharge to home or other facility with appropriate resources  Outcome: Progressing     Problem: Pain  Goal: Verbalizes/displays adequate comfort level or baseline comfort level  Outcome: Progressing     Problem: Safety - Medical Restraint  Goal: Remains free of injury from restraints (Restraint for Interference with Medical Device)  Description: INTERVENTIONS:  1. Determine that other, less restrictive measures have been tried or would not be effective before applying the restraint  2. Evaluate the patient's condition at the time of restraint application  3. Inform patient/family regarding the reason for restraint  4. Q2H: Monitor safety, psychosocial status, comfort, nutrition and hydration  Outcome: Progressing  Flowsheets (Taken 3/3/2023 2302 by Liya Mari RN)  Remains free of injury from restraints (restraint for interference with medical device): Inform patient/family regarding the reason for restraint     Problem: Confusion  Goal: Confusion, delirium, dementia, or psychosis is improved or at baseline  Description: INTERVENTIONS:  1. Assess for possible contributors to thought disturbance, including medications, impaired vision or hearing, underlying metabolic abnormalities, dehydration, psychiatric diagnoses, and notify attending LIP  2. [place] high risk fall precautions, as indicated  3. Provide frequent short contacts to provide reality reorientation, refocusing and direction  4. Decrease environmental stimuli, including noise as appropriate  5. Monitor and intervene to maintain adequate nutrition, hydration, elimination, sleep and activity  6. If unable to ensure safety without constant attention obtain sitter and review sitter guidelines with assigned personnel  7.  Initiate Psychosocial CNS and Spiritual Care consult, as indicated  Outcome: Progressing

## 2023-03-04 NOTE — PROGRESS NOTES
Patient asks persistently to call Cara Sims at [facility] and update her. Left message with [facility] to update house mother Cara Sims in regards to restraints placed on patient. Waiting a call back to update her.   2328: Able to get a hold of the night staff at [facility] Hanna) and update on patient condition and restraints per patient request. I asked patient's permission to speak with Rory Patient and update [employer] Patient on his condition, he says that it is okay to give [employer].

## 2023-03-04 NOTE — PROGRESS NOTES
[facility]  Internal Medicine Residency Program  Progress Note - House Team 1    Patient:  Erika Licona 78 y.o. male MRN: [id number]     Date of Service: 3/4/2023     CC: Jaw pain after mechanical fall    Overnight events: ORIF of bilateral mandibular fracture by ENT. Postop agitation treated with Versed and Haldol. Subjective     Patient seen and evaluated. He continues to have jaw pain, exacerbated by movement (talking, eating). Pain is adequately controlled on current analgesia regimen. No other complaints this morning. He confirms being on lisinopril for HTN, Xarelto for A-fib, and Keppra and Depakote for seizures. He was previously on methimazole, but is now taking Synthroid. Hospital Course:  Patient is a 79-year-old male with PMH of HFrEF, HTN, paroxysmal A-fib and PE on chronic anticoagulation, COPD, hepatitis C, hyperthyroidism, bipolar 1 disorder, seizures, and polysubstance abuse who presented with jaw pain s/p mechanical fall. Upon presentation to the ED, vital signs are stable. He was found to have oblique mildly comminuted fracture through the mandibular body bilaterally with normal temporomandibular joints. CT head and C-spine showed no acute injury. He had a mild leukocytosis (13.7), and urine drug screen was positive for benzo and fentanyl. PT and APTT were slightly prolonged; INR 1.3. CMP was unremarkable. ENT was consulted, and patient underwent ORIF of his mandibular fracture upon admission. Objective     Vitals: /61   Pulse 75   Temp 99.5 °F (37.5 °C) (Temporal)   Resp 16   Ht 5' 4\" (1.626 m)   Wt 150 lb (68 kg)   SpO2 95%   BMI 25.75 kg/m²       Physical Exam  Vitals reviewed. Constitutional:       General: He is not in acute distress. Appearance: Normal appearance. HENT:      Head: Normocephalic. Cardiovascular:      Rate and Rhythm: Normal rate and regular rhythm. Heart sounds: No murmur heard. No friction rub. No gallop. Pulmonary:      Effort: No respiratory distress. Breath sounds: Normal breath sounds. Abdominal:      General: There is no distension. Palpations: Abdomen is soft. Tenderness: There is no abdominal tenderness. Musculoskeletal:         General: No swelling or tenderness. Cervical back: Neck supple. Skin:     General: Skin is warm and dry. Neurological:      General: No focal deficit present. Mental Status: He is alert. Mental status is at baseline. Pertinent Labs & Imaging Studies     Labs  Recent Labs     03/03/23  1620   WBC 13.7*   RBC 4.79   HGB 14.5   HCT 44.0   MCV 91.9   MCH 30.3   MCHC 33.0   RDW 13.7      MPV 12.7*      K 4.6      CO2 27   BUN 13   CREATININE 1.0   GLUCOSE 131*   CALCIUM 9.5   ALKPHOS 77   AST 21   ALT 17   PROT 7.9   LABALBU 4.5      3/3/23 16:20   Prothrombin Time 14.0 (H)   INR 1.3   aPTT 38.4 (H)      3/4/23 07:47   TSH 0.160 (L)      3/4/23 00:30 3/4/23 07:46   Ethanol Lvl  <10   TCA Scrn  NEGATIVE   Amphetamine Screen, Urine NOT DETECTED    Barbiturate Screen, Ur NOT DETECTED    Benzodiazepine Screen, Urine POSITIVE ! Cannabinoid Scrn, Ur NOT DETECTED    METHADONE SCREEN, URINE NOT DETECTED    Opiate Scrn, Ur NOT DETECTED    PCP Screen, Urine NOT DETECTED    Cocaine Metabolite Screen, Urine NOT DETECTED    FENTANYL SCREEN, URINE POSITIVE ! Oxycodone Urine NOT DETECTED    Acetaminophen Level  5.9 (L)   Salicylate, Serum  <4.7       Imaging  XR CHEST PORTABLE   Final Result   No acute process. CT HEAD WO CONTRAST   Final Result   No acute intracranial abnormality. Specifically, there is no acute   intracranial hemorrhage      Findings of chronic pansinusitis         CT FACIAL BONES WO CONTRAST   Final Result   There is oblique fracture through the body of the mandible bilaterally      Moderate paranasal sinus disease is persistent but less extensive than seen   on prior CT of December 5, 2021. CT CERVICAL SPINE WO CONTRAST   Final Result   1. There is no acute compression fracture or subluxation of the cervical   spine. 2. Multilevel degenerative disc and degenerative joint disease. CT 3D RECONSTRUCTION    (Results Pending)   CT FACIAL BONES WO CONTRAST    (Results Pending)   CT 3D RECONSTRUCTION    (Results Pending)        Resident's Assessment and Plan     Lynne Merida is a 78 y.o. male with:    Bilateral mandibular fracture 2/2 mechanical fall  Fracture demonstrated on CT facial bones  ENT following --> s/p ORIF 3/3  Pain management per ENT  Continue Unasyn    Leukocytosis with left shift, likely reactive vs infective  Afebrile; WBC 13.7 on admission, now 13.5  Monitor daily CBC    History of paroxysmal A-fib  Patient reports taking Xarelto at home --> held for procedure  [id number]    History of HFrEF  Most recent echo showed EF 35% in 2018  Not currently on GDMT --> consider starting  Currently euvolemic    History of HTN  Continue home Lisinopril 10 mg daily  Monitor BP    History of COPD  PFT 67% FEV/FVC ratio post bronc per PFT in 2018  Previously on Dulera and Spiriva --> continue Dulera    History of hypothyroidism  TSH 0.160 on admission  Previously on methimazole, now on Synthroid --> continue home dose 25 mcg daily    History of seizures  Continue home meds: Keppra 1000 mg twice daily, Depakote  mg nightly    History of polysubstance abuse  Multiple ED visit for drug use in the past; documented history of drug-seeking behavior  Patient has previously declined rehab or referral for pain medicine clinic      PT/OT evaluation: Not indicated  DVT prophylaxis: PCD's  GI prophylaxis: Regular diet  Disposition: Continue current care      Meena Lindsay DO, PGY-1  Attending Physician: Dr. Christy Ulrich  Internal Medicine Residency Program  Attending Physician Statement:  Elida Li M.D., F.A.C.P.   Hospital charts reviewed, including other providers notes, relevant labs and imaging. I have seen/discussed the case with the resident   time spent coordinating care with residents, other providers and/or counseling patient   re: workup/plan and orders as documented by the resident   For billing Medical decision making level of complexity   My Assessment as follows: Sp trip fall  Mandibular fx- sp ORIF  Acute on chronic mulitple pain      QOD ER encounters- get Oxy and clonazpam  +THC in urine in past  Watch benzo withdrawals  ENT- morphine, percocet- ongoing high risk IV meds requesting  In past requests for tramadol-- in last clinc visit- but noted seizure hx  (Allergy ketorolac- doubt true anaphylaxis)    Seizure - keppra and depakote  Likely low grade, chronically elevated inr, +hep C chronic    Blood loss anemia, slight- sp ORIF  Afib and questionable VQ moderate risk  - hold elqius  Dobut significant CAD on cath, but MI? Ant/apex wall motion EF 35% - non ischemic cardiomypathy (rule out hx of [place])  HTN on ACE  Hx of afib-- not on AV blocker - no racing heart recently  No active chf admission recently  Reecntly dehdyration Hai- risk but NOT seen yet  BS elevated, check aic    PFT 67% FEV/FVC ratio post bronc per PFT in 2018  Previously on Dulera and Spiriva --> continue Dulera- no exac  BPH - DHT blocker  Hyperthryoid -- sp ablation 2012- on 25 thryoid rcently  Chronci DDD- R lumbar radic  - neurosurg seen Jan -- no fu yet  /no MRI yet.

## 2023-03-05 LAB
ANION GAP SERPL CALCULATED.3IONS-SCNC: 11 MMOL/L (ref 7–16)
BASOPHILS ABSOLUTE: 0.04 E9/L (ref 0–0.2)
BASOPHILS RELATIVE PERCENT: 0.5 % (ref 0–2)
BUN BLDV-MCNC: 13 MG/DL (ref 6–23)
CALCIUM SERPL-MCNC: 8.6 MG/DL (ref 8.6–10.2)
CHLORIDE BLD-SCNC: 101 MMOL/L (ref 98–107)
CO2: 28 MMOL/L (ref 22–29)
CREAT SERPL-MCNC: 0.9 MG/DL (ref 0.7–1.2)
EOSINOPHILS ABSOLUTE: 0.14 E9/L (ref 0.05–0.5)
EOSINOPHILS RELATIVE PERCENT: 1.8 % (ref 0–6)
GFR SERPL CREATININE-BSD FRML MDRD: >60 ML/MIN/1.73
GLUCOSE BLD-MCNC: 109 MG/DL (ref 74–99)
HBA1C MFR BLD: 5.2 % (ref 4–5.6)
HCT VFR BLD CALC: 34.8 % (ref 37–54)
HEMOGLOBIN: 11.1 G/DL (ref 12.5–16.5)
IMMATURE GRANULOCYTES #: 0.05 E9/L
IMMATURE GRANULOCYTES %: 0.6 % (ref 0–5)
LYMPHOCYTES ABSOLUTE: 1.77 E9/L (ref 1.5–4)
LYMPHOCYTES RELATIVE PERCENT: 22.3 % (ref 20–42)
MAGNESIUM: 2 MG/DL (ref 1.6–2.6)
MCH RBC QN AUTO: 29.9 PG (ref 26–35)
MCHC RBC AUTO-ENTMCNC: 31.9 % (ref 32–34.5)
MCV RBC AUTO: 93.8 FL (ref 80–99.9)
MONOCYTES ABSOLUTE: 0.81 E9/L (ref 0.1–0.95)
MONOCYTES RELATIVE PERCENT: 10.2 % (ref 2–12)
NEUTROPHILS ABSOLUTE: 5.13 E9/L (ref 1.8–7.3)
NEUTROPHILS RELATIVE PERCENT: 64.6 % (ref 43–80)
PDW BLD-RTO: 13.8 FL (ref 11.5–15)
PHOSPHORUS: 2.3 MG/DL (ref 2.5–4.5)
PLATELET # BLD: 155 E9/L (ref 130–450)
PMV BLD AUTO: 12.6 FL (ref 7–12)
POTASSIUM SERPL-SCNC: 4 MMOL/L (ref 3.5–5)
RBC # BLD: 3.71 E12/L (ref 3.8–5.8)
SODIUM BLD-SCNC: 140 MMOL/L (ref 132–146)
WBC # BLD: 7.9 E9/L (ref 4.5–11.5)

## 2023-03-05 PROCEDURE — 97535 SELF CARE MNGMENT TRAINING: CPT

## 2023-03-05 PROCEDURE — 83036 HEMOGLOBIN GLYCOSYLATED A1C: CPT

## 2023-03-05 PROCEDURE — 97165 OT EVAL LOW COMPLEX 30 MIN: CPT

## 2023-03-05 PROCEDURE — 36415 COLL VENOUS BLD VENIPUNCTURE: CPT

## 2023-03-05 PROCEDURE — 99232 SBSQ HOSP IP/OBS MODERATE 35: CPT | Performed by: INTERNAL MEDICINE

## 2023-03-05 PROCEDURE — 84100 ASSAY OF PHOSPHORUS: CPT

## 2023-03-05 PROCEDURE — 6370000000 HC RX 637 (ALT 250 FOR IP)

## 2023-03-05 PROCEDURE — 80048 BASIC METABOLIC PNL TOTAL CA: CPT

## 2023-03-05 PROCEDURE — 6370000000 HC RX 637 (ALT 250 FOR IP): Performed by: INTERNAL MEDICINE

## 2023-03-05 PROCEDURE — 83735 ASSAY OF MAGNESIUM: CPT

## 2023-03-05 PROCEDURE — 85025 COMPLETE CBC W/AUTO DIFF WBC: CPT

## 2023-03-05 PROCEDURE — 1200000000 HC SEMI PRIVATE

## 2023-03-05 RX ORDER — SODIUM CHLORIDE 0.9 % (FLUSH) 0.9 %
5-40 SYRINGE (ML) INJECTION EVERY 12 HOURS SCHEDULED
Status: DISCONTINUED | OUTPATIENT
Start: 2023-03-05 | End: 2023-03-05

## 2023-03-05 RX ORDER — HEPARIN SODIUM (PORCINE) LOCK FLUSH IV SOLN 100 UNIT/ML 100 UNIT/ML
3 SOLUTION INTRAVENOUS PRN
Status: DISCONTINUED | OUTPATIENT
Start: 2023-03-05 | End: 2023-03-05

## 2023-03-05 RX ORDER — HEPARIN SODIUM (PORCINE) LOCK FLUSH IV SOLN 100 UNIT/ML 100 UNIT/ML
3 SOLUTION INTRAVENOUS EVERY 12 HOURS SCHEDULED
Status: DISCONTINUED | OUTPATIENT
Start: 2023-03-05 | End: 2023-03-05

## 2023-03-05 RX ORDER — HEPARIN SODIUM (PORCINE) LOCK FLUSH IV SOLN 100 UNIT/ML 100 UNIT/ML
1 SOLUTION INTRAVENOUS EVERY 12 HOURS SCHEDULED
Status: DISCONTINUED | OUTPATIENT
Start: 2023-03-05 | End: 2023-03-05

## 2023-03-05 RX ORDER — AMOXICILLIN AND CLAVULANATE POTASSIUM 875; 125 MG/1; MG/1
1 TABLET, FILM COATED ORAL EVERY 12 HOURS SCHEDULED
Status: DISCONTINUED | OUTPATIENT
Start: 2023-03-05 | End: 2023-03-07 | Stop reason: HOSPADM

## 2023-03-05 RX ORDER — SODIUM CHLORIDE 0.9 % (FLUSH) 0.9 %
5-40 SYRINGE (ML) INJECTION PRN
Status: DISCONTINUED | OUTPATIENT
Start: 2023-03-05 | End: 2023-03-05

## 2023-03-05 RX ORDER — LIDOCAINE HYDROCHLORIDE 10 MG/ML
5 INJECTION, SOLUTION EPIDURAL; INFILTRATION; INTRACAUDAL; PERINEURAL ONCE
Status: DISCONTINUED | OUTPATIENT
Start: 2023-03-05 | End: 2023-03-05

## 2023-03-05 RX ORDER — SODIUM CHLORIDE 9 MG/ML
INJECTION, SOLUTION INTRAVENOUS PRN
Status: DISCONTINUED | OUTPATIENT
Start: 2023-03-05 | End: 2023-03-07 | Stop reason: HOSPADM

## 2023-03-05 RX ORDER — HEPARIN SODIUM (PORCINE) LOCK FLUSH IV SOLN 100 UNIT/ML 100 UNIT/ML
1 SOLUTION INTRAVENOUS PRN
Status: DISCONTINUED | OUTPATIENT
Start: 2023-03-05 | End: 2023-03-05

## 2023-03-05 RX ADMIN — CHLORHEXIDINE GLUCONATE 0.12% ORAL RINSE 15 ML: 1.2 LIQUID ORAL at 21:20

## 2023-03-05 RX ADMIN — AMOXICILLIN AND CLAVULANATE POTASSIUM 1 TABLET: 875; 125 TABLET, FILM COATED ORAL at 09:10

## 2023-03-05 RX ADMIN — OXYCODONE AND ACETAMINOPHEN 2 TABLET: 5; 325 TABLET ORAL at 09:10

## 2023-03-05 RX ADMIN — LEVETIRACETAM 1000 MG: 500 TABLET, FILM COATED ORAL at 09:10

## 2023-03-05 RX ADMIN — DIVALPROEX SODIUM 250 MG: 250 TABLET, DELAYED RELEASE ORAL at 21:19

## 2023-03-05 RX ADMIN — POLYETHYLENE GLYCOL 3350 17 G: 17 POWDER, FOR SOLUTION ORAL at 05:08

## 2023-03-05 RX ADMIN — LEVETIRACETAM 1000 MG: 500 TABLET, FILM COATED ORAL at 21:19

## 2023-03-05 RX ADMIN — FINASTERIDE 5 MG: 5 TABLET, FILM COATED ORAL at 09:10

## 2023-03-05 RX ADMIN — CHLORHEXIDINE GLUCONATE 0.12% ORAL RINSE 15 ML: 1.2 LIQUID ORAL at 09:10

## 2023-03-05 RX ADMIN — RIVAROXABAN 20 MG: 20 TABLET, FILM COATED ORAL at 17:22

## 2023-03-05 RX ADMIN — OXYCODONE AND ACETAMINOPHEN 2 TABLET: 5; 325 TABLET ORAL at 21:23

## 2023-03-05 RX ADMIN — Medication 250 MG: at 17:22

## 2023-03-05 RX ADMIN — AMOXICILLIN AND CLAVULANATE POTASSIUM 1 TABLET: 875; 125 TABLET, FILM COATED ORAL at 21:19

## 2023-03-05 RX ADMIN — OXYCODONE AND ACETAMINOPHEN 2 TABLET: 5; 325 TABLET ORAL at 17:22

## 2023-03-05 RX ADMIN — DIVALPROEX SODIUM 250 MG: 250 TABLET, DELAYED RELEASE ORAL at 09:10

## 2023-03-05 RX ADMIN — OXYCODONE AND ACETAMINOPHEN 2 TABLET: 5; 325 TABLET ORAL at 13:17

## 2023-03-05 RX ADMIN — Medication 250 MG: at 21:19

## 2023-03-05 RX ADMIN — OXYCODONE AND ACETAMINOPHEN 2 TABLET: 5; 325 TABLET ORAL at 05:04

## 2023-03-05 RX ADMIN — CHLORHEXIDINE GLUCONATE 0.12% ORAL RINSE 15 ML: 1.2 LIQUID ORAL at 17:23

## 2023-03-05 RX ADMIN — CHLORHEXIDINE GLUCONATE 0.12% ORAL RINSE 15 ML: 1.2 LIQUID ORAL at 14:29

## 2023-03-05 ASSESSMENT — PAIN DESCRIPTION - PAIN TYPE
TYPE: ACUTE PAIN;SURGICAL PAIN

## 2023-03-05 ASSESSMENT — PAIN DESCRIPTION - DESCRIPTORS
DESCRIPTORS: ACHING;DULL;DISCOMFORT
DESCRIPTORS: ACHING;DISCOMFORT
DESCRIPTORS: ACHING;DISCOMFORT

## 2023-03-05 ASSESSMENT — PAIN - FUNCTIONAL ASSESSMENT
PAIN_FUNCTIONAL_ASSESSMENT: ACTIVITIES ARE NOT PREVENTED
PAIN_FUNCTIONAL_ASSESSMENT: ACTIVITIES ARE NOT PREVENTED

## 2023-03-05 ASSESSMENT — PAIN SCALES - GENERAL
PAINLEVEL_OUTOF10: 10
PAINLEVEL_OUTOF10: 5
PAINLEVEL_OUTOF10: 6
PAINLEVEL_OUTOF10: 10
PAINLEVEL_OUTOF10: 6
PAINLEVEL_OUTOF10: 6
PAINLEVEL_OUTOF10: 10

## 2023-03-05 ASSESSMENT — PAIN DESCRIPTION - ONSET
ONSET: ON-GOING
ONSET: ON-GOING

## 2023-03-05 ASSESSMENT — PAIN DESCRIPTION - LOCATION
LOCATION: JAW

## 2023-03-05 ASSESSMENT — PAIN DESCRIPTION - FREQUENCY
FREQUENCY: CONTINUOUS

## 2023-03-05 NOTE — ED PROVIDER NOTES
[address] 60        Pt Name: Danette Lamas  MRN: [id number]  [place] 1955  Date of evaluation: 3/3/2023  Provider: Addie Mccall MD  PCP: Nick Salazar MD  Note Started: 9:08 PM EST 3/4/23    CHIEF COMPLAINT       Chief Complaint   Patient presents with    Fall     Tripped and fell on curb, laceration to chin, on Xarelto, -LOC       HISTORY OF PRESENT ILLNESS: 1 or more Elements   History From: Patient    Limitations to history : Intoxication    Danette Lamas is a 78 y.o. male who presents after fall. Patient states he tripped on a curb twice and fell hitting his chin. Denies LOC, He is on xarelto. Patient states he has significant pain with jaw movement, has a laceration to chin that is bleeding. Describes symptoms moderate in severity with no alleviating or exacerbating factors. Denies any fever, chills, n/v, headache, dizziness, vision changes, neck tenderness or stiffness, weakness, cp, palpitations, leg swelling/tenderness, sob, cough, abd pain, dysuria, hematuria, diarrhea, constipation, bloody stools. Nursing Notes were all reviewed and agreed with or any disagreements were addressed in the HPI.    ROS:   Pertinent positives and negatives are stated within HPI, all other systems reviewed and are negative.     Social Determinants : None      --------------------------------------------- PAST HISTORY ---------------------------------------------  Past Medical History:  has a past medical history of Anxiety, Arthritis, Asthma, Bipolar 1 disorder ([facility] [address] Chronic combined systolic and diastolic CHF (congestive heart failure) ([facility] [address] COPD (chronic obstructive pulmonary disease) ([facility] [address] Depression, Diabetes mellitus ([facility] [address] GERD (gastroesophageal reflux disease), Hepatitis C, Hypertension, Hyperthyroidism, Hypothyroidism due to medication, Mass of testicle, Mesothelioma ([facility] [address] Neuromuscular disorder ([facility] [address] Other disorders of kidney and ureter, Paroxysmal A-fib ([facility] [address] Peptic ulcer, Prostate enlargement, Pulmonary embolism ([facility] [address] Seizures ([facility] [address] and Thyroid disease. Past Surgical History:  has a past surgical history that includes Appendectomy; Lithotripsy; Hemorrhoid surgery; Bladder surgery; Endoscopy, colon, diagnostic (11/13/2015); Abdomen surgery; Colonoscopy; Cardiac surgery; vascular surgery; and Cardiac catheterization (05/21/2018). Social History:  reports that he quit smoking about 13 years ago. His smoking use included cigarettes. He quit smokeless tobacco use about 5 years ago. His smokeless tobacco use included chew. He reports that he does not currently use drugs after having used the following drugs: Cocaine. He reports that he does not drink alcohol. Family History: family history includes Cancer in his father, maternal grandfather, and mother; Diabetes in his father, maternal aunt, maternal aunt, maternal aunt, maternal grandmother, and mother; Heart Disease in his paternal grandfather and paternal grandmother; Heart Failure in his paternal grandfather and paternal grandmother. The patients home medications have been reviewed. Allergies: Codeine, Dye [iodides], Ketorolac tromethamine, Peanuts [peanut oil], Shellfish-derived products, and Nitroglycerin    REVIEW OF EXTERNAL NOTE :       Controlled Substance Monitoring:    Acute and Chronic Pain Monitoring:   RX Monitoring 2/12/2023   Periodic Controlled Substance Monitoring Possible medication side effects, risk of tolerance/dependence & alternative treatments discussed. ;No signs of potential drug abuse or diversion identified. Some encounter information is confidential and restricted.  Go to Review Flowsheets activity to see all data.          ---------------------------------------------------PHYSICAL EXAM--------------------------------------        Constitutional/General: Alert and oriented x3, well appearing, non toxic in NAD  Head: Normocephalic  Mouth: Chin laceration, pain with jaw movement, limited jaw motion, handling secretions, no trismus  Neck: Supple, full ROM,  Pulmonary: Lungs clear to auscultation bilaterally, no wheezes, rales, or rhonchi. Not in respiratory distress  Cardiovascular:  Regular rate. Regular rhythm. No murmurs  Chest: no chest wall tenderness  Abdomen: Soft. Non tender. Non distended. No rebound, guarding, or rigidity. No pulsatile masses appreciated. Musculoskeletal: Moves all extremities x 4. Neurovascularly intact. Warm and well perfused, no clubbing, cyanosis, or edema. Compartments are soft and compressible. Capillary refill <3 seconds. Skin: warm and dry. No rashes. Neurologic: GCS 15, no gross focal neurologic deficits  Psych: Normal Affect    -------------------------------------------------- RESULTS -------------------------------------------------  I have personally reviewed all laboratory and imaging results for this patient. Results are listed below.      LABS:  Results for orders placed or performed during the hospital encounter of 03/03/23   CBC with Auto Differential   Result Value Ref Range    WBC 13.7 (H) 4.5 - 11.5 E9/L    RBC 4.79 3.80 - 5.80 E12/L    Hemoglobin 14.5 12.5 - 16.5 g/dL    Hematocrit 44.0 37.0 - 54.0 %    MCV 91.9 80.0 - 99.9 fL    MCH 30.3 26.0 - 35.0 pg    MCHC 33.0 32.0 - 34.5 %    RDW 13.7 11.5 - 15.0 fL    Platelets 161 716 - 837 E9/L    MPV 12.7 (H) 7.0 - 12.0 fL    Neutrophils % 88.2 (H) 43.0 - 80.0 %    Immature Granulocytes % 0.5 0.0 - 5.0 %    Lymphocytes % 5.1 (L) 20.0 - 42.0 %    Monocytes % 5.6 2.0 - 12.0 %    Eosinophils % 0.1 0.0 - 6.0 %    Basophils % 0.5 0.0 - 2.0 %    Neutrophils Absolute 12.02 (H) 1.80 - 7.30 E9/L    Immature Granulocytes # 0.07 E9/L    Lymphocytes Absolute 0.70 (L) 1.50 - 4.00 E9/L    Monocytes Absolute 0.77 0.10 - 0.95 E9/L    Eosinophils Absolute 0.02 (L) 0.05 - 0.50 E9/L    Basophils Absolute 0.07 0.00 - 0.20 E9/L   Comprehensive Metabolic Panel   Result Value Ref Range    Sodium 137 132 - 146 mmol/L    Potassium 4.6 3.5 - 5.0 mmol/L    Chloride 100 98 - 107 mmol/L    CO2 27 22 - 29 mmol/L    Anion Gap 10 7 - 16 mmol/L    Glucose 131 (H) 74 - 99 mg/dL    BUN 13 6 - 23 mg/dL    Creatinine 1.0 0.7 - 1.2 mg/dL    Est, Glom Filt Rate >60 >=60 mL/min/1.73    Calcium 9.5 8.6 - 10.2 mg/dL    Total Protein 7.9 6.4 - 8.3 g/dL    Albumin 4.5 3.5 - 5.2 g/dL    Total Bilirubin 0.3 0.0 - 1.2 mg/dL    Alkaline Phosphatase 77 40 - 129 U/L    ALT 17 0 - 40 U/L    AST 21 0 - 39 U/L   APTT   Result Value Ref Range    aPTT 38.4 (H) 24.5 - 35.1 sec   Protime-INR   Result Value Ref Range    Protime 14.0 (H) 9.3 - 12.4 sec    INR 1.3    TSH   Result Value Ref Range    TSH 0.160 (L) 0.270 - 4.200 uIU/mL   CBC with Auto Differential   Result Value Ref Range    WBC 13.5 (H) 4.5 - 11.5 E9/L    RBC 4.08 3.80 - 5.80 E12/L    Hemoglobin 12.4 (L) 12.5 - 16.5 g/dL    Hematocrit 37.8 37.0 - 54.0 %    MCV 92.6 80.0 - 99.9 fL    MCH 30.4 26.0 - 35.0 pg    MCHC 32.8 32.0 - 34.5 %    RDW 13.9 11.5 - 15.0 fL    Platelets 723 944 - 641 E9/L    MPV 12.5 (H) 7.0 - 12.0 fL    Neutrophils % 85.3 (H) 43.0 - 80.0 %    Immature Granulocytes % 0.6 0.0 - 5.0 %    Lymphocytes % 6.0 (L) 20.0 - 42.0 %    Monocytes % 7.9 2.0 - 12.0 %    Eosinophils % 0.0 0.0 - 6.0 %    Basophils % 0.2 0.0 - 2.0 %    Neutrophils Absolute 11.55 (H) 1.80 - 7.30 E9/L    Immature Granulocytes # 0.08 E9/L    Lymphocytes Absolute 0.81 (L) 1.50 - 4.00 E9/L    Monocytes Absolute 1.07 (H) 0.10 - 0.95 E9/L    Eosinophils Absolute 0.00 (L) 0.05 - 0.50 E9/L    Basophils Absolute 0.03 0.00 - 0.20 G1/R   Basic Metabolic Panel   Result Value Ref Range    Sodium 135 132 - 146 mmol/L    Potassium 4.4 3.5 - 5.0 mmol/L    Chloride 99 98 - 107 mmol/L    CO2 23 22 - 29 mmol/L    Anion Gap 13 7 - 16 mmol/L    Glucose 156 (H) 74 - 99 mg/dL    BUN 16 6 - 23 mg/dL    Creatinine 1.0 0.7 - 1.2 mg/dL    Est, Glom Filt Rate >60 >=60 mL/min/1.73    Calcium 8.6 8.6 - 10.2 mg/dL   Magnesium Result Value Ref Range    Magnesium 2.2 1.6 - 2.6 mg/dL   Serum Drug Screen   Result Value Ref Range    Ethanol Lvl <10 mg/dL    Acetaminophen Level 5.9 (L) 10.0 - 38.0 mcg/mL    Salicylate, Serum <1.7 0.0 - 30.0 mg/dL    TCA Scrn NEGATIVE Cutoff:300 ng/mL   Phosphorus   Result Value Ref Range    Phosphorus 4.1 2.5 - 4.5 mg/dL   Urine Drug Screen   Result Value Ref Range    Amphetamine Screen, Urine NOT DETECTED Negative <1000 ng/mL    Barbiturate Screen, Ur NOT DETECTED Negative < 200 ng/mL    Benzodiazepine Screen, Urine POSITIVE (A) Negative < 200 ng/mL    Cannabinoid Scrn, Ur NOT DETECTED Negative < 50ng/mL    Cocaine Metabolite Screen, Urine NOT DETECTED Negative < 300 ng/mL    Opiate Scrn, Ur NOT DETECTED Negative < 300ng/mL    PCP Screen, Urine NOT DETECTED Negative < 25 ng/mL    Methadone Screen, Urine NOT DETECTED Negative <300 ng/mL    Oxycodone Urine NOT DETECTED Negative <100 ng/mL    FENTANYL SCREEN, URINE POSITIVE (A) Negative <1 ng/mL    Drug Screen Comment: see below    T4, Free   Result Value Ref Range    T4 Free 1.51 0.93 - 1.70 ng/dL       RADIOLOGY:   Non-plain film images such as CT, Ultrasound and MRI are read by the radiologist. Plain radiographic images are visualized and preliminarily interpreted by the ED Provider with the below findings:    CXR with no obvious effusions or consolidations concerning pna, no ptx   No obvious intracranial bleed on Ct head    Interpretation per the Radiologist below, if available at the time of this note:    MRI LUMBAR SPINE WO CONTRAST   Final Result   1. Advanced degenerative change. Mild central canal stenosis at L3-4 and   L4-5. Moderate right L4-5 subarticular recess stenosis. 2. Multilevel neural foraminal stenosis as noted above. CT FACIAL BONES WO CONTRAST   Final Result   Status post ORIF for fracture of the mandible across the midline fix by   metallic plate with perpendicular screws at both fracture sites.       3D volume rendered images of the mandible demonstrate in better advantage the   spatial anatomical orientation of the bone fragments following ORIF. CT 3D RECONSTRUCTION   Final Result   Status post ORIF for fracture of the mandible across the midline fix by   metallic plate with perpendicular screws at both fracture sites. 3D volume rendered images of the mandible demonstrate in better advantage the   spatial anatomical orientation of the bone fragments following ORIF. XR CHEST PORTABLE   Final Result   No acute process. CT HEAD WO CONTRAST   Final Result   No acute intracranial abnormality. Specifically, there is no acute   intracranial hemorrhage      Findings of chronic pansinusitis         CT FACIAL BONES WO CONTRAST   Final Result   There is oblique fracture through the body of the mandible bilaterally      Moderate paranasal sinus disease is persistent but less extensive than seen   on prior CT of December 5, 2021. CT CERVICAL SPINE WO CONTRAST   Final Result   1. There is no acute compression fracture or subluxation of the cervical   spine. 2. Multilevel degenerative disc and degenerative joint disease. CT 3D RECONSTRUCTION    (Results Pending)     CT HEAD WO CONTRAST    Result Date: 3/3/2023  EXAMINATION: CT OF THE HEAD WITHOUT CONTRAST  3/3/2023 12:58 pm TECHNIQUE: CT of the head was performed without the administration of intravenous contrast. Automated exposure control, iterative reconstruction, and/or weight based adjustment of the mA/kV was utilized to reduce the radiation dose to as low as reasonably achievable. COMPARISON: 05/02/2022 HISTORY: ORDERING SYSTEM PROVIDED HISTORY: fall TECHNOLOGIST PROVIDED HISTORY: Reason for exam:->fall Has a \"code stroke\" or \"stroke alert\" been called? ->No Decision Support Exception - unselect if not a suspected or confirmed emergency medical condition->Emergency Medical Condition ([place]) What reading provider will be dictating this exam?->[facility] FINDINGS: BRAIN/VENTRICLES: There is no acute intracranial hemorrhage, mass effect or midline shift. No abnormal extra-axial fluid collection. The gray-white differentiation is maintained without evidence of an acute infarct. There is no evidence of hydrocephalus. ORBITS: The visualized portion of the orbits demonstrate no acute abnormality. SINUSES: The visualized paranasal sinuses and mastoid air cells demonstrate no acute abnormality. There are findings of chronic pansinusitis SOFT TISSUES/SKULL:  No acute abnormality of the visualized skull or soft tissues. There is no fracture of the nasal bones. No acute intracranial abnormality. Specifically, there is no acute intracranial hemorrhage Findings of chronic pansinusitis     CT FACIAL BONES WO CONTRAST    Result Date: 3/4/2023  EXAMINATION: CT OF THE FACE WITHOUT CONTRAST; 3D RECONSTRUCTIONS  3/4/2023 8:08 am TECHNIQUE: CT of the face was performed without the administration of intravenous contrast. Multiplanar reformatted images are provided for review. Automated exposure control, iterative reconstruction, and/or weight based adjustment of the mA/kV was utilized to reduce the radiation dose to as low as reasonably achievable. ; 3D reconstructions were performed on a separate workstation. Automated exposure control, iterative reconstruction, and/or weight based adjustment of the mA/kV was utilized to reduce the radiation dose to as low as reasonably achievable. COMPARISON: None HISTORY: ORDERING SYSTEM PROVIDED HISTORY: s/p ORIF mandible TECHNOLOGIST PROVIDED HISTORY: Reason for exam:->s/p ORIF mandible What reading provider will be dictating this exam?->[facility] FINDINGS: Comparison study of a March 3. Status post open reduction internal fixation for a displaced comminuted fracture of the mandible that crosses the midline. There is now alignment of the fragments.   Metallic plate fix by perpendicular screws have been applied to stabilize the fracture sites which are the junction between the anterior ramus and the mental region of the mandible bilaterally. The mandibular condyles are in proper position. There are fluid accumulation in the right maxillary sinus and mucosal thickening is seen in both maxillary sinus which were seen on the previous study could be relate with chronic sinus inflammatory process. There is also obliteration of the ethmoid air cells at multiple levels. There is mucosal thickening in the pathways of drainage of the right frontal sinus and fluid accumulation is seen in the left frontal sinus which was also seen previously. These indicates the pre existence chronic nasal sinus disease. Soft tissue swelling is seen in the face around the mandible as expected by the trauma and postsurgical changes. Visualized intraorbital structures appear unremarkable. Visualized intracranial structures partially covered on this examination appears unremarkable. 3D CT reconstruction of the mandible: The 3D volume rendered images of mandible demonstrate in better advantage the spatial anatomical alignment of the mandible fragments in both sides of the midline following ORIF. Metallic plates have been applied to stabilize the fracture in both sides of the midline. There is proper position of the mandibular condyles in relation to the glenoid fossa of the temporal bone bilaterally. Status post ORIF for fracture of the mandible across the midline fix by metallic plate with perpendicular screws at both fracture sites. 3D volume rendered images of the mandible demonstrate in better advantage the spatial anatomical orientation of the bone fragments following ORIF. CT FACIAL BONES WO CONTRAST    Result Date: 3/3/2023  EXAMINATION: CT OF THE FACE WITHOUT CONTRAST  3/3/2023 12:58 pm TECHNIQUE: CT of the face was performed without the administration of intravenous contrast. Multiplanar reformatted images are provided for review.  Automated exposure control, iterative reconstruction, and/or weight based adjustment of the mA/kV was utilized to reduce the radiation dose to as low as reasonably achievable. COMPARISON: None HISTORY: ORDERING SYSTEM PROVIDED HISTORY: fall TECHNOLOGIST PROVIDED HISTORY: Reason for exam:->fall Decision Support Exception - unselect if not a suspected or confirmed emergency medical condition->Emergency Medical Condition ([place]) What reading provider will be dictating this exam?->[facility] FINDINGS: FACIAL BONES: There is oblique mildly comminuted fracture through the mandibular body bilaterally. The lateral fragments are anteriorly displaced by 1 shaft width. Temporomandibular joints within normal limits. Mandibular rami are maintained. The sinuses, orbital walls, maxilla, pterygoid plates, zygomatic arches, hard palate, nasal bones are intact. There is depression the left zygoma which is unchanged compared to December 5, 2021 felt represent old fracture. The temporomandibular joints are aligned. ORBITAL CONTENTS: The globes appear intact. The extraocular muscles, optic nerve sheath complexes and lacrimal glands appear unremarkable. No retrobulbar hematoma or mass is seen. SINUSES: There is moderate mucosal thickening frontal, ethmoid, in maxillary sinuses. Air-fluid level right maxillary sinus but no associated fracture evident. SOFT TISSUES: Soft tissue irregularity in swelling likely related to laceration seen over the mandible near midline. There is oblique fracture through the body of the mandible bilaterally Moderate paranasal sinus disease is persistent but less extensive than seen on prior CT of December 5, 2021. CT CERVICAL SPINE WO CONTRAST    Result Date: 3/3/2023  EXAMINATION: CT OF THE CERVICAL SPINE WITHOUT CONTRAST 3/3/2023 12:58 pm TECHNIQUE: CT of the cervical spine was performed without the administration of intravenous contrast. Multiplanar reformatted images are provided for review.  Automated exposure control, iterative reconstruction, and/or weight based adjustment of the mA/kV was utilized to reduce the radiation dose to as low as reasonably achievable. COMPARISON: None. HISTORY: ORDERING SYSTEM PROVIDED HISTORY: fall TECHNOLOGIST PROVIDED HISTORY: Reason for exam:->fall Decision Support Exception - unselect if not a suspected or confirmed emergency medical condition->Emergency Medical Condition ([place]) What reading provider will be dictating this exam?->[facility] FINDINGS: The ring of C1 is intact as is the dense. There is no compression fracture of the cervical spine. No jumped or perched facet is noted. Multilevel degenerative disc and degenerative joint disease is noted. The prevertebral soft tissues are unremarkable. The airway is widely patent. Images through the lung apices are negative for a pneumothorax. 1. There is no acute compression fracture or subluxation of the cervical spine. 2. Multilevel degenerative disc and degenerative joint disease. MRI LUMBAR SPINE WO CONTRAST    Result Date: 3/4/2023  EXAMINATION: MRI OF THE LUMBAR SPINE WITHOUT CONTRAST, 3/4/2023 2:22 pm TECHNIQUE: Multiplanar multisequence MRI of the lumbar spine was performed without the administration of intravenous contrast. COMPARISON: Plain films of the lumbar spine dated 01/31/2023 and CT lumbar spine dated 01/22/2023 HISTORY: ORDERING SYSTEM PROVIDED HISTORY: Hx lumbar spinal stenosis TECHNOLOGIST PROVIDED HISTORY: Reason for exam:->Hx lumbar spinal stenosis What is the sedation requirement?->None What reading provider will be dictating this exam?->[facility] FINDINGS: BONES/ALIGNMENT: There is normal alignment of the spine apart from slight grade 1 retrolisthesis of L3 on L4. There is straightening of the normal lumbar lordosis and mild dextroscoliosis is seen in the thoracolumbar region. The L1-2 disc space is fused. The vertebral body heights are maintained.  The bone marrow signal appears somewhat heterogeneous, probably due to marrow conversion. There is no evidence to suggest osseous destructive lesion or acute fracture. There is disc desiccation in all the lumbar discs. Severe disc space narrowing is seen at L2-3 with spurring and mild to moderate narrowing at L3-4 and L5-S1. SPINAL CORD: The conus terminates normally. SOFT TISSUES: No paraspinal mass identified. L1-L2: There is mild disc bulge and mild to moderate bilateral posterior facet degenerative change. No significant central canal stenosis is seen. There is mild left neural foraminal narrowing. No significant right foraminal narrowing seen. L2-L3: There is mild disc bulge and moderate bilateral posterior facet degenerative change without evidence of significant central canal stenosis. There is moderate left neural foraminal narrowing. No evidence of significant right foraminal narrowing. L3-L4: There is mild disc bulge and moderate bilateral posterior facet degenerative change causing mild central canal stenosis. There is moderate to severe left and moderate right neural foraminal narrowing. L4-L5: There is severe bilateral posterior facet degenerative change, greater on the right and mild disc bulge with small right posterolateral disc protrusion. These changes combine to cause mild central canal stenosis and moderate right subarticular recess stenosis. There is moderate to severe right and mild left neural foraminal narrowing. L5-S1: A small central disc protrusion with severe right and moderate left posterior facet degenerative change causing mild bilateral subarticular recess stenosis. No significant central canal stenosis is seen. There is moderate to severe bilateral neural foraminal narrowing. 1. Advanced degenerative change. Mild central canal stenosis at L3-4 and L4-5. Moderate right L4-5 subarticular recess stenosis. 2. Multilevel neural foraminal stenosis as noted above.      XR CHEST PORTABLE    Result Date: 3/3/2023  EXAMINATION: ONE XRAY VIEW OF THE CHEST 3/3/2023 12:18 pm COMPARISON: 2nd May 2022 HISTORY: ORDERING SYSTEM PROVIDED HISTORY: Shortness of breath TECHNOLOGIST PROVIDED HISTORY: Reason for exam:->Shortness of breath What reading provider will be dictating this exam?->[facility] FINDINGS: Postsurgical changes as before. The lungs are without acute focal process. There is no effusion or pneumothorax. The cardiomediastinal silhouette is without acute process. The osseous structures are without acute process. No acute process. CT 3D RECONSTRUCTION    Result Date: 3/4/2023  EXAMINATION: CT OF THE FACE WITHOUT CONTRAST; 3D RECONSTRUCTIONS  3/4/2023 8:08 am TECHNIQUE: CT of the face was performed without the administration of intravenous contrast. Multiplanar reformatted images are provided for review. Automated exposure control, iterative reconstruction, and/or weight based adjustment of the mA/kV was utilized to reduce the radiation dose to as low as reasonably achievable. ; 3D reconstructions were performed on a separate workstation. Automated exposure control, iterative reconstruction, and/or weight based adjustment of the mA/kV was utilized to reduce the radiation dose to as low as reasonably achievable. COMPARISON: None HISTORY: ORDERING SYSTEM PROVIDED HISTORY: s/p ORIF mandible TECHNOLOGIST PROVIDED HISTORY: Reason for exam:->s/p ORIF mandible What reading provider will be dictating this exam?->[facility] FINDINGS: Comparison study of a March 3. Status post open reduction internal fixation for a displaced comminuted fracture of the mandible that crosses the midline. There is now alignment of the fragments. Metallic plate fix by perpendicular screws have been applied to stabilize the fracture sites which are the junction between the anterior ramus and the mental region of the mandible bilaterally. The mandibular condyles are in proper position.  There are fluid accumulation in the right maxillary sinus and mucosal thickening is seen in both maxillary sinus which were seen on the previous study could be relate with chronic sinus inflammatory process. There is also obliteration of the ethmoid air cells at multiple levels. There is mucosal thickening in the pathways of drainage of the right frontal sinus and fluid accumulation is seen in the left frontal sinus which was also seen previously. These indicates the pre existence chronic nasal sinus disease. Soft tissue swelling is seen in the face around the mandible as expected by the trauma and postsurgical changes. Visualized intraorbital structures appear unremarkable. Visualized intracranial structures partially covered on this examination appears unremarkable. 3D CT reconstruction of the mandible: The 3D volume rendered images of mandible demonstrate in better advantage the spatial anatomical alignment of the mandible fragments in both sides of the midline following ORIF. Metallic plates have been applied to stabilize the fracture in both sides of the midline. There is proper position of the mandibular condyles in relation to the glenoid fossa of the temporal bone bilaterally. Status post ORIF for fracture of the mandible across the midline fix by metallic plate with perpendicular screws at both fracture sites. 3D volume rendered images of the mandible demonstrate in better advantage the spatial anatomical orientation of the bone fragments following ORIF. No results found. Procedures:      ------------------------- NURSING NOTES AND VITALS REVIEWED ---------------------------   The nursing notes within the ED encounter and vital signs as below have been reviewed by myself and ED attending.   /60   Pulse 68   Temp 97.6 °F (36.4 °C) (Temporal)   Resp 16   Ht 5' 4\" (1.626 m)   Wt 150 lb (68 kg)   SpO2 100%   BMI 25.75 kg/m²   Oxygen Saturation Interpretation: Normal    The patients available past medical records and past encounters were reviewed by myself and ED attending        ------------------------------ ED COURSE/MEDICAL DECISION MAKING----------------------    Medical Decision Making/Differential Diagnosis:    CC/HPI Summary, Pertinent Physical Exam Findings, Social Determinants of health, Records Reviewed, DDx, testing done/not done, ED Course, Reassessment, disposition considerations/shared decision making with patient, consults, disposition:      Medical Decision Making/ED COURSE:    Chronic Conditions affecting care:    has a past medical history of Anxiety, Arthritis, Asthma, Bipolar 1 disorder ([address] Chronic combined systolic and diastolic CHF (congestive heart failure) ([address] (05/27/2018), COPD (chronic obstructive pulmonary disease) ([address] Depression, Diabetes mellitus ([address] GERD (gastroesophageal reflux disease), Hepatitis C, Hypertension, Hyperthyroidism (8/2/2012), Hypothyroidism due to medication, Mass of testicle, Mesothelioma ([address] Neuromuscular disorder ([address] Other disorders of kidney and ureter, Paroxysmal A-fib ([address] Peptic ulcer, Prostate enlargement, Pulmonary embolism ([address] Seizures ([address] and Thyroid disease. Myself and ED attending interpreted findings during patient's stay. Vital signs /60   Pulse 68   Temp 97.6 °F (36.4 °C) (Temporal)   Resp 16   Ht 5' 4\" (1.626 m)   Wt 150 lb (68 kg)   SpO2 100%   BMI 25.75 kg/m²   Patient was placed on the cardiac monitor. Rhythm - Sinus. While in the ED patient was hemodynamically stable, afebrile, nontoxic-appearing, in no respiratory distress. Physical exam remarkable  for chin laceration, pain with jaw movement, limited jaw motion, handling secretions, no trismus  Working diagnosis include intracranial bleed, facial fracture, cervical fracture. Labs interpreted by me CBC with leukocytosis or significant anemia. CMP with stable renal and liver function, slight hyperglycemia 131, no electrolyte derangement. Stable Coags. CXR with no effusions or consolidations, no ptx.  Ct head No acute intracranial abnormality. no acute compression fracture or subluxation of the cervical spine. Ct facial oblique fracture through the body of the mandible bilaterally. ENT consulted. Patient administered IV: unasyn, fentanyl. Oral: Norco. Consultation with Hospitalist and ENT . The patient will be admitted for further treatment and evaluation for   1. Open fracture of body of mandible, unspecified laterality, initial encounter ([facility] [address]    2. Fall, initial encounter      IP CONSULT TO OTOLARYNGOLOGY  IP CONSULT TO HOSPITALIST      Counseling: The emergency provider has spoken with the patient and discussed todays results, in addition to providing specific details for the plan of care and counseling regarding the diagnosis and prognosis. Questions are answered at this time and they are agreeable with the plan.     ED Course as of 03/04/23 2127   Fri Mar 03, 2023   1425 oblique fracture through the body of the mandible bilaterally [TC]      ED Course User Index  [TC] Ivy Moody MD       Medications   lidocaine-EPINEPHrine 1 %-1:522201 injection 20 mL (has no administration in time range)   finasteride (PROSCAR) tablet 5 mg (5 mg Oral Given 3/4/23 0903)   levETIRAcetam (KEPPRA) tablet 1,000 mg (1,000 mg Oral Given 3/4/23 0903)   lisinopril (PRINIVIL;ZESTRIL) tablet 10 mg (10 mg Oral Not Given 3/4/23 0902)   sodium chloride flush 0.9 % injection 5-40 mL (10 mLs IntraVENous Given 3/4/23 0906)   sodium chloride flush 0.9 % injection 5-40 mL (has no administration in time range)   0.9 % sodium chloride infusion (has no administration in time range)   ondansetron (ZOFRAN-ODT) disintegrating tablet 4 mg (has no administration in time range)     Or   ondansetron (ZOFRAN) injection 4 mg (has no administration in time range)   polyethylene glycol (GLYCOLAX) packet 17 g (has no administration in time range)   acetaminophen (TYLENOL) tablet 650 mg (has no administration in time range)     Or   acetaminophen (TYLENOL) suppository 650 mg (has no administration in time range)   dextrose 5 % solution (950 mLs IntraVENous New Bag 3/4/23 0040)   ampicillin-sulbactam (UNASYN) 3,000 mg in sodium chloride 0.9 % 100 mL IVPB (Ozid4Gov) (0 mg IntraVENous Held 3/4/23 1742)   chlorhexidine (PERIDEX) 0.12 % solution 15 mL (15 mLs Mouth/Throat Given 3/4/23 1802)   budesonide (PULMICORT) nebulizer suspension 500 mcg (500 mcg Nebulization Given 3/4/23 2052)     And   arformoterol tartrate (BROVANA) nebulizer solution 15 mcg (15 mcg Nebulization Given 3/4/23 2052)   oxyCODONE-acetaminophen (PERCOCET) 5-325 MG per tablet 1 tablet ( Oral See Alternative 3/4/23 1803)     Or   oxyCODONE-acetaminophen (PERCOCET) 5-325 MG per tablet 2 tablet (2 tablets Oral Given 3/4/23 1803)   morphine (PF) injection 2 mg ( IntraVENous See Alternative 3/4/23 0904)     Or   morphine sulfate (PF) injection 4 mg (4 mg IntraVENous Given 3/4/23 0904)   levothyroxine (SYNTHROID) tablet 25 mcg (25 mcg Oral Given 3/4/23 1110)   divalproex (DEPAKOTE) DR tablet 250 mg (250 mg Oral Given 3/4/23 1110)   perflutren lipid microspheres (DEFINITY) injection 1.5 mL (has no administration in time range)   HYDROcodone-acetaminophen (NORCO) 5-325 MG per tablet 1 tablet (1 tablet Oral Given 3/3/23 1244)   ampicillin-sulbactam (UNASYN) 3,000 mg in sodium chloride 0.9 % 100 mL IVPB ([id number]) (0 mg IntraVENous Stopped 3/3/23 1653)   fentaNYL (SUBLIMAZE) injection 50 mcg (50 mcg IntraVENous Given 3/3/23 1542)   midazolam (VERSED) injection 1 mg (1 mg IntraVENous Given 3/3/23 2230)   haloperidol lactate (HALDOL) injection 1 mg (1 mg IntraVENous Given 3/3/23 2300)   midazolam (VERSED) injection 1 mg (1 mg IntraVENous Given 3/3/23 2245)       Current Discharge Medication List            CONSULTS: ENT and hospitalist  Discussion with Other Profesionals : None         --------------------------------- IMPRESSION AND DISPOSITION ---------------------------------    IMPRESSION  1.  Open fracture of body of mandible, unspecified laterality, initial encounter ([facility] [address]    2. Fall, initial encounter        DISPOSITION  Disposition: Admit to telemetry  Patient condition is stable        NOTE: This report was transcribed using voice recognition software.  Every effort was made to ensure accuracy; however, inadvertent computerized transcription errors may be present        Marjory Landau, MD  Resident  03/04/23 3890

## 2023-03-05 NOTE — PROGRESS NOTES
[facility]  Internal Medicine Residency Program  Progress Note - House Team 1    Patient:  Beverly Greene 80 y.o. male MRN: [id number]     Date of Service: 3/5/2023     CC: Jaw pain after mechanical fall    Overnight events: None    Subjective     Patient seen and evaluated. Jaw pain is 10/10 this morning. Also continues to have low back pain. Appetite is good, but he would like to have more soft solid foods instead of only liquids. No bowel movement since admission, but he is passing gas. Hospital Course:  Patient is a 46-year-old male with PMH of HFrEF, HTN, paroxysmal A-fib and PE on chronic anticoagulation, COPD, hepatitis C, hyperthyroidism, bipolar 1 disorder, seizures, and polysubstance abuse who presented with jaw pain s/p mechanical fall. Upon presentation to the ED, vital signs are stable. He was found to have oblique mildly comminuted fracture through the mandibular body bilaterally with normal temporomandibular joints. CT head and C-spine showed no acute injury. He had a mild leukocytosis (13.7), and urine drug screen was positive for benzo and fentanyl. PT and APTT were slightly prolonged; INR 1.3. CMP was unremarkable. ENT was consulted, and patient underwent ORIF of his mandibular fracture upon admission. MRI lumbar spine obtained for chronic low back pain with repeated falls; results showed multilevel neural foraminal stenosis at L3-L5. Objective     Vitals: BP 97/61   Pulse 70   Temp 98.2 °F (36.8 °C) (Temporal)   Resp 17   Ht 5' 4\" (1.626 m)   Wt 150 lb (68 kg)   SpO2 93%   BMI 25.75 kg/m²       Physical Exam  Vitals reviewed. Constitutional:       General: He is not in acute distress. Appearance: Normal appearance. HENT:      Head: Normocephalic. Comments: Ecchymosis over the lower jaw. Cardiovascular:      Rate and Rhythm: Normal rate and regular rhythm. Heart sounds: No murmur heard. No friction rub. No gallop.    Pulmonary: Effort: No respiratory distress. Breath sounds: Normal breath sounds. Abdominal:      General: There is no distension. Palpations: Abdomen is soft. Tenderness: There is no abdominal tenderness. Musculoskeletal:         General: No swelling or tenderness. Cervical back: Neck supple. Skin:     General: Skin is warm and dry. Neurological:      General: No focal deficit present. Mental Status: He is alert. Mental status is at baseline. Pertinent Labs & Imaging Studies     Labs  Recent Labs     03/03/23  1620 03/04/23  0746 03/05/23  0713   WBC 13.7*   < > 7.9   RBC 4.79   < > 3.71*   HGB 14.5   < > 11.1*   HCT 44.0   < > 34.8*   MCV 91.9   < > 93.8   MCH 30.3   < > 29.9   MCHC 33.0   < > 31.9*   RDW 13.7   < > 13.8      < > 155   MPV 12.7*   < > 12.6*      < > 140   K 4.6   < > 4.0      < > 101   CO2 27   < > 28   BUN 13   < > 13   CREATININE 1.0   < > 0.9   GLUCOSE 131*   < > 109*   LABA1C  --   --  5.2   CALCIUM 9.5   < > 8.6   MG  --    < > 2.0   PHOS  --    < > 2.3*   ALKPHOS 77  --   --    AST 21  --   --    ALT 17  --   --    PROT 7.9  --   --    LABALBU 4.5  --   --     < > = values in this interval not displayed. 3/3/23 16:20   Prothrombin Time 14.0 (H)   INR 1.3   aPTT 38.4 (H)      3/4/23 07:47   TSH 0.160 (L)      3/4/23 00:30 3/4/23 07:46   Ethanol Lvl  <10   TCA Scrn  NEGATIVE   Amphetamine Screen, Urine NOT DETECTED    Barbiturate Screen, Ur NOT DETECTED    Benzodiazepine Screen, Urine POSITIVE ! Cannabinoid Scrn, Ur NOT DETECTED    METHADONE SCREEN, URINE NOT DETECTED    Opiate Scrn, Ur NOT DETECTED    PCP Screen, Urine NOT DETECTED    Cocaine Metabolite Screen, Urine NOT DETECTED    FENTANYL SCREEN, URINE POSITIVE ! Oxycodone Urine NOT DETECTED    Acetaminophen Level  5.9 (L)   Salicylate, Serum  <5.1       Imaging  MRI LUMBAR SPINE WO CONTRAST   Final Result   1. Advanced degenerative change.   Mild central canal stenosis at L3-4 and   L4-5. Moderate right L4-5 subarticular recess stenosis. 2. Multilevel neural foraminal stenosis as noted above. CT FACIAL BONES WO CONTRAST   Final Result   Status post ORIF for fracture of the mandible across the midline fix by   metallic plate with perpendicular screws at both fracture sites. 3D volume rendered images of the mandible demonstrate in better advantage the   spatial anatomical orientation of the bone fragments following ORIF. CT 3D RECONSTRUCTION   Final Result   Status post ORIF for fracture of the mandible across the midline fix by   metallic plate with perpendicular screws at both fracture sites. 3D volume rendered images of the mandible demonstrate in better advantage the   spatial anatomical orientation of the bone fragments following ORIF. CT 3D RECONSTRUCTION   Final Result   3D reconstruction of the bilateral mandibular body fractures for preoperative   planning. See original report for additional details. XR CHEST PORTABLE   Final Result   No acute process. CT HEAD WO CONTRAST   Final Result   No acute intracranial abnormality. Specifically, there is no acute   intracranial hemorrhage      Findings of chronic pansinusitis         CT FACIAL BONES WO CONTRAST   Final Result   There is oblique fracture through the body of the mandible bilaterally      Moderate paranasal sinus disease is persistent but less extensive than seen   on prior CT of December 5, 2021. CT CERVICAL SPINE WO CONTRAST   Final Result   1. There is no acute compression fracture or subluxation of the cervical   spine. 2. Multilevel degenerative disc and degenerative joint disease.               Resident's Assessment and Plan     Georgie Rivera is a 80 y.o. male with:    Bilateral mandibular fracture 2/2 mechanical fall  Fracture demonstrated on CT facial bones  ENT following --> s/p ORIF 3/3  Pain management per ENT  Okay to switch antibiotic to Augmentin per ENT  Diet advanced to full liquids per ENT    Leukocytosis with left shift, likely reactive vs infective (resolved)  Afebrile; WBC 13.7 on admission, now WNL  Monitor daily CBC    History of paroxysmal A-fib  Patient reports taking Xarelto at home --> held for procedure, resume when okay with ENT  [id number]    History of HFrEF  Most recent echo showed EF 35% in 2018 --> will repeat this admission  Not currently on GDMT --> consider starting    History of HTN  Continue home Lisinopril 10 mg daily  Monitor BP    History of COPD  PFT 67% FEV/FVC ratio post bronc per PFT in 2018  Previously on Dulera and Spiriva  Continue Pulmicort, Brovana    History of hypothyroidism vs subclinical hyperthyroidism vs nonthyroidal illness  Previously on methimazole, now on Synthroid 25 mcg daily  TSH 0.160 on admission, free T4 WNL --> Synthroid held  Consider thyroid US if symptomatic    History of seizures  Continue home meds: Keppra 1000 mg twice daily, Depakote  mg nightly    History of polysubstance use in the setting of chronic low back pain  Frequent ED visits with opiates, muscle relaxers, benzos prescribed  Seen by neurosurgery 1/27/2023 for degenerative lumbar spinal stenosis --> recommended for MRI, but patient reported difficulty getting scheduled  MRI L-spine obtained this admission: Advanced degenerative change, mild central canal stenosis at L3-L4 and L4-L5, moderate right L4-L5 subarticular recess stenosis, multilevel neural foraminal stenosis  PT/OT evaluation --> AM-PAC score 20/24  Recommend referral for pain management clinic, though patient has declined previously      PT/OT evaluation: AM-PAC 20/24  DVT prophylaxis: PCD's  GI prophylaxis: Full liquid diet  Disposition: Continue current care      Beth Christianson DO, PGY-1  Attending Physician: Dr. Tad Stringer    Attending Physician Statement:  Germaine Eisenmenger, M.D., F.A.C.P.   Hospital charts reviewed, including other providers notes, relevant labs and imaging. I have seen/discussed the case with the resident   time spent coordinating care with residents, other providers and/or counseling patient   re: workup/plan and orders as documented by the resident   For billing Medical decision making level of complexity   My Assessment as follows: Sp trip fall  Mandibular fx- sp ORIF  Acute on chronic mulitple pain                QOD ER encounters- get Oxy and clonazpam  +THC in urine in past  Watch benzo withdrawals  ENT- morphine, percocet- ongoing high risk IV meds requesting  In past requests for tramadol-- in last clinc visit- but noted seizure hx  (Allergy ketorolac- doubt true anaphylaxis)     Seizure - keppra and depakote  Likely low grade, chronically elevated inr, +hep C chronic     Blood loss anemia, slight- sp ORIF  Afib and questionable VQ moderate risk  - hold elqius  Dobut significant CAD on cath, but MI? Ant/apex wall motion EF 35% - non ischemic cardiomypathy (rule out hx of Takosubu)  HTN on ACE  Hx of afib-- not on AV blocker - no racing heart recently  No active chf admission recently  Reecntly dehdyration Hai- risk but NOT seen yet  BS elevated, check aic     PFT 67% FEV/FVC ratio post bronc per PFT in 2018  Previously on Dulera and Spiriva --> continue Dulera- no exac  BPH - DHT blocker- no complaints of urinary retention    Hyperthryoid -- sp ablation 2012- on 25 thryoid recently, t4 N  Tsh intermittently high, will resume 25 thryoid for home use    Opiod requests ongoing - now acute bc of jaw but chronic bc of back and legs  Chronci DDD- R lumbar radic  - neurosurg seen Jan -- no fu yet  He hadn't had his MRI yet. - so ordered  inhouse bc of leg tripping- rule out lumbar radic  Noted MRI didn't show etiology for tripping/no major lumbar radic on PE, some vague sensory issues+pain  Only mild spinal stenosis

## 2023-03-05 NOTE — PLAN OF CARE
Problem: Chronic Conditions and Co-morbidities  Goal: Patient's chronic conditions and co-morbidity symptoms are monitored and maintained or improved  Outcome: Progressing     Problem: Discharge Planning  Goal: Discharge to home or other facility with appropriate resources  Outcome: Progressing     Problem: Pain  Goal: Verbalizes/displays adequate comfort level or baseline comfort level  Outcome: Progressing  Flowsheets (Taken 3/4/2023 2204)  Verbalizes/displays adequate comfort level or baseline comfort level:   Encourage patient to monitor pain and request assistance   Assess pain using appropriate pain scale   Administer analgesics based on type and severity of pain and evaluate response   Implement non-pharmacological measures as appropriate and evaluate response

## 2023-03-05 NOTE — PROGRESS NOTES
In pt room and pt set up for midline and lidocaine given in RUE and needle inserted for brachial vein midline insertion when pt unit nurse informed me that pt iv medications were changed to po and he would not need a midline catheter. Procedure stopped and needle removed. Pressure dressing held. Pt tolerated procedure well.

## 2023-03-05 NOTE — PROGRESS NOTES
OTOLARYNGOLOGY  DAILY PROGRESS NOTE  3/5/2023    Subjective:     Patient sleeping comfortably this AM. Reports pain is controlled with Percocet. Trismus improved. Tolerating clear liquid diet and asking for soft foods. Objective:     BP 97/61   Pulse 70   Temp 98.2 °F (36.8 °C) (Temporal)   Resp 17   Ht 5' 4\" (1.626 m)   Wt 150 lb (68 kg)   SpO2 93%   BMI 25.75 kg/m²   PULSE OXIMETRY RANGE: SpO2  Av %  Min: 93 %  Max: 95 %  I/O last 3 completed shifts: In: 635 [P.O.:420; I.V.:500]  Out: 25 [Blood:25]    GENERAL:  Awake, alert, cooperative, no apparent distress  HENT: Normocephalic, atraumatic. Chin lacerations are clean, dry and intact. Moist oral mucosa. Sutures intact to gingivolabial sulcus. Trismus largely resolved. EYES: No sclera icterus, pupils equal  LUNGS:  No increased work of breathing, no stridor, On NC  CARDIOVASCULAR:  RR      Assessment/Plan:     80 y.o. male with displaced, open, bilateral fractures of the body of the mandible s/p ORIF and laceration repairs on 3/3.        Pain control with PRN Percocet and Morphine  Ok for full liquid diet with gradual advancement to soft diet which will need to be maintained for 6 weeks  Peridex 4 times daily after meals  Remainder of medical needs per primary team  Ok for discharge to home from ENT standpoint  Follow up with Dr. River Galan in 1 week    Patient seen and examined by resident, discussed with attending on call Dr. Krys Ramos DO,  Resident Physician, PGY-1  Department of Otolaryngology, [facility])       Electronically signed by Arina Vela DO on 3/5/2023 at 10:55 AM

## 2023-03-05 NOTE — PROGRESS NOTES
[address]      Date:3/5/2023                                                Patient Name: Bashir Diaz  MRN: [id number]  : 1955  Room: [address] [place] [address]     Evaluating OT:Manasa Tompkins OTR/L   License #  [id number]       Referring Provider: Zaina Philippe MD    Specific Provider Orders/Date: OT evaluation & treatment        Diagnosis: s/p trip & fall   Bilateral fracture of mandible, open, initial encounter ([facility] [address] [S02.609B]      Pertinent Medical History:  has a past medical history of Anxiety, Arthritis, Asthma, Bipolar 1 disorder ([address] Chronic combined systolic and diastolic CHF (congestive heart failure) ([address] COPD (chronic obstructive pulmonary disease) ([address] Depression, Diabetes mellitus ([address] GERD (gastroesophageal reflux disease), Hepatitis C, Hypertension, Hyperthyroidism, Hypothyroidism due to medication, Mass of testicle, Mesothelioma ([address] Neuromuscular disorder ([address] Other disorders of kidney and ureter, Paroxysmal A-fib ([address] Peptic ulcer, Prostate enlargement, Pulmonary embolism ([address] Seizures ([address] and Thyroid disease. Surgery: 3-3-23: Open fracture of mandible, bilateral mandibular body    Past Surgical History:  has a past surgical history that includes Appendectomy; Lithotripsy; Hemorrhoid surgery; Bladder surgery; Endoscopy, colon, diagnostic (2015); Abdomen surgery; Colonoscopy; Cardiac surgery; vascular surgery; and Cardiac catheterization (2018).        Precautions:  Fall Risk, Hep C      Assessment of current deficits    [x] Functional mobility            [x]ADLs           [x] Strength                  [x]Cognition    [x] Functional transfers          [x] IADLs         [x] Safety Awareness   [x]Endurance    [x] Fine Coordination                         [x] Balance      [] Vision/perception   [x]Sensation      []Gross Motor Coordination             [] ROM           [] Delirium                   [] Motor Control      OT PLAN OF CARE   OT POC based on physician orders, patient diagnosis and results of clinical assessment     Frequency/Duration: 2-4 days/wk for 2 weeks PRN   Specific OT Treatment Interventions to include: Instruction/training on adapted ADL techniques and AE recommendations to increase functional independence within precautions  Training on energy conservation strategies, correct breathing pattern and techniques to improve independence/tolerance for self-care routine  Functional transfer/mobility training/DME recommendations for increased independence, safety, and fall prevention  Patient/Family education to increase follow through with safety techniques and functional independence  Recommendation of environmental modifications for increased safety with functional transfers/mobility and ADLs  Cognitive retraining/development of therapeutic activities to improve problem solving, judgement, memory, and attention for increased safety/participation in ADL/IADL tasks  Therapeutic exercise to improve motor endurance, ROM, and functional strength for ADLs/functional transfers  Therapeutic activities to facilitate/challenge dynamic balance, stand tolerance for increased safety and independence with ADLs  Therapeutic activities to facilitate gross/fine motor skills for increased independence with ADLs  Positioning to improve skin integrity, interaction with environment and functional independence     Recommended Adaptive Equipment:  TBD      Home Living: Pt lives in a [address] [place] [address] Street in a 2 story with 5+5 steps to enter with B HR. B&B on upper level. Bathroom setup: tub/shower, std. commode   Equipment owned: spc     Prior Level of Function: Pt. Reports was Ind. with ADLs , some assist with IADLs; ambulated spc d/t sciatica PTA  Driving: not active, pt.  States Group Home provides transport as needed  Occupation: none stated Pain Level: 10/10 jaw pain. Rn notified  Cognition: A&O: 4/4; Follows 2 step directions              Memory:  F+              Sequencing:  F+              Problem solving:  F+              Judgement/safety:  F/+                Functional Assessment:  AM-PAC Daily Activity Raw Score: 19/24    Initial Eval Status  Date: 3-5-23 Treatment Status  Date: STGs = LTGs  Time frame: 10-14 days   Feeding Mod I  (currently liquid only per RN)   Mod I   Grooming Sup in standing   Modified [place]    UB Dressing Sup to doff gown, angela new gown in sitting & standing   Modified [place]    LB Dressing SBA to angela pants seated EOB/ pull past hips in standing   Modified [place]    Bathing SBA with sim. task   Modified [place]    Toileting NT, pt. deferred   Modified [place]    Bed Mobility  Supine to sit: Ind. Sit to supine: Ind. Supine to sit: Modified [place]   Sit to supine: Modified [place]    Functional Transfers Sup with sit <> stand, SPT   Modified [place]    Functional Mobility Sup without A.D. > functional home distances in room & hallway   Modified [place]    Balance Sitting:     Static:  Ind. Dynamic:Sup during EOB ADLs  Standing: Sup       Activity Tolerance F   G   Visual/  Perceptual Glasses: readers          Vitals spO2 on RA & HR WFL   WFL      Hand Dominance R    AROM (PROM) Strength Additional Info:    RUE  WFL 4/5 good  and wfl FMC/dexterity noted during ADL tasks      LUE WFL 4/5 good  and wfl FMC/dexterity noted during ADL tasks         Hearing: [facility]   Sensation:  No c/o numbness or tingling B UE  Tone: WFL B UE  Edema: none noted B UE or B LE, moderate facial edema     Comments: Upon arrival patient supine in bed, agreeable to OT, cleared by Nursing. Therapist facilitated bed mobility/ADLs at EOB/functional transfers/mobility training with focus on safety, technique & precautions. Pt.  Instructed RE: safe transfers/mobility, ADLs, role of OT, treatment plan, recs. , prec. At end of session, patient returned to bed, all needs met, RN notified, with call light and phone within reach, all lines and tubes intact. Overall patient demonstrated decreased strength, balance, independence & safety during completion of ADL/functional transfer/mobility tasks. Pt would benefit from continued skilled OT to increase safety and independence with completion of ADL/IADL tasks for functional independence and quality of life. Treatment: OT treatment provided this date includes:   Instruction/training on safety and adapted techniques for completion of ADLs: to increase [place] in self care   Instruction/training on safe functional mobility/transfer techniques: with focus on safety, technique & precautions   Instruction/training on energy conservation/work simplification for completion of ADLs: techniques to increase [place] with self care ADLs & iADLs, work simplification to improve endurance   Proper Positioning/Alignment: for optimal healing, skin integrity to prevent breakdown, decrease edema  Skilled monitoring of vitals: to include BP, spO2 & HR during session  Sitting/standing Balance/Tolerance- to increased balance & activity tolerance during ADLs as well as facilitate proper posture and/or positioning. Rehab Potential: Good for established goals     Patient / Family Goal: to \"get something to eat. \"   Spoke with RN who plans to message physician RE: diet (currently liquids only)       Patient and/or family were instructed on functional diagnosis, prognosis/goals and OT plan of care. Demonstrated F+ understanding.       Eval Complexity: Low     Time In: 11:25  Time Out: 11:48  Total Treatment Time: 08    Min Units   OT Eval Low 90733  x     OT Eval Medium 73714       OT Eval High 44289       OT Re-Eval [id number]       Therapeutic Ex 92882       Therapeutic Activities 35306       ADL/Self Care 29378  08  1   Orthotic Management [id number]       Manual [id number]       Neuro Re-Ed 11229       Non-Billable Time          Evaluation Time additionally includes thorough review of current medical information, gathering information on past medical history/social history and prior level of function, interpretation of standardized testing/informal observation of tasks, assessment of data and development of plan of care and goals. Manasa Tompkins, OTR/L   License #  [id number]

## 2023-03-06 ENCOUNTER — TELEPHONE (OUTPATIENT)
Dept: ENT CLINIC | Age: 68
End: 2023-03-06

## 2023-03-06 LAB
ANION GAP SERPL CALCULATED.3IONS-SCNC: 10 MMOL/L (ref 7–16)
BASOPHILS ABSOLUTE: 0.04 E9/L (ref 0–0.2)
BASOPHILS RELATIVE PERCENT: 0.6 % (ref 0–2)
BUN BLDV-MCNC: 10 MG/DL (ref 6–23)
CALCIUM SERPL-MCNC: 8.4 MG/DL (ref 8.6–10.2)
CHLORIDE BLD-SCNC: 105 MMOL/L (ref 98–107)
CO2: 27 MMOL/L (ref 22–29)
CREAT SERPL-MCNC: 0.9 MG/DL (ref 0.7–1.2)
EOSINOPHILS ABSOLUTE: 0.26 E9/L (ref 0.05–0.5)
EOSINOPHILS RELATIVE PERCENT: 3.7 % (ref 0–6)
GFR SERPL CREATININE-BSD FRML MDRD: >60 ML/MIN/1.73
GLUCOSE BLD-MCNC: 106 MG/DL (ref 74–99)
HCT VFR BLD CALC: 34.6 % (ref 37–54)
HEMOGLOBIN: 11.4 G/DL (ref 12.5–16.5)
IMMATURE GRANULOCYTES #: 0.02 E9/L
IMMATURE GRANULOCYTES %: 0.3 % (ref 0–5)
LV EF: 63 %
LVEF MODALITY: NORMAL
LYMPHOCYTES ABSOLUTE: 1.82 E9/L (ref 1.5–4)
LYMPHOCYTES RELATIVE PERCENT: 25.6 % (ref 20–42)
MAGNESIUM: 1.9 MG/DL (ref 1.6–2.6)
MCH RBC QN AUTO: 30.6 PG (ref 26–35)
MCHC RBC AUTO-ENTMCNC: 32.9 % (ref 32–34.5)
MCV RBC AUTO: 92.8 FL (ref 80–99.9)
MONOCYTES ABSOLUTE: 0.78 E9/L (ref 0.1–0.95)
MONOCYTES RELATIVE PERCENT: 11 % (ref 2–12)
NEUTROPHILS ABSOLUTE: 4.19 E9/L (ref 1.8–7.3)
NEUTROPHILS RELATIVE PERCENT: 58.8 % (ref 43–80)
PDW BLD-RTO: 13.7 FL (ref 11.5–15)
PHOSPHORUS: 3.3 MG/DL (ref 2.5–4.5)
PLATELET # BLD: 161 E9/L (ref 130–450)
PMV BLD AUTO: 12.5 FL (ref 7–12)
POTASSIUM SERPL-SCNC: 4.2 MMOL/L (ref 3.5–5)
RBC # BLD: 3.73 E12/L (ref 3.8–5.8)
SODIUM BLD-SCNC: 142 MMOL/L (ref 132–146)
WBC # BLD: 7.1 E9/L (ref 4.5–11.5)

## 2023-03-06 PROCEDURE — 1200000000 HC SEMI PRIVATE

## 2023-03-06 PROCEDURE — 6370000000 HC RX 637 (ALT 250 FOR IP)

## 2023-03-06 PROCEDURE — 36415 COLL VENOUS BLD VENIPUNCTURE: CPT

## 2023-03-06 PROCEDURE — 94640 AIRWAY INHALATION TREATMENT: CPT

## 2023-03-06 PROCEDURE — 84100 ASSAY OF PHOSPHORUS: CPT

## 2023-03-06 PROCEDURE — 93306 TTE W/DOPPLER COMPLETE: CPT

## 2023-03-06 PROCEDURE — 80048 BASIC METABOLIC PNL TOTAL CA: CPT

## 2023-03-06 PROCEDURE — 99238 HOSP IP/OBS DSCHRG MGMT 30/<: CPT | Performed by: INTERNAL MEDICINE

## 2023-03-06 PROCEDURE — 85025 COMPLETE CBC W/AUTO DIFF WBC: CPT

## 2023-03-06 PROCEDURE — 6370000000 HC RX 637 (ALT 250 FOR IP): Performed by: INTERNAL MEDICINE

## 2023-03-06 PROCEDURE — 83735 ASSAY OF MAGNESIUM: CPT

## 2023-03-06 RX ORDER — LANOLIN ALCOHOL/MO/W.PET/CERES
400 CREAM (GRAM) TOPICAL 2 TIMES DAILY
Status: COMPLETED | OUTPATIENT
Start: 2023-03-06 | End: 2023-03-06

## 2023-03-06 RX ORDER — MAGNESIUM SULFATE 1 G/100ML
1000 INJECTION INTRAVENOUS ONCE
Status: DISCONTINUED | OUTPATIENT
Start: 2023-03-06 | End: 2023-03-06 | Stop reason: ALTCHOICE

## 2023-03-06 RX ADMIN — RIVAROXABAN 20 MG: 20 TABLET, FILM COATED ORAL at 17:29

## 2023-03-06 RX ADMIN — Medication 250 MG: at 08:30

## 2023-03-06 RX ADMIN — CHLORHEXIDINE GLUCONATE 0.12% ORAL RINSE 15 ML: 1.2 LIQUID ORAL at 17:29

## 2023-03-06 RX ADMIN — AMOXICILLIN AND CLAVULANATE POTASSIUM 1 TABLET: 875; 125 TABLET, FILM COATED ORAL at 08:29

## 2023-03-06 RX ADMIN — LISINOPRIL 10 MG: 10 TABLET ORAL at 08:32

## 2023-03-06 RX ADMIN — DIVALPROEX SODIUM 250 MG: 250 TABLET, DELAYED RELEASE ORAL at 08:29

## 2023-03-06 RX ADMIN — OXYCODONE AND ACETAMINOPHEN 2 TABLET: 5; 325 TABLET ORAL at 15:52

## 2023-03-06 RX ADMIN — LEVETIRACETAM 1000 MG: 500 TABLET, FILM COATED ORAL at 08:29

## 2023-03-06 RX ADMIN — DIVALPROEX SODIUM 250 MG: 250 TABLET, DELAYED RELEASE ORAL at 20:13

## 2023-03-06 RX ADMIN — CHLORHEXIDINE GLUCONATE 0.12% ORAL RINSE 15 ML: 1.2 LIQUID ORAL at 20:13

## 2023-03-06 RX ADMIN — ARFORMOTEROL TARTRATE 15 MCG: 15 SOLUTION RESPIRATORY (INHALATION) at 21:33

## 2023-03-06 RX ADMIN — LEVOTHYROXINE SODIUM 25 MCG: 0.05 TABLET ORAL at 05:44

## 2023-03-06 RX ADMIN — OXYCODONE AND ACETAMINOPHEN 2 TABLET: 5; 325 TABLET ORAL at 20:14

## 2023-03-06 RX ADMIN — OXYCODONE AND ACETAMINOPHEN 2 TABLET: 5; 325 TABLET ORAL at 05:45

## 2023-03-06 RX ADMIN — MAGNESIUM OXIDE 400 MG (241.3 MG MAGNESIUM) TABLET 400 MG: TABLET at 10:52

## 2023-03-06 RX ADMIN — OXYCODONE AND ACETAMINOPHEN 2 TABLET: 5; 325 TABLET ORAL at 01:24

## 2023-03-06 RX ADMIN — BUDESONIDE 500 MCG: 0.5 SUSPENSION RESPIRATORY (INHALATION) at 21:33

## 2023-03-06 RX ADMIN — CHLORHEXIDINE GLUCONATE 0.12% ORAL RINSE 15 ML: 1.2 LIQUID ORAL at 13:23

## 2023-03-06 RX ADMIN — AMOXICILLIN AND CLAVULANATE POTASSIUM 1 TABLET: 875; 125 TABLET, FILM COATED ORAL at 20:13

## 2023-03-06 RX ADMIN — OXYCODONE AND ACETAMINOPHEN 2 TABLET: 5; 325 TABLET ORAL at 10:09

## 2023-03-06 RX ADMIN — MAGNESIUM OXIDE 400 MG (241.3 MG MAGNESIUM) TABLET 400 MG: TABLET at 20:13

## 2023-03-06 RX ADMIN — LEVETIRACETAM 1000 MG: 500 TABLET, FILM COATED ORAL at 20:13

## 2023-03-06 RX ADMIN — FINASTERIDE 5 MG: 5 TABLET, FILM COATED ORAL at 08:29

## 2023-03-06 RX ADMIN — CHLORHEXIDINE GLUCONATE 0.12% ORAL RINSE 15 ML: 1.2 LIQUID ORAL at 08:28

## 2023-03-06 ASSESSMENT — PAIN DESCRIPTION - ORIENTATION
ORIENTATION: LEFT;RIGHT
ORIENTATION: RIGHT;LEFT
ORIENTATION: RIGHT;LEFT

## 2023-03-06 ASSESSMENT — PAIN - FUNCTIONAL ASSESSMENT
PAIN_FUNCTIONAL_ASSESSMENT: ACTIVITIES ARE NOT PREVENTED

## 2023-03-06 ASSESSMENT — PAIN SCALES - GENERAL
PAINLEVEL_OUTOF10: 10
PAINLEVEL_OUTOF10: 10
PAINLEVEL_OUTOF10: 3
PAINLEVEL_OUTOF10: 10

## 2023-03-06 ASSESSMENT — PAIN DESCRIPTION - DESCRIPTORS
DESCRIPTORS: ACHING;BURNING;SORE
DESCRIPTORS: THROBBING
DESCRIPTORS: BURNING;ACHING;SORE
DESCRIPTORS: ACHING;DISCOMFORT
DESCRIPTORS: ACHING;DISCOMFORT

## 2023-03-06 ASSESSMENT — PAIN DESCRIPTION - FREQUENCY
FREQUENCY: CONTINUOUS
FREQUENCY: CONTINUOUS

## 2023-03-06 ASSESSMENT — PAIN DESCRIPTION - LOCATION
LOCATION: HEAD;JAW
LOCATION: JAW
LOCATION: JAW
LOCATION: JAW;HEAD
LOCATION: JAW

## 2023-03-06 ASSESSMENT — PAIN DESCRIPTION - PAIN TYPE: TYPE: ACUTE PAIN;SURGICAL PAIN

## 2023-03-06 ASSESSMENT — PAIN DESCRIPTION - ONSET: ONSET: ON-GOING

## 2023-03-06 NOTE — PROGRESS NOTES
[facility]  Internal Medicine Residency Program  Progress Note - House Team     Patient:  Dejan Ornelas 80 y.o. male MRN: [id number]     Date of Service: 3/6/2023     CC: Bilateral mandible fracture s/p mechanical fall    Overnight events:   No acute concerns    Subjective     Patient was seen and examined this morning at bedside in no acute distress. He reports no new complaints and states that his jaw pain is better controlled on Percocet and lidocaine patch. Reports no issues with eating or drinking. Patient requests not to be discharged today as he wanted to be observed for one more day. Objective     Physical Exam:  Vitals: /76   Pulse 85   Temp 98 °F (36.7 °C) (Temporal)   Resp 17   Ht 5' 4\" (1.626 m)   Wt 150 lb (68 kg)   SpO2 98%   BMI 25.75 kg/m²     I & O - 24hr: No intake/output data recorded. General Appearance: alert, appears stated age, and cooperative  HEENT:  Head: Normal, normocephalic, lower jaw tenderness  Neck: no JVD and supple, symmetrical, trachea midline  Lung: clear to auscultation bilaterally  Heart: regular rate and rhythm, S1, S2 normal, no murmur, click, rub or gallop  Abdomen: soft, non-tender; bowel sounds normal; no masses,  no organomegaly  Extremities:  extremities normal, atraumatic, no cyanosis or edema  Musculokeletal: No joint swelling, no muscle tenderness. ROM normal in all joints of extremities.    Neurologic: Mental status: Alert, oriented, thought content appropriate    Pertinent Labs & Imaging Studies   georgina  CBC with Differential:    Lab Results   Component Value Date/Time    WBC 7.1 03/06/2023 05:41 AM    RBC 3.73 03/06/2023 05:41 AM    HGB 11.4 03/06/2023 05:41 AM    HCT 34.6 03/06/2023 05:41 AM     03/06/2023 05:41 AM    MCV 92.8 03/06/2023 05:41 AM    MCH 30.6 03/06/2023 05:41 AM    MCHC 32.9 03/06/2023 05:41 AM    RDW 13.7 03/06/2023 05:41 AM    SEGSPCT 54 07/06/2012 09:20 AM    LYMPHOPCT 25.6 03/06/2023 05:41 AM    MONOPCT 11.0 03/06/2023 05:41 AM    BASOPCT 0.6 03/06/2023 05:41 AM    MONOSABS 0.78 03/06/2023 05:41 AM    LYMPHSABS 1.82 03/06/2023 05:41 AM    EOSABS 0.26 03/06/2023 05:41 AM    BASOSABS 0.04 03/06/2023 05:41 AM     CMP:    Lab Results   Component Value Date/Time     03/06/2023 05:41 AM    K 4.2 03/06/2023 05:41 AM    K 4.1 07/20/2022 03:45 PM     03/06/2023 05:41 AM    CO2 27 03/06/2023 05:41 AM    BUN 10 03/06/2023 05:41 AM    CREATININE 0.9 03/06/2023 05:41 AM    GFRAA >60 07/20/2022 03:45 PM    LABGLOM >60 03/06/2023 05:41 AM    GLUCOSE 106 03/06/2023 05:41 AM    GLUCOSE 157 09/16/2011 12:34 PM    PROT 7.9 03/03/2023 04:20 PM    LABALBU 4.5 03/03/2023 04:20 PM    LABALBU 4.0 09/16/2011 12:34 PM    CALCIUM 8.4 03/06/2023 05:41 AM    BILITOT 0.3 03/03/2023 04:20 PM    ALKPHOS 77 03/03/2023 04:20 PM    AST 21 03/03/2023 04:20 PM    ALT 17 03/03/2023 04:20 PM       MRI LUMBAR SPINE WO CONTRAST   Final Result   1. Advanced degenerative change.  Mild central canal stenosis at L3-4 and   L4-5.  Moderate right L4-5 subarticular recess stenosis.   2. Multilevel neural foraminal stenosis as noted above.         CT FACIAL BONES WO CONTRAST   Final Result   Status post ORIF for fracture of the mandible across the midline fix by   metallic plate with perpendicular screws at both fracture sites.      3D volume rendered images of the mandible demonstrate in better advantage the   spatial anatomical orientation of the bone fragments following ORIF.         CT 3D RECONSTRUCTION   Final Result   Status post ORIF for fracture of the mandible across the midline fix by   metallic plate with perpendicular screws at both fracture sites.      3D volume rendered images of the mandible demonstrate in better advantage the   spatial anatomical orientation of the bone fragments following ORIF.         CT 3D RECONSTRUCTION   Final Result   3D reconstruction of the bilateral mandibular body fractures for preoperative  planning. See original report for additional details. XR CHEST PORTABLE   Final Result   No acute process. CT HEAD WO CONTRAST   Final Result   No acute intracranial abnormality. Specifically, there is no acute   intracranial hemorrhage      Findings of chronic pansinusitis         CT FACIAL BONES WO CONTRAST   Final Result   There is oblique fracture through the body of the mandible bilaterally      Moderate paranasal sinus disease is persistent but less extensive than seen   on prior CT of December 5, 2021. CT CERVICAL SPINE WO CONTRAST   Final Result   1. There is no acute compression fracture or subluxation of the cervical   spine. 2. Multilevel degenerative disc and degenerative joint disease.               Resident's Assessment and Plan     Bilateral mandibular fracture 2/2 mechanical fall  Fracture demonstrated on CT facial bones  ENT following --> s/p ORIF 3/3  Pain management per ENT, pain medication reconciled  On Augmentin x 5 days  Diet advanced to full liquids per ENT, patient tolerating oral diet     Leukocytosis with left shift, likely reactive vs infective (resolved)  Afebrile; WBC 13.7 on admission, now WNL  Monitor daily CBC     History of paroxysmal A-fib  Patient reports taking Xarelto at home --> held for procedure, resume when okay with ENT  [id number] 4     History of HFrEF  Most recent echo showed EF 35% in 2018 --> will repeat this admission  Not currently on GDMT --> consider starting     History of HTN  Continue home Lisinopril 10 mg daily  Monitor BP     History of COPD  PFT 67% FEV/FVC ratio post bronc per PFT in 2018  Previously on Dulera and Spiriva  Continue Pulmicort, Brovana     History of hypothyroidism vs subclinical hyperthyroidism vs nonthyroidal illness  Previously on methimazole, now on Synthroid 25 mcg daily  TSH 0.160 on admission, free T4 WNL --> Synthroid held  Consider thyroid US if symptomatic     History of seizures  Continue home meds: Keppra 1000 mg twice daily, Depakote  mg nightly     History of polysubstance use in the setting of chronic low back pain  Frequent ED visits with opiates, muscle relaxers, benzos prescribed  Seen by neurosurgery 1/27/2023 for degenerative lumbar spinal stenosis --> recommended for MRI, but patient reported difficulty getting scheduled  MRI L-spine obtained this admission: Advanced degenerative change, mild central canal stenosis at L3-L4 and L4-L5, moderate right L4-L5 subarticular recess stenosis, multilevel neural foraminal stenosis  PT/OT evaluation --> AM-PAC score 20/24  Recommends pain clinic referral upon discharge      PT/OT evaluation:  AM-PAC 20/24, OT 19/24  DVT prophylaxis/ GI prophylaxis: On Xarelto/diet  Disposition: For discharged tomorrow    Devyn Cooper MD, PGY-1  Attending physician: Dr. Jonathon Reyes

## 2023-03-06 NOTE — TELEPHONE ENCOUNTER
Per Dr. Keshia Josue, patient to be scheduled for 1 week post op mendable fracture repair. Patient's phone number not in service and patient's mother [place] is in nursing home, unable to schedule with her. Patient still impatient at [address]. MA spoke with Gianni Perez and scheduled patient's post op with Dr. Emma Parks 3/10/23.     Electronically signed by Torin Gil MA on 3/6/23 at 9:29 AM EST

## 2023-03-06 NOTE — PROGRESS NOTES
[address] [facility]  Internal Medicine Residency / [address]    Attending Physician Statement  I have discussed the case, including pertinent history and exam findings with the resident and the team.  I have seen and examined the patient and the key elements of the encounter have been performed by me. I agree with the assessment, plan and orders as documented by the resident. A&O and mobile   Having Mag level drawn before discharge  Mobile and S/P ORIF mandible  fx post trauma  Meds reviewed   Discharging to [facility] reviewed before discharge     Remainder of medical problems as per resident note.       Cintia Leblanc MD [facility]  Internal Medicine Residency Faculty

## 2023-03-06 NOTE — PLAN OF CARE
Problem: Chronic Conditions and Co-morbidities  Goal: Patient's chronic conditions and co-morbidity symptoms are monitored and maintained or improved  Outcome: Progressing     Problem: Discharge Planning  Goal: Discharge to home or other facility with appropriate resources  Outcome: Progressing     Problem: Pain  Goal: Verbalizes/displays adequate comfort level or baseline comfort level  Outcome: Progressing     Problem: Safety - Medical Restraint  Goal: Remains free of injury from restraints (Restraint for Interference with Medical Device)  Description: INTERVENTIONS:  1. Determine that other, less restrictive measures have been tried or would not be effective before applying the restraint  2. Evaluate the patient's condition at the time of restraint application  3. Inform patient/family regarding the reason for restraint  4. Q2H: Monitor safety, psychosocial status, comfort, nutrition and hydration  Outcome: Progressing     Problem: Confusion  Goal: Confusion, delirium, dementia, or psychosis is improved or at baseline  Description: INTERVENTIONS:  1. Assess for possible contributors to thought disturbance, including medications, impaired vision or hearing, underlying metabolic abnormalities, dehydration, psychiatric diagnoses, and notify attending LIP  2. [place] high risk fall precautions, as indicated  3. Provide frequent short contacts to provide reality reorientation, refocusing and direction  4. Decrease environmental stimuli, including noise as appropriate  5. Monitor and intervene to maintain adequate nutrition, hydration, elimination, sleep and activity  6. If unable to ensure safety without constant attention obtain sitter and review sitter guidelines with assigned personnel  7.  Initiate Psychosocial CNS and Spiritual Care consult, as indicated  Outcome: Progressing

## 2023-03-06 NOTE — CARE COORDINATION
3/6/2023 social work transition of care planning  Aysha spoke with -catrina,pt can return. Can walk or use care source for transport. Per Catrina,if pt is discharged on narcotics, will  meds from pharmacy(pt has hx of narcotic abuse,per Trina Robins). Aysha spoke with pt,plan will be to return to group home.   Electronically signed by TONY Leger on 3/6/2023 at 10:19 AM

## 2023-03-07 VITALS
TEMPERATURE: 97.2 F | SYSTOLIC BLOOD PRESSURE: 100 MMHG | RESPIRATION RATE: 16 BRPM | OXYGEN SATURATION: 96 % | WEIGHT: 150 LBS | DIASTOLIC BLOOD PRESSURE: 66 MMHG | BODY MASS INDEX: 25.61 KG/M2 | HEIGHT: 64 IN | HEART RATE: 80 BPM

## 2023-03-07 LAB
MAGNESIUM: 2.1 MG/DL (ref 1.6–2.6)
PHOSPHORUS: 4.1 MG/DL (ref 2.5–4.5)

## 2023-03-07 PROCEDURE — 99238 HOSP IP/OBS DSCHRG MGMT 30/<: CPT | Performed by: INTERNAL MEDICINE

## 2023-03-07 PROCEDURE — 6370000000 HC RX 637 (ALT 250 FOR IP)

## 2023-03-07 PROCEDURE — 84100 ASSAY OF PHOSPHORUS: CPT

## 2023-03-07 PROCEDURE — 36415 COLL VENOUS BLD VENIPUNCTURE: CPT

## 2023-03-07 PROCEDURE — 6370000000 HC RX 637 (ALT 250 FOR IP): Performed by: INTERNAL MEDICINE

## 2023-03-07 PROCEDURE — 83735 ASSAY OF MAGNESIUM: CPT

## 2023-03-07 RX ORDER — AMOXICILLIN AND CLAVULANATE POTASSIUM 875; 125 MG/1; MG/1
1 TABLET, FILM COATED ORAL EVERY 12 HOURS SCHEDULED
Qty: 5 TABLET | Refills: 0 | Status: SHIPPED | OUTPATIENT
Start: 2023-03-07 | End: 2023-03-10

## 2023-03-07 RX ORDER — CHLORHEXIDINE GLUCONATE 0.12 MG/ML
15 RINSE ORAL 2 TIMES DAILY
Qty: 210 ML | Refills: 0 | Status: SHIPPED | OUTPATIENT
Start: 2023-03-07 | End: 2023-03-14

## 2023-03-07 RX ORDER — CHLORHEXIDINE GLUCONATE 0.12 MG/ML
15 RINSE ORAL
Qty: 840 ML | Refills: 0 | Status: SHIPPED | OUTPATIENT
Start: 2023-03-07 | End: 2023-03-07 | Stop reason: HOSPADM

## 2023-03-07 RX ORDER — OXYCODONE HYDROCHLORIDE AND ACETAMINOPHEN 5; 325 MG/1; MG/1
1 TABLET ORAL EVERY 6 HOURS PRN
Qty: 12 TABLET | Refills: 0 | Status: CANCELLED | OUTPATIENT
Start: 2023-03-07 | End: 2023-03-10

## 2023-03-07 RX ADMIN — LEVETIRACETAM 1000 MG: 500 TABLET, FILM COATED ORAL at 08:38

## 2023-03-07 RX ADMIN — FINASTERIDE 5 MG: 5 TABLET, FILM COATED ORAL at 08:38

## 2023-03-07 RX ADMIN — AMOXICILLIN AND CLAVULANATE POTASSIUM 1 TABLET: 875; 125 TABLET, FILM COATED ORAL at 08:38

## 2023-03-07 RX ADMIN — DIVALPROEX SODIUM 250 MG: 250 TABLET, DELAYED RELEASE ORAL at 08:38

## 2023-03-07 RX ADMIN — LEVOTHYROXINE SODIUM 25 MCG: 0.05 TABLET ORAL at 05:53

## 2023-03-07 RX ADMIN — OXYCODONE AND ACETAMINOPHEN 2 TABLET: 5; 325 TABLET ORAL at 00:03

## 2023-03-07 RX ADMIN — CHLORHEXIDINE GLUCONATE 0.12% ORAL RINSE 15 ML: 1.2 LIQUID ORAL at 08:38

## 2023-03-07 RX ADMIN — OXYCODONE AND ACETAMINOPHEN 2 TABLET: 5; 325 TABLET ORAL at 04:15

## 2023-03-07 RX ADMIN — OXYCODONE AND ACETAMINOPHEN 2 TABLET: 5; 325 TABLET ORAL at 08:40

## 2023-03-07 ASSESSMENT — PAIN DESCRIPTION - DESCRIPTORS
DESCRIPTORS: THROBBING
DESCRIPTORS: THROBBING
DESCRIPTORS: ACHING;BURNING;SORE

## 2023-03-07 ASSESSMENT — PAIN SCALES - GENERAL
PAINLEVEL_OUTOF10: 10

## 2023-03-07 ASSESSMENT — PAIN DESCRIPTION - ORIENTATION
ORIENTATION: RIGHT;LEFT
ORIENTATION: RIGHT;LEFT

## 2023-03-07 ASSESSMENT — PAIN DESCRIPTION - ONSET: ONSET: ON-GOING

## 2023-03-07 ASSESSMENT — PAIN DESCRIPTION - LOCATION
LOCATION: JAW
LOCATION: FACE;JAW
LOCATION: JAW

## 2023-03-07 ASSESSMENT — PAIN DESCRIPTION - FREQUENCY: FREQUENCY: CONTINUOUS

## 2023-03-07 ASSESSMENT — PAIN DESCRIPTION - PAIN TYPE: TYPE: ACUTE PAIN;SURGICAL PAIN

## 2023-03-07 ASSESSMENT — PAIN - FUNCTIONAL ASSESSMENT: PAIN_FUNCTIONAL_ASSESSMENT: ACTIVITIES ARE NOT PREVENTED

## 2023-03-07 NOTE — DISCHARGE SUMMARY
[facility]  Discharge Summary    PCP: Varun Mcintosh MD    Admit Date:3/3/2023  Discharge Date: 3/7/2023    Admission Diagnosis:   Bilateral mandibular fracture s/p fall - s/p ORIF by ENT  Leukocytosis w/ left shift - likely reactive vs infective   Hx of COPD   Hx of paroxysmal afib   Hx of Vit D deficiency  Hx of polysubstance abuse   Hx of HTN - on lisinopril   Hx of hyperthyroidism, on Methimazole- most recent TSH 22.830 in Sept 2021  Hx of HFrEF - not on GDMT   Hx of BPH - on Finasteride  Hx of Seizures - on Keppra   Hx of mild mitral regurgitation   Hx of allergic rhinitis   Hx of bipolar I disorder - on Depakote     Discharge Diagnosis:  Bilateral mandibular fracture s/p fall - s/p ORIF by ENT, stable  Leukocytosis w/ left shift - likely reactive vs infective, resolved  Hx of COPD, stable  Hx of paroxysmal afib - [id number] 4, on Xarelto  Hx of Vit D deficiency, stable  Hx of polysubstance abuse   Hx of HTN - on lisinopril   Hx of hyperthyroidism, continue on Methimazole, recommends  TSH surveillance  Hx of HFrEF - not on GDMT   Hx of BPH -continue on Finasteride  Hx of Seizures -continue on Keppra   Hx of mild mitral regurgitation   Hx of allergic rhinitis   Hx of [facility] I disorder - [facility]     Hospital Course:   Patient is a 50-year-old male with PMH of HFrEF, HTN, paroxysmal A-fib and PE on chronic anticoagulation, COPD, hepatitis C, hyperthyroidism, bipolar 1 disorder, seizures, and polysubstance abuse who presented with jaw pain s/p mechanical fall. Upon presentation to the ED, vital signs are stable. He was found to have oblique mildly comminuted fracture through the mandibular body bilaterally with normal temporomandibular joints. CT head and C-spine showed no acute injury. He had a mild leukocytosis (13.7), and urine drug screen was positive for benzo and fentanyl. PT and APTT were slightly prolonged; INR 1.3. CMP was unremarkable.   ENT was consulted, and patient underwent ORIF of his mandibular fracture upon admission. MRI lumbar spine obtained for chronic low back pain with repeated falls; results showed multilevel neural foraminal stenosis at L3-L5. He was managed in the floor with percocet PRN and home medications resumed. Patient remained stable with TSH level resulting to 0. 16. Decision was made to hold levothyroxine at this point and for patient to recheck TSH in 1 month following discharge to help determine further need for methimazole during outpatient clinic visits. Patient was started on diet which was advanced with good tolerance. Today, patient reports no new complaints. He was able to ambulate without difficulty and denies any lightheadedness, chest pain or palpitations. Patient was cleared for discharge to a group home. He verbalized understanding of discharge instructions and follow-up appointments already scheduled. General Appearance: alert, appears stated age, and cooperative  HEENT:  Head: Normal, normocephalic, lower jaw tenderness improved  Neck: no JVD, supple, symmetrical, trachea midline, and thyroid not enlarged, symmetric, no tenderness/mass/nodules  Lung: clear to auscultation bilaterally  Heart: regular rate and rhythm, S1, S2 normal, no murmur, click, rub or gallop  Abdomen: soft, non-tender; bowel sounds normal; no masses,  no organomegaly  Extremities:  extremities normal, atraumatic, no cyanosis or edema  Musculokeletal: No joint swelling, no muscle tenderness. ROM normal in all joints of extremities.    Neurologic: Mental status: Alert, oriented, thought content appropriate    Pending test results: None    Consults:  ENT    Procedures:  ORIF    Condition at discharge: Stable    Disposition: home    Discharge Medications:  Discharge Medication List as of 3/7/2023  1:16 PM        START taking these medications    Details   amoxicillin-clavulanate (AUGMENTIN) 875-125 MG per tablet Take 1 tablet by mouth every 12 hours for 5 doses, Disp-5 tablet, R-0Normal           CONTINUE these medications which have CHANGED    Details   chlorhexidine (PERIDEX) 0.12 % solution Swish and spit 15 mLs 2 times daily for 7 days, Disp-210 mL, R-0Normal           CONTINUE these medications which have NOT CHANGED    Details   lidocaine (LIDODERM) 5 % Place 1 patch onto the skin every 24 hours for 10 days 12 hours on, 12 hours off., Disp-10 patch, R-0Normal      gabapentin (NEURONTIN) 100 MG capsule Take 1 capsule by mouth 3 times daily for 180 days.  Intended supply: 90 days, Disp-42 capsule, R-0Print      LATUDA 120 MG tablet DAWHistorical Med      sildenafil (VIAGRA) 50 MG tablet take 1 tablet by mouth 1 hour prior to intercourseHistorical Med      acetaminophen (TYLENOL) 500 MG tablet Take 1 tablet by mouth 4 times daily as needed for Pain, Disp-120 tablet, R-0Normal      levETIRAcetam (KEPPRA) 500 MG tablet Take 1,000 mg by mouth 2 times dailyHistorical Med      Rivaroxaban (XARELTO PO) Take by mouthHistorical Med      divalproex (DEPAKOTE) 250 MG DR tablet Take 1 tablet by mouth every 12 hours, Disp-60 tablet, R-0Print      venlafaxine (EFFEXOR XR) 37.5 MG extended release capsule Take 1 capsule by mouth daily (with breakfast), Disp-30 capsule, R-0Print      atorvastatin (LIPITOR) 40 MG tablet Take 1 tablet by mouth nightly, Disp-30 tablet, R-0Print      traZODone (DESYREL) 50 MG tablet Take 1 tablet by mouth nightly as needed for Sleep, Disp-30 tablet, R-0Normal      Multiple Vitamins-Minerals (THERAPEUTIC MULTIVITAMIN-MINERALS) tablet Take 1 tablet by mouth dailyHistorical Med      BREO ELLIPTA 200-25 MCG/INH AEPB DAWHistorical Med      methimazole (TAPAZOLE) 10 MG tablet Take 10 mg by mouth 3 times dailyHistorical Med      lisinopril (PRINIVIL;ZESTRIL) 10 MG tablet Take 1 tablet by mouth daily, Disp-30 tablet, R-11Normal      Nutritional Supplements (RA BALANCED NUTRITIONAL PLUS) LIQD Take 1 Bottle by mouth daily, Disp-10 Can, R-0Normal      finasteride (PROSCAR) 5 MG tablet Take 1 tablet by mouth daily, Disp-30 tablet, R-3Print           STOP taking these medications       methylPREDNISolone (MEDROL, MARIS,) 4 MG tablet Comments:   Reason for Stopping:               Internal medicine    Follow ups  Please follow up with the internal medicine clinic at [facility] appointment has been scheduled for on 3/16/2023 at 2pm  Please keep all other follow up appointments:  Future Appointments   Date Time Provider Bertha Santiago   3/10/2023 10:30 AM Carmella Salazar, [address] [facility]   3/16/2023  2:00 PM Pat Sears MD [facility]      Ibuprofen and Tylenol as needed for pain    Changes in healthcare   Please take all medications as indicated  Diet: regular diet   Activity: activity as tolerated  New Medications started during this hospital stay  Augmentin 1 tablet every 12 hours for 5 more doses  Continue chlorhexidine swish and spit 50 mils 2 times daily for 7 days  Changes to your medications  None  Medications you should stop taking   Synthroid  Additional labs, testing or imaging needed after discharge   TSH  Even if you are feeling better and not having symptoms do not stop taking antibiotic earlier then prescribed   Please contact us if you have any concerns, wish to change or make an appointment:  Internal medicine clinic   Phone: [phone]  Fax: [phone]  [address]  Should you have further questions in regards to this visit, you can review your clinical note and after visit summary document on your [employer] account. Other than any new prescriptions given to you today, the list of home medications on this After Visit Summary are based on information provided to us from you and your healthcare providers. This information, including the list, dose, and frequency of medications is only as accurate as the information you provided.  If you have any questions or concerns about your home medications, please contact your Primary Care Physician for further clarification.      Amadeo Monte MD  PGY 1   1:39 PM 3/7/2023

## 2023-03-07 NOTE — PROGRESS NOTES
[address] [facility]  Internal Medicine Residency / [address]    Attending Physician Statement  I have discussed the case, including pertinent history and exam findings with the resident and the team.  I have seen and examined the patient and the key elements of the encounter have been performed by me. I agree with the assessment, plan and orders as documented by the resident. A&O  VS stable   Eating and mobile/continent   Meds reviewed for discharge   Recommend holding thyroxine 25 mcg with low T4  Continue Methimazole as usual and recheck TSH in a month   Then determine further need for methimazole   S/P ORIF mandibular Fx  Discharge narcotics to be handled by group home authorities only  At [facility] of medical problems as per resident note.       Oscar Redding MD [facility] SPECIALTY [facility]  Internal Medicine Residency Faculty

## 2023-03-07 NOTE — CARE COORDINATION
3/7/2023social work transition of care planning  Pt plan is to return to group home. If transport needed,pt has care care source. If pt discharged with narcotics,please send to pharmacy and a worker from group home will  meds. Electronically signed by TONY Vazquez on 3/7/2023 at 11:12 AM    Addendum; riccardo set up provide a ride for 3 pm [phone]. Riccardo updated pt,he said he will walk. Riccardo updated group home, and nurse. Confirm # [id number]. Sw will cx ride if pt walks home.   Electronically signed by TONY Vazquez on 3/7/2023 at 12:27 PM

## 2023-03-07 NOTE — DISCHARGE INSTRUCTIONS
Internal medicine    Follow ups  Please follow up with the internal medicine clinic at [facility] appointment has been scheduled for on 3/16/2023  at 2pm  Please keep all other follow up appointments:  Future Appointments   Date Time Provider Bertha Larseni   3/10/2023 10:30 AM Yasmin Bushra, [address] [facility]   3/16/2023  2:00 PM My Luna MD [facility]      Ibuprofen and Tylenol as needed for pain    Changes in healthcare   Please take all medications as indicated  Diet: regular diet   Activity: activity as tolerated  New Medications started during this hospital stay  Augmentin 1 tablet every 12 hours for 5 more doses  Continue chlorhexidine swish and spit 50 mils 2 times daily for 7 days  Changes to your medications  None  Medications you should stop taking   Synthroid  Additional labs, testing or imaging needed after discharge   TSH  Even if you are feeling better and not having symptoms do not stop taking antibiotic earlier then prescribed   Please contact us if you have any concerns, wish to change or make an appointment:  Internal medicine clinic   Phone: [phone]  Fax: [phone]  One [address]  Should you have further questions in regards to this visit, you can review your clinical note and after visit summary document on your [employer] account. Other than any new prescriptions given to you today, the list of home medications on this After Visit Summary are based on information provided to us from you and your healthcare providers. This information, including the list, dose, and frequency of medications is only as accurate as the information you provided. If you have any questions or concerns about your home medications, please contact your Primary Care Physician for further clarification.

## 2023-03-07 NOTE — PROGRESS NOTES
Pt was told he was discharged. Pt stated that he was walking home. Encouraged pt to wait for discharge papers and proper clothes but pt stated \"Im not waiting for papers\". Pt walked out with his red bag of belongings and only a gown and pants. Confirmed that pt did not have any PIV in place.

## 2023-03-07 NOTE — PROGRESS NOTES
CLINICAL PHARMACY NOTE: MEDS TO BEDS    Total # of Prescriptions Filled: 1   The following medications were delivered to the patient:  Amoxicillin-pot clavul 875-125      Additional Documentation:   Caregiver picked up at register

## 2023-03-08 ENCOUNTER — TELEPHONE (OUTPATIENT)
Dept: INTERNAL MEDICINE | Age: 68
End: 2023-03-08

## 2023-03-08 NOTE — TELEPHONE ENCOUNTER
Care Transitions Initial Follow Up Call    Outreach made within 2 business days of discharge: Yes    Patient: Erika Licona Patient : 1955   MRN: [id number]  Reason for Admission: There are no discharge diagnoses documented for the most recent discharge. Discharge Date: 3/7/23       Spoke with: Unable to reach patient.  Phone number is no longer in service or has been disconnected    Discharge department/facility: [facility]      Scheduled appointment with PCP within 7-14 days    Follow Up  Future Appointments   Date Time Provider Bertha Santiago   3/10/2023 10:30 AM DO Landry Patterson [facility]   3/16/2023  2:00 PM Zachary Peña MD [facility] IM Natalie Granda RN

## 2023-03-09 ENCOUNTER — HOSPITAL ENCOUNTER (EMERGENCY)
Age: 68
Discharge: HOME | End: 2023-03-09
Payer: MEDICARE

## 2023-03-09 ENCOUNTER — TELEPHONE (OUTPATIENT)
Dept: INTERNAL MEDICINE | Age: 68
End: 2023-03-09

## 2023-03-09 VITALS
HEART RATE: 90 BPM | DIASTOLIC BLOOD PRESSURE: 74 MMHG | RESPIRATION RATE: 20 BRPM | OXYGEN SATURATION: 100 % | SYSTOLIC BLOOD PRESSURE: 165 MMHG | TEMPERATURE: 97.5 F

## 2023-03-09 DIAGNOSIS — S02.640D: ICD-10-CM

## 2023-03-09 DIAGNOSIS — G89.18 POST-OPERATIVE PAIN: Primary | ICD-10-CM

## 2023-03-09 PROCEDURE — 6370000000 HC RX 637 (ALT 250 FOR IP): Performed by: NURSE PRACTITIONER

## 2023-03-09 PROCEDURE — 99283 EMERGENCY DEPT VISIT LOW MDM: CPT

## 2023-03-09 RX ORDER — OXYCODONE HYDROCHLORIDE AND ACETAMINOPHEN 5; 325 MG/1; MG/1
1 TABLET ORAL ONCE
Status: COMPLETED | OUTPATIENT
Start: 2023-03-09 | End: 2023-03-09

## 2023-03-09 RX ORDER — OXYCODONE HYDROCHLORIDE AND ACETAMINOPHEN 5; 325 MG/1; MG/1
1 TABLET ORAL EVERY 6 HOURS PRN
Qty: 4 TABLET | Refills: 0 | Status: SHIPPED | OUTPATIENT
Start: 2023-03-09 | End: 2023-03-12

## 2023-03-09 RX ADMIN — OXYCODONE AND ACETAMINOPHEN 1 TABLET: 5; 325 TABLET ORAL at 22:59

## 2023-03-09 ASSESSMENT — PAIN DESCRIPTION - DESCRIPTORS: DESCRIPTORS: ACHING;THROBBING

## 2023-03-09 ASSESSMENT — PAIN DESCRIPTION - LOCATION: LOCATION: JAW

## 2023-03-09 ASSESSMENT — PAIN SCALES - GENERAL: PAINLEVEL_OUTOF10: 10

## 2023-03-09 NOTE — TELEPHONE ENCOUNTER
Care Transitions Initial Follow Up Call    Outreach made within 2 business days of discharge: Yes    Patient: Erika Licona Patient : 1955   MRN: [id number]  Reason for Admission: There are no discharge diagnoses documented for the most recent discharge. Discharge Date: 3/7/23       Spoke with: Attempted to reach patient at phone number listed.  Message received that phone has been disconnected or is no longer in service        Discharge department/facility: [facility]      Scheduled appointment with PCP within 7-14 days    Follow Up  Future Appointments   Date Time Provider Bertha Santiago   3/10/2023 10:30 AM DO Landry Patterson [address] [facility]   3/16/2023  2:00 PM Zachary Peña MD [facility] IM Natalie Granda RN

## 2023-03-10 ENCOUNTER — OFFICE VISIT (OUTPATIENT)
Dept: ENT CLINIC | Age: 68
End: 2023-03-10

## 2023-03-10 ENCOUNTER — APPOINTMENT (OUTPATIENT)
Dept: CT IMAGING | Age: 68
End: 2023-03-10
Payer: MEDICARE

## 2023-03-10 ENCOUNTER — HOSPITAL ENCOUNTER (EMERGENCY)
Age: 68
Discharge: HOME | End: 2023-03-10
Attending: EMERGENCY MEDICINE
Payer: MEDICARE

## 2023-03-10 VITALS
RESPIRATION RATE: 14 BRPM | HEIGHT: 63 IN | DIASTOLIC BLOOD PRESSURE: 71 MMHG | BODY MASS INDEX: 26.58 KG/M2 | HEART RATE: 84 BPM | WEIGHT: 150 LBS | TEMPERATURE: 97.9 F | OXYGEN SATURATION: 98 % | SYSTOLIC BLOOD PRESSURE: 131 MMHG

## 2023-03-10 VITALS
TEMPERATURE: 97.8 F | SYSTOLIC BLOOD PRESSURE: 150 MMHG | DIASTOLIC BLOOD PRESSURE: 98 MMHG | OXYGEN SATURATION: 97 % | RESPIRATION RATE: 18 BRPM | HEART RATE: 89 BPM

## 2023-03-10 DIAGNOSIS — G89.18 ACUTE POSTOPERATIVE PAIN: ICD-10-CM

## 2023-03-10 DIAGNOSIS — G44.209 TENSION HEADACHE: Primary | ICD-10-CM

## 2023-03-10 DIAGNOSIS — S02.66XA CLOSED FRACTURE OF SYMPHYSIS OF MANDIBLE, INITIAL ENCOUNTER (HCC): Primary | ICD-10-CM

## 2023-03-10 PROCEDURE — 6370000000 HC RX 637 (ALT 250 FOR IP)

## 2023-03-10 PROCEDURE — 96372 THER/PROPH/DIAG INJ SC/IM: CPT

## 2023-03-10 PROCEDURE — 6360000002 HC RX W HCPCS

## 2023-03-10 PROCEDURE — 99284 EMERGENCY DEPT VISIT MOD MDM: CPT

## 2023-03-10 PROCEDURE — 99024 POSTOP FOLLOW-UP VISIT: CPT | Performed by: OTOLARYNGOLOGY

## 2023-03-10 PROCEDURE — 70450 CT HEAD/BRAIN W/O DYE: CPT

## 2023-03-10 RX ORDER — METOCLOPRAMIDE 10 MG/1
10 TABLET ORAL 4 TIMES DAILY
Qty: 20 TABLET | Refills: 0 | Status: SHIPPED | OUTPATIENT
Start: 2023-03-10 | End: 2023-03-16

## 2023-03-10 RX ORDER — ACETAMINOPHEN 325 MG/1
650 TABLET ORAL ONCE
Status: COMPLETED | OUTPATIENT
Start: 2023-03-10 | End: 2023-03-10

## 2023-03-10 RX ORDER — KETOROLAC TROMETHAMINE 10 MG/1
10 TABLET, FILM COATED ORAL EVERY 6 HOURS PRN
Qty: 20 TABLET | Refills: 0 | Status: SHIPPED | OUTPATIENT
Start: 2023-03-10 | End: 2024-03-09

## 2023-03-10 RX ORDER — DIPHENHYDRAMINE HCL 25 MG
25 TABLET ORAL ONCE
Status: DISCONTINUED | OUTPATIENT
Start: 2023-03-10 | End: 2023-03-10 | Stop reason: HOSPADM

## 2023-03-10 RX ORDER — ORPHENADRINE CITRATE 30 MG/ML
60 INJECTION INTRAMUSCULAR; INTRAVENOUS ONCE
Status: COMPLETED | OUTPATIENT
Start: 2023-03-10 | End: 2023-03-10

## 2023-03-10 RX ORDER — METOCLOPRAMIDE 5 MG/1
10 TABLET ORAL ONCE
Status: DISCONTINUED | OUTPATIENT
Start: 2023-03-10 | End: 2023-03-10 | Stop reason: HOSPADM

## 2023-03-10 RX ADMIN — ACETAMINOPHEN 650 MG: 325 TABLET ORAL at 18:51

## 2023-03-10 RX ADMIN — ORPHENADRINE CITRATE 60 MG: 30 INJECTION INTRAMUSCULAR; INTRAVENOUS at 18:51

## 2023-03-10 ASSESSMENT — ENCOUNTER SYMPTOMS
EYES NEGATIVE: 1
CHEST TIGHTNESS: 0
RESPIRATORY NEGATIVE: 1
EYE PAIN: 0
SHORTNESS OF BREATH: 0
EYE DISCHARGE: 0
GASTROINTESTINAL NEGATIVE: 1
VOMITING: 0
COLOR CHANGE: 0
ABDOMINAL PAIN: 0
APNEA: 0
DIARRHEA: 0

## 2023-03-10 ASSESSMENT — PAIN SCALES - GENERAL
PAINLEVEL_OUTOF10: 10
PAINLEVEL_OUTOF10: 8

## 2023-03-10 ASSESSMENT — PAIN - FUNCTIONAL ASSESSMENT: PAIN_FUNCTIONAL_ASSESSMENT: 0-10

## 2023-03-10 NOTE — PROGRESS NOTES
for cold intolerance, heat intolerance and polydipsia. Genitourinary: Negative. Negative for dysuria, flank pain and hematuria. Musculoskeletal: Negative. Negative for arthralgias, gait problem and neck pain. Skin: Negative. Negative for color change, pallor and rash. Allergic/Immunologic: Negative for environmental allergies, food allergies and immunocompromised state. Neurological: Negative. Negative for dizziness, numbness and headaches. Hematological:  Negative for adenopathy. Psychiatric/Behavioral: Negative. Negative for behavioral problems and hallucinations. All other systems reviewed and are negative. Objective:   Physical Exam  Vitals and nursing note reviewed. Constitutional:       Appearance: He is well-developed. HENT:      Head: Normocephalic and atraumatic. Right Ear: Hearing, tympanic membrane, ear canal and external ear normal.      Left Ear: Hearing, tympanic membrane, ear canal and external ear normal.      Nose: Nose normal.   Eyes:      Conjunctiva/sclera: Conjunctivae normal.      Pupils: Pupils are equal, round, and reactive to light. Cardiovascular:      Rate and Rhythm: Normal rate and regular rhythm. Heart sounds: Normal heart sounds. Pulmonary:      Effort: Pulmonary effort is normal.      Breath sounds: Normal breath sounds. Abdominal:      General: Bowel sounds are normal.      Palpations: Abdomen is soft. Musculoskeletal:      Cervical back: Normal range of motion and neck supple. Skin:     General: Skin is warm and dry. Neurological:      Mental Status: He is alert and oriented to person, place, and time. Assessment:       Diagnosis Orders   1. Closed fracture of symphysis of mandible, initial encounter ([facility] [address]                   Plan:      Pt is improving and is requesting pain medicine but has already visited [facility] for pain meds.    Follow up in prn

## 2023-03-10 NOTE — ED PROVIDER NOTES
Shared MABEL-ED Attending Visit. CC: No          [address] [facility] ENCOUNTER        Pt Name: Yen Andrade  MRN: [id number]  [place] 1955  Date of evaluation: 3/10/2023  Provider: ZOIE Penn - CNP  PCP: Bryant Pollack MD  Note Started: 6:17 PM EST 3/10/23    CHIEF COMPLAINT       Chief Complaint   Patient presents with    Jaw Pain     Patient states that he tripped on a curb 3 days ago and fell on his chin and broke his jaw, saw his Dr and received x rays and sutures. Coming in today for increasing headache. Did not lose consciousness and on xerelto       HISTORY OF PRESENT ILLNESS: 1 or more Elements   History from : Patient  Limitations to history : None    Yen Andrade is a 80 y.o. male with a history of chronic back pain, diabetes mellitus, hypertension, neuromuscular disorder, PE, paroxysmal A-fib, and chronic anticoagulation who presents to the emergency department on foot, for complaints of jaw pain in the headache. Jaw pain has been ongoing since his fall with subsequent jaw injury requiring internal fixation on 3/3/2023 by Dr. Imer Whatley  He was discharged home following his surgery with Percocet. He reports he has been cutting them in half with-year-old go on. Last night, he started having gradual onset of a posterior headache. He reports that he had difficulty sleeping last night due to the headache. Since onset the symptoms have been unchanged and remaining constant and moderate in severity. The patient has history of headaches of unknown type diagnosed in the past.  Today's episode is somewhat typical for him.  He has had no prior workup in the past. The pain is associated with decreased appetite since his surgery and negative for fever, chills, neck pain, neck stiffness, recent illness, numbness, weakness, speech or visual changes, syncope, seizures, amaurosis, diplopia, other visual changes, paralysis/weakness, numbness or tingling, involuntary movements, tremor, speech impairment, or any neurologic symptoms or deficits. The symptoms are aggravated by palpation and relieved by nothing. This is not worst headache of life, not maximal at onset, and not thunderclap in nature. No household contacts with headache. He has not taken over any over-the-counter medications for his symptoms. There has been no new trauma since his fall on 3/3/2023. Treatment prior to arrival consisted of: nothing. He takes Xarelto. He is scheduled for his postop follow-up appoint with Dr. Ave Del Toro on Wednesday of this coming week. Nursing Notes were all reviewed and agreed with or any disagreements were addressed in the HPI. REVIEW OF SYSTEMS :      Review of Systems    Positives and Pertinent negatives as per HPI. PAST MEDICAL HISTORY    has a past medical history of Anxiety, Arthritis, Asthma, Bipolar 1 disorder ([address] Chronic combined systolic and diastolic CHF (congestive heart failure) ([address] (05/27/2018), COPD (chronic obstructive pulmonary disease) ([address] Depression, Diabetes mellitus ([address] GERD (gastroesophageal reflux disease), Hepatitis C, Hypertension, Hyperthyroidism (8/2/2012), Hypothyroidism due to medication, Mass of testicle, Mesothelioma ([address] Neuromuscular disorder ([address] Other disorders of kidney and ureter, Paroxysmal A-fib ([address] Peptic ulcer, Prostate enlargement, Pulmonary embolism ([address] Seizures ([address] and Thyroid disease.      SURGICAL HISTORY     Past Surgical History:   Procedure Laterality Date    ABDOMEN SURGERY      APPENDECTOMY      BLADDER SURGERY      CARDIAC CATHETERIZATION  05/21/2018    Dr. Erinn Stanley, COLON, DIAGNOSTIC  11/13/2015    HEMORRHOID SURGERY      LITHOTRIPSY      MANDIBLE SURGERY N/A 3/3/2023    MANDIBLE OPEN REDUCTION INTERNAL FIXATION performed by Nuha Marrero DO at [address] [place] [address] CURRENTMEDICATIONS       Previous Medications    ACETAMINOPHEN (TYLENOL) 500 MG TABLET    Take 1 tablet by mouth 4 times daily as needed for Pain    AMOXICILLIN-CLAVULANATE (AUGMENTIN) 875-125 MG PER TABLET    Take 1 tablet by mouth every 12 hours for 5 doses    ATORVASTATIN (LIPITOR) 40 MG TABLET    Take 1 tablet by mouth nightly    BREO ELLIPTA 200-25 MCG/INH AEPB        CHLORHEXIDINE (PERIDEX) 0.12 % SOLUTION    Swish and spit 15 mLs 2 times daily for 7 days    DIVALPROEX (DEPAKOTE) 250 MG DR TABLET    Take 1 tablet by mouth every 12 hours    FINASTERIDE (PROSCAR) 5 MG TABLET    Take 1 tablet by mouth daily    GABAPENTIN (NEURONTIN) 100 MG CAPSULE    Take 1 capsule by mouth 3 times daily for 180 days. Intended supply: 90 days    KETOROLAC (TORADOL) 10 MG TABLET    Take 1 tablet by mouth every 6 hours as needed for Pain    LATUDA 120 MG TABLET        LEVETIRACETAM (KEPPRA) 500 MG TABLET    Take 1,000 mg by mouth 2 times daily    LISINOPRIL (PRINIVIL;ZESTRIL) 10 MG TABLET    Take 1 tablet by mouth daily    MAGIC MOUTHWASH (MIRACLE MOUTHWASH)    Swish and spit 5 mLs 4 times daily as needed for Irritation    METHIMAZOLE (TAPAZOLE) 10 MG TABLET    Take 10 mg by mouth 3 times daily    MULTIPLE VITAMINS-MINERALS (THERAPEUTIC MULTIVITAMIN-MINERALS) TABLET    Take 1 tablet by mouth daily    NUTRITIONAL SUPPLEMENTS (RA BALANCED NUTRITIONAL PLUS) LIQD    Take 1 Bottle by mouth daily    OXYCODONE-ACETAMINOPHEN (PERCOCET) 5-325 MG PER TABLET    Take 1 tablet by mouth every 6 hours as needed for Pain for up to 3 days.  Max Daily Amount: 4 tablets    RIVAROXABAN (XARELTO PO)    Take by mouth    SILDENAFIL (VIAGRA) 50 MG TABLET    take 1 tablet by mouth 1 hour prior to intercourse    TRAZODONE (DESYREL) 50 MG TABLET    Take 1 tablet by mouth nightly as needed for Sleep    VENLAFAXINE (EFFEXOR XR) 37.5 MG EXTENDED RELEASE CAPSULE    Take 1 capsule by mouth daily (with breakfast)       ALLERGIES     Codeine, Dye [iodides], Ketorolac tromethamine, Peanuts [peanut oil], Shellfish-derived products, and Nitroglycerin    FAMILYHISTORY       Family History   Problem Relation Age of Onset    Cancer Mother     Diabetes Mother     Cancer Father     Diabetes Father     Diabetes Maternal Aunt     Diabetes Maternal Grandmother     Cancer Maternal Grandfather     Heart Disease Paternal Grandmother     Heart Failure Paternal Grandmother     Heart Disease Paternal Grandfather     Heart Failure Paternal Grandfather     Diabetes Maternal Aunt     Diabetes Maternal Aunt         SOCIAL HISTORY       Social History     Tobacco Use    Smoking status: Former     Years: 10.00     Types: Cigarettes     Quit date: 1/10/2010     Years since quittin.1    Smokeless tobacco: Former     Types: Chew     Quit date:    Vaping Use    Vaping Use: Never used   Substance Use Topics    Alcohol use: No     Alcohol/week: 0.0 standard drinks     Comment: recovered alcoholic; 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    Constitutional:  Alert, development consistent with age. HEENT:  NC/NT. PERRLA. Edentulous. Sutures lower gum line without erythema, edema, induration, or fluctuance. No trismus. No drooling. Floor of the mouth is soft. Airway patent. Neck:  Normal ROM. Supple. No meningeal signs.   Respiratory:  Clear to auscultation and breath sounds equal.  CV:  Regular rate and rhythm, normal heart sounds, without pathological murmurs, ectopy, gallops, or rubs. GI:  Abdomen Soft, nontender, good bowel sounds. No firm or pulsatile mass. Back:  No costovertebral tenderness. Extremities: No tenderness or edema noted. Integument:  Normal turgor. Warm, dry, without visible rash, unless noted elsewhere. Linear area of scabbed to mid lower    Lymphatics: No lymphangitis or adenopathy noted. Neurological:  Oriented x 3, GCS 15. CNII-XII grossly intact. Motor functions intact. NIH Stroke Scale/Score at time of initial evaluation:  1A: Level of Consciousness 0 - alert; keenly responsive   1B: Ask Month and Age 0 - answers both questions correctly   1C: Tell Patient To Open and Close Eyes, then Hand  Squeeze 0 - performs both tasks correctly   2: Test Horizontal Extraocular Movements 0 - normal   3: Test Visual Fields 0 - no visual loss   4: Test Facial Palsy 0 - normal symmetric movement   5A: Test Left Arm Motor Drift 0 - no drift, limb holds 90 (or 45) degrees for full 10 seconds   5B: Test Right Arm Motor Drift 0 - no drift, limb holds 90 (or 45) degrees for full 10 seconds   6A: Test Left Leg Motor Drift 0 - no drift; leg holds 30 degree position for full 5 seconds   6B: Test Right Leg Motor Drift 0 - no drift; leg holds 30 degree position for full 5 seconds   7: Test Limb Ataxia   (FNF/Heel-Shin) 0 - absent   8: Test Sensation 0 - normal; no sensory loss   9: Test Language/Aphasia 0 - no aphasia, normal   10: Test Dysarthria 0 - normal   11: Test Extinction/Inattention 0 - no abnormality   Total 0       DIAGNOSTIC RESULTS   LABS:    No results found for this visit on 03/10/23. RADIOLOGY:   Images reviewed but not interpreted by the ED provider. Interpretation per the Radiologist below, if available at the time of this note:    CT HEAD WO CONTRAST   Final Result   No acute intracranial abnormality.              EKG: N/A    PROCEDURES      None    CRITICAL CARE TIME (.cctime)     N/A    CC/HPI, ED COURSE, DIFFERENTIAL DIAGNOSIS, MDM & REEVALUATION:   ED Course:  ED Course as of 03/10/23 2050   Fri Mar 10, 2023   1901 Spontaneously approached by the patient stating he has \"$250,000 in the bank and if he wanted Percocet he would just buy them off the street. You know while I was in the hospital they were giving me 2 Percs at a time. \"  Advised patient that nothing was mentioned about Percocet. He states he already received his medication and just wants to go is not getting any Percocet. Advised patient of recommendations to stay for CT of the head due to complaints of headache, Xarelto use, and concern for intracranial hemorrhage. At this time, he has agreed to stay for evaluation. [DH]   2022 At the bedside to update and reassess. Assessment remains unchanged. Patient denies any relief of headache despite Tylenol and Norflex. [DH]   2031 Patient refusing Benadryl and Reglan for headache. [DH]      ED Course User Index  [DH] Lawrence Sinha, ZOIE - LATONIA       Reevaluation & Disposition Considerations: The nursing notes within the ED encounter and vital signs as below have been reviewed. Vitals:    03/10/23 1804 03/10/23 1812   BP:  (!) 150/98   Pulse: 89    Resp:  18   Temp: 97.8 °F (36.6 °C)    SpO2: 97%        Chief Complaint / HPI Summary  History from : Patient  Limitations to history : None    Patient is a 21-year-old male with a history of hypertension, paroxysmal A-fib, PE, seizures, chronic anticoagulation Xarelto, and recent external fixation of his mandible due to fall with fracture who presents to the ED for complaints of ongoing jaw pain and posterior headache, which started last night. Social Determinants : Patient lacks transportation, does not have his own vehicle, but does have friends who will intermittently bring him to the ER and doctors appointments.     Differential diagnoses included but not limited to subarachnoid hemorrhage, subdural hemorrhage, tension headache, tension related headache, pseudotumor cerebri, postop pain, postop infection, meningitis, sinus thrombosis. Medical Decision Making:  Records Reviewed : None    On exam, there is reproducible pain with palpation of bilateral lower occiput's. No meningeal signs. Cervical range of motion is nonpainful. BP is elevated, likely secondary to pain and pre-existing hypertension. No systemic symptoms to suggest infection. Headache is gradual onset, not thunderclap, not maximal at onset, not associated with any new injury or trauma, and there are no meningeal signs. No recent sick contacts in his household. Nothing to suggest meningitis. CT of the head was obtained to rule out intracranial hemorrhage due to Xarelto use and facial/head trauma approximately 1 week ago. Patient did have a CT of his head on 3/3/2023 in this ER which was negative for intracranial hemorrhage or fracture. CT of the head is unremarkable. Specifically, there is no acute intracranial hemorrhage, mass effect, midline shift, abnormal extra-axial fluid collection, and there is no evidence of an acute infarct or hydrocephalus. CT of the head does note mucosal thickening involving the bilateral ethmoid sinuses and maxillary sinuses and a small air-fluid level located in the right maxillary sinus with mastoid cells clear. Has no sinus tenderness to suggest sinusitis as contributory to headache. NIH score 0. No neurological deficits in conjunction with headache to suggest sinus thrombosis. Exam findings are consistent with a headache that is tension-like in nature and reproducible with palpation. He was medicated with Tylenol and Norflex for analgesia and muscle relaxation without improvement of his symptoms. He was offered Benadryl and Reglan for headache, however he declined. Patient was seen and evaluated by Dr. Jacki Schafer, who agrees with plan of care and feels the patient is appropriate for discharge. On reevaluation, patient is well-appearing, nontoxic, and not in distress. Blood pressure This patient's ED course included: a personal history and physicial examination and re-evaluation prior to disposition. This patient has remained hemodynamically stable and remained unchanged during their ED course and at this time is without objective evidence of an acute process requiring additional evaluation or inpatient admission and is: Appropriate for outpatient management   . Patient does have a extensive history of drug-seeking behavior and has been seen in this ER multiple times this year for pain related complaints. He frequently speaks of and request Percocet during his ER visits. [place]AR[place] report was reviewed with an overdose risk score of 680 noted. His Narc scores are 434 narcotic, 560 sedative, and 0 stimulant. He has received controlled substance prescriptions from 12 providers in the last year. His MME per day average score is 1.7 and his MME per Rx score is 50. His LME per average day score is 4 and his LME milligrams per Rx score is 24. In the last 30 days he has received 1 narcotic prescription, for benzodiazepine prescriptions and a prescription for gabapentin. Therefore, no narcotics or sedating medications will be prescribed. He will be discharged home with recommendations for Tylenol for analgesia. Upon Dr. Radha Loyd and I presented to the bedside side to discuss discharge plan of care, patient proceeded to ask for Ultrams and became upset and slightly verbally aggressive when he was advised that he would not be discharged with narcotics. Patient was not discharged home on narcotics postoperatively per OARRS review. Patient received a prescription from this ER for Percocet on 3/10/2023 for 4 tablets, which he reports he has taken all month. Patient immediately left the ER after discharge conversation with myself and Dr. Radha Loyd. He did not want to wait to have blood pressure rechecked or to receive his discharge instructions.       PLAN OF CARE & COUNSELING:  ZOIE Sanchez - CNP and EM Attending Physician reviewed today's visit with the patient. This included the differential, results and plan of care in addition to providing specific details for the plan of care and counseling regarding the diagnosis and prognosis and they are agreeable with the plan. All results reviewed with patient. and all questions answered. The patient expressed understanding    Patient will follow-up with Dr. Jeremy Valdivia, otolaryngology in 5 days as instructed/ previously scheduled. I emphasized the importance of follow-up with the physician I referred them to in the timeframe recommended. I discussed with the patient emergent symptoms and the need to immediately return to the ER for any new or worsening symptoms as discussed above. Written information was included in their discharge instructions. Additional verbal discharge instructions were also given and discussed with the patient to supplement those generated by the EMR. We also discussed medications that were prescribed including common side effects and interactions. All questions were addressed. They understand return precautions and discharge instructions. The patient expressed understanding. Vitals were stable and they were in no distress at discharge. FINAL IMPRESSION      1. Tension headache    2. Acute postoperative pain          DISPOSITION/PLAN     DISPOSITION    Discharge to home. Patient condition is good. PATIENT REFERRED TO:  Justen Hale DO  [facility]  Margot Krishnan [address] [place] [address] Drive [phone]    On 3/15/2023  Keep your scheduled appointment    [address] [place] [address] Emergency Department  [address] [place] [address]  [phone]    If symptoms worsen    DISCHARGE MEDICATIONS:  New Prescriptions    No medications on file       DISCONTINUED MEDICATIONS:  Discontinued Medications    No medications on file                END OF PROVIDER NOTE    I am the primary clinician of record. Electronically signed by ZOIE Garcia CNP   DD: 3/10/23    (Please note that portions of this note were completed with a voice recognition program.  Efforts were made to edit the dictations but occasionally words are mis-transcribed.)           ZOIE Garcia CNP  03/11/23 1105

## 2023-03-10 NOTE — ED PROVIDER NOTES
independent  HPI:  3/9/23, Time: 10:44 PM HALEIGH Hoffman is a 80 y.o. male presenting to the ED for     Review of Systems:   A complete review of systems was performed and pertinent positives and negatives are stated within HPI, all other systems reviewed and are negative.          --------------------------------------------- PAST HISTORY ---------------------------------------------  Past Medical History:  has a past medical history of Anxiety, Arthritis, Asthma, Bipolar 1 disorder ([address] Chronic combined systolic and diastolic CHF (congestive heart failure) ([address] COPD (chronic obstructive pulmonary disease) ([address] Depression, Diabetes mellitus ([address] GERD (gastroesophageal reflux disease), Hepatitis C, Hypertension, Hyperthyroidism, Hypothyroidism due to medication, Mass of testicle, Mesothelioma ([address] Neuromuscular disorder ([address] Other disorders of kidney and ureter, Paroxysmal A-fib ([address] Peptic ulcer, Prostate enlargement, Pulmonary embolism ([address] Seizures ([address] and Thyroid disease. Past Surgical History:  has a past surgical history that includes Appendectomy; Lithotripsy; Hemorrhoid surgery; Bladder surgery; Endoscopy, colon, diagnostic (11/13/2015); Abdomen surgery; Colonoscopy; Cardiac surgery; vascular surgery; Cardiac catheterization (05/21/2018); and Mandible surgery (N/A, 3/3/2023). Social History:  reports that he quit smoking about 13 years ago. His smoking use included cigarettes. He quit smokeless tobacco use about 5 years ago. His smokeless tobacco use included chew. He reports that he does not currently use drugs after having used the following drugs: Cocaine. He reports that he does not drink alcohol.     Family History: family history includes Cancer in his father, maternal grandfather, and mother; Diabetes in his father, maternal aunt, maternal aunt, maternal aunt, maternal grandmother, and mother; Heart Disease in his paternal grandfather and paternal grandmother; Heart Failure in his paternal grandfather and paternal grandmother. The patients home medications have been reviewed. Allergies: Codeine, Dye [iodides], Ketorolac tromethamine, Peanuts [peanut oil], Shellfish-derived products, and Nitroglycerin    -------------------------------------------------- RESULTS -------------------------------------------------  All laboratory and radiology results have been personally reviewed by myself   LABS:  No results found for this visit on 03/09/23. RADIOLOGY:  Interpreted by Radiologist.  No orders to display       ------------------------- NURSING NOTES AND VITALS REVIEWED ---------------------------   The nursing notes within the ED encounter and vital signs as below have been reviewed. BP (!) 165/74   Pulse 90   Temp 97.5 °F (36.4 °C) (Temporal)   Resp 20   SpO2 100%   Oxygen Saturation Interpretation: Normal      ---------------------------------------------------PHYSICAL EXAM--------------------------------------      Constitutional/General: Alert and oriented x3, well appearing, non toxic in NAD  Head: Normocephalic and atraumatic  Eyes: PERRL, EOMI  Mouth: Oropharynx clear, handling secretions, no trismus  Neck: Supple, full ROM,   Pulmonary: Lungs clear to auscultation bilaterally, no wheezes, rales, or rhonchi. Not in respiratory distress  Cardiovascular:  Regular rate and rhythm, no murmurs, gallops, or rubs. 2+ distal pulses  Abdomen: Soft, non tender, non distended,   Extremities: Moves all extremities x 4. Warm and well perfused  Skin: warm and dry without rash  Neurologic: GCS 15,  Psych: Normal Affect      ------------------------------ ED COURSE/MEDICAL DECISION MAKING----------------------  Medications   oxyCODONE-acetaminophen (PERCOCET) 5-325 MG per tablet 1 tablet (1 tablet Oral Given 3/9/23 7584)         ED COURSE:       Medical Decision Making:        Counseling:    The emergency provider has spoken with the patient and discussed todays results, in addition to providing specific details for the plan of care and counseling regarding the diagnosis and prognosis. Questions are answered at this time and they are agreeable with the plan.      --------------------------------- IMPRESSION AND DISPOSITION ---------------------------------    IMPRESSION  1. Post-operative pain    2. Open fracture of ramus of mandible with routine healing, unspecified laterality, subsequent encounter        DISPOSITION  Disposition: Discharge to home  Patient condition is stable      NOTE: This report was transcribed using voice recognition software.  Every effort was made to ensure accuracy; however, inadvertent computerized transcription errors may be present : None    Chronic Conditions: has a past medical history of Anxiety, Arthritis, Asthma, Bipolar 1 disorder ([address] Chronic combined systolic and diastolic CHF (congestive heart failure) ([address] COPD (chronic obstructive pulmonary disease) ([address] Depression, Diabetes mellitus ([address] GERD (gastroesophageal reflux disease), Hepatitis C, Hypertension, Hyperthyroidism, Hypothyroidism due to medication, Mass of testicle, Mesothelioma ([address] Neuromuscular disorder ([address] Other disorders of kidney and ureter, Paroxysmal A-fib ([address] Peptic ulcer, Prostate enlargement, Pulmonary embolism ([address] Seizures ([address] and Thyroid disease. CONSULTS: Dr. Ra Matt- ENT - Facial trauma     Discussion with Other Profesionals : None    Social Determinants : None    Records Reviewed : Source patient and Inpatient Notes epic medical records        Disposition Considerations (Tests not ordered but considered, Shared Decision Making, Pt Expectation of Test or Tx.):   Appropriate for outpatient management yes and Evaluation by myself and discharge recommended. I am the Primary Clinician of Record. Counseling: The emergency provider has spoken with the patient and discussed todays results, in addition to providing specific details for the plan of care and counseling regarding the diagnosis and prognosis. Questions are answered at this time and they are agreeable with the plan.      --------------------------------- IMPRESSION AND DISPOSITION ---------------------------------    IMPRESSION  1. Post-operative pain    2. Open fracture of ramus of mandible with routine healing, unspecified laterality, subsequent encounter        DISPOSITION  Disposition: Discharge to home  Patient condition is stable      NOTE: This report was transcribed using voice recognition software.  Every effort was made to ensure accuracy; however, inadvertent computerized transcription errors may be present      ZOIE Bansal - LATONIA  03/14/23 [id number]

## 2023-03-13 ENCOUNTER — HOSPITAL ENCOUNTER (EMERGENCY)
Age: 68
Discharge: HOME | End: 2023-03-13
Payer: MEDICARE

## 2023-03-13 VITALS
TEMPERATURE: 98.4 F | OXYGEN SATURATION: 99 % | DIASTOLIC BLOOD PRESSURE: 102 MMHG | HEART RATE: 98 BPM | BODY MASS INDEX: 25.61 KG/M2 | SYSTOLIC BLOOD PRESSURE: 153 MMHG | HEIGHT: 64 IN | RESPIRATION RATE: 18 BRPM | WEIGHT: 150 LBS

## 2023-03-13 DIAGNOSIS — G89.18 POST-OP PAIN: Primary | ICD-10-CM

## 2023-03-13 PROCEDURE — 96372 THER/PROPH/DIAG INJ SC/IM: CPT

## 2023-03-13 PROCEDURE — 99284 EMERGENCY DEPT VISIT MOD MDM: CPT

## 2023-03-13 PROCEDURE — 6360000002 HC RX W HCPCS: Performed by: NURSE PRACTITIONER

## 2023-03-13 RX ORDER — ORPHENADRINE CITRATE 30 MG/ML
60 INJECTION INTRAMUSCULAR; INTRAVENOUS ONCE
Status: COMPLETED | OUTPATIENT
Start: 2023-03-13 | End: 2023-03-13

## 2023-03-13 RX ADMIN — ORPHENADRINE CITRATE 60 MG: 30 INJECTION INTRAMUSCULAR; INTRAVENOUS at 20:17

## 2023-03-13 ASSESSMENT — PAIN DESCRIPTION - ORIENTATION: ORIENTATION: RIGHT;LEFT

## 2023-03-13 ASSESSMENT — PAIN DESCRIPTION - LOCATION: LOCATION: JAW

## 2023-03-13 ASSESSMENT — PAIN SCALES - GENERAL
PAINLEVEL_OUTOF10: 10
PAINLEVEL_OUTOF10: 10

## 2023-03-13 ASSESSMENT — PAIN DESCRIPTION - PAIN TYPE: TYPE: ACUTE PAIN

## 2023-03-13 ASSESSMENT — PAIN DESCRIPTION - DESCRIPTORS: DESCRIPTORS: ACHING

## 2023-03-13 ASSESSMENT — PAIN - FUNCTIONAL ASSESSMENT: PAIN_FUNCTIONAL_ASSESSMENT: 0-10

## 2023-03-13 NOTE — ED PROVIDER NOTES
independent  HPI:  3/13/23, Time: 7:49 PM EDT         Vera Cam is a 80 y.o. male presenting to the ED for pain associated to recent mandible surgery. Patient with history of open fracture of the body of the mandible with repair on March 3, 2023. Patient then presented here to the emergency department on March 9 with postoperative pain. At that time he was provided with a one-time short dose of prescription for Percocet, 4 tablets. We did make him aware that we will not provide him with additional medication that this pain needs to be directly handled by his surgeon or primary care doctor. Patient states today that he did have a doctor's appointment on March 10 after visiting the ED and reports that he did not inform them about his pain. Patient states that the pain returned and he once again needs another prescription. I made him aware that we will not provided with a prescription he needs to continue taking Tylenol. He does not have any infectious etiology to the surgical site. Speech is clear and coherent and he denies any fevers, chills, chest pain, shortness of breath or any abdominal pain.   Review of Systems:   A complete review of systems was performed and pertinent positives and negatives are stated within HPI, all other systems reviewed and are negative.          --------------------------------------------- PAST HISTORY ---------------------------------------------  Past Medical History:  has a past medical history of Anxiety, Arthritis, Asthma, Bipolar 1 disorder ([address] Chronic combined systolic and diastolic CHF (congestive heart failure) ([address] COPD (chronic obstructive pulmonary disease) ([address] Depression, Diabetes mellitus ([address] GERD (gastroesophageal reflux disease), Hepatitis C, Hypertension, Hyperthyroidism, Hypothyroidism due to medication, Mass of testicle, Mesothelioma ([address] Neuromuscular disorder ([address] Other disorders of kidney and ureter, Paroxysmal A-fib ([facility] Peptic ulcer, Prostate enlargement, Pulmonary embolism ([facility] Seizures ([facility] [address] [facility] and Thyroid disease. Past Surgical History:  has a past surgical history that includes Appendectomy; Lithotripsy; Hemorrhoid surgery; Bladder surgery; Endoscopy, colon, diagnostic (11/13/2015); Abdomen surgery; Colonoscopy; Cardiac surgery; vascular surgery; Cardiac catheterization (05/21/2018); and Mandible surgery (N/A, 3/3/2023). Social History:  reports that he quit smoking about 13 years ago. His smoking use included cigarettes. He quit smokeless tobacco use about 5 years ago. His smokeless tobacco use included chew. He reports that he does not currently use drugs after having used the following drugs: Cocaine. He reports that he does not drink alcohol. Family History: family history includes Cancer in his father, maternal grandfather, and mother; Diabetes in his father, maternal aunt, maternal aunt, maternal aunt, maternal grandmother, and mother; Heart Disease in his paternal grandfather and paternal grandmother; Heart Failure in his paternal grandfather and paternal grandmother. The patients home medications have been reviewed. Allergies: Codeine, Dye [iodides], Ketorolac tromethamine, Peanuts [peanut oil], Shellfish-derived products, and Nitroglycerin    -------------------------------------------------- RESULTS -------------------------------------------------  All laboratory and radiology results have been personally reviewed by myself   LABS:  No results found for this visit on 03/13/23. RADIOLOGY:  Interpreted by Radiologist.  No orders to display       ------------------------- NURSING NOTES AND VITALS REVIEWED ---------------------------   The nursing notes within the ED encounter and vital signs as below have been reviewed.    BP (!) 153/102   Pulse 98   Temp 98.4 °F (36.9 °C) (Oral)   Resp 18   Ht 5' 4\" (1.626 m)   Wt 150 lb (68 kg)   SpO2 99%   BMI 25.75 kg/m²   Oxygen Saturation Interpretation: Normal      ---------------------------------------------------PHYSICAL EXAM--------------------------------------      Constitutional/General: Alert and oriented x3, overall nontoxic  Head: Normocephalic and atraumatic  Eyes: PERRL, EOMI  Mouth: Oropharynx clear, handling secretions, no trismus  Neck: Supple, full ROM,   Pulmonary: Lungs clear to auscultation bilaterally, no wheezes, rales, or rhonchi. Not in respiratory distress  Cardiovascular:  Regular rate and rhythm, no murmurs, gallops, or rubs. 2+ distal pulses  Abdomen: Soft, non tender, non distended,   Extremities: Moves all extremities x 4. Warm and well perfused  Skin: warm and dry without rash  Neurologic: GCS 15,  Psych: Normal Affect      ------------------------------ ED COURSE/MEDICAL DECISION MAKING----------------------  Medications   orphenadrine (NORFLEX) injection 60 mg (60 mg IntraMUSCular Given 3/13/23 2017)         ED COURSE:       Medical Decision Making:    Faiza Hernandez is a 80 y.o. male presenting to the ED for pain associated to recent mandible surgery. Patient with history of open fracture of the body of the mandible with repair on March 3, 2023. Patient then presented here to the emergency department on March 9 with postoperative pain. At that time he was provided with a one-time short dose of prescription for Percocet, 4 tablets. We did make him aware that we will not provide him with additional medication that this pain needs to be directly handled by his surgeon or primary care doctor. Patient states today that he did have a doctor's appointment on March 10 after visiting the ED and reports that he did not inform them about his pain. Patient states that the pain returned and he once again needs another prescription. I made him aware that we will not provided with a prescription he needs to continue taking Tylenol. He does not have any infectious etiology to the surgical site.   Speech is clear and coherent and he denies any fevers, chills, chest pain, shortness of breath or any abdominal pain. Differential diagnosis includes drug-seeking behavior, acute postoperative pain versus infection to surgical site. Patient as stated above was already informed that we will not manage his postoperative pain. He does have a concerning history for drug abuse. Because of that I will not provide him with any additional narcotics. He will receive a one-time dose of Norflex 60 mg IM injection to help with the muscle tightening. He was encouraged to once again follow-up with his primary care doctor as well as his surgeon who did the mandible repair. Patient otherwise encouraged to continue taking over-the-counter meds as already scheduled. Patient is neurovascular and hemodynamically intact. Patient expressed understanding. Patient will be safely discharged home. History from : Patient and Medical records     Limitations to history : None    Chronic Conditions has a past medical history of Anxiety, Arthritis, Asthma, Bipolar 1 disorder ([address] Chronic combined systolic and diastolic CHF (congestive heart failure) ([address] COPD (chronic obstructive pulmonary disease) ([address] Depression, Diabetes mellitus ([address] GERD (gastroesophageal reflux disease), Hepatitis C, Hypertension, Hyperthyroidism, Hypothyroidism due to medication, Mass of testicle, Mesothelioma ([address] Neuromuscular disorder ([address] Other disorders of kidney and ureter, Paroxysmal A-fib ([address] Peptic ulcer, Prostate enlargement, Pulmonary embolism ([address] Seizures ([address] and Thyroid disease. CONSULTS: Surgeon who performed mandible repair- Dr Jacinta Angel    Discussion with Other Profesionals : None    Social Determinants : : reports that he quit smoking about 13 years ago. His smoking use included cigarettes. He quit smokeless tobacco use about 5 years ago. His smokeless tobacco use included chew.  He reports that he does not currently use drugs after having used the following drugs: Cocaine. He reports that he does not drink alcohol. Records Reviewed : Source patient and Inpatient Notes epic medical records        Disposition Considerations (Tests not ordered but considered, Shared Decision Making, Pt Expectation of Test or Tx.):   Appropriate for outpatient management yes and Evaluation by myself and discharge recommended. I am the Primary Clinician of Record. Counseling: The emergency provider has spoken with the patient and discussed todays results, in addition to providing specific details for the plan of care and counseling regarding the diagnosis and prognosis. Questions are answered at this time and they are agreeable with the plan.      --------------------------------- IMPRESSION AND DISPOSITION ---------------------------------    IMPRESSION  1. Post-op pain        DISPOSITION  Disposition: Discharge to home  Patient condition is stable      NOTE: This report was transcribed using voice recognition software.  Every effort was made to ensure accuracy; however, inadvertent computerized transcription errors may be present      ZOIE Gayle - CNP  03/15/23 [id number]

## 2023-03-13 NOTE — DISCHARGE INSTRUCTIONS
Please follow-up with physician listed for continued medical management regarding your mandible pain. Call in the morning to get an appointment to be seen.

## 2023-03-14 ENCOUNTER — TELEPHONE (OUTPATIENT)
Dept: ENT CLINIC | Age: 68
End: 2023-03-14

## 2023-03-14 NOTE — TELEPHONE ENCOUNTER
[facility]. He was just seen on 3/10/23 for a post op for a fx jaw. He stated his jaw is giving him alot of issues and he is in alot pain. Please, advise on scheduling.  Khari Crespo can be reached at [phone]

## 2023-03-14 NOTE — TELEPHONE ENCOUNTER
MA called pt back x2, ph states number\" has been disconnected or no longer in service\".      Electronically signed by Gregoria Odonnell MA on 3/14/2023 at 9:27 AM

## 2023-03-16 ENCOUNTER — OFFICE VISIT (OUTPATIENT)
Dept: INTERNAL MEDICINE | Age: 68
End: 2023-03-16

## 2023-03-16 ENCOUNTER — APPOINTMENT (OUTPATIENT)
Dept: CT IMAGING | Age: 68
End: 2023-03-16
Payer: MEDICARE

## 2023-03-16 ENCOUNTER — HOSPITAL ENCOUNTER (EMERGENCY)
Age: 68
Discharge: HOME | End: 2023-03-16
Payer: MEDICARE

## 2023-03-16 VITALS
OXYGEN SATURATION: 97 % | SYSTOLIC BLOOD PRESSURE: 120 MMHG | WEIGHT: 154 LBS | HEART RATE: 92 BPM | HEIGHT: 64 IN | RESPIRATION RATE: 18 BRPM | BODY MASS INDEX: 26.29 KG/M2 | DIASTOLIC BLOOD PRESSURE: 78 MMHG | TEMPERATURE: 97.2 F

## 2023-03-16 VITALS
HEART RATE: 81 BPM | TEMPERATURE: 97.7 F | DIASTOLIC BLOOD PRESSURE: 77 MMHG | RESPIRATION RATE: 16 BRPM | OXYGEN SATURATION: 100 % | SYSTOLIC BLOOD PRESSURE: 131 MMHG

## 2023-03-16 DIAGNOSIS — S02.609D CLOSED FRACTURE OF MANDIBLE WITH ROUTINE HEALING, UNSPECIFIED LATERALITY, UNSPECIFIED MANDIBULAR SITE, SUBSEQUENT ENCOUNTER: Primary | ICD-10-CM

## 2023-03-16 DIAGNOSIS — G89.18 POST-OP PAIN: Primary | ICD-10-CM

## 2023-03-16 PROCEDURE — 6370000000 HC RX 637 (ALT 250 FOR IP): Performed by: PHYSICIAN ASSISTANT

## 2023-03-16 PROCEDURE — 99284 EMERGENCY DEPT VISIT MOD MDM: CPT

## 2023-03-16 PROCEDURE — 70486 CT MAXILLOFACIAL W/O DYE: CPT

## 2023-03-16 RX ORDER — CYCLOBENZAPRINE HCL 5 MG
TABLET ORAL
COMMUNITY
Start: 2023-03-15 | End: 2023-03-18

## 2023-03-16 RX ORDER — LEVOTHYROXINE SODIUM 0.03 MG/1
TABLET ORAL
COMMUNITY
Start: 2023-02-14

## 2023-03-16 RX ORDER — GABAPENTIN 300 MG/1
CAPSULE ORAL
COMMUNITY
Start: 2023-03-11

## 2023-03-16 RX ORDER — IBUPROFEN 800 MG/1
TABLET ORAL
COMMUNITY
Start: 2023-03-16

## 2023-03-16 RX ORDER — ACETAMINOPHEN 500 MG
1000 TABLET ORAL 3 TIMES DAILY PRN
Qty: 42 TABLET | Refills: 0 | Status: SHIPPED | OUTPATIENT
Start: 2023-03-16 | End: 2023-03-23

## 2023-03-16 RX ORDER — DIVALPROEX SODIUM 500 MG/1
TABLET, DELAYED RELEASE ORAL
COMMUNITY
Start: 2023-03-06

## 2023-03-16 RX ORDER — VENLAFAXINE HYDROCHLORIDE 75 MG/1
CAPSULE, EXTENDED RELEASE ORAL
COMMUNITY
Start: 2023-03-06

## 2023-03-16 RX ORDER — OXYCODONE HYDROCHLORIDE AND ACETAMINOPHEN 5; 325 MG/1; MG/1
2 TABLET ORAL ONCE
Status: COMPLETED | OUTPATIENT
Start: 2023-03-16 | End: 2023-03-16

## 2023-03-16 RX ORDER — RIVAROXABAN 20 MG/1
TABLET, FILM COATED ORAL
COMMUNITY
Start: 2022-12-29

## 2023-03-16 RX ORDER — CLONAZEPAM 1 MG/1
TABLET ORAL
COMMUNITY
Start: 2023-03-16

## 2023-03-16 RX ADMIN — OXYCODONE AND ACETAMINOPHEN 2 TABLET: 5; 325 TABLET ORAL at 15:38

## 2023-03-16 ASSESSMENT — PAIN DESCRIPTION - LOCATION
LOCATION: OTHER (COMMENT)
LOCATION: JAW

## 2023-03-16 ASSESSMENT — ENCOUNTER SYMPTOMS
ABDOMINAL PAIN: 0
SHORTNESS OF BREATH: 0
NAUSEA: 0
COUGH: 0
VOMITING: 0
DIARRHEA: 0
WHEEZING: 0
FACIAL SWELLING: 0

## 2023-03-16 ASSESSMENT — PAIN SCALES - GENERAL
PAINLEVEL_OUTOF10: 10
PAINLEVEL_OUTOF10: 9

## 2023-03-16 ASSESSMENT — PAIN DESCRIPTION - ORIENTATION: ORIENTATION: LOWER

## 2023-03-16 ASSESSMENT — PAIN DESCRIPTION - PAIN TYPE: TYPE: ACUTE PAIN

## 2023-03-16 ASSESSMENT — PAIN - FUNCTIONAL ASSESSMENT: PAIN_FUNCTIONAL_ASSESSMENT: 0-10

## 2023-03-16 ASSESSMENT — PAIN DESCRIPTION - DESCRIPTORS: DESCRIPTORS: ACHING;STABBING

## 2023-03-16 NOTE — PROGRESS NOTES
Post-Discharge Transitional Care Follow Up      Rafi Alanis   YOB: 1955    Date of Office Visit:  3/16/2023  Date of Hospital Admission: 3/13/23  Date of Hospital Discharge: 3/13/23  Readmission Risk Score (high >=14%. Medium >=10%):Readmission Risk Score: 16.5      Care management risk score Rising risk (score 2-5) and Complex Care (Scores >=6): No Risk Score On File     Non face to face  following discharge, date last encounter closed (first attempt may have been earlier): 03/09/2023     Call initiated 2 business days of discharge: Yes         Medical Decision Making: straightforward  No follow-ups on file. Subjective:   HPI  Patient was admitted at [facility] from  3/3 to 3/7 for mandible fracture s/p fall. He was evaluated by ENT and underwent ORIF on 3/3/23. Pain was managed by ENT during this admission with percocet and lidocaine patch. Patient was instructed by ENT to manage pain with acetaminophen as an outpatient. On 3/9/23 the patient presented to the ED complaining of jaw pain. He was provided 1 time dose of percocet and instructed to follow up with ENT. On 3/10 he had a  post op visit with ENT, Dr. Emir Solano. He complained of continued pain and was provided with a prescription for toradol 10 mg q6h - 20 tablets provided     On 3/10 he presented again to the ED for pain; he was given a norflex injection and instructed to follow up with ENT. On 3/13 he presented again to the ED for pain ; he was given a norflex injection and instructed to follow up with ENT.     Patient Active Problem List   Diagnosis    Hyperthyroidism    Hepatitis C    Mood disorder ([address]    Pain in both testicles    Mild mitral regurgitation    Hep C w/o coma, chronic (HCC)    Chronic obstructive pulmonary disease (HCC)    Dyspnea and respiratory abnormalities    Respiratory failure with hypoxia (HCC)    Severe protein-calorie malnutrition ([address]    Essential (primary) hypertension    H/O drug abuse ([facility]    Gastroduodenal ulcer    Seasonal allergic rhinitis    Type 2 diabetes mellitus ([facility]    Vitamin D deficiency    LV dysfunction    S/P MVR (mitral valve repair)    NSTEMI (non-ST elevated myocardial infarction) ([facility]    Suicidal ideations    Benzodiazepine dependence ([facility]    Bipolar disorder current episode depressed ([facility]    Polysubstance abuse ([facility]    Cluster B personality disorder ([facility]    History of noncompliance with medical treatment, presenting hazards to health    Cocaine abuse ([facility]    Cannabis abuse    Bipolar disorder ([facility]    Bilateral fracture of mandible, open, initial encounter ([facility]       Medications listed as ordered at the time of discharge from hospital     Medication List            Accurate as of March 16, 2023  2:45 PM. If you have any questions, ask your nurse or doctor. CHANGE how you take these medications      gabapentin 300 MG capsule  Commonly known as: NEURONTIN  What changed: Another medication with the same name was removed. Continue taking this medication, and follow the directions you see here. Changed by: Deandra Ferguson MD     * venlafaxine 37.5 MG extended release capsule  Commonly known as: EFFEXOR XR  Take 1 capsule by mouth daily (with breakfast)  What changed:   how much to take  when to take this  additional instructions     * venlafaxine 75 MG extended release capsule  Commonly known as: EFFEXOR XR  What changed: Another medication with the same name was changed. Make sure you understand how and when to take each. * This list has 2 medication(s) that are the same as other medications prescribed for you. Read the directions carefully, and ask your doctor or other care provider to review them with you.                 CONTINUE taking these medications      atorvastatin 40 MG tablet  Commonly known as: LIPITOR  Take 1 tablet by mouth nightly     Breo Ellipta 200-25 MCG/ACT Aepb inhaler  Generic drug: fluticasone furoate-vilanterol   clonazePAM 1 MG tablet  Commonly known as: KLONOPIN     cyclobenzaprine 5 MG tablet  Commonly known as: FLEXERIL     divalproex 500 MG DR tablet  Commonly known as: DEPAKOTE     finasteride 5 MG tablet  Commonly known as: PROSCAR  Take 1 tablet by mouth daily     ibuprofen 800 MG tablet  Commonly known as: ADVIL;MOTRIN     ketorolac 10 MG tablet  Commonly known as: TORADOL  Take 1 tablet by mouth every 6 hours as needed for Pain     Latuda 120 MG tablet  Generic drug: lurasidone     levETIRAcetam 500 MG tablet  Commonly known as: KEPPRA     levothyroxine 25 MCG tablet  Commonly known as: SYNTHROID     lisinopril 10 MG tablet  Commonly known as: PRINIVIL;ZESTRIL  Take 1 tablet by mouth daily     Magic Mouthwash  Commonly known as: Miracle Mouthwash  Swish and spit 5 mLs 4 times daily as needed for Irritation     methIMAzole 10 MG tablet  Commonly known as: TAPAZOLE     metoclopramide 10 MG tablet  Commonly known as: Reglan  Take 1 tablet by mouth 4 times daily for 5 days     RA Balanced Nutritional Plus Liqd  Take 1 Bottle by mouth daily     sildenafil 50 MG tablet  Commonly known as: VIAGRA     therapeutic multivitamin-minerals tablet     traZODone 50 MG tablet  Commonly known as: DESYREL  Take 1 tablet by mouth nightly as needed for Sleep     Xarelto 20 MG Tabs tablet  Generic drug: rivaroxaban               Medications marked \"taking\" at this time  Outpatient Medications Marked as Taking for the 3/16/23 encounter (Office Visit) with Darby Lee MD   Medication Sig Dispense Refill    clonazePAM (KLONOPIN) 1 MG tablet       cyclobenzaprine (FLEXERIL) 5 MG tablet       divalproex (DEPAKOTE) 500 MG DR tablet take 1 tablet by mouth at bedtime      levothyroxine (SYNTHROID) 25 MCG tablet take 1 tablet by mouth once daily      venlafaxine (EFFEXOR XR) 75 MG extended release capsule take 1 capsule by mouth once daily      gabapentin (NEURONTIN) 300 MG capsule       XARELTO 20 MG TABS tablet take 1 tablet by mouth  once daily with EVENING MEAL      Magic Mouthwash (MIRACLE MOUTHWASH) Swish and spit 5 mLs 4 times daily as needed for Irritation 370 mL 2    metoclopramide (REGLAN) 10 MG tablet Take 1 tablet by mouth 4 times daily for 5 days 20 tablet 0    LATUDA 120 MG tablet       sildenafil (VIAGRA) 50 MG tablet take 1 tablet by mouth 1 hour prior to intercourse      levETIRAcetam (KEPPRA) 500 MG tablet Take 1,000 mg by mouth 2 times daily      venlafaxine (EFFEXOR XR) 37.5 MG extended release capsule Take 1 capsule by mouth daily (with breakfast) (Patient taking differently: Take 75 mg by mouth in the morning and at bedtime 75 mg in the day and 36 mg at night.) 30 capsule 0    atorvastatin (LIPITOR) 40 MG tablet Take 1 tablet by mouth nightly 30 tablet 0    traZODone (DESYREL) 50 MG tablet Take 1 tablet by mouth nightly as needed for Sleep 30 tablet 0    Multiple Vitamins-Minerals (THERAPEUTIC MULTIVITAMIN-MINERALS) tablet Take 1 tablet by mouth daily      BREO ELLIPTA 200-25 MCG/INH AEPB       methimazole (TAPAZOLE) 10 MG tablet Take 10 mg by mouth 3 times daily      lisinopril (PRINIVIL;ZESTRIL) 10 MG tablet Take 1 tablet by mouth daily 30 tablet 11    Nutritional Supplements (RA BALANCED NUTRITIONAL PLUS) LIQD Take 1 Bottle by mouth daily 10 Can 0    finasteride (PROSCAR) 5 MG tablet Take 1 tablet by mouth daily 30 tablet 3        Medications patient taking as of now reconciled against medications ordered at time of hospital discharge: Yes    Review of Systems   Constitutional:  Negative for chills, fatigue and fever. HENT:  Negative for drooling and facial swelling. Respiratory:  Negative for cough, shortness of breath and wheezing. Cardiovascular:  Negative for chest pain, palpitations and leg swelling. Gastrointestinal:  Negative for abdominal pain, diarrhea, nausea and vomiting. Musculoskeletal:  Negative for arthralgias and myalgias.    Neurological:  Negative for dizziness, tremors, syncope, weakness and headaches. Objective:    /78 (Site: Right Upper Arm, Position: Sitting, Cuff Size: Medium Adult)   Pulse 92   Temp 97.2 °F (36.2 °C) (Temporal)   Resp 18   Ht 5' 4\" (1.626 m)   Wt 154 lb (69.9 kg)   SpO2 97% Comment: room air  BMI 26.43 kg/m²   Physical Exam  Vitals reviewed. Constitutional:       Appearance: Normal appearance. HENT:      Head: Normocephalic and atraumatic. Nose: Nose normal.      Mouth/Throat:      Mouth: Mucous membranes are moist.      Pharynx: Oropharynx is clear. Comments: Minimal swelling around mandible; no erythema; no drainage noted   Eyes:      Extraocular Movements: Extraocular movements intact. Conjunctiva/sclera: Conjunctivae normal.      Pupils: Pupils are equal, round, and reactive to light. Cardiovascular:      Rate and Rhythm: Normal rate and regular rhythm. Pulses: Normal pulses. Heart sounds: Normal heart sounds. Pulmonary:      Effort: Pulmonary effort is normal.      Breath sounds: Normal breath sounds. Abdominal:      General: Abdomen is flat. Bowel sounds are normal.      Palpations: Abdomen is soft. Musculoskeletal:         General: Normal range of motion. Cervical back: Normal range of motion and neck supple. Skin:     General: Skin is warm. Neurological:      General: No focal deficit present. Mental Status: He is alert. Psychiatric:         Mood and Affect: Mood normal.         Behavior: Behavior normal.         Thought Content: Thought content normal.         Judgment: Judgment normal.       A&P  Mandible pain 2/2 fracture s/p ORIF  Manage pain with acetaminophen/ibuprofen  Follow up with ENT regarding pain if no improvement seen    Patient became very angry when informed that this office does not prescribe narcotics. He was instructed he can use the toradol givenn to him by ENT to help with pain. Informed him that ENT was trying to get in touch with him as he called them on the 14th.  He became very angry at this and insisted that he never called them as \"'he doesn't know how to use his phone\". Earlier in the visit he had stated that he used the phone at the desk of his group home to call the ENT office. Patient stated he only needs \"5 more norco\" to get him by until he gets his dentures in April. Once again informed the patient that this office does not prescribe narcotics. Patient got very upset by this and walked out of the office stating he was going to the ED for his \"severe pain\". Chronic problems not addressed this visit:  COPD  Paroxysmal a fib  Vitamin D deficiency  Polysubstance abuse  HTN  Hyperthyroid  HFrEF  BPH  Seizures  Mild mitral regurgitation   Allergic rhinitis   Bipolar Disorder type I     An electronic signature was used to authenticate this note.   --Robb Alcaraz MD

## 2023-03-16 NOTE — PROGRESS NOTES
[facility]    Attending Physician Statement  I have discussed the case, including pertinent history and exam findings with the resident physician. I agree with the assessment, plan and orders as documented by the resident. I have reviewed all pertinent PMHx, PSHx, FamHx, SocialHx, medications, and allergies and updated history as appropriate. ED follow up. The pt was admitted related to his bi-mandibular fracture after pt fell flat on is jaw at the curb, seen by oral surgery ORIF done, pt visited two additional ED visit for mandibular pain and received opioid and pt requested additional opioid in this visit. Recommended to have alternate regimen of Motrin and Tylenol and patient declined recommendation. Pt left with remark that he would go back to ED again despite recommendation pt needs to contact oral surgeon's office if the jaw pain is not tolerable. Remainder of medical problems as per resident note.     Ashley Dong MD  3/16/2023 2:29 PM    Encounter time including independent chart review, discussion with patient, interpreting test results and/or external communications: 30'

## 2023-03-16 NOTE — TELEPHONE ENCOUNTER
3rd attempt; ph number listed is no longer in service. Unable to call patient back.      Electronically signed by Bola Long MA on 3/16/23 at 9:23 AM EDT

## 2023-03-16 NOTE — PATIENT INSTRUCTIONS
Dear Claude Lanius,    Thank you for coming to your appointment today. I hope we have addressed all of your needs. Please make sure to do the following:  - Continue your medications as listed. -If you do not hear from the office in 1 week, please call the number listed. - We will see each other again in 3 months    Please follow up with ENT regarding your ongoing jaw pain    Call for a sooner appointment if you develop new or worsening symptoms. Have a great day!     Sincerely,  Salty Rhoades MD PGY-1

## 2023-03-16 NOTE — Clinical Note
Adrian Tellez was seen and treated in our emergency department on 3/16/2023. He may return to work on 03/17/2023. If you have any questions or concerns, please don't hesitate to call.       Yessica Duarte PA-C

## 2023-03-16 NOTE — ED PROVIDER NOTES
Independent MABEL Visit. [facility]  ED  Encounter Note  Admit Date/RoomTime: 3/16/2023  3:06 PM  ED Room: [facility]  NAME: Melba Esquivel  : 1955  MRN: [id number]  PCP: Sulema Perry MD    CHIEF COMPLAINT     Jaw Pain (Lower jaw pain d/t fx. Per pt bumped it yesterday. )    HISTORY OF PRESENT ILLNESS        Melba Esquivel is a 80 y.o. male who presents to the ED by private vehicle for post op pain, beginning since his surgery 3/3/2023 ago. The complaint has been remaining constant and are severe in severity. He states that he \"bumped\" his jaw on a frying pan while cooking yesterday. This is the patient's third emergency department visit for post op pain since his surgery. I did review the patient's incomplete note from his otolaryngologist from 3/3/23. Also reviewed the patient's Internal Medicine visit from today, narrative from A&P reads:     Patient became very angry when informed that this office does not prescribe narcotics. He was instructed he can use the toradol givenn to him by ENT to help with pain. Informed him that ENT was trying to get in touch with him as he called them on the . He became very angry at this and insisted that he never called them as \"'he doesn't know how to use his phone\". Earlier in the visit he had stated that he used the phone at the desk of his group home to call the ENT office. Patient stated he only needs \"5 more norco\" to get him by until he gets his dentures in April. Once again informed the patient that this office does not prescribe narcotics. Patient got very upset by this and walked out of the office stating he was going to the ED for his \"severe pain\". REVIEW OF SYSTEMS     Pertinent positives and negatives are stated within HPI, all other systems reviewed and are negative.     Past Medical History:  has a past medical history of Anxiety, Arthritis, Asthma, Bipolar 1 disorder ([address] Chronic combined systolic and diastolic CHF (congestive heart failure) ([facility] [address] COPD (chronic obstructive pulmonary disease) ([facility] [address] Depression, Diabetes mellitus ([facility] [address] GERD (gastroesophageal reflux disease), Hepatitis C, Hypertension, Hyperthyroidism, Hypothyroidism due to medication, Mass of testicle, Mesothelioma ([facility] [address] Neuromuscular disorder ([facility] [address] Other disorders of kidney and ureter, Paroxysmal A-fib ([facility] [address] Peptic ulcer, Prostate enlargement, Pulmonary embolism ([facility] [address] Seizures ([facility] [address] and Thyroid disease. Surgical History:  has a past surgical history that includes Appendectomy; Lithotripsy; Hemorrhoid surgery; Bladder surgery; Endoscopy, colon, diagnostic (11/13/2015); Abdomen surgery; Colonoscopy; Cardiac surgery; vascular surgery; Cardiac catheterization (05/21/2018); and Mandible surgery (N/A, 3/3/2023). Social History:  reports that he quit smoking about 13 years ago. His smoking use included cigarettes. He quit smokeless tobacco use about 5 years ago. His smokeless tobacco use included chew. He reports that he does not currently use drugs after having used the following drugs: Cocaine. He reports that he does not drink alcohol. Family History: family history includes Cancer in his father, maternal grandfather, and mother; Diabetes in his father, maternal aunt, maternal aunt, maternal aunt, maternal grandmother, and mother; Heart Disease in his paternal grandfather and paternal grandmother; Heart Failure in his paternal grandfather and paternal grandmother.    Allergies: Codeine, Dye [iodides], Ketorolac tromethamine, Peanuts [peanut oil], Shellfish-derived products, and Nitroglycerin  CURRENT MEDICATIONS       Discharge Medication List as of 3/16/2023  5:14 PM        CONTINUE these medications which have NOT CHANGED    Details   clonazePAM (KLONOPIN) 1 MG tablet Historical Med      cyclobenzaprine (FLEXERIL) 5 MG tablet Historical Med      ibuprofen (ADVIL;MOTRIN) 800 MG tablet Historical Med      divalproex (DEPAKOTE) 500 MG DR tablet take 1 tablet by mouth at bedtimeHistorical Med      levothyroxine (SYNTHROID) 25 MCG tablet take 1 tablet by mouth once dailyHistorical Med      !! venlafaxine (EFFEXOR XR) 75 MG extended release capsule take 1 capsule by mouth once dailyHistorical Med      gabapentin (NEURONTIN) 300 MG capsule Historical Med      XARELTO 20 MG TABS tablet take 1 tablet by mouth once daily with EVENING MEAL, DAWHistorical Med      Magic Mouthwash (MIRACLE MOUTHWASH) Swish and spit 5 mLs 4 times daily as needed for Irritation, Disp-370 mL, R-2Normal      ketorolac (TORADOL) 10 MG tablet Take 1 tablet by mouth every 6 hours as needed for Pain, Disp-20 tablet, R-0Normal      metoclopramide (REGLAN) 10 MG tablet Take 1 tablet by mouth 4 times daily for 5 days, Disp-20 tablet, R-0Print      LATUDA 120 MG tablet DAWHistorical Med      sildenafil (VIAGRA) 50 MG tablet take 1 tablet by mouth 1 hour prior to intercourseHistorical Med      levETIRAcetam (KEPPRA) 500 MG tablet Take 1,000 mg by mouth 2 times dailyHistorical Med      !! venlafaxine (EFFEXOR XR) 37.5 MG extended release capsule Take 1 capsule by mouth daily (with breakfast), Disp-30 capsule, R-0Print      atorvastatin (LIPITOR) 40 MG tablet Take 1 tablet by mouth nightly, Disp-30 tablet, R-0Print      traZODone (DESYREL) 50 MG tablet Take 1 tablet by mouth nightly as needed for Sleep, Disp-30 tablet, R-0Normal      Multiple Vitamins-Minerals (THERAPEUTIC MULTIVITAMIN-MINERALS) tablet Take 1 tablet by mouth dailyHistorical Med      BREO ELLIPTA 200-25 MCG/INH AEPB DAWHistorical Med      methimazole (TAPAZOLE) 10 MG tablet Take 10 mg by mouth 3 times dailyHistorical Med      lisinopril (PRINIVIL;ZESTRIL) 10 MG tablet Take 1 tablet by mouth daily, Disp-30 tablet, R-11Normal      Nutritional Supplements (RA BALANCED NUTRITIONAL PLUS) LIQD Take 1 Bottle by mouth daily, Disp-10 Can, R-0Normal      finasteride (PROSCAR) 5 MG tablet Take 1 tablet by mouth daily, Disp-30 tablet, R-3Print       !! - Potential duplicate medications found. Please discuss with provider. SCREENINGS     Rhona Coma Scale  Eye Opening: Spontaneous  Best Verbal Response: Oriented  Best Motor Response: Obeys commands  Rhona Coma Scale Score: 15         CIWA Assessment  BP: 131/77  Heart Rate: 81       PHYSICAL EXAM   Oxygen Saturation Interpretation: Normal on room air analysis. ED Triage Vitals   BP Temp Temp Source Heart Rate Resp SpO2 Height Weight   03/16/23 1503 03/16/23 1430 03/16/23 1430 03/16/23 1430 03/16/23 1503 03/16/23 1430 -- --   131/77 97.7 °F (36.5 °C) Temporal 81 16 100 %         Physical Exam  Constitutional/General: Alert and oriented x3, well appearing, non toxic  HEENT:  NC/NT, patient is edentulous. Minimal swelling to the bilateral mandible  Airway patent. Neck: Supple, full ROM, non tender to palpation in the midline, no stridor, no crepitus, no meningeal signs  Respiratory: Lungs clear to auscultation bilaterally, no wheezes, rales, or rhonchi. Not in respiratory distress  CV:  Regular rate. Regular rhythm. No murmurs, gallops, or rubs. 2+ distal pulses  Chest: No chest wall tenderness  Musculoskeletal: Moves all extremities x 4. Integument: skin warm and dry. No rashes. Lymphatic: no lymphadenopathy noted  Neurologic: GCS 15  Psychiatric: Normal Affect    DIAGNOSTIC RESULTS   (All laboratory and radiology results have been personally reviewed by myself)  Labs:  No results found for this visit on 03/16/23. Imaging: All Radiology results interpreted by Radiologist unless otherwise noted. CT FACIAL BONES WO CONTRAST   Final Result   1. No acute facial bone trauma. 2. Subacute fractures in the anterior mandibular bodies, transfixed by   surgical plate and screws. 3. Chronic bilateral nasal bone fractures.              ED COURSE   Vitals:    Vitals:    03/16/23 1430 03/16/23 1503   BP:  131/77   Pulse: 81    Resp:  16   Temp: 97.7 °F (36.5 °C) TempSrc: Temporal    SpO2: 100%        Patient was given the following medications:  Medications   oxyCODONE-acetaminophen (PERCOCET) 5-325 MG per tablet 2 tablet (2 tablets Oral Given 3/16/23 1538)          PROCEDURES   none    REASSESSMENT   3/16/23       Time: 8960  Patients condition is improving. Results discussed. CONSULTS:  None  DIFFERENTIAL DX_MDM   MDM:   Social Determinants : patient lives in a group home    Records Reviewed : ENT and internal medicine visits from 3/10 and 3/16    CC/HPI Summary, DDx, ED Course, and Reassessment: Patient presents with Jaw Pain (Lower jaw pain d/t fx. Per pt bumped it yesterday. )     Patient with history of recent mandible fracture. States that he \"bumped\" his jaw on a pan while cooking yesterday. Differential diagnosis includes but is not limited to soft tissue versus traumatic bony abnormality. CT facial bones was obtained and interpreted by the radiologist as  1. No acute facial bone trauma. 2. Subacute fractures in the anterior mandibular bodies, transfixed by   surgical plate and screws. 3. Chronic bilateral nasal bone fractures. Patient without acute injury. Notes from his ENT as well as the internal medicine clinic's office visit were reviewed. Patient given 2 Percocet in the emergency department, but has been instructed that since this is likely all from postop pain he must receive any further narcotic medications from his ENT physician. Patient without additional complaints. Airway is patent. Vital signs are normal.  Appropriate discharge and outpatient follow-up. Return for new or worsening symptoms. Plan of Care/Counseling:  Naman Rosas PA-C reviewed today's visit with the patient in addition to providing specific details for the plan of care and counseling regarding the diagnosis and prognosis. Questions are answered at this time and are agreeable with the plan. ASSESSMENT     1.  Post-op pain      DISPOSITION   Discharged home.  Patient condition is good    Discharge Instructions:   Patient referred to  Severiano Bustle, [address] [facility]  [address] [facility]    Schedule an appointment as soon as possible for a visit     NEW MEDICATIONS     Discharge Medication List as of 3/16/2023  5:14 PM        START taking these medications    Details   acetaminophen (TYLENOL) 500 MG tablet Take 2 tablets by mouth 3 times daily as needed for Pain or Fever, Disp-42 tablet, R-0Normal           Electronically signed by Jose Penaloza PA-C   DD: 3/16/23  **This report was transcribed using voice recognition software. Every effort was made to ensure accuracy; however, inadvertent computerized transcription errors may be present.   END OF ED PROVIDER NOTE        Jose Penaloza PA-C  03/16/23 8971

## 2023-03-18 ENCOUNTER — HOSPITAL ENCOUNTER (EMERGENCY)
Age: 68
Discharge: LEFT BEFORE DISCHARGE | End: 2023-03-18
Attending: STUDENT IN AN ORGANIZED HEALTH CARE EDUCATION/TRAINING PROGRAM
Payer: MEDICARE

## 2023-03-18 ENCOUNTER — APPOINTMENT (OUTPATIENT)
Dept: GENERAL RADIOLOGY | Age: 68
End: 2023-03-18
Payer: MEDICARE

## 2023-03-18 ENCOUNTER — HOSPITAL ENCOUNTER (EMERGENCY)
Age: 68
Discharge: HOME | End: 2023-03-18
Attending: STUDENT IN AN ORGANIZED HEALTH CARE EDUCATION/TRAINING PROGRAM

## 2023-03-18 VITALS
DIASTOLIC BLOOD PRESSURE: 60 MMHG | HEART RATE: 71 BPM | OXYGEN SATURATION: 94 % | RESPIRATION RATE: 18 BRPM | TEMPERATURE: 97.8 F | SYSTOLIC BLOOD PRESSURE: 109 MMHG

## 2023-03-18 VITALS
SYSTOLIC BLOOD PRESSURE: 119 MMHG | RESPIRATION RATE: 16 BRPM | DIASTOLIC BLOOD PRESSURE: 65 MMHG | TEMPERATURE: 97.4 F | HEART RATE: 85 BPM | OXYGEN SATURATION: 100 %

## 2023-03-18 DIAGNOSIS — G89.18 POSTOPERATIVE PAIN: ICD-10-CM

## 2023-03-18 DIAGNOSIS — Z76.5 DRUG-SEEKING BEHAVIOR: ICD-10-CM

## 2023-03-18 DIAGNOSIS — R07.9 CHEST PAIN, UNSPECIFIED TYPE: Primary | ICD-10-CM

## 2023-03-18 DIAGNOSIS — G89.29 OTHER CHRONIC PAIN: Primary | ICD-10-CM

## 2023-03-18 LAB
ALBUMIN SERPL-MCNC: 3.5 G/DL (ref 3.5–5.2)
ALP SERPL-CCNC: 61 U/L (ref 40–129)
ALT SERPL-CCNC: 9 U/L (ref 0–40)
ANION GAP SERPL CALCULATED.3IONS-SCNC: 6 MMOL/L (ref 7–16)
AST SERPL-CCNC: 9 U/L (ref 0–39)
BASOPHILS # BLD: 0.03 E9/L (ref 0–0.2)
BASOPHILS NFR BLD: 0.4 % (ref 0–2)
BILIRUB SERPL-MCNC: <0.2 MG/DL (ref 0–1.2)
BUN SERPL-MCNC: 16 MG/DL (ref 6–23)
CALCIUM SERPL-MCNC: 8.5 MG/DL (ref 8.6–10.2)
CHLORIDE SERPL-SCNC: 107 MMOL/L (ref 98–107)
CO2 SERPL-SCNC: 30 MMOL/L (ref 22–29)
CREAT SERPL-MCNC: 1 MG/DL (ref 0.7–1.2)
EKG ATRIAL RATE: 72 BPM
EKG P AXIS: 6 DEGREES
EKG P-R INTERVAL: 146 MS
EKG Q-T INTERVAL: 374 MS
EKG QRS DURATION: 80 MS
EKG QTC CALCULATION (BAZETT): 409 MS
EKG R AXIS: -12 DEGREES
EKG T AXIS: 41 DEGREES
EKG VENTRICULAR RATE: 72 BPM
EOSINOPHIL # BLD: 0.23 E9/L (ref 0.05–0.5)
EOSINOPHIL NFR BLD: 3 % (ref 0–6)
ERYTHROCYTE [DISTWIDTH] IN BLOOD BY AUTOMATED COUNT: 14.6 FL (ref 11.5–15)
GLUCOSE SERPL-MCNC: 112 MG/DL (ref 74–99)
HCT VFR BLD AUTO: 30.8 % (ref 37–54)
HGB BLD-MCNC: 10 G/DL (ref 12.5–16.5)
IMM GRANULOCYTES # BLD: 0.04 E9/L
IMM GRANULOCYTES NFR BLD: 0.5 % (ref 0–5)
LIPASE: 15 U/L (ref 13–60)
LYMPHOCYTES # BLD: 1.39 E9/L (ref 1.5–4)
LYMPHOCYTES NFR BLD: 18.1 % (ref 20–42)
MCH RBC QN AUTO: 30.6 PG (ref 26–35)
MCHC RBC AUTO-ENTMCNC: 32.5 % (ref 32–34.5)
MCV RBC AUTO: 94.2 FL (ref 80–99.9)
MONOCYTES # BLD: 0.67 E9/L (ref 0.1–0.95)
MONOCYTES NFR BLD: 8.7 % (ref 2–12)
NEUTROPHILS # BLD: 5.34 E9/L (ref 1.8–7.3)
NEUTS SEG NFR BLD: 69.3 % (ref 43–80)
PLATELET # BLD AUTO: 190 E9/L (ref 130–450)
PMV BLD AUTO: 11.9 FL (ref 7–12)
POTASSIUM SERPL-SCNC: 4.4 MMOL/L (ref 3.5–5)
PROT SERPL-MCNC: 6.2 G/DL (ref 6.4–8.3)
RBC # BLD AUTO: 3.27 E12/L (ref 3.8–5.8)
SODIUM SERPL-SCNC: 143 MMOL/L (ref 132–146)
TROPONIN, HIGH SENSITIVITY: 17 NG/L (ref 0–11)
WBC # BLD: 7.7 E9/L (ref 4.5–11.5)

## 2023-03-18 PROCEDURE — 93010 ELECTROCARDIOGRAM REPORT: CPT | Performed by: INTERNAL MEDICINE

## 2023-03-18 PROCEDURE — 99285 EMERGENCY DEPT VISIT HI MDM: CPT

## 2023-03-18 PROCEDURE — 71045 X-RAY EXAM CHEST 1 VIEW: CPT

## 2023-03-18 PROCEDURE — 84484 ASSAY OF TROPONIN QUANT: CPT

## 2023-03-18 PROCEDURE — 80053 COMPREHEN METABOLIC PANEL: CPT

## 2023-03-18 PROCEDURE — 93005 ELECTROCARDIOGRAM TRACING: CPT | Performed by: STUDENT IN AN ORGANIZED HEALTH CARE EDUCATION/TRAINING PROGRAM

## 2023-03-18 PROCEDURE — 85025 COMPLETE CBC W/AUTO DIFF WBC: CPT

## 2023-03-18 PROCEDURE — 83690 ASSAY OF LIPASE: CPT

## 2023-03-18 PROCEDURE — 36415 COLL VENOUS BLD VENIPUNCTURE: CPT

## 2023-03-18 RX ORDER — METHOCARBAMOL 500 MG/1
500 TABLET, FILM COATED ORAL 2 TIMES DAILY
Qty: 6 TABLET | Refills: 0 | Status: SHIPPED | OUTPATIENT
Start: 2023-03-18 | End: 2023-03-21

## 2023-03-18 ASSESSMENT — ENCOUNTER SYMPTOMS
BACK PAIN: 0
NAUSEA: 0
ABDOMINAL PAIN: 0
DIARRHEA: 0
CHEST TIGHTNESS: 0
PHOTOPHOBIA: 0
ABDOMINAL DISTENTION: 0
VOMITING: 0
COUGH: 0
SHORTNESS OF BREATH: 0

## 2023-03-18 ASSESSMENT — LIFESTYLE VARIABLES
HOW OFTEN DO YOU HAVE A DRINK CONTAINING ALCOHOL: NEVER
HOW OFTEN DO YOU HAVE A DRINK CONTAINING ALCOHOL: NEVER
HOW MANY STANDARD DRINKS CONTAINING ALCOHOL DO YOU HAVE ON A TYPICAL DAY: PATIENT DOES NOT DRINK
HOW MANY STANDARD DRINKS CONTAINING ALCOHOL DO YOU HAVE ON A TYPICAL DAY: PATIENT DOES NOT DRINK

## 2023-03-18 NOTE — ED PROVIDER NOTES
Independent MABEL Visit. [facility]  ED  Encounter Note  Admit Date/RoomTime: 3/18/2023  2:18 PM  ED Room: PR1/PR1  NAME: Shayy Issa  : 1955  MRN: [id number]  PCP: Sabina Fletcher MD    CHIEF COMPLAINT     Jaw Pain    HISTORY OF PRESENT ILLNESS        Shayy Issa is a 80 y.o. male who presents to the ED by private vehicle for postoperative pain. Patient was acute jaw fracture a few weeks prior. Patient has been seen by ear nose and throat Dr. Olivia Van. Patient has been seen multiple times for visits for his jaw pain. Patient has been given Percocets, Norflex prednisone and Tylenol at his visits. Patient was just seen on 3/16 and given 10 mg of oxycodone. Patient presented today stating that he is having continued jaw pain. In reviewing the chart Dr. Marcia Alvarado has attempted to get in contact with the patient since March 10 and is called 3 times. Patient states that he has not been able to get into pain management. Patient denies any new falls or trauma. Patient still tolerating food and drink. Patient currently has chew in his mouth. Patient currently denying any headaches, blurry vision, chest pain, shortness of breath, fever, chills, nausea, vomiting, severe abdominal pain, change in bowel or bladder movements. REVIEW OF SYSTEMS     Pertinent positives and negatives are stated within HPI, all other systems reviewed and are negative.     Past Medical History:  has a past medical history of Anxiety, Arthritis, Asthma, Bipolar 1 disorder ([address] Chronic combined systolic and diastolic CHF (congestive heart failure) ([address] COPD (chronic obstructive pulmonary disease) ([address] Depression, Diabetes mellitus ([address] GERD (gastroesophageal reflux disease), Hepatitis C, Hypertension, Hyperthyroidism, Hypothyroidism due to medication, Mass of testicle, Mesothelioma [facility]), Neuromuscular disorder ([address] Other disorders of kidney and ureter, Paroxysmal A-fib ([facility] [address] Peptic ulcer, Prostate enlargement, Pulmonary embolism ([facility] [address] Seizures ([facility] [address] and Thyroid disease. Surgical History:  has a past surgical history that includes Appendectomy; Lithotripsy; Hemorrhoid surgery; Bladder surgery; Endoscopy, colon, diagnostic (11/13/2015); Abdomen surgery; Colonoscopy; Cardiac surgery; vascular surgery; Cardiac catheterization (05/21/2018); and Mandible surgery (N/A, 3/3/2023). Social History:  reports that he quit smoking about 13 years ago. His smoking use included cigarettes. He quit smokeless tobacco use about 5 years ago. His smokeless tobacco use included chew. He reports that he does not currently use drugs after having used the following drugs: Cocaine. He reports that he does not drink alcohol. Family History: family history includes Cancer in his father, maternal grandfather, and mother; Diabetes in his father, maternal aunt, maternal aunt, maternal aunt, maternal grandmother, and mother; Heart Disease in his paternal grandfather and paternal grandmother; Heart Failure in his paternal grandfather and paternal grandmother.    Allergies: Codeine, Dye [iodides], Ketorolac tromethamine, Peanuts [peanut oil], Shellfish-derived products, and Nitroglycerin  CURRENT MEDICATIONS       Previous Medications    ACETAMINOPHEN (TYLENOL) 500 MG TABLET    Take 2 tablets by mouth 3 times daily as needed for Pain or Fever    ATORVASTATIN (LIPITOR) 40 MG TABLET    Take 1 tablet by mouth nightly    BREO ELLIPTA 200-25 MCG/INH AEPB        CLONAZEPAM (KLONOPIN) 1 MG TABLET        DIVALPROEX (DEPAKOTE) 500 MG DR TABLET    take 1 tablet by mouth at bedtime    FINASTERIDE (PROSCAR) 5 MG TABLET    Take 1 tablet by mouth daily    GABAPENTIN (NEURONTIN) 300 MG CAPSULE        IBUPROFEN (ADVIL;MOTRIN) 800 MG TABLET        KETOROLAC (TORADOL) 10 MG TABLET    Take 1 tablet by mouth every 6 hours as needed for Pain    LATUDA 120 MG TABLET        LEVETIRACETAM (KEPPRA) 500 MG TABLET    Take 1,000 mg by mouth 2 times daily    LEVOTHYROXINE (SYNTHROID) 25 MCG TABLET    take 1 tablet by mouth once daily    LISINOPRIL (PRINIVIL;ZESTRIL) 10 MG TABLET    Take 1 tablet by mouth daily    MAGIC MOUTHWASH (MIRACLE MOUTHWASH)    Swish and spit 5 mLs 4 times daily as needed for Irritation    METHIMAZOLE (TAPAZOLE) 10 MG TABLET    Take 10 mg by mouth 3 times daily    METOCLOPRAMIDE (REGLAN) 10 MG TABLET    Take 1 tablet by mouth 4 times daily for 5 days    MULTIPLE VITAMINS-MINERALS (THERAPEUTIC MULTIVITAMIN-MINERALS) TABLET    Take 1 tablet by mouth daily    NUTRITIONAL SUPPLEMENTS (RA BALANCED NUTRITIONAL PLUS) LIQD    Take 1 Bottle by mouth daily    SILDENAFIL (VIAGRA) 50 MG TABLET    take 1 tablet by mouth 1 hour prior to intercourse    TRAZODONE (DESYREL) 50 MG TABLET    Take 1 tablet by mouth nightly as needed for Sleep    VENLAFAXINE (EFFEXOR XR) 37.5 MG EXTENDED RELEASE CAPSULE    Take 1 capsule by mouth daily (with breakfast)    VENLAFAXINE (EFFEXOR XR) 75 MG EXTENDED RELEASE CAPSULE    take 1 capsule by mouth once daily    XARELTO 20 MG TABS TABLET    take 1 tablet by mouth once daily with EVENING MEAL       SCREENINGS               CIWA Assessment  BP: 119/65  Heart Rate: 85       PHYSICAL EXAM   Oxygen Saturation Interpretation: Normal on room air analysis. ED Triage Vitals   BP Temp Temp Source Heart Rate Resp SpO2 Height Weight   03/18/23 1413 03/18/23 1330 03/18/23 1330 03/18/23 1330 03/18/23 1413 03/18/23 1330 -- --   119/65 97.4 °F (36.3 °C) Temporal 85 16 100 %           Physical Exam  Constitutional/General: Alert and oriented x3, well appearing, non toxic  HEENT:  NC/NT. PERRLA,  Airway patent. Entire jaw palpated no pain elicited. Patient can open and close mouth with ease. No evidence of trismus. Airway is patent. Chew noted in the front of the patient's bottom lip.   Neck: Supple, full ROM, non tender to palpation in the midline, no stridor, no crepitus, no meningeal signs  Respiratory: Lungs clear to auscultation bilaterally, no wheezes, rales, or rhonchi. Not in respiratory distress  CV:  Regular rate. Regular rhythm. No murmurs, gallops, or rubs. 2+ distal pulses  Chest: No chest wall tenderness  GI:  Abdomen Soft, Non tender, Non distended. +BS. No rebound, guarding, or rigidity. No pulsatile masses. Musculoskeletal: Moves all extremities x 4. Warm and well perfused, no clubbing, cyanosis, or edema. Capillary refill <3 seconds  Integument: skin warm and dry. No rashes. Lymphatic: no lymphadenopathy noted  Neurologic: GCS 15, no focal deficits, symmetric strength 5/5 in the upper and lower extremities bilaterally  Psychiatric: Normal Affect    DIAGNOSTIC RESULTS   (All laboratory and radiology results have been personally reviewed by myself)  Labs:  No results found for this visit on 03/18/23. Imaging: All Radiology results interpreted by Radiologist unless otherwise noted. No orders to display       ED COURSE   Vitals:    Vitals:    03/18/23 1330 03/18/23 1413   BP:  119/65   Pulse: 85    Resp:  16   Temp: 97.4 °F (36.3 °C)    TempSrc: Temporal    SpO2: 100%        Patient was given the following medications:  Medications - No data to display       PROCEDURES   none    REASSESSMENT   3/18/23       Time:   46  I educated the patient that he needs to call  [facility]  Patient asking specifically for narcotic pain medication. I offered the patient Tylenol. I also offered the patient prednisone. I also offered the patient Norflex. CONSULTS:  None  DIFFERENTIAL DX_MDM   MDM:   Social Determinants : None    Records Reviewed : None_ n/a per encounter visit    CC/HPI Summary, DDx, ED Course, and Reassessment: Patient presents with Jaw Pain     Nathan Richardson is a 80 y.o. male who presents to the ED by private vehicle for postoperative pain. Patient was acute jaw fracture a few weeks prior. Patient has been seen by ear nose and throat Dr. Tori Ortiz. Patient has been seen multiple times for visits for his jaw pain.  Patient has been given Percocets, Norflex prednisone and Tylenol at his visits.  Patient was just seen on 3/16 and given 10 mg of oxycodone.  Patient presented today stating that he is having continued jaw pain.  In reviewing the chart Dr. West has attempted to get in contact with the patient since March 10 and is called 3 times.  Patient states that he has not been able to get into pain management.  Patient denies any new falls or trauma.  Patient still tolerating food and drink.  Patient currently has chew in his mouth.  Patient currently denying any headaches, blurry vision, chest pain, shortness of breath, fever, chills, nausea, vomiting, severe abdominal pain, change in bowel or bladder movements.    Patient became very argumentative in triage.  Patient states \"why can she just give me 1 pain pill.\"  Patient was educated that I can offer him anti-inflammatories, muscle relaxers and steroids.  I explained to the patient that he has been here multiple times for pain medication and that he needs to follow-up with a pain management specialist.  I told him that his drug score was very elevated.  I told him that I would no longer give him any more pain medication in the emergency department.  Patient stated \"I want to see a real doctor, you are mean.\"  Patient then stated \"Tylenol is for babies, I am does get a go by it on the street.\"  Patient then got up and stormed out of triage room.    3/18/2023 / Time:   2:30 PM EDT.  PATIENT HAS ELOPED FROM DEPARTMENT PRIOR TO DISCHARGE / COMPLETION OF CARE AND DID NOT RETURN.     1440 patient starting to check back into the emergency department, patient refusing to see me.  At this time I staffed the case with Dr. Escamilla.  Dr. Escamilla had a long conversation with the patient and also refused to give pain medication.  Again offered for muscle relaxers and steroids.  Patient finally has excepted Robaxin.   Patient will be given Robaxin twice a day for 3 days. Patient was advised the risk, benefits side effects of the medication. He voiced understanding and agreed with the plan of management. Patient is advised cannot drive on this medication.  ------------------------------------------------------------------------------------------------------------------  Plan of Care/Counseling:  Augie Gary PA-C reviewed today's visit with the patient in addition to providing specific details for the plan of care and counseling regarding the diagnosis and prognosis. Questions are answered at this time and are agreeable with the plan. ASSESSMENT     1. Other chronic pain    2. Postoperative pain        DISPOSITION   Patient D/C to home  Patient condition is stable    Discharge Instructions:   Patient referred to  Danish Hutton, [address] [place] [address] [phone]    Schedule an appointment as soon as possible for a visit   For Follow Up    Andreas Rainey MD  [facility] [address]  [phone]      PAIN MANAGEMENT NUMBER  NEW MEDICATIONS     New Prescriptions    METHOCARBAMOL (ROBAXIN) 500 MG TABLET    Take 1 tablet by mouth in the morning and at bedtime for 3 days     Electronically signed by Augie Gary PA-C   DD: 3/18/23  **This report was transcribed using voice recognition software. Every effort was made to ensure accuracy; however, inadvertent computerized transcription errors may be present.   END OF ED PROVIDER NOTE       Augie Gary PA-C  03/18/23 7645

## 2023-03-18 NOTE — ED PROVIDER NOTES
Milady Patel is a 80year old male who presented to ED with concern for chest pain. Patient stated that he is having substernal chest pain that is rating down the left arm. Patient denies any shortness of breath patient's pain is coming and going. Patient states he has been having pain present for over 1 month and it just recurred about 1 hour ago. Patient was previously seen in the emergency department earlier today for concern for pain medication. Patient has been coming to emergency department for narcotic pain medication due to chronic jaw pain and back pain patient was advised he cannot receive narcotic pain medication and left patient then returned by EMS and stated that he had recurrence of his chest pain. Patient denies lightheadedness, dizziness, syncope patient does have a history of mitral valve replacement 2017 by Dr. Vinod Taylor. Patient denies shortness of breath. The history is provided by the patient, medical records and the EMS personnel. Review of Systems   Constitutional:  Negative for chills, diaphoresis, fatigue and fever. Eyes:  Negative for photophobia and visual disturbance. Respiratory:  Negative for cough, chest tightness and shortness of breath. Cardiovascular:  Positive for chest pain. Negative for palpitations and leg swelling. Gastrointestinal:  Negative for abdominal distention, abdominal pain, diarrhea, nausea and vomiting. Genitourinary:  Negative for dysuria. Musculoskeletal:  Negative for back pain, neck pain and neck stiffness. Skin:  Negative for pallor and rash. Neurological:  Negative for headaches. Psychiatric/Behavioral:  Negative for confusion. Physical Exam  Vitals and nursing note reviewed. Constitutional:       General: He is not in acute distress. Appearance: He is ill-appearing. Comments: Chronically ill appearance    HENT:      Head: Normocephalic and atraumatic. Eyes:      General: No scleral icterus. Conjunctiva/sclera: Conjunctivae normal.      Pupils: Pupils are equal, round, and reactive to light. Cardiovascular:      Rate and Rhythm: Normal rate and regular rhythm. Pulmonary:      Effort: Pulmonary effort is normal.      Breath sounds: Normal breath sounds. Comments: Reproducible pain left sided chest  Chest:      Chest wall: Tenderness present. Abdominal:      General: Bowel sounds are normal. There is no distension. Palpations: Abdomen is soft. Tenderness: There is no abdominal tenderness. There is no guarding or rebound. Musculoskeletal:      Cervical back: Normal range of motion and neck supple. No rigidity. No muscular tenderness. Right lower leg: No edema. Left lower leg: No edema. Skin:     General: Skin is warm and dry. Capillary Refill: Capillary refill takes less than 2 seconds. Coloration: Skin is not pale. Findings: No erythema or rash. Neurological:      Mental Status: He is alert and oriented to person, place, and time. Psychiatric:         Mood and Affect: Mood normal.        Procedures     MDM  Number of Diagnoses or Management Options  Chest pain, unspecified type  Drug-seeking behavior  Diagnosis management comments: Edilma Andrade is a 80year old male who presented to ED with chest pain and jaw pain  Patient requested narcotics for his pain  Patient was advised that we cannot give him narcotics for his pain that it would not be appropriate treatment  Chart reviewed that patient has had multiple visits for the same compliant   Patient was advised that he needed to follow up with pain management or PCP to discuss chronic pain.   Patient did not appear to be in any distress while in ED  See ed course for further information  [facility] reviewed patient has significant overdose risk  Patient was offered evaluation for chest pain and CT facial bones since he feels his pain has worsened  Patient declined and eloped from ED before evaluation could be completed  Patient declined nitro and asa he stated he has anaphylaxis with these medications  CXR was reviewed and interpreted by myself and radiology significant for atelectasis vs developing pna LLQ, patient eloped before this could be discussed with patient, patient does not have infectious symptoms less likely symptoms due to pna  Chart reviewed previous echo EF 60%    History provided by patient and EMS  Social determents of health include hx of cocaine abuse, polysubstance abuse, mental health conditions  Co morbidities include HTN, hep C, DM, COPD,   Differential diagnosis includes not limited to ACS, PE, drug abuse, drug withdrawal, pna, aortic pathology, mandibular fracture         ED Course as of 03/19/23 0018   Sat Mar 18, 2023   1812 Ejection fraction is visually estimated at 60-65%. From ECHO 3/6/2023 [SS]   1813 EKG: This EKG is signed and interpreted by me. Rate: 72  Rhythm: Sinus  Interpretation: non-specific EKG, no st elevation, ant infarct, left axis deviation  Comparison: changes compared to previous EKG   [SS]   1929 Repeat evaluation patient states he is not having any chest pain and came back to emergency department because he is having jaw pain patient states that his jaw still broken and he states that he stated he had chest pain so he would be seen patient states that he wants narcotics for his jaw pain and wants to be admitted for his jaw pain patient states that it is broken and pain is worse than typical pain he denies new trauma CT facial bones ordered to evaluate for possible complication from recent fx [SS]      ED Course User Index  [SS] Dk Stephenson MD        ED Course as of 03/19/23 0023   Sat Mar 18, 2023   1812 Ejection fraction is visually estimated at 60-65%. From ECHO 3/6/2023 [SS]   1813 EKG: This EKG is signed and interpreted by me.     Rate: 72  Rhythm: Sinus  Interpretation: non-specific EKG, no st elevation, ant infarct, left axis deviation  Comparison: changes compared to previous EKG   [SS]   1929 Repeat evaluation patient states he is not having any chest pain and came back to emergency department because he is having jaw pain patient states that his jaw still broken and he states that he stated he had chest pain so he would be seen patient states that he wants narcotics for his jaw pain and wants to be admitted for his jaw pain patient states that it is broken and pain is worse than typical pain he denies new trauma CT facial bones ordered to evaluate for possible complication from recent fx [SS]      ED Course User Index  [SS] Israel Barrios MD       --------------------------------------------- PAST HISTORY ---------------------------------------------  Past Medical History:  has a past medical history of Anxiety, Arthritis, Asthma, Bipolar 1 disorder ([address] Chronic combined systolic and diastolic CHF (congestive heart failure) ([address] COPD (chronic obstructive pulmonary disease) ([address] Depression, Diabetes mellitus ([address] GERD (gastroesophageal reflux disease), Hepatitis C, Hypertension, Hyperthyroidism, Hypothyroidism due to medication, Mass of testicle, Mesothelioma ([address] Neuromuscular disorder ([address] Other disorders of kidney and ureter, Paroxysmal A-fib ([address] Peptic ulcer, Prostate enlargement, Pulmonary embolism ([address] Seizures ([address] and Thyroid disease. Past Surgical History:  has a past surgical history that includes Appendectomy; Lithotripsy; Hemorrhoid surgery; Bladder surgery; Endoscopy, colon, diagnostic (11/13/2015); Abdomen surgery; Colonoscopy; Cardiac surgery; vascular surgery; Cardiac catheterization (05/21/2018); and Mandible surgery (N/A, 3/3/2023). Social History:  reports that he quit smoking about 13 years ago. His smoking use included cigarettes. He quit smokeless tobacco use about 5 years ago. His smokeless tobacco use included chew.  He reports that he does not currently use drugs after having used the following drugs: Cocaine. He reports that he does not drink alcohol. Family History: family history includes Cancer in his father, maternal grandfather, and mother; Diabetes in his father, maternal aunt, maternal aunt, maternal aunt, maternal grandmother, and mother; Heart Disease in his paternal grandfather and paternal grandmother; Heart Failure in his paternal grandfather and paternal grandmother. The patients home medications have been reviewed.     Allergies: Codeine, Dye [iodides], Ketorolac tromethamine, Peanuts [peanut oil], Shellfish-derived products, and Nitroglycerin    -------------------------------------------------- RESULTS -------------------------------------------------  Labs:  Results for orders placed or performed during the hospital encounter of 03/18/23   CBC with Auto Differential   Result Value Ref Range    WBC 7.7 4.5 - 11.5 E9/L    RBC 3.27 (L) 3.80 - 5.80 E12/L    Hemoglobin 10.0 (L) 12.5 - 16.5 g/dL    Hematocrit 30.8 (L) 37.0 - 54.0 %    MCV 94.2 80.0 - 99.9 fL    MCH 30.6 26.0 - 35.0 pg    MCHC 32.5 32.0 - 34.5 %    RDW 14.6 11.5 - 15.0 fL    Platelets 009 173 - 230 E9/L    MPV 11.9 7.0 - 12.0 fL    Neutrophils % 69.3 43.0 - 80.0 %    Immature Granulocytes % 0.5 0.0 - 5.0 %    Lymphocytes % 18.1 (L) 20.0 - 42.0 %    Monocytes % 8.7 2.0 - 12.0 %    Eosinophils % 3.0 0.0 - 6.0 %    Basophils % 0.4 0.0 - 2.0 %    Neutrophils Absolute 5.34 1.80 - 7.30 E9/L    Immature Granulocytes # 0.04 E9/L    Lymphocytes Absolute 1.39 (L) 1.50 - 4.00 E9/L    Monocytes Absolute 0.67 0.10 - 0.95 E9/L    Eosinophils Absolute 0.23 0.05 - 0.50 E9/L    Basophils Absolute 0.03 0.00 - 0.20 E9/L   Comprehensive Metabolic Panel w/ Reflex to MG   Result Value Ref Range    Sodium 143 132 - 146 mmol/L    Potassium reflex Magnesium 4.4 3.5 - 5.0 mmol/L    Chloride 107 98 - 107 mmol/L    CO2 30 (H) 22 - 29 mmol/L    Anion Gap 6 (L) 7 - 16 mmol/L    Glucose 112 (H) 74 - 99 mg/dL    BUN 16 6 - 23 mg/dL Creatinine 1.0 0.7 - 1.2 mg/dL    Est, Glom Filt Rate >60 >=60 mL/min/1.73    Calcium 8.5 (L) 8.6 - 10.2 mg/dL    Total Protein 6.2 (L) 6.4 - 8.3 g/dL    Albumin 3.5 3.5 - 5.2 g/dL    Total Bilirubin <0.2 0.0 - 1.2 mg/dL    Alkaline Phosphatase 61 40 - 129 U/L    ALT 9 0 - 40 U/L    AST 9 0 - 39 U/L   Troponin   Result Value Ref Range    Troponin, High Sensitivity 17 (H) 0 - 11 ng/L   Lipase   Result Value Ref Range    Lipase 15 13 - 60 U/L   EKG 12 Lead   Result Value Ref Range    Ventricular Rate 72 BPM    Atrial Rate 72 BPM    P-R Interval 146 ms    QRS Duration 80 ms    Q-T Interval 374 ms    QTc Calculation (Bazett) 409 ms    P Axis 6 degrees    R Axis -12 degrees    T Axis 41 degrees       Radiology:  CT HEAD WO CONTRAST    Result Date: 3/10/2023  EXAMINATION: CT OF THE HEAD WITHOUT CONTRAST  3/10/2023 7:19 pm TECHNIQUE: CT of the head was performed without the administration of intravenous contrast. Automated exposure control, iterative reconstruction, and/or weight based adjustment of the mA/kV was utilized to reduce the radiation dose to as low as reasonably achievable. COMPARISON: March 3, 2023 HISTORY: ORDERING SYSTEM PROVIDED HISTORY: R/O INTRACRANIAL HEMORRHAGE TECHNOLOGIST PROVIDED HISTORY: Reason for exam:->R/O INTRACRANIAL HEMORRHAGE Has a \"code stroke\" or \"stroke alert\" been called? ->No Decision Support Exception - unselect if not a suspected or confirmed emergency medical condition->Emergency Medical Condition ([place]) What reading provider will be dictating this exam?->CRC FINDINGS: BRAIN/VENTRICLES: There is no acute intracranial hemorrhage, mass effect or midline shift. No abnormal extra-axial fluid collection. The gray-white differentiation is maintained without evidence of an acute infarct. There is no evidence of hydrocephalus. ORBITS: The visualized portion of the orbits demonstrate no acute abnormality. SINUSES: Mucosal thickening involving bilateral ethmoid sinuses and maxillary sinuses. Small air-fluid level located in right maxillary sinus. Mastoid air cells are clear. SOFT TISSUES/SKULL:  No acute abnormality of the visualized skull or soft tissues. No acute intracranial abnormality. CT HEAD WO CONTRAST    Result Date: 3/3/2023  EXAMINATION: CT OF THE HEAD WITHOUT CONTRAST  3/3/2023 12:58 pm TECHNIQUE: CT of the head was performed without the administration of intravenous contrast. Automated exposure control, iterative reconstruction, and/or weight based adjustment of the mA/kV was utilized to reduce the radiation dose to as low as reasonably achievable. COMPARISON: 05/02/2022 HISTORY: ORDERING SYSTEM PROVIDED HISTORY: fall TECHNOLOGIST PROVIDED HISTORY: Reason for exam:->fall Has a \"code stroke\" or \"stroke alert\" been called? ->No Decision Support Exception - unselect if not a suspected or confirmed emergency medical condition->Emergency Medical Condition ([place]) What reading provider will be dictating this exam?->CRC FINDINGS: BRAIN/VENTRICLES: There is no acute intracranial hemorrhage, mass effect or midline shift. No abnormal extra-axial fluid collection. The gray-white differentiation is maintained without evidence of an acute infarct. There is no evidence of hydrocephalus. ORBITS: The visualized portion of the orbits demonstrate no acute abnormality. SINUSES: The visualized paranasal sinuses and mastoid air cells demonstrate no acute abnormality. There are findings of chronic pansinusitis SOFT TISSUES/SKULL:  No acute abnormality of the visualized skull or soft tissues. There is no fracture of the nasal bones. No acute intracranial abnormality.   Specifically, there is no acute intracranial hemorrhage Findings of chronic pansinusitis     CT FACIAL BONES WO CONTRAST    Result Date: 3/16/2023  EXAMINATION: CT OF THE FACE WITHOUT CONTRAST  3/16/2023 3:57 pm TECHNIQUE: CT of the face was performed without the administration of intravenous contrast. Multiplanar reformatted images are provided for review. Automated exposure control, iterative reconstruction, and/or weight based adjustment of the mA/kV was utilized to reduce the radiation dose to as low as reasonably achievable. COMPARISON: CT of the facial bones, 03/04/2023 HISTORY: ORDERING SYSTEM PROVIDED HISTORY: recent mandible fracture, new injury TECHNOLOGIST PROVIDED HISTORY: Reason for exam:->recent mandible fracture, new injury Decision Support Exception - unselect if not a suspected or confirmed emergency medical condition->Emergency Medical Condition ([place]) What reading provider will be dictating this exam?->CRC FINDINGS: FACIAL BONES: Surgical plate and screws are seen transfixing subacute fractures of the anterior mandibular bodies bilaterally. Surgical screws extend to the parasymphyseal region on the right. The fracture fragments are well aligned. No perihardware lucency is seen to indicate loosening or infection. There is inward buckling of the left zygomatic arch, likely the result of remote trauma. Right zygomatic arch is unremarkable. Chronic infarctions in the anterior nasal bones bilaterally. The frontal sinuses, orbital walls, maxilla, pterygoid plates and the hard palate are intact. The temporomandibular joints are aligned. ORBITAL CONTENTS: The globes appear intact. The extraocular muscles, optic nerve sheath complexes and lacrimal glands appear unremarkable. No retrobulbar hematoma or mass is seen. SINUSES: Mild-to-moderate mucosal thickening in the paranasal sinuses, worse in the ethmoid sinuses. No air-fluid level. The mastoids are clear. SOFT TISSUES: No superficial facial soft tissue swelling is seen. 1. No acute facial bone trauma. 2. Subacute fractures in the anterior mandibular bodies, transfixed by surgical plate and screws. 3. Chronic bilateral nasal bone fractures.      CT FACIAL BONES WO CONTRAST    Result Date: 3/4/2023  EXAMINATION: CT OF THE FACE WITHOUT CONTRAST; 3D RECONSTRUCTIONS  3/4/2023 8:08 am TECHNIQUE: CT of the face was performed without the administration of intravenous contrast. Multiplanar reformatted images are provided for review. Automated exposure control, iterative reconstruction, and/or weight based adjustment of the mA/kV was utilized to reduce the radiation dose to as low as reasonably achievable. ; 3D reconstructions were performed on a separate workstation. Automated exposure control, iterative reconstruction, and/or weight based adjustment of the mA/kV was utilized to reduce the radiation dose to as low as reasonably achievable. COMPARISON: None HISTORY: ORDERING SYSTEM PROVIDED HISTORY: s/p ORIF mandible TECHNOLOGIST PROVIDED HISTORY: Reason for exam:->s/p ORIF mandible What reading provider will be dictating this exam?->CRC FINDINGS: Comparison study of a March 3. Status post open reduction internal fixation for a displaced comminuted fracture of the mandible that crosses the midline. There is now alignment of the fragments. Metallic plate fix by perpendicular screws have been applied to stabilize the fracture sites which are the junction between the anterior ramus and the mental region of the mandible bilaterally. The mandibular condyles are in proper position. There are fluid accumulation in the right maxillary sinus and mucosal thickening is seen in both maxillary sinus which were seen on the previous study could be relate with chronic sinus inflammatory process. There is also obliteration of the ethmoid air cells at multiple levels. There is mucosal thickening in the pathways of drainage of the right frontal sinus and fluid accumulation is seen in the left frontal sinus which was also seen previously. These indicates the pre existence chronic nasal sinus disease. Soft tissue swelling is seen in the face around the mandible as expected by the trauma and postsurgical changes. Visualized intraorbital structures appear unremarkable.  Visualized intracranial structures partially covered on this examination appears unremarkable. 3D CT reconstruction of the mandible: The 3D volume rendered images of mandible demonstrate in better advantage the spatial anatomical alignment of the mandible fragments in both sides of the midline following ORIF.  Metallic plates have been applied to stabilize the fracture in both sides of the midline.  There is proper position of the mandibular condyles in relation to the glenoid fossa of the temporal bone bilaterally.     Status post ORIF for fracture of the mandible across the midline fix by metallic plate with perpendicular screws at both fracture sites. 3D volume rendered images of the mandible demonstrate in better advantage the spatial anatomical orientation of the bone fragments following ORIF.     CT FACIAL BONES WO CONTRAST    Result Date: 3/3/2023  EXAMINATION: CT OF THE FACE WITHOUT CONTRAST  3/3/2023 12:58 pm TECHNIQUE: CT of the face was performed without the administration of intravenous contrast. Multiplanar reformatted images are provided for review. Automated exposure control, iterative reconstruction, and/or weight based adjustment of the mA/kV was utilized to reduce the radiation dose to as low as reasonably achievable. COMPARISON: None HISTORY: ORDERING SYSTEM PROVIDED HISTORY: fall TECHNOLOGIST PROVIDED HISTORY: Reason for exam:->fall Decision Support Exception - unselect if not a suspected or confirmed emergency medical condition->Emergency Medical Condition ([place]) What reading provider will be dictating this exam?->CRC FINDINGS: FACIAL BONES: There is oblique mildly comminuted fracture through the mandibular body bilaterally.  The lateral fragments are anteriorly displaced by 1 shaft width.  Temporomandibular joints within normal limits.  Mandibular rami are maintained. The sinuses, orbital walls, maxilla, pterygoid plates, zygomatic arches, hard palate, nasal bones are intact.  There is depression the left zygoma which is unchanged  compared to December 5, 2021 felt represent old fracture. The temporomandibular joints are aligned. ORBITAL CONTENTS: The globes appear intact. The extraocular muscles, optic nerve sheath complexes and lacrimal glands appear unremarkable. No retrobulbar hematoma or mass is seen. SINUSES: There is moderate mucosal thickening frontal, ethmoid, in maxillary sinuses. Air-fluid level right maxillary sinus but no associated fracture evident. SOFT TISSUES: Soft tissue irregularity in swelling likely related to laceration seen over the mandible near midline. There is oblique fracture through the body of the mandible bilaterally Moderate paranasal sinus disease is persistent but less extensive than seen on prior CT of December 5, 2021. CT CERVICAL SPINE WO CONTRAST    Result Date: 3/3/2023  EXAMINATION: CT OF THE CERVICAL SPINE WITHOUT CONTRAST 3/3/2023 12:58 pm TECHNIQUE: CT of the cervical spine was performed without the administration of intravenous contrast. Multiplanar reformatted images are provided for review. Automated exposure control, iterative reconstruction, and/or weight based adjustment of the mA/kV was utilized to reduce the radiation dose to as low as reasonably achievable. COMPARISON: None. HISTORY: ORDERING SYSTEM PROVIDED HISTORY: fall TECHNOLOGIST PROVIDED HISTORY: Reason for exam:->fall Decision Support Exception - unselect if not a suspected or confirmed emergency medical condition->Emergency Medical Condition ([place]) What reading provider will be dictating this exam?->CRC FINDINGS: The ring of C1 is intact as is the dense. There is no compression fracture of the cervical spine. No jumped or perched facet is noted. Multilevel degenerative disc and degenerative joint disease is noted. The prevertebral soft tissues are unremarkable. The airway is widely patent. Images through the lung apices are negative for a pneumothorax.      1. There is no acute compression fracture or subluxation of the cervical spine. 2. Multilevel degenerative disc and degenerative joint disease. MRI LUMBAR SPINE WO CONTRAST    Result Date: 3/4/2023  EXAMINATION: MRI OF THE LUMBAR SPINE WITHOUT CONTRAST, 3/4/2023 2:22 pm TECHNIQUE: Multiplanar multisequence MRI of the lumbar spine was performed without the administration of intravenous contrast. COMPARISON: Plain films of the lumbar spine dated 01/31/2023 and CT lumbar spine dated 01/22/2023 HISTORY: ORDERING SYSTEM PROVIDED HISTORY: Hx lumbar spinal stenosis TECHNOLOGIST PROVIDED HISTORY: Reason for exam:->Hx lumbar spinal stenosis What is the sedation requirement?->None What reading provider will be dictating this exam?->CRC FINDINGS: BONES/ALIGNMENT: There is normal alignment of the spine apart from slight grade 1 retrolisthesis of L3 on L4. There is straightening of the normal lumbar lordosis and mild dextroscoliosis is seen in the thoracolumbar region. The L1-2 disc space is fused. The vertebral body heights are maintained. The bone marrow signal appears somewhat heterogeneous, probably due to marrow conversion. There is no evidence to suggest osseous destructive lesion or acute fracture. There is disc desiccation in all the lumbar discs. Severe disc space narrowing is seen at L2-3 with spurring and mild to moderate narrowing at L3-4 and L5-S1. SPINAL CORD: The conus terminates normally. SOFT TISSUES: No paraspinal mass identified. L1-L2: There is mild disc bulge and mild to moderate bilateral posterior facet degenerative change. No significant central canal stenosis is seen. There is mild left neural foraminal narrowing. No significant right foraminal narrowing seen. L2-L3: There is mild disc bulge and moderate bilateral posterior facet degenerative change without evidence of significant central canal stenosis. There is moderate left neural foraminal narrowing. No evidence of significant right foraminal narrowing.  L3-L4: There is mild disc bulge and moderate bilateral posterior facet degenerative change causing mild central canal stenosis. There is moderate to severe left and moderate right neural foraminal narrowing. L4-L5: There is severe bilateral posterior facet degenerative change, greater on the right and mild disc bulge with small right posterolateral disc protrusion. These changes combine to cause mild central canal stenosis and moderate right subarticular recess stenosis. There is moderate to severe right and mild left neural foraminal narrowing. L5-S1: A small central disc protrusion with severe right and moderate left posterior facet degenerative change causing mild bilateral subarticular recess stenosis. No significant central canal stenosis is seen. There is moderate to severe bilateral neural foraminal narrowing. 1. Advanced degenerative change. Mild central canal stenosis at L3-4 and L4-5. Moderate right L4-5 subarticular recess stenosis. 2. Multilevel neural foraminal stenosis as noted above. XR CHEST PORTABLE    Result Date: 3/18/2023  EXAMINATION: ONE XRAY VIEW OF THE CHEST 3/18/2023 6:23 pm COMPARISON: 03/03/2023 HISTORY: ORDERING SYSTEM PROVIDED HISTORY: chest pain TECHNOLOGIST PROVIDED HISTORY: Reason for exam:->chest pain What reading provider will be dictating this exam?->CRC FINDINGS: There is borderline cardiac size. There is emphysema. There is atelectasis in the left base. The remainder of the lungs are clear. Nonacute chest with atelectasis in the left base. XR CHEST PORTABLE    Result Date: 3/3/2023  EXAMINATION: ONE XRAY VIEW OF THE CHEST 3/3/2023 12:18 pm COMPARISON: 2nd May 2022 HISTORY: ORDERING SYSTEM PROVIDED HISTORY: Shortness of breath TECHNOLOGIST PROVIDED HISTORY: Reason for exam:->Shortness of breath What reading provider will be dictating this exam?->CRC FINDINGS: Postsurgical changes as before. The lungs are without acute focal process. There is no effusion or pneumothorax.  The cardiomediastinal silhouette is without acute process. The osseous structures are without acute process. No acute process. CT 3D RECONSTRUCTION    Result Date: 3/4/2023  EXAMINATION: 3D RECONSTRUCTIONS 3/3/2023 3:53 pm TECHNIQUE: 3D reconstructions were performed on a separate workstation. Automated exposure control, iterative reconstruction, and/or weight based adjustment of the mA/kV was utilized to reduce the radiation dose to as low as reasonably achievable. COMPARISON: CT facial bones 03/03/2023 HISTORY: ORDERING SYSTEM PROVIDED HISTORY: Facial bones fx TECHNOLOGIST PROVIDED HISTORY: Reason for exam:->Facial bones fx What reading provider will be dictating this exam?->CRC FINDINGS: 3D reconstructions of the facial bones was performed for the purpose of of preoperative planning. The 3D reconstructions again show the bilateral mandibular body fractures with depression of the fracture fragment. There is some overriding of the fracture fragments on the right. Normal TMJ alignment is seen bilaterally. 3D reconstruction of the bilateral mandibular body fractures for preoperative planning. See original report for additional details. CT 3D RECONSTRUCTION    Result Date: 3/4/2023  EXAMINATION: CT OF THE FACE WITHOUT CONTRAST; 3D RECONSTRUCTIONS  3/4/2023 8:08 am TECHNIQUE: CT of the face was performed without the administration of intravenous contrast. Multiplanar reformatted images are provided for review. Automated exposure control, iterative reconstruction, and/or weight based adjustment of the mA/kV was utilized to reduce the radiation dose to as low as reasonably achievable. ; 3D reconstructions were performed on a separate workstation. Automated exposure control, iterative reconstruction, and/or weight based adjustment of the mA/kV was utilized to reduce the radiation dose to as low as reasonably achievable.  COMPARISON: None HISTORY: ORDERING SYSTEM PROVIDED HISTORY: s/p ORIF mandible TECHNOLOGIST PROVIDED HISTORY: Reason for exam:->s/p ORIF mandible What reading provider will be dictating this exam?->CRC FINDINGS: Comparison study of a March 3. Status post open reduction internal fixation for a displaced comminuted fracture of the mandible that crosses the midline. There is now alignment of the fragments. Metallic plate fix by perpendicular screws have been applied to stabilize the fracture sites which are the junction between the anterior ramus and the mental region of the mandible bilaterally. The mandibular condyles are in proper position. There are fluid accumulation in the right maxillary sinus and mucosal thickening is seen in both maxillary sinus which were seen on the previous study could be relate with chronic sinus inflammatory process. There is also obliteration of the ethmoid air cells at multiple levels. There is mucosal thickening in the pathways of drainage of the right frontal sinus and fluid accumulation is seen in the left frontal sinus which was also seen previously. These indicates the pre existence chronic nasal sinus disease. Soft tissue swelling is seen in the face around the mandible as expected by the trauma and postsurgical changes. Visualized intraorbital structures appear unremarkable. Visualized intracranial structures partially covered on this examination appears unremarkable. 3D CT reconstruction of the mandible: The 3D volume rendered images of mandible demonstrate in better advantage the spatial anatomical alignment of the mandible fragments in both sides of the midline following ORIF. Metallic plates have been applied to stabilize the fracture in both sides of the midline. There is proper position of the mandibular condyles in relation to the glenoid fossa of the temporal bone bilaterally. Status post ORIF for fracture of the mandible across the midline fix by metallic plate with perpendicular screws at both fracture sites.  3D volume rendered images of the mandible demonstrate in better advantage the spatial anatomical orientation of the bone fragments following ORIF.       ------------------------- NURSING NOTES AND VITALS REVIEWED ---------------------------  Date / Time Roomed:  3/18/2023  5:52 PM  ED Bed Assignment:  10/10    The nursing notes within the ED encounter and vital signs as below have been reviewed.   /60   Pulse 71   Temp 97.8 °F (36.6 °C)   Resp 18   SpO2 94%   Oxygen Saturation Interpretation: Normal      ------------------------------------------ PROGRESS NOTES ------------------------------------------  Patient eloped from ED      Discharge Medication List as of 3/18/2023  8:13 PM          Diagnosis:  1. Chest pain, unspecified type    2. Drug-seeking behavior        Disposition:  Patient's disposition: Left against medical advice.  Patient's condition is stable.       Brenda Escamilla MD  03/19/23 0028

## 2023-03-18 NOTE — ED NOTES
Pt yelling out,\"I have a broken jaw and I need narcotics. \" Attempted to explain to patient plan of care for addition blood work and then will then re-access plan of care. Pt yelling out,\"I want to be admitted for my jaw pain. \" Dr Lesley Hamilton notified of above and to bedside to speak with patient.       Raul Prado, RN  03/18/23 1942

## 2023-03-18 NOTE — ED NOTES
Pt continues to yell out-\"I need pain meds for my jaw pain. \" Explained to patient that Dr. Inocente Ball has ordered a CT of his jaw to evaluate his pain. Dr Inocente Ball to bedside and explain to patient that no narcotics will be given. Pt states,\"even if my jaw is broken worse-can I get pain meds. \" Dr Inocente Ball again explained to pain that the Dr. Mathias Boor his jaw pain feel that it has healed to the pain narcotic are not need for his pain and no narcotics will be given. Dr Hahn Stands offered other meds for pain pt states,\"the only thing that works is fentanyl. Dr. Inocente Ball continues to attempt to explain to patient that no narcotics will be given for pain-but the patient continues to talk over Dr. Inocente Ball.        Tyrell Jasso RN  03/18/23 2000

## 2023-03-18 NOTE — ED NOTES
Pt upset that he would not be prescribed narcotics today and stormed out stating that he would \"go buy drugs off the street\". Pt walked out of triage and out the door.       Og Olivarez RN  03/18/23 1219

## 2023-03-19 ENCOUNTER — HOSPITAL ENCOUNTER (EMERGENCY)
Age: 68
Discharge: LEFT BEFORE DISCHARGE | End: 2023-03-19
Attending: STUDENT IN AN ORGANIZED HEALTH CARE EDUCATION/TRAINING PROGRAM
Payer: MEDICARE

## 2023-03-19 ENCOUNTER — APPOINTMENT (OUTPATIENT)
Dept: CT IMAGING | Age: 68
End: 2023-03-19
Payer: MEDICARE

## 2023-03-19 VITALS
HEART RATE: 73 BPM | TEMPERATURE: 98.1 F | RESPIRATION RATE: 18 BRPM | SYSTOLIC BLOOD PRESSURE: 126 MMHG | DIASTOLIC BLOOD PRESSURE: 82 MMHG | OXYGEN SATURATION: 99 %

## 2023-03-19 DIAGNOSIS — Z76.5 DRUG-SEEKING BEHAVIOR: ICD-10-CM

## 2023-03-19 DIAGNOSIS — M54.50 ACUTE EXACERBATION OF CHRONIC LOW BACK PAIN: Primary | ICD-10-CM

## 2023-03-19 DIAGNOSIS — S39.012A BACK STRAIN, INITIAL ENCOUNTER: ICD-10-CM

## 2023-03-19 DIAGNOSIS — G89.29 ACUTE EXACERBATION OF CHRONIC LOW BACK PAIN: Primary | ICD-10-CM

## 2023-03-19 PROCEDURE — 96372 THER/PROPH/DIAG INJ SC/IM: CPT

## 2023-03-19 PROCEDURE — 99284 EMERGENCY DEPT VISIT MOD MDM: CPT

## 2023-03-19 PROCEDURE — 72131 CT LUMBAR SPINE W/O DYE: CPT

## 2023-03-19 PROCEDURE — 6360000002 HC RX W HCPCS: Performed by: STUDENT IN AN ORGANIZED HEALTH CARE EDUCATION/TRAINING PROGRAM

## 2023-03-19 RX ORDER — DEXAMETHASONE 4 MG/1
4 TABLET ORAL ONCE
Status: COMPLETED | OUTPATIENT
Start: 2023-03-19 | End: 2023-03-19

## 2023-03-19 RX ORDER — ORPHENADRINE CITRATE 30 MG/ML
60 INJECTION INTRAMUSCULAR; INTRAVENOUS ONCE
Status: COMPLETED | OUTPATIENT
Start: 2023-03-19 | End: 2023-03-19

## 2023-03-19 RX ADMIN — ORPHENADRINE CITRATE 60 MG: 30 INJECTION INTRAMUSCULAR; INTRAVENOUS at 14:10

## 2023-03-19 RX ADMIN — DEXAMETHASONE 4 MG: 4 TABLET ORAL at 14:10

## 2023-03-19 ASSESSMENT — ENCOUNTER SYMPTOMS
DIARRHEA: 0
COUGH: 0
CHEST TIGHTNESS: 0
NAUSEA: 0
BACK PAIN: 1
SHORTNESS OF BREATH: 0
VOMITING: 0
ABDOMINAL DISTENTION: 0
ABDOMINAL PAIN: 0
PHOTOPHOBIA: 0

## 2023-03-19 ASSESSMENT — PAIN DESCRIPTION - LOCATION: LOCATION: BACK

## 2023-03-19 ASSESSMENT — PAIN - FUNCTIONAL ASSESSMENT: PAIN_FUNCTIONAL_ASSESSMENT: NONE - DENIES PAIN

## 2023-03-19 ASSESSMENT — PAIN SCALES - GENERAL: PAINLEVEL_OUTOF10: 10

## 2023-03-19 NOTE — ED NOTES
Department of Emergency Medicine  FIRST PROVIDER TRIAGE NOTE             Independent MLP           3/19/23  1:52 PM EDT    Date of Encounter: 3/19/23   MRN: [id number]      HPI: Betsy Rangel is a 80 y.o. male who presents to the ED for No chief complaint on file. Pt presenting with chronic back pain     ROS: Negative for cp or sob. PE: Gen Appearance/Constitutional: alert  HEENT: NC/NT. PERRLA,  Airway patent. Initial Plan of Care: All treatment areas with department are currently occupied. Plan to order/Initiate the following while awaiting opening in ED.  Initiate Treatment-Testing, Proceed toTreatment Area When Bed Available for ED Attending/MLP to Continue Care    Electronically signed by Mell Rosario PA-C   DD: 3/19/23      Mell Rosario PA-C  03/19/23 2233

## 2023-03-19 NOTE — ED NOTES
Pt put call light on-states,\"if I'm not getting any pain meds then I out of here. \" IV d/'ed from [facility] with catheter intact. Dr Samuel Mark aware of above. Pt decline to sign AMA form.      Clifford Negron RN  03/18/23 2005

## 2023-03-19 NOTE — ED PROVIDER NOTES
Palpations: Abdomen is soft. Tenderness: There is no abdominal tenderness. There is no guarding or rebound. Musculoskeletal:         General: Tenderness present. Cervical back: Normal range of motion and neck supple. No rigidity or tenderness. No muscular tenderness. Right lower leg: No edema. Left lower leg: No edema. Comments: Back pain lumbar midline lower tenderness  DP/PT pulses intact  Normal sensation  Normal dorsiflexion plantarflexion bilaterally,   Skin:     General: Skin is warm and dry. Capillary Refill: Capillary refill takes less than 2 seconds. Coloration: Skin is not pale. Findings: No erythema or rash. Neurological:      Mental Status: He is alert and oriented to person, place, and time.    Psychiatric:         Mood and Affect: Mood normal.        Procedures     MDM  Number of Diagnoses or Management Options  Acute exacerbation of chronic low back pain  Back strain, initial encounter  Drug-seeking behavior  Diagnosis management comments: Cori Stratton is a 16-year-old male presented to emergency department who presented to ED with concern for back pain   Patient did have reproducible lower lumbar pain   Patient was neurovascularly intact  He did not have red flags concerning for cord compression or cauda equina  Patient has had multiple visits for chronic pain and specifically requests narcotic pain medication, patient did accept norflex and decadron for pain today but stated it did not help, patient was comfortable and did not appear to be in any distress  Alternatives for narcotics were offered to patient  Patient stated he was leaving if he was not going to be given narcotics, patient then eloped from ED    Social determents of health include drug seeking behavior   Co morbidities include chronic pain, hep C, DM, hyperthyroidism  Differential diagnosis includes not limited to exacerbation of chronic back pain, trauma,

## 2023-03-21 ENCOUNTER — TELEPHONE (OUTPATIENT)
Dept: ENT CLINIC | Age: 68
End: 2023-03-21

## 2023-03-21 NOTE — TELEPHONE ENCOUNTER
Pt called office X2 trying to contact [facility] office C/O Px in his mouth, stating that he has dry socket and his mouth is on fire. Pt has been to ED several times in last few days C/O different areas of Px. Pt is stating Dr. Anabell Saab did Sx on him and sent him home with no Px meds. Advised Pt that a message would be sent. Pt is agitated and is shouting on the phone. Please advise.

## 2023-03-21 NOTE — TELEPHONE ENCOUNTER
Patient states that he had surgery with Dr Tanja Oleary where Dr Tanja Oleary pulled all teeth, he states he has a dry socket and is experiencing pain and discomfort Please contact patient to schedule asap.

## 2023-03-21 NOTE — TELEPHONE ENCOUNTER
MA spoke with patient, per Dr. Aren Ramey he is scheduled 3/22.     Electronically signed by Sharon Galicia MA on 3/21/23 at 3:23 PM EDT

## 2023-03-21 NOTE — TELEPHONE ENCOUNTER
MA spoke with patient, he states he has a lump at the incision and needs to be fitted for dentures.  Will speak with Dr. Becca Hoskins and call patient back    Electronically signed by Nichole Vela MA on 3/21/23 at 3:14 PM EDT

## 2023-03-24 ENCOUNTER — HOSPITAL ENCOUNTER (EMERGENCY)
Age: 68
Discharge: HOME | End: 2023-03-24
Attending: EMERGENCY MEDICINE
Payer: MEDICARE

## 2023-03-24 VITALS
SYSTOLIC BLOOD PRESSURE: 120 MMHG | WEIGHT: 150 LBS | HEIGHT: 64 IN | OXYGEN SATURATION: 97 % | HEART RATE: 93 BPM | DIASTOLIC BLOOD PRESSURE: 56 MMHG | BODY MASS INDEX: 25.61 KG/M2 | RESPIRATION RATE: 20 BRPM | TEMPERATURE: 98.6 F

## 2023-03-24 DIAGNOSIS — R68.84 JAW PAIN: Primary | ICD-10-CM

## 2023-03-24 DIAGNOSIS — G89.29 CHRONIC MIDLINE LOW BACK PAIN WITHOUT SCIATICA: ICD-10-CM

## 2023-03-24 DIAGNOSIS — M54.50 CHRONIC MIDLINE LOW BACK PAIN WITHOUT SCIATICA: ICD-10-CM

## 2023-03-24 PROCEDURE — 99281 EMR DPT VST MAYX REQ PHY/QHP: CPT

## 2023-03-24 RX ORDER — ACETAMINOPHEN 325 MG/1
650 TABLET ORAL ONCE
Status: DISCONTINUED | OUTPATIENT
Start: 2023-03-24 | End: 2023-03-24 | Stop reason: HOSPADM

## 2023-03-24 ASSESSMENT — PAIN DESCRIPTION - ONSET: ONSET: ON-GOING

## 2023-03-24 ASSESSMENT — PAIN DESCRIPTION - DESCRIPTORS: DESCRIPTORS: SHARP;SHOOTING;THROBBING

## 2023-03-24 ASSESSMENT — PAIN DESCRIPTION - FREQUENCY: FREQUENCY: CONTINUOUS

## 2023-03-24 ASSESSMENT — PAIN - FUNCTIONAL ASSESSMENT: PAIN_FUNCTIONAL_ASSESSMENT: 0-10

## 2023-03-24 ASSESSMENT — PAIN DESCRIPTION - PAIN TYPE: TYPE: CHRONIC PAIN

## 2023-03-24 ASSESSMENT — PAIN SCALES - GENERAL: PAINLEVEL_OUTOF10: 10

## 2023-03-24 ASSESSMENT — PAIN DESCRIPTION - LOCATION: LOCATION: JAW

## 2023-03-24 NOTE — TELEPHONE ENCOUNTER
MA spoke with patient, per Dr. Elroy Berkowitz he will not prescribe any pain medication. Patient is to f/u with his PCP for a referral to pain management. Pt is scheduled for a f/u appt on 4/26. Patient stated \"pain management sucks\", MA reinforced recommendations. Patient understood.      Electronically signed by Jimmie Garcia MA on 3/24/23 at 12:13 PM EDT

## 2023-03-24 NOTE — ED PROVIDER NOTES
HPI:  3/24/23,   Time: 9:47 AM EDT       Efra Love is a 80 y.o. male presenting to the ED for chronic jaw and back pain, beginning weeks ago. The complaint has been persistent, mild in severity, and worsened by nothing. Patient well-known. Previous visits for same. Supposed to see pain management schedule by PCP. No fever/chills/sweats. No nausea/vomiting history. No incontinence/saddle anesthesia/focal weakness. Back pain is chronic. Jaw pain  Surgery began in March. Is to follow with Dr. Tata Chahal in 3 days. Review of Systems:   Pertinent positives and negatives are stated within HPI, all other systems reviewed and are negative.          --------------------------------------------- PAST HISTORY ---------------------------------------------  Past Medical History:  has a past medical history of Anxiety, Arthritis, Asthma, Bipolar 1 disorder ([address] Chronic combined systolic and diastolic CHF (congestive heart failure) ([address] COPD (chronic obstructive pulmonary disease) ([address] Depression, Diabetes mellitus ([address] GERD (gastroesophageal reflux disease), Hepatitis C, Hypertension, Hyperthyroidism, Hypothyroidism due to medication, Mass of testicle, Mesothelioma ([address] Neuromuscular disorder ([address] Other disorders of kidney and ureter, Paroxysmal A-fib ([address] Peptic ulcer, Prostate enlargement, Pulmonary embolism ([address] Seizures ([address] and Thyroid disease. Past Surgical History:  has a past surgical history that includes Appendectomy; Lithotripsy; Hemorrhoid surgery; Bladder surgery; Endoscopy, colon, diagnostic (11/13/2015); Abdomen surgery; Colonoscopy; Cardiac surgery; vascular surgery; Cardiac catheterization (05/21/2018); and Mandible surgery (N/A, 3/3/2023). Social History:  reports that he quit smoking about 13 years ago. His smoking use included cigarettes. He quit smokeless tobacco use about 5 years ago. His smokeless tobacco use included chew.  He reports that he does not currently agreeable with the plan.       --------------------------------- IMPRESSION AND DISPOSITION ---------------------------------    IMPRESSION  1. Jaw pain    2. Chronic midline low back pain without sciatica        DISPOSITION  Disposition: Discharge to home  Patient condition is stable    NOTE: This report was transcribed using voice recognition software.  Every effort was made to ensure accuracy; however, inadvertent computerized transcription errors may be present        Bishop Gonsalez MD  03/24/23 1000

## 2023-03-24 NOTE — TELEPHONE ENCOUNTER
Patient states that he is experiencing severe pain he went to the hospital today and they released him and told hi to take tylenol and that doesn't work. He wants to speak with the office to come in to see Dr Roberto Wong today. Please contact patient to schedule.

## 2023-03-31 NOTE — ED PROVIDER NOTES
[address]. [address]  Department of Emergency Medicine   ED  Encounter Note  Admit Date/RoomTime: 2022  8:34 AM  ED Room: [facility]    NAME: Aleksandr Curry  : 1955  MRN: [id number]     Chief Complaint:  Testicle Pain (states he has been having increased pain in right testicle)    History of Present Illness       Aleksandr Curry is a 73 y.o. old male who presenting to the emergency department by private vehicle, for still present of Right testicular pain , which occured 1 year(s) prior to arrival.  Since exposure/onset the symptoms have been stable and moderate in severity. He has associated symptoms of nothing additional.  He denies any abdominal pain, chills, dysuria or penile discharge. There has been history of similar episodes of pain he has been dealing with this for over 1 year. He has been referred to urology multiple times however states that he was dismissed from [facility] urology. There has been no history of recent trauma. His prior STD history: denies. He states he has been taking ibuprofen and tylenol without improvement, requesting narcotic prescription for home. ROS   Pertinent positives and negatives are stated within HPI, all other systems reviewed and are negative. Past Medical History:  has a past medical history of Anxiety, Arthritis, Asthma, Bipolar 1 disorder ([address] Chronic combined systolic and diastolic CHF (congestive heart failure) ([address] COPD (chronic obstructive pulmonary disease) ([address] Depression, Diabetes mellitus ([address] GERD (gastroesophageal reflux disease), Hepatitis C, Hypertension, Hyperthyroidism, Hypothyroidism due to medication, Mass of testicle, Mesothelioma ([address] Neuromuscular disorder ([address] Other disorders of kidney and ureter, Paroxysmal A-fib ([address] Peptic ulcer, Prostate enlargement, Pulmonary embolism ([address] Seizures ([address] and Thyroid disease.     Surgical History:  has a past surgical history that includes Appendectomy; Lithotripsy; Hemorrhoid surgery; Bladder surgery; Endoscopy, colon, diagnostic (11/13/2015); Abdomen surgery; Colonoscopy; Cardiac surgery; vascular surgery; and Cardiac catheterization (05/21/2018). Social History:  reports that he quit smoking about 12 years ago. His smoking use included cigarettes. He quit after 10.00 years of use. He quit smokeless tobacco use about 4 years ago. His smokeless tobacco use included chew. He reports current drug use. Drug: Cocaine. He reports that he does not drink alcohol. Family History: family history includes Cancer in his father, maternal grandfather, and mother; Diabetes in his father, maternal aunt, maternal aunt, maternal aunt, maternal grandmother, and mother; Heart Disease in his paternal grandfather and paternal grandmother; Heart Failure in his paternal grandfather and paternal grandmother. Allergies: Codeine, Dye [iodides], Ketorolac tromethamine, Peanuts [peanut oil], Shellfish-derived products, and Nitroglycerin    Physical Exam   Oxygen Saturation Interpretation: Normal.        ED Triage Vitals [02/09/22 0833]   BP Temp Temp src Pulse Resp SpO2 Height Weight   (!) 165/101 -- -- 71 18 100 % -- --         Constitutional:  Alert, development consistent with age. Eyes:  PERRL, EOMI, no discharge or conjunctival injection. Ears:  External ears without lesions. Throat:  Pharynx without injection, exudate, or tonsillar hypertrophy. Airway patient. Neck:  Normal ROM. Supple. Respiratory:  Clear to auscultation and breath sounds equal.  CV:  Regular rate and rhythm, normal heart sounds, without pathological murmurs, ectopy, gallops, or rubs. GI:  General Appearance: normal.       Bowel sounds: normal bowel sounds. Distension:  None. Tenderness: No abdominal tenderness. Liver: non-palpable and non-tender. Spleen:  non-tender. Pulsatile Mass: absent.          Hernia:  no inguinal or femoral hernias noted.  :  Genitalia Exam: (Same sex / third party chaperone present during examination) [address], LPN. Penis: non focal circumcised. Scrotum: normal.       Testicle: tenderness and firmness right, no masses or wounds, left WNL. Integument:  Normal turgor. Warm, dry, without visible rash, unless noted elsewhere. Neurological:  Orientation age-appropriate. Motor functions intact. Lab / Imaging Results   (All laboratory and radiology results have been personally reviewed by myself)  Labs:  Results for orders placed or performed during the hospital encounter of 02/09/22   C.trachomatis N.gonorrhoeae DNA    Specimen: Urethral   Result Value Ref Range    Source Genital    Urinalysis   Result Value Ref Range    Color, UA Yellow Straw/Yellow    Clarity, UA Clear Clear    Glucose, Ur Negative Negative mg/dL    Bilirubin Urine Negative Negative    Ketones, Urine TRACE (A) Negative mg/dL    Specific Gravity, UA 1.020 1.005 - 1.030    Blood, Urine Negative Negative    pH, UA 5.5 5.0 - 9.0    Protein, UA Negative Negative mg/dL    Urobilinogen, Urine 0.2 <2.0 E.U./dL    Nitrite, Urine Negative Negative    Leukocyte Esterase, Urine Negative Negative   URINE DRUG SCREEN   Result Value Ref Range    Amphetamine Screen, Urine NOT DETECTED Negative <1000 ng/mL    Barbiturate Screen, Ur NOT DETECTED Negative < 200 ng/mL    Benzodiazepine Screen, Urine POSITIVE (A) Negative < 200 ng/mL    Cannabinoid Scrn, Ur NOT DETECTED Negative < 50ng/mL    Cocaine Metabolite Screen, Urine NOT DETECTED Negative < 300 ng/mL    Opiate Scrn, Ur NOT DETECTED Negative < 300ng/mL    PCP Screen, Urine NOT DETECTED Negative < 25 ng/mL    Methadone Screen, Urine NOT DETECTED Negative <300 ng/mL    Oxycodone Urine NOT DETECTED Negative <100 ng/mL    FENTANYL SCREEN, URINE NOT DETECTED Negative <1 ng/mL    Drug Screen Comment: see below      Imaging: All Radiology results interpreted by Radiologist unless otherwise noted.   US DUP ABD PEL Consent (Lip)/Introductory Paragraph: The rationale for Mohs was explained to the patient and consent was obtained. The risks, benefits and alternatives to therapy were discussed in detail. Specifically, the risks of lip deformity, changes in the oral aperture, infection, scarring, bleeding, prolonged wound healing, incomplete removal, allergy to anesthesia, nerve injury and recurrence were addressed. Prior to the procedure, the treatment site was clearly identified and confirmed by the patient. All components of Universal Protocol/PAUSE Rule completed.

## 2023-04-14 ENCOUNTER — APPOINTMENT (OUTPATIENT)
Dept: GENERAL RADIOLOGY | Age: 68
End: 2023-04-14
Payer: MEDICARE

## 2023-04-14 ENCOUNTER — HOSPITAL ENCOUNTER (EMERGENCY)
Age: 68
Discharge: LEFT BEFORE DISCHARGE | End: 2023-04-14
Attending: EMERGENCY MEDICINE
Payer: MEDICARE

## 2023-04-14 VITALS
BODY MASS INDEX: 25.61 KG/M2 | HEIGHT: 64 IN | RESPIRATION RATE: 17 BRPM | WEIGHT: 150 LBS | TEMPERATURE: 98.1 F | OXYGEN SATURATION: 95 % | DIASTOLIC BLOOD PRESSURE: 77 MMHG | HEART RATE: 84 BPM | SYSTOLIC BLOOD PRESSURE: 121 MMHG

## 2023-04-14 DIAGNOSIS — R07.9 CHEST PAIN, UNSPECIFIED TYPE: ICD-10-CM

## 2023-04-14 DIAGNOSIS — Z53.29 LEFT AGAINST MEDICAL ADVICE: Primary | ICD-10-CM

## 2023-04-14 LAB
ALBUMIN SERPL-MCNC: 3.8 G/DL (ref 3.5–5.2)
ALP SERPL-CCNC: 70 U/L (ref 40–129)
ALT SERPL-CCNC: 18 U/L (ref 0–40)
ANION GAP SERPL CALCULATED.3IONS-SCNC: 12 MMOL/L (ref 7–16)
AST SERPL-CCNC: 13 U/L (ref 0–39)
BASOPHILS # BLD: 0.09 E9/L (ref 0–0.2)
BASOPHILS NFR BLD: 1.1 % (ref 0–2)
BILIRUB SERPL-MCNC: <0.2 MG/DL (ref 0–1.2)
BUN SERPL-MCNC: 13 MG/DL (ref 6–23)
CALCIUM SERPL-MCNC: 8.7 MG/DL (ref 8.6–10.2)
CHLORIDE SERPL-SCNC: 107 MMOL/L (ref 98–107)
CO2 SERPL-SCNC: 26 MMOL/L (ref 22–29)
CREAT SERPL-MCNC: 1 MG/DL (ref 0.7–1.2)
D DIMER: 463 NG/ML DDU
EKG ATRIAL RATE: 85 BPM
EKG P AXIS: 16 DEGREES
EKG P-R INTERVAL: 114 MS
EKG Q-T INTERVAL: 376 MS
EKG QRS DURATION: 84 MS
EKG QTC CALCULATION (BAZETT): 447 MS
EKG R AXIS: 23 DEGREES
EKG T AXIS: 66 DEGREES
EKG VENTRICULAR RATE: 85 BPM
EOSINOPHIL # BLD: 0.43 E9/L (ref 0.05–0.5)
EOSINOPHIL NFR BLD: 5.4 % (ref 0–6)
ERYTHROCYTE [DISTWIDTH] IN BLOOD BY AUTOMATED COUNT: 14.6 FL (ref 11.5–15)
GLUCOSE SERPL-MCNC: 130 MG/DL (ref 74–99)
HCT VFR BLD AUTO: 38.1 % (ref 37–54)
HGB BLD-MCNC: 12.5 G/DL (ref 12.5–16.5)
IMM GRANULOCYTES # BLD: 0.05 E9/L
IMM GRANULOCYTES NFR BLD: 0.6 % (ref 0–5)
LACTATE BLDV-SCNC: 1.7 MMOL/L (ref 0.5–2.2)
LIPASE: 21 U/L (ref 13–60)
LYMPHOCYTES # BLD: 2.68 E9/L (ref 1.5–4)
LYMPHOCYTES NFR BLD: 33.8 % (ref 20–42)
MAGNESIUM SERPL-MCNC: 1.8 MG/DL (ref 1.6–2.6)
MCH RBC QN AUTO: 29.9 PG (ref 26–35)
MCHC RBC AUTO-ENTMCNC: 32.8 % (ref 32–34.5)
MCV RBC AUTO: 91.1 FL (ref 80–99.9)
MONOCYTES # BLD: 0.82 E9/L (ref 0.1–0.95)
MONOCYTES NFR BLD: 10.4 % (ref 2–12)
NEUTROPHILS # BLD: 3.85 E9/L (ref 1.8–7.3)
NEUTS SEG NFR BLD: 48.7 % (ref 43–80)
PLATELET # BLD AUTO: 176 E9/L (ref 130–450)
PMV BLD AUTO: 12.3 FL (ref 7–12)
POTASSIUM SERPL-SCNC: 3.6 MMOL/L (ref 3.5–5)
PROT SERPL-MCNC: 6.6 G/DL (ref 6.4–8.3)
RBC # BLD AUTO: 4.18 E12/L (ref 3.8–5.8)
SODIUM SERPL-SCNC: 145 MMOL/L (ref 132–146)
TROPONIN, HIGH SENSITIVITY: 18 NG/L (ref 0–11)
WBC # BLD: 7.9 E9/L (ref 4.5–11.5)

## 2023-04-14 PROCEDURE — 99285 EMERGENCY DEPT VISIT HI MDM: CPT

## 2023-04-14 PROCEDURE — 71045 X-RAY EXAM CHEST 1 VIEW: CPT

## 2023-04-14 PROCEDURE — 83690 ASSAY OF LIPASE: CPT

## 2023-04-14 PROCEDURE — 84484 ASSAY OF TROPONIN QUANT: CPT

## 2023-04-14 PROCEDURE — 85025 COMPLETE CBC W/AUTO DIFF WBC: CPT

## 2023-04-14 PROCEDURE — 93005 ELECTROCARDIOGRAM TRACING: CPT | Performed by: EMERGENCY MEDICINE

## 2023-04-14 PROCEDURE — 93010 ELECTROCARDIOGRAM REPORT: CPT | Performed by: INTERNAL MEDICINE

## 2023-04-14 PROCEDURE — 85378 FIBRIN DEGRADE SEMIQUANT: CPT

## 2023-04-14 PROCEDURE — 83735 ASSAY OF MAGNESIUM: CPT

## 2023-04-14 PROCEDURE — 80053 COMPREHEN METABOLIC PANEL: CPT

## 2023-04-14 PROCEDURE — 83605 ASSAY OF LACTIC ACID: CPT

## 2023-04-14 RX ORDER — ACETAMINOPHEN 500 MG
1000 TABLET ORAL ONCE
Status: DISCONTINUED | OUTPATIENT
Start: 2023-04-14 | End: 2023-04-14 | Stop reason: HOSPADM

## 2023-04-14 NOTE — ED NOTES
Attempted to give patient Tylenol to which he refused. Patient stated \"get all of this stuff off of me, I'm leaving\". Attending physician aware. Patient refused to sign AMA papers and eloped.       Kenyetta Wagoner RN  04/14/23 9217

## 2023-04-14 NOTE — ED PROVIDER NOTES
There is no guarding or rebound. Musculoskeletal:         General: No swelling or tenderness. Normal range of motion. Cervical back: Normal range of motion and neck supple. No rigidity. No muscular tenderness. Comments: Radial, DP, and PT pulses 2+ bilaterally. Skin:     General: Skin is warm and dry. Neurological:      Mental Status: He is alert and oriented to person, place, and time. Comments: BUE 5/5 strength throughout. BLE 3/5 strength throughout.          -------------------------------------------------- RESULTS -------------------------------------------------  I have personally reviewed all laboratory and imaging results for this patient. Results are listed below. LABS:  Labs Reviewed   CBC WITH AUTO DIFFERENTIAL - Abnormal; Notable for the following components:       Result Value    MPV 12.3 (*)     All other components within normal limits   COMPREHENSIVE METABOLIC PANEL - Abnormal; Notable for the following components:    Glucose 130 (*)     All other components within normal limits   TROPONIN - Abnormal; Notable for the following components:    Troponin, High Sensitivity 18 (*)     All other components within normal limits   MAGNESIUM   LIPASE   LACTIC ACID   D-DIMER, QUANTITATIVE       RADIOLOGY:  Interpreted personally and by Radiologist.  XR CHEST PORTABLE   Final Result   No acute process. EKG:  This EKG is signed and interpreted by the EP. Normal sinus rhythm, ventricular rate 85 bpm, normal axis intervals, no acute injury pattern, no clinically significant change compared w/ prior EKG       ------------------------- NURSING NOTES AND VITALS REVIEWED ---------------------------   The nursing notes within the ED encounter and vital signs as below have been reviewed by myself.   /77   Pulse 84   Temp 98.1 °F (36.7 °C) (Oral)   Resp 17   Ht 5' 4\" (1.626 m)   Wt 150 lb (68 kg)   SpO2 95%   BMI 25.75 kg/m²   Oxygen Saturation Interpretation:

## 2023-04-21 ENCOUNTER — HOSPITAL ENCOUNTER (EMERGENCY)
Age: 68
Discharge: HOME | End: 2023-04-21
Attending: EMERGENCY MEDICINE
Payer: MEDICARE

## 2023-04-21 ENCOUNTER — APPOINTMENT (OUTPATIENT)
Dept: CT IMAGING | Age: 68
End: 2023-04-21
Payer: MEDICARE

## 2023-04-21 VITALS
TEMPERATURE: 98 F | DIASTOLIC BLOOD PRESSURE: 73 MMHG | SYSTOLIC BLOOD PRESSURE: 129 MMHG | RESPIRATION RATE: 17 BRPM | OXYGEN SATURATION: 100 % | HEART RATE: 79 BPM

## 2023-04-21 DIAGNOSIS — S02.640A CLOSED FRACTURE OF RAMUS OF MANDIBLE, UNSPECIFIED LATERALITY, INITIAL ENCOUNTER (HCC): Primary | ICD-10-CM
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
## 2023-04-21 DIAGNOSIS — S02.2XXA CLOSED FRACTURE OF NASAL BONE, INITIAL ENCOUNTER: ICD-10-CM

## 2023-04-21 LAB
ALBUMIN SERPL-MCNC: 3.9 G/DL (ref 3.5–5.2)
ALP SERPL-CCNC: 67 U/L (ref 40–129)
ALT SERPL-CCNC: 12 U/L (ref 0–40)
AMPHET UR QL SCN: NOT DETECTED
ANION GAP SERPL CALCULATED.3IONS-SCNC: 9 MMOL/L (ref 7–16)
APAP SERPL-MCNC: <5 MCG/ML (ref 10–30)
AST SERPL-CCNC: 12 U/L (ref 0–39)
BARBITURATES UR QL SCN: NOT DETECTED
BASOPHILS # BLD: 0.05 E9/L (ref 0–0.2)
BASOPHILS NFR BLD: 0.8 % (ref 0–2)
BENZODIAZ UR QL SCN: NOT DETECTED
BILIRUB SERPL-MCNC: 0.3 MG/DL (ref 0–1.2)
BUN SERPL-MCNC: 14 MG/DL (ref 6–23)
CALCIUM SERPL-MCNC: 9.1 MG/DL (ref 8.6–10.2)
CANNABINOIDS UR QL SCN: NOT DETECTED
CHLORIDE SERPL-SCNC: 104 MMOL/L (ref 98–107)
CO2 SERPL-SCNC: 28 MMOL/L (ref 22–29)
COCAINE UR QL SCN: NOT DETECTED
CREAT SERPL-MCNC: 0.9 MG/DL (ref 0.7–1.2)
DRUG SCREEN COMMENT UR-IMP: NORMAL
EKG ATRIAL RATE: 80 BPM
EKG P AXIS: 12 DEGREES
EKG P-R INTERVAL: 136 MS
EKG Q-T INTERVAL: 372 MS
EKG QRS DURATION: 78 MS
EKG QTC CALCULATION (BAZETT): 429 MS
EKG R AXIS: -35 DEGREES
EKG T AXIS: 63 DEGREES
EKG VENTRICULAR RATE: 80 BPM
EOSINOPHIL # BLD: 0.63 E9/L (ref 0.05–0.5)
EOSINOPHIL NFR BLD: 10.4 % (ref 0–6)
ERYTHROCYTE [DISTWIDTH] IN BLOOD BY AUTOMATED COUNT: 14 FL (ref 11.5–15)
ETHANOLAMINE SERPL-MCNC: <10 MG/DL (ref 0–0.08)
FENTANYL SCREEN, URINE: NOT DETECTED
GLUCOSE SERPL-MCNC: 128 MG/DL (ref 74–99)
HCT VFR BLD AUTO: 41 % (ref 37–54)
HGB BLD-MCNC: 13.2 G/DL (ref 12.5–16.5)
IMM GRANULOCYTES # BLD: 0.02 E9/L
IMM GRANULOCYTES NFR BLD: 0.3 % (ref 0–5)
LYMPHOCYTES # BLD: 1.24 E9/L (ref 1.5–4)
LYMPHOCYTES NFR BLD: 20.5 % (ref 20–42)
MCH RBC QN AUTO: 29.4 PG (ref 26–35)
MCHC RBC AUTO-ENTMCNC: 32.2 % (ref 32–34.5)
MCV RBC AUTO: 91.3 FL (ref 80–99.9)
METHADONE UR QL SCN: NOT DETECTED
MONOCYTES # BLD: 0.63 E9/L (ref 0.1–0.95)
MONOCYTES NFR BLD: 10.4 % (ref 2–12)
NEUTROPHILS # BLD: 3.49 E9/L (ref 1.8–7.3)
NEUTS SEG NFR BLD: 57.6 % (ref 43–80)
OPIATES UR QL SCN: NOT DETECTED
OXYCODONE URINE: NOT DETECTED
PCP UR QL SCN: NOT DETECTED
PLATELET # BLD AUTO: 169 E9/L (ref 130–450)
PMV BLD AUTO: 12.9 FL (ref 7–12)
POTASSIUM SERPL-SCNC: 4.4 MMOL/L (ref 3.5–5)
PROT SERPL-MCNC: 7.1 G/DL (ref 6.4–8.3)
RBC # BLD AUTO: 4.49 E12/L (ref 3.8–5.8)
SALICYLATES SERPL-MCNC: <0.3 MG/DL (ref 0–30)
SODIUM SERPL-SCNC: 141 MMOL/L (ref 132–146)
TRICYCLIC ANTIDEPRESSANTS SCREEN SERUM: NEGATIVE NG/ML
TROPONIN, HIGH SENSITIVITY: 13 NG/L (ref 0–11)
TROPONIN, HIGH SENSITIVITY: 15 NG/L (ref 0–11)
WBC # BLD: 6.1 E9/L (ref 4.5–11.5)

## 2023-04-21 PROCEDURE — 93005 ELECTROCARDIOGRAM TRACING: CPT | Performed by: EMERGENCY MEDICINE

## 2023-04-21 PROCEDURE — 80307 DRUG TEST PRSMV CHEM ANLYZR: CPT

## 2023-04-21 PROCEDURE — 85025 COMPLETE CBC W/AUTO DIFF WBC: CPT

## 2023-04-21 PROCEDURE — 80143 DRUG ASSAY ACETAMINOPHEN: CPT

## 2023-04-21 PROCEDURE — 93010 ELECTROCARDIOGRAM REPORT: CPT | Performed by: INTERNAL MEDICINE

## 2023-04-21 PROCEDURE — 80179 DRUG ASSAY SALICYLATE: CPT

## 2023-04-21 PROCEDURE — 6370000000 HC RX 637 (ALT 250 FOR IP)

## 2023-04-21 PROCEDURE — 99284 EMERGENCY DEPT VISIT MOD MDM: CPT

## 2023-04-21 PROCEDURE — 70450 CT HEAD/BRAIN W/O DYE: CPT

## 2023-04-21 PROCEDURE — 84484 ASSAY OF TROPONIN QUANT: CPT

## 2023-04-21 PROCEDURE — 70486 CT MAXILLOFACIAL W/O DYE: CPT

## 2023-04-21 PROCEDURE — 82077 ASSAY SPEC XCP UR&BREATH IA: CPT

## 2023-04-21 PROCEDURE — 36415 COLL VENOUS BLD VENIPUNCTURE: CPT

## 2023-04-21 PROCEDURE — 80053 COMPREHEN METABOLIC PANEL: CPT

## 2023-04-21 PROCEDURE — 72125 CT NECK SPINE W/O DYE: CPT

## 2023-04-21 RX ORDER — ACETAMINOPHEN 325 MG/1
650 TABLET ORAL ONCE
Status: COMPLETED | OUTPATIENT
Start: 2023-04-21 | End: 2023-04-21

## 2023-04-21 RX ADMIN — ACETAMINOPHEN 650 MG: 325 TABLET ORAL at 14:57

## 2023-04-21 ASSESSMENT — PAIN - FUNCTIONAL ASSESSMENT
PAIN_FUNCTIONAL_ASSESSMENT: 0-10

## 2023-04-21 ASSESSMENT — PAIN SCALES - GENERAL
PAINLEVEL_OUTOF10: 10
PAINLEVEL_OUTOF10: 7

## 2023-04-21 ASSESSMENT — PAIN DESCRIPTION - LOCATION
LOCATION: JAW
LOCATION: HEAD;JAW

## 2023-04-21 NOTE — DISCHARGE INSTRUCTIONS
the anterior right mandible. 3. Fracture of the left nasal bone.       RECOMMENDATIONS:   Unavailable

## 2023-04-21 NOTE — ED NOTES
MD Rodriguez cleared C spine and C-collar was removed from patient.       Mazin Patel RN  04/21/23 0843

## 2023-04-21 NOTE — ED PROVIDER NOTES
ED Course, Reassessment, disposition considerations/shared decision making with patient, consults, disposition:      ED Course as of 04/21/23 1632   Fri Apr 21, 2023   1008 EKG Interpretation  Interpreted by emergency department physician, Dr. Dustin Zarate     4/21/23  Time: 9627    Rhythm: normal sinus   Rate: normal  Axis: left  Conduction: normal  ST Segments: no acute change  T Waves: no acute change    Clinical Impression: Sinus rhythm, no acute ischemic changes    Comparison to Old EKG  Stable from prior EKG       [CD]   1438 Spoke to ENT resident, she states no surgical intervention if medically cleared can be DC and follow up 1 wk.    [TC]   1625 The following tests were interpreted by me:       [CD]   1625 Troponin(!):    Troponin, High Sensitivity 15(!) [CD]   1625 Serum Drug Screen(!):    Ethanol Lvl <10   Acetaminophen Level <5.0(!)   Salicylate, Serum <1.3   TCA Scrn NEGATIVE [CD]   1625 Urine Drug Screen:    Amphetamine Screen, Urine NOT DETECTED   Barbiturate Screen, Ur NOT DETECTED   Benzodiazepine Screen, Urine NOT DETECTED   Cannabinoid Scrn, Ur NOT DETECTED   Cocaine Metabolite Screen, Urine NOT DETECTED   Opiate Scrn, Ur NOT DETECTED   PCP Screen, Urine NOT DETECTED   METHADONE SCREEN, URINE, 55695 NOT DETECTED   Oxycodone Urine NOT DETECTED   FENTANYL SCREEN, URINE NOT DETECTED   Drug Screen Comment: see below [CD]   1625 Comprehensive Metabolic Panel w/ Reflex to MG(!):    Sodium 141   Potassium 4.4   Chloride 104   CO2 28   Anion Gap 9   Glucose, Random 128(!)   BUN,BUNPL 14   Creatinine 0.9   Est, Glom Filt Rate >60   CALCIUM, SERUM, 642270 9.1   Total Protein 7.1   Albumin 3.9   BILIRUBIN TOTAL 0.3   Alk Phos 67   ALT 12   AST 12 [CD]   1625 CBC with Auto Differential(!):    WBC 6.1   RBC 4.49   Hemoglobin Quant 13.2   Hematocrit 41.0   MCV 91.3   MCH 29.4   MCHC 32.2   RDW 14.0   Platelet Count 250   MPV 12.9(!)   Neutrophils % 57.6   Immature Granulocytes % 0.3   Lymphocyte % 20.5   Monocytes %

## 2023-04-21 NOTE — ED NOTES
Patient has 3 rings and 2 necklaces removed and placed in two containers next to patient in HB bed.       Viviana Huber RN  04/21/23 0358

## 2023-04-26 ENCOUNTER — OFFICE VISIT (OUTPATIENT)
Dept: ENT CLINIC | Age: 68
End: 2023-04-26
Payer: MEDICARE

## 2023-04-26 VITALS
WEIGHT: 150 LBS | HEIGHT: 64 IN | DIASTOLIC BLOOD PRESSURE: 79 MMHG | HEART RATE: 83 BPM | SYSTOLIC BLOOD PRESSURE: 141 MMHG | TEMPERATURE: 98.4 F | RESPIRATION RATE: 12 BRPM | OXYGEN SATURATION: 98 % | BODY MASS INDEX: 25.61 KG/M2

## 2023-04-26 DIAGNOSIS — S02.600K: Primary | ICD-10-CM

## 2023-04-26 PROCEDURE — 3077F SYST BP >= 140 MM HG: CPT | Performed by: OTOLARYNGOLOGY

## 2023-04-26 PROCEDURE — G8417 CALC BMI ABV UP PARAM F/U: HCPCS | Performed by: OTOLARYNGOLOGY

## 2023-04-26 PROCEDURE — G8427 DOCREV CUR MEDS BY ELIG CLIN: HCPCS | Performed by: OTOLARYNGOLOGY

## 2023-04-26 PROCEDURE — 1124F ACP DISCUSS-NO DSCNMKR DOCD: CPT | Performed by: OTOLARYNGOLOGY

## 2023-04-26 PROCEDURE — 1036F TOBACCO NON-USER: CPT | Performed by: OTOLARYNGOLOGY

## 2023-04-26 PROCEDURE — 3078F DIAST BP <80 MM HG: CPT | Performed by: OTOLARYNGOLOGY

## 2023-04-26 PROCEDURE — 3017F COLORECTAL CA SCREEN DOC REV: CPT | Performed by: OTOLARYNGOLOGY

## 2023-04-26 PROCEDURE — 99213 OFFICE O/P EST LOW 20 MIN: CPT | Performed by: OTOLARYNGOLOGY

## 2023-04-26 ASSESSMENT — ENCOUNTER SYMPTOMS
EYE PAIN: 0
EYES NEGATIVE: 1
TROUBLE SWALLOWING: 1
ABDOMINAL PAIN: 0
EYE DISCHARGE: 0
COLOR CHANGE: 0
RESPIRATORY NEGATIVE: 1
DIARRHEA: 0
APNEA: 0
CHEST TIGHTNESS: 0
VOMITING: 0
SHORTNESS OF BREATH: 0
GASTROINTESTINAL NEGATIVE: 1

## 2023-04-26 NOTE — PATIENT INSTRUCTIONS
Thank you for choosing our [place] [id number] or [facility]  [facility] practice. We are committed to your medical treatment and  care. If you need to reschedule or cancel your surgery or follow up  appointment, please call the surgery scheduler at [phone]. INSTRUCTIONS FOR SURGERY ORIF Mandible Fracture 6/12/23    Nothing to eat or drink after midnight the night before surgery unless surgery is at [facility] or otherwise instructed by the hospital.    DO NOT TAKE ANY ASPIRIN PRODUCTS 7 days prior to surgery-unless required by your cardiologist or primary care physician. Tylenol only. No Advil, Motrin, Aleve, or Ibuprofen    Any illegal drugs in your system (including Marijuana even if legally prescribed) will result in your surgery being cancelled. Please be sure to check with our office or the hospital on time frame for the drugs to be out of your system. Should your insurance change at any time you must contact our office. Failure to do so may result in your surgery being rescheduled. If you need paperwork filled out for work, you must give the office 2 weeks to complete and submit the forms.       [address], [address] will call you a couple days prior to your surgery and give you further instructions, if any questions call them at [phone]

## 2023-04-26 NOTE — PROGRESS NOTES
Mental Status: He is alert and oriented to person, place, and time. Assessment:       Diagnosis Orders   1. 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 Pt and family understood and decided to proceed with the surgery. Risks of any anesthesia are:  Reactions to medicines   Breathing problems  Risks of any surgery are:  Bleeding   Infection     Specific risks of surgery:       Follow up 1 week after surgery

## 2023-05-12 ENCOUNTER — HOSPITAL ENCOUNTER (EMERGENCY)
Age: 68
Discharge: HOME | End: 2023-05-12
Payer: MEDICAID

## 2023-05-12 ENCOUNTER — HOSPITAL ENCOUNTER (EMERGENCY)
Age: 68
Discharge: HOME | End: 2023-05-12
Attending: EMERGENCY MEDICINE
Payer: MEDICAID

## 2023-05-12 VITALS
OXYGEN SATURATION: 100 % | RESPIRATION RATE: 16 BRPM | SYSTOLIC BLOOD PRESSURE: 140 MMHG | TEMPERATURE: 97.6 F | HEART RATE: 82 BPM | DIASTOLIC BLOOD PRESSURE: 84 MMHG

## 2023-05-12 VITALS
OXYGEN SATURATION: 95 % | WEIGHT: 137 LBS | HEART RATE: 90 BPM | BODY MASS INDEX: 23.39 KG/M2 | HEIGHT: 64 IN | RESPIRATION RATE: 20 BRPM | DIASTOLIC BLOOD PRESSURE: 78 MMHG | SYSTOLIC BLOOD PRESSURE: 137 MMHG | TEMPERATURE: 98.1 F

## 2023-05-12 DIAGNOSIS — R68.84 JAW PAIN: Primary | ICD-10-CM

## 2023-05-12 DIAGNOSIS — G89.4 CHRONIC PAIN SYNDROME: Primary | ICD-10-CM

## 2023-05-12 PROCEDURE — 6370000000 HC RX 637 (ALT 250 FOR IP): Performed by: EMERGENCY MEDICINE

## 2023-05-12 PROCEDURE — 99283 EMERGENCY DEPT VISIT LOW MDM: CPT

## 2023-05-12 RX ORDER — OXYCODONE HYDROCHLORIDE AND ACETAMINOPHEN 5; 325 MG/1; MG/1
1 TABLET ORAL ONCE
Status: COMPLETED | OUTPATIENT
Start: 2023-05-12 | End: 2023-05-12

## 2023-05-12 RX ADMIN — OXYCODONE HYDROCHLORIDE AND ACETAMINOPHEN 1 TABLET: 5; 325 TABLET ORAL at 11:00

## 2023-05-12 ASSESSMENT — LIFESTYLE VARIABLES
HOW OFTEN DO YOU HAVE A DRINK CONTAINING ALCOHOL: NEVER
HOW MANY STANDARD DRINKS CONTAINING ALCOHOL DO YOU HAVE ON A TYPICAL DAY: PATIENT DOES NOT DRINK
HOW OFTEN DO YOU HAVE A DRINK CONTAINING ALCOHOL: NEVER

## 2023-05-12 ASSESSMENT — PAIN DESCRIPTION - DESCRIPTORS: DESCRIPTORS: THROBBING

## 2023-05-12 ASSESSMENT — PAIN DESCRIPTION - ORIENTATION: ORIENTATION: LOWER

## 2023-05-12 ASSESSMENT — PAIN - FUNCTIONAL ASSESSMENT: PAIN_FUNCTIONAL_ASSESSMENT: 0-10

## 2023-05-12 ASSESSMENT — PAIN SCALES - GENERAL: PAINLEVEL_OUTOF10: 10

## 2023-05-12 ASSESSMENT — PAIN DESCRIPTION - LOCATION: LOCATION: MOUTH

## 2023-05-12 NOTE — ED PROVIDER NOTES
[address]        Pt Name: Misha Medina  MRN: [id number]  [place] 1955  Date of evaluation: 5/12/2023  Provider: Kira Camara DO  PCP: Claire Mckeon MD  Note Started: 10:32 AM EDT 5/12/23    CHIEF COMPLAINT       Chief Complaint   Patient presents with    Jaw Pain     Patient has chronic jaw pain from a broken jaw in march. HISTORY OF PRESENT ILLNESS: 1 or more Elements   History From: patient    Limitations to history : None    Misha Medina is a 80 y.o. male who presents with left jaw pain of a chronic nature, previously addressed by ENT, then had a fall on 4/21 and refractured it. He is here for no new injury, just needs pain control. He want's Percocet, states he's allergic to other medications (including toradol) and to \"Please have a heart, I'm not a drug addict\". He denies difficulty breathing or swallowing, chest pain, headache or new injury, infection or swelling. The complaint has been persistent, moderate in severity, and worsened by nothing. Patient denies fever/chills, sore throat, cough, congestion, chest pain, shortness of breath, edema, headache, visual disturbances, focal paresthesias, focal weakness, abdominal pain, nausea, vomiting, diarrhea, constipation, dysuria, hematuria, trauma, neck or back pain, rash or other complaints. Nursing Notes were all reviewed and agreed with or any disagreements were addressed in the HPI. REVIEW OF SYSTEMS :           Positives and Pertinent negatives as per HPI.      SURGICAL HISTORY     Past Surgical History:   Procedure Laterality Date    ABDOMEN SURGERY      APPENDECTOMY      BLADDER SURGERY      CARDIAC CATHETERIZATION  05/21/2018    Dr. Cristian Valdes      ENDOSCOPY, COLON, DIAGNOSTIC  11/13/2015    HEMORRHOID SURGERY      LITHOTRIPSY      MANDIBLE SURGERY N/A 3/3/2023    MANDIBLE OPEN REDUCTION

## 2023-05-13 NOTE — ED PROVIDER NOTES
diagnostic (11/13/2015); Abdomen surgery; Colonoscopy; Cardiac surgery; vascular surgery; Cardiac catheterization (05/21/2018); and Mandible surgery (N/A, 3/3/2023). Social History:  reports that he quit smoking about 13 years ago. His smoking use included cigarettes. He quit smokeless tobacco use about 5 years ago. His smokeless tobacco use included chew. He reports that he does not currently use drugs after having used the following drugs: Cocaine. He reports that he does not drink alcohol. Family History: family history includes Cancer in his father, maternal grandfather, and mother; Diabetes in his father, maternal aunt, maternal aunt, maternal aunt, maternal grandmother, and mother; Heart Disease in his paternal grandfather and paternal grandmother; Heart Failure in his paternal grandfather and paternal grandmother.    Allergies: Codeine, Dye [iodides], Ketorolac tromethamine, Peanuts [peanut oil], Shellfish-derived products, Aspirin, and Nitroglycerin  CURRENT MEDICATIONS       Previous Medications    ACETAMINOPHEN (TYLENOL) 500 MG TABLET    Take 2 tablets by mouth 3 times daily as needed for Pain or Fever    ATORVASTATIN (LIPITOR) 40 MG TABLET    Take 1 tablet by mouth nightly    BREO ELLIPTA 200-25 MCG/INH AEPB        CLONAZEPAM (KLONOPIN) 1 MG TABLET        DIVALPROEX (DEPAKOTE) 500 MG DR TABLET    take 1 tablet by mouth at bedtime    FINASTERIDE (PROSCAR) 5 MG TABLET    Take 1 tablet by mouth daily    GABAPENTIN (NEURONTIN) 300 MG CAPSULE        IBUPROFEN (ADVIL;MOTRIN) 800 MG TABLET        KETOROLAC (TORADOL) 10 MG TABLET    Take 1 tablet by mouth every 6 hours as needed for Pain    LATUDA 120 MG TABLET        LEVETIRACETAM (KEPPRA) 500 MG TABLET    Take 2 tablets by mouth 2 times daily    LEVOTHYROXINE (SYNTHROID) 25 MCG TABLET    take 1 tablet by mouth once daily    LISINOPRIL (PRINIVIL;ZESTRIL) 10 MG TABLET    Take 1 tablet by mouth daily    METHIMAZOLE (TAPAZOLE) 10 MG TABLET    Take 1 tablet by

## 2023-05-25 ENCOUNTER — PREP FOR PROCEDURE (OUTPATIENT)
Dept: ENT CLINIC | Age: 68
End: 2023-05-25

## 2023-05-25 ENCOUNTER — TELEPHONE (OUTPATIENT)
Dept: ENT CLINIC | Age: 68
End: 2023-05-25

## 2023-05-25 DIAGNOSIS — S02.66XA CLOSED FRACTURE OF SYMPHYSIS OF MANDIBLE, INITIAL ENCOUNTER (HCC): Primary | ICD-10-CM

## 2023-05-25 PROBLEM — S02.609A MANDIBLE FRACTURE (HCC): Status: ACTIVE | Noted: 2023-05-25

## 2023-05-25 NOTE — TELEPHONE ENCOUNTER
Prior Authorization Form:      DEMOGRAPHICS:                     Patient Name:  Sandro Hernandez  Patient :  1955            Insurance:  Payor: [employer] / Plan: [employer] / Product Type: *No Product type* /   Insurance ID Number:    Payer/Plan Subscr  Sex Relation Sub.  Ins. ID Effective Group Num   1. SINDHU GORDILLO* MARKIE COLLIER* 1955 Male Self [id number]                                   [address]         DIAGNOSIS & PROCEDURE:                       Procedure/Operation: Open Reduction Internal Fixation Mandible fracture           CPT Code: 72503    Diagnosis:  mandible fracture     ICD10 Code: S02.600K    Location:  [facility]    Surgeon:  christofer    SCHEDULING INFORMATION:                          Date: 23    Time: n/a              Anesthesia:  General                                                       Status:  Outpatient        Special Comments:  include all chart notes        Electronically signed by Filiberto Gómez MA on 2023 at 11:43 AM

## 2023-05-31 ENCOUNTER — TELEPHONE (OUTPATIENT)
Dept: INTERNAL MEDICINE | Age: 68
End: 2023-05-31

## 2023-05-31 NOTE — TELEPHONE ENCOUNTER
Spoke with pt and informed him he will need to be seen for the referral , and advised a walk in appt due to full schedule , pt will be in Friday morning.

## 2023-05-31 NOTE — TELEPHONE ENCOUNTER
----- Message from Bill Jacome sent at 5/30/2023  3:11 PM EDT -----  Subject: Referral Request    Reason for referral request? Pain management   Provider patient wants to be referred to(if known):     Provider Phone Number(if known): Additional Information for Provider?  Patient needs a referral for pain   management for his broken jaw  ---------------------------------------------------------------------------  --------------  Tae Jefferson INFO    [phone]; OK to leave message on voicemail  ---------------------------------------------------------------------------  --------------

## 2023-06-03 ENCOUNTER — HOSPITAL ENCOUNTER (EMERGENCY)
Age: 68
Discharge: LEFT BEFORE DISCHARGE | End: 2023-06-03
Attending: EMERGENCY MEDICINE
Payer: COMMERCIAL

## 2023-06-03 ENCOUNTER — APPOINTMENT (OUTPATIENT)
Dept: CT IMAGING | Age: 68
End: 2023-06-03
Payer: COMMERCIAL

## 2023-06-03 ENCOUNTER — APPOINTMENT (OUTPATIENT)
Dept: GENERAL RADIOLOGY | Age: 68
End: 2023-06-03
Payer: COMMERCIAL

## 2023-06-03 VITALS
RESPIRATION RATE: 16 BRPM | TEMPERATURE: 100.8 F | DIASTOLIC BLOOD PRESSURE: 65 MMHG | SYSTOLIC BLOOD PRESSURE: 134 MMHG | HEART RATE: 121 BPM | OXYGEN SATURATION: 94 %

## 2023-06-03 DIAGNOSIS — R55 SYNCOPE AND COLLAPSE: Primary | ICD-10-CM

## 2023-06-03 DIAGNOSIS — R77.8 ELEVATED TROPONIN: ICD-10-CM

## 2023-06-03 DIAGNOSIS — N17.9 ACUTE KIDNEY INJURY (HCC): ICD-10-CM

## 2023-06-03 DIAGNOSIS — D72.829 LEUKOCYTOSIS, UNSPECIFIED TYPE: ICD-10-CM

## 2023-06-03 LAB
ALBUMIN SERPL-MCNC: 4.3 G/DL (ref 3.5–5.2)
ALP SERPL-CCNC: 81 U/L (ref 40–129)
ALT SERPL-CCNC: 10 U/L (ref 0–40)
ANION GAP SERPL CALCULATED.3IONS-SCNC: 15 MMOL/L (ref 7–16)
APAP SERPL-MCNC: <5 MCG/ML (ref 10–30)
AST SERPL-CCNC: 13 U/L (ref 0–39)
BASOPHILS # BLD: 0.07 E9/L (ref 0–0.2)
BASOPHILS NFR BLD: 0.6 % (ref 0–2)
BILIRUB DIRECT SERPL-MCNC: <0.2 MG/DL (ref 0–0.3)
BILIRUB INDIRECT SERPL-MCNC: NORMAL MG/DL (ref 0–1)
BILIRUB SERPL-MCNC: 0.4 MG/DL (ref 0–1.2)
BUN SERPL-MCNC: 20 MG/DL (ref 6–23)
CALCIUM SERPL-MCNC: 9.2 MG/DL (ref 8.6–10.2)
CHLORIDE SERPL-SCNC: 100 MMOL/L (ref 98–107)
CK SERPL-CCNC: 88 U/L (ref 20–200)
CO2 SERPL-SCNC: 22 MMOL/L (ref 22–29)
CREAT SERPL-MCNC: 1.7 MG/DL (ref 0.7–1.2)
EKG ATRIAL RATE: 96 BPM
EKG P AXIS: -11 DEGREES
EKG P-R INTERVAL: 120 MS
EKG Q-T INTERVAL: 362 MS
EKG QRS DURATION: 90 MS
EKG QTC CALCULATION (BAZETT): 457 MS
EKG R AXIS: -31 DEGREES
EKG T AXIS: 74 DEGREES
EKG VENTRICULAR RATE: 96 BPM
EOSINOPHIL # BLD: 0.26 E9/L (ref 0.05–0.5)
EOSINOPHIL NFR BLD: 2.1 % (ref 0–6)
ERYTHROCYTE [DISTWIDTH] IN BLOOD BY AUTOMATED COUNT: 14.3 FL (ref 11.5–15)
ETHANOLAMINE SERPL-MCNC: <10 MG/DL (ref 0–0.08)
GLUCOSE SERPL-MCNC: 147 MG/DL (ref 74–99)
HCT VFR BLD AUTO: 37.5 % (ref 37–54)
HGB BLD-MCNC: 12.4 G/DL (ref 12.5–16.5)
IMM GRANULOCYTES # BLD: 0.04 E9/L
IMM GRANULOCYTES NFR BLD: 0.3 % (ref 0–5)
LYMPHOCYTES # BLD: 0.67 E9/L (ref 1.5–4)
LYMPHOCYTES NFR BLD: 5.5 % (ref 20–42)
MCH RBC QN AUTO: 29.5 PG (ref 26–35)
MCHC RBC AUTO-ENTMCNC: 33.1 % (ref 32–34.5)
MCV RBC AUTO: 89.1 FL (ref 80–99.9)
MONOCYTES # BLD: 0.73 E9/L (ref 0.1–0.95)
MONOCYTES NFR BLD: 6 % (ref 2–12)
NEUTROPHILS # BLD: 10.37 E9/L (ref 1.8–7.3)
NEUTS SEG NFR BLD: 85.5 % (ref 43–80)
PLATELET # BLD AUTO: 173 E9/L (ref 130–450)
PMV BLD AUTO: 11.9 FL (ref 7–12)
POTASSIUM SERPL-SCNC: 3.6 MMOL/L (ref 3.5–5)
PROT SERPL-MCNC: 7.4 G/DL (ref 6.4–8.3)
RBC # BLD AUTO: 4.21 E12/L (ref 3.8–5.8)
SALICYLATES SERPL-MCNC: <0.3 MG/DL (ref 0–30)
SODIUM SERPL-SCNC: 137 MMOL/L (ref 132–146)
TRICYCLIC ANTIDEPRESSANTS SCREEN SERUM: NEGATIVE NG/ML
TROPONIN, HIGH SENSITIVITY: 33 NG/L (ref 0–11)
WBC # BLD: 12.1 E9/L (ref 4.5–11.5)

## 2023-06-03 PROCEDURE — 80076 HEPATIC FUNCTION PANEL: CPT

## 2023-06-03 PROCEDURE — 84484 ASSAY OF TROPONIN QUANT: CPT

## 2023-06-03 PROCEDURE — 99285 EMERGENCY DEPT VISIT HI MDM: CPT

## 2023-06-03 PROCEDURE — 80048 BASIC METABOLIC PNL TOTAL CA: CPT

## 2023-06-03 PROCEDURE — 82550 ASSAY OF CK (CPK): CPT

## 2023-06-03 PROCEDURE — 93005 ELECTROCARDIOGRAM TRACING: CPT

## 2023-06-03 PROCEDURE — 80143 DRUG ASSAY ACETAMINOPHEN: CPT

## 2023-06-03 PROCEDURE — 71045 X-RAY EXAM CHEST 1 VIEW: CPT

## 2023-06-03 PROCEDURE — 85025 COMPLETE CBC W/AUTO DIFF WBC: CPT

## 2023-06-03 PROCEDURE — 2580000003 HC RX 258

## 2023-06-03 PROCEDURE — 80179 DRUG ASSAY SALICYLATE: CPT

## 2023-06-03 PROCEDURE — 6370000000 HC RX 637 (ALT 250 FOR IP)

## 2023-06-03 PROCEDURE — 80307 DRUG TEST PRSMV CHEM ANLYZR: CPT

## 2023-06-03 PROCEDURE — 82077 ASSAY SPEC XCP UR&BREATH IA: CPT

## 2023-06-03 RX ORDER — ACETAMINOPHEN 325 MG/1
650 TABLET ORAL ONCE
Status: COMPLETED | OUTPATIENT
Start: 2023-06-03 | End: 2023-06-03

## 2023-06-03 RX ORDER — 0.9 % SODIUM CHLORIDE 0.9 %
1000 INTRAVENOUS SOLUTION INTRAVENOUS ONCE
Status: COMPLETED | OUTPATIENT
Start: 2023-06-03 | End: 2023-06-03

## 2023-06-03 RX ADMIN — SODIUM CHLORIDE 1000 ML: 9 INJECTION, SOLUTION INTRAVENOUS at 13:41

## 2023-06-03 RX ADMIN — ACETAMINOPHEN 650 MG: 325 TABLET ORAL at 13:40

## 2023-06-03 ASSESSMENT — PAIN SCALES - GENERAL: PAINLEVEL_OUTOF10: 0

## 2023-06-03 ASSESSMENT — PAIN - FUNCTIONAL ASSESSMENT: PAIN_FUNCTIONAL_ASSESSMENT: NONE - DENIES PAIN

## 2023-06-03 NOTE — ED NOTES
Wonderguard removed at this time per Dr. Linda Hernandez. Patient Alert and oriented and ambulating the ED halls. Patient given meal tray.      Brenda Ariza RN  06/03/23 8535

## 2023-06-03 NOTE — ED PROVIDER NOTES
Department of Emergency Medicine     Written by: Paris Winkler MD  Patient Name: Maryjane Davis Date: 6/3/2023 12:32 PM  MRN: [id number]                   : 1955      HPI    This is a 43-year-old male with history of hypertension, COPD, hypothyroidism, bipolar disorder, diabetes, peptic ulcer, seizures, paroxysmal A-fib, CHF that presents to the ED after a possible heatstroke. The patient was found on the ground after he syncopized on the street. Bystanders called officers and they presented there to help him. They brought him to the ER. The patient initially said that he did not be treated and he went To the ER, however he agreed to do so. He says that he feels thirsty and hot, otherwise no chest pain, shortness of breath, abdominal pain, diarrhea, constipation, urinary changes. The patient does mention that he is taking his Klonopin. Review of systems:    Pertinent positives and negatives mentioned in the HPI/MDM. Physical Exam  Constitutional:       General: He is not in acute distress. Appearance: Normal appearance. He is diaphoretic. He is not ill-appearing. Comments: Febrile   HENT:      Head: Normocephalic and atraumatic. Nose: Nose normal. No congestion. Mouth/Throat:      Mouth: Mucous membranes are moist.      Pharynx: No posterior oropharyngeal erythema. Eyes:      General: No scleral icterus. Extraocular Movements: Extraocular movements intact. Pupils: Pupils are equal, round, and reactive to light. Cardiovascular:      Rate and Rhythm: Regular rhythm. Tachycardia present. Pulses: Normal pulses. Heart sounds: Normal heart sounds. Pulmonary:      Effort: Pulmonary effort is normal. No respiratory distress. Breath sounds: No stridor. Wheezing present. No rhonchi. Chest:      Chest wall: No tenderness. Abdominal:      General: Bowel sounds are normal. There is no distension. Palpations: Abdomen is soft.  There is no

## 2023-06-06 LAB
EKG ATRIAL RATE: 96 BPM
EKG P AXIS: -11 DEGREES
EKG P-R INTERVAL: 120 MS
EKG Q-T INTERVAL: 362 MS
EKG QRS DURATION: 90 MS
EKG QTC CALCULATION (BAZETT): 457 MS
EKG R AXIS: -31 DEGREES
EKG T AXIS: 74 DEGREES
EKG VENTRICULAR RATE: 96 BPM

## 2023-06-06 PROCEDURE — 93010 ELECTROCARDIOGRAM REPORT: CPT | Performed by: INTERNAL MEDICINE

## 2023-06-09 ENCOUNTER — TELEPHONE (OUTPATIENT)
Dept: ENT CLINIC | Age: 68
End: 2023-06-09

## 2023-06-09 NOTE — TELEPHONE ENCOUNTER
Attempted to call patient 3x unable to leave a voicemail to discuss cardiac clearance prior to surgery, called pcp to discuss cardiac clearance patient has not seen pcp since march 2023.

## 2023-06-13 ENCOUNTER — APPOINTMENT (OUTPATIENT)
Dept: CT IMAGING | Age: 68
End: 2023-06-13
Payer: MEDICARE

## 2023-06-13 ENCOUNTER — HOSPITAL ENCOUNTER (OUTPATIENT)
Age: 68
Setting detail: OBSERVATION
Discharge: HOME | End: 2023-06-14
Attending: EMERGENCY MEDICINE | Admitting: INTERNAL MEDICINE
Payer: MEDICARE

## 2023-06-13 DIAGNOSIS — R07.9 CHEST PAIN, UNSPECIFIED TYPE: Primary | ICD-10-CM

## 2023-06-13 DIAGNOSIS — E05.90 HYPERTHYROIDISM: ICD-10-CM

## 2023-06-13 LAB
ALBUMIN SERPL-MCNC: 3.8 G/DL (ref 3.5–5.2)
ALP SERPL-CCNC: 65 U/L (ref 40–129)
ALT SERPL-CCNC: 16 U/L (ref 0–40)
ANION GAP SERPL CALCULATED.3IONS-SCNC: 10 MMOL/L (ref 7–16)
AST SERPL-CCNC: 27 U/L (ref 0–39)
BASOPHILS # BLD: 0.04 E9/L (ref 0–0.2)
BASOPHILS NFR BLD: 0.6 % (ref 0–2)
BILIRUB SERPL-MCNC: 0.2 MG/DL (ref 0–1.2)
BUN SERPL-MCNC: 16 MG/DL (ref 6–23)
CALCIUM SERPL-MCNC: 9 MG/DL (ref 8.6–10.2)
CHLORIDE SERPL-SCNC: 102 MMOL/L (ref 98–107)
CO2 SERPL-SCNC: 26 MMOL/L (ref 22–29)
CREAT SERPL-MCNC: 0.9 MG/DL (ref 0.7–1.2)
EOSINOPHIL # BLD: 0.57 E9/L (ref 0.05–0.5)
EOSINOPHIL NFR BLD: 7.9 % (ref 0–6)
ERYTHROCYTE [DISTWIDTH] IN BLOOD BY AUTOMATED COUNT: 14.5 FL (ref 11.5–15)
GLUCOSE SERPL-MCNC: 106 MG/DL (ref 74–99)
HCT VFR BLD AUTO: 39.7 % (ref 37–54)
HGB BLD-MCNC: 12.8 G/DL (ref 12.5–16.5)
IMM GRANULOCYTES # BLD: 0.04 E9/L
IMM GRANULOCYTES NFR BLD: 0.6 % (ref 0–5)
LACTATE BLDV-SCNC: 1.3 MMOL/L (ref 0.5–2.2)
LYMPHOCYTES # BLD: 1.61 E9/L (ref 1.5–4)
LYMPHOCYTES NFR BLD: 22.3 % (ref 20–42)
MCH RBC QN AUTO: 29 PG (ref 26–35)
MCHC RBC AUTO-ENTMCNC: 32.2 % (ref 32–34.5)
MCV RBC AUTO: 89.8 FL (ref 80–99.9)
MONOCYTES # BLD: 0.73 E9/L (ref 0.1–0.95)
MONOCYTES NFR BLD: 10.1 % (ref 2–12)
NEUTROPHILS # BLD: 4.23 E9/L (ref 1.8–7.3)
NEUTS SEG NFR BLD: 58.5 % (ref 43–80)
PLATELET # BLD AUTO: 219 E9/L (ref 130–450)
PMV BLD AUTO: 12.9 FL (ref 7–12)
POTASSIUM SERPL-SCNC: 5.2 MMOL/L (ref 3.5–5)
PROT SERPL-MCNC: 7 G/DL (ref 6.4–8.3)
RBC # BLD AUTO: 4.42 E12/L (ref 3.8–5.8)
REASON FOR REJECTION: NORMAL
REJECTED TEST: NORMAL
SODIUM SERPL-SCNC: 138 MMOL/L (ref 132–146)
TROPONIN, HIGH SENSITIVITY: 15 NG/L (ref 0–11)
TROPONIN, HIGH SENSITIVITY: 15 NG/L (ref 0–11)
WBC # BLD: 7.2 E9/L (ref 4.5–11.5)

## 2023-06-13 PROCEDURE — 6360000002 HC RX W HCPCS

## 2023-06-13 PROCEDURE — 96374 THER/PROPH/DIAG INJ IV PUSH: CPT

## 2023-06-13 PROCEDURE — 6370000000 HC RX 637 (ALT 250 FOR IP)

## 2023-06-13 PROCEDURE — 85025 COMPLETE CBC W/AUTO DIFF WBC: CPT

## 2023-06-13 PROCEDURE — 99223 1ST HOSP IP/OBS HIGH 75: CPT | Performed by: INTERNAL MEDICINE

## 2023-06-13 PROCEDURE — G0378 HOSPITAL OBSERVATION PER HR: HCPCS

## 2023-06-13 PROCEDURE — 84484 ASSAY OF TROPONIN QUANT: CPT

## 2023-06-13 PROCEDURE — 80053 COMPREHEN METABOLIC PANEL: CPT

## 2023-06-13 PROCEDURE — 93005 ELECTROCARDIOGRAM TRACING: CPT

## 2023-06-13 PROCEDURE — 70450 CT HEAD/BRAIN W/O DYE: CPT

## 2023-06-13 PROCEDURE — 36415 COLL VENOUS BLD VENIPUNCTURE: CPT

## 2023-06-13 PROCEDURE — 83605 ASSAY OF LACTIC ACID: CPT

## 2023-06-13 PROCEDURE — 99285 EMERGENCY DEPT VISIT HI MDM: CPT

## 2023-06-13 RX ORDER — REGADENOSON 0.08 MG/ML
0.4 INJECTION, SOLUTION INTRAVENOUS
Status: COMPLETED | OUTPATIENT
Start: 2023-06-13 | End: 2023-06-14

## 2023-06-13 RX ORDER — POLYETHYLENE GLYCOL 3350 17 G/17G
17 POWDER, FOR SOLUTION ORAL DAILY PRN
Status: DISCONTINUED | OUTPATIENT
Start: 2023-06-13 | End: 2023-06-14 | Stop reason: HOSPADM

## 2023-06-13 RX ORDER — DIVALPROEX SODIUM 500 MG/1
500 TABLET, DELAYED RELEASE ORAL NIGHTLY
Status: DISCONTINUED | OUTPATIENT
Start: 2023-06-13 | End: 2023-06-14 | Stop reason: CLARIF

## 2023-06-13 RX ORDER — SODIUM CHLORIDE 9 MG/ML
INJECTION, SOLUTION INTRAVENOUS PRN
Status: DISCONTINUED | OUTPATIENT
Start: 2023-06-13 | End: 2023-06-14 | Stop reason: HOSPADM

## 2023-06-13 RX ORDER — ACETAMINOPHEN 325 MG/1
650 TABLET ORAL EVERY 6 HOURS PRN
Status: DISCONTINUED | OUTPATIENT
Start: 2023-06-13 | End: 2023-06-14 | Stop reason: HOSPADM

## 2023-06-13 RX ORDER — NICOTINE 21 MG/24HR
1 PATCH, TRANSDERMAL 24 HOURS TRANSDERMAL ONCE
Status: COMPLETED | OUTPATIENT
Start: 2023-06-13 | End: 2023-06-15

## 2023-06-13 RX ORDER — ACETAMINOPHEN 650 MG/1
650 SUPPOSITORY RECTAL EVERY 6 HOURS PRN
Status: DISCONTINUED | OUTPATIENT
Start: 2023-06-13 | End: 2023-06-14 | Stop reason: HOSPADM

## 2023-06-13 RX ORDER — ATORVASTATIN CALCIUM 40 MG/1
40 TABLET, FILM COATED ORAL NIGHTLY
Status: DISCONTINUED | OUTPATIENT
Start: 2023-06-13 | End: 2023-06-14 | Stop reason: CLARIF

## 2023-06-13 RX ORDER — SODIUM CHLORIDE 0.9 % (FLUSH) 0.9 %
5-40 SYRINGE (ML) INJECTION EVERY 12 HOURS SCHEDULED
Status: DISCONTINUED | OUTPATIENT
Start: 2023-06-13 | End: 2023-06-14 | Stop reason: HOSPADM

## 2023-06-13 RX ORDER — CLONAZEPAM 0.5 MG/1
1 TABLET ORAL EVERY 12 HOURS
Status: DISCONTINUED | OUTPATIENT
Start: 2023-06-13 | End: 2023-06-14 | Stop reason: HOSPADM

## 2023-06-13 RX ORDER — SODIUM CHLORIDE 0.9 % (FLUSH) 0.9 %
5-40 SYRINGE (ML) INJECTION PRN
Status: DISCONTINUED | OUTPATIENT
Start: 2023-06-13 | End: 2023-06-14 | Stop reason: HOSPADM

## 2023-06-13 RX ORDER — FINASTERIDE 5 MG/1
5 TABLET, FILM COATED ORAL DAILY
Status: DISCONTINUED | OUTPATIENT
Start: 2023-06-14 | End: 2023-06-14 | Stop reason: HOSPADM

## 2023-06-13 RX ORDER — LEVETIRACETAM 500 MG/1
1000 TABLET ORAL 2 TIMES DAILY
Status: DISCONTINUED | OUTPATIENT
Start: 2023-06-13 | End: 2023-06-14 | Stop reason: HOSPADM

## 2023-06-13 RX ORDER — FENTANYL CITRATE 50 UG/ML
25 INJECTION, SOLUTION INTRAMUSCULAR; INTRAVENOUS ONCE
Status: COMPLETED | OUTPATIENT
Start: 2023-06-13 | End: 2023-06-13

## 2023-06-13 RX ORDER — ONDANSETRON 2 MG/ML
4 INJECTION INTRAMUSCULAR; INTRAVENOUS EVERY 6 HOURS PRN
Status: DISCONTINUED | OUTPATIENT
Start: 2023-06-13 | End: 2023-06-14 | Stop reason: HOSPADM

## 2023-06-13 RX ORDER — ASPIRIN 81 MG/1
324 TABLET, CHEWABLE ORAL ONCE
Status: COMPLETED | OUTPATIENT
Start: 2023-06-13 | End: 2023-06-13

## 2023-06-13 RX ORDER — ONDANSETRON 4 MG/1
4 TABLET, ORALLY DISINTEGRATING ORAL EVERY 8 HOURS PRN
Status: DISCONTINUED | OUTPATIENT
Start: 2023-06-13 | End: 2023-06-14 | Stop reason: HOSPADM

## 2023-06-13 RX ORDER — LISINOPRIL 10 MG/1
10 TABLET ORAL DAILY
Status: DISCONTINUED | OUTPATIENT
Start: 2023-06-14 | End: 2023-06-14 | Stop reason: HOSPADM

## 2023-06-13 RX ADMIN — FENTANYL CITRATE 25 MCG: 50 INJECTION, SOLUTION INTRAMUSCULAR; INTRAVENOUS at 21:06

## 2023-06-13 RX ADMIN — ASPIRIN 81 MG CHEWABLE TABLET 324 MG: 81 TABLET CHEWABLE at 21:04

## 2023-06-13 ASSESSMENT — PAIN DESCRIPTION - ONSET: ONSET: ON-GOING

## 2023-06-13 ASSESSMENT — ENCOUNTER SYMPTOMS
EYE ITCHING: 0
BACK PAIN: 0
ABDOMINAL DISTENTION: 0
EYE DISCHARGE: 0
CHEST TIGHTNESS: 0
APNEA: 0
ABDOMINAL PAIN: 0

## 2023-06-13 ASSESSMENT — PAIN DESCRIPTION - LOCATION: LOCATION: CHEST

## 2023-06-13 ASSESSMENT — PAIN DESCRIPTION - FREQUENCY: FREQUENCY: CONTINUOUS

## 2023-06-13 ASSESSMENT — HEART SCORE: ECG: 0

## 2023-06-13 ASSESSMENT — PAIN DESCRIPTION - PAIN TYPE: TYPE: ACUTE PAIN

## 2023-06-13 ASSESSMENT — PAIN SCALES - GENERAL
PAINLEVEL_OUTOF10: 10
PAINLEVEL_OUTOF10: 7

## 2023-06-13 ASSESSMENT — PAIN DESCRIPTION - DESCRIPTORS: DESCRIPTORS: STABBING

## 2023-06-13 ASSESSMENT — PAIN DESCRIPTION - ORIENTATION: ORIENTATION: MID;UPPER

## 2023-06-13 ASSESSMENT — PAIN - FUNCTIONAL ASSESSMENT: PAIN_FUNCTIONAL_ASSESSMENT: 0-10

## 2023-06-13 NOTE — ED NOTES
The following labs were labeled with appropriate pt sticker and tubed to lab:     [] Blue     [x] Lavender   [] on ice  [x] Green/yellow  [] Green/black [] on ice  [x] Grey  [x] on ice  [] Yellow  [] Red  [] Type/ Screen  [] ABG  [] VBG    [] COVID-19 swab    [] Rapid  [] PCR  [] Flu swab  [] Peds Viral Panel     [] Urine Sample  [] Fecal Sample  [] Pelvic Cultures  [] Blood Cultures  [] X 2  [] STREP Cultures       Barbara Odom RN  06/13/23 1922

## 2023-06-13 NOTE — ED NOTES
The following labs were labeled with appropriate pt sticker and tubed to lab:     [] Blue     [x] Lavender   [] on ice  [] Green/yellow  [] Green/black [] on ice  [x] Grey  [x] on ice  [] Yellow  [] Red  [] Type/ Screen  [] ABG  [] VBG    [] COVID-19 swab    [] Rapid  [] PCR  [] Flu swab  [] Peds Viral Panel     [] Urine Sample  [] Fecal Sample  [] Pelvic Cultures  [] Blood Cultures  [] X 2  [] STREP Cultures       Las juan david Bornes  06/13/23 1519

## 2023-06-13 NOTE — ED PROVIDER NOTES
[address] [facility] EMERGENCY MEDICINE                                                                                                                                         HISTORY AND PHYSICAL                                                                                                                                                                  Arsenio Brooklyn 80 y.o. male PHMx of medical noncompliance, mitral valve replacement, CAD, high blood pressure, hyperlipidemia, chronic tobacco use, polysubstance use disorder, gastroduodenal ulcer, type 2 diabetes, homelessness presents to the ED c/o chest pain. Onset: Started several days ago, has been ongoing. Location/Radiation: Sternal radiation to the left chest and left arm. Duration: Constant. Characterization: Achiness. Aggravating Factors: Moving or walking. Relieving Factors: None. Severity: Moderate to severe. Patient reports that he was recently seen in the ER and was told to be beneficial for him to stay to get chest pain work-up he decided leave [address] Patient reports that he will stay at this time. He also reports that he accidentally rolled out of bed and hit his head last night. He denies loss conscious or syncope after this. .       Assx Sxs: Chest pain. He Denies: Fever/chills, abdominal pain, nausea/vomiting, diaphoresis, dysuria/hematuria. Review of Systems   Constitutional:  Positive for activity change. Negative for appetite change, diaphoresis, fatigue and fever. Eyes:  Negative for discharge and itching. Respiratory:  Negative for apnea and chest tightness.     Cardiovascular:  Positive for chest

## 2023-06-14 ENCOUNTER — APPOINTMENT (OUTPATIENT)
Dept: NUCLEAR MEDICINE | Age: 68
End: 2023-06-14
Payer: MEDICARE

## 2023-06-14 ENCOUNTER — APPOINTMENT (OUTPATIENT)
Dept: NON INVASIVE DIAGNOSTICS | Age: 68
End: 2023-06-14
Payer: MEDICARE

## 2023-06-14 VITALS
OXYGEN SATURATION: 95 % | SYSTOLIC BLOOD PRESSURE: 120 MMHG | HEIGHT: 64 IN | RESPIRATION RATE: 18 BRPM | HEART RATE: 67 BPM | WEIGHT: 160 LBS | BODY MASS INDEX: 27.31 KG/M2 | TEMPERATURE: 97.7 F | DIASTOLIC BLOOD PRESSURE: 65 MMHG

## 2023-06-14 LAB
ANION GAP SERPL CALCULATED.3IONS-SCNC: 12 MMOL/L (ref 7–16)
ANION GAP SERPL CALCULATED.3IONS-SCNC: 8 MMOL/L (ref 7–16)
BASOPHILS # BLD: 0.05 E9/L (ref 0–0.2)
BASOPHILS # BLD: 0.05 E9/L (ref 0–0.2)
BASOPHILS NFR BLD: 0.8 % (ref 0–2)
BASOPHILS NFR BLD: 0.9 % (ref 0–2)
BUN SERPL-MCNC: 16 MG/DL (ref 6–23)
BUN SERPL-MCNC: 17 MG/DL (ref 6–23)
CALCIUM SERPL-MCNC: 9.1 MG/DL (ref 8.6–10.2)
CALCIUM SERPL-MCNC: 9.2 MG/DL (ref 8.6–10.2)
CHLORIDE SERPL-SCNC: 104 MMOL/L (ref 98–107)
CHLORIDE SERPL-SCNC: 106 MMOL/L (ref 98–107)
CO2 SERPL-SCNC: 25 MMOL/L (ref 22–29)
CO2 SERPL-SCNC: 27 MMOL/L (ref 22–29)
CREAT SERPL-MCNC: 1 MG/DL (ref 0.7–1.2)
CREAT SERPL-MCNC: 1 MG/DL (ref 0.7–1.2)
EKG ATRIAL RATE: 65 BPM
EKG ATRIAL RATE: 80 BPM
EKG P AXIS: -20 DEGREES
EKG P AXIS: -28 DEGREES
EKG P-R INTERVAL: 138 MS
EKG P-R INTERVAL: 142 MS
EKG Q-T INTERVAL: 378 MS
EKG Q-T INTERVAL: 424 MS
EKG QRS DURATION: 76 MS
EKG QRS DURATION: 88 MS
EKG QTC CALCULATION (BAZETT): 435 MS
EKG QTC CALCULATION (BAZETT): 440 MS
EKG R AXIS: -21 DEGREES
EKG R AXIS: 0 DEGREES
EKG T AXIS: 44 DEGREES
EKG T AXIS: 50 DEGREES
EKG VENTRICULAR RATE: 65 BPM
EKG VENTRICULAR RATE: 80 BPM
EOSINOPHIL # BLD: 0.73 E9/L (ref 0.05–0.5)
EOSINOPHIL # BLD: 0.79 E9/L (ref 0.05–0.5)
EOSINOPHIL NFR BLD: 11.9 % (ref 0–6)
EOSINOPHIL NFR BLD: 14.1 % (ref 0–6)
ERYTHROCYTE [DISTWIDTH] IN BLOOD BY AUTOMATED COUNT: 14.5 FL (ref 11.5–15)
ERYTHROCYTE [DISTWIDTH] IN BLOOD BY AUTOMATED COUNT: 14.6 FL (ref 11.5–15)
GLUCOSE SERPL-MCNC: 102 MG/DL (ref 74–99)
GLUCOSE SERPL-MCNC: 116 MG/DL (ref 74–99)
HCT VFR BLD AUTO: 37.6 % (ref 37–54)
HCT VFR BLD AUTO: 38.1 % (ref 37–54)
HGB BLD-MCNC: 12.2 G/DL (ref 12.5–16.5)
HGB BLD-MCNC: 12.4 G/DL (ref 12.5–16.5)
IMM GRANULOCYTES # BLD: 0.01 E9/L
IMM GRANULOCYTES # BLD: 0.02 E9/L
IMM GRANULOCYTES NFR BLD: 0.2 % (ref 0–5)
IMM GRANULOCYTES NFR BLD: 0.3 % (ref 0–5)
LV EF: 61 %
LVEF MODALITY: NORMAL
LYMPHOCYTES # BLD: 2.07 E9/L (ref 1.5–4)
LYMPHOCYTES # BLD: 2.13 E9/L (ref 1.5–4)
LYMPHOCYTES NFR BLD: 34.7 % (ref 20–42)
LYMPHOCYTES NFR BLD: 36.9 % (ref 20–42)
MCH RBC QN AUTO: 28.9 PG (ref 26–35)
MCH RBC QN AUTO: 29 PG (ref 26–35)
MCHC RBC AUTO-ENTMCNC: 32.4 % (ref 32–34.5)
MCHC RBC AUTO-ENTMCNC: 32.5 % (ref 32–34.5)
MCV RBC AUTO: 88.8 FL (ref 80–99.9)
MCV RBC AUTO: 89.3 FL (ref 80–99.9)
MONOCYTES # BLD: 0.67 E9/L (ref 0.1–0.95)
MONOCYTES # BLD: 0.73 E9/L (ref 0.1–0.95)
MONOCYTES NFR BLD: 11.9 % (ref 2–12)
MONOCYTES NFR BLD: 11.9 % (ref 2–12)
NEUTROPHILS # BLD: 2.02 E9/L (ref 1.8–7.3)
NEUTROPHILS # BLD: 2.47 E9/L (ref 1.8–7.3)
NEUTS SEG NFR BLD: 36 % (ref 43–80)
NEUTS SEG NFR BLD: 40.4 % (ref 43–80)
PLATELET # BLD AUTO: 192 E9/L (ref 130–450)
PLATELET # BLD AUTO: 197 E9/L (ref 130–450)
PMV BLD AUTO: 12.3 FL (ref 7–12)
PMV BLD AUTO: 12.6 FL (ref 7–12)
POTASSIUM SERPL-SCNC: 3.9 MMOL/L (ref 3.5–5)
POTASSIUM SERPL-SCNC: 4.1 MMOL/L (ref 3.5–5)
RBC # BLD AUTO: 4.21 E12/L (ref 3.8–5.8)
RBC # BLD AUTO: 4.29 E12/L (ref 3.8–5.8)
SODIUM SERPL-SCNC: 137 MMOL/L (ref 132–146)
SODIUM SERPL-SCNC: 145 MMOL/L (ref 132–146)
T4 FREE SERPL-MCNC: 1.79 NG/DL (ref 0.93–1.7)
TROPONIN, HIGH SENSITIVITY: 16 NG/L (ref 0–11)
TROPONIN, HIGH SENSITIVITY: 17 NG/L (ref 0–11)
TSH SERPL-MCNC: 0.17 UIU/ML (ref 0.27–4.2)
WBC # BLD: 5.6 E9/L (ref 4.5–11.5)
WBC # BLD: 6.1 E9/L (ref 4.5–11.5)

## 2023-06-14 PROCEDURE — 6370000000 HC RX 637 (ALT 250 FOR IP): Performed by: INTERNAL MEDICINE

## 2023-06-14 PROCEDURE — 6360000002 HC RX W HCPCS: Performed by: INTERNAL MEDICINE

## 2023-06-14 PROCEDURE — 96376 TX/PRO/DX INJ SAME DRUG ADON: CPT

## 2023-06-14 PROCEDURE — 84443 ASSAY THYROID STIM HORMONE: CPT

## 2023-06-14 PROCEDURE — 85025 COMPLETE CBC W/AUTO DIFF WBC: CPT

## 2023-06-14 PROCEDURE — 51798 US URINE CAPACITY MEASURE: CPT

## 2023-06-14 PROCEDURE — G0378 HOSPITAL OBSERVATION PER HR: HCPCS

## 2023-06-14 PROCEDURE — 93005 ELECTROCARDIOGRAM TRACING: CPT | Performed by: INTERNAL MEDICINE

## 2023-06-14 PROCEDURE — 78452 HT MUSCLE IMAGE SPECT MULT: CPT

## 2023-06-14 PROCEDURE — 93018 CV STRESS TEST I&R ONLY: CPT | Performed by: INTERNAL MEDICINE

## 2023-06-14 PROCEDURE — 84439 ASSAY OF FREE THYROXINE: CPT

## 2023-06-14 PROCEDURE — 2580000003 HC RX 258: Performed by: INTERNAL MEDICINE

## 2023-06-14 PROCEDURE — 36415 COLL VENOUS BLD VENIPUNCTURE: CPT

## 2023-06-14 PROCEDURE — 3430000000 HC RX DIAGNOSTIC RADIOPHARMACEUTICAL: Performed by: RADIOLOGY

## 2023-06-14 PROCEDURE — 78452 HT MUSCLE IMAGE SPECT MULT: CPT | Performed by: INTERNAL MEDICINE

## 2023-06-14 PROCEDURE — 84484 ASSAY OF TROPONIN QUANT: CPT

## 2023-06-14 PROCEDURE — 93017 CV STRESS TEST TRACING ONLY: CPT

## 2023-06-14 PROCEDURE — 93016 CV STRESS TEST SUPVJ ONLY: CPT | Performed by: INTERNAL MEDICINE

## 2023-06-14 PROCEDURE — 80048 BASIC METABOLIC PNL TOTAL CA: CPT

## 2023-06-14 PROCEDURE — 6370000000 HC RX 637 (ALT 250 FOR IP)

## 2023-06-14 PROCEDURE — A9500 TC99M SESTAMIBI: HCPCS | Performed by: RADIOLOGY

## 2023-06-14 PROCEDURE — 99238 HOSP IP/OBS DSCHRG MGMT 30/<: CPT

## 2023-06-14 RX ORDER — PROPRANOLOL HYDROCHLORIDE 10 MG/1
10 TABLET ORAL 2 TIMES DAILY
Status: DISCONTINUED | OUTPATIENT
Start: 2023-06-14 | End: 2023-06-14 | Stop reason: HOSPADM

## 2023-06-14 RX ORDER — NICOTINE 21 MG/24HR
1 PATCH, TRANSDERMAL 24 HOURS TRANSDERMAL DAILY
Status: DISCONTINUED | OUTPATIENT
Start: 2023-06-14 | End: 2023-06-14 | Stop reason: HOSPADM

## 2023-06-14 RX ORDER — METHIMAZOLE 5 MG/1
10 TABLET ORAL 2 TIMES DAILY
Status: DISCONTINUED | OUTPATIENT
Start: 2023-06-14 | End: 2023-06-14 | Stop reason: HOSPADM

## 2023-06-14 RX ORDER — LIDOCAINE 4 G/G
1 PATCH TOPICAL DAILY
Status: DISCONTINUED | OUTPATIENT
Start: 2023-06-14 | End: 2023-06-14 | Stop reason: HOSPADM

## 2023-06-14 RX ORDER — ATORVASTATIN CALCIUM 40 MG/1
40 TABLET, FILM COATED ORAL NIGHTLY
Status: DISCONTINUED | OUTPATIENT
Start: 2023-06-14 | End: 2023-06-14 | Stop reason: CLARIF

## 2023-06-14 RX ORDER — VENLAFAXINE HYDROCHLORIDE 37.5 MG/1
37.5 CAPSULE, EXTENDED RELEASE ORAL NIGHTLY
COMMUNITY

## 2023-06-14 RX ORDER — PROPRANOLOL HYDROCHLORIDE 10 MG/1
10 TABLET ORAL 2 TIMES DAILY
Qty: 60 TABLET | Refills: 0 | Status: SHIPPED | OUTPATIENT
Start: 2023-06-14

## 2023-06-14 RX ORDER — TRAZODONE HYDROCHLORIDE 50 MG/1
50 TABLET ORAL NIGHTLY
COMMUNITY

## 2023-06-14 RX ORDER — TETRAKIS(2-METHOXYISOBUTYLISOCYANIDE)COPPER(I) TETRAFLUOROBORATE 1 MG/ML
30 INJECTION, POWDER, LYOPHILIZED, FOR SOLUTION INTRAVENOUS
Status: COMPLETED | OUTPATIENT
Start: 2023-06-14 | End: 2023-06-14

## 2023-06-14 RX ORDER — FENTANYL CITRATE 50 UG/ML
25 INJECTION, SOLUTION INTRAMUSCULAR; INTRAVENOUS ONCE
Status: COMPLETED | OUTPATIENT
Start: 2023-06-14 | End: 2023-06-14

## 2023-06-14 RX ORDER — TETRAKIS(2-METHOXYISOBUTYLISOCYANIDE)COPPER(I) TETRAFLUOROBORATE 1 MG/ML
10.7 INJECTION, POWDER, LYOPHILIZED, FOR SOLUTION INTRAVENOUS
Status: COMPLETED | OUTPATIENT
Start: 2023-06-14 | End: 2023-06-14

## 2023-06-14 RX ORDER — ACETAMINOPHEN 500 MG
500 TABLET ORAL 4 TIMES DAILY PRN
Qty: 30 TABLET | Refills: 0 | Status: SHIPPED | OUTPATIENT
Start: 2023-06-14

## 2023-06-14 RX ORDER — LEVETIRACETAM 1000 MG/1
1000 TABLET ORAL 2 TIMES DAILY
Qty: 60 TABLET | Refills: 3 | Status: SHIPPED | OUTPATIENT
Start: 2023-06-14

## 2023-06-14 RX ORDER — FLUTICASONE PROPIONATE 50 MCG
1 SPRAY, SUSPENSION (ML) NASAL DAILY
COMMUNITY
Start: 2023-05-30

## 2023-06-14 RX ORDER — DIVALPROEX SODIUM 250 MG/1
500 TABLET, DELAYED RELEASE ORAL NIGHTLY
Status: DISCONTINUED | OUTPATIENT
Start: 2023-06-14 | End: 2023-06-14 | Stop reason: HOSPADM

## 2023-06-14 RX ORDER — LIDOCAINE 4 G/G
1 PATCH TOPICAL DAILY
Qty: 10 PATCH | Refills: 0 | Status: SHIPPED | OUTPATIENT
Start: 2023-06-14 | End: 2023-07-14

## 2023-06-14 RX ORDER — MIRTAZAPINE 15 MG/1
15 TABLET, FILM COATED ORAL NIGHTLY
COMMUNITY
Start: 2023-06-02

## 2023-06-14 RX ORDER — DIVALPROEX SODIUM 250 MG/1
500 TABLET, DELAYED RELEASE ORAL NIGHTLY
Status: DISCONTINUED | OUTPATIENT
Start: 2023-06-14 | End: 2023-06-14 | Stop reason: CLARIF

## 2023-06-14 RX ORDER — LISINOPRIL 10 MG/1
10 TABLET ORAL DAILY
Status: DISCONTINUED | OUTPATIENT
Start: 2023-06-14 | End: 2023-06-14

## 2023-06-14 RX ORDER — LEVETIRACETAM 500 MG/1
500 TABLET ORAL 2 TIMES DAILY
Status: ON HOLD | COMMUNITY
End: 2023-06-14 | Stop reason: HOSPADM

## 2023-06-14 RX ORDER — ATORVASTATIN CALCIUM 40 MG/1
40 TABLET, FILM COATED ORAL NIGHTLY
Status: DISCONTINUED | OUTPATIENT
Start: 2023-06-14 | End: 2023-06-14

## 2023-06-14 RX ORDER — METHIMAZOLE 10 MG/1
15 TABLET ORAL 2 TIMES DAILY
Qty: 90 TABLET | Refills: 0 | Status: SHIPPED | OUTPATIENT
Start: 2023-06-14 | End: 2023-07-14

## 2023-06-14 RX ADMIN — PROPRANOLOL HYDROCHLORIDE 10 MG: 10 TABLET ORAL at 15:29

## 2023-06-14 RX ADMIN — METHIMAZOLE 10 MG: 5 TABLET ORAL at 15:29

## 2023-06-14 RX ADMIN — LEVETIRACETAM 1000 MG: 500 TABLET, FILM COATED ORAL at 00:37

## 2023-06-14 RX ADMIN — Medication 10.7 MILLICURIE: at 08:22

## 2023-06-14 RX ADMIN — LISINOPRIL 10 MG: 10 TABLET ORAL at 11:49

## 2023-06-14 RX ADMIN — RIVAROXABAN 20 MG: 20 TABLET, FILM COATED ORAL at 00:37

## 2023-06-14 RX ADMIN — REGADENOSON 0.4 MG: 0.08 INJECTION, SOLUTION INTRAVENOUS at 09:40

## 2023-06-14 RX ADMIN — FINASTERIDE 5 MG: 5 TABLET, FILM COATED ORAL at 11:38

## 2023-06-14 RX ADMIN — CLONAZEPAM 1 MG: 0.5 TABLET ORAL at 11:38

## 2023-06-14 RX ADMIN — Medication 30 MILLICURIE: at 09:50

## 2023-06-14 RX ADMIN — CLONAZEPAM 1 MG: 0.5 TABLET ORAL at 00:37

## 2023-06-14 RX ADMIN — FENTANYL CITRATE 25 MCG: 50 INJECTION, SOLUTION INTRAMUSCULAR; INTRAVENOUS at 03:43

## 2023-06-14 RX ADMIN — SODIUM CHLORIDE, PRESERVATIVE FREE 10 ML: 5 INJECTION INTRAVENOUS at 00:38

## 2023-06-14 RX ADMIN — DIVALPROEX SODIUM 500 MG: 500 TABLET, DELAYED RELEASE ORAL at 00:38

## 2023-06-14 RX ADMIN — SODIUM CHLORIDE, PRESERVATIVE FREE 10 ML: 5 INJECTION INTRAVENOUS at 13:06

## 2023-06-14 RX ADMIN — LEVETIRACETAM 1000 MG: 500 TABLET, FILM COATED ORAL at 11:37

## 2023-06-14 ASSESSMENT — PAIN DESCRIPTION - LOCATION: LOCATION: CHEST

## 2023-06-14 ASSESSMENT — PAIN DESCRIPTION - ORIENTATION: ORIENTATION: MID;UPPER

## 2023-06-14 ASSESSMENT — PAIN DESCRIPTION - ONSET: ONSET: ON-GOING

## 2023-06-14 ASSESSMENT — ENCOUNTER SYMPTOMS
VOMITING: 0
SINUS PAIN: 0
SORE THROAT: 0
COUGH: 0
ABDOMINAL PAIN: 0
BACK PAIN: 1
CHEST TIGHTNESS: 0
RHINORRHEA: 0
WHEEZING: 0
NAUSEA: 0

## 2023-06-14 ASSESSMENT — PAIN SCALES - GENERAL: PAINLEVEL_OUTOF10: 8

## 2023-06-14 ASSESSMENT — PAIN DESCRIPTION - DESCRIPTORS: DESCRIPTORS: ACHING;SHARP;SHOOTING

## 2023-06-14 ASSESSMENT — PAIN - FUNCTIONAL ASSESSMENT: PAIN_FUNCTIONAL_ASSESSMENT: ACTIVITIES ARE NOT PREVENTED

## 2023-06-14 ASSESSMENT — PAIN DESCRIPTION - FREQUENCY: FREQUENCY: INTERMITTENT

## 2023-06-14 ASSESSMENT — PAIN DESCRIPTION - PAIN TYPE: TYPE: ACUTE PAIN

## 2023-06-14 NOTE — PROCEDURES
Lexiscan Stress EKG Report: Indication: Atypical angina    Baseline EKG: normal sinus rhythm, nonspecific ST and T waves changes. Stress EKG: No ST-T changes. Arrhythmias: None. Symptoms: None. Summary:  Unremarkable lexiscan stress EKG. See separate report for stress perfusion results.      Merri Schlatter, MD  Cardiologist  Cardiology, [facility]) Physicians

## 2023-06-14 NOTE — H&P
[facility]  Internal Medicine Residency Program  History and Physical    Patient:  Ashley Veras 80 y.o. male   MRN: [id number]       Date of Service: 6/14/2023          Chief complaint: had concerns including Chest Pain (Started last night, L sided, then \"fell out of bed, hit my head and passed out\" xeralto, CP 10/10 stabbing, reproducible, radiates down L arm. hx MI, mitral valve, anxiety). History of Present Illness   The patient is a 80 y.o. male with a PMHx of seizures, nonischemic cardiomyopathy, with diastolic and systolic heart failure with improved EF from 35 to 40% (2018) to 60-65% (3/2023), mitral valve and tricuspid repair in 2017, paroxysmal A-fib on Xarelto, hypertension on lisinopril, COPD on Breo, hyperthyroidism (on methimazole, but states that it was switched to levothyroxine a month ago),  BPH, anxiety, bipolar disorder, chronic back pain, bilateral mandibular fracture status post ORIF 3/2023 by ENT, who presented to the ED for chest pain. History is obtained from patient and EHR. The patient states that 2 days ago, around 3 AM in the morning, the patient got up to go to the restroom to void when he felt lightheaded and dizzy and fell out of bed. He fell on his left side. Denies hitting his head on the floor, but complains to have lost consciousness for about 30 seconds with urinary incontinence. He states that he lives with his roommate. He did not go to any urgent care or ED to seek consult. He states that after that time he has been having chest pain, more on the left, described as sharp pain, 8/10, radiating to the L arm, on and off, worse on exertion, inspiration and movement. He states that he has been under a lot of stress lately because her mom has dementia and living in a nursing home and he is going through divorce. he denies suicidal or homicidal ideations.   He denies fever, night sweats, chills, headache, diaphoresis, cold clammy skin, worsening

## 2023-06-14 NOTE — CARE COORDINATION
06/14/23 Update CM Note: Call placed to dial a ride and set up transport via cab between 4:15-5:15pm. Patient is to be on the [address] ramp. The  instructed to call when they arrive on site. Ref# for call:  [id number].   Electronically signed by Marie Zambrano RN CM on 6/14/2023 at 3:16 PM

## 2023-06-14 NOTE — FLOWSHEET NOTE
06/14/23 0615   Vital Signs   Blood Pressure Lying 141/70   Pulse Lying 68 PER MINUTE   Blood Pressure Sitting 119/86   Pulse Sitting 79 PER MINUTE   Blood Pressure Standing 142/78   Pulse Standing 85 PER MINUTE

## 2023-06-14 NOTE — PLAN OF CARE
Patient evaluated at bedside for PS received at 0316 for chest pain. Patient is resting on bed, complaining of sharp pain on R anterior chest (it was on the L earlier), worse with inspiration and movement. (+) tenderness on the 4th to 5th R parasternal border. Patient allergic to nitroglycerin. /76, HR 67 RR 18 Temp 97.3, 99% RA. Trop 17 from 15. EKG showed NSR HR 65 bpm, Qtc 440 ms, no STTWC noted. Given fentanyl IV for pain and lidocaine patch. Patient for pharmacologic stress test later.

## 2023-06-14 NOTE — CARE COORDINATION
06/14/23 Transition of Care: Met with patient at the bedside. He is alert and oriented. He resides at [facility]. He has been a resident there for approx 6 months. He plans on returning at discharge. He states he will need a pcp and wants the [facility] at [place]. Call placed to the [facility] in clinic at [place]. Per Jacek Garcia at the [facility] office at [phone] she will review and call back with a date/time. He plans on returning to [facility] when he is discharged. Perfect serve sent to  resident Team 1 as stress test is reported and negative. Electronically signed by Burnetta Severin, RN CM on 6/14/2023 at 1:52 PM    FOLLOWUP APPT. [address]  BRING ID/INSURANCE CARD TO APPOINTMENT  [phone]  APPOINTMENT:  [id number] [facility] 7042 @ 1:00PM.   CALL TO CANCEL IF UNABLE TO ATTEND.

## 2023-06-14 NOTE — DISCHARGE SUMMARY
[facility]  Discharge Summary    PCP: Yulia Ferro MD    Admit Date:6/13/2023  Discharge Date: 6/14/2023    Chief Complaint   Patient presents with    Chest Pain     Started last night, L sided, then \"fell out of bed, hit my head and passed out\" xeralto, CP 10/10 stabbing, reproducible, radiates down L arm. hx MI, mitral valve, anxiety         Admission Diagnosis:   Chest pain, cardiac (possible NSTEMI) vs MSK  Hx of COPD, not on home O2  Hx of paroxysmal atrial fibrillation - [id number] 4, on Xarelto, currently SR  Hx mitral valve repair/tricuspid valve repair 4/2017 in [place]  Hx of HF with improved EF chronic systolic/diastolic dysfunction  EF 31-71% 2018>>60-65% 3/6/2023  Hx non ischemic cardiomyopathy/ LHC normal coronaries 2018  Hx of Seizures -continue on Keppra   Hx of allergic rhinitis   Hx of HTN - on lisinopril   Hx of hyperthyroidism, ? on methimazole,?levothyroxine?   Hx of BPH, on finasteride  Hx chronic back pain 2/2 mild central canal stenosis at L3-4 and L4-5, moderate right L4-5 subarticular recess stenosis, multilevel neural foraminal stenosis (MRI lumbar 3/4/23)  Hx Bilateral mandibular fracture s/p fall - s/p ORIF by ENT  Hx of bipolar I disorder, on Depakote at HS  Hx of polysubstance abuse   Nitroglycerin allergy    Discharge Diagnosis:  Chest pain most likely 2/2 MSK  Hx of hyperthyroidism on methimazole started on propranolol  Hx of COPD, not on home O2  Hx of paroxysmal atrial fibrillation - [id number] 4, on Xarelto, currently SR  Hx mitral valve repair/tricuspid valve repair 4/2017 in [place]  Hx of HF with improved EF chronic systolic/diastolic dysfunction  EF 47-54% 2018>>60-65% 3/6/2023  Hx non ischemic cardiomyopathy/ LHC normal coronaries 2018  Hx of Seizures -continue on Keppra   Hx of allergic rhinitis   Hx of HTN - on lisinopril   Hx of BPH, on finasteride  Hx chronic back pain 2/2 mild central canal stenosis at L3-4 and L4-5, moderate right

## 2023-06-14 NOTE — PROGRESS NOTES
Perfect Serve sent to Dr. Raul Gutierrez regarding patient rating chest pain as 8 out 10, EKG normal and he is in NSR.  Patient offered tylenol to help with the pain and he refused stating that it will not help the pain he is having

## 2023-06-14 NOTE — DISCHARGE INSTRUCTIONS
Internal medicine    Follow ups  Please follow up with your primary care physician ( [email or web address]) within 10 days of discharge from hospital. Please call as soon as possible to make an appointment. Please contact the internal medicine clinic for an appointment if you are unable to get an appointment with your PCP. Please keep all other follow up appointments:  Future Appointments   Date Time Provider Bertha Santiago   6/19/2023  2:30 PM DO SAGAR Nayak [facility]   8/29/2023  1:00 PM Clarisse Eduardo MD [facility]        Changes in healthcare   Please take all medications as indicated  Diet: cardiac diet   Activity: activity as tolerated  New Medications started during this hospital stay  lidocaine 4 % external patch - Place 1 patch onto the skin daily. propranolol 10 MG tablet - Commonly known as: INDERAL - Take 1 tablet by mouth 2 times daily  Changes to your medications  levETIRAcetam 1000 MG tablet BID, changed from 500 to 1000 BID  methIMAzole 10 MG tablet - Commonly known as: TAPAZOLE  Take 1.5 tablets by mouth in the morning and at bedtime changed from 10 mg 3 times daily. Medications you should stop taking   None  Additional labs, testing or imaging needed after discharge   Free T4, and TSH  Even if you are feeling better and not having symptoms do not stop taking antibiotic earlier then prescribed. Please contact us if you have any concerns, wish to change or make an appointment:  Internal medicine clinic   Phone: [phone]  Fax: [phone]  [facility] [address] [facility]  Should you have further questions in regards to this visit, you can review your clinical note and after visit summary document on your [employer] account. Other than any new prescriptions given to you today, the list of home medications on this After Visit Summary are based on information provided to us from you and your healthcare providers.  This information, including the list, dose, and frequency of medications

## 2023-06-14 NOTE — PROGRESS NOTES
4 Eyes Skin Assessment     NAME:  Mike Rae  YOB: 1955  MEDICAL RECORD NUMBER:  [id number]    The patient is being assessed for  Admission    I agree that at least one RN has performed a thorough Head to Toe Skin Assessment on the patient. ALL assessment sites listed below have been assessed. Areas assessed by both nurses:    Head, Face, Ears, Shoulders, Back, Chest, Arms, Elbows, Hands, Sacrum. Buttock, Coccyx, Ischium, and Legs. Feet and Heels        Does the Patient have a Wound?  No noted wound(s)       Baltazar Prevention initiated by RN: No  Wound Care Orders initiated by RN: No    Pressure Injury (Stage 3,4, Unstageable, DTI, NWPT, and Complex wounds) if present, place Wound referral order by RN under : No    New Ostomies, if present place, Ostomy referral order under : No     Nurse 1 eSignature: Electronically signed by Rosa M Coleman RN on 6/14/23 at 12:28 AM EDT    **SHARE this note so that the co-signing nurse can place an eSignature**    Nurse 2 eSignature: Electronically signed by Cherie Goodman RN on 6/14/23 at 12:30 AM EDT

## 2023-08-01 ENCOUNTER — TELEPHONE (OUTPATIENT)
Dept: ENT CLINIC | Age: 68
End: 2023-08-01

## 2023-08-01 NOTE — TELEPHONE ENCOUNTER
Jacqueline Keller from pre admission testing called regarding pts surgery for tomorrow. Pt is in a group home and they were unaware of his surgery and they do not have transportation for pt and said that the surgery would have to be rescheduled. Please return call to [address].   Electronically signed by Rene Smith on 8/1/2023 at 9:10 AM

## 2023-08-02 ENCOUNTER — APPOINTMENT (OUTPATIENT)
Dept: GENERAL RADIOLOGY | Age: 68
End: 2023-08-02
Payer: MEDICARE

## 2023-08-02 ENCOUNTER — HOSPITAL ENCOUNTER (INPATIENT)
Age: 68
LOS: 2 days | Discharge: HOME | End: 2023-08-04
Attending: STUDENT IN AN ORGANIZED HEALTH CARE EDUCATION/TRAINING PROGRAM | Admitting: INTERNAL MEDICINE
Payer: MEDICARE

## 2023-08-02 ENCOUNTER — APPOINTMENT (OUTPATIENT)
Dept: CT IMAGING | Age: 68
End: 2023-08-02
Payer: MEDICARE

## 2023-08-02 DIAGNOSIS — R55 SYNCOPE AND COLLAPSE: Primary | ICD-10-CM

## 2023-08-02 LAB
ALBUMIN SERPL-MCNC: 4.3 G/DL (ref 3.5–5.2)
ALP SERPL-CCNC: 76 U/L (ref 40–129)
ALT SERPL-CCNC: 8 U/L (ref 0–40)
AMPHET UR QL SCN: NEGATIVE
ANION GAP SERPL CALCULATED.3IONS-SCNC: 11 MMOL/L (ref 7–16)
AST SERPL-CCNC: 16 U/L (ref 0–39)
BARBITURATES UR QL SCN: NEGATIVE
BASOPHILS # BLD: 0.03 K/UL (ref 0–0.2)
BASOPHILS NFR BLD: 0 % (ref 0–2)
BENZODIAZ UR QL: POSITIVE
BILIRUB SERPL-MCNC: 0.5 MG/DL (ref 0–1.2)
BILIRUB UR QL STRIP: NEGATIVE
BUN SERPL-MCNC: 15 MG/DL (ref 6–23)
CALCIUM SERPL-MCNC: 9.2 MG/DL (ref 8.6–10.2)
CANNABINOIDS UR QL SCN: POSITIVE
CHLORIDE SERPL-SCNC: 104 MMOL/L (ref 98–107)
CLARITY UR: CLEAR
CO2 SERPL-SCNC: 25 MMOL/L (ref 22–29)
COCAINE UR QL SCN: POSITIVE
COLOR UR: YELLOW
COMMENT: ABNORMAL
CREAT SERPL-MCNC: 1.2 MG/DL (ref 0.7–1.2)
DATE LAST DOSE: ABNORMAL
EOSINOPHIL # BLD: 0.16 K/UL (ref 0.05–0.5)
EOSINOPHILS RELATIVE PERCENT: 2 % (ref 0–6)
ERYTHROCYTE [DISTWIDTH] IN BLOOD BY AUTOMATED COUNT: 14.4 % (ref 11.5–15)
FENTANYL UR QL: POSITIVE
GFR SERPL CREATININE-BSD FRML MDRD: >60 ML/MIN/1.73M2
GLUCOSE SERPL-MCNC: 93 MG/DL (ref 74–99)
GLUCOSE UR STRIP-MCNC: NEGATIVE MG/DL
HCT VFR BLD AUTO: 37.7 % (ref 37–54)
HGB BLD-MCNC: 12.3 G/DL (ref 12.5–16.5)
HGB UR QL STRIP.AUTO: NEGATIVE
IMM GRANULOCYTES # BLD AUTO: 0.03 K/UL (ref 0–0.58)
IMM GRANULOCYTES NFR BLD: 0 % (ref 0–5)
INR PPP: 1.8
KETONES UR STRIP-MCNC: 15 MG/DL
LEUKOCYTE ESTERASE UR QL STRIP: NEGATIVE
LYMPHOCYTES NFR BLD: 1.19 K/UL (ref 1.5–4)
LYMPHOCYTES RELATIVE PERCENT: 15 % (ref 20–42)
MCH RBC QN AUTO: 28.7 PG (ref 26–35)
MCHC RBC AUTO-ENTMCNC: 32.6 G/DL (ref 32–34.5)
MCV RBC AUTO: 88.1 FL (ref 80–99.9)
METHADONE UR QL: NEGATIVE
MONOCYTES NFR BLD: 0.79 K/UL (ref 0.1–0.95)
MONOCYTES NFR BLD: 10 % (ref 2–12)
NEUTROPHILS NFR BLD: 73 % (ref 43–80)
NEUTS SEG NFR BLD: 5.84 K/UL (ref 1.8–7.3)
NITRITE UR QL STRIP: NEGATIVE
OPIATES UR QL SCN: NEGATIVE
OXYCODONE UR QL SCN: NEGATIVE
PCP UR QL SCN: NEGATIVE
PH UR STRIP: 6 [PH] (ref 5–9)
PLATELET # BLD AUTO: 212 K/UL (ref 130–450)
PMV BLD AUTO: 12.8 FL (ref 7–12)
POTASSIUM SERPL-SCNC: 4.6 MMOL/L (ref 3.5–5)
PROT SERPL-MCNC: 7.8 G/DL (ref 6.4–8.3)
PROT UR STRIP-MCNC: NEGATIVE MG/DL
PROTHROMBIN TIME: 19.8 SEC (ref 9.3–12.4)
RBC # BLD AUTO: 4.28 M/UL (ref 3.8–5.8)
SODIUM SERPL-SCNC: 140 MMOL/L (ref 132–146)
SP GR UR STRIP: 1.02 (ref 1–1.03)
T4 FREE SERPL-MCNC: 1.8 NG/DL (ref 0.9–1.7)
TEST INFORMATION: ABNORMAL
TME LAST DOSE: ABNORMAL H
TROPONIN I SERPL HS-MCNC: 12 NG/L (ref 0–11)
TSH SERPL DL<=0.05 MIU/L-ACNC: 0.13 UIU/ML (ref 0.27–4.2)
UROBILINOGEN UR STRIP-ACNC: 1 EU/DL (ref 0–1)
VALPROATE SERPL-MCNC: 28 UG/ML (ref 50–100)
VANCOMYCIN DOSE: ABNORMAL MG
WBC OTHER # BLD: 8 K/UL (ref 4.5–11.5)

## 2023-08-02 PROCEDURE — 72125 CT NECK SPINE W/O DYE: CPT

## 2023-08-02 PROCEDURE — 80179 DRUG ASSAY SALICYLATE: CPT

## 2023-08-02 PROCEDURE — 84443 ASSAY THYROID STIM HORMONE: CPT

## 2023-08-02 PROCEDURE — 85610 PROTHROMBIN TIME: CPT

## 2023-08-02 PROCEDURE — 80307 DRUG TEST PRSMV CHEM ANLYZR: CPT

## 2023-08-02 PROCEDURE — 96374 THER/PROPH/DIAG INJ IV PUSH: CPT

## 2023-08-02 PROCEDURE — 84439 ASSAY OF FREE THYROXINE: CPT

## 2023-08-02 PROCEDURE — 1200000000 HC SEMI PRIVATE

## 2023-08-02 PROCEDURE — 71046 X-RAY EXAM CHEST 2 VIEWS: CPT

## 2023-08-02 PROCEDURE — 6360000002 HC RX W HCPCS

## 2023-08-02 PROCEDURE — 81003 URINALYSIS AUTO W/O SCOPE: CPT

## 2023-08-02 PROCEDURE — G0480 DRUG TEST DEF 1-7 CLASSES: HCPCS

## 2023-08-02 PROCEDURE — 80143 DRUG ASSAY ACETAMINOPHEN: CPT

## 2023-08-02 PROCEDURE — 80053 COMPREHEN METABOLIC PANEL: CPT

## 2023-08-02 PROCEDURE — 93005 ELECTROCARDIOGRAM TRACING: CPT

## 2023-08-02 PROCEDURE — 80164 ASSAY DIPROPYLACETIC ACD TOT: CPT

## 2023-08-02 PROCEDURE — 99285 EMERGENCY DEPT VISIT HI MDM: CPT

## 2023-08-02 PROCEDURE — 84484 ASSAY OF TROPONIN QUANT: CPT

## 2023-08-02 PROCEDURE — 85025 COMPLETE CBC W/AUTO DIFF WBC: CPT

## 2023-08-02 PROCEDURE — 70450 CT HEAD/BRAIN W/O DYE: CPT

## 2023-08-02 RX ORDER — FENTANYL CITRATE 50 UG/ML
25 INJECTION, SOLUTION INTRAMUSCULAR; INTRAVENOUS ONCE
Status: COMPLETED | OUTPATIENT
Start: 2023-08-02 | End: 2023-08-02

## 2023-08-02 RX ADMIN — FENTANYL CITRATE 25 MCG: 50 INJECTION, SOLUTION INTRAMUSCULAR; INTRAVENOUS at 19:08

## 2023-08-02 ASSESSMENT — PAIN DESCRIPTION - LOCATION: LOCATION: CHEST

## 2023-08-02 ASSESSMENT — PAIN SCALES - GENERAL: PAINLEVEL_OUTOF10: 8

## 2023-08-02 NOTE — ED NOTES
Bedside shift report received from GABY Nunez, assumed Pt care at this time. Pt resting comfortably showing no signs of distress. Pt recently medicated for pain by GABY Nunez.       Sunday Perea RN  08/02/23 1149
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
## 2023-08-03 LAB
ANION GAP SERPL CALCULATED.3IONS-SCNC: 12 MMOL/L (ref 7–16)
APAP SERPL-MCNC: <5 UG/ML (ref 10–30)
BUN SERPL-MCNC: 18 MG/DL (ref 6–23)
CALCIUM SERPL-MCNC: 8.8 MG/DL (ref 8.6–10.2)
CHLORIDE SERPL-SCNC: 106 MMOL/L (ref 98–107)
CO2 SERPL-SCNC: 25 MMOL/L (ref 22–29)
CREAT SERPL-MCNC: 1 MG/DL (ref 0.7–1.2)
EKG ATRIAL RATE: 72 BPM
EKG P AXIS: -3 DEGREES
EKG P-R INTERVAL: 140 MS
EKG Q-T INTERVAL: 386 MS
EKG QRS DURATION: 80 MS
EKG QTC CALCULATION (BAZETT): 422 MS
EKG R AXIS: 4 DEGREES
EKG T AXIS: 57 DEGREES
EKG VENTRICULAR RATE: 72 BPM
ERYTHROCYTE [DISTWIDTH] IN BLOOD BY AUTOMATED COUNT: 14.4 % (ref 11.5–15)
ETHANOLAMINE SERPL-MCNC: <10 MG/DL
GFR SERPL CREATININE-BSD FRML MDRD: >60 ML/MIN/1.73M2
GLUCOSE SERPL-MCNC: 97 MG/DL (ref 74–99)
HCT VFR BLD AUTO: 37.1 % (ref 37–54)
HGB BLD-MCNC: 12.2 G/DL (ref 12.5–16.5)
MAGNESIUM SERPL-MCNC: 2.1 MG/DL (ref 1.6–2.6)
MCH RBC QN AUTO: 29 PG (ref 26–35)
MCHC RBC AUTO-ENTMCNC: 32.9 G/DL (ref 32–34.5)
MCV RBC AUTO: 88.3 FL (ref 80–99.9)
METER GLUCOSE: 109 MG/DL (ref 74–99)
PHOSPHATE SERPL-MCNC: 3.3 MG/DL (ref 2.5–4.5)
PLATELET # BLD AUTO: 188 K/UL (ref 130–450)
PMV BLD AUTO: 12.5 FL (ref 7–12)
POTASSIUM SERPL-SCNC: 4 MMOL/L (ref 3.5–5)
RBC # BLD AUTO: 4.2 M/UL (ref 3.8–5.8)
SALICYLATES SERPL-MCNC: <0.3 MG/DL (ref 0–30)
SODIUM SERPL-SCNC: 143 MMOL/L (ref 132–146)
TOXIC TRICYCLIC SC,BLOOD: NEGATIVE
WBC OTHER # BLD: 6.3 K/UL (ref 4.5–11.5)

## 2023-08-03 PROCEDURE — 82947 ASSAY GLUCOSE BLOOD QUANT: CPT

## 2023-08-03 PROCEDURE — 93010 ELECTROCARDIOGRAM REPORT: CPT | Performed by: INTERNAL MEDICINE

## 2023-08-03 PROCEDURE — 80048 BASIC METABOLIC PNL TOTAL CA: CPT

## 2023-08-03 PROCEDURE — 85027 COMPLETE CBC AUTOMATED: CPT

## 2023-08-03 PROCEDURE — 36415 COLL VENOUS BLD VENIPUNCTURE: CPT

## 2023-08-03 PROCEDURE — 84100 ASSAY OF PHOSPHORUS: CPT

## 2023-08-03 PROCEDURE — 2140000000 HC CCU INTERMEDIATE R&B

## 2023-08-03 PROCEDURE — 6370000000 HC RX 637 (ALT 250 FOR IP): Performed by: STUDENT IN AN ORGANIZED HEALTH CARE EDUCATION/TRAINING PROGRAM

## 2023-08-03 PROCEDURE — 2580000003 HC RX 258: Performed by: STUDENT IN AN ORGANIZED HEALTH CARE EDUCATION/TRAINING PROGRAM

## 2023-08-03 PROCEDURE — 83735 ASSAY OF MAGNESIUM: CPT

## 2023-08-03 PROCEDURE — 99221 1ST HOSP IP/OBS SF/LOW 40: CPT | Performed by: INTERNAL MEDICINE

## 2023-08-03 RX ORDER — SODIUM CHLORIDE 0.9 % (FLUSH) 0.9 %
5-40 SYRINGE (ML) INJECTION EVERY 12 HOURS SCHEDULED
Status: DISCONTINUED | OUTPATIENT
Start: 2023-08-03 | End: 2023-08-04 | Stop reason: HOSPADM

## 2023-08-03 RX ORDER — METHIMAZOLE 5 MG/1
10 TABLET ORAL DAILY
Status: DISCONTINUED | OUTPATIENT
Start: 2023-08-03 | End: 2023-08-04 | Stop reason: HOSPADM

## 2023-08-03 RX ORDER — DEXTROSE MONOHYDRATE 100 MG/ML
INJECTION, SOLUTION INTRAVENOUS CONTINUOUS PRN
Status: DISCONTINUED | OUTPATIENT
Start: 2023-08-03 | End: 2023-08-04 | Stop reason: HOSPADM

## 2023-08-03 RX ORDER — LEVETIRACETAM 500 MG/1
1000 TABLET ORAL 2 TIMES DAILY
Status: DISCONTINUED | OUTPATIENT
Start: 2023-08-03 | End: 2023-08-03

## 2023-08-03 RX ORDER — METHIMAZOLE 5 MG/1
10 TABLET ORAL DAILY
Status: DISCONTINUED | OUTPATIENT
Start: 2023-08-03 | End: 2023-08-03

## 2023-08-03 RX ORDER — LISINOPRIL 10 MG/1
10 TABLET ORAL DAILY
Status: DISCONTINUED | OUTPATIENT
Start: 2023-08-03 | End: 2023-08-04 | Stop reason: HOSPADM

## 2023-08-03 RX ORDER — SODIUM CHLORIDE 0.9 % (FLUSH) 0.9 %
5-40 SYRINGE (ML) INJECTION PRN
Status: DISCONTINUED | OUTPATIENT
Start: 2023-08-03 | End: 2023-08-04 | Stop reason: HOSPADM

## 2023-08-03 RX ORDER — ONDANSETRON 4 MG/1
4 TABLET, ORALLY DISINTEGRATING ORAL EVERY 8 HOURS PRN
Status: DISCONTINUED | OUTPATIENT
Start: 2023-08-03 | End: 2023-08-04 | Stop reason: HOSPADM

## 2023-08-03 RX ORDER — PROPRANOLOL HYDROCHLORIDE 10 MG/1
10 TABLET ORAL 2 TIMES DAILY
Status: DISCONTINUED | OUTPATIENT
Start: 2023-08-03 | End: 2023-08-04 | Stop reason: HOSPADM

## 2023-08-03 RX ORDER — LEVETIRACETAM 500 MG/1
1500 TABLET ORAL 2 TIMES DAILY
Status: DISCONTINUED | OUTPATIENT
Start: 2023-08-04 | End: 2023-08-04 | Stop reason: HOSPADM

## 2023-08-03 RX ORDER — ACETAMINOPHEN 325 MG/1
650 TABLET ORAL EVERY 6 HOURS PRN
Status: DISCONTINUED | OUTPATIENT
Start: 2023-08-03 | End: 2023-08-04 | Stop reason: HOSPADM

## 2023-08-03 RX ORDER — ONDANSETRON 2 MG/ML
4 INJECTION INTRAMUSCULAR; INTRAVENOUS EVERY 6 HOURS PRN
Status: DISCONTINUED | OUTPATIENT
Start: 2023-08-03 | End: 2023-08-04 | Stop reason: HOSPADM

## 2023-08-03 RX ORDER — ACETAMINOPHEN 650 MG/1
650 SUPPOSITORY RECTAL EVERY 6 HOURS PRN
Status: DISCONTINUED | OUTPATIENT
Start: 2023-08-03 | End: 2023-08-04 | Stop reason: HOSPADM

## 2023-08-03 RX ORDER — SODIUM CHLORIDE 9 MG/ML
INJECTION, SOLUTION INTRAVENOUS PRN
Status: DISCONTINUED | OUTPATIENT
Start: 2023-08-03 | End: 2023-08-04 | Stop reason: HOSPADM

## 2023-08-03 RX ORDER — POLYETHYLENE GLYCOL 3350 17 G/17G
17 POWDER, FOR SOLUTION ORAL DAILY PRN
Status: DISCONTINUED | OUTPATIENT
Start: 2023-08-03 | End: 2023-08-04 | Stop reason: HOSPADM

## 2023-08-03 RX ORDER — LEVOTHYROXINE SODIUM 0.03 MG/1
25 TABLET ORAL EVERY MORNING
Status: ON HOLD | COMMUNITY
Start: 2023-07-18 | End: 2023-08-04 | Stop reason: HOSPADM

## 2023-08-03 RX ADMIN — LISINOPRIL 10 MG: 10 TABLET ORAL at 08:26

## 2023-08-03 RX ADMIN — METHIMAZOLE 10 MG: 5 TABLET ORAL at 09:33

## 2023-08-03 RX ADMIN — ACETAMINOPHEN 650 MG: 325 TABLET ORAL at 17:41

## 2023-08-03 RX ADMIN — LEVETIRACETAM 1000 MG: 500 TABLET, FILM COATED ORAL at 20:58

## 2023-08-03 RX ADMIN — SODIUM CHLORIDE, PRESERVATIVE FREE 10 ML: 5 INJECTION INTRAVENOUS at 20:58

## 2023-08-03 RX ADMIN — RIVAROXABAN 20 MG: 20 TABLET, FILM COATED ORAL at 17:41

## 2023-08-03 RX ADMIN — SODIUM CHLORIDE, PRESERVATIVE FREE 10 ML: 5 INJECTION INTRAVENOUS at 08:26

## 2023-08-03 RX ADMIN — LEVETIRACETAM 1000 MG: 500 TABLET, FILM COATED ORAL at 08:26

## 2023-08-03 ASSESSMENT — PAIN DESCRIPTION - LOCATION: LOCATION: JAW

## 2023-08-03 ASSESSMENT — PAIN DESCRIPTION - DESCRIPTORS: DESCRIPTORS: ACHING

## 2023-08-03 NOTE — H&P
[address] [place] [address]  Internal Medicine Residency Program  History and Physical    Patient:  Aixa Gil 80 y.o. male MRN: [id number]     Date of Service: 8/3/2023    Hospital Day: 2      Chief complaint: had concerns including Fall (At home w/ c/o of chest pain). History of Present Illness   The patient is a 80 y.o. male with a past medical history of syncopal episodes, seizures, hyperthyroidism, HF, COPD, HTN, Hx of paroxysmal Afib, BPH, depression, Hep C who presented after a fall. He states that he was standing outside a bar and started to feel dizzy. When he woke up, he was on the ground. He denied tongue biting, bladder incontinence and urinary incontinence. He said his last seizure was many years ago and he is compliant with intake of keppra. He saw a  and asked for help. He was brought to the ED. Upon arrival his vitals were stable. Labs were remarkable for TSH 0.13, fT4 1.8, Hg 12.3. Urinalysis showed increased ketones at 15. UDS was positive for cocaine, benzodiazepines and cannabinoids. EKG showed NSR. CXR showed bilateral cephalization and possible bilateral perihilar opacities? CT head was negative for hemorrhage. CT C-spin was negative for fracture or spondylolisthesis. There were multilevel degenerative changes. He was given 1 dose of fentanyl 25 mg.           Past Medical History:      Diagnosis Date    Anxiety     Arthritis     Asthma     Bipolar 1 disorder (HCC)     Cardiomegaly     Chronic combined systolic and diastolic CHF (congestive heart failure) ([address]) 05/27/2018    COPD (chronic obstructive pulmonary disease) (HCC)     Depression     Diabetes mellitus (HCC)     GERD (gastroesophageal reflux disease)     Hepatitis C     resolved    Hypertension     Hyperthyroidism 08/02/2012    Hypothyroidism due to medication     Mass of testicle     Mesothelioma [facility])     pt states possibly not    Neuromuscular disorder ([address])     Other disorders of kidney and ureter

## 2023-08-03 NOTE — PROGRESS NOTES
[address]  Internal Medicine Residency / [address]     Attending Physician Statement  I have discussed the case, including pertinent history and exam findings with the resident and the team.  I have seen and examined the patient and the key elements of the encounter have been performed by me. I agree with the assessment, plan and orders as documented by the resident. A&O  Gait observed  No evident OH  Took Cocaine and BZ outside in heat at  facility, [facility]  Falls with head contusions multifactorial and related to BZ   With Parkinsonism from [address]  And probable OH   Doubt Cardiac arrhythmia as cause  Continue to observe /Monitor  Consider restart of Xarelto once solved  Simplify polypharmacy, reevaluate hyperthyroidism  Remainder of medical problems as per resident note.       Shahzad Anaya MD [facility] SPECIALTY [facility]  Internal Medicine Residency Faculty

## 2023-08-03 NOTE — ED PROVIDER NOTES
Assumed care of patient from Dr. Jeniffer Fraire. Please refer to Dr. Wendy Lopez note for full history and physical.  Presented with multiple syncopal episodes with head injury. Workup thus far no acute process. Pending at this time is labs and imaging. Course under my care:  ED Course as of 08/02/23 2158   Wed Aug 02, 2023   1830 EKG interpreted by me. Sinus rhythm. No ST elevations or depressions no signs of ACS otherwise agree with resident interpretation [JM]   6403 Patient presented with syncope. Patient states that he syncopized multiple times in the past week. Patient has no chest pain. Patient has no fevers or chills. No nausea or vomiting. Patient states that he does have some chest pain. Patient stated that he is unsure why he fell. [JM]   2602 EKG interpreted by me:     Rate: 72  Rhythm: Sinus  Axis: Normal   No ST elevations or depressions. When compared to EKG from 6/14/2023, no significant changes found [KT]   2023 Patient was signed out to me by outgoing resident physician. The patient has had multiple episodes of dizziness for the past 2 months and multiple falls. He was seen here in the ER in the past.  Due to persistence of symptoms, patient will likely need admission for syncopal work-up. Pending at this time is remainder of labs and imaging. [AH]   2135 Images are negative, labs negative as well. I spoke to Dr. Sharan Mix accepted the patient for admission. Asked to speak to resident [AH]   2154 Spoke to resident from [facility], patient will be admitted. [AH]   2157 Chart review from 03/03/2023 by Dr. Evert Ambrose reveals a structurally normal aortic valve which is trileaflet without any significant aortic stenosis.  []      ED Course User Index  [AH] Cb Connolly MD  [JM] Chris Guo MD  [KT] DO Cb Ruiz MD  Resident  08/02/23 6675

## 2023-08-03 NOTE — ED NOTES
Nurse to nurse report called to GABY Medeiros of [id number], who had no further questions at this time.  Transport Paged     Karo Lucia RN  08/03/23 4842

## 2023-08-03 NOTE — CONSULTS
NEUROLOGY CONSULT NOTE      Requesting Physician:  Boone Meyer MD    Reason for Consult:  Evaluate for recurrent syncopal episodes with hx of seizures. History of Present Illness:  Kay Melendez is a 80 y.o. male  with h/o seizures, paroxysmal A-fib on Xarelto, HTN, hypothyroidism, DM, COPD, CHF, chronic back pain, and bipolar 1 disorder who was admitted to [facility] on 8/2/2023 with presentation of fall. Patient indicates that he was standing outside of a bar when he suddenly felt dizzy and lost consciousness. He awoke on the ground confused and disoriented, but there was no bowel or bladder incontinence, tongue biting, or foaming of the mouth reported. There were no witnesses present at time of this fall. Patient indicated that he had had multiple episodes of syncope in the past week. He subsequently saw a  and called for help with transport to the ED for further evaluation. Patient indicates he has a history of seizures with last seizure many years ago. He was placed on Keppra and indicates that he has been with this medication. ED evaluation was notable for UDS that was positive for cocaine, benzodiazepines, and cannabinoids. UA showed increased ketones. Remainder of labs were unremarkable. CT scan of the abdominal head and C-spine are unremarkable except for mild multilevel degenerative changes in the cervical spine. Patient reports history of seizures with grand mal type seizure activity since childhood. He is currently on Keppra thousand twice daily and indicates that he has seizures about once a week. Seizures are associated with falling and loss of consciousness. No report of adverse reaction to Keppra. Patient poor historian and could not detail if and what other medications tried in the past for his seizures.   Patient denied any history of alcohol or drug use in the past.      Past Medical History:        Diagnosis Date    Anxiety     Arthritis     Asthma in urine and patient to denies history of substance abuse. Tremors: Patient reports tremors present for a long time and seems to be worse on the right compared to the left. Tremor activity appears to be more consistent with essential tremors versus physiologic tremor versus     Principal Problem:    Syncope and collapse  Resolved Problems:    * No resolved hospital problems. *      Recommendations:                                            Increase Keppra to 1500 mg twice daily  Consider for addition of a second AED for help seizure control  Trial primidone for essential tremor starting  at 250 twice daily or 3 times daily with titration for essential tremor. EEG for evaluation of seizure foci. Further pending results and response to therapy change    It was my pleasure to evaluate Jose Manuel Petersonter today. Please call with questions.       Electronically signed by Tita Menon MD on 8/3/2023 at 7:49 PM

## 2023-08-03 NOTE — PROGRESS NOTES
Patient is requesting vicodin ES, this nurse told him Dr. Jody Ortiz said he did not want to order pain medication at this time. Patient states, \"That's fine Ill just buy them off of the streets. \" Resident notified that patient is in pain.

## 2023-08-03 NOTE — H&P
[facility]  Internal Medicine Residency Program  History and Physical    Patient:  Anais Toro 80 y.o. male MRN: [id number]     Date of Service: 8/3/2023    Hospital Day: 2      Chief complaint: had concerns including Fall (At home w/ c/o of chest pain). History of Present Illness     The patient is a 80 y.o. male with a past medical history of multiple syncopal episodes with head injury, Bipolar 1 disorder, CHF, DM, COPD, GERD, Hep C, Hypertension, Hyperthyroidism, Mesothelioma, Seizures, and mitral valve replacement who presents with an episode of syncope and fall. Patient was admitted yesterday (8/2) after an acute episode of syncope and fall. Patient was outside in the hot sun when it happened and hit the left side of his head on the sidewalk and left hand. Dark reddish bruise seen on left side of head and patient is able to tolerate the pain. Patient said the incident happened suddenly, lost consciousness, and remembers walking up of the ground with the bruise. Patient said these episodes started happening since Oct 2023 and this was the 5th time this happened. Patient states that he may have been dehydrated and in the hot sun when this happened; however, he is worried about if problems with his heart is causing these episodes. He is worried about future syncopal episodes but he has lack of control over when it happens. He undergone mitral valve replacement in 2017. His EKG was assessed as normal in the ED yesterday showing no ST elevations or depression. His history of seizures started at an early age and last seizure occurred last year. He is currently on Keppra 1000 mg TID and tolerates the dose. Patient said the first time he fell back in Oct, he injured his jaw (deviated slightly to the right). He has a bilateral parkinsonian tremor with mild muscle rigidity and resistance to assisted pronation or supination. Neuro was consulted.     Past Medical History:      Diagnosis medication causes worsened around the same time that syncopal episodes started  - Orthostatic test ordered  2. Intoxications   - patient was positive for benzodiazepines, cannabinoids, cocaine, and fentanyl  - Benzo can cause sedation and syncope (Patient not prescribed benzo and thus received from outside the medical system)  2. Vascular disease   - history of valvular surgery (mitral valve)   - cardiac auscultation - no murmur heard   -ECG read as normal   - consider Echocardiogram? - last echo March  3. Cardiac arrhythmia    -EKG result was read as normal    -Continuous EKG monitoring can be considered    - potential organic/structural heart disease    - Hx of mitral valve replacement and CHF makes cardiac arrhythmia pertinent    - no sudden cardiac death in [facility]   4. Epilepsy    - hx of seizures    - Neuro consult    - EEG   5. Metabolic disorders    - CBC, CMP, electrolytes ordered and normal  6.  Neurocardiogenic cause  -prolonged standing, emotional events can trigger syncopal episodes (patient denies any triggers and says episodes seem random)  - Associated with nausea, abdominal pain, diaphoresis and vision change which was not seen in patient  -Tilt table test if thought to be neurocardiogenic     Other considerations but ruled out due to syncope without impairment of consciousness:  -cataplexy  -drop attacks  -psychogenic pseudo-syncope  - TIA of carotid origin    Recurrent falls  -Continue rivaroxaban (xarelto) but monitor patient status and likeliness for future falls for consideration of discontinuing blood thinners to prevent intracranial bleed from head trauma from future potential falls   - Neuro consult placed   - Head ct for assessment of brain injury - no acute concerns     Hyperthyroidism  - methimazole 10 mg this morning  - T4 free high (1.8)  - TSH (0.13)    Cardiac  -Hx of mitral valve replacement 3/6/23  -Hx of paroxysmal Afib  -Hx of HF, ECHO (3/5/23) EF 50-60%  - Hx of HTN   -continue

## 2023-08-03 NOTE — CARE COORDINATION
8/3:  Transition of care:  Pt presented to the ER for syncope episode & CP from [address] [place] [address] Road at [phone] or owner Constance Guy at [phone]. Pt would like LISA to set up appointment with [facility] in [place]. CM spoke with [facility] Phys[facility] pt has an appointment set up with them at on August 29 information on dc papers. PTA pt was independent w/ nebulizer. Pt states he has been up in room. Pt's is on room air at  96% & Po Xarelto. UDS + cocaine, benzodiazepines & cannabinoids. Neurology was consulted. Pt declined [facility]. Pt may need transportation arranged via [employer] [facility] provide a ride at    [phone]. Sw/LISA will continue to follow for dc planning.   Electronically signed by Jaxson Bella RN on 8/3/2023 at 2:51 PM

## 2023-08-04 ENCOUNTER — APPOINTMENT (OUTPATIENT)
Dept: NEUROLOGY | Age: 68
End: 2023-08-04
Payer: MEDICARE

## 2023-08-04 VITALS
SYSTOLIC BLOOD PRESSURE: 128 MMHG | RESPIRATION RATE: 18 BRPM | BODY MASS INDEX: 27.31 KG/M2 | WEIGHT: 160 LBS | OXYGEN SATURATION: 98 % | HEART RATE: 72 BPM | DIASTOLIC BLOOD PRESSURE: 77 MMHG | TEMPERATURE: 98 F | HEIGHT: 64 IN

## 2023-08-04 PROBLEM — G25.0 ESSENTIAL TREMOR: Status: ACTIVE | Noted: 2023-08-04

## 2023-08-04 PROBLEM — G40.309 NONINTRACTABLE GENERALIZED IDIOPATHIC EPILEPSY WITHOUT STATUS EPILEPTICUS (HCC): Status: ACTIVE | Noted: 2023-08-04

## 2023-08-04 LAB
25(OH)D3 SERPL-MCNC: 16.1 NG/ML (ref 30–100)
ANION GAP SERPL CALCULATED.3IONS-SCNC: 14 MMOL/L (ref 7–16)
BUN SERPL-MCNC: 15 MG/DL (ref 6–23)
CALCIUM SERPL-MCNC: 8.8 MG/DL (ref 8.6–10.2)
CHLORIDE SERPL-SCNC: 103 MMOL/L (ref 98–107)
CO2 SERPL-SCNC: 24 MMOL/L (ref 22–29)
CREAT SERPL-MCNC: 1 MG/DL (ref 0.7–1.2)
GFR SERPL CREATININE-BSD FRML MDRD: >60 ML/MIN/1.73M2
GLUCOSE SERPL-MCNC: 102 MG/DL (ref 74–99)
MAGNESIUM SERPL-MCNC: 2.1 MG/DL (ref 1.6–2.6)
POTASSIUM SERPL-SCNC: 4 MMOL/L (ref 3.5–5)
SODIUM SERPL-SCNC: 141 MMOL/L (ref 132–146)

## 2023-08-04 PROCEDURE — 97530 THERAPEUTIC ACTIVITIES: CPT

## 2023-08-04 PROCEDURE — 6370000000 HC RX 637 (ALT 250 FOR IP): Performed by: PSYCHIATRY & NEUROLOGY

## 2023-08-04 PROCEDURE — 97165 OT EVAL LOW COMPLEX 30 MIN: CPT

## 2023-08-04 PROCEDURE — 36415 COLL VENOUS BLD VENIPUNCTURE: CPT

## 2023-08-04 PROCEDURE — 95819 EEG AWAKE AND ASLEEP: CPT | Performed by: PSYCHIATRY & NEUROLOGY

## 2023-08-04 PROCEDURE — 97535 SELF CARE MNGMENT TRAINING: CPT

## 2023-08-04 PROCEDURE — 80048 BASIC METABOLIC PNL TOTAL CA: CPT

## 2023-08-04 PROCEDURE — 97161 PT EVAL LOW COMPLEX 20 MIN: CPT

## 2023-08-04 PROCEDURE — 82306 VITAMIN D 25 HYDROXY: CPT

## 2023-08-04 PROCEDURE — 99223 1ST HOSP IP/OBS HIGH 75: CPT | Performed by: PSYCHIATRY & NEUROLOGY

## 2023-08-04 PROCEDURE — 95819 EEG AWAKE AND ASLEEP: CPT

## 2023-08-04 PROCEDURE — 6370000000 HC RX 637 (ALT 250 FOR IP): Performed by: STUDENT IN AN ORGANIZED HEALTH CARE EDUCATION/TRAINING PROGRAM

## 2023-08-04 PROCEDURE — 83735 ASSAY OF MAGNESIUM: CPT

## 2023-08-04 PROCEDURE — 99232 SBSQ HOSP IP/OBS MODERATE 35: CPT | Performed by: NURSE PRACTITIONER

## 2023-08-04 RX ORDER — LEVETIRACETAM 750 MG/1
1500 TABLET ORAL 2 TIMES DAILY
Qty: 60 TABLET | Refills: 3 | Status: CANCELLED | OUTPATIENT
Start: 2023-08-04

## 2023-08-04 RX ORDER — MELATONIN
1000 DAILY
Qty: 90 TABLET | Refills: 0 | Status: SHIPPED | OUTPATIENT
Start: 2023-08-04

## 2023-08-04 RX ORDER — LEVETIRACETAM 750 MG/1
1500 TABLET ORAL 2 TIMES DAILY
Qty: 60 TABLET | Refills: 3 | Status: SHIPPED | OUTPATIENT
Start: 2023-08-04

## 2023-08-04 RX ORDER — PANTOPRAZOLE SODIUM 40 MG/1
40 TABLET, DELAYED RELEASE ORAL
Status: DISCONTINUED | OUTPATIENT
Start: 2023-08-04 | End: 2023-08-04 | Stop reason: HOSPADM

## 2023-08-04 RX ORDER — METHIMAZOLE 10 MG/1
10 TABLET ORAL DAILY
Qty: 30 TABLET | Refills: 2 | Status: SHIPPED | OUTPATIENT
Start: 2023-08-05 | End: 2023-08-11 | Stop reason: ALTCHOICE

## 2023-08-04 RX ADMIN — LISINOPRIL 10 MG: 10 TABLET ORAL at 08:59

## 2023-08-04 RX ADMIN — METHIMAZOLE 10 MG: 5 TABLET ORAL at 08:57

## 2023-08-04 RX ADMIN — LEVETIRACETAM 1500 MG: 500 TABLET, FILM COATED ORAL at 08:56

## 2023-08-04 NOTE — PROGRESS NOTES
NEUROLOGY PROGRESS  NOTE      Requesting Physician:  Bela Ascencio MD    Neurology is following for recurrent syncopal episodes with hx of seizures. PMH: Seizures, paroxysmal A-fib on Xarelto, HTN, hypothyroidism, DM, COPD, CHF, chronic back pain, and bipolar 1 disorder     Kurtis Phillip is a 80 y.o. male  was admitted to [facility] on 8/2/2023 with presentation of fall. Patient indicates that he was standing outside of a bar when he suddenly felt dizzy and lost consciousness. He awoke on the ground confused and disoriented; there was no bowel or bladder incontinence, tongue biting, or foaming of the mouth reported. There were no witnesses present at time of this fall. Patient indicated that he had had multiple episodes of syncope in the past week; however today patient denies this to me. He subsequently saw a  and called for help with transport to the ED for further evaluation. Patient indicates he has a history of seizures with last seizure many years ago;  grand mal type seizure activity since childhood. He was placed on Keppra and indicates that he has been with this medication. ED evaluation was notable for UDS that was positive for cocaine, benzodiazepines, and cannabinoids. UA showed increased ketones. CT head and C-spine are unremarkable except for mild multilevel degenerative changes in the cervical spine. He was on Keppra 1000 mg twice daily. Seizures are associated with falling and loss of consciousness. No report of adverse reaction to Keppra. Patient poor historian and could not detail if and what other medications tried in the past for his seizures. Initially told neuro yesterday that he has had multiple episodes of syncope in the past week however today he denies this. Patient also told neurology yesterday that he has been having seizures once a week however again he denies this to me saying that prior to this event is been many years since his last seizure. distress  HEENT: Normocephalic, atraumatic, no lesions or abnormalities noted. Neck:  supple with full ROM; no masses, nodes or bruits; no cervical tenderness on palpation. Lungs: Unlabored breaths on room air  CV: RRR without gallops or murmurs     Extremities: no c/c/e          Neurologic Exam   Mental Status: Awake, alert and oriented x4, appropriate, answering questions, and following commands. Speech was fluent with normal sensorium and cognition. Intact attention and concentration. Cranial Nerves: Pupils were equal round and reactive to light and accommodation;    Visual fields were full on confrontation; Extraocular movements were intact; no nystagmus; Intact facial sensation to LT  Symmetric facial movements with good lip and eye closure bilaterally; Hearing was intact; Daniel was midline;    Normal palatal elevation with midline uvula  Trapezius strength of 5/5. Tongue was midline with no atrophy or fasciculations  Motor Exam:  Strength was 5/5 throughout; normal tone and bulk; no normal movements  Sensory Exam:  Normal sensation to light touch  Cerebella Exam: FN, FFM and ORLIN intact   Gait:  Gait was not tested.    Reflexes: normal and symmetric bilaterally; Babinski is negative    Labs:    Lab Results   Component Value Date    WBC 6.3 08/03/2023    HGB 12.2 (L) 08/03/2023    HCT 37.1 08/03/2023    MCV 88.3 08/03/2023     08/03/2023    LYMPHOPCT 15 (L) 08/02/2023    RBC 4.20 08/03/2023    MCH 29.0 08/03/2023    MCHC 32.9 08/03/2023    RDW 14.4 08/03/2023     Lab Results   Component Value Date     08/04/2023    K 4.0 08/04/2023     08/04/2023    CO2 24 08/04/2023    BUN 15 08/04/2023    CREATININE 1.0 08/04/2023    GLUCOSE 102 (H) 08/04/2023    CALCIUM 8.8 08/04/2023    PROT 7.8 08/02/2023    LABALBU 4.3 08/02/2023    BILITOT 0.5 08/02/2023    ALKPHOS 76 08/02/2023    AST 16 08/02/2023    ALT 8 08/02/2023    LABGLOM >60 08/04/2023    GFRAA >60 07/20/2022     UDS

## 2023-08-04 NOTE — CARE COORDINATION
8/4:  update CM Note:  Pt presented to the ER for syncope episode & CP from [address] [place] [address] at [phone] or owner Becki Kang at [phone]. Pt is being dc today. [facility] will be here at 4pm to pick the pt up. CM supplied them with the phone number to the floor to call when they arrive.   Electronically signed by Francis Martinez RN on 8/4/2023 at 1:53 PM

## 2023-08-04 NOTE — DISCHARGE SUMMARY
[address]  Discharge Summary    PCP: Bernard Carranza MD    Admit Date:8/2/2023  Discharge Date: 8/4/2023    Admission Diagnosis:   Mechanical fall secondary to syncopal episode  Recurrent falls possibly secondary to orthostatic hypotension versus breakthrough seizures versus cardiac versus medication side effect  Drug use disorder  History of hyperthyroidism  History of mitral valve replacement  History of paroxysmal A-fib  History of HF, echo on 3/5/2023 EF 50-60%  History of COPD  History of hypertension  History of hep C  History of BPH  History of bipolar 1 disorder  History of depression    Discharge Diagnosis:  Mechanical fall secondary to syncopal episode  Recurrent falls likely secondary to breakthrough seizures versus medication side effect  Drug use disorder  Essential tremor  History of hyperthyroidism  History of mitral valve replacement  History of paroxysmal A-fib  History of HF, echo on 3/5/2023 EF 50-60%  History of COPD  History of hypertension  History of hep C  History of BPH  History of bipolar 1 disorder  History of depression    Hospital Course:   Hardy Zayas is a 64-year-old male with past medical history of recurrent falls, seizures, hyperthyroidism, HF, COPD, HTN, paroxysmal A-fib, BPH, depression, hep C, presented to the ER after a fall. Patient stated that he was outside at a bar and started to feel dizzy, he lost consciousness, and woke up when he was on the ground. He denied tongue biting, bladder incontinence. Upon arrival to the ED patient's vital signs were stable. Labs were remarkable for TSH 0.13, free T41.8, hemoglobin 12.3. UDS was positive for cocaine, benzodiazepines, and cannabinoids. EKG showed NSR. CXR showed bilateral cephalization and possible bilateral opacities. CT head showed no acute abnormalities. CT C spinal was negative for fracture or spondylolisthesis; positive for multilevel degenerative changes.     During his with you. STOP taking these medications      clonazePAM 1 MG tablet  Commonly known as: KLONOPIN     levothyroxine 25 MCG tablet  Commonly known as: SYNTHROID     propranolol 10 MG tablet  Commonly known as: INDERAL               Where to Get Your Medications        These medications were sent to [facility] Rona Reid [facility], [address] [place] [address] [phone] Mildred Gutiérrez [phone]  [address] [place] [id number]      Phone: [phone]   levETIRAcetam 750 MG tablet  methIMAzole 10 MG tablet  vitamin D3 25 MCG (1000 UT) Tabs tablet     Internal medicine    Follow ups  Please follow up with the internal medicine clinic at [facility] appointment has been scheduled for Friday 8/11/23 at 2pm  Please call to schedule an appointment with neurology within 2 weeks    Changes in healthcare   Please take all medications as indicated  Diet: regular diet   Activity: activity as tolerated  New Medications started during this hospital stay  Vitamin D3 1000U daily  Changes to your medications  Please take your Keppra two tables of 750mg twice a day. Medications you should stop taking   Stop taking levothyroxine  Stop taking clonazepam  Stop taking propanolol     Please contact us if you have any concerns, wish to change or make an appointment:  Internal medicine clinic   Phone: [phone]  Fax: [phone]  [address] [place] [address]  Or please call the nurse line at [phone]. Should you have further questions in regards to this visit, you can review your clinical note and after visit summary document on your [employer] account. Other questions can be directed to our nurse line at [phone]. Other than any new prescriptions given to you today, the list of home medications on this After Visit Summary are based on information provided to us from you and your healthcare providers.  This information, including the list, dose, and frequency of medications is only as

## 2023-08-04 NOTE — PROCEDURES
report of adverse reaction to Keppra. Patient poor historian and could not detail if and what other medications tried in the past for his seizures.   Patient denied any history of alcohol or drug use in the past.    Medications    Current Facility-Administered Medications:     pantoprazole (PROTONIX) tablet 40 mg, 40 mg, Oral, QAM AC, Chase Pascual MD    lisinopril (PRINIVIL;ZESTRIL) tablet 10 mg, 10 mg, Oral, Daily, Christelle Prado MD, 10 mg at 08/03/23 1094    [Held by provider] propranolol (INDERAL) tablet 10 mg, 10 mg, Oral, BID, Christelle Prado MD    rivaroxaban Christine Kub) tablet 20 mg, 20 mg, Oral, Dinner, Christelle Prado MD, 20 mg at 08/03/23 1741    sodium chloride flush 0.9 % injection 5-40 mL, 5-40 mL, IntraVENous, 2 times per day, Christelle Prado MD, 10 mL at 08/03/23 2058    sodium chloride flush 0.9 % injection 5-40 mL, 5-40 mL, IntraVENous, PRN, Christelle Prado MD    0.9 % sodium chloride infusion, , IntraVENous, PRN, Christelle Prado MD    ondansetron (ZOFRAN-ODT) disintegrating tablet 4 mg, 4 mg, Oral, Q8H PRN **OR** ondansetron (ZOFRAN) injection 4 mg, 4 mg, IntraVENous, Q6H PRN, Christelle Prado MD    polyethylene glycol (GLYCOLAX) packet 17 g, 17 g, Oral, Daily PRN, Christelle Prado MD    acetaminophen (TYLENOL) tablet 650 mg, 650 mg, Oral, Q6H PRN, 650 mg at 08/03/23 1741 **OR** acetaminophen (TYLENOL) suppository 650 mg, 650 mg, Rectal, Q6H PRN, Christelle Prado MD    glucose chewable tablet 16 g, 4 tablet, Oral, PRN, Christelle Prado MD    dextrose bolus 10% 125 mL, 125 mL, IntraVENous, PRN **OR** dextrose bolus 10% 250 mL, 250 mL, IntraVENous, PRN, Christelle Prado MD    glucagon injection 1 mg, 1 mg, SubCUTAneous, PRN, Christelle Prado MD    dextrose 10 % infusion, , IntraVENous, Continuous PRN, Christelle Prado MD    methIMAzole (TAPAZOLE) tablet 10 mg, 10 mg, Oral, Daily, Christelle Prado MD, 10 mg at 08/03/23 0933    levETIRAcetam (KEPPRA) tablet 1,500 mg, 1,500 mg, Oral, BID, Lionel Monzon MD        Physician

## 2023-08-04 NOTE — PROGRESS NOTES
Occupational Therapy  OCCUPATIONAL THERAPY INITIAL EVALUATION    ARIAN Dyer  [facility]   [address], [place], [place]      Date:2023                                                Patient Name: Elif Tovar  MRN: [id number]  : 1955  Room: [address] [place] [address]    Evaluating OT: Brisajose l Shepard OTR/L #[id number]     Referring Provider: Van Copeland MD  Specific Provider Orders/Date: OT eval and treat 8/3/23     Diagnosis: Syncope and collapse [R55]   Pt admitted to hospital following fall; + dizziness       Pertinent Medical History:  has a past medical history of Anxiety, Arthritis, Asthma, Bipolar 1 disorder ([address]), Cardiomegaly, Chronic combined systolic and diastolic CHF (congestive heart failure) ([address]), COPD (chronic obstructive pulmonary disease) ([address]), Depression, Diabetes mellitus ([address]), GERD (gastroesophageal reflux disease), Hepatitis C, Hypertension, Hyperthyroidism, Kidney stones, Mass of testicle, Mesothelioma ([address]), Paroxysmal A-fib ([address]), Peptic ulcer, Prostate enlargement, Pulmonary embolism ([address]), and Seizures ([address]).        Precautions:  Fall Risk, monitor BP    Assessment of current deficits    [x] Functional mobility  [x]ADLs  [x] Strength               []Cognition    [x] Functional transfers   [x] IADLs         [x] Safety Awareness   [x]Endurance    [] Fine Coordination              [x] Balance      [] Vision/perception   []Sensation     []Gross Motor Coordination  [] ROM  [] Delirium                   [] Motor Control     OT PLAN OF CARE   OT POC based on physician orders, patient diagnosis and results of clinical assessment    Frequency/Duration 1-3 days/wk for 2 weeks PRN   Specific OT Treatment Interventions to include:   * Instruction/training on adapted ADL techniques and AE recommendations to increase functional independence within precautions       * Training on energy conservation strategies, correct breathing pattern and techniques to Minimal Assist  Modified Alfalfa    Toileting Stand by Assist   Modified Alfalfa    Bed Mobility  Supine to sit: Stand by Assist   Sit to supine: Stand by Assist   Supine to sit: Modified Alfalfa   Sit to supine: Modified Alfalfa    Functional Transfers Stand by Assist   Modified Alfalfa    Functional Mobility Minimal Assist     Ambulated in room and hallway without AD: + unsteadiness and dizziness requiring seated rest break (/74)  Modified Alfalfa    Balance Sitting:     Static:  SBA    Dynamic:SBA  Standing: min A     Activity Tolerance F-    Limited due to fatigue and dizziness  G   Visual/  Perceptual wfl                Vitals  Supine: 135/63, 57  Seated: 116.68, 63  Standin.69, 69  Seated post short ambulation with 10/10 dizziness: 121/74  Standing post ambulation: 143/78, 70  Seated: 130/72  Nursing notified     Hand Dominance right   Strength ROM Additional Info:    RUE   4/5 wfl good  and fair FMC/dexterity noted during ADL tasks     LUE 4/5 wfl good  and fair FMC/dexterity noted during ADL tasks   + tremors  Hearing: wfl  Sensation:wfl  Tone: wfl  Edema:none noted     Comments: Upon arrival patient supine in bed and agreeable to OT Session. Therapist educated pt on role of OT. At end of session, patient semi-supine in bed with call light and phone within reach, all lines and tubes intact. Overall patient demonstrated decreased independence and safety during completion of ADL/functional transfer/mobility tasks. Pt would benefit from continued skilled OT to increase safety and independence with completion of ADL/IADL tasks for functional independence and quality of life.     Treatment: OT treatment provided this date includes: Facilitation of bed mobility, unsupported sitting balance (impacting ADLs; addressing posture, weight shifting, dynamic reaching), functional transfers (various surfaces: bed, toilet, chair), standing tolerance tasks (addressing

## 2023-08-04 NOTE — PROGRESS NOTES
yes yes yes     ASSESSMENT:    Conditions Requiring Skilled Therapeutic Intervention:    [x]Decreased strength     []Decreased ROM  [x]Decreased functional mobility  [x]Decreased balance   [x]Decreased endurance   []Decreased posture  []Decreased sensation  [x]Decreased coordination   []Decreased vision  [x]Decreased safety awareness   [x]Increased pain       Comments:  Pt supine in bed upon entering, agreeable to participate. Pt BP in supine at 135/63. Pt transferred from supine to sit at EOB with assist; pt BP sitting EOB at 116/68. Pt performed a STS with assist and no AD; BP at 135/79. Pt ambulated into the hallway showing a good pawel with assist; pt showed a good pawel and stated that \"he felt normal\". Pt continued ambulating until c/o dizziness at a 10/10; pt transferred to chair. Pt BP in sitting at 121/74. Pt performed another STS after ~ 1 minute and ambulated back to room. Pt asked to stand at EOB but needed to use commode; pt BP in standing using commode at 143/78. Pt transferred back to EOB; pt BP at 130/72. Pt assisted back into supine; pt BP at 136/62. Pt remained supine with all needs met and call light in reach. Treatment:  Patient practiced and was instructed in the following treatment:    Bed mobility training - pt given verbal and tactile cues to facilitate proper sequencing and safety during rolling and supine<>sit as well as provided with stand by assistance to complete task   STS and pivot transfer training - pt educated on proper hand and foot placement, safety and sequencing, and use of verbal and tactile cues to safely complete sit<>stand and pivot transfers with physical assistance to complete task safely   Gait training- pt was given verbal and tactile cues to facilitate safety during ambulation as well as provided with physical assistance. Pt's/ family goals   1. Return PLOF    Prognosis is fair for reaching above PT goals.     Patient and or family understand(s) diagnosis,

## 2023-08-04 NOTE — DISCHARGE INSTRUCTIONS
Internal medicine    Follow ups  Please follow up with the internal medicine clinic at [facility] appointment has been scheduled for Friday 8/11/23 at 2pm  Please call to schedule an appointment with neurology within 2 weeks    Changes in healthcare   Please take all medications as indicated  Diet: regular diet   Activity: activity as tolerated  New Medications started during this hospital stay  Vitamin D3 1000U daily  Changes to your medications  Please take your Keppra two tables of 750mg twice a day. Medications you should stop taking   Stop taking levothyroxine  Stop taking clonazepam  Stop taking propanolol     Please contact us if you have any concerns, wish to change or make an appointment:  Internal medicine clinic   Phone: [phone]  Fax: [phone]  [address] [place] [address]  Or please call the nurse line at [phone]. Should you have further questions in regards to this visit, you can review your clinical note and after visit summary document on your D[employer] account. Other questions can be directed to our nurse line at [phone]. Other than any new prescriptions given to you today, the list of home medications on this After Visit Summary are based on information provided to us from you and your healthcare providers. This information, including the list, dose, and frequency of medications is only as accurate as the information you provided. If you have any questions or concerns about your home medications, please contact your Primary Care Physician for further clarification.

## 2023-08-04 NOTE — PROGRESS NOTES
[address]  Internal Medicine Residency / [address]    Attending Physician Statement  I have discussed the case, including pertinent history and exam findings with the resident and the team.  I have seen and examined the patient and the key elements of the encounter have been performed by me. I agree with the assessment, plan and orders as documented by the resident. A&O  VS stable  Up in [place] with OT/PT  No arm swing   Neurology Dx with essential tremor, better with ETOH  Recommended target dose noted perr Neuro  And will titrate Primadone to that dose over several weeks  Impression; Seizure disorder and Keppra titrated up                      Essential tremor                      Parkinsonism from psychosis meds  Plan; Discharge planning on Xarelto           PT and OT for Parkinsonism and tremor needs  Remainder of medical problems as per resident note.       Ferny Lowe MD [facility]  Internal Medicine Residency Faculty

## 2023-08-05 NOTE — PROGRESS NOTES
Physician Progress Note      PATIENT:               Rene Hines  CSN #:                  [id number]  :                       1955  ADMIT DATE:       2023 5:55 PM  [facility] DATE:        2023 4:34 PM  RESPONDING  PROVIDER #:        Jordon Reyes MD          QUERY TEXT:    Dear Dr. Bay Duggan,    Patient admitted with Syncope, noted to have Multidrug positive tox screen. If   possible, please document in progress notes and discharge summary if you are   evaluating and/or treating any of the following: The medical record reflects the following:  Risk Factors: Positive drugs of abuse urine screening, History of seizures  Clinical Indicators: Pt admitted with Syncope hypotension dehydration and fall   with chest pain, Pt reported standing outside of a bar when he suddenly felt   dizzy and lost consciousness. He awoke on the ground confused and   disoriented, No reports of loss of bladder or bowel continence, Further   reported multiple episodes of syncope during the week prior to incident, Urine   Tox screen: (+) for Benzo's Cannabinoids Cocaine  and Fentanyl, EEG normal   showed absence of epileptiform activity  Treatment: Close patient monitoring, IVF, serial labs, Neurology consult, EEG    Thank you,  Maritza KUMARN, RN, CRCR  Clinical Documentation Improvement  [email or web address]  Options provided:  -- Accidental overdose of multiple elicit drugs  -- Accidental overdose of Benzodiazepines  -- Accidental overdose of Cannabinoids  -- Accidental overdose of Cocaine  -- Accidental overdose of Fentanyl  -- Other - I will add my own diagnosis  -- Disagree - Not applicable / Not valid  -- Disagree - Clinically unable to determine / Unknown  -- Refer to Clinical Documentation Reviewer    PROVIDER RESPONSE TEXT:    This patient had an accidental overdose of multiple elicit drugs.     Query created by: Scott Patterson on 2023 10:57 AM      Electronically signed by:  Jordon Reyes MD 2023 10:30 AM

## 2023-08-07 ENCOUNTER — TELEPHONE (OUTPATIENT)
Dept: INTERNAL MEDICINE | Age: 68
End: 2023-08-07

## 2023-08-07 NOTE — TELEPHONE ENCOUNTER
Care Transitions Initial Follow Up Call    Outreach made within 2 business days of discharge: Yes    Patient: Allyn Montenegro Patient : 1955   MRN: [id number]  Reason for Admission: There are no discharge diagnoses documented for the most recent discharge. Discharge Date: 23       Spoke with: Patient    Discharge department/facility: [address] [place] [address] Interactive Patient Contact:  Was patient able to fill all prescriptions: Yes  Was patient instructed to bring all medications to the follow-up visit: Yes  Is patient taking all medications as directed in the discharge summary?  Yes  Does patient understand their discharge instructions: Yes  Does patient have questions or concerns that need addressed prior to 7-14 day follow up office visit: no    Scheduled appointment with PCP within 7-14 days    Follow Up  Future Appointments   Date Time Provider [address] [place] [address]   2023  2:00 PM Elda Lan MD [facility]   8/15/2023  9:45 AM DO [address] [facility]   2023  1:00 PM Jakob Salcido MD [facility] MITUL Infante RN

## 2023-08-11 ENCOUNTER — OFFICE VISIT (OUTPATIENT)
Dept: INTERNAL MEDICINE | Age: 68
End: 2023-08-11

## 2023-08-11 VITALS
SYSTOLIC BLOOD PRESSURE: 134 MMHG | RESPIRATION RATE: 18 BRPM | WEIGHT: 146.7 LBS | HEIGHT: 64 IN | DIASTOLIC BLOOD PRESSURE: 77 MMHG | BODY MASS INDEX: 25.04 KG/M2 | OXYGEN SATURATION: 98 % | TEMPERATURE: 97.3 F | HEART RATE: 63 BPM

## 2023-08-11 DIAGNOSIS — E43 SEVERE PROTEIN-CALORIE MALNUTRITION (HCC): ICD-10-CM

## 2023-08-11 DIAGNOSIS — F31.63 BIPOLAR DISORDER, CURRENT EPISODE MIXED, SEVERE, WITHOUT PSYCHOTIC FEATURES (HCC): ICD-10-CM

## 2023-08-11 DIAGNOSIS — Z09 HOSPITAL DISCHARGE FOLLOW-UP: ICD-10-CM

## 2023-08-11 DIAGNOSIS — G40.909 SEIZURE DISORDER (HCC): ICD-10-CM

## 2023-08-11 DIAGNOSIS — E05.90 HYPERTHYROIDISM: ICD-10-CM

## 2023-08-11 DIAGNOSIS — B18.2 HEP C W/O COMA, CHRONIC (HCC): ICD-10-CM

## 2023-08-11 DIAGNOSIS — R07.9 CHEST PAIN, UNSPECIFIED TYPE: Primary | ICD-10-CM

## 2023-08-11 RX ORDER — CLONAZEPAM 1 MG/1
1 TABLET ORAL 2 TIMES DAILY PRN
COMMUNITY
Start: 2023-08-07

## 2023-08-11 ASSESSMENT — ENCOUNTER SYMPTOMS
SHORTNESS OF BREATH: 0
COUGH: 0
TROUBLE SWALLOWING: 0
SORE THROAT: 0

## 2023-08-11 NOTE — PROGRESS NOTES
[facility]  Internal Medicine Residency Clinic  Attending Physician Statement:  Savanah Lemus M.D., F.A.CRonyP. Patient is seen for fu visit today. -- acute and chronic problems addressed  I have seen/discussed the case, including pertinent history and exam findings with the resident, review of last visit medical records/labs/imaging if applicable-     Addressed when applicable- Health maintenance issues of vaccinations, social determinants/depression/CA screening, tobacco cessation etc... I agree with the assessment, plan and orders as documented by the resident.    -Lenoard Fails assessed by medical complexity of case  My assessment of high points of today's visit as follows:      [facility] fu 8/4/23:  Hospital Course:   Delaney Carlson is a 79-year-old male with past medical   \"history of recurrent falls, seizures, hyperthyroidism, HF, COPD, HTN, paroxysmal A-fib, BPH, depression, hep C, presented to the ER after a fall. Patient stated that he was outside at a bar and started to feel dizzy, he lost consciousness, and woke up when he was on the ground. He denied tongue biting, bladder incontinence. Upon arrival to the ED patient's vital signs were stable. Labs were remarkable for TSH 0.13, free T41.8, hemoglobin 12.3. UDS was positive for cocaine, benzodiazepines, and cannabinoids. EKG showed NSR. CXR showed bilateral cephalization and possible bilateral opacities. CT head showed no acute abnormalities. CT C spinal was negative for fracture or spondylolisthesis; positive for multilevel degenerative changes. During his hospital course neurology was consulted regarding possible breakthrough seizure. They evaluated the patient and recommended increase and the dose of Keppra to 2000 mg daily 3000 mg daily as patient said that he was having seizures multiple episodes of seizures.   EEG was normal.      Patient was also found to have tremor activity more consistent with

## 2023-08-11 NOTE — PROGRESS NOTES
[facility]  Internal Medicine Residency Program  [facility] Note      SUBJECTIVE:  CC: had concerns including Follow-Up from Hospital and Headache (Pt states he has headache for 1 week and is located in back of head. Pt denies nausea/vomiting but has sensitivity to loud sounds ). Last Visit: 3/16/23    HPI:  Adan Castillo is a 80 y.o.male with PMH of COPD, PAF, vitamin D deficiency, polysubstance abuse, HTN, hyperthyroid, HFrEF, BPH, seizures and bipolar disorder presenting to [facility] for hospital follow up. Patient was recently hospitalized from 8/2 - 8/4 for mechanical fall at home. Keppra was increased per neurology for concerns of breakthrough seizures. EEG was found to be normal during course of stay. Patient was found to have essential tremor and was recommended to use weighted utensils to assist with eating. Medication list was cleaned up and address with patient prior to discharge to confirm proper regimen. Patient still having a headache. He states the pain is in the back of his head at the base of the skull. He has lost his appetite from the pain. He tried ice packs or motrin with minimal to no relief. He has been having these headaches off and on all of his life. Did break his jaw about a month ago and has been having these headaches since that time. Patient did state the only thing that has helped the pain was Handley that he had at home. He also states he does not do drugs and only takes a half of the pill that he has from breaking his arm before but does not its not good to take pain medications like that. Patient has been having left parasternal chest pain. The pain is sharp in nature and has been occurring for past 2 weeks. It does go away with rest and if he elevated his legs. He did have a cardiac work up back on 6/14/23 for stress test that was low risk. He states his chest pain was similar to when he had issues with his mitral valve.  This was back in 2017 when he

## 2023-08-14 RX ORDER — GABAPENTIN 300 MG/1
300 CAPSULE ORAL 3 TIMES DAILY
Qty: 90 CAPSULE | Refills: 1 | Status: SHIPPED | OUTPATIENT
Start: 2023-08-14 | End: 2023-10-13

## 2023-08-15 ENCOUNTER — HOSPITAL ENCOUNTER (EMERGENCY)
Age: 68
Discharge: HOME | End: 2023-08-15

## 2023-08-15 ENCOUNTER — HOSPITAL ENCOUNTER (EMERGENCY)
Age: 68
Discharge: HOME | End: 2023-08-15
Payer: MEDICARE

## 2023-08-15 VITALS
BODY MASS INDEX: 25.23 KG/M2 | SYSTOLIC BLOOD PRESSURE: 132 MMHG | OXYGEN SATURATION: 98 % | HEART RATE: 78 BPM | WEIGHT: 147 LBS | DIASTOLIC BLOOD PRESSURE: 70 MMHG | RESPIRATION RATE: 18 BRPM | TEMPERATURE: 97.1 F

## 2023-08-15 VITALS
OXYGEN SATURATION: 98 % | WEIGHT: 147 LBS | SYSTOLIC BLOOD PRESSURE: 148 MMHG | TEMPERATURE: 97.8 F | HEART RATE: 58 BPM | RESPIRATION RATE: 18 BRPM | DIASTOLIC BLOOD PRESSURE: 88 MMHG | BODY MASS INDEX: 25.23 KG/M2

## 2023-08-15 DIAGNOSIS — G89.29 ACUTE EXACERBATION OF CHRONIC LOW BACK PAIN: Primary | ICD-10-CM

## 2023-08-15 DIAGNOSIS — G89.29 OTHER CHRONIC PAIN: Primary | ICD-10-CM

## 2023-08-15 DIAGNOSIS — R68.84 JAW PAIN: ICD-10-CM

## 2023-08-15 DIAGNOSIS — M54.50 ACUTE EXACERBATION OF CHRONIC LOW BACK PAIN: Primary | ICD-10-CM

## 2023-08-15 PROCEDURE — 99283 EMERGENCY DEPT VISIT LOW MDM: CPT

## 2023-08-15 PROCEDURE — 6370000000 HC RX 637 (ALT 250 FOR IP): Performed by: NURSE PRACTITIONER

## 2023-08-15 RX ORDER — TRAMADOL HYDROCHLORIDE 50 MG/1
50 TABLET ORAL ONCE
Status: COMPLETED | OUTPATIENT
Start: 2023-08-15 | End: 2023-08-15

## 2023-08-15 RX ORDER — ACETAMINOPHEN 500 MG
1000 TABLET ORAL ONCE
Status: DISCONTINUED | OUTPATIENT
Start: 2023-08-15 | End: 2023-08-15 | Stop reason: HOSPADM

## 2023-08-15 RX ADMIN — TRAMADOL HYDROCHLORIDE 50 MG: 50 TABLET, COATED ORAL at 14:01

## 2023-08-15 ASSESSMENT — PAIN DESCRIPTION - DESCRIPTORS: DESCRIPTORS: ACHING

## 2023-08-15 ASSESSMENT — PAIN DESCRIPTION - FREQUENCY: FREQUENCY: CONTINUOUS

## 2023-08-15 ASSESSMENT — PAIN - FUNCTIONAL ASSESSMENT
PAIN_FUNCTIONAL_ASSESSMENT: 0-10
PAIN_FUNCTIONAL_ASSESSMENT: 0-10

## 2023-08-15 ASSESSMENT — PAIN DESCRIPTION - PAIN TYPE: TYPE: ACUTE PAIN

## 2023-08-15 ASSESSMENT — PAIN DESCRIPTION - ONSET: ONSET: ON-GOING

## 2023-08-15 ASSESSMENT — PAIN SCALES - GENERAL
PAINLEVEL_OUTOF10: 10
PAINLEVEL_OUTOF10: 8

## 2023-08-16 ENCOUNTER — TELEPHONE (OUTPATIENT)
Dept: ENT CLINIC | Age: 68
End: 2023-08-16
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
## 2023-08-18 NOTE — PROGRESS NOTES
Call placed to Enio Flores ([phone])  of [address] [place] [address] regarding OR scheduled for 9/6/23. Message left to return call to DANA.

## 2023-08-18 NOTE — PROGRESS NOTES
Spoke with Min Lares, caregiver at [facility]) where pt. resides. Min Lares was informed pt is scheduled for OR 9/6/23 at [facility] and will need to arrive at 0800. Min Lares stated pt will be transported via cab. Min Lares was informed pt will need a responsible adult to accompany him during transport and 24 hours post-op. Requested documents be faxed to PAT for review.

## 2023-08-22 ENCOUNTER — APPOINTMENT (OUTPATIENT)
Dept: CT IMAGING | Age: 68
End: 2023-08-22
Payer: MEDICAID

## 2023-08-22 ENCOUNTER — HOSPITAL ENCOUNTER (EMERGENCY)
Age: 68
Discharge: HOME | End: 2023-08-22
Attending: STUDENT IN AN ORGANIZED HEALTH CARE EDUCATION/TRAINING PROGRAM
Payer: MEDICAID

## 2023-08-22 ENCOUNTER — HOSPITAL ENCOUNTER (EMERGENCY)
Age: 68
Discharge: HOME | End: 2023-08-22
Payer: MEDICAID

## 2023-08-22 ENCOUNTER — APPOINTMENT (OUTPATIENT)
Dept: GENERAL RADIOLOGY | Age: 68
End: 2023-08-22
Payer: MEDICAID

## 2023-08-22 VITALS
HEART RATE: 76 BPM | SYSTOLIC BLOOD PRESSURE: 131 MMHG | OXYGEN SATURATION: 94 % | WEIGHT: 134 LBS | BODY MASS INDEX: 23 KG/M2 | TEMPERATURE: 97.3 F | RESPIRATION RATE: 18 BRPM | DIASTOLIC BLOOD PRESSURE: 61 MMHG

## 2023-08-22 VITALS
DIASTOLIC BLOOD PRESSURE: 89 MMHG | SYSTOLIC BLOOD PRESSURE: 142 MMHG | BODY MASS INDEX: 23 KG/M2 | WEIGHT: 134 LBS | TEMPERATURE: 97.7 F | HEART RATE: 81 BPM | OXYGEN SATURATION: 96 % | RESPIRATION RATE: 18 BRPM

## 2023-08-22 DIAGNOSIS — G40.919 BREAKTHROUGH SEIZURE (HCC): Primary | ICD-10-CM

## 2023-08-22 DIAGNOSIS — S46.919A MUSCLE STRAIN OF UPPER EXTREMITY, INITIAL ENCOUNTER: Primary | ICD-10-CM

## 2023-08-22 LAB
ALBUMIN SERPL-MCNC: 4.1 G/DL (ref 3.5–5.2)
ALP SERPL-CCNC: 93 U/L (ref 40–129)
ALT SERPL-CCNC: 15 U/L (ref 0–40)
AMPHET UR QL SCN: NEGATIVE
ANION GAP SERPL CALCULATED.3IONS-SCNC: 10 MMOL/L (ref 7–16)
APAP SERPL-MCNC: <5 UG/ML (ref 10–30)
AST SERPL-CCNC: 14 U/L (ref 0–39)
BARBITURATES UR QL SCN: NEGATIVE
BASOPHILS # BLD: 0.05 K/UL (ref 0–0.2)
BASOPHILS NFR BLD: 1 % (ref 0–2)
BENZODIAZ UR QL: POSITIVE
BILIRUB SERPL-MCNC: 0.3 MG/DL (ref 0–1.2)
BUN SERPL-MCNC: 16 MG/DL (ref 6–23)
BUPRENORPHINE UR QL: NEGATIVE
CALCIUM SERPL-MCNC: 9.1 MG/DL (ref 8.6–10.2)
CANNABINOIDS UR QL SCN: NEGATIVE
CHLORIDE SERPL-SCNC: 102 MMOL/L (ref 98–107)
CO2 SERPL-SCNC: 26 MMOL/L (ref 22–29)
COCAINE UR QL SCN: NEGATIVE
CREAT SERPL-MCNC: 1 MG/DL (ref 0.7–1.2)
EOSINOPHIL # BLD: 0.68 K/UL (ref 0.05–0.5)
EOSINOPHILS RELATIVE PERCENT: 13 % (ref 0–6)
ERYTHROCYTE [DISTWIDTH] IN BLOOD BY AUTOMATED COUNT: 14 % (ref 11.5–15)
ETHANOLAMINE SERPL-MCNC: <10 MG/DL
FENTANYL UR QL: NEGATIVE
GFR SERPL CREATININE-BSD FRML MDRD: >60 ML/MIN/1.73M2
GLUCOSE SERPL-MCNC: 91 MG/DL (ref 74–99)
HCT VFR BLD AUTO: 37.4 % (ref 37–54)
HGB BLD-MCNC: 12.7 G/DL (ref 12.5–16.5)
IMM GRANULOCYTES # BLD AUTO: <0.03 K/UL (ref 0–0.58)
IMM GRANULOCYTES NFR BLD: 0 % (ref 0–5)
LYMPHOCYTES NFR BLD: 1.48 K/UL (ref 1.5–4)
LYMPHOCYTES RELATIVE PERCENT: 27 % (ref 20–42)
MCH RBC QN AUTO: 29.5 PG (ref 26–35)
MCHC RBC AUTO-ENTMCNC: 34 G/DL (ref 32–34.5)
MCV RBC AUTO: 87 FL (ref 80–99.9)
METHADONE UR QL: NEGATIVE
MONOCYTES NFR BLD: 0.49 K/UL (ref 0.1–0.95)
MONOCYTES NFR BLD: 9 % (ref 2–12)
NEUTROPHILS NFR BLD: 50 % (ref 43–80)
NEUTS SEG NFR BLD: 2.73 K/UL (ref 1.8–7.3)
OPIATES UR QL SCN: NEGATIVE
OXYCODONE UR QL SCN: NEGATIVE
PCP UR QL SCN: NEGATIVE
PLATELET # BLD AUTO: 175 K/UL (ref 130–450)
PMV BLD AUTO: 12.9 FL (ref 7–12)
POTASSIUM SERPL-SCNC: 4.3 MMOL/L (ref 3.5–5)
PROT SERPL-MCNC: 7.6 G/DL (ref 6.4–8.3)
RBC # BLD AUTO: 4.3 M/UL (ref 3.8–5.8)
SALICYLATES SERPL-MCNC: <0.3 MG/DL (ref 0–30)
SODIUM SERPL-SCNC: 138 MMOL/L (ref 132–146)
TEST INFORMATION: ABNORMAL
TOXIC TRICYCLIC SC,BLOOD: NEGATIVE
WBC OTHER # BLD: 5.4 K/UL (ref 4.5–11.5)

## 2023-08-22 PROCEDURE — 80053 COMPREHEN METABOLIC PANEL: CPT

## 2023-08-22 PROCEDURE — 80179 DRUG ASSAY SALICYLATE: CPT

## 2023-08-22 PROCEDURE — 99283 EMERGENCY DEPT VISIT LOW MDM: CPT

## 2023-08-22 PROCEDURE — 6360000002 HC RX W HCPCS

## 2023-08-22 PROCEDURE — 93005 ELECTROCARDIOGRAM TRACING: CPT

## 2023-08-22 PROCEDURE — 85025 COMPLETE CBC W/AUTO DIFF WBC: CPT

## 2023-08-22 PROCEDURE — G0480 DRUG TEST DEF 1-7 CLASSES: HCPCS

## 2023-08-22 PROCEDURE — 80307 DRUG TEST PRSMV CHEM ANLYZR: CPT

## 2023-08-22 PROCEDURE — 99285 EMERGENCY DEPT VISIT HI MDM: CPT

## 2023-08-22 PROCEDURE — 70450 CT HEAD/BRAIN W/O DYE: CPT

## 2023-08-22 PROCEDURE — 80143 DRUG ASSAY ACETAMINOPHEN: CPT

## 2023-08-22 PROCEDURE — 71045 X-RAY EXAM CHEST 1 VIEW: CPT

## 2023-08-22 PROCEDURE — 72125 CT NECK SPINE W/O DYE: CPT

## 2023-08-22 PROCEDURE — 6370000000 HC RX 637 (ALT 250 FOR IP): Performed by: PHYSICIAN ASSISTANT

## 2023-08-22 PROCEDURE — 70486 CT MAXILLOFACIAL W/O DYE: CPT

## 2023-08-22 RX ORDER — LEVETIRACETAM 500 MG/5ML
1000 INJECTION, SOLUTION, CONCENTRATE INTRAVENOUS ONCE
Status: DISCONTINUED | OUTPATIENT
Start: 2023-08-22 | End: 2023-08-22 | Stop reason: HOSPADM

## 2023-08-22 RX ORDER — METHOCARBAMOL 500 MG/1
750 TABLET, FILM COATED ORAL ONCE
Status: COMPLETED | OUTPATIENT
Start: 2023-08-22 | End: 2023-08-22

## 2023-08-22 RX ORDER — METHOCARBAMOL 750 MG/1
750 TABLET, FILM COATED ORAL 4 TIMES DAILY
Qty: 28 TABLET | Refills: 0 | Status: SHIPPED | OUTPATIENT
Start: 2023-08-22 | End: 2023-08-29

## 2023-08-22 RX ORDER — FENTANYL CITRATE 50 UG/ML
50 INJECTION, SOLUTION INTRAMUSCULAR; INTRAVENOUS ONCE
Status: DISCONTINUED | OUTPATIENT
Start: 2023-08-22 | End: 2023-08-22

## 2023-08-22 RX ADMIN — METHOCARBAMOL 750 MG: 500 TABLET ORAL at 15:18

## 2023-08-22 ASSESSMENT — PAIN DESCRIPTION - LOCATION: LOCATION: ARM

## 2023-08-22 ASSESSMENT — PAIN DESCRIPTION - ORIENTATION: ORIENTATION: RIGHT;LEFT;UPPER

## 2023-08-22 ASSESSMENT — PAIN SCALES - GENERAL
PAINLEVEL_OUTOF10: 10
PAINLEVEL_OUTOF10: 10

## 2023-08-22 ASSESSMENT — PAIN - FUNCTIONAL ASSESSMENT
PAIN_FUNCTIONAL_ASSESSMENT: NONE - DENIES PAIN
PAIN_FUNCTIONAL_ASSESSMENT: 0-10

## 2023-08-22 NOTE — ED NOTES
Pt refused Keppra and stated that he wants fentanyl.  Electronically signed by Onelia Alberts RN on 8/22/23 at 6:43 PM EDT       Onelia Alberts RN  08/22/23 5691

## 2023-08-23 LAB
EKG ATRIAL RATE: 71 BPM
EKG P AXIS: -6 DEGREES
EKG P-R INTERVAL: 152 MS
EKG Q-T INTERVAL: 376 MS
EKG QRS DURATION: 80 MS
EKG QTC CALCULATION (BAZETT): 408 MS
EKG R AXIS: -29 DEGREES
EKG T AXIS: 42 DEGREES
EKG VENTRICULAR RATE: 71 BPM

## 2023-08-23 PROCEDURE — 93010 ELECTROCARDIOGRAM REPORT: CPT | Performed by: INTERNAL MEDICINE

## 2023-08-23 NOTE — ED NOTES
RN gave pt discharge paperwork and pt stated, \"I am not leaving without fentanyl. \" RN explained to pt that fentanyl was not ordered and had Dr. Fernando Nobles speak with pt. She explained that he was not getting fentanyl and pt replied, \"bitch. \" Pt then ripped papers out of RN's hand and refused to sign d/c paperwork.      Samantha Madera RN  08/22/23 8386

## 2023-08-23 NOTE — DISCHARGE INSTRUCTIONS
Contributing to her medications at home. Follow-up with family doctor.   Also follow-up with neurologist.

## 2023-08-23 NOTE — ED PROVIDER NOTES
[address]  Department of Emergency Medicine   ED  Encounter Note  Admit Date/RoomTime: 2023  2:53 PM  ED Room: [place] [address]    NAME: Yasmeen Hall  : 1955  MRN: [id number]     Chief Complaint:  Arm Pain (Pt says he has upper arm pain on both sides for a month, pt was here last week and did not mention it )    History of Present Illness       Yasmeen Hall is a 80 y.o. old male who presents to the emergency department by private vehicle, for bilateral bicep pain x1 month. Patient denies injury. Patient states he only has the pain when he moves his arm. Patient states his symptoms are mild in severity and describes it as an aching. Denies fever/chills, headache, vision change, dizziness, chest pain, dyspnea, abdominal pain, NVD, numbness/weakness. ROS   Pertinent positives and negatives are stated within HPI, all other systems reviewed and are negative. Past Medical History:  has a past medical history of Anxiety, Arthritis, Asthma, Bipolar 1 disorder ([address]), Cardiomegaly, Chronic combined systolic and diastolic CHF (congestive heart failure) ([address]), COPD (chronic obstructive pulmonary disease) ([address]), Depression, Diabetes mellitus ([address]), GERD (gastroesophageal reflux disease), Hepatitis C, Hypertension, Hyperthyroidism, Kidney stones, Mass of testicle, Mesothelioma ([address]), Paroxysmal A-fib ([address]), Peptic ulcer, Prostate enlargement, Pulmonary embolism ([address]), and Seizures ([address]). Surgical History:  has a past surgical history that includes Appendectomy; Lithotripsy; Hemorrhoid surgery; Bladder surgery; Endoscopy, colon, diagnostic (2015); Abdomen surgery; Colonoscopy; Cardiac surgery; vascular surgery; Cardiac catheterization (2018); and Mandible surgery (N/A, 3/3/2023). Social History:  reports that he quit smoking about 13 years ago. His smoking use included cigarettes. He quit smokeless tobacco use about 5 years ago.   His recognition software. Every effort was made to ensure accuracy; however, inadvertent computerized transcription errors may be present.   END OF ED PROVIDER NOTE      Darwin Bejarano PA-C  08/22/23 2008

## 2023-08-23 NOTE — ED PROVIDER NOTES
Sinus  Interpretation: Normal sinus rhythm, left axis deviation, nonspecific changes throughout, QTc 408  Comparison: no previous EKG available    [KG]      ED Course User Index  [KG] Vonda Likes, DO      MDM  61-year-old male arrives with a complaint of possible seizure-like activity. On arrival patient appears in no acute distress. Vitals are within normal limits. Exam unremarkable. DDX: Breakthrough seizure, medication noncompliance, drug use, chronic pain, intracranial bleed, cervical fracture, mandibular fracture electrolyte abnormality    ED course, reevaluations, disposition   Medications - No data to display  Shortly after arrival patient was evaluated. Labs and imaging were ordered which indicated positive urine drug screen for benzodiazepines, glucose of 91 with normal kidney function and electrolytes. WBC of 5.4. CT imaging of the head neck and facial bones was done because patient is unsure if he hit his head and he also is complaining of jaw pain and has a surgery coming up for a poorly healed mandibular fracture. CT imaging indicated no abnormalities, no injuries no intracranial bleeding. Patient was reassured of his results and patient also refused to receive Keppra because he wanted fentanyl instead. Patient was discharged after everything came back as negative and told to follow-up with neurology. CONSULTS: (Who and What was discussed)  None      Social Determinants : None      Records Reviewed (source and summary): Office visit notes from Dr. Georgie Martin yes indicating the patient has a history of recurrent falls, seizures, hypothyroidism, HF, COPD, HTN, A-fib, BPH, depression, hep C and much more. Disposition Considerations: Patient was not given analgesics because patient's vitals are stable and he has no acute injuries or notable cause of pain       I am the Primary Clinician of Record. FINAL IMPRESSION      1.  Breakthrough seizure ([address])          DISPOSITION/PLAN DISPOSITION Decision To Discharge 08/22/2023 08:48:42 PM      PATIENT REFERRED TO:  Geni Grant MD  [address] [place] [address] [phone]    Schedule an appointment as soon as possible for a visit         DISCHARGE MEDICATIONS:  Discharge Medication List as of 8/22/2023  9:30 PM          DISCONTINUED MEDICATIONS:  Discharge Medication List as of 8/22/2023  9:30 PM                 (Please note that portions of this note were completed with a voice recognition program.  Efforts were made to edit the dictations but occasionally words are mis-transcribed.)    Kelvin Griffin DO (electronically signed)           Kelvin Griffin DO  Resident  08/23/23 3222

## 2023-08-23 NOTE — ED NOTES
Pt refusing to keep cardiac monitor on at this time.  RN educated pt on importance of monitor, however pt continuing to refuse     Anais Toro RN  08/22/23 2043

## 2023-08-26 ENCOUNTER — APPOINTMENT (OUTPATIENT)
Dept: GENERAL RADIOLOGY | Age: 68
End: 2023-08-26
Payer: COMMERCIAL

## 2023-08-26 ENCOUNTER — HOSPITAL ENCOUNTER (EMERGENCY)
Age: 68
Discharge: HOME | End: 2023-08-26
Attending: EMERGENCY MEDICINE
Payer: COMMERCIAL

## 2023-08-26 VITALS
DIASTOLIC BLOOD PRESSURE: 78 MMHG | OXYGEN SATURATION: 95 % | WEIGHT: 135 LBS | HEIGHT: 64 IN | BODY MASS INDEX: 23.05 KG/M2 | SYSTOLIC BLOOD PRESSURE: 138 MMHG | HEART RATE: 78 BPM | RESPIRATION RATE: 15 BRPM | TEMPERATURE: 98.5 F

## 2023-08-26 DIAGNOSIS — R07.9 CHEST PAIN, UNSPECIFIED TYPE: ICD-10-CM

## 2023-08-26 DIAGNOSIS — R06.02 SHORTNESS OF BREATH: Primary | ICD-10-CM

## 2023-08-26 LAB
ALBUMIN SERPL-MCNC: 4.5 G/DL (ref 3.5–5.2)
ALP SERPL-CCNC: 107 U/L (ref 40–129)
ALT SERPL-CCNC: 14 U/L (ref 0–40)
ANION GAP SERPL CALCULATED.3IONS-SCNC: 10 MMOL/L (ref 7–16)
AST SERPL-CCNC: 13 U/L (ref 0–39)
BASOPHILS # BLD: 0.03 K/UL (ref 0–0.2)
BASOPHILS NFR BLD: 0 % (ref 0–2)
BILIRUB SERPL-MCNC: 0.5 MG/DL (ref 0–1.2)
BNP SERPL-MCNC: 466 PG/ML (ref 0–125)
BUN SERPL-MCNC: 11 MG/DL (ref 6–23)
CALCIUM SERPL-MCNC: 9.4 MG/DL (ref 8.6–10.2)
CHLORIDE SERPL-SCNC: 101 MMOL/L (ref 98–107)
CO2 SERPL-SCNC: 29 MMOL/L (ref 22–29)
CREAT SERPL-MCNC: 1 MG/DL (ref 0.7–1.2)
EOSINOPHIL # BLD: 0.56 K/UL (ref 0.05–0.5)
EOSINOPHILS RELATIVE PERCENT: 8 % (ref 0–6)
ERYTHROCYTE [DISTWIDTH] IN BLOOD BY AUTOMATED COUNT: 14 % (ref 11.5–15)
GFR SERPL CREATININE-BSD FRML MDRD: >60 ML/MIN/1.73M2
GLUCOSE SERPL-MCNC: 190 MG/DL (ref 74–99)
HCT VFR BLD AUTO: 42.9 % (ref 37–54)
HGB BLD-MCNC: 14.2 G/DL (ref 12.5–16.5)
IMM GRANULOCYTES # BLD AUTO: <0.03 K/UL (ref 0–0.58)
IMM GRANULOCYTES NFR BLD: 0 % (ref 0–5)
LYMPHOCYTES NFR BLD: 1.26 K/UL (ref 1.5–4)
LYMPHOCYTES RELATIVE PERCENT: 18 % (ref 20–42)
MCH RBC QN AUTO: 29.2 PG (ref 26–35)
MCHC RBC AUTO-ENTMCNC: 33.1 G/DL (ref 32–34.5)
MCV RBC AUTO: 88.3 FL (ref 80–99.9)
MONOCYTES NFR BLD: 0.44 K/UL (ref 0.1–0.95)
MONOCYTES NFR BLD: 6 % (ref 2–12)
NEUTROPHILS NFR BLD: 67 % (ref 43–80)
NEUTS SEG NFR BLD: 4.63 K/UL (ref 1.8–7.3)
PLATELET # BLD AUTO: 187 K/UL (ref 130–450)
PMV BLD AUTO: 12.4 FL (ref 7–12)
POTASSIUM SERPL-SCNC: 3.3 MMOL/L (ref 3.5–5)
PROT SERPL-MCNC: 8.4 G/DL (ref 6.4–8.3)
RBC # BLD AUTO: 4.86 M/UL (ref 3.8–5.8)
SODIUM SERPL-SCNC: 140 MMOL/L (ref 132–146)
TROPONIN I SERPL HS-MCNC: 8 NG/L (ref 0–11)
TROPONIN I SERPL HS-MCNC: 9 NG/L (ref 0–11)
WBC OTHER # BLD: 6.9 K/UL (ref 4.5–11.5)

## 2023-08-26 PROCEDURE — 99285 EMERGENCY DEPT VISIT HI MDM: CPT

## 2023-08-26 PROCEDURE — 96374 THER/PROPH/DIAG INJ IV PUSH: CPT

## 2023-08-26 PROCEDURE — 94640 AIRWAY INHALATION TREATMENT: CPT

## 2023-08-26 PROCEDURE — 6370000000 HC RX 637 (ALT 250 FOR IP): Performed by: STUDENT IN AN ORGANIZED HEALTH CARE EDUCATION/TRAINING PROGRAM

## 2023-08-26 PROCEDURE — 80053 COMPREHEN METABOLIC PANEL: CPT

## 2023-08-26 PROCEDURE — 6360000002 HC RX W HCPCS: Performed by: STUDENT IN AN ORGANIZED HEALTH CARE EDUCATION/TRAINING PROGRAM

## 2023-08-26 PROCEDURE — 2580000003 HC RX 258: Performed by: STUDENT IN AN ORGANIZED HEALTH CARE EDUCATION/TRAINING PROGRAM

## 2023-08-26 PROCEDURE — 85025 COMPLETE CBC W/AUTO DIFF WBC: CPT

## 2023-08-26 PROCEDURE — 84484 ASSAY OF TROPONIN QUANT: CPT

## 2023-08-26 PROCEDURE — 71045 X-RAY EXAM CHEST 1 VIEW: CPT

## 2023-08-26 PROCEDURE — 93005 ELECTROCARDIOGRAM TRACING: CPT | Performed by: EMERGENCY MEDICINE

## 2023-08-26 PROCEDURE — 83880 ASSAY OF NATRIURETIC PEPTIDE: CPT

## 2023-08-26 RX ORDER — FINASTERIDE 5 MG/1
5 TABLET, FILM COATED ORAL DAILY
Qty: 30 TABLET | Refills: 0 | Status: SHIPPED | OUTPATIENT
Start: 2023-08-26 | End: 2023-09-01

## 2023-08-26 RX ORDER — FLUTICASONE FUROATE AND VILANTEROL 200; 25 UG/1; UG/1
1 POWDER RESPIRATORY (INHALATION) DAILY
Qty: 60 EACH | Refills: 0 | Status: SHIPPED | OUTPATIENT
Start: 2023-08-26 | End: 2023-09-01

## 2023-08-26 RX ORDER — ALBUTEROL SULFATE 90 UG/1
2 AEROSOL, METERED RESPIRATORY (INHALATION) 4 TIMES DAILY PRN
Qty: 18 G | Refills: 0 | Status: SHIPPED | OUTPATIENT
Start: 2023-08-26 | End: 2023-09-01

## 2023-08-26 RX ORDER — POTASSIUM CHLORIDE 20 MEQ/1
40 TABLET, EXTENDED RELEASE ORAL ONCE
Status: COMPLETED | OUTPATIENT
Start: 2023-08-26 | End: 2023-08-26

## 2023-08-26 RX ORDER — IPRATROPIUM BROMIDE AND ALBUTEROL SULFATE 2.5; .5 MG/3ML; MG/3ML
3 SOLUTION RESPIRATORY (INHALATION) ONCE
Status: COMPLETED | OUTPATIENT
Start: 2023-08-26 | End: 2023-08-26

## 2023-08-26 RX ORDER — OXYCODONE HYDROCHLORIDE AND ACETAMINOPHEN 5; 325 MG/1; MG/1
1 TABLET ORAL ONCE
Status: COMPLETED | OUTPATIENT
Start: 2023-08-26 | End: 2023-08-26

## 2023-08-26 RX ADMIN — OXYCODONE HYDROCHLORIDE AND ACETAMINOPHEN 1 TABLET: 5; 325 TABLET ORAL at 18:24

## 2023-08-26 RX ADMIN — POTASSIUM CHLORIDE 40 MEQ: 1500 TABLET, EXTENDED RELEASE ORAL at 19:17

## 2023-08-26 RX ADMIN — IPRATROPIUM BROMIDE AND ALBUTEROL SULFATE 3 DOSE: .5; 2.5 SOLUTION RESPIRATORY (INHALATION) at 18:59

## 2023-08-26 RX ADMIN — WATER 125 MG: 1 INJECTION INTRAMUSCULAR; INTRAVENOUS; SUBCUTANEOUS at 19:17

## 2023-08-26 ASSESSMENT — PATIENT HEALTH QUESTIONNAIRE - PHQ9: SUM OF ALL RESPONSES TO PHQ QUESTIONS 1-9: 0

## 2023-08-26 ASSESSMENT — PAIN - FUNCTIONAL ASSESSMENT
PAIN_FUNCTIONAL_ASSESSMENT: NONE - DENIES PAIN
PAIN_FUNCTIONAL_ASSESSMENT: NONE - DENIES PAIN

## 2023-08-28 ENCOUNTER — TELEPHONE (OUTPATIENT)
Dept: INTERNAL MEDICINE | Age: 68
End: 2023-08-28

## 2023-08-28 ENCOUNTER — HOSPITAL ENCOUNTER (EMERGENCY)
Age: 68
Discharge: LEFT BEFORE DISCHARGE | End: 2023-08-28
Payer: COMMERCIAL

## 2023-08-28 VITALS
TEMPERATURE: 98.2 F | RESPIRATION RATE: 18 BRPM | DIASTOLIC BLOOD PRESSURE: 69 MMHG | BODY MASS INDEX: 23.17 KG/M2 | HEART RATE: 87 BPM | OXYGEN SATURATION: 100 % | SYSTOLIC BLOOD PRESSURE: 130 MMHG | WEIGHT: 135 LBS

## 2023-08-28 DIAGNOSIS — R68.84 JAW PAIN: Primary | ICD-10-CM

## 2023-08-28 LAB
EKG ATRIAL RATE: 96 BPM
EKG P AXIS: -29 DEGREES
EKG P-R INTERVAL: 130 MS
EKG Q-T INTERVAL: 336 MS
EKG QRS DURATION: 82 MS
EKG QTC CALCULATION (BAZETT): 424 MS
EKG R AXIS: -45 DEGREES
EKG T AXIS: 96 DEGREES
EKG VENTRICULAR RATE: 96 BPM

## 2023-08-28 PROCEDURE — 99283 EMERGENCY DEPT VISIT LOW MDM: CPT

## 2023-08-28 PROCEDURE — 93010 ELECTROCARDIOGRAM REPORT: CPT | Performed by: INTERNAL MEDICINE

## 2023-08-28 PROCEDURE — 6370000000 HC RX 637 (ALT 250 FOR IP): Performed by: NURSE PRACTITIONER

## 2023-08-28 RX ORDER — ACETAMINOPHEN 500 MG
500 TABLET ORAL 4 TIMES DAILY PRN
Qty: 40 TABLET | Refills: 0 | Status: SHIPPED | OUTPATIENT
Start: 2023-08-28 | End: 2023-09-01

## 2023-08-28 RX ORDER — ACETAMINOPHEN 500 MG
1000 TABLET ORAL ONCE
Status: COMPLETED | OUTPATIENT
Start: 2023-08-28 | End: 2023-08-28

## 2023-08-28 RX ADMIN — ACETAMINOPHEN 1000 MG: 500 TABLET ORAL at 18:25

## 2023-08-28 ASSESSMENT — PAIN SCALES - GENERAL: PAINLEVEL_OUTOF10: 10

## 2023-08-28 ASSESSMENT — PAIN - FUNCTIONAL ASSESSMENT: PAIN_FUNCTIONAL_ASSESSMENT: 0-10

## 2023-08-28 NOTE — TELEPHONE ENCOUNTER
Called pt and advised that the referral for derm will need to wait for his appt for this referral and advised that his inhalers were sent to [facility] and mailed him an appt card

## 2023-08-28 NOTE — TELEPHONE ENCOUNTER
----- Message from Davidson Zarate sent at 8/28/2023  2:17 PM EDT -----  Subject: Referral Request    Reason for referral request? Pt would like to be established with a derm   pvdr  Provider patient wants to be referred to(if known):     Provider Phone Number(if known): Additional Information for Provider? pt stated that he is currently   established with a pcp in the office.  The record states otherwise  ---------------------------------------------------------------------------  --------------  Forrest Reid INFO    [phone]; OK to leave message on voicemail  ---------------------------------------------------------------------------  --------------

## 2023-08-29 ENCOUNTER — HOSPITAL ENCOUNTER (EMERGENCY)
Age: 68
Discharge: LEFT BEFORE DISCHARGE | End: 2023-08-29
Attending: EMERGENCY MEDICINE
Payer: MEDICAID

## 2023-08-29 ENCOUNTER — OFFICE VISIT (OUTPATIENT)
Dept: PRIMARY CARE CLINIC | Age: 68
End: 2023-08-29

## 2023-08-29 VITALS
HEIGHT: 64 IN | HEART RATE: 89 BPM | TEMPERATURE: 98.2 F | OXYGEN SATURATION: 94 % | WEIGHT: 135 LBS | BODY MASS INDEX: 23.05 KG/M2 | RESPIRATION RATE: 18 BRPM | SYSTOLIC BLOOD PRESSURE: 122 MMHG | DIASTOLIC BLOOD PRESSURE: 79 MMHG

## 2023-08-29 VITALS — WEIGHT: 146.6 LBS | BODY MASS INDEX: 25.16 KG/M2

## 2023-08-29 DIAGNOSIS — Z53.21 PATIENT LEFT WITHOUT BEING SEEN: ICD-10-CM

## 2023-08-29 DIAGNOSIS — R68.84 CHRONIC JAW PAIN: Primary | ICD-10-CM

## 2023-08-29 DIAGNOSIS — N43.3 HYDROCELE IN ADULT: ICD-10-CM

## 2023-08-29 DIAGNOSIS — G89.29 CHRONIC JAW PAIN: Primary | ICD-10-CM

## 2023-08-29 DIAGNOSIS — N50.811 PAIN IN RIGHT TESTICLE: ICD-10-CM

## 2023-08-29 DIAGNOSIS — Z76.5 DRUG-SEEKING BEHAVIOR: Primary | ICD-10-CM

## 2023-08-29 PROCEDURE — 99282 EMERGENCY DEPT VISIT SF MDM: CPT

## 2023-08-29 PROCEDURE — 99999 PR OFFICE/OUTPT VISIT,PROCEDURE ONLY: CPT | Performed by: NURSE PRACTITIONER

## 2023-08-29 PROCEDURE — 99281 EMR DPT VST MAYX REQ PHY/QHP: CPT

## 2023-08-29 RX ORDER — ACETAMINOPHEN 500 MG
1000 TABLET ORAL ONCE
Status: DISCONTINUED | OUTPATIENT
Start: 2023-08-29 | End: 2023-08-29 | Stop reason: HOSPADM

## 2023-08-29 ASSESSMENT — PAIN DESCRIPTION - LOCATION
LOCATION: JAW
LOCATION: SCROTUM

## 2023-08-29 ASSESSMENT — PAIN SCALES - GENERAL
PAINLEVEL_OUTOF10: 10
PAINLEVEL_OUTOF10: 10

## 2023-08-29 ASSESSMENT — PAIN DESCRIPTION - DESCRIPTORS
DESCRIPTORS: DISCOMFORT;SORE;ACHING
DESCRIPTORS: ACHING;CRUSHING

## 2023-08-29 ASSESSMENT — PAIN - FUNCTIONAL ASSESSMENT: PAIN_FUNCTIONAL_ASSESSMENT: 0-10

## 2023-08-29 NOTE — ED NOTES
Patient refused tylenol called it junk.  He stated he only wanted percocet     Katja Barker RN  08/29/23 5693

## 2023-08-29 NOTE — PROGRESS NOTES
Pt presents today for narcotics related to his mandible fracture. He was seen multiple times for this issue at the ER and they will not prescribe him narcotics anymore, only Tylenol. When he was advised narcotics will not be prescribed that he needed to call his surgeon for pain medication, pt advised he did not want to be seen anymore and was leaving our office. Patient was not evaluated by me prior to leaving.

## 2023-08-29 NOTE — ED PROVIDER NOTES
time.  They state that they are aware of the serious risks as explained, but they continue to wish to leave against medical advice. In light of their decision to leave against medical advice, follow-up has been arranged and they are aware of the importance of following up as instructed. They have been advised that they should return to the ED immediately if they change their mind at any time, or if their condition begins to change or worsen. --------------------------------- IMPRESSION AND DISPOSITION ---------------------------------    IMPRESSION  1. Drug-seeking behavior    2. Hydrocele in adult    3.  Pain in right testicle        DISPOSITION  Disposition: Other Disposition: Left AMA  Patient condition is stable    PATIENT REFERRED TO:  Rigoberto Lazo MD  [facility] [phone]    Call in 1 day      [address] [place] [address] [phone]  Call in 1 day      Mau Thompson, [address] [place] [address] [address] [facility] [phone]    Call in 1 day      [facility]  [address] [address]  [phone]  Go to   If symptoms worsen      DISCHARGE MEDICATIONS:  Discharge Medication List as of 8/29/2023  6:03 PM          DISCONTINUED MEDICATIONS:  Discharge Medication List as of 8/29/2023  6:03 PM                 (Please note that portions of this note were completed with a voice recognition program.  Efforts were made to edit the dictations but occasionally words are mis-transcribed.)    Charlie Guillermo DO (electronically signed)            Charlie Guillermo DO  08/31/23 0720

## 2023-08-29 NOTE — ED PROVIDER NOTES
Independent MABEL Visit. HPI:  8/28/23, Time: 10:08 PM EDT         Moustapha Villa is a 80 y.o. male presenting to the ED for right jaw pain, beginning months/years ago. The complaint has been persistent, moderate in severity, and worsened by nothing. Patient stated that he comes few times  a week and hopes that someone will give him adequate pain relief in the form of a Percocet. He has not yet follow up with the dental clinic. Denied any other complaints. ROS:   Pertinent positives and negatives are stated within HPI, all other systems reviewed and are negative.  --------------------------------------------- PAST HISTORY ---------------------------------------------  Past Medical History:  has a past medical history of Anxiety, Arthritis, Asthma, Bipolar 1 disorder ([address]), Cardiomegaly, Chronic combined systolic and diastolic CHF (congestive heart failure) ([address]), COPD (chronic obstructive pulmonary disease) ([address]), Depression, Diabetes mellitus ([address]), GERD (gastroesophageal reflux disease), Hepatitis C, Hypertension, Hyperthyroidism, Kidney stones, Mass of testicle, Mesothelioma ([address]), Paroxysmal A-fib ([address]), Peptic ulcer, Prostate enlargement, Pulmonary embolism ([address]), and Seizures ([address]). Past Surgical History:  has a past surgical history that includes Appendectomy; Lithotripsy; Hemorrhoid surgery; Bladder surgery; Endoscopy, colon, diagnostic (11/13/2015); Abdomen surgery; Colonoscopy; Cardiac surgery; vascular surgery; Cardiac catheterization (05/21/2018); and Mandible surgery (N/A, 3/3/2023). Social History:  reports that he quit smoking about 13 years ago. His smoking use included cigarettes. He quit smokeless tobacco use about 5 years ago. His smokeless tobacco use included chew. He reports that he does not currently use drugs after having used the following drugs: Cocaine. He reports that he does not drink alcohol.     Family History: family history includes Cancer in his father, maternal

## 2023-08-29 NOTE — ED PROVIDER NOTES
Oral 89 18 94 % 5' 4\" (1.626 m) 135 lb (61.2 kg)          ----------------------------------------PHYSICAL EXAM--------------------------------------  Constitutional:  Well developed, well nourished, no acute distress, non-toxic appearance   Eyes:  PERRL, conjunctiva normal, EOMI  HENT:  Atraumatic, external ears normal, nose normal, oropharynx moist, no pharyngeal exudates. Diffuse left-sided jaw tenderness without difficulty breathing or swallowing. No trismus. Is tolerating secretions. No jaw or oral airway swelling. Neck- normal range of motion, no nuchal rigidity   Respiratory:  No respiratory distress, normal breath sounds, no rales, no wheezing   Cardiovascular:  Normal rate, normal rhythm, no murmurs, no gallops, no rubs. Radial  pulses 2+ bilaterally. GI:  Soft, nondistended, normal bowel sounds, nontender,  no rebound, no guarding   :  No costovertebral angle tenderness   Musculoskeletal:  No edema, no deformities. Integument:  Well hydrated, no rash. Adequate perfusion. Lymphatic:  No cervical lymphadenopathy noted   Neurologic:  Alert & oriented x 3, CN 2-12 normal,  no focal deficits noted. Normal gait. Psychiatric:  Speech and behavior appropriate             DIAGNOSTIC RESULTS   LABS:  No results found for this visit on 08/29/23. As interpreted by me, the above displayed labs are abnormal as marked by (H) or (L). All other labs obtained during this visit were within normal range or not returned as of this dictation.         RADIOLOGY:   Non-plain film images such as CT, Ultrasound, Xray and MRI are read by the radiologist.   Interpretation per the Radiologist below, if available at the time of this note:    No orders to display     CT HEAD WO CONTRAST    Result Date: 8/22/2023  EXAMINATION: CT OF THE HEAD WITHOUT CONTRAST  8/22/2023 7:51 pm TECHNIQUE: CT of the head was performed without the administration of intravenous contrast. Automated exposure control, iterative reconstruction,

## 2023-08-29 NOTE — ED NOTES
Patient made he did not get percocet and he got up and left.      Harjinder Hammer, GABY  08/29/23 020

## 2023-09-01 RX ORDER — LEVOTHYROXINE SODIUM 0.03 MG/1
TABLET ORAL
COMMUNITY
Start: 2023-08-20 | End: 2023-09-01 | Stop reason: SDUPTHER

## 2023-09-01 RX ORDER — LEVOTHYROXINE SODIUM 0.03 MG/1
25 TABLET ORAL DAILY
Qty: 30 TABLET | Refills: 2 | Status: SHIPPED | OUTPATIENT
Start: 2023-09-01

## 2023-09-01 RX ORDER — LEVETIRACETAM 1000 MG/1
1000 TABLET ORAL 2 TIMES DAILY
COMMUNITY
Start: 2023-08-28

## 2023-09-01 RX ORDER — METOPROLOL SUCCINATE 50 MG/1
50 TABLET, EXTENDED RELEASE ORAL DAILY
COMMUNITY
Start: 2023-08-28 | End: 2023-09-18 | Stop reason: SDUPTHER

## 2023-09-01 NOTE — TELEPHONE ENCOUNTER
Last Appointment:  8/11/23  Future Appointments   Date Time Provider [address]   9/18/2023  3:00 PM Rigoberto Lazo MD [facility]

## 2023-09-01 NOTE — PROGRESS NOTES
Spoke with Becki Kang ([phone]), owner of [facility] (group home) where pt resides and informed of OR 9/6/23 at [phone] and will need to arrive at 0915 to [facility], turn right to surgery reception. Becki Kang states pt will be transported via cab and will be accompanied by friend Gunner Guevara. Per faxed document, pt is alert and oriented, signs for self. Is ambulatory and able to use call light and swallow oral meds with water only. He has NO tubes or drains, NO wounds or isolation. Is a FULL CODE. Pt is taking Ballwin Sink was advised to call Dr. Katrina Medina office ASAP to check regarding holding xarelto. Hx seizures-last about 1 month ago according to Becki Kang. Becki Kang also states pt has drug-seeking behavior. It was noted pt was in ER 8/29/23 for testicular pain and signed out AMA. Preop instructions faxed to Becki Kang at [facility].

## 2023-09-03 NOTE — PROGRESS NOTES
[address] [employer] PRE-ADMISSION TESTING INSTRUCTIONS    The Preadmission Testing patient is instructed accordingly using the following criteria (check applicable):    ARRIVAL INSTRUCTIONS:  [x]  Enter [facility] via the Main Entrance (A). Upon entering the door, make an immediate right to the surgery reception desk    [x] Please be sure to arrange for responsible adult to provide transportation to and from the hospital    [x] Please arrange for responsible adult to be with pt. for the 24 hour period post procedure due to having anesthesia    [x] If pt awakes am of surgery not feeling well or have temperature >100 please call [phone]    GENERAL INSTRUCTIONS:    [x] Nothing by mouth after midnight, including gum, candy, mints or water    [x] You may brush your teeth, but do not swallow any water    [x] Take medications as instructed with 1-2 oz of water    [x] Stop herbal supplements and vitamins 5 days prior to procedure    [x] Follow preop dosing of blood thinners per physician instructions    [] Take 1/2 dose of evening insulin, but no insulin after midnight    [] No oral diabetic medications after midnight    [x] If diabetic and have low blood sugar or feel symptomatic, take 1-2oz apple juice only    [] Bring inhalers day of surgery    [] Bring C-PAP/ Bi-Pap day of surgery    [] Bring urine specimen day of surgery    [x] Shower or bath with soap, lather and rinse well, AM of Surgery, no lotion, powders or creams to surgical site    [] Follow bowel prep as instructed per surgeon    [x] No tobacco products within 24 hours of surgery     [x] No alcohol or illegal drug use within 24 hours of surgery.     [x] Jewelry, body piercing's, eyeglasses, contact lenses and dentures are not permitted into surgery (bring cases)      [x] Please do not wear any nail polish, make up or hair products on the day of surgery    [x] You can expect a call the business day prior to procedure to notify you if your

## 2023-09-05 ENCOUNTER — TELEPHONE (OUTPATIENT)
Dept: ENT CLINIC | Age: 68
End: 2023-09-05

## 2023-09-05 ENCOUNTER — ANESTHESIA EVENT (OUTPATIENT)
Dept: OPERATING ROOM | Age: 68
End: 2023-09-05
Payer: COMMERCIAL

## 2023-09-05 NOTE — TELEPHONE ENCOUNTER
Tyler Adams from [facility] requesting return call in regards to surgical questions. Patient scheduled for surgery with Dr. Andree Galvez 9/6/23. Please return Catrina's call at [phone].     Electronically signed by Audi Hyde, [facility] on 9/5/23 at 11:47 AM EDT

## 2023-09-05 NOTE — H&P
Subjective:      Patient ID:  Jose Manuel Dee is a 80 y.o. male. HPI:     Patient presents today for refracture of his jaw. Condition has been present for 1 week(s). Pt states he fell and hit concrete and his jaw is painful now. Past Medical History        Past Medical History:   Diagnosis Date    Anxiety      Arthritis      Asthma      Bipolar 1 disorder (HCC)      Chronic combined systolic and diastolic CHF (congestive heart failure) ([address]) 05/27/2018    COPD (chronic obstructive pulmonary disease) (HCC)      Depression      Diabetes mellitus (HCC)      GERD (gastroesophageal reflux disease)      Hepatitis C       resolved    Hypertension      Hyperthyroidism 8/2/2012    Hypothyroidism due to medication      Mass of testicle      Mesothelioma [facility])       pt states possibly not    Neuromuscular disorder ([address])      Other disorders of kidney and ureter       kidney stones; most recent 08/27/2015    Paroxysmal A-fib ([address])       discussed with PCP- patient does have hx of PAfib    Peptic ulcer      Prostate enlargement      Pulmonary embolism ([address])       Intermediate risk for PE on VQ scan.     Seizures ([address])      Thyroid disease       hyperthyroid         Past Surgical History         Past Surgical History:   Procedure Laterality Date    ABDOMEN SURGERY        APPENDECTOMY        BLADDER SURGERY        CARDIAC CATHETERIZATION   05/21/2018     Dr. Zeesahn Finch, COLON, DIAGNOSTIC   11/13/2015    HEMORRHOID SURGERY        LITHOTRIPSY        MANDIBLE SURGERY N/A 3/3/2023     MANDIBLE OPEN REDUCTION INTERNAL FIXATION performed by Jin Martinez DO at [facility] OR    VASCULAR SURGERY             Family History         Family History   Problem Relation Age of Onset    Cancer Mother      Diabetes Mother      Cancer Father      Diabetes Father      Diabetes Maternal Aunt      Diabetes Maternal Grandmother      Cancer Maternal Grandfather      Heart Disease Paternal Grandmother      Heart Failure Paternal Grandmother      Heart Disease Paternal Grandfather      Heart Failure Paternal Grandfather      Diabetes Maternal Aunt      Diabetes Maternal Aunt           Social History   Social History            Socioeconomic History    Marital status: Single       Spouse name: None    Number of children: None    Years of education: None    Highest education level: None   Occupational History    Occupation: disability   Tobacco Use    Smoking status: Former       Years: 10.00       Types: Cigarettes       Quit date: 1/10/2010       Years since quittin.2    Smokeless tobacco: Former       Types: Chew       Quit date:    Vaping Use    Vaping Use: Never used   Substance and Sexual Activity    Alcohol use: No       Alcohol/week: 0.0 standard drinks       Comment: recovered alcoholic; 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  Noted that if listed bc of shellfish-- this is NOT true cross reaction but an osmolaliity issue    Ketorolac Tromethamine Anaphylaxis    Peanuts [Peanut Oil] Anaphylaxis       Throat and eyes swell pt denies 3/3/23    Shellfish-Derived Products Anaphylaxis    Aspirin      Nitroglycerin Hives         Review of Systems   Constitutional: Negative. Negative for appetite change. HENT:  Positive for mouth sores and trouble swallowing. Eyes: Negative.   Negative for pain, discharge and visual

## 2023-09-06 ENCOUNTER — ANESTHESIA (OUTPATIENT)
Dept: OPERATING ROOM | Age: 68
End: 2023-09-06
Payer: COMMERCIAL

## 2023-09-06 ENCOUNTER — HOSPITAL ENCOUNTER (OUTPATIENT)
Age: 68
Setting detail: OUTPATIENT SURGERY
Discharge: HOME | End: 2023-09-06
Attending: OTOLARYNGOLOGY | Admitting: OTOLARYNGOLOGY
Payer: COMMERCIAL

## 2023-09-06 VITALS
OXYGEN SATURATION: 94 % | RESPIRATION RATE: 16 BRPM | BODY MASS INDEX: 22.88 KG/M2 | DIASTOLIC BLOOD PRESSURE: 63 MMHG | HEIGHT: 64 IN | SYSTOLIC BLOOD PRESSURE: 138 MMHG | WEIGHT: 134 LBS | TEMPERATURE: 97 F | HEART RATE: 62 BPM

## 2023-09-06 DIAGNOSIS — S02.609S: ICD-10-CM

## 2023-09-06 DIAGNOSIS — S02.609B: Primary | ICD-10-CM

## 2023-09-06 LAB
ANION GAP SERPL CALCULATED.3IONS-SCNC: 7 MMOL/L (ref 7–16)
BUN SERPL-MCNC: 9 MG/DL (ref 6–23)
CALCIUM SERPL-MCNC: 8.9 MG/DL (ref 8.6–10.2)
CHLORIDE SERPL-SCNC: 102 MMOL/L (ref 98–107)
CO2 SERPL-SCNC: 28 MMOL/L (ref 22–29)
CREAT SERPL-MCNC: 1 MG/DL (ref 0.7–1.2)
EKG ATRIAL RATE: 53 BPM
EKG P AXIS: -16 DEGREES
EKG P-R INTERVAL: 152 MS
EKG Q-T INTERVAL: 448 MS
EKG QRS DURATION: 92 MS
EKG QTC CALCULATION (BAZETT): 420 MS
EKG R AXIS: -4 DEGREES
EKG T AXIS: 41 DEGREES
EKG VENTRICULAR RATE: 53 BPM
ERYTHROCYTE [DISTWIDTH] IN BLOOD BY AUTOMATED COUNT: 13.8 % (ref 11.5–15)
GFR SERPL CREATININE-BSD FRML MDRD: >60 ML/MIN/1.73M2
GLUCOSE SERPL-MCNC: 111 MG/DL (ref 74–99)
HCT VFR BLD AUTO: 36.3 % (ref 37–54)
HGB BLD-MCNC: 12.1 G/DL (ref 12.5–16.5)
MCH RBC QN AUTO: 28.9 PG (ref 26–35)
MCHC RBC AUTO-ENTMCNC: 33.3 G/DL (ref 32–34.5)
MCV RBC AUTO: 86.6 FL (ref 80–99.9)
PLATELET # BLD AUTO: 168 K/UL (ref 130–450)
PMV BLD AUTO: 11.6 FL (ref 7–12)
POTASSIUM SERPL-SCNC: 4.1 MMOL/L (ref 3.5–5)
RBC # BLD AUTO: 4.19 M/UL (ref 3.8–5.8)
SODIUM SERPL-SCNC: 137 MMOL/L (ref 132–146)
WBC OTHER # BLD: 5.7 K/UL (ref 4.5–11.5)

## 2023-09-06 PROCEDURE — 2580000003 HC RX 258

## 2023-09-06 PROCEDURE — 7100000000 HC PACU RECOVERY - FIRST 15 MIN: Performed by: OTOLARYNGOLOGY

## 2023-09-06 PROCEDURE — 3600000013 HC SURGERY LEVEL 3 ADDTL 15MIN: Performed by: OTOLARYNGOLOGY

## 2023-09-06 PROCEDURE — 6360000002 HC RX W HCPCS

## 2023-09-06 PROCEDURE — 93005 ELECTROCARDIOGRAM TRACING: CPT | Performed by: ANESTHESIOLOGY

## 2023-09-06 PROCEDURE — 7100000011 HC PHASE II RECOVERY - ADDTL 15 MIN: Performed by: OTOLARYNGOLOGY

## 2023-09-06 PROCEDURE — 94664 DEMO&/EVAL PT USE INHALER: CPT

## 2023-09-06 PROCEDURE — C9399 UNCLASSIFIED DRUGS OR BIOLOG: HCPCS

## 2023-09-06 PROCEDURE — 6370000000 HC RX 637 (ALT 250 FOR IP): Performed by: ANESTHESIOLOGY

## 2023-09-06 PROCEDURE — 6360000002 HC RX W HCPCS: Performed by: ANESTHESIOLOGY

## 2023-09-06 PROCEDURE — 20680 REMOVAL OF IMPLANT DEEP: CPT | Performed by: OTOLARYNGOLOGY

## 2023-09-06 PROCEDURE — 3600000003 HC SURGERY LEVEL 3 BASE: Performed by: OTOLARYNGOLOGY

## 2023-09-06 PROCEDURE — 3700000001 HC ADD 15 MINUTES (ANESTHESIA): Performed by: OTOLARYNGOLOGY

## 2023-09-06 PROCEDURE — 80048 BASIC METABOLIC PNL TOTAL CA: CPT

## 2023-09-06 PROCEDURE — 2500000003 HC RX 250 WO HCPCS

## 2023-09-06 PROCEDURE — 7100000001 HC PACU RECOVERY - ADDTL 15 MIN: Performed by: OTOLARYNGOLOGY

## 2023-09-06 PROCEDURE — 94640 AIRWAY INHALATION TREATMENT: CPT

## 2023-09-06 PROCEDURE — 2500000003 HC RX 250 WO HCPCS: Performed by: OTOLARYNGOLOGY

## 2023-09-06 PROCEDURE — 2709999900 HC NON-CHARGEABLE SUPPLY: Performed by: OTOLARYNGOLOGY

## 2023-09-06 PROCEDURE — 3700000000 HC ANESTHESIA ATTENDED CARE: Performed by: OTOLARYNGOLOGY

## 2023-09-06 PROCEDURE — 7100000010 HC PHASE II RECOVERY - FIRST 15 MIN: Performed by: OTOLARYNGOLOGY

## 2023-09-06 PROCEDURE — 85027 COMPLETE CBC AUTOMATED: CPT

## 2023-09-06 PROCEDURE — 93010 ELECTROCARDIOGRAM REPORT: CPT | Performed by: INTERNAL MEDICINE

## 2023-09-06 RX ORDER — SODIUM CHLORIDE, SODIUM LACTATE, POTASSIUM CHLORIDE, CALCIUM CHLORIDE 600; 310; 30; 20 MG/100ML; MG/100ML; MG/100ML; MG/100ML
INJECTION, SOLUTION INTRAVENOUS CONTINUOUS
Status: DISCONTINUED | OUTPATIENT
Start: 2023-09-06 | End: 2023-09-06 | Stop reason: HOSPADM

## 2023-09-06 RX ORDER — SODIUM CHLORIDE 0.9 % (FLUSH) 0.9 %
5-40 SYRINGE (ML) INJECTION PRN
Status: DISCONTINUED | OUTPATIENT
Start: 2023-09-06 | End: 2023-09-06 | Stop reason: HOSPADM

## 2023-09-06 RX ORDER — LIDOCAINE HYDROCHLORIDE 20 MG/ML
INJECTION, SOLUTION EPIDURAL; INFILTRATION; INTRACAUDAL; PERINEURAL PRN
Status: DISCONTINUED | OUTPATIENT
Start: 2023-09-06 | End: 2023-09-06 | Stop reason: SDUPTHER

## 2023-09-06 RX ORDER — OXYCODONE HYDROCHLORIDE 5 MG/1
10 TABLET ORAL PRN
Status: DISCONTINUED | OUTPATIENT
Start: 2023-09-06 | End: 2023-09-06 | Stop reason: HOSPADM

## 2023-09-06 RX ORDER — ROCURONIUM BROMIDE 10 MG/ML
INJECTION, SOLUTION INTRAVENOUS PRN
Status: DISCONTINUED | OUTPATIENT
Start: 2023-09-06 | End: 2023-09-06 | Stop reason: SDUPTHER

## 2023-09-06 RX ORDER — OXYCODONE HYDROCHLORIDE 5 MG/1
5 TABLET ORAL PRN
Status: DISCONTINUED | OUTPATIENT
Start: 2023-09-06 | End: 2023-09-06 | Stop reason: HOSPADM

## 2023-09-06 RX ORDER — CHLORHEXIDINE GLUCONATE ORAL RINSE 1.2 MG/ML
15 SOLUTION DENTAL 2 TIMES DAILY
Qty: 420 ML | Refills: 0 | Status: SHIPPED | OUTPATIENT
Start: 2023-09-06 | End: 2023-09-11

## 2023-09-06 RX ORDER — ONDANSETRON 4 MG/1
4 TABLET, FILM COATED ORAL EVERY 6 HOURS
Qty: 6 TABLET | Refills: 0 | Status: SHIPPED | OUTPATIENT
Start: 2023-09-06 | End: 2023-09-11

## 2023-09-06 RX ORDER — ACETAMINOPHEN 500 MG
1000 TABLET ORAL ONCE
Status: COMPLETED | OUTPATIENT
Start: 2023-09-06 | End: 2023-09-06

## 2023-09-06 RX ORDER — DEXAMETHASONE SODIUM PHOSPHATE 4 MG/ML
INJECTION, SOLUTION INTRA-ARTICULAR; INTRALESIONAL; INTRAMUSCULAR; INTRAVENOUS; SOFT TISSUE PRN
Status: DISCONTINUED | OUTPATIENT
Start: 2023-09-06 | End: 2023-09-06 | Stop reason: SDUPTHER

## 2023-09-06 RX ORDER — SODIUM CHLORIDE 9 MG/ML
INJECTION, SOLUTION INTRAVENOUS PRN
Status: DISCONTINUED | OUTPATIENT
Start: 2023-09-06 | End: 2023-09-06 | Stop reason: HOSPADM

## 2023-09-06 RX ORDER — LIDOCAINE HYDROCHLORIDE AND EPINEPHRINE 10; 10 MG/ML; UG/ML
INJECTION, SOLUTION INFILTRATION; PERINEURAL PRN
Status: DISCONTINUED | OUTPATIENT
Start: 2023-09-06 | End: 2023-09-06 | Stop reason: ALTCHOICE

## 2023-09-06 RX ORDER — SODIUM CHLORIDE 0.9 % (FLUSH) 0.9 %
5-40 SYRINGE (ML) INJECTION EVERY 12 HOURS SCHEDULED
Status: DISCONTINUED | OUTPATIENT
Start: 2023-09-06 | End: 2023-09-06 | Stop reason: HOSPADM

## 2023-09-06 RX ORDER — ONDANSETRON 2 MG/ML
INJECTION INTRAMUSCULAR; INTRAVENOUS PRN
Status: DISCONTINUED | OUTPATIENT
Start: 2023-09-06 | End: 2023-09-06 | Stop reason: SDUPTHER

## 2023-09-06 RX ORDER — METOPROLOL SUCCINATE 50 MG/1
50 TABLET, EXTENDED RELEASE ORAL ONCE
Status: COMPLETED | OUTPATIENT
Start: 2023-09-06 | End: 2023-09-06

## 2023-09-06 RX ORDER — PROPOFOL 10 MG/ML
INJECTION, EMULSION INTRAVENOUS PRN
Status: DISCONTINUED | OUTPATIENT
Start: 2023-09-06 | End: 2023-09-06 | Stop reason: SDUPTHER

## 2023-09-06 RX ORDER — MEPERIDINE HYDROCHLORIDE 25 MG/ML
12.5 INJECTION INTRAMUSCULAR; INTRAVENOUS; SUBCUTANEOUS EVERY 5 MIN PRN
Status: DISCONTINUED | OUTPATIENT
Start: 2023-09-06 | End: 2023-09-06 | Stop reason: HOSPADM

## 2023-09-06 RX ORDER — KETAMINE HYDROCHLORIDE 10 MG/ML
INJECTION INTRAMUSCULAR; INTRAVENOUS PRN
Status: DISCONTINUED | OUTPATIENT
Start: 2023-09-06 | End: 2023-09-06 | Stop reason: SDUPTHER

## 2023-09-06 RX ORDER — CEPHALEXIN 500 MG/1
500 CAPSULE ORAL 3 TIMES DAILY
Qty: 15 CAPSULE | Refills: 0 | Status: SHIPPED | OUTPATIENT
Start: 2023-09-06 | End: 2023-09-11

## 2023-09-06 RX ORDER — HYDROCODONE BITARTRATE AND ACETAMINOPHEN 5; 325 MG/1; MG/1
1 TABLET ORAL EVERY 6 HOURS PRN
Qty: 12 TABLET | Refills: 0 | Status: SHIPPED | OUTPATIENT
Start: 2023-09-06 | End: 2023-09-09

## 2023-09-06 RX ORDER — DIPHENHYDRAMINE HYDROCHLORIDE 50 MG/ML
12.5 INJECTION INTRAMUSCULAR; INTRAVENOUS
Status: DISCONTINUED | OUTPATIENT
Start: 2023-09-06 | End: 2023-09-06 | Stop reason: HOSPADM

## 2023-09-06 RX ORDER — IPRATROPIUM BROMIDE AND ALBUTEROL SULFATE 2.5; .5 MG/3ML; MG/3ML
1 SOLUTION RESPIRATORY (INHALATION)
Status: DISCONTINUED | OUTPATIENT
Start: 2023-09-06 | End: 2023-09-06 | Stop reason: HOSPADM

## 2023-09-06 RX ORDER — IPRATROPIUM BROMIDE AND ALBUTEROL SULFATE 2.5; .5 MG/3ML; MG/3ML
1 SOLUTION RESPIRATORY (INHALATION) ONCE
Status: COMPLETED | OUTPATIENT
Start: 2023-09-06 | End: 2023-09-06

## 2023-09-06 RX ORDER — FENTANYL CITRATE 50 UG/ML
INJECTION, SOLUTION INTRAMUSCULAR; INTRAVENOUS PRN
Status: DISCONTINUED | OUTPATIENT
Start: 2023-09-06 | End: 2023-09-06 | Stop reason: SDUPTHER

## 2023-09-06 RX ADMIN — ACETAMINOPHEN 1000 MG: 500 TABLET ORAL at 09:47

## 2023-09-06 RX ADMIN — DEXAMETHASONE SODIUM PHOSPHATE 4 MG: 4 INJECTION, SOLUTION INTRAMUSCULAR; INTRAVENOUS at 12:12

## 2023-09-06 RX ADMIN — IPRATROPIUM BROMIDE AND ALBUTEROL SULFATE 1 DOSE: 2.5; .5 SOLUTION RESPIRATORY (INHALATION) at 10:15

## 2023-09-06 RX ADMIN — KETAMINE HYDROCHLORIDE 25 MG: 10 INJECTION INTRAMUSCULAR; INTRAVENOUS at 11:57

## 2023-09-06 RX ADMIN — HYDROMORPHONE HYDROCHLORIDE 0.5 MG: 0.5 INJECTION, SOLUTION INTRAMUSCULAR; INTRAVENOUS; SUBCUTANEOUS at 13:53

## 2023-09-06 RX ADMIN — FENTANYL CITRATE 100 MCG: 50 INJECTION, SOLUTION INTRAMUSCULAR; INTRAVENOUS at 11:57

## 2023-09-06 RX ADMIN — LIDOCAINE HYDROCHLORIDE 100 MG: 20 INJECTION, SOLUTION EPIDURAL; INFILTRATION; INTRACAUDAL; PERINEURAL at 11:57

## 2023-09-06 RX ADMIN — AMPICILLIN SODIUM AND SULBACTAM SODIUM 3000 MG: 2; 1 INJECTION, POWDER, FOR SOLUTION INTRAMUSCULAR; INTRAVENOUS at 12:05

## 2023-09-06 RX ADMIN — PROPOFOL 150 MG: 10 INJECTION, EMULSION INTRAVENOUS at 11:57

## 2023-09-06 RX ADMIN — ROCURONIUM BROMIDE 40 MG: 10 INJECTION, SOLUTION INTRAVENOUS at 11:57

## 2023-09-06 RX ADMIN — ONDANSETRON 4 MG: 2 INJECTION INTRAMUSCULAR; INTRAVENOUS at 12:35

## 2023-09-06 RX ADMIN — SODIUM CHLORIDE: 9 INJECTION, SOLUTION INTRAVENOUS at 11:50

## 2023-09-06 RX ADMIN — SUGAMMADEX 250 MG: 100 INJECTION, SOLUTION INTRAVENOUS at 12:40

## 2023-09-06 RX ADMIN — METOPROLOL SUCCINATE 50 MG: 50 TABLET, EXTENDED RELEASE ORAL at 10:11

## 2023-09-06 RX ADMIN — HYDROMORPHONE HYDROCHLORIDE 0.5 MG: 0.5 INJECTION, SOLUTION INTRAMUSCULAR; INTRAVENOUS; SUBCUTANEOUS at 13:30

## 2023-09-06 RX ADMIN — IPRATROPIUM BROMIDE AND ALBUTEROL SULFATE 1 DOSE: 2.5; .5 SOLUTION RESPIRATORY (INHALATION) at 14:13

## 2023-09-06 ASSESSMENT — PAIN DESCRIPTION - PAIN TYPE
TYPE: SURGICAL PAIN

## 2023-09-06 ASSESSMENT — PAIN DESCRIPTION - LOCATION
LOCATION: HEAD;MOUTH
LOCATION: MOUTH
LOCATION: JAW
LOCATION: HEAD;JAW
LOCATION: JAW
LOCATION: JAW
LOCATION: HEAD;JAW
LOCATION: JAW

## 2023-09-06 ASSESSMENT — PAIN DESCRIPTION - DESCRIPTORS
DESCRIPTORS: SHARP
DESCRIPTORS: ACHING
DESCRIPTORS: ACHING
DESCRIPTORS: SHARP
DESCRIPTORS: DISCOMFORT
DESCRIPTORS: DISCOMFORT

## 2023-09-06 ASSESSMENT — PAIN SCALES - GENERAL
PAINLEVEL_OUTOF10: 5
PAINLEVEL_OUTOF10: 0
PAINLEVEL_OUTOF10: 8
PAINLEVEL_OUTOF10: 5
PAINLEVEL_OUTOF10: 4
PAINLEVEL_OUTOF10: 4
PAINLEVEL_OUTOF10: 8
PAINLEVEL_OUTOF10: 10

## 2023-09-06 ASSESSMENT — PAIN - FUNCTIONAL ASSESSMENT: PAIN_FUNCTIONAL_ASSESSMENT: 0-10

## 2023-09-06 NOTE — OP NOTE
Operative Note      Patient: Kurtis Phillip  YOB: 1955  MRN: [id number]    Date of Procedure: 9/6/2023    Pre-Op Diagnosis Codes:     * Closed fracture of mandible, unspecified laterality, unspecified mandibular site, initial encounter ([address] [C73.806H]    Post-Op Diagnosis:  Ossified bilateral mandible fractures       Procedure(s):  MANDIBLE OPEN PLATE REMOVAL +++ISMAEL    Surgeon(s):  Fahad Lane DO    Assistant:   Resident: Janet Valle DO    Anesthesia: General    Estimated Blood Loss (mL): less than 15 cc    Complications: None    Specimens:   * No specimens in log *    Implants:  * No implants in log *      Drains: * No LDAs found *    Findings: Ossified bilateral mandible fractures with fixation plates    Indications: Patient is a 72-year-old male with prior history of bilateral mandible fractures status post open reduction internal fixation March 3, 2023. He then refractured his jaw in April. He was subsequently scheduled for surgery but missed several appointments for this which delayed his care. Detailed Description of Procedure: The patient was consented preoperatively and taken back to the operating room, identified appropriately, placed in the supine position, given anesthesia for nasal intubation. Anterior oral incision  Attention was first turned to the right fracture site. Incision was made into the oral mucosa with Bovie cautery. This was taken down to the patient's lower mandible and mental nerve was not encountered as it was severed during the original fracture. Once this was accomplished, a Abimael Platts was used to remove the periosteum off of the mandible over the existing fracture plate and surrounding bone. Once this entire area was exposed, a screw  was used to remove 4 screws  followed by the 4-hole plate. The mandible was then examined and the prior fracture site was found to be completely ossified and non-mobile.   Attention was then turned to the left fracture site. Incision was made into the mucosa with Bovie cautery. The plate was exposed using a freer to elevate the periosteum off of the plate and bone. Once this entire area was exposed, a screw  was used to remove 4 screws, one of which was involved in the fracture line, followed by the 4-hole plate. The mandible was then examined and the prior fracture site was again found to be completely ossified and non-mobile. The decision was made to not place any additional hardware as the mandible was entirely intact. The incision sites were irrigated copiously with saline. The oral incisions were closed with a 3-0 Vicryl for the muscular layer in a simple interrupted fashion followed by a running-locking mucosal closure. The patient was then turned back to Anesthesia for appropriate awakening. Dr. Vicente Green was present and scrubbed for the entire procedure.     9/6/2023  Burgess Thacker  [phone]    Electronically signed by Madison Callahan DO on 9/6/2023 at 1:01 PM

## 2023-09-06 NOTE — DISCHARGE INSTRUCTIONS
EAR, NOSE, THROAT / FACIAL  TRAUMA    Follow up with Dr Gilma Agudelo in 1 week  [phone]    Office:  [facility]  [address] [facility], [address]  [facility] [address] [facility]  -Peridex swish and spit twice daily and after meals  - take medications as prescribed  -Hold your Xarelto for 24 hours after surgery

## 2023-09-06 NOTE — H&P
Kay Melendez was seen and re-examined preoperatively today, September 6, 2023. There was no substantial change in his physical and medical status. Patient is fit for the proposed surgical procedure. All questions were appropriately addressed and had no further questions regarding the risks, benefits, and alternatives of the procedure. Kay Melendez and family wished to proceed.     Marleny Lucas DO  Resident Physician  [facility])  Otolaryngology Residency  9/6/2023  9:13 AM  \

## 2023-09-06 NOTE — ANESTHESIA PRE PROCEDURE
Department of Anesthesiology  Preprocedure Note       Name:  Panda Renee   Age:  80 y.o.  :  1955                                          MRN:  [id number]         Date:  2023      Surgeon: Mari Barboza):  Magali Renteria DO    Procedure: Procedure(s):  MANDIBLE OPEN REDUCTION INTERNAL FIXATION +++ISMAEL+++   ++IODINE ALLERGY++    Medications prior to admission:   Prior to Admission medications    Medication Sig Start Date End Date Taking? Authorizing Provider   metoprolol succinate (TOPROL XL) 50 MG extended release tablet Take 1 tablet by mouth daily 23   Historical Provider, MD   levETIRAcetam (KEPPRA) 1000 MG tablet Take 1 tablet by mouth 2 times daily 23   Historical Provider, MD   levothyroxine (SYNTHROID) 25 MCG tablet Take 1 tablet by mouth Daily 23   Lacretia Pound., DO   gabapentin (NEURONTIN) 300 MG capsule Take 1 capsule by mouth 3 times daily for 60 days. 8/14/23 10/13/23  Nay Sood MD   clonazePAM (KLONOPIN) 1 MG tablet Take 1 tablet by mouth 2 times daily as needed. [facility] 23   Historical Provider, MD   vitamin D3 (CHOLECALCIFEROL) 25 MCG (1000 UT) TABS tablet Take 1 tablet by mouth daily 23   Loli Stone MD   mirtazapine (REMERON) 15 MG tablet Take 1 tablet by mouth nightly at bedtime.  23   Historical Provider, MD   traZODone (DESYREL) 50 MG tablet Take 1 tablet by mouth nightly    Historical Provider, MD   venlafaxine (EFFEXOR XR) 37.5 MG extended release capsule Take 1 capsule by mouth at bedtime    Historical Provider, MD   divalproex (DEPAKOTE) 500 MG DR tablet Take 1 tablet by mouth at bedtime 3/6/23   Historical Provider, MD   venlafaxine (EFFEXOR XR) 75 MG extended release capsule Take 1 capsule by mouth every morning 3/6/23   Historical Provider, MD   rivaroxaban (XARELTO) 20 MG TABS tablet Take 1 tablet by mouth Daily with supper 22   Historical Provider, MD   lurasidone (LATUDA) 120 MG

## 2023-09-07 NOTE — PROGRESS NOTES
CLINICAL PHARMACY NOTE: MEDS TO BEDS    Total # of Prescriptions Filled: 4   The following medications were delivered to the patient:  Peridex  Keflex 500  Norco 5  Zofran 4    Additional Documentation:

## 2023-09-07 NOTE — ANESTHESIA POSTPROCEDURE EVALUATION
Department of Anesthesiology  Postprocedure Note    Patient: Steve Rios  MRN: [id number]  YOB: 1955  Date of evaluation: 9/7/2023      Procedure Summary     Date: 09/06/23 Room / Location: [facility] OR 07 / [facility]    Anesthesia Start: 1150 Anesthesia Stop: 1319    Procedure: MANDIBLE OPEN PLATE REMOVAL (Mouth) Diagnosis:       Closed fracture of mandible, unspecified laterality, unspecified mandibular site, initial encounter ([address])      (Closed fracture of mandible, unspecified laterality, unspecified mandibular site, initial encounter ([address]) [S02.609A])    Surgeons: Carmelo Leiva DO Responsible Provider: Alysha De León MD    Anesthesia Type: General ASA Status: 4          Anesthesia Type: General    Arben Phase I: Arben Score: 10    Arben Phase II: Arben Score: 10      Anesthesia Post Evaluation    Patient location during evaluation: PACU  Patient participation: complete - patient participated  Level of consciousness: awake and alert  Airway patency: patent  Nausea & Vomiting: no nausea and no vomiting  Complications: no  Cardiovascular status: hemodynamically stable  Respiratory status: acceptable  Hydration status: euvolemic  Multimodal analgesia pain management approach  Pain management: adequate

## 2023-09-10 ENCOUNTER — HOSPITAL ENCOUNTER (OUTPATIENT)
Age: 68
Setting detail: OBSERVATION
Discharge: OTHER INSTITUTION | End: 2023-09-12
Attending: EMERGENCY MEDICINE | Admitting: INTERNAL MEDICINE
Payer: COMMERCIAL

## 2023-09-10 ENCOUNTER — APPOINTMENT (OUTPATIENT)
Dept: CT IMAGING | Age: 68
End: 2023-09-10
Payer: COMMERCIAL

## 2023-09-10 DIAGNOSIS — R55 SYNCOPE AND COLLAPSE: Primary | ICD-10-CM

## 2023-09-10 DIAGNOSIS — R29.6 RECURRENT FALLS: ICD-10-CM

## 2023-09-10 DIAGNOSIS — F19.10 POLYSUBSTANCE ABUSE (HCC): ICD-10-CM

## 2023-09-10 DIAGNOSIS — R07.89 CHEST WALL PAIN: ICD-10-CM

## 2023-09-10 LAB
ALBUMIN SERPL-MCNC: 4.3 G/DL (ref 3.5–5.2)
ALP SERPL-CCNC: 91 U/L (ref 40–129)
ALT SERPL-CCNC: 14 U/L (ref 0–40)
ANION GAP SERPL CALCULATED.3IONS-SCNC: 12 MMOL/L (ref 7–16)
AST SERPL-CCNC: 16 U/L (ref 0–39)
BASOPHILS # BLD: 0.05 K/UL (ref 0–0.2)
BASOPHILS NFR BLD: 1 % (ref 0–2)
BILIRUB SERPL-MCNC: 0.3 MG/DL (ref 0–1.2)
BUN SERPL-MCNC: 10 MG/DL (ref 6–23)
CALCIUM SERPL-MCNC: 9.1 MG/DL (ref 8.6–10.2)
CHLORIDE SERPL-SCNC: 101 MMOL/L (ref 98–107)
CO2 SERPL-SCNC: 27 MMOL/L (ref 22–29)
CREAT SERPL-MCNC: 1 MG/DL (ref 0.7–1.2)
DATE LAST DOSE: ABNORMAL
EOSINOPHIL # BLD: 0.96 K/UL (ref 0.05–0.5)
EOSINOPHILS RELATIVE PERCENT: 12 % (ref 0–6)
ERYTHROCYTE [DISTWIDTH] IN BLOOD BY AUTOMATED COUNT: 14 % (ref 11.5–15)
GFR SERPL CREATININE-BSD FRML MDRD: >60 ML/MIN/1.73M2
GLUCOSE SERPL-MCNC: 115 MG/DL (ref 74–99)
HCT VFR BLD AUTO: 39 % (ref 37–54)
HGB BLD-MCNC: 13.2 G/DL (ref 12.5–16.5)
IMM GRANULOCYTES # BLD AUTO: 0.03 K/UL (ref 0–0.58)
IMM GRANULOCYTES NFR BLD: 0 % (ref 0–5)
INR PPP: 1.9
LYMPHOCYTES NFR BLD: 1.98 K/UL (ref 1.5–4)
LYMPHOCYTES RELATIVE PERCENT: 24 % (ref 20–42)
MCH RBC QN AUTO: 29.6 PG (ref 26–35)
MCHC RBC AUTO-ENTMCNC: 33.8 G/DL (ref 32–34.5)
MCV RBC AUTO: 87.4 FL (ref 80–99.9)
MONOCYTES NFR BLD: 0.65 K/UL (ref 0.1–0.95)
MONOCYTES NFR BLD: 8 % (ref 2–12)
NEUTROPHILS NFR BLD: 55 % (ref 43–80)
NEUTS SEG NFR BLD: 4.55 K/UL (ref 1.8–7.3)
PLATELET # BLD AUTO: 214 K/UL (ref 130–450)
PMV BLD AUTO: 12 FL (ref 7–12)
POTASSIUM SERPL-SCNC: 4 MMOL/L (ref 3.5–5)
PROT SERPL-MCNC: 8.2 G/DL (ref 6.4–8.3)
PROTHROMBIN TIME: 21.2 SEC (ref 9.3–12.4)
RBC # BLD AUTO: 4.46 M/UL (ref 3.8–5.8)
SODIUM SERPL-SCNC: 140 MMOL/L (ref 132–146)
TME LAST DOSE: ABNORMAL H
TROPONIN I SERPL HS-MCNC: 7 NG/L (ref 0–11)
TROPONIN I SERPL HS-MCNC: 9 NG/L (ref 0–11)
VALPROATE SERPL-MCNC: 32 UG/ML (ref 50–100)
VANCOMYCIN DOSE: ABNORMAL MG
WBC OTHER # BLD: 8.2 K/UL (ref 4.5–11.5)

## 2023-09-10 PROCEDURE — 99285 EMERGENCY DEPT VISIT HI MDM: CPT

## 2023-09-10 PROCEDURE — 6370000000 HC RX 637 (ALT 250 FOR IP): Performed by: EMERGENCY MEDICINE

## 2023-09-10 PROCEDURE — 80053 COMPREHEN METABOLIC PANEL: CPT

## 2023-09-10 PROCEDURE — 96360 HYDRATION IV INFUSION INIT: CPT

## 2023-09-10 PROCEDURE — 84484 ASSAY OF TROPONIN QUANT: CPT

## 2023-09-10 PROCEDURE — 70450 CT HEAD/BRAIN W/O DYE: CPT

## 2023-09-10 PROCEDURE — 72125 CT NECK SPINE W/O DYE: CPT

## 2023-09-10 PROCEDURE — 85610 PROTHROMBIN TIME: CPT

## 2023-09-10 PROCEDURE — 36415 COLL VENOUS BLD VENIPUNCTURE: CPT

## 2023-09-10 PROCEDURE — 96361 HYDRATE IV INFUSION ADD-ON: CPT

## 2023-09-10 PROCEDURE — 71250 CT THORAX DX C-: CPT

## 2023-09-10 PROCEDURE — 2580000003 HC RX 258: Performed by: STUDENT IN AN ORGANIZED HEALTH CARE EDUCATION/TRAINING PROGRAM

## 2023-09-10 PROCEDURE — 80164 ASSAY DIPROPYLACETIC ACD TOT: CPT

## 2023-09-10 PROCEDURE — 85025 COMPLETE CBC W/AUTO DIFF WBC: CPT

## 2023-09-10 PROCEDURE — 93005 ELECTROCARDIOGRAM TRACING: CPT | Performed by: STUDENT IN AN ORGANIZED HEALTH CARE EDUCATION/TRAINING PROGRAM

## 2023-09-10 RX ORDER — OXYCODONE HYDROCHLORIDE AND ACETAMINOPHEN 5; 325 MG/1; MG/1
2 TABLET ORAL ONCE
Status: COMPLETED | OUTPATIENT
Start: 2023-09-10 | End: 2023-09-10

## 2023-09-10 RX ORDER — 0.9 % SODIUM CHLORIDE 0.9 %
1000 INTRAVENOUS SOLUTION INTRAVENOUS ONCE
Status: COMPLETED | OUTPATIENT
Start: 2023-09-10 | End: 2023-09-11

## 2023-09-10 RX ADMIN — SODIUM CHLORIDE 1000 ML: 9 INJECTION, SOLUTION INTRAVENOUS at 21:16

## 2023-09-10 RX ADMIN — OXYCODONE AND ACETAMINOPHEN 2 TABLET: 325; 5 TABLET ORAL at 23:20

## 2023-09-11 LAB
AMPHET UR QL SCN: NEGATIVE
APAP SERPL-MCNC: <5 UG/ML (ref 10–30)
BARBITURATES UR QL SCN: NEGATIVE
BENZODIAZ UR QL: POSITIVE
BUPRENORPHINE UR QL: NEGATIVE
CANNABINOIDS UR QL SCN: POSITIVE
COCAINE UR QL SCN: POSITIVE
ETHANOLAMINE SERPL-MCNC: <10 MG/DL
FENTANYL UR QL: NEGATIVE
INR PPP: 1.6
METHADONE UR QL: NEGATIVE
OPIATES UR QL SCN: NEGATIVE
OXYCODONE UR QL SCN: POSITIVE
PCP UR QL SCN: NEGATIVE
PROTHROMBIN TIME: 17.2 SEC (ref 9.3–12.4)
SALICYLATES SERPL-MCNC: <0.3 MG/DL (ref 0–30)
TEST INFORMATION: ABNORMAL
TOXIC TRICYCLIC SC,BLOOD: NEGATIVE

## 2023-09-11 PROCEDURE — 2580000003 HC RX 258

## 2023-09-11 PROCEDURE — 80143 DRUG ASSAY ACETAMINOPHEN: CPT

## 2023-09-11 PROCEDURE — G0480 DRUG TEST DEF 1-7 CLASSES: HCPCS

## 2023-09-11 PROCEDURE — 80307 DRUG TEST PRSMV CHEM ANLYZR: CPT

## 2023-09-11 PROCEDURE — 99222 1ST HOSP IP/OBS MODERATE 55: CPT | Performed by: INTERNAL MEDICINE

## 2023-09-11 PROCEDURE — G0378 HOSPITAL OBSERVATION PER HR: HCPCS

## 2023-09-11 PROCEDURE — 96372 THER/PROPH/DIAG INJ SC/IM: CPT

## 2023-09-11 PROCEDURE — 96361 HYDRATE IV INFUSION ADD-ON: CPT

## 2023-09-11 PROCEDURE — 80179 DRUG ASSAY SALICYLATE: CPT

## 2023-09-11 PROCEDURE — 6360000002 HC RX W HCPCS

## 2023-09-11 PROCEDURE — 6370000000 HC RX 637 (ALT 250 FOR IP)

## 2023-09-11 PROCEDURE — 85610 PROTHROMBIN TIME: CPT

## 2023-09-11 RX ORDER — CEPHALEXIN 500 MG/1
500 CAPSULE ORAL
Status: ON HOLD | COMMUNITY
Start: 2023-09-06 | End: 2023-09-12 | Stop reason: HOSPADM

## 2023-09-11 RX ORDER — POLYETHYLENE GLYCOL 3350 17 G/17G
17 POWDER, FOR SOLUTION ORAL DAILY PRN
Status: DISCONTINUED | OUTPATIENT
Start: 2023-09-11 | End: 2023-09-12 | Stop reason: HOSPADM

## 2023-09-11 RX ORDER — ALBUTEROL SULFATE 2.5 MG/3ML
2.5 SOLUTION RESPIRATORY (INHALATION) EVERY 6 HOURS PRN
Status: DISCONTINUED | OUTPATIENT
Start: 2023-09-11 | End: 2023-09-12 | Stop reason: HOSPADM

## 2023-09-11 RX ORDER — ENOXAPARIN SODIUM 100 MG/ML
40 INJECTION SUBCUTANEOUS DAILY
Status: DISCONTINUED | OUTPATIENT
Start: 2023-09-11 | End: 2023-09-12 | Stop reason: HOSPADM

## 2023-09-11 RX ORDER — SODIUM CHLORIDE 0.9 % (FLUSH) 0.9 %
5-40 SYRINGE (ML) INJECTION EVERY 12 HOURS SCHEDULED
Status: DISCONTINUED | OUTPATIENT
Start: 2023-09-11 | End: 2023-09-12 | Stop reason: HOSPADM

## 2023-09-11 RX ORDER — ALBUTEROL SULFATE 90 UG/1
2 AEROSOL, METERED RESPIRATORY (INHALATION) EVERY 4 HOURS PRN
COMMUNITY

## 2023-09-11 RX ORDER — FINASTERIDE 5 MG/1
5 TABLET, FILM COATED ORAL DAILY
COMMUNITY

## 2023-09-11 RX ORDER — SODIUM CHLORIDE 0.9 % (FLUSH) 0.9 %
5-40 SYRINGE (ML) INJECTION PRN
Status: DISCONTINUED | OUTPATIENT
Start: 2023-09-11 | End: 2023-09-12 | Stop reason: HOSPADM

## 2023-09-11 RX ORDER — DIVALPROEX SODIUM 500 MG/1
500 TABLET, DELAYED RELEASE ORAL NIGHTLY
Status: DISCONTINUED | OUTPATIENT
Start: 2023-09-11 | End: 2023-09-11

## 2023-09-11 RX ORDER — CHLORHEXIDINE GLUCONATE 0.12 MG/ML
15 RINSE ORAL 2 TIMES DAILY
COMMUNITY
Start: 2023-09-06 | End: 2023-09-19

## 2023-09-11 RX ORDER — ONDANSETRON 2 MG/ML
4 INJECTION INTRAMUSCULAR; INTRAVENOUS EVERY 6 HOURS PRN
Status: DISCONTINUED | OUTPATIENT
Start: 2023-09-11 | End: 2023-09-12 | Stop reason: HOSPADM

## 2023-09-11 RX ORDER — LEVETIRACETAM 500 MG/1
1000 TABLET ORAL 2 TIMES DAILY
Status: DISCONTINUED | OUTPATIENT
Start: 2023-09-11 | End: 2023-09-11

## 2023-09-11 RX ORDER — 0.9 % SODIUM CHLORIDE 0.9 %
500 INTRAVENOUS SOLUTION INTRAVENOUS ONCE
Status: COMPLETED | OUTPATIENT
Start: 2023-09-11 | End: 2023-09-11

## 2023-09-11 RX ORDER — HYDROXYZINE PAMOATE 25 MG/1
25 CAPSULE ORAL 2 TIMES DAILY
COMMUNITY

## 2023-09-11 RX ORDER — ONDANSETRON 4 MG/1
4 TABLET, ORALLY DISINTEGRATING ORAL EVERY 8 HOURS PRN
Status: DISCONTINUED | OUTPATIENT
Start: 2023-09-11 | End: 2023-09-12 | Stop reason: HOSPADM

## 2023-09-11 RX ORDER — LEVETIRACETAM 500 MG/1
1000 TABLET ORAL 2 TIMES DAILY
Status: DISCONTINUED | OUTPATIENT
Start: 2023-09-11 | End: 2023-09-12 | Stop reason: HOSPADM

## 2023-09-11 RX ORDER — SODIUM CHLORIDE 9 MG/ML
INJECTION, SOLUTION INTRAVENOUS PRN
Status: DISCONTINUED | OUTPATIENT
Start: 2023-09-11 | End: 2023-09-12 | Stop reason: HOSPADM

## 2023-09-11 RX ORDER — LEVOTHYROXINE SODIUM 0.05 MG/1
25 TABLET ORAL DAILY
Status: DISCONTINUED | OUTPATIENT
Start: 2023-09-11 | End: 2023-09-12 | Stop reason: HOSPADM

## 2023-09-11 RX ORDER — ACETAMINOPHEN 325 MG/1
650 TABLET ORAL EVERY 6 HOURS PRN
Status: DISCONTINUED | OUTPATIENT
Start: 2023-09-11 | End: 2023-09-12 | Stop reason: HOSPADM

## 2023-09-11 RX ORDER — VITAMIN B COMPLEX
1000 TABLET ORAL DAILY
Status: DISCONTINUED | OUTPATIENT
Start: 2023-09-11 | End: 2023-09-12 | Stop reason: HOSPADM

## 2023-09-11 RX ORDER — ACETAMINOPHEN 650 MG/1
650 SUPPOSITORY RECTAL EVERY 6 HOURS PRN
Status: DISCONTINUED | OUTPATIENT
Start: 2023-09-11 | End: 2023-09-12 | Stop reason: HOSPADM

## 2023-09-11 RX ORDER — DIVALPROEX SODIUM 250 MG/1
500 TABLET, DELAYED RELEASE ORAL NIGHTLY
Status: DISCONTINUED | OUTPATIENT
Start: 2023-09-11 | End: 2023-09-12 | Stop reason: HOSPADM

## 2023-09-11 RX ADMIN — LEVETIRACETAM 1000 MG: 500 TABLET, FILM COATED ORAL at 23:08

## 2023-09-11 RX ADMIN — LURASIDONE HYDROCHLORIDE 120 MG: 80 TABLET, FILM COATED ORAL at 11:35

## 2023-09-11 RX ADMIN — DIVALPROEX SODIUM 500 MG: 250 TABLET, DELAYED RELEASE ORAL at 23:08

## 2023-09-11 RX ADMIN — Medication 1000 UNITS: at 11:36

## 2023-09-11 RX ADMIN — LEVOTHYROXINE SODIUM 25 MCG: 25 TABLET ORAL at 06:50

## 2023-09-11 RX ADMIN — LEVETIRACETAM 1000 MG: 500 TABLET, FILM COATED ORAL at 06:50

## 2023-09-11 RX ADMIN — ENOXAPARIN SODIUM 40 MG: 100 INJECTION SUBCUTANEOUS at 11:36

## 2023-09-11 RX ADMIN — SODIUM CHLORIDE, PRESERVATIVE FREE 10 ML: 5 INJECTION INTRAVENOUS at 23:09

## 2023-09-11 RX ADMIN — SODIUM CHLORIDE 500 ML: 9 INJECTION, SOLUTION INTRAVENOUS at 07:57

## 2023-09-11 ASSESSMENT — ENCOUNTER SYMPTOMS
EYES NEGATIVE: 1
GASTROINTESTINAL NEGATIVE: 1
RESPIRATORY NEGATIVE: 1

## 2023-09-11 ASSESSMENT — PAIN SCALES - GENERAL: PAINLEVEL_OUTOF10: 0

## 2023-09-11 NOTE — ED NOTES
Pt was observed sitting in lobby. Dr. Ranjit Taylor saw pt and pt reported he wants to stay now. Pt was moved back to the board.      Abhi Holliday RN  09/11/23 7480

## 2023-09-11 NOTE — CARE COORDINATION
Social Work/ Transition of Care:    Pt presents to the ED secondary to multiple falls and syncopal episodes at [address] [place] [address]. Pt has a previous fractured mandible from a fall. Pt is admitted observation with syncope and collapse. KADEN spoke to Hillary ([phone]), group home owner, who reports pt gets high and loses his balance and that is why he has been falling. Hillary reports pt will use whatever pill or drug he has access to.  Pt's drug screen is positive for benzos, cocaine, THC, and oxycodone. SW met with pt who was lying in bed, alert and oriented x3. Pt stated he keeps falling but does not know why. Pt reports smoking crack cocaine. Pt aware that using drugs could effect his balance therefore cause him to continue to fall. Pt reports he has been at [facility] for the past year and is assisted with transportation to medical appointments. Pt reports his PCP is at [facility] clinic. Pt plans to return to [facility] upon discharge. Pt has orders for PT/OT nena ALFONSO/LISA to follow for any discharge needs.

## 2023-09-11 NOTE — PROGRESS NOTES
[address]  Internal Medicine Residency / House Medicine Service      Initial H and P    Attending Physician Statement  I have discussed the case, including pertinent history and exam findings with the resident and the team. I have reviewed all significant past medical history, surgical history, social history, family history, allergies, and home medications independently. I have seen and examined the patient and the key elements of the encounter have been performed by me. I agree with the assessment, plan and orders as documented by the resident. Case Discussed During AM Rounds    Admitted overnight by resident/preceptor on call with chief complaint of syncopal episode    Initial concern noted for possible breakthrough seizure vs. Orthostatic hypotension in the setting of dehydration    Patient seen bedside with residents in the ED during AM Rounds   Patient was attempting to ambulate to bathroom and redirected for need for assistance   No acute focal complaints and states hungry    Resuming seizure medications at this time and following for compliance and additional Neuro recs as Valproic acid level was low on presentation    Polysubstance abuse concerns noted and given reported group home setting need clarification and coordination with social work during inpatient assessment     Syncope   Recurrent in nature   Admitted in August with mechanical fall and concern for syncope episode with work-up noted. Hx not consistent with breakthrough seizure but additional diagnosis monitoring ongoing    Seizure precautions   Resume home seizure medications   Tele monitoring    Neuro assessment   Consider repeat EEG       Seizure Disorder    As above     Polysubstance abuse    Coordination inpatient needed     Mood Disorder    Resume home meds and monitor     Paroxysmal AFIB    Reported to be off anticoagulation due to fall risks at last appt   ?  Still taking    Pharmacy team during rounding to

## 2023-09-11 NOTE — ED NOTES
Pt asked ED staff to remove his IV. Pt eloped from department. Dr. Key Matamoros was notified.      Julius Jackson RN  09/11/23 4457

## 2023-09-11 NOTE — ED NOTES
Nurse to nurse called to [facility]. Accepting RN updated on pt plan and condition. Pt to be transported to accepting unit. Will continue to closely monitor pt.        Krissy Story RN  09/11/23 9554

## 2023-09-11 NOTE — ED PROVIDER NOTES
Jason        Pt Name: Logan Perez  MRN: [id number]  [address] [facility] 1955  Date of evaluation: 9/10/2023  Provider: Florecita Cervantes DO  PCP: Geni Grant MD  Note Started: 8:38 PM EDT 9/10/23    CHIEF COMPLAINT       Chief Complaint   Patient presents with    Chest Pain     Pain w/ cough, syncopal episodes for the past three days     Loss of Consciousness       HISTORY OF PRESENT ILLNESS: 1 or more Elements   History From: Patient    Limitations to history : None    Logan Perez is a 80 y.o. male who presents to the emergency department for complaint of multiple episodes of syncope with collapse as well as chest pain. He states this has been ongoing for the past 3 to 4 days. He has been having multiple falls every day. He states it is typically associated with standing up followed by feeling lightheaded and then falling to the ground. Patient adamantly denies any drug or alcohol use. He does report previous similar episodes. Denies any vision changes, numbness, tingling. Nursing Notes were all reviewed and agreed with or any disagreements were addressed in the HPI. REVIEW OF EXTERNAL NOTE :       Previous hospital admission 8/2/2023 reviewed. Patient admitted for mechanical fall secondary to syncopal episode. REVIEW OF SYSTEMS :           Positives and Pertinent negatives as per HPI.      SURGICAL HISTORY     Past Surgical History:   Procedure Laterality Date    ABDOMEN SURGERY      APPENDECTOMY      BLADDER SURGERY      CARDIAC CATHETERIZATION  05/21/2018    Dr. Graeme Child      ENDOSCOPY, COLON, DIAGNOSTIC  11/13/2015    HEMORRHOID SURGERY      LITHOTRIPSY      MANDIBLE SURGERY N/A 3/3/2023    MANDIBLE OPEN REDUCTION INTERNAL FIXATION performed by Ana María Duenas DO at [address] [place] [address] N/A 9/6/2023    MANDIBLE OPEN PLATE REMOVAL performed

## 2023-09-12 ENCOUNTER — APPOINTMENT (OUTPATIENT)
Dept: GENERAL RADIOLOGY | Age: 68
End: 2023-09-12
Payer: COMMERCIAL

## 2023-09-12 VITALS
TEMPERATURE: 97.9 F | WEIGHT: 136.19 LBS | HEIGHT: 64 IN | HEART RATE: 64 BPM | SYSTOLIC BLOOD PRESSURE: 143 MMHG | BODY MASS INDEX: 23.25 KG/M2 | OXYGEN SATURATION: 98 % | RESPIRATION RATE: 18 BRPM | DIASTOLIC BLOOD PRESSURE: 61 MMHG

## 2023-09-12 LAB
ANION GAP SERPL CALCULATED.3IONS-SCNC: 12 MMOL/L (ref 7–16)
BASOPHILS # BLD: 0.04 K/UL (ref 0–0.2)
BASOPHILS NFR BLD: 1 % (ref 0–2)
BUN SERPL-MCNC: 11 MG/DL (ref 6–23)
CALCIUM SERPL-MCNC: 8.3 MG/DL (ref 8.6–10.2)
CHLORIDE SERPL-SCNC: 105 MMOL/L (ref 98–107)
CO2 SERPL-SCNC: 24 MMOL/L (ref 22–29)
CREAT SERPL-MCNC: 0.9 MG/DL (ref 0.7–1.2)
EKG ATRIAL RATE: 59 BPM
EKG P AXIS: -33 DEGREES
EKG P-R INTERVAL: 152 MS
EKG Q-T INTERVAL: 410 MS
EKG QRS DURATION: 78 MS
EKG QTC CALCULATION (BAZETT): 405 MS
EKG R AXIS: 56 DEGREES
EKG T AXIS: 73 DEGREES
EKG VENTRICULAR RATE: 59 BPM
EOSINOPHIL # BLD: 0.67 K/UL (ref 0.05–0.5)
EOSINOPHILS RELATIVE PERCENT: 12 % (ref 0–6)
ERYTHROCYTE [DISTWIDTH] IN BLOOD BY AUTOMATED COUNT: 13.8 % (ref 11.5–15)
GFR SERPL CREATININE-BSD FRML MDRD: >60 ML/MIN/1.73M2
GLUCOSE SERPL-MCNC: 101 MG/DL (ref 74–99)
HCT VFR BLD AUTO: 33.1 % (ref 37–54)
HGB BLD-MCNC: 11.1 G/DL (ref 12.5–16.5)
IMM GRANULOCYTES # BLD AUTO: <0.03 K/UL (ref 0–0.58)
IMM GRANULOCYTES NFR BLD: 0 % (ref 0–5)
LYMPHOCYTES NFR BLD: 1.44 K/UL (ref 1.5–4)
LYMPHOCYTES RELATIVE PERCENT: 26 % (ref 20–42)
MCH RBC QN AUTO: 29 PG (ref 26–35)
MCHC RBC AUTO-ENTMCNC: 33.5 G/DL (ref 32–34.5)
MCV RBC AUTO: 86.4 FL (ref 80–99.9)
MONOCYTES NFR BLD: 0.5 K/UL (ref 0.1–0.95)
MONOCYTES NFR BLD: 9 % (ref 2–12)
NEUTROPHILS NFR BLD: 52 % (ref 43–80)
NEUTS SEG NFR BLD: 2.9 K/UL (ref 1.8–7.3)
PLATELET # BLD AUTO: 167 K/UL (ref 130–450)
PMV BLD AUTO: 12.5 FL (ref 7–12)
POTASSIUM SERPL-SCNC: 3.9 MMOL/L (ref 3.5–5)
RBC # BLD AUTO: 3.83 M/UL (ref 3.8–5.8)
SODIUM SERPL-SCNC: 141 MMOL/L (ref 132–146)
WBC OTHER # BLD: 5.6 K/UL (ref 4.5–11.5)

## 2023-09-12 PROCEDURE — 93010 ELECTROCARDIOGRAM REPORT: CPT | Performed by: INTERNAL MEDICINE

## 2023-09-12 PROCEDURE — 94640 AIRWAY INHALATION TREATMENT: CPT

## 2023-09-12 PROCEDURE — 99231 SBSQ HOSP IP/OBS SF/LOW 25: CPT | Performed by: INTERNAL MEDICINE

## 2023-09-12 PROCEDURE — 97535 SELF CARE MNGMENT TRAINING: CPT

## 2023-09-12 PROCEDURE — 2580000003 HC RX 258

## 2023-09-12 PROCEDURE — 85025 COMPLETE CBC W/AUTO DIFF WBC: CPT

## 2023-09-12 PROCEDURE — G0378 HOSPITAL OBSERVATION PER HR: HCPCS

## 2023-09-12 PROCEDURE — 80048 BASIC METABOLIC PNL TOTAL CA: CPT

## 2023-09-12 PROCEDURE — 6360000002 HC RX W HCPCS

## 2023-09-12 PROCEDURE — 97165 OT EVAL LOW COMPLEX 30 MIN: CPT

## 2023-09-12 PROCEDURE — 6370000000 HC RX 637 (ALT 250 FOR IP)

## 2023-09-12 PROCEDURE — 36415 COLL VENOUS BLD VENIPUNCTURE: CPT

## 2023-09-12 PROCEDURE — 71045 X-RAY EXAM CHEST 1 VIEW: CPT

## 2023-09-12 PROCEDURE — 97161 PT EVAL LOW COMPLEX 20 MIN: CPT

## 2023-09-12 RX ADMIN — LEVETIRACETAM 1000 MG: 500 TABLET, FILM COATED ORAL at 09:22

## 2023-09-12 RX ADMIN — LURASIDONE HYDROCHLORIDE 120 MG: 80 TABLET, FILM COATED ORAL at 09:23

## 2023-09-12 RX ADMIN — SODIUM CHLORIDE, PRESERVATIVE FREE 10 ML: 5 INJECTION INTRAVENOUS at 09:23

## 2023-09-12 RX ADMIN — ALBUTEROL SULFATE 2.5 MG: 2.5 SOLUTION RESPIRATORY (INHALATION) at 00:11

## 2023-09-12 RX ADMIN — Medication 1000 UNITS: at 09:22

## 2023-09-12 RX ADMIN — ACETAMINOPHEN 650 MG: 325 TABLET ORAL at 09:24

## 2023-09-12 RX ADMIN — ALBUTEROL SULFATE 2.5 MG: 2.5 SOLUTION RESPIRATORY (INHALATION) at 10:09

## 2023-09-12 ASSESSMENT — PAIN SCALES - GENERAL
PAINLEVEL_OUTOF10: 8
PAINLEVEL_OUTOF10: 10

## 2023-09-12 ASSESSMENT — PAIN DESCRIPTION - DESCRIPTORS: DESCRIPTORS: CRUSHING

## 2023-09-12 ASSESSMENT — PAIN DESCRIPTION - ORIENTATION: ORIENTATION: RIGHT

## 2023-09-12 ASSESSMENT — PAIN DESCRIPTION - LOCATION: LOCATION: CHEST

## 2023-09-12 NOTE — PROGRESS NOTES
Secure message to GLADYS LUIS. Pt is asking if he could get something stronger for pain. No new orders at this time.

## 2023-09-12 NOTE — PROGRESS NOTES
[facility]  Internal Medicine Residency / [address]         Attending Physician Statement  I have discussed the case, including pertinent history and exam findings with the resident and the team.   I have seen and examined the patient and the key elements of the encounter have been performed by me. I agree with the assessment, plan and orders as documented by the resident. Case Discussed During AM Rounds    No overnight events  Vitals remain stable  No recurrence of events  Awaiting Neurology assessment at this time    Patient at bedside complaining of chest pain- pain to palpation- but with deep palpation with stethoscope bell- no significant grimacing- requesting narcotics- discussed alternative pain control will be considered and addressed UDS with patient. Syncope- currently stable inpatient    Recurrent in nature- admitted in August with mechanical fall and concern for syncope episode with work-up noted. Hx not consistent with breakthrough seizure but additional diagnosis monitoring ongoing    Polysubstance abuse on presentation as contributing likely    Seizure precautions continued    Continue seizure meds    Neuro assessment pending   Consider EEG- although currently remains stable without recurrence of events      Seizure Disorder    As above     Polysubstance abuse    Coordination inpatient needed with social work team as patient is reported to be in group home setting     Mood Disorder    Resume home meds and monitor     Paroxysmal AFIB    Patient was previously taken off anticoagulation due to risk of use and recurrent fall in context of higher risk bleeding (previous admission)     Elevated INR   INR improved to 1.6    Coordination with Pharmacy team    ? Vit K deficiency- replace       Remainder of medical problems as per resident note. Attending note is in collaboration with resident-physician Dr. Yaakov Marquis note on 9/12/2023.      Min Pill

## 2023-09-12 NOTE — PROGRESS NOTES
Occupational Therapy  OCCUPATIONAL THERAPY INITIAL EVALUATION    [facility] [address] [facility]   [address] [place], [place]      NSBN:                                                Patient Name: Jose Manuel Dee  MRN: [id number]  : 1955  Room: [address] [place] [address]    Evaluating OT: Marilee Rashid OTR/L #[id number]     Referring Provider: Raul Gonzalez MD  Specific Provider Orders/Date: OT eval and treat 23    Diagnosis: Syncope and collapse [R55]  Chest wall pain [R07.89]  Polysubstance abuse ([address]) [F19.10]  Recurrent falls [R29.6]   Pt admitted to hospital following syncopal episodes; seizure vs orthostatic hypotension; polysubstance abuse     Precautions:  Fall Risk, monitor Vitals, bed alarm, TSM      Assessment of current deficits    [x] Functional mobility          [x]ADLs           [x] Strength                  []Cognition    [x] Functional transfers        [x] IADLs         [x] Safety Awareness   [x]Endurance    [] Fine Coordination                        [x] Balance      [] Vision/perception   []Sensation      []Gross Motor Coordination            [] ROM           [] Delirium                   [] Motor Control      OT PLAN OF CARE   OT POC based on physician orders, patient diagnosis and results of clinical assessment     Frequency/Duration 1-3 days/wk for 2 weeks PRN   Specific OT Treatment Interventions to include:   * Instruction/training on adapted ADL techniques and AE recommendations to increase functional independence within precautions       * Training on energy conservation strategies, correct breathing pattern and techniques to improve independence/tolerance for self-care routine  * Functional transfer/mobility training/DME recommendations for increased independence, safety, and fall prevention  * Patient/Family education to increase follow through with safety techniques and functional independence  * Recommendation of environmental modifications

## 2023-09-12 NOTE — PATIENT CARE CONFERENCE
P Quality Flow/Interdisciplinary Rounds Progress Note        Quality Flow Rounds held on September 12, 2023    Disciplines Attending:  Bedside Nurse, , , and Nursing Unit Leadership    Yajaira Watson was admitted on 9/10/2023  8:07 PM    Anticipated Discharge Date:       Disposition:    Baltazar Score:  Baltazar Scale Score: 21    Readmission Risk              Risk of Unplanned Readmission:  0           Discussed patient goal for the day, patient clinical progression, and barriers to discharge.   The following Goal(s) of the Day/Commitment(s) have been identified:  Labs - Report Results and discharge planning       Leopoldo العراقي RN  September 12, 2023

## 2023-09-12 NOTE — PROGRESS NOTES
4 Eyes Skin Assessment     NAME:  Kurtis Phillip  YOB: 1955  MEDICAL RECORD NUMBER:  [id number]    The patient is being assessed for  Admission    I agree that at least one RN has performed a thorough Head to Toe Skin Assessment on the patient. ALL assessment sites listed below have been assessed. Areas assessed by both nurses:    Head, Face, Ears, Shoulders, Back, Chest, Arms, Elbows, Hands, Sacrum. Buttock, Coccyx, Ischium, and Legs. Feet and Heels        Does the Patient have a Wound?  No noted wound(s)       Baltazar Prevention initiated by RN: No  Wound Care Orders initiated by RN: No    Pressure Injury (Stage 3,4, Unstageable, DTI, NWPT, and Complex wounds) if present, place Wound referral order by RN under : No    New Ostomies, if present place, Ostomy referral order under : No     Nurse 1 eSignature: Electronically signed by Jeramie Pa RN on 9/11/23 at 10:57 PM EDT    **SHARE this note so that the co-signing nurse can place an eSignature**    Nurse 2 eSignature: Electronically signed by Atilano Curling, RN on 9/11/23 at 10:59 PM EDT No

## 2023-09-12 NOTE — CARE COORDINATION
9/12:  Update CM Note:  Pt set up for transportation with [facility] reference #[id number] from now til 545pm.  They will call the desk 15 mins prior.   Electronically signed by Mike Flanagan RN on 9/12/2023 at 3:48 PM

## 2023-09-12 NOTE — DISCHARGE INSTRUCTIONS
Internal medicine    Follow ups  Please follow up with the internal medicine clinic at [facility] appointment has been scheduled for 9/18/2023 3 PM      Changes in healthcare   Please take all medications as indicated  Diet: regular diet   Activity: activity as tolerated  New Medications started during this hospital stay  none  Changes to your medications  none  Medications you should stop taking   Clonazepam  rivaroxaban  Additional labs, testing or imaging needed after discharge   none  Please contact us if you have any concerns, wish to change or make an appointment:  Internal medicine clinic   Phone: [phone]  Fax: [phone]  [address] [place] [address]  Or please call the nurse line at [phone]. Should you have further questions in regards to this visit, you can review your clinical note and after visit summary document on your [email or web address] account. Other questions can be directed to our nurse line at [phone]. Other than any new prescriptions given to you today, the list of home medications on this After Visit Summary are based on information provided to us from you and your healthcare providers. This information, including the list, dose, and frequency of medications is only as accurate as the information you provided. If you have any questions or concerns about your home medications, please contact your Primary Care Physician for further clarification.

## 2023-09-12 NOTE — DISCHARGE SUMMARY
document on your mLED account. Other questions can be directed to our nurse line at [phone]. Other than any new prescriptions given to you today, the list of home medications on this After Visit Summary are based on information provided to us from you and your healthcare providers. This information, including the list, dose, and frequency of medications is only as accurate as the information you provided. If you have any questions or concerns about your home medications, please contact your Primary Care Physician for further clarification.        Dariana Gunter MD  PGY 1   3:31 PM 9/12/2023

## 2023-09-12 NOTE — CARE COORDINATION
9/12:  Update CM Note:  Pt presented to the Er for multiple falls & syncopal episodes from [facility]. Pt is on room air at 98% & pending Neurology consult. Pt has a AGUSTIN. PT 18/24 OT 20/24. Pt's dc plan is to return to [facility] when medically cleared. PT/OT recommend FWW - per Catrina at [phone] pt doesn't have FWW. Cm spoke with pt & he has no DME preferences & declined a list.  CM sent referral to [facility] & order placed. Per Metta Locket depending on the time of day she maybe able to pick him up if not [facility] offers transportation at [phone]. Any new medications must be sent to [facility] on [address]. Pt declined PRS. Sw/CM will continue to follow. Electronically signed by Shahana Medrano RN on 9/12/2023 at 2:23 PM    The Plan for Transition of Care is related to the following treatment goals: DME    The Patient and/or patient representative  was provided with a choice of provider and agrees   with the discharge plan. [x] Yes [] No    Freedom of choice list was provided with basic dialogue that supports the patient's individualized plan of care/goals, treatment preferences and shares the quality data associated with the providers.  [x] Yes [] No

## 2023-09-12 NOTE — PROGRESS NOTES
Physical Therapy  Physical Therapy Initial Assessment     Name: Allyn Montenegro  : 1955  MRN: [id number]      Date of Service: 2023    Evaluating PT:  Tracey Dill, PT, DPT  [id number]     Room #:  2287/9420-X  Diagnosis:  Syncope and collapse [R55]  Chest wall pain [R07.89]  Polysubstance abuse ([address]) [F19.10]  Recurrent falls [R29.6]  PMHx/PSHx:   has a past medical history of Anxiety, Arthritis, Asthma, Bipolar 1 disorder ([address]), Cardiomegaly, COPD (chronic obstructive pulmonary disease) ([address]), Depression, Diabetes mellitus ([address]), Drug-seeking behavior, GERD (gastroesophageal reflux disease), Hepatitis C, Hypertension, Hyperthyroidism, Kidney stones, Mass of testicle, Mesothelioma ([address]), Paroxysmal A-fib ([address]), Peptic ulcer, Prostate enlargement, Pulmonary embolism ([address]), and Seizures ([address]). Procedure/Surgery:  None  Precautions:  Falls, Seizures, TSM  Equipment Needs:  FWW    SUBJECTIVE:    Pt lives with roommates in on the 2nd floor of the [facility]. Bedroom and bathroom are on the 2nd level with 13 steps & 1 rail. Pt ambulated with no AD PTA. OBJECTIVE:   Initial Evaluation  Date: 23 Treatment Short Term/ Long Term   Goals   AM-PAC 6 Clicks      Was pt agreeable to Eval/treatment? Yes     Does pt have pain? No     Bed Mobility  Rolling: SBA  Supine to sit: SBA  Sit to supine: SBA  Scooting: SBA  Rolling: Independent  Supine to sit: Independent  Sit to supine: Independent  Scooting: Independent   Transfers Sit to stand: SBA  Stand to sit: SBA  Stand pivot: SBA  Sit to stand: Independent  Stand to sit:  Independent  Stand pivot: Independent   Ambulation    125 feet with FWW CGA  >500 feet with AAD Mod I   Stair negotiation: ascended and descended  NT  15 steps with 1 rail Mod I   ROM BUE:  Per OT eval  BLE:  WFLs     Strength BUE:  Per OT eval   BLE:  >3+/5 observed through fct  Grossly 4+/5   Balance Sitting EOB:  SBA  Dynamic Standing:  SBA  Sitting EOB:

## 2023-09-18 ENCOUNTER — OFFICE VISIT (OUTPATIENT)
Dept: INTERNAL MEDICINE | Age: 68
End: 2023-09-18
Payer: MEDICARE

## 2023-09-18 VITALS
HEART RATE: 75 BPM | BODY MASS INDEX: 24.09 KG/M2 | DIASTOLIC BLOOD PRESSURE: 67 MMHG | RESPIRATION RATE: 20 BRPM | TEMPERATURE: 98.1 F | SYSTOLIC BLOOD PRESSURE: 134 MMHG | HEIGHT: 64 IN | WEIGHT: 141.1 LBS | OXYGEN SATURATION: 96 %

## 2023-09-18 DIAGNOSIS — I10 ESSENTIAL (PRIMARY) HYPERTENSION: ICD-10-CM

## 2023-09-18 DIAGNOSIS — I51.9 LV DYSFUNCTION: ICD-10-CM

## 2023-09-18 DIAGNOSIS — E05.90 HYPERTHYROIDISM: Primary | ICD-10-CM

## 2023-09-18 DIAGNOSIS — Z09 HOSPITAL DISCHARGE FOLLOW-UP: ICD-10-CM

## 2023-09-18 PROCEDURE — 99212 OFFICE O/P EST SF 10 MIN: CPT

## 2023-09-18 RX ORDER — METOPROLOL SUCCINATE 50 MG/1
50 TABLET, EXTENDED RELEASE ORAL DAILY
Qty: 90 TABLET | Refills: 1 | Status: SHIPPED
Start: 2023-09-18 | End: 2023-09-22 | Stop reason: SDUPTHER

## 2023-09-18 RX ORDER — BENZTROPINE MESYLATE 1 MG/1
1 TABLET ORAL 2 TIMES DAILY
Qty: 28 TABLET | Refills: 0 | Status: SHIPPED
Start: 2023-09-18 | End: 2023-09-22 | Stop reason: SDUPTHER

## 2023-09-18 RX ORDER — LISINOPRIL 10 MG/1
10 TABLET ORAL DAILY
Qty: 90 TABLET | Refills: 1 | Status: SHIPPED
Start: 2023-09-18 | End: 2023-09-22 | Stop reason: SDUPTHER

## 2023-09-18 RX ORDER — GABAPENTIN 300 MG/1
300 CAPSULE ORAL 3 TIMES DAILY
Qty: 90 CAPSULE | Refills: 1 | Status: SHIPPED
Start: 2023-09-18 | End: 2023-09-22 | Stop reason: SDUPTHER

## 2023-09-18 SDOH — ECONOMIC STABILITY: HOUSING INSECURITY
IN THE LAST 12 MONTHS, WAS THERE A TIME WHEN YOU DID NOT HAVE A STEADY PLACE TO SLEEP OR SLEPT IN A SHELTER (INCLUDING NOW)?: YES

## 2023-09-18 SDOH — ECONOMIC STABILITY: FOOD INSECURITY: WITHIN THE PAST 12 MONTHS, THE FOOD YOU BOUGHT JUST DIDN'T LAST AND YOU DIDN'T HAVE MONEY TO GET MORE.: NEVER TRUE

## 2023-09-18 SDOH — ECONOMIC STABILITY: INCOME INSECURITY: HOW HARD IS IT FOR YOU TO PAY FOR THE VERY BASICS LIKE FOOD, HOUSING, MEDICAL CARE, AND HEATING?: NOT HARD AT ALL

## 2023-09-18 SDOH — ECONOMIC STABILITY: FOOD INSECURITY: WITHIN THE PAST 12 MONTHS, YOU WORRIED THAT YOUR FOOD WOULD RUN OUT BEFORE YOU GOT MONEY TO BUY MORE.: NEVER TRUE

## 2023-09-18 ASSESSMENT — PATIENT HEALTH QUESTIONNAIRE - PHQ9
4. FEELING TIRED OR HAVING LITTLE ENERGY: 3
10. IF YOU CHECKED OFF ANY PROBLEMS, HOW DIFFICULT HAVE THESE PROBLEMS MADE IT FOR YOU TO DO YOUR WORK, TAKE CARE OF THINGS AT HOME, OR GET ALONG WITH OTHER PEOPLE: 2
3. TROUBLE FALLING OR STAYING ASLEEP: 3
SUM OF ALL RESPONSES TO PHQ QUESTIONS 1-9: 15
6. FEELING BAD ABOUT YOURSELF - OR THAT YOU ARE A FAILURE OR HAVE LET YOURSELF OR YOUR FAMILY DOWN: 3
5. POOR APPETITE OR OVEREATING: 0
SUM OF ALL RESPONSES TO PHQ QUESTIONS 1-9: 15
7. TROUBLE CONCENTRATING ON THINGS, SUCH AS READING THE NEWSPAPER OR WATCHING TELEVISION: 0
SUM OF ALL RESPONSES TO PHQ QUESTIONS 1-9: 15
9. THOUGHTS THAT YOU WOULD BE BETTER OFF DEAD, OR OF HURTING YOURSELF: 0
SUM OF ALL RESPONSES TO PHQ9 QUESTIONS 1 & 2: 3
1. LITTLE INTEREST OR PLEASURE IN DOING THINGS: 0
2. FEELING DOWN, DEPRESSED OR HOPELESS: 3
8. MOVING OR SPEAKING SO SLOWLY THAT OTHER PEOPLE COULD HAVE NOTICED. OR THE OPPOSITE, BEING SO FIGETY OR RESTLESS THAT YOU HAVE BEEN MOVING AROUND A LOT MORE THAN USUAL: 3
SUM OF ALL RESPONSES TO PHQ QUESTIONS 1-9: 15

## 2023-09-18 NOTE — PATIENT INSTRUCTIONS
Dear Geena Rendon,        Thank you for coming to your appointment today. I hope we have addressed all of your needs. Please make sure to do the following:  - Continue your medications as listed. - Get labs drawn before our next follow up. We will call you with the results   - We will see each other again in 2 weeks. Call for a sooner appointment if you develop any issues    Have a great day!         Sincerely,  Dariana Gunter MD  9/18/2023  4:38 PM

## 2023-09-18 NOTE — PROGRESS NOTES
[facility]  Internal Medicine Residency Clinic    Attending Physician Statement  I have discussed the case, including pertinent history and exam findings with the resident physician. I have seen and examined the patient and the key elements of the encounter have been performed by me. I agree with the assessment, plan and orders as documented by the resident. I have reviewed the relevant PMHx, PSHx, FamHx, SocialHx, medications, and allergies and updated history as appropriate. Patient presents for hospital follow up appointment after admission for falls and tremor due to polysubstance abuse (+oxycodone, cocaine, benzodiazepines, oxycodone with hx seizures. He reports having falling 1x since discharge and lives a group home setting. Tremor -- suspect TD  Present at rest and with movement, tongue fasciculations noted  Trial cogentin and he has follow up with psychiatry to assess response. Hypothyroidism  Prior thyroid labs suggest that his synthroid dose might need to be reduced. Given active tremor, recommend stop levothyroxine and reassess TSH and free T4. Bipolar disorder  [facility] Latuda, trazodone, Effexor. Remainder of medical problems as per resident note.     Kirstie Ramirez DO  9/18/2023 3:59 PM
Chronic obstructive pulmonary disease (HCC)    Dyspnea and respiratory abnormalities    Respiratory failure with hypoxia (HCC)    Severe protein-calorie malnutrition (HCC)    Essential (primary) hypertension    H/O drug abuse ([address])    Gastroduodenal ulcer    Seasonal allergic rhinitis    Type 2 diabetes mellitus ([address])    Vitamin D deficiency    LV dysfunction    S/P MVR (mitral valve repair)    NSTEMI (non-ST elevated myocardial infarction) ([address])    Suicidal ideations    Benzodiazepine dependence ([address])    Bipolar disorder current episode depressed ([address])    Polysubstance abuse ([address])    Cluster B personality disorder ([address])    History of noncompliance with medical treatment, presenting hazards to health    Cocaine abuse ([address])    Cannabis abuse    Bipolar disorder ([address])    Bilateral fracture of mandible, open, initial encounter ([facility])    Mandible fracture ([address])    Acute chest pain    Syncope and collapse    Nonintractable generalized idiopathic epilepsy without status epilepticus ([address])    Essential tremor       Medications listed as ordered at the time of discharge from hospital     Medication List            Accurate as of September 18, 2023  4:29 PM. If you have any questions, ask your nurse or doctor. START taking these medications      benztropine 1 MG tablet  Commonly known as: COGENTIN  Take 1 tablet by mouth 2 times daily for 14 days  Started by: Geni Grant MD            CONTINUE taking these medications      chlorhexidine 0.12 % solution  Commonly known as: PERIDEX     divalproex 500 MG DR tablet  Commonly known as: DEPAKOTE     finasteride 5 MG tablet  Commonly known as: PROSCAR     gabapentin 300 MG capsule  Commonly known as: NEURONTIN  Take 1 capsule by mouth 3 times daily for 60 days.      hydrOXYzine pamoate 25 MG capsule  Commonly known as: VISTARIL     levETIRAcetam 1000 MG tablet  Commonly known as: KEPPRA     levothyroxine 25 MCG tablet  Commonly known as: SYNTHROID  Take 1 tablet by

## 2023-09-19 RX ORDER — ALBUTEROL SULFATE 90 UG/1
2 AEROSOL, METERED RESPIRATORY (INHALATION) EVERY 4 HOURS PRN
Qty: 18 G | Refills: 2 | Status: SHIPPED | OUTPATIENT
Start: 2023-09-19

## 2023-09-19 NOTE — TELEPHONE ENCOUNTER
Spoke with patient. Patient stated he wants all his medications to do to [facility]. Please send all medications to  this pharmacy. Patient is requesting a refill on his albuterol.

## 2023-09-19 NOTE — TELEPHONE ENCOUNTER
All patient's medications need sent to [facility] pharmacy. This need verified  All medications were sent to [facility].

## 2023-09-21 DIAGNOSIS — I10 ESSENTIAL (PRIMARY) HYPERTENSION: ICD-10-CM

## 2023-09-21 DIAGNOSIS — I51.9 LV DYSFUNCTION: ICD-10-CM

## 2023-09-21 NOTE — TELEPHONE ENCOUNTER
Medications needs filled and sent to [facility]

## 2023-09-22 RX ORDER — METOPROLOL SUCCINATE 50 MG/1
50 TABLET, EXTENDED RELEASE ORAL DAILY
Qty: 90 TABLET | Refills: 1 | Status: SHIPPED | OUTPATIENT
Start: 2023-09-22

## 2023-09-22 RX ORDER — GABAPENTIN 300 MG/1
300 CAPSULE ORAL 3 TIMES DAILY
Qty: 90 CAPSULE | Refills: 1 | Status: SHIPPED | OUTPATIENT
Start: 2023-09-22 | End: 2023-11-21

## 2023-09-22 RX ORDER — BENZTROPINE MESYLATE 1 MG/1
1 TABLET ORAL 2 TIMES DAILY
Qty: 28 TABLET | Refills: 0 | Status: SHIPPED | OUTPATIENT
Start: 2023-09-22 | End: 2023-10-06

## 2023-09-22 RX ORDER — LISINOPRIL 10 MG/1
10 TABLET ORAL DAILY
Qty: 90 TABLET | Refills: 1 | Status: SHIPPED | OUTPATIENT
Start: 2023-09-22

## 2023-09-27 ENCOUNTER — CLINICAL DOCUMENTATION (OUTPATIENT)
Dept: INTERNAL MEDICINE | Age: 68
End: 2023-09-27

## 2023-10-02 DIAGNOSIS — I51.9 LV DYSFUNCTION: ICD-10-CM

## 2023-10-02 DIAGNOSIS — I10 ESSENTIAL (PRIMARY) HYPERTENSION: ICD-10-CM

## 2023-10-02 RX ORDER — LISINOPRIL 10 MG/1
10 TABLET ORAL DAILY
Qty: 28 TABLET | OUTPATIENT
Start: 2023-10-02

## 2023-10-06 ENCOUNTER — TELEPHONE (OUTPATIENT)
Dept: INTERNAL MEDICINE | Age: 68
End: 2023-10-06

## 2023-10-06 NOTE — TELEPHONE ENCOUNTER
ECC call transferred to office. Patient complains of shortness of breath. Patient states that his inhalers are helping with his Shortness of breath. He states that he wants an appt for Monday. Patient instructed to go to ER if symptoms worsen. Verbalized understanding.   Patient given appt for 10/09/23 at 9 am .

## 2023-11-07 RX ORDER — GABAPENTIN 300 MG/1
300 CAPSULE ORAL 3 TIMES DAILY
Qty: 90 CAPSULE | Refills: 1 | Status: SHIPPED | OUTPATIENT
Start: 2023-11-07 | End: 2023-12-07

## 2023-11-09 ENCOUNTER — TRANSCRIBE ORDERS (OUTPATIENT)
Dept: ADMINISTRATIVE | Age: 68
End: 2023-11-09

## 2023-11-09 DIAGNOSIS — E03.9 PRIMARY HYPOTHYROIDISM: Primary | ICD-10-CM

## 2023-11-16 ENCOUNTER — HOSPITAL ENCOUNTER (OUTPATIENT)
Dept: ULTRASOUND IMAGING | Age: 68
Discharge: HOME | End: 2023-11-18
Payer: MEDICARE

## 2023-11-16 DIAGNOSIS — E03.9 PRIMARY HYPOTHYROIDISM: ICD-10-CM

## 2023-11-16 PROCEDURE — 76536 US EXAM OF HEAD AND NECK: CPT

## 2023-11-29 ENCOUNTER — HOSPITAL ENCOUNTER (EMERGENCY)
Age: 68
Discharge: HOME | End: 2023-11-29
Payer: COMMERCIAL

## 2023-11-29 VITALS
RESPIRATION RATE: 17 BRPM | DIASTOLIC BLOOD PRESSURE: 99 MMHG | BODY MASS INDEX: 24.2 KG/M2 | TEMPERATURE: 97.8 F | WEIGHT: 141 LBS | HEART RATE: 91 BPM | OXYGEN SATURATION: 99 % | SYSTOLIC BLOOD PRESSURE: 137 MMHG

## 2023-11-29 DIAGNOSIS — G89.4 CHRONIC PAIN SYNDROME: Primary | ICD-10-CM

## 2023-11-29 PROCEDURE — 6370000000 HC RX 637 (ALT 250 FOR IP): Performed by: NURSE PRACTITIONER

## 2023-11-29 PROCEDURE — 99283 EMERGENCY DEPT VISIT LOW MDM: CPT

## 2023-11-29 RX ORDER — ACETAMINOPHEN 500 MG
1000 TABLET ORAL ONCE
Status: COMPLETED | OUTPATIENT
Start: 2023-11-29 | End: 2023-11-29

## 2023-11-29 RX ADMIN — ACETAMINOPHEN 1000 MG: 500 TABLET ORAL at 12:31

## 2023-11-29 ASSESSMENT — PAIN DESCRIPTION - LOCATION: LOCATION: JAW

## 2023-11-29 ASSESSMENT — PAIN SCALES - GENERAL: PAINLEVEL_OUTOF10: 10

## 2023-11-29 NOTE — ED PROVIDER NOTES
[facility]  ED  Encounter Note  Admit Date/RoomTime: 11/29/2023 12:21 PM  ED Room: [place] [address]  Independent MABEL Visit. HPI:  11/29/23,   Time: 1:25 PM HALEIGH Peña is a 80 y.o. male presenting to the ED for jaw pain, beginning chronic in nature ago. The complaint has been persistent, mild in severity, and worsened by nothing. Presents for complaints of worsening jaw pain which initially began in March after he had a mandible fracture. He states he has ongoing pain to the jaw. He has been taking Tylenol and ibuprofen intermittently for pain. Denies any new falls or injuries. Has been drinking fluids with no difficulty. ROS:   Pertinent positives and negatives are stated within HPI, all other systems reviewed and are negative.  --------------------------------------------- PAST HISTORY ---------------------------------------------  Past Medical History:  has a past medical history of Anxiety, Arthritis, Asthma, Bipolar 1 disorder ([address]), Cardiomegaly, COPD (chronic obstructive pulmonary disease) ([address]), Depression, Diabetes mellitus ([address]), Drug-seeking behavior, GERD (gastroesophageal reflux disease), Hepatitis C, Hypertension, Hyperthyroidism, Kidney stones, Mass of testicle, Mesothelioma ([address]), Paroxysmal A-fib ([address]), Peptic ulcer, Prostate enlargement, Pulmonary embolism ([address]), and Seizures ([address]). Past Surgical History:  has a past surgical history that includes Appendectomy; Lithotripsy; Hemorrhoid surgery; Bladder surgery; Endoscopy, colon, diagnostic (11/13/2015); Abdomen surgery; Colonoscopy; Cardiac surgery; vascular surgery; Cardiac catheterization (05/21/2018); Mandible surgery (N/A, 3/3/2023); and Mandible surgery (N/A, 9/6/2023). Social History:  reports that he quit smoking about 13 years ago. His smoking use included cigarettes. He quit smokeless tobacco use about 5 years ago.   His smokeless tobacco use

## 2023-12-15 ENCOUNTER — APPOINTMENT (OUTPATIENT)
Dept: ULTRASOUND IMAGING | Age: 68
End: 2023-12-15
Payer: MEDICARE

## 2023-12-15 ENCOUNTER — HOSPITAL ENCOUNTER (EMERGENCY)
Age: 68
Discharge: HOME | End: 2023-12-15
Attending: EMERGENCY MEDICINE
Payer: MEDICARE

## 2023-12-15 VITALS
DIASTOLIC BLOOD PRESSURE: 67 MMHG | OXYGEN SATURATION: 97 % | HEART RATE: 58 BPM | SYSTOLIC BLOOD PRESSURE: 105 MMHG | TEMPERATURE: 98.1 F | RESPIRATION RATE: 18 BRPM

## 2023-12-15 DIAGNOSIS — N50.811 TESTICULAR PAIN, RIGHT: Primary | ICD-10-CM

## 2023-12-15 PROCEDURE — 76870 US EXAM SCROTUM: CPT

## 2023-12-15 PROCEDURE — 99284 EMERGENCY DEPT VISIT MOD MDM: CPT

## 2023-12-15 PROCEDURE — 93975 VASCULAR STUDY: CPT

## 2023-12-15 ASSESSMENT — PAIN SCALES - GENERAL: PAINLEVEL_OUTOF10: 9

## 2023-12-15 ASSESSMENT — PAIN - FUNCTIONAL ASSESSMENT: PAIN_FUNCTIONAL_ASSESSMENT: 0-10

## 2023-12-15 NOTE — ED NOTES
Pt refusing to give urine sample at this time. Pt informed urine was for infection purposes and no drug screen was being ran. Pt states \"no, I can't pee\".      Natalie Bucio RN  12/15/23 [id number]
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
## (undated) DEVICE — MARKER,SKIN,WI/RULER AND LABELS: Brand: MEDLINE

## (undated) DEVICE — GLOVE ORANGE PI 8 1/2   MSG9085

## (undated) DEVICE — SYRINGE,CONTROL,LL,FINGER,GRIP: Brand: MEDLINE INDUSTRIES, INC.

## (undated) DEVICE — COVER HNDL LT DISP

## (undated) DEVICE — GARMENT,MEDLINE,DVT,INT,CALF,MED, GEN2: Brand: MEDLINE

## (undated) DEVICE — SINU FOAM: Brand: SINU-FOAM

## (undated) DEVICE — MAND. RECON PLATE, STRAIGHT, W. TEMPLATE: Type: IMPLANTABLE DEVICE | Status: NON-FUNCTIONAL

## (undated) DEVICE — GLOVE SURG SZ 6 THK91MIL LTX FREE SYN POLYISOPRENE ANTI

## (undated) DEVICE — GLOVE ORANGE PI 7   MSG9070

## (undated) DEVICE — DRILL FOR 20MM SCREWS, STRYKER SHAFT

## (undated) DEVICE — COUNTER NDL 30 COUNT DBL MAG

## (undated) DEVICE — GOWN,SIRUS,FABRNF,L,20/CS: Brand: MEDLINE

## (undated) DEVICE — MAGNETIC INSTR DRAPE 20X16: Brand: MEDLINE INDUSTRIES, INC.

## (undated) DEVICE — TUBING SUCT 12FR MAL ALUM SHFT FN CAP VENT UNIV CONN W/ OBT

## (undated) DEVICE — SURGICAL PROCEDURE PACK EENT CUST

## (undated) DEVICE — TRAY,SKIN SCRUB,DRY,W/GAUZE: Brand: MEDLINE

## (undated) DEVICE — GAUZE,SPONGE,4"X4",8PLY,STRL,LF,10/TRAY: Brand: MEDLINE

## (undated) DEVICE — PACK PROCEDURE SURG GEN CUST

## (undated) DEVICE — HEAD AND NECK: Brand: MEDLINE INDUSTRIES, INC.

## (undated) DEVICE — SOLUTION IRRIG 1000ML 0.9% SOD CHL USP POUR PLAS BTL

## (undated) DEVICE — MICRODISSECTION NEEDLE STRAIGHT SLEEVE: Brand: COLORADO

## (undated) DEVICE — TIP SUCT 12FR EXT MTL TIP YANK FOR LT IRRIG VIT VUE

## (undated) DEVICE — DOUBLE BASIN SET: Brand: MEDLINE INDUSTRIES, INC.

## (undated) DEVICE — NIPPER SURG NAIL 5.5 IN CONCAVE-JAW 2-SPRING FURST

## (undated) DEVICE — BLADE,STAINLESS-STEEL,15,STRL,DISPOSABLE: Brand: MEDLINE

## (undated) DEVICE — Device

## (undated) DEVICE — ELECTRODE PT RET AD L9FT HI MOIST COND ADH HYDRGEL CORDED

## (undated) DEVICE — BATTERY PACK FOR VARISPEED: Brand: STRYKER VARISPEED

## (undated) DEVICE — ELECTRODE ELECSURG NDL 2.8 INX7.2 CM COAT INSUL EDGE

## (undated) DEVICE — TAPE ADH W1INXL10YD WHT PAPR GENTLE BRTH FLX COMFORTABLE

## (undated) DEVICE — 4-PORT MANIFOLD: Brand: NEPTUNE 2

## (undated) DEVICE — SOLUTION IRRIGATION BAL SALT SOLUTION 15 ML STRL BSS

## (undated) DEVICE — BLADE ES ELASTOMERIC COAT INSUL DURABLE BEND UPTO 90DEG

## (undated) DEVICE — SURGICAL NERVE STIMULATOR/LOCATOR: Brand: CHECKPOINT HEAD & NECK